# Patient Record
Sex: MALE | Race: WHITE | Employment: OTHER | URBAN - METROPOLITAN AREA
[De-identification: names, ages, dates, MRNs, and addresses within clinical notes are randomized per-mention and may not be internally consistent; named-entity substitution may affect disease eponyms.]

---

## 2018-03-19 ENCOUNTER — INITIAL CONSULT (OUTPATIENT)
Dept: SURGERY | Age: 23
End: 2018-03-19
Payer: COMMERCIAL

## 2018-03-19 VITALS — BODY MASS INDEX: 23.56 KG/M2 | WEIGHT: 120 LBS | HEIGHT: 60 IN

## 2018-03-19 DIAGNOSIS — D49.2 SOFT TISSUE NEOPLASM: Primary | ICD-10-CM

## 2018-03-19 PROCEDURE — 99204 OFFICE O/P NEW MOD 45 MIN: CPT | Performed by: SURGERY

## 2018-03-19 RX ORDER — LACTULOSE 10 G/15ML
SOLUTION ORAL; RECTAL
COMMUNITY
End: 2018-03-23 | Stop reason: ALTCHOICE

## 2018-03-19 NOTE — PROGRESS NOTES
mouth 2 times daily Take morning of surgery with a sip of water       No current facility-administered medications for this visit. No Known Allergies    The patient has a family history that is negative for severe cardiovascular or respiratory issues, negative for reaction to anesthesia. Social History     Social History    Marital status: Single     Spouse name: N/A    Number of children: N/A    Years of education: N/A     Occupational History    Not on file. Social History Main Topics    Smoking status: Never Smoker    Smokeless tobacco: Never Used    Alcohol use No    Drug use: No    Sexual activity: Not on file     Other Topics Concern    Not on file     Social History Narrative    No narrative on file           Review of Systems  Review of Systems -  General ROS: negative for - chills, fatigue or malaise  ENT ROS: negative for - hearing change, nasal congestion or nasal discharge  Allergy and Immunology ROS: negative for - hives, itchy/watery eyes or nasal congestion  Hematological and Lymphatic ROS: negative for - blood clots, blood transfusions, bruising or fatigue  Endocrine ROS: negative for - malaise/lethargy, mood swings, palpitations or polydipsia/polyuria  Breast ROS: negative for - new or changing breast lumps or nipple changes  Respiratory ROS: negative for - sputum changes, stridor, tachypnea or wheezing  Cardiovascular ROS: negative for - irregular heartbeat, loss of consciousness, murmur or orthopnea  Gastrointestinal ROS: negative for - constipation, diarrhea, gas/bloating, heartburn or hematemesis  Genito-Urinary ROS: negative for -  genital discharge, genital ulcers or hematuria  Musculoskeletal ROS: negative for - gait disturbance, muscle pain or muscular weakness      Physical exam:   There were no vitals taken for this visit.   General appearance:  NAD  Head: NCAT, PERRLA, EOMI, red conjunctiva  Neck: supple, no masses  Lungs: CTAB, equal chest rise bilateral  Heart: Reg rate  Abdomen: soft, nontender, nondistended  Skin; soft tissue neoplasm of right leg that is soft rubbery and mobile. Gu: no cva tenderness  Extremities: extremities normal, atraumatic, no cyanosis or edema  Pyschosocial: normal affect, no signs of depression      Assessment:  23 y.o. male with soft tissue neoplasm of right leg    Plan: To OR for excision Discussed the risk, benefits and alternatives of surgery including wound infections, bleeding, scar  and the risks of  anesthetic including MI, CVA, sudden death or reactions to anesthetic medications. The patient understands the risks and alternatives. All questions were answered to the patient's satisfaction and they freely signed the consent.     Clark Ansari MD  1:54 PM  3/19/2018

## 2018-03-21 ENCOUNTER — TELEPHONE (OUTPATIENT)
Dept: SURGERY | Age: 23
End: 2018-03-21

## 2018-03-23 ENCOUNTER — PREP FOR PROCEDURE (OUTPATIENT)
Dept: SURGERY | Age: 23
End: 2018-03-23

## 2018-03-23 RX ORDER — SODIUM CHLORIDE 0.9 % (FLUSH) 0.9 %
10 SYRINGE (ML) INJECTION PRN
Status: CANCELLED | OUTPATIENT
Start: 2018-03-23

## 2018-03-23 RX ORDER — SODIUM CHLORIDE 0.9 % (FLUSH) 0.9 %
10 SYRINGE (ML) INJECTION EVERY 12 HOURS SCHEDULED
Status: CANCELLED | OUTPATIENT
Start: 2018-03-23

## 2018-03-23 RX ORDER — SODIUM CHLORIDE, SODIUM LACTATE, POTASSIUM CHLORIDE, CALCIUM CHLORIDE 600; 310; 30; 20 MG/100ML; MG/100ML; MG/100ML; MG/100ML
INJECTION, SOLUTION INTRAVENOUS CONTINUOUS
Status: CANCELLED | OUTPATIENT
Start: 2018-03-23

## 2018-03-26 ENCOUNTER — ANESTHESIA EVENT (OUTPATIENT)
Dept: OPERATING ROOM | Age: 23
End: 2018-03-26
Payer: COMMERCIAL

## 2018-03-26 ENCOUNTER — ANESTHESIA (OUTPATIENT)
Dept: OPERATING ROOM | Age: 23
End: 2018-03-26
Payer: COMMERCIAL

## 2018-03-26 ENCOUNTER — HOSPITAL ENCOUNTER (OUTPATIENT)
Age: 23
Setting detail: OUTPATIENT SURGERY
Discharge: HOME | End: 2018-03-26
Attending: SURGERY | Admitting: SURGERY
Payer: COMMERCIAL

## 2018-03-26 VITALS
DIASTOLIC BLOOD PRESSURE: 44 MMHG | RESPIRATION RATE: 13 BRPM | OXYGEN SATURATION: 92 % | SYSTOLIC BLOOD PRESSURE: 86 MMHG

## 2018-03-26 VITALS
TEMPERATURE: 97.1 F | BODY MASS INDEX: 23.36 KG/M2 | HEART RATE: 92 BPM | RESPIRATION RATE: 16 BRPM | OXYGEN SATURATION: 96 % | WEIGHT: 119 LBS | SYSTOLIC BLOOD PRESSURE: 98 MMHG | DIASTOLIC BLOOD PRESSURE: 54 MMHG | HEIGHT: 60 IN

## 2018-03-26 PROCEDURE — 3700000001 HC ADD 15 MINUTES (ANESTHESIA): Performed by: SURGERY

## 2018-03-26 PROCEDURE — 27618 EXC LEG/ANKLE TUM < 3 CM: CPT | Performed by: SURGERY

## 2018-03-26 PROCEDURE — 2500000003 HC RX 250 WO HCPCS: Performed by: SURGERY

## 2018-03-26 PROCEDURE — 2500000003 HC RX 250 WO HCPCS

## 2018-03-26 PROCEDURE — 7100000011 HC PHASE II RECOVERY - ADDTL 15 MIN: Performed by: SURGERY

## 2018-03-26 PROCEDURE — 88305 TISSUE EXAM BY PATHOLOGIST: CPT

## 2018-03-26 PROCEDURE — 6360000002 HC RX W HCPCS

## 2018-03-26 PROCEDURE — 3600000012 HC SURGERY LEVEL 2 ADDTL 15MIN: Performed by: SURGERY

## 2018-03-26 PROCEDURE — 3600000002 HC SURGERY LEVEL 2 BASE: Performed by: SURGERY

## 2018-03-26 PROCEDURE — 2580000003 HC RX 258

## 2018-03-26 PROCEDURE — 3700000000 HC ANESTHESIA ATTENDED CARE: Performed by: SURGERY

## 2018-03-26 PROCEDURE — 7100000010 HC PHASE II RECOVERY - FIRST 15 MIN: Performed by: SURGERY

## 2018-03-26 RX ORDER — CEFAZOLIN SODIUM 1 G/3ML
INJECTION, POWDER, FOR SOLUTION INTRAMUSCULAR; INTRAVENOUS PRN
Status: DISCONTINUED | OUTPATIENT
Start: 2018-03-26 | End: 2018-03-26 | Stop reason: SDUPTHER

## 2018-03-26 RX ORDER — KETAMINE HYDROCHLORIDE 50 MG/ML
INJECTION, SOLUTION, CONCENTRATE INTRAMUSCULAR; INTRAVENOUS PRN
Status: DISCONTINUED | OUTPATIENT
Start: 2018-03-26 | End: 2018-03-26 | Stop reason: SDUPTHER

## 2018-03-26 RX ORDER — GLYCOPYRROLATE 0.2 MG/ML
INJECTION INTRAMUSCULAR; INTRAVENOUS PRN
Status: DISCONTINUED | OUTPATIENT
Start: 2018-03-26 | End: 2018-03-26 | Stop reason: SDUPTHER

## 2018-03-26 RX ORDER — MIDAZOLAM HYDROCHLORIDE 1 MG/ML
INJECTION INTRAMUSCULAR; INTRAVENOUS PRN
Status: DISCONTINUED | OUTPATIENT
Start: 2018-03-26 | End: 2018-03-26 | Stop reason: SDUPTHER

## 2018-03-26 RX ORDER — SODIUM CHLORIDE, SODIUM LACTATE, POTASSIUM CHLORIDE, CALCIUM CHLORIDE 600; 310; 30; 20 MG/100ML; MG/100ML; MG/100ML; MG/100ML
INJECTION, SOLUTION INTRAVENOUS CONTINUOUS
Status: DISCONTINUED | OUTPATIENT
Start: 2018-03-26 | End: 2018-03-26 | Stop reason: HOSPADM

## 2018-03-26 RX ORDER — BUPIVACAINE HYDROCHLORIDE AND EPINEPHRINE 2.5; 5 MG/ML; UG/ML
INJECTION, SOLUTION EPIDURAL; INFILTRATION; INTRACAUDAL; PERINEURAL PRN
Status: DISCONTINUED | OUTPATIENT
Start: 2018-03-26 | End: 2018-03-26 | Stop reason: HOSPADM

## 2018-03-26 RX ORDER — SODIUM CHLORIDE 9 MG/ML
INJECTION, SOLUTION INTRAVENOUS CONTINUOUS PRN
Status: DISCONTINUED | OUTPATIENT
Start: 2018-03-26 | End: 2018-03-26 | Stop reason: SDUPTHER

## 2018-03-26 RX ORDER — PROPOFOL 10 MG/ML
INJECTION, EMULSION INTRAVENOUS CONTINUOUS PRN
Status: DISCONTINUED | OUTPATIENT
Start: 2018-03-26 | End: 2018-03-26 | Stop reason: SDUPTHER

## 2018-03-26 RX ORDER — ONDANSETRON 2 MG/ML
INJECTION INTRAMUSCULAR; INTRAVENOUS PRN
Status: DISCONTINUED | OUTPATIENT
Start: 2018-03-26 | End: 2018-03-26 | Stop reason: SDUPTHER

## 2018-03-26 RX ORDER — SODIUM CHLORIDE 0.9 % (FLUSH) 0.9 %
10 SYRINGE (ML) INJECTION EVERY 12 HOURS SCHEDULED
Status: DISCONTINUED | OUTPATIENT
Start: 2018-03-26 | End: 2018-03-26 | Stop reason: HOSPADM

## 2018-03-26 RX ORDER — SODIUM CHLORIDE 0.9 % (FLUSH) 0.9 %
10 SYRINGE (ML) INJECTION PRN
Status: DISCONTINUED | OUTPATIENT
Start: 2018-03-26 | End: 2018-03-26 | Stop reason: HOSPADM

## 2018-03-26 RX ORDER — FENTANYL CITRATE 50 UG/ML
INJECTION, SOLUTION INTRAMUSCULAR; INTRAVENOUS PRN
Status: DISCONTINUED | OUTPATIENT
Start: 2018-03-26 | End: 2018-03-26 | Stop reason: SDUPTHER

## 2018-03-26 RX ADMIN — SODIUM CHLORIDE: 9 INJECTION, SOLUTION INTRAVENOUS at 10:08

## 2018-03-26 RX ADMIN — KETAMINE HYDROCHLORIDE 60 MG: 50 INJECTION, SOLUTION INTRAMUSCULAR; INTRAVENOUS at 10:06

## 2018-03-26 RX ADMIN — PROPOFOL 100 MCG/KG/MIN: 10 INJECTION, EMULSION INTRAVENOUS at 10:10

## 2018-03-26 RX ADMIN — CEFAZOLIN 1000 MG: 1 INJECTION, POWDER, FOR SOLUTION INTRAMUSCULAR; INTRAVENOUS; PARENTERAL at 10:18

## 2018-03-26 RX ADMIN — MIDAZOLAM HYDROCHLORIDE 2 MG: 1 INJECTION INTRAMUSCULAR; INTRAVENOUS at 10:06

## 2018-03-26 RX ADMIN — FENTANYL CITRATE 100 MCG: 50 INJECTION, SOLUTION INTRAMUSCULAR; INTRAVENOUS at 10:10

## 2018-03-26 RX ADMIN — ONDANSETRON 4 MG: 2 INJECTION, SOLUTION INTRAMUSCULAR; INTRAVENOUS at 10:17

## 2018-03-26 RX ADMIN — GLYCOPYRROLATE 0.2 MG: 0.2 INJECTION, SOLUTION INTRAMUSCULAR; INTRAVENOUS at 10:17

## 2018-03-26 ASSESSMENT — PULMONARY FUNCTION TESTS
PIF_VALUE: 1
PIF_VALUE: 0
PIF_VALUE: 1
PIF_VALUE: 0
PIF_VALUE: 1
PIF_VALUE: 0
PIF_VALUE: 1

## 2018-03-26 ASSESSMENT — PAIN - FUNCTIONAL ASSESSMENT: PAIN_FUNCTIONAL_ASSESSMENT: FACES

## 2018-03-26 NOTE — H&P
LACTULOSE PO Take  by mouth 3 times daily.        citalopram (CELEXA) 10 MG/5ML solution Take 10 mg by mouth daily.        RANITIDINE HCL PO Take 15 mg by mouth 2 times daily Take morning of surgery with a sip of water          No current facility-administered medications for this visit.             No Known Allergies     The patient has a family history that is negative for severe cardiovascular or respiratory issues, negative for reaction to anesthesia.     Social History   Social History            Social History    Marital status: Single       Spouse name: N/A    Number of children: N/A    Years of education: N/A          Occupational History    Not on file.           Social History Main Topics    Smoking status: Never Smoker    Smokeless tobacco: Never Used    Alcohol use No    Drug use: No    Sexual activity: Not on file           Other Topics Concern    Not on file          Social History Narrative    No narrative on file                  Review of Systems  Review of Systems -  General ROS: negative for - chills, fatigue or malaise  ENT ROS: negative for - hearing change, nasal congestion or nasal discharge  Allergy and Immunology ROS: negative for - hives, itchy/watery eyes or nasal congestion  Hematological and Lymphatic ROS: negative for - blood clots, blood transfusions, bruising or fatigue  Endocrine ROS: negative for - malaise/lethargy, mood swings, palpitations or polydipsia/polyuria  Breast ROS: negative for - new or changing breast lumps or nipple changes  Respiratory ROS: negative for - sputum changes, stridor, tachypnea or wheezing  Cardiovascular ROS: negative for - irregular heartbeat, loss of consciousness, murmur or orthopnea  Gastrointestinal ROS: negative for - constipation, diarrhea, gas/bloating, heartburn or hematemesis  Genito-Urinary ROS: negative for -  genital discharge, genital ulcers or hematuria  Musculoskeletal ROS: negative for - gait disturbance, muscle pain or muscular weakness        Physical exam:   There were no vitals taken for this visit. General appearance:  NAD  Head: NCAT, PERRLA, EOMI, red conjunctiva  Neck: supple, no masses  Lungs: CTAB, equal chest rise bilateral  Heart: Reg rate  Abdomen: soft, nontender, nondistended  Skin; soft tissue neoplasm of right leg that is soft rubbery and mobile. Gu: no cva tenderness  Extremities: extremities normal, atraumatic, no cyanosis or edema  Pyschosocial: normal affect, no signs of depression        Assessment:  23 y.o. male with soft tissue neoplasm of right leg     Plan: To OR for excision Discussed the risk, benefits and alternatives of surgery including wound infections, bleeding, scar  and the risks of  anesthetic including MI, CVA, sudden death or reactions to anesthetic medications. The patient understands the risks and alternatives.  All questions were answered to the patient's satisfaction and they freely signed the consent.  Malika Mayer MD

## 2018-03-28 ENCOUNTER — TELEPHONE (OUTPATIENT)
Dept: SURGERY | Age: 23
End: 2018-03-28

## 2018-03-28 NOTE — TELEPHONE ENCOUNTER
Patient's mom called saying Erik's thigh is swollen, red and warm to the touch. ..says incision looks fine--doesn't look infected, says it's just his thigh above the surgical site and she says it's more swollen today than yesterday. Denies fever or any other sx. Message sent to Dr Dolly Harrison. Per Dr Uriel Freed orders, pt can be seen here Thursday or can go to the ER. Called to inform mother and have to leave a voicemail msg to return my call.     Electronically signed by Narinder Sin MA on 3/28/18 at 8:52 AM

## 2018-03-29 ENCOUNTER — OFFICE VISIT (OUTPATIENT)
Dept: SURGERY | Age: 23
End: 2018-03-29

## 2018-03-29 VITALS — SYSTOLIC BLOOD PRESSURE: 102 MMHG | DIASTOLIC BLOOD PRESSURE: 68 MMHG | HEART RATE: 64 BPM

## 2018-03-29 DIAGNOSIS — D49.2 SOFT TISSUE NEOPLASM: Primary | ICD-10-CM

## 2018-03-29 PROCEDURE — 99024 POSTOP FOLLOW-UP VISIT: CPT | Performed by: SURGERY

## 2018-04-09 ENCOUNTER — OFFICE VISIT (OUTPATIENT)
Dept: SURGERY | Age: 23
End: 2018-04-09

## 2018-04-09 VITALS — SYSTOLIC BLOOD PRESSURE: 106 MMHG | HEART RATE: 70 BPM | DIASTOLIC BLOOD PRESSURE: 72 MMHG

## 2018-04-09 DIAGNOSIS — D49.2 SOFT TISSUE NEOPLASM: Primary | ICD-10-CM

## 2018-04-09 PROCEDURE — 99024 POSTOP FOLLOW-UP VISIT: CPT | Performed by: SURGERY

## 2018-04-09 RX ORDER — SULFAMETHOXAZOLE AND TRIMETHOPRIM 800; 160 MG/1; MG/1
1 TABLET ORAL 2 TIMES DAILY
Qty: 20 TABLET | Refills: 0 | Status: SHIPPED | OUTPATIENT
Start: 2018-04-09 | End: 2018-04-19

## 2018-05-01 ENCOUNTER — HOSPITAL ENCOUNTER (OUTPATIENT)
Dept: WOUND CARE | Age: 23
Discharge: HOME | End: 2018-05-01
Payer: COMMERCIAL

## 2018-05-01 VITALS
RESPIRATION RATE: 16 BRPM | HEART RATE: 84 BPM | SYSTOLIC BLOOD PRESSURE: 100 MMHG | WEIGHT: 119 LBS | HEIGHT: 60 IN | BODY MASS INDEX: 23.36 KG/M2 | DIASTOLIC BLOOD PRESSURE: 60 MMHG | TEMPERATURE: 97.3 F

## 2018-05-01 PROCEDURE — 99203 OFFICE O/P NEW LOW 30 MIN: CPT | Performed by: FAMILY MEDICINE

## 2018-05-01 PROCEDURE — 87070 CULTURE OTHR SPECIMN AEROBIC: CPT

## 2018-05-01 PROCEDURE — 11042 DBRDMT SUBQ TIS 1ST 20SQCM/<: CPT | Performed by: FAMILY MEDICINE

## 2018-05-01 PROCEDURE — 99213 OFFICE O/P EST LOW 20 MIN: CPT

## 2018-05-01 PROCEDURE — 87075 CULTR BACTERIA EXCEPT BLOOD: CPT

## 2018-05-01 PROCEDURE — 11042 DBRDMT SUBQ TIS 1ST 20SQCM/<: CPT

## 2018-05-03 LAB
ANAEROBIC CULTURE: NORMAL
ORGANISM: ABNORMAL
WOUND/ABSCESS: ABNORMAL
WOUND/ABSCESS: ABNORMAL

## 2018-05-15 ENCOUNTER — HOSPITAL ENCOUNTER (OUTPATIENT)
Dept: WOUND CARE | Age: 23
Discharge: HOME | End: 2018-05-15
Payer: COMMERCIAL

## 2018-05-15 VITALS
TEMPERATURE: 96.5 F | SYSTOLIC BLOOD PRESSURE: 110 MMHG | HEART RATE: 62 BPM | RESPIRATION RATE: 16 BRPM | HEIGHT: 60 IN | DIASTOLIC BLOOD PRESSURE: 62 MMHG | WEIGHT: 119 LBS | BODY MASS INDEX: 23.36 KG/M2

## 2018-05-15 PROCEDURE — 11042 DBRDMT SUBQ TIS 1ST 20SQCM/<: CPT | Performed by: FAMILY MEDICINE

## 2018-05-15 PROCEDURE — 11042 DBRDMT SUBQ TIS 1ST 20SQCM/<: CPT

## 2018-05-22 ENCOUNTER — HOSPITAL ENCOUNTER (OUTPATIENT)
Dept: WOUND CARE | Age: 23
Discharge: HOME | End: 2018-05-22
Payer: COMMERCIAL

## 2018-05-22 VITALS
SYSTOLIC BLOOD PRESSURE: 110 MMHG | RESPIRATION RATE: 16 BRPM | TEMPERATURE: 99.5 F | DIASTOLIC BLOOD PRESSURE: 62 MMHG | HEART RATE: 60 BPM

## 2018-05-22 PROCEDURE — 11042 DBRDMT SUBQ TIS 1ST 20SQCM/<: CPT

## 2018-05-22 PROCEDURE — 11042 DBRDMT SUBQ TIS 1ST 20SQCM/<: CPT | Performed by: FAMILY MEDICINE

## 2018-05-29 ENCOUNTER — HOSPITAL ENCOUNTER (OUTPATIENT)
Dept: WOUND CARE | Age: 23
Discharge: HOME | End: 2018-05-29
Payer: COMMERCIAL

## 2018-05-29 VITALS
HEART RATE: 96 BPM | WEIGHT: 119 LBS | BODY MASS INDEX: 23.36 KG/M2 | SYSTOLIC BLOOD PRESSURE: 106 MMHG | RESPIRATION RATE: 18 BRPM | HEIGHT: 60 IN | TEMPERATURE: 97.6 F | DIASTOLIC BLOOD PRESSURE: 80 MMHG

## 2018-05-29 PROCEDURE — 11042 DBRDMT SUBQ TIS 1ST 20SQCM/<: CPT

## 2018-05-29 PROCEDURE — 11042 DBRDMT SUBQ TIS 1ST 20SQCM/<: CPT | Performed by: FAMILY MEDICINE

## 2018-05-29 ASSESSMENT — PAIN SCALES - GENERAL: PAINLEVEL_OUTOF10: 0

## 2018-06-08 ENCOUNTER — HOSPITAL ENCOUNTER (OUTPATIENT)
Dept: WOUND CARE | Age: 23
Discharge: HOME | End: 2018-06-08
Payer: COMMERCIAL

## 2018-06-08 VITALS
DIASTOLIC BLOOD PRESSURE: 62 MMHG | SYSTOLIC BLOOD PRESSURE: 100 MMHG | RESPIRATION RATE: 16 BRPM | TEMPERATURE: 98 F | HEART RATE: 80 BPM

## 2018-06-08 PROCEDURE — 87077 CULTURE AEROBIC IDENTIFY: CPT

## 2018-06-08 PROCEDURE — 87186 SC STD MICRODIL/AGAR DIL: CPT

## 2018-06-08 PROCEDURE — 11042 DBRDMT SUBQ TIS 1ST 20SQCM/<: CPT

## 2018-06-08 PROCEDURE — 87070 CULTURE OTHR SPECIMN AEROBIC: CPT

## 2018-06-08 PROCEDURE — 11042 DBRDMT SUBQ TIS 1ST 20SQCM/<: CPT | Performed by: FAMILY MEDICINE

## 2018-06-08 PROCEDURE — 87075 CULTR BACTERIA EXCEPT BLOOD: CPT

## 2018-06-08 ASSESSMENT — PAIN SCALES - GENERAL: PAINLEVEL_OUTOF10: 0

## 2018-06-10 LAB — ANAEROBIC CULTURE: NORMAL

## 2018-06-12 ENCOUNTER — HOSPITAL ENCOUNTER (OUTPATIENT)
Dept: WOUND CARE | Age: 23
Discharge: HOME | End: 2018-06-12
Payer: COMMERCIAL

## 2018-06-12 VITALS
BODY MASS INDEX: 23.36 KG/M2 | TEMPERATURE: 97.5 F | WEIGHT: 119 LBS | HEART RATE: 98 BPM | DIASTOLIC BLOOD PRESSURE: 60 MMHG | RESPIRATION RATE: 18 BRPM | SYSTOLIC BLOOD PRESSURE: 102 MMHG | HEIGHT: 60 IN

## 2018-06-12 LAB
ORGANISM: ABNORMAL
WOUND/ABSCESS: ABNORMAL
WOUND/ABSCESS: ABNORMAL

## 2018-06-12 PROCEDURE — 97607 NEG PRS WND THR NDME<=50SQCM: CPT

## 2018-06-12 PROCEDURE — 11042 DBRDMT SUBQ TIS 1ST 20SQCM/<: CPT

## 2018-06-12 PROCEDURE — 11042 DBRDMT SUBQ TIS 1ST 20SQCM/<: CPT | Performed by: FAMILY MEDICINE

## 2018-06-22 ENCOUNTER — HOSPITAL ENCOUNTER (OUTPATIENT)
Dept: WOUND CARE | Age: 23
Discharge: HOME | End: 2018-06-22
Payer: COMMERCIAL

## 2018-06-22 VITALS
TEMPERATURE: 97.6 F | RESPIRATION RATE: 18 BRPM | SYSTOLIC BLOOD PRESSURE: 106 MMHG | DIASTOLIC BLOOD PRESSURE: 64 MMHG | HEART RATE: 96 BPM

## 2018-06-22 PROCEDURE — 97597 DBRDMT OPN WND 1ST 20 CM/<: CPT | Performed by: FAMILY MEDICINE

## 2018-06-22 PROCEDURE — 97597 DBRDMT OPN WND 1ST 20 CM/<: CPT

## 2018-06-22 PROCEDURE — 97607 NEG PRS WND THR NDME<=50SQCM: CPT

## 2018-06-29 ENCOUNTER — HOSPITAL ENCOUNTER (OUTPATIENT)
Dept: WOUND CARE | Age: 23
Discharge: HOME | End: 2018-06-29
Payer: COMMERCIAL

## 2018-06-29 VITALS
DIASTOLIC BLOOD PRESSURE: 68 MMHG | TEMPERATURE: 98.1 F | RESPIRATION RATE: 16 BRPM | SYSTOLIC BLOOD PRESSURE: 98 MMHG | HEART RATE: 90 BPM

## 2018-06-29 PROCEDURE — 87186 SC STD MICRODIL/AGAR DIL: CPT

## 2018-06-29 PROCEDURE — 87077 CULTURE AEROBIC IDENTIFY: CPT

## 2018-06-29 PROCEDURE — 11042 DBRDMT SUBQ TIS 1ST 20SQCM/<: CPT | Performed by: FAMILY MEDICINE

## 2018-06-29 PROCEDURE — 87070 CULTURE OTHR SPECIMN AEROBIC: CPT

## 2018-06-29 PROCEDURE — 87075 CULTR BACTERIA EXCEPT BLOOD: CPT

## 2018-06-29 PROCEDURE — 11042 DBRDMT SUBQ TIS 1ST 20SQCM/<: CPT

## 2018-06-29 ASSESSMENT — PAIN SCALES - GENERAL: PAINLEVEL_OUTOF10: 0

## 2018-07-01 LAB — ANAEROBIC CULTURE: NORMAL

## 2018-07-02 LAB
ORGANISM: ABNORMAL
WOUND/ABSCESS: ABNORMAL

## 2018-07-06 ENCOUNTER — HOSPITAL ENCOUNTER (OUTPATIENT)
Dept: WOUND CARE | Age: 23
Discharge: HOME | End: 2018-07-06
Payer: COMMERCIAL

## 2018-07-06 VITALS
SYSTOLIC BLOOD PRESSURE: 100 MMHG | HEART RATE: 100 BPM | DIASTOLIC BLOOD PRESSURE: 62 MMHG | TEMPERATURE: 98 F | RESPIRATION RATE: 18 BRPM

## 2018-07-06 DIAGNOSIS — L97.812 ULCER OF KNEE, RIGHT, WITH FAT LAYER EXPOSED (HCC): Primary | ICD-10-CM

## 2018-07-06 DIAGNOSIS — A49.02 INFECTION OF WOUND DUE TO METHICILLIN RESISTANT STAPHYLOCOCCUS AUREUS (MRSA): ICD-10-CM

## 2018-07-06 PROCEDURE — 11042 DBRDMT SUBQ TIS 1ST 20SQCM/<: CPT | Performed by: FAMILY MEDICINE

## 2018-07-06 PROCEDURE — 11042 DBRDMT SUBQ TIS 1ST 20SQCM/<: CPT

## 2018-07-06 RX ORDER — SULFAMETHOXAZOLE AND TRIMETHOPRIM 800; 160 MG/1; MG/1
1 TABLET ORAL 2 TIMES DAILY
Qty: 20 TABLET | Refills: 0 | Status: SHIPPED | OUTPATIENT
Start: 2018-07-06 | End: 2018-07-31

## 2018-07-06 ASSESSMENT — PAIN SCALES - GENERAL: PAINLEVEL_OUTOF10: 0

## 2018-07-06 NOTE — PROGRESS NOTES
Referring Physician : Estephania Morillo MD  [facility] RECORD NUMBER:  [id number]  AGE: 23 y.o. GENDER: male  : 1995  EPISODE DATE:  2018    Subjective:     Chief Complaint   Patient presents with    Wound Check      HISTORY of PRESENT ILLNESS HPI   Yared Quinn is a 23 y.o. male who presents today for wound/ulcer evaluation. History of Wound Context:  Right popliteal wound post op to a biopsy site. Wound/Ulcer Pain Timing/Severity: none  Quality of pain: N/A  Severity:  0 / 10   Modifying Factors: None  Associated Signs/Symptoms: irritation with attending tgo the wound    Ulcer Identification:  Ulcer Type: non-healing surgical  Contributing Factors: MRSA    Diabetic/Pressure/Non Pressure Ulcers only:  Ulcer: dehisced surgical wound    Wound: Nonhealing surgical due to infection        PAST MEDICAL HISTORY      Diagnosis Date    G tube feedings [facility])     age 3    History of blood transfusion     Mental handicap     mom states severe brain damage    Seizures ([address]      Past Surgical History:   Procedure Laterality Date    DENTAL SURGERY  2015    xrays, restorations and extractions x1    DENTAL SURGERY  2016    LEG TENDON SURGERY Bilateral     [place] BIOPSY, EACH ADDED LESION Right 3/26/2018    EXCISION OF SOFT TISSUE NEOPLASM RIGHT LEG performed by [facility] MD at [facility]     No family history on file.   Social History   Substance Use Topics    Smoking status: Never Smoker    Smokeless tobacco: Never Used    Alcohol use No     No Known Allergies  Current Outpatient Prescriptions on File Prior to Encounter   Medication Sig Dispense Refill    Elastic Bandages & Supports (KNEE BRACE ADJUSTABLE HINGED) MISC 1 Units by Does not apply route daily 1 each 0    Bisacodyl (DULCOLAX PO) Take by mouth daily as needed       Polyethylene Glycol 3350 (MIRALAX PO) Take by mouth      lacosamide (VIMPAT) 150 MG TABS tablet Take 150 mg by mouth 2 times daily Take morning of base.    Performed by: Heather Hassan DO    Consent obtained:  Yes    Time out taken:  Yes    Pain Control: Anesthetic  Anesthetic: 2% Lidocaine Gel Topical     Debridement:Excisional Debridement    Using curette the wound(s)/ulcer(s) was/were sharply debrided down through and including the removal of subcutaneous tissue. Devitalized Tissue Debrided:  fibrin, biofilm and slough to stimulate bleeding to promote healing, post debridement good bleeding base and wound edges noted    Pre Debridement Measurements:  Are located in the Wound/Ulcer Documentation Flow Sheet    Wound/Ulcer #: 1    Post Debridement Measurements:  Wound/Ulcer Descriptions are Pre Debridement except measurements:    Wound 05/01/18 Knee Posterior new #1 surgjical. right post knee. date of surgery april 9, 2018 (Active)   Wound Image   6/8/2018  1:21 PM   Dressing Status New drainage; Changed 5/15/2018  3:36 PM   Dressing Changed Changed/New 6/22/2018  1:59 PM   Dressing/Treatment Vacuum dressing 6/22/2018  1:59 PM   Wound Cleansed Rinsed/Irrigated with saline 6/22/2018  1:59 PM   Dressing Change Due 06/24/18 6/22/2018  1:59 PM   Wound Length (cm) 0.5 cm 7/6/2018  2:28 PM   Wound Width (cm) 0.5 cm 7/6/2018  2:28 PM   Wound Depth (cm)  .4 7/6/2018  2:28 PM   Calculated Wound Size (cm^2) (l*w) 0.25 cm^2 7/6/2018  2:28 PM   Change in Wound Size % (l*w) 91.26 7/6/2018  2:28 PM   Wound Assessment Pink 7/6/2018  1:32 PM   Drainage Amount Moderate 7/6/2018  1:32 PM   Drainage Description Serosanguinous 7/6/2018  1:32 PM   Odor None 7/6/2018  1:32 PM   Exposed structure Tendon 5/29/2018  3:35 PM   Cassie-wound Assessment Intact 7/6/2018  1:32 PM   Red%Wound Bed 40 5/1/2018  1:57 PM   Yellow%Wound Bed 60 5/1/2018  1:57 PM   Culture Taken Yes 6/8/2018  1:44 PM   Time out Yes 7/6/2018  2:28 PM   Procedural Pain 5 7/6/2018  2:28 PM   Post procedural Pain 2 7/6/2018  2:28 PM   Number of days: 66       Incision 03/26/15 Mouth (Active)   Number of days: 1198       Incision 12/08/16 Mouth (Active)   Number of days: 574       Incision 03/26/18 Leg Right (Active)   Number of days: 102     Percent of Wound/Ulcer Debrided: 100%    Total Surface Area Debrided:  0.25 cm sq. sq cm     Estimated Blood Loss:  None  Hemostasis Achieved:  by pressure    Procedural Pain:  0  / 10   Post Procedural Pain:  0 / 10     Response to treatment:  Well tolerated by patient. Plan:   Treatment Note please see attached Discharge Instructions  MRSA on last C&S We discussed with family need for IVAB TX due to resultant DERIAN's  Will stop VCA start Mupirocin daily as well as Bactrim and get ID consult. Written patient dismissal instructions given to patient and signed by patient or POA. Discharge Instructions         Visit Discharge/Physician Orders     Discharge condition: Stable     Assessment of pain at discharge: mild     Anesthetic used: 2% lidocaine     Discharge to: Home     Left via:Private automobile     Accompanied by: family     ECF/HHA: None     Dressing Orders: WOUND VAC HOLD. continue to cleanse wound with vashe in clinic, then rinse with normal saline solution. Pack wound lightly but thoroughly with MUPIRICIN ointment, then apply silver alginate and dry dressing. CHANGE EVERY 24 HRS.    FAX WOUND MEASUREMENTS TO I TWICE A MONTH #[phone]     Treatment Orders consult infectious disease. Begin to take liquid antibiotic. :[facility] followup visit ____________1 week_________________  (Please note your next appointment above and if you are unable to keep, kindly give a 24 hour notice.  Thank you.)     Physician signature:__________________________        If you experience any of the following, please call the [address] [place] [address] Road during business hours:     * Increase in Pain  * Temperature over 101  * Increase in drainage from your wound  * Drainage with a foul odor  * Bleeding  * Increase in swelling  * Need for compression bandage changes due to slippage,

## 2018-07-13 ENCOUNTER — HOSPITAL ENCOUNTER (OUTPATIENT)
Dept: WOUND CARE | Age: 23
Discharge: HOME | End: 2018-07-13
Payer: COMMERCIAL

## 2018-07-13 VITALS
RESPIRATION RATE: 18 BRPM | TEMPERATURE: 98 F | DIASTOLIC BLOOD PRESSURE: 62 MMHG | HEART RATE: 90 BPM | SYSTOLIC BLOOD PRESSURE: 98 MMHG

## 2018-07-13 DIAGNOSIS — L97.812 ULCER OF KNEE, RIGHT, WITH FAT LAYER EXPOSED (HCC): ICD-10-CM

## 2018-07-13 DIAGNOSIS — T81.31XS POSTOPERATIVE WOUND DEHISCENCE, SEQUELA: ICD-10-CM

## 2018-07-13 PROBLEM — T81.31XA DEHISCENCE OF SURGICAL WOUND: Status: ACTIVE | Noted: 2018-07-13

## 2018-07-13 PROCEDURE — 11042 DBRDMT SUBQ TIS 1ST 20SQCM/<: CPT

## 2018-07-13 PROCEDURE — 11042 DBRDMT SUBQ TIS 1ST 20SQCM/<: CPT | Performed by: FAMILY MEDICINE

## 2018-07-13 RX ORDER — GENTAMICIN SULFATE 1 MG/G
OINTMENT TOPICAL
Qty: 30 G | Refills: 1 | Status: SHIPPED | OUTPATIENT
Start: 2018-07-13 | End: 2018-07-20

## 2018-07-13 RX ORDER — SULFAMETHOXAZOLE AND TRIMETHOPRIM 800; 160 MG/1; MG/1
1 TABLET ORAL 2 TIMES DAILY
Qty: 20 TABLET | Refills: 0 | Status: SHIPPED | OUTPATIENT
Start: 2018-07-13 | End: 2018-07-31

## 2018-07-13 NOTE — PROGRESS NOTES
Referring Physician : Zackary Glass MD  [facility] RECORD NUMBER:  [id number]  AGE: 23 y.o. GENDER: male  : 1995  EPISODE DATE:  2018    Subjective:     Chief Complaint   Patient presents with    Wound Check      HISTORY of PRESENT ILLNESS HPI   Vero Heredia is a 23 y.o. male who presents today for wound/ulcer evaluation. History of Wound Context:  Post operative biopsy Right popliteal fossa wound opened. Chronic ulcer difficult to close in this location as his flexion contractures keep the knee hyperflexed. Wound/Ulcer Pain Timing/Severity: none  Quality of pain: N/A  Severity:  0 / 10   Modifying Factors: contractures from CP  Associated Signs/Symptoms: infection    Ulcer Identification:  Ulcer Type: non-healing surgical  Contributing Factors: position    Diabetic/Pressure/Non Pressure Ulcers only:  Ulcer: Surgial    Wound: Nonhealing surgical due to infection        PAST MEDICAL HISTORY      Diagnosis Date    G tube feedings [facility])     age 3    History of blood transfusion     Mental handicap     mom states severe brain damage    Seizures [address] [address]      Past Surgical History:   Procedure Laterality Date    DENTAL SURGERY  2015    xrays, restorations and extractions x1    DENTAL SURGERY  2016    LEG TENDON SURGERY Bilateral     [place] BIOPSY, EACH ADDED LESION Right 3/26/2018    EXCISION OF SOFT TISSUE NEOPLASM RIGHT LEG performed by Sonido Chen MD at [address]     No family history on file. Social History   Substance Use Topics    Smoking status: Never Smoker    Smokeless tobacco: Never Used    Alcohol use No     No Known Allergies  Current Outpatient Prescriptions on File Prior to Encounter   Medication Sig Dispense Refill    sulfamethoxazole-trimethoprim (BACTRIM DS) 800-160 MG per tablet Take 1 tablet by mouth 2 times daily 20 tablet 0    mupirocin (BACTROBAN) 2 % ointment Apply topically daily,to Right knee wound.  30 g 1    Elastic Bandages & Supports Incision 03/26/15 Mouth (Active)   Number of days: 1205       Incision 12/08/16 Mouth (Active)   Number of days: 581       Incision 03/26/18 Leg Right (Active)   Number of days: 109     Percent of Wound/Ulcer Debrided: 100%    Total Surface Area Debrided:  0.25 cm sq. sq cm     Estimated Blood Loss:  Minimal  Hemostasis Achieved:  by pressure    Procedural Pain:  0  / 10   Post Procedural Pain:  0 / 10     Response to treatment:  Well tolerated by patient. Plan:   Treatment Note please see attached Discharge Instructions    Written patient dismissal instructions given to patient and signed by patient or POA. Discharge Instructions       Visit Discharge/Physician Orders     Discharge condition: Stable     Assessment of pain at discharge: mild     Anesthetic used: 2% lidocaine     Discharge to: Home     Left via:Private automobile     Accompanied by: family     ECF/HHA: None     Dressing Orders: WOUND VAC HOLD. continue to cleanse wound with vashe in clinic, then rinse with normal saline solution. Pack wound lightly but thoroughly with MUPIRICIN ointment, then apply silver alginate and dry dressing. CHANGE EVERY 24 HRS.    FAX WOUND MEASUREMENTS TO [facility] TWICE A MONTH #[phone]     Treatment Orders consult infectious disease (done). Continue  to take antibiotic.   Consider consult to [facility] for right knee brace :[facility] followup visit ___________2 weekS, TUESDAY_________________        Electronically signed by Artur Short DO on 7/13/2018 at 2:02 PM

## 2018-07-24 ENCOUNTER — HOSPITAL ENCOUNTER (OUTPATIENT)
Dept: WOUND CARE | Age: 23
Discharge: HOME | End: 2018-07-24
Payer: COMMERCIAL

## 2018-07-24 VITALS
SYSTOLIC BLOOD PRESSURE: 98 MMHG | TEMPERATURE: 97.8 F | DIASTOLIC BLOOD PRESSURE: 62 MMHG | HEART RATE: 84 BPM | RESPIRATION RATE: 18 BRPM

## 2018-07-24 DIAGNOSIS — T81.31XS POSTOPERATIVE WOUND DEHISCENCE, SEQUELA: ICD-10-CM

## 2018-07-24 DIAGNOSIS — L97.812 ULCER OF KNEE, RIGHT, WITH FAT LAYER EXPOSED (HCC): Primary | ICD-10-CM

## 2018-07-24 PROCEDURE — 11042 DBRDMT SUBQ TIS 1ST 20SQCM/<: CPT | Performed by: FAMILY MEDICINE

## 2018-07-24 PROCEDURE — 11042 DBRDMT SUBQ TIS 1ST 20SQCM/<: CPT

## 2018-07-24 PROCEDURE — 87075 CULTR BACTERIA EXCEPT BLOOD: CPT

## 2018-07-24 PROCEDURE — 87070 CULTURE OTHR SPECIMN AEROBIC: CPT

## 2018-07-24 RX ORDER — SULFAMETHOXAZOLE AND TRIMETHOPRIM 800; 160 MG/1; MG/1
1 TABLET ORAL 2 TIMES DAILY
Qty: 20 TABLET | Refills: 0 | Status: SHIPPED | OUTPATIENT
Start: 2018-07-24 | End: 2018-08-01 | Stop reason: ALTCHOICE

## 2018-07-24 RX ORDER — GENTAMICIN SULFATE 1 MG/G
CREAM TOPICAL DAILY
COMMUNITY
End: 2018-09-28 | Stop reason: ALTCHOICE

## 2018-07-24 NOTE — PROGRESS NOTES
Follow-Up Wound Progress Note  Erik Daniels  AGE: 23 y.o. GENDER: male  DOD: 1995  TODAY'S DATE:  7/24/18  Subjective:    Michael Muse is a 23 y.o. male who presents today for wound check. Wound Etiology :  dehisced surgical wound  Wound Location :   on the right popliteal fossa Pain : {1/10     Abx : Present      Overall Wound Assessment  Wound is is unchanged. Size has unchanged    Objective:    BP 98/62   Pulse 84   Temp 97.8 °F (36.6 °C) (Axillary)   Resp 18   Wound 05/01/18 Knee Posterior new #1 surgical. right post knee. date of surgery april 9, 2018 (Active)   Wound Image   6/8/2018  1:21 PM   Dressing Status Clean;Dry; Intact 7/13/2018  2:15 PM   Dressing Changed Changed/New 7/24/2018  3:26 PM   Dressing/Treatment Silver dressing;Dry dressing 7/24/2018  3:26 PM   Wound Cleansed Rinsed/Irrigated with saline 7/24/2018  3:26 PM   Dressing Change Due 07/25/18 7/24/2018  3:26 PM   Wound Length (cm) 1 cm 7/24/2018  2:29 PM   Wound Width (cm) 1 cm 7/24/2018  2:29 PM   Wound Depth (cm)  1.3 7/24/2018  2:29 PM   Calculated Wound Size (cm^2) (l*w) 1 cm^2 7/24/2018  2:29 PM   Change in Wound Size % (l*w) 65.03 7/24/2018  2:29 PM   Wound Assessment Yellow;Slough 7/24/2018  1:50 PM   Drainage Amount Moderate 7/24/2018  1:50 PM   Drainage Description Serosanguinous 7/24/2018  1:50 PM   Odor None 7/24/2018  1:50 PM   Exposed structure Tendon 5/29/2018  3:35 PM   Cassie-wound Assessment Pink; Intact 7/24/2018  1:50 PM   Red%Wound Bed 40 5/1/2018  1:57 PM   Yellow%Wound Bed 60 5/1/2018  1:57 PM   Culture Taken Yes 7/24/2018  2:29 PM   Time out Yes 7/24/2018  2:29 PM   Procedural Pain 5 7/24/2018  2:29 PM   Post procedural Pain 2 7/24/2018  2:29 PM   Number of days: 84       Incision 03/26/15 Mouth (Active)   Number of days: 1216       Incision 12/08/16 Mouth (Active)   Number of days: 592       Incision 03/26/18 Leg Right (Active)   Number of days: 120     Wound   serous exudate noted  Errythema -

## 2018-07-25 ENCOUNTER — HOSPITAL ENCOUNTER (OUTPATIENT)
Dept: ULTRASOUND IMAGING | Age: 23
Discharge: HOME | End: 2018-07-25
Payer: COMMERCIAL

## 2018-07-25 ENCOUNTER — HOSPITAL ENCOUNTER (OUTPATIENT)
Dept: MAMMOGRAPHY | Age: 23
Discharge: HOME | End: 2018-07-27
Payer: COMMERCIAL

## 2018-07-25 DIAGNOSIS — R79.89 ABNORMAL LIVER FUNCTION TESTS: ICD-10-CM

## 2018-07-25 DIAGNOSIS — M24.50 CONTRACTURE OF JOINT OF MULTIPLE SITES: ICD-10-CM

## 2018-07-25 PROCEDURE — 77080 DXA BONE DENSITY AXIAL: CPT

## 2018-07-25 PROCEDURE — 76705 ECHO EXAM OF ABDOMEN: CPT

## 2018-07-26 LAB
ANAEROBIC CULTURE: NORMAL
ORGANISM: ABNORMAL
ORGANISM: ABNORMAL
WOUND/ABSCESS: ABNORMAL

## 2018-07-31 ENCOUNTER — HOSPITAL ENCOUNTER (OUTPATIENT)
Dept: WOUND CARE | Age: 23
Discharge: HOME | End: 2018-07-31
Payer: COMMERCIAL

## 2018-07-31 VITALS
SYSTOLIC BLOOD PRESSURE: 102 MMHG | BODY MASS INDEX: 23.36 KG/M2 | RESPIRATION RATE: 18 BRPM | DIASTOLIC BLOOD PRESSURE: 62 MMHG | HEIGHT: 60 IN | HEART RATE: 58 BPM | WEIGHT: 119 LBS | TEMPERATURE: 97.8 F

## 2018-07-31 PROCEDURE — 11042 DBRDMT SUBQ TIS 1ST 20SQCM/<: CPT | Performed by: FAMILY MEDICINE

## 2018-07-31 PROCEDURE — 11042 DBRDMT SUBQ TIS 1ST 20SQCM/<: CPT

## 2018-07-31 NOTE — PROGRESS NOTES
Follow-Up Wound Progress Note  Erik Daniels  AGE: 23 y.o. GENDER: male  DOD: 1995  TODAY'S DATE:  7/31/18  Subjective:    Darshana Carballo is a 23 y.o. male who presents today for wound check. Wound Etiology :  dehisced surgical wound  Wound Location :   on the right popliteal fossa. Granulation better. Pain : {0/10     Abx : Absent       Overall Wound Assessment  Wound is has improved. Size has unchanged    Objective:    /62   Pulse 58   Temp 97.8 °F (36.6 °C) (Oral)   Resp 18   Ht 5' (1.524 m)   Wt 119 lb (54 kg)   BMI 23.24 kg/m²   Wound 05/01/18 Knee Posterior new #1 surgical. right post knee. date of surgery april 9, 2018 (Active)   Wound Image   6/8/2018  1:21 PM   Dressing Status Clean;Dry; Intact 7/13/2018  2:15 PM   Dressing Changed Changed/New 7/24/2018  3:26 PM   Dressing/Treatment Silver dressing;Dry dressing 7/24/2018  3:26 PM   Wound Cleansed Rinsed/Irrigated with saline 7/24/2018  3:26 PM   Dressing Change Due 07/25/18 7/24/2018  3:26 PM   Wound Length (cm) 1 cm 7/31/2018  2:16 PM   Wound Width (cm) 1 cm 7/31/2018  2:16 PM   Wound Depth (cm)  1.3 7/31/2018  2:16 PM   Calculated Wound Size (cm^2) (l*w) 1 cm^2 7/31/2018  2:16 PM   Change in Wound Size % (l*w) 65.03 7/31/2018  2:16 PM   Wound Assessment Yellow;Slough;Pink 7/31/2018  1:46 PM   Drainage Amount Moderate 7/31/2018  1:46 PM   Drainage Description Serosanguinous; Yellow 7/31/2018  1:46 PM   Odor None 7/24/2018  1:50 PM   Exposed structure Tendon 5/29/2018  3:35 PM   Cassie-wound Assessment Pink; Intact 7/31/2018  1:46 PM   Red%Wound Bed 40 5/1/2018  1:57 PM   Yellow%Wound Bed 60 5/1/2018  1:57 PM   Culture Taken Yes 7/24/2018  2:29 PM   Time out Yes 7/24/2018  2:29 PM   Procedural Pain 6 7/31/2018  2:16 PM   Post procedural Pain 3 7/31/2018  2:16 PM   Number of days: 91       Incision 03/26/15 Mouth (Active)   Number of days: 1223       Incision 12/08/16 Mouth (Active)   Number of days: 599       Incision 03/26/18 Leg Right dimensions. Wound was irrigated with normal saline solution. Bleeding was with a small amount of bleeding, and controlled with pressure. Patient tolerated procedure well and was given proper instruction. Diagnosis: Dehisced surgical wound                    Patient Active Problem List   Diagnosis Code    Periodontal disease K05.6    Gingivitis K05.10    Tooth impaction K01.1    Ulcer of knee, right, with fat layer exposed ([facility] L97.812    Dehiscence of surgical wound T81. 31XA           Plan:      Treatment & Plan: 1.Excisional Debridement                              2. Alginate, ID consult. Scheduled                               3. Discussed appropriate home care of this wound. 4. Patient instructions were given. 5. Follow Up in 1 week(s).                                      Taylor Suggs DO                           7/31/18     2:30 PM

## 2018-08-02 RX ORDER — DOXYCYCLINE HYCLATE 100 MG
100 TABLET ORAL 2 TIMES DAILY
COMMUNITY
End: 2018-08-14 | Stop reason: SDUPTHER

## 2018-08-02 RX ORDER — AMOXICILLIN AND CLAVULANATE POTASSIUM 250; 62.5 MG/5ML; MG/5ML
POWDER, FOR SUSPENSION ORAL 2 TIMES DAILY
COMMUNITY
End: 2018-08-23 | Stop reason: ALTCHOICE

## 2018-08-06 ENCOUNTER — PREP FOR PROCEDURE (OUTPATIENT)
Dept: DENTISTRY | Age: 23
End: 2018-08-06

## 2018-08-07 ENCOUNTER — HOSPITAL ENCOUNTER (OUTPATIENT)
Dept: WOUND CARE | Age: 23
Discharge: HOME | End: 2018-08-07
Payer: COMMERCIAL

## 2018-08-07 VITALS
RESPIRATION RATE: 19 BRPM | HEART RATE: 60 BPM | BODY MASS INDEX: 23.36 KG/M2 | SYSTOLIC BLOOD PRESSURE: 118 MMHG | DIASTOLIC BLOOD PRESSURE: 80 MMHG | TEMPERATURE: 98 F | WEIGHT: 119 LBS | HEIGHT: 60 IN

## 2018-08-07 PROCEDURE — 11042 DBRDMT SUBQ TIS 1ST 20SQCM/<: CPT | Performed by: FAMILY MEDICINE

## 2018-08-07 PROCEDURE — 11042 DBRDMT SUBQ TIS 1ST 20SQCM/<: CPT

## 2018-08-07 NOTE — PROGRESS NOTES
Follow-Up Wound Progress Note  Erik Daniels  AGE: 23 y.o. GENDER: male  DOD: 1995  TODAY'S DATE:  8/7/18  Subjective:    Maco Bright is a 23 y.o. male who presents today for wound check. Wound Etiology :  dehisced surgical wound  Wound Location :   on the right popliteal fossa Pain : {1/10     Abx : Present      Overall Wound Assessment  Wound is has improved. Size has decreased    Objective:    /80   Pulse 60   Temp 98 °F (36.7 °C) (Axillary)   Resp 19   Ht 5' (1.524 m)   Wt 119 lb (54 kg)   BMI 23.24 kg/m²   Wound 05/01/18 Knee Posterior new #1 surgical. right post knee. date of surgery april 9, 2018 (Active)   Wound Image   6/8/2018  1:21 PM   Dressing Status Clean;Dry; Intact 7/13/2018  2:15 PM   Dressing Changed Changed/New 7/31/2018  2:43 PM   Dressing/Treatment Silver dressing;Dry dressing;Alginate 7/31/2018  2:43 PM   Wound Cleansed Rinsed/Irrigated with saline 7/31/2018  2:43 PM   Dressing Change Due 08/01/18 7/31/2018  2:43 PM   Wound Length (cm) 0.8 cm 8/7/2018  1:57 PM   Wound Width (cm) 0.9 cm 8/7/2018  1:57 PM   Wound Depth (cm)  1.9 8/7/2018  1:57 PM   Calculated Wound Size (cm^2) (l*w) 0.72 cm^2 8/7/2018  1:57 PM   Change in Wound Size % (l*w) 74.83 8/7/2018  1:57 PM   Wound Assessment Pale;Pink;Yellow 8/7/2018  1:13 PM   Drainage Amount Small 8/7/2018  1:13 PM   Drainage Description Zayas;Yellow 8/7/2018  1:13 PM   Odor None 8/7/2018  1:13 PM   Exposed structure Tendon 5/29/2018  3:35 PM   Cassie-wound Assessment Maceration;Pink 8/7/2018  1:13 PM   Red%Wound Bed 40 5/1/2018  1:57 PM   Yellow%Wound Bed 60 5/1/2018  1:57 PM   Culture Taken Yes 7/24/2018  2:29 PM   Time out Yes 8/7/2018  1:57 PM   Procedural Pain 10 8/7/2018  1:57 PM   Post procedural Pain 3 8/7/2018  1:57 PM   Number of days: 98       Incision 03/26/15 Mouth (Active)   Number of days: 1230       Incision 12/08/16 Mouth (Active)   Number of days: 606       Incision 03/26/18 Leg Right (Active)   Number of days: 134 Wound   serous exudate noted  Errythema - Present    (this wound was originally measured on:)  Wound 05/01/18 Knee Posterior new #1 surgical. right post knee. date of surgery april 9, 2018-Wound Length (cm): 0.8 cm  Wound 05/01/18 Knee Posterior new #1 surgical. right post knee. date of surgery april 9, 2018-Wound Width (cm): 0.9 cm  Wound 05/01/18 Knee Posterior new #1 surgical. right post knee. date of surgery april 9, 2018-Wound Depth (cm) : 1.9   Measurements shown are from today's visit. Wound 05/01/18 Knee Posterior new #1 surgical. right post knee. date of surgery april 9, 2018-Wound Assessment: Pale, Pink, Yellow     Wound 05/01/18 Knee Posterior new #1 surgical. right post knee. date of surgery april 9, 2018-Wound Assessment: Pale, Pink, Yellow  Wound 05/01/18 Knee Posterior new #1 surgical. right post knee. date of surgery april 9, 2018-Cassie-wound Assessment: Maceration, Pink  Wound 05/01/18 Knee Posterior new #1 surgical. right post knee. date of surgery april 9, 2018-Drainage Amount: Small  Wound 05/01/18 Knee Posterior new #1 surgical. right post knee. date of surgery april 9, 2018-Drainage Description: Shaista Bliss  Wound 05/01/18 Knee Posterior new #1 surgical. right post knee. date of surgery april 9, 2018-Odor: None     Assessment:     Please refer to nursing measurements and assessment regarding wound size pre and post debridement. Wound check - Care provided includes removal of existing dressing and visual inspection    Procedure: Debridement Note: Wound # 1. At 0.72 cm sq. The patient was placed in the supine position. Lidocaine  gauze was applied  at beginning of wound evaluation. An  Excisional Debridement was performed. Using a curette ,  the wound was debrided sharply of all fibrotic, necrotic, and hyperkeratotic tissue, including a layer of surrounding healthy tissue to stimulate epithelization.     The wound was excised through the level of the subcutaneous Wound Percentage debrided is

## 2018-08-08 ENCOUNTER — INITIAL CONSULT (OUTPATIENT)
Dept: SURGERY | Age: 23
End: 2018-08-08
Payer: COMMERCIAL

## 2018-08-08 ENCOUNTER — TELEPHONE (OUTPATIENT)
Dept: SURGERY | Age: 23
End: 2018-08-08

## 2018-08-08 VITALS
HEART RATE: 83 BPM | BODY MASS INDEX: 23.16 KG/M2 | SYSTOLIC BLOOD PRESSURE: 122 MMHG | WEIGHT: 118 LBS | HEIGHT: 60 IN | RESPIRATION RATE: 14 BRPM | TEMPERATURE: 98.3 F | DIASTOLIC BLOOD PRESSURE: 72 MMHG

## 2018-08-08 DIAGNOSIS — S81.801S LEG WOUND, RIGHT, SEQUELA: Primary | ICD-10-CM

## 2018-08-08 PROCEDURE — 99214 OFFICE O/P EST MOD 30 MIN: CPT | Performed by: SURGERY

## 2018-08-08 NOTE — TELEPHONE ENCOUNTER
Per the order of Dr. Trinity Goff, patient has been scheduled for Excisional debridement of posterior right knee on 8.10.18. Patient provided with verbal instructions during office visit and verbalized understanding. Patient informed that the hospital will be contacting them with the exact time to arrive day of surgery. Patient instructed to please contact our office with any questions. Surgery scheduling form faxed to [address] [place] [address] surgery scheduling and fax confirmation received. Dr. Trinity Goff to enter orders. Chart forwarded to MA to follow up on surgery scheduling, check for pre cert and to schedule post op appointment.   Electronically signed by Albin Kurtz on 8/8/18 at 12:15 PM

## 2018-08-09 NOTE — PROGRESS NOTES
General Surgery History and Physical  [facility]    Patient's Name/Date of Birth: Nory Borges / 1995    Date: August 9, 2018     Surgeon: Christelle Jacob M.D.    PCP: Velia Kanner, MD     Chief Complaint: right leg popliteal wound    HPI:   Nory Borges is a 23 y.o. male who presents for evaluation of right leg popliteal wound. Timing is constant, radiation to right popliteal fossa, alleviated by non and started several months ago and severity is 8/10. Followed in wound care. MR with full time parental care. Per family has problem with bouncing on legs uncontrollably to mental handicap. States up to 5000 times daily. Sleep contracted, Difficult to care for wound. Only recently obtained a brace for keeping leg extended. Had trial of wound vac, excision stitch abscess. Patient Active Problem List   Diagnosis    Periodontal disease    Gingivitis    Tooth impaction    Ulcer of knee, right, with fat layer exposed ([address]    Dehiscence of surgical wound       Past Medical History:   Diagnosis Date    G tube feedings ([address]     age 3    History of blood transfusion     Mental handicap     mom states severe brain damage    Seizures ([address]        Past Surgical History:   Procedure Laterality Date    DENTAL SURGERY  03/26/2015    xrays, restorations and extractions x1    DENTAL SURGERY  12/08/2016    LEG TENDON SURGERY Bilateral     [place] BIOPSY, EACH ADDED LESION Right 3/26/2018    EXCISION OF SOFT TISSUE NEOPLASM RIGHT LEG performed by Mike Moscoso MD at [address]       No Known Allergies    The patient has a family history that is negative for severe cardiovascular or respiratory issues, negative for reaction to anesthesia. Time spent reviewing past medical, surgical, social and family history, vitals, nursing assessment and images. No changes from above documented history.     Social History     Social History    Marital status: Single     Spouse name: N/A    Number of children: N/A    Years of education: N/A     Occupational History    Not on file.      Social History Main Topics    Smoking status: Never Smoker    Smokeless tobacco: Never Used    Alcohol use No    Drug use: No    Sexual activity: Not on file     Other Topics Concern    Not on file     Social History Narrative    No narrative on file           Review of Systems  Review of Systems -  General ROS: unable to perform due to the patients clinical state and sedation however, review of physical state revealed:  ENT ROS: negative for - nasal congestion or nasal discharge  Allergy and Immunology ROS: negative for - hives  Hematological and Lymphatic ROS: negative for - bruising or fatigue  Endocrine ROS: negative for - polydipsia/polyuria  Respiratory ROS: negative for -  stridor  Cardiovascular ROS: neg for frequent PVCs  Gastrointestinal ROS: pos for diarrhea  Genito-Urinary ROS: negative for -  genital discharge, genital ulcers or hematuria  Musculoskeletal ROS: bed bound      Physical exam:   /72 (Site: Left Arm, Position: Sitting, Cuff Size: Medium Adult)   Pulse 83   Temp 98.3 °F (36.8 °C) (Temporal)   Resp 14   Ht 5' (1.524 m)   Wt 118 lb (53.5 kg)   BMI 23.05 kg/m²   General appearance:  NAD  Head: NCAT, PERRLA, EOMI, red conjunctiva  Neck: supple, no masses  Lungs:Equal chest rise bilateral  Heart: Reg rate  Abdomen: soft, nontender, nondistended  Rectal: No bleeding  Skin; right leg wound  Gu: no cva tenderness  Extremities: contracted, right popliteal wound, tracking 4cm inferior, moist, no purulence, some exudate, no cellultisi  Psychosocial: No auditory or visual hallucinations          Assessment:  Yared Quinn is a 23 y.o. male with chronic right popliteal wound, developmental delay, extremity contractions  Patient Active Problem List   Diagnosis    Periodontal disease    Gingivitis    Tooth impaction    Ulcer of knee, right, with fat layer exposed ([address]    Dehiscence of surgical wound         Plan:  OR for excisional debridement right popliteal fossa wound  Discussed options, family very insistent on OR for debridement  Will need to keep brace on majority of day once OR complete to allow to heal and limit trauma  Follow up in wound care after surgery        Toño Desai MD  12:35 AM  8/9/2018

## 2018-08-10 ENCOUNTER — HOSPITAL ENCOUNTER (OUTPATIENT)
Age: 23
Discharge: HOME | End: 2018-08-10
Payer: COMMERCIAL

## 2018-08-10 DIAGNOSIS — T81.31XA POSTOPERATIVE WOUND DEHISCENCE, INITIAL ENCOUNTER: ICD-10-CM

## 2018-08-10 LAB
ALBUMIN SERPL-MCNC: 4.1 G/DL (ref 3.5–5.2)
ALP BLD-CCNC: 138 U/L (ref 40–129)
ALT SERPL-CCNC: 44 U/L (ref 0–40)
AMYLASE: 26 U/L (ref 20–100)
ANION GAP SERPL CALCULATED.3IONS-SCNC: 11 MMOL/L (ref 7–16)
AST SERPL-CCNC: 20 U/L (ref 0–39)
BASOPHILS ABSOLUTE: 0.05 E9/L (ref 0–0.2)
BASOPHILS RELATIVE PERCENT: 0.6 % (ref 0–2)
BILIRUB SERPL-MCNC: 0.7 MG/DL (ref 0–1.2)
BUN BLDV-MCNC: 8 MG/DL (ref 6–20)
CALCIUM SERPL-MCNC: 9.3 MG/DL (ref 8.6–10.2)
CHLORIDE BLD-SCNC: 105 MMOL/L (ref 98–107)
CO2: 28 MMOL/L (ref 22–29)
CREAT SERPL-MCNC: 0.6 MG/DL (ref 0.7–1.2)
EOSINOPHILS ABSOLUTE: 0.41 E9/L (ref 0.05–0.5)
EOSINOPHILS RELATIVE PERCENT: 5.3 % (ref 0–6)
GFR AFRICAN AMERICAN: >60
GFR NON-AFRICAN AMERICAN: >60 ML/MIN/1.73
GLUCOSE FASTING: 77 MG/DL (ref 74–109)
HCT VFR BLD CALC: 44.7 % (ref 37–54)
HEMOGLOBIN: 14.2 G/DL (ref 12.5–16.5)
IMMATURE GRANULOCYTES #: 0.02 E9/L
IMMATURE GRANULOCYTES %: 0.3 % (ref 0–5)
LIPASE: 15 U/L (ref 13–60)
LYMPHOCYTES ABSOLUTE: 2.53 E9/L (ref 1.5–4)
LYMPHOCYTES RELATIVE PERCENT: 32.8 % (ref 20–42)
MCH RBC QN AUTO: 32 PG (ref 26–35)
MCHC RBC AUTO-ENTMCNC: 31.8 % (ref 32–34.5)
MCV RBC AUTO: 100.7 FL (ref 80–99.9)
MONOCYTES ABSOLUTE: 0.55 E9/L (ref 0.1–0.95)
MONOCYTES RELATIVE PERCENT: 7.1 % (ref 2–12)
NEUTROPHILS ABSOLUTE: 4.16 E9/L (ref 1.8–7.3)
NEUTROPHILS RELATIVE PERCENT: 53.9 % (ref 43–80)
PDW BLD-RTO: 12.5 FL (ref 11.5–15)
PLATELET # BLD: 245 E9/L (ref 130–450)
PMV BLD AUTO: 11 FL (ref 7–12)
POTASSIUM SERPL-SCNC: 3.7 MMOL/L (ref 3.5–5)
RBC # BLD: 4.44 E12/L (ref 3.8–5.8)
SEDIMENTATION RATE, ERYTHROCYTE: 21 MM/HR (ref 0–15)
SODIUM BLD-SCNC: 144 MMOL/L (ref 132–146)
TOTAL PROTEIN: 7.3 G/DL (ref 6.4–8.3)
WBC # BLD: 7.7 E9/L (ref 4.5–11.5)

## 2018-08-10 PROCEDURE — 83690 ASSAY OF LIPASE: CPT

## 2018-08-10 PROCEDURE — 85025 COMPLETE CBC W/AUTO DIFF WBC: CPT

## 2018-08-10 PROCEDURE — 36415 COLL VENOUS BLD VENIPUNCTURE: CPT

## 2018-08-10 PROCEDURE — 82652 VIT D 1 25-DIHYDROXY: CPT

## 2018-08-10 PROCEDURE — 82150 ASSAY OF AMYLASE: CPT

## 2018-08-10 PROCEDURE — 80053 COMPREHEN METABOLIC PANEL: CPT

## 2018-08-10 PROCEDURE — 85651 RBC SED RATE NONAUTOMATED: CPT

## 2018-08-11 LAB — VITAMIN D 1,25-DIHYDROXY: 54.2 PG/ML (ref 19.9–79.3)

## 2018-08-14 ENCOUNTER — HOSPITAL ENCOUNTER (OUTPATIENT)
Dept: WOUND CARE | Age: 23
Discharge: HOME | End: 2018-08-14
Payer: COMMERCIAL

## 2018-08-14 ENCOUNTER — HOSPITAL ENCOUNTER (OUTPATIENT)
Dept: CT IMAGING | Age: 23
Discharge: HOME | End: 2018-08-14
Payer: COMMERCIAL

## 2018-08-14 ENCOUNTER — TELEPHONE (OUTPATIENT)
Dept: SURGERY | Age: 23
End: 2018-08-14

## 2018-08-14 VITALS
BODY MASS INDEX: 23.16 KG/M2 | RESPIRATION RATE: 16 BRPM | WEIGHT: 118 LBS | DIASTOLIC BLOOD PRESSURE: 72 MMHG | HEIGHT: 60 IN | HEART RATE: 84 BPM | SYSTOLIC BLOOD PRESSURE: 128 MMHG | TEMPERATURE: 98.8 F

## 2018-08-14 DIAGNOSIS — R19.15 ABNORMAL BOWEL SOUNDS: ICD-10-CM

## 2018-08-14 PROCEDURE — 74176 CT ABD & PELVIS W/O CONTRAST: CPT

## 2018-08-14 PROCEDURE — 11042 DBRDMT SUBQ TIS 1ST 20SQCM/<: CPT | Performed by: FAMILY MEDICINE

## 2018-08-14 PROCEDURE — 6360000004 HC RX CONTRAST MEDICATION: Performed by: RADIOLOGY

## 2018-08-14 PROCEDURE — 11042 DBRDMT SUBQ TIS 1ST 20SQCM/<: CPT

## 2018-08-14 RX ADMIN — IOHEXOL 50 ML: 240 INJECTION, SOLUTION INTRATHECAL; INTRAVASCULAR; INTRAVENOUS; ORAL at 10:32

## 2018-08-14 NOTE — PROGRESS NOTES
Dr Santo [place] office called and surgery for next week cancelled due to wound improvement.  Will call dr Gal Silvestre office if needed to reschedule
hyperkeratotic tissue, including a layer of surrounding healthy tissue to stimulate epithelization. The wound was excised through the level of the subcutaneous Wound Percentage debrided is 100%. Please refer to Nurses notes for pre and post debridement dimensions. Wound was irrigated with normal saline solution. Bleeding was with a small amount of bleeding, and controlled with pressure. Patient tolerated procedure well and was given proper instruction. Diagnosis: dehisced surgical wound                      Patient Active Problem List   Diagnosis Code    Periodontal disease K05.6    Gingivitis K05.10    Tooth impaction K01.1    Ulcer of knee, right, with fat layer exposed ([facility] L97.812    Dehiscence of surgical wound T81. 31XA           Plan:      Treatment & Plan: 1.Excisional Debridement                              2. Alginate. Continue brace. Recheck here before any surgery                              3. Discussed appropriate home care of this wound. 4. Patient instructions were given. 5. Follow Up in 1 week(s).                                      Evi Mendez DO                           8/14/18     2:37 PM

## 2018-08-14 NOTE — TELEPHONE ENCOUNTER
Called to speak with pts guardians. Pt is to be reschedule for surgery. Left message on mothers phone regarding rescheduling and offered 8/21/18 to pts mother on VM. Will follow up with.  Electronically signed by Gabo Paez on 8/14/2018 at 8:53 AM

## 2018-08-23 ENCOUNTER — HOSPITAL ENCOUNTER (OUTPATIENT)
Dept: WOUND CARE | Age: 23
Discharge: HOME | End: 2018-08-23
Payer: COMMERCIAL

## 2018-08-23 VITALS
RESPIRATION RATE: 16 BRPM | DIASTOLIC BLOOD PRESSURE: 64 MMHG | HEART RATE: 84 BPM | TEMPERATURE: 97.9 F | SYSTOLIC BLOOD PRESSURE: 116 MMHG

## 2018-08-23 PROCEDURE — 99213 OFFICE O/P EST LOW 20 MIN: CPT

## 2018-08-23 RX ORDER — NYSTATIN 100000 [USP'U]/G
POWDER TOPICAL
Qty: 1 BOTTLE | Refills: 1 | Status: SHIPPED
Start: 2018-08-23 | End: 2021-08-18 | Stop reason: SDUPTHER

## 2018-08-23 NOTE — PROGRESS NOTES
medications for this encounter. ALLERGIES     Patient has no known allergies. REVIEW OF SYSTEMS    (2-9 systems for level 4, 10 or more for level 5)   REVIEW OF SYSTEMS:    CONSTITUTIONAL:  No fever, chills, weight loss  ALLERGIES:   No urticaria, hay fever,    EYES:   No blurry vision, loss of vision, eye pain  ENT:     No hearing loss, sore throat  CARDIOVASCULAR:  No chest pain or palpitations  RESPIRATORY:  No cough, sob  ENDOCRINE:   No diabetes, thyroid disease  HEME-LYMPH:  No easy bruising or bleeding  GI:    No nausea, vomiting or diarrhea  :    No urinary complaints  NEURO:   No seizures, stroke, HA  MUSCULOSKELETAL: No muscle aches or pain, no joint pain  SKIN:    No rash or itch  PSYCH:   No depression or anxiety  PAST MEDICAL HISTORY     Past Medical History:   Diagnosis Date    G tube feedings ([address]     age 3    History of blood transfusion     Mental handicap     mom states severe brain damage    Seizures (HCC)     tremors  last seizure 8/8/2018     SURGICAL HISTORY       Past Surgical History:   Procedure Laterality Date    DENTAL SURGERY  03/26/2015    xrays, restorations and extractions x1    DENTAL SURGERY  12/08/2016    LEG TENDON SURGERY Bilateral     [place] BIOPSY, EACH ADDED LESION Right 3/26/2018    EXCISION OF SOFT TISSUE NEOPLASM RIGHT LEG performed by Sonido Chen MD at [facility]     History reviewed. No pertinent family history.   SOCIAL HISTORY       Social History     Social History    Marital status: Single     Spouse name: N/A    Number of children: N/A    Years of education: N/A     Social History Main Topics    Smoking status: Never Smoker    Smokeless tobacco: Never Used    Alcohol use No    Drug use: No    Sexual activity: Not Asked     Other Topics Concern    None     Social History Narrative    None     ·    PHYSICAL EXAM    (up to 7 for level 4, 8 or more for level 5)   Vitals:    Vitals:    08/23/18 1325   BP: 116/64   Pulse: 84 wound right post knee        FINAL IMPRESSION          Jose Antonio Quintero was seen today for wound infection. Postoperative wound dehiscence, subsequent encounter  -     SEDIMENTATION RATE 21  -     amoxicillin-clavulanate (AUGMENTIN) 250-62.5 MG/5ML suspension; Take 5 mLs by mouth 2 times daily for 21 days  -     doxycycline hyclate (VIBRA-TABS) 100 MG tablet;  Take 1 tablet by mouth 2 times daily for 21 days  Antifungal powder to area    Cont wound care    Side effects of abx discussed and informed    F/u in [address] 1 month  watch off atbx after today          Electronically signed by Erik Musa MD on 8/23/2018 at 1:50 PM

## 2018-08-24 RX ORDER — DOXYCYCLINE HYCLATE 100 MG/1
100 CAPSULE ORAL 2 TIMES DAILY
COMMUNITY
End: 2018-08-28 | Stop reason: ALTCHOICE

## 2018-08-24 RX ORDER — AMOXICILLIN AND CLAVULANATE POTASSIUM 250; 62.5 MG/5ML; MG/5ML
POWDER, FOR SUSPENSION ORAL 2 TIMES DAILY
COMMUNITY
End: 2018-09-28 | Stop reason: ALTCHOICE

## 2018-08-28 ENCOUNTER — HOSPITAL ENCOUNTER (OUTPATIENT)
Dept: WOUND CARE | Age: 23
Discharge: HOME | End: 2018-08-28
Payer: COMMERCIAL

## 2018-08-28 VITALS
HEART RATE: 64 BPM | TEMPERATURE: 97.2 F | DIASTOLIC BLOOD PRESSURE: 60 MMHG | RESPIRATION RATE: 16 BRPM | SYSTOLIC BLOOD PRESSURE: 120 MMHG

## 2018-08-28 PROCEDURE — 11042 DBRDMT SUBQ TIS 1ST 20SQCM/<: CPT

## 2018-08-28 PROCEDURE — 11042 DBRDMT SUBQ TIS 1ST 20SQCM/<: CPT | Performed by: FAMILY MEDICINE

## 2018-08-28 NOTE — PROGRESS NOTES
Follow-Up Wound Progress Note  Erik Daniels  AGE: 23 y.o. GENDER: male  DOD: 1995  TODAY'S DATE:  8/28/18  Subjective:    Teja Fierro is a 23 y.o. male who presents today for wound check. Wound Etiology :  dehisced surgical wound  Wound Location :  on right popliteal fossa Pain : {0/10     Abx : Present      Overall Wound Assessment  Wound is has slightly improved. Size has decreased    Objective:    /60   Pulse 64   Temp 97.2 °F (36.2 °C) (Axillary)   Resp 16   Wound 05/01/18 Knee Posterior new #1 surgical. right post knee. date of surgery april 9, 2018 (Active)   Wound Image   6/8/2018  1:21 PM   Dressing Status Clean;Dry; Intact 8/23/2018  4:27 PM   Dressing Changed Changed/New 8/23/2018  4:27 PM   Dressing/Treatment Other (Comment) 8/23/2018  4:27 PM   Wound Cleansed Rinsed/Irrigated with saline 8/23/2018  4:27 PM   Dressing Change Due 08/01/18 7/31/2018  2:43 PM   Wound Length (cm) 0.2 cm 8/28/2018  1:56 PM   Wound Width (cm) 0.5 cm 8/28/2018  1:56 PM   Wound Depth (cm)  1.6 8/28/2018  1:56 PM   Calculated Wound Size (cm^2) (l*w) 0.1 cm^2 8/28/2018  1:56 PM   Change in Wound Size % (l*w) 96.5 8/28/2018  1:56 PM   Distance Tunneling (cm) 2 cm 8/28/2018  1:28 PM   Tunneling Position ___ O'Clock 7 8/28/2018  1:28 PM   Wound Assessment Drainage; Red 8/28/2018  1:28 PM   Drainage Amount Moderate 8/28/2018  1:28 PM   Drainage Description Yellow;Serous 8/28/2018  1:28 PM   Odor None 8/28/2018  1:28 PM   Exposed structure Tendon 5/29/2018  3:35 PM   Cassie-wound Assessment Maceration; White 8/28/2018  1:28 PM   Red%Wound Bed 100 8/23/2018  1:30 PM   Yellow%Wound Bed 60 5/1/2018  1:57 PM   Culture Taken Yes 7/24/2018  2:29 PM   Debridement per physician None 8/23/2018  1:53 PM   Time out Yes 8/28/2018  1:56 PM   Procedural Pain 5 8/28/2018  1:56 PM   Post procedural Pain 3 8/28/2018  1:56 PM   Number of days: 119       Incision 03/26/15 Mouth (Active)   Number of days: 1251       Incision 12/08/16

## 2018-09-28 ENCOUNTER — HOSPITAL ENCOUNTER (OUTPATIENT)
Dept: WOUND CARE | Age: 23
Discharge: HOME | End: 2018-09-28
Payer: COMMERCIAL

## 2018-09-28 VITALS
DIASTOLIC BLOOD PRESSURE: 80 MMHG | RESPIRATION RATE: 16 BRPM | HEART RATE: 72 BPM | SYSTOLIC BLOOD PRESSURE: 122 MMHG | TEMPERATURE: 99.3 F

## 2018-09-28 PROCEDURE — 99212 OFFICE O/P EST SF 10 MIN: CPT

## 2018-09-28 PROCEDURE — 99213 OFFICE O/P EST LOW 20 MIN: CPT | Performed by: FAMILY MEDICINE

## 2018-12-11 ENCOUNTER — HOSPITAL ENCOUNTER (OUTPATIENT)
Age: 23
Discharge: HOME | End: 2018-12-11
Payer: COMMERCIAL

## 2018-12-11 LAB
ALBUMIN SERPL-MCNC: 3.8 G/DL (ref 3.5–5.2)
ALP BLD-CCNC: 94 U/L (ref 40–129)
ALT SERPL-CCNC: 40 U/L (ref 0–40)
ANION GAP SERPL CALCULATED.3IONS-SCNC: 11 MMOL/L (ref 7–16)
AST SERPL-CCNC: 21 U/L (ref 0–39)
BASOPHILS ABSOLUTE: 0.01 E9/L (ref 0–0.2)
BASOPHILS RELATIVE PERCENT: 0.2 % (ref 0–2)
BILIRUB SERPL-MCNC: 0.6 MG/DL (ref 0–1.2)
BUN BLDV-MCNC: 14 MG/DL (ref 6–20)
CALCIUM SERPL-MCNC: 9.1 MG/DL (ref 8.6–10.2)
CHLORIDE BLD-SCNC: 102 MMOL/L (ref 98–107)
CO2: 23 MMOL/L (ref 22–29)
CREAT SERPL-MCNC: 0.6 MG/DL (ref 0.7–1.2)
EOSINOPHILS ABSOLUTE: 0.05 E9/L (ref 0.05–0.5)
EOSINOPHILS RELATIVE PERCENT: 1 % (ref 0–6)
GFR AFRICAN AMERICAN: >60
GFR NON-AFRICAN AMERICAN: >60 ML/MIN/1.73
GLUCOSE BLD-MCNC: 136 MG/DL (ref 74–99)
HCT VFR BLD CALC: 40.8 % (ref 37–54)
HEMOGLOBIN: 13.9 G/DL (ref 12.5–16.5)
IMMATURE GRANULOCYTES #: 0.01 E9/L
IMMATURE GRANULOCYTES %: 0.2 % (ref 0–5)
LIPASE: 10 U/L (ref 13–60)
LYMPHOCYTES ABSOLUTE: 0.94 E9/L (ref 1.5–4)
LYMPHOCYTES RELATIVE PERCENT: 18.7 % (ref 20–42)
MCH RBC QN AUTO: 32.3 PG (ref 26–35)
MCHC RBC AUTO-ENTMCNC: 34.1 % (ref 32–34.5)
MCV RBC AUTO: 94.9 FL (ref 80–99.9)
MONOCYTES ABSOLUTE: 0.52 E9/L (ref 0.1–0.95)
MONOCYTES RELATIVE PERCENT: 10.3 % (ref 2–12)
NEUTROPHILS ABSOLUTE: 3.5 E9/L (ref 1.8–7.3)
NEUTROPHILS RELATIVE PERCENT: 69.6 % (ref 43–80)
PDW BLD-RTO: 12.8 FL (ref 11.5–15)
PLATELET # BLD: 203 E9/L (ref 130–450)
PMV BLD AUTO: 11.3 FL (ref 7–12)
POTASSIUM SERPL-SCNC: 3.5 MMOL/L (ref 3.5–5)
RBC # BLD: 4.3 E12/L (ref 3.8–5.8)
SEDIMENTATION RATE, ERYTHROCYTE: 29 MM/HR (ref 0–15)
SODIUM BLD-SCNC: 136 MMOL/L (ref 132–146)
TOTAL PROTEIN: 7.2 G/DL (ref 6.4–8.3)
WBC # BLD: 5 E9/L (ref 4.5–11.5)

## 2018-12-11 PROCEDURE — 83690 ASSAY OF LIPASE: CPT

## 2018-12-11 PROCEDURE — 36415 COLL VENOUS BLD VENIPUNCTURE: CPT

## 2018-12-11 PROCEDURE — 80053 COMPREHEN METABOLIC PANEL: CPT

## 2018-12-11 PROCEDURE — 85025 COMPLETE CBC W/AUTO DIFF WBC: CPT

## 2018-12-11 PROCEDURE — 85651 RBC SED RATE NONAUTOMATED: CPT

## 2019-08-05 ENCOUNTER — HOSPITAL ENCOUNTER (OUTPATIENT)
Age: 24
Discharge: HOME | End: 2019-08-05
Payer: COMMERCIAL

## 2019-08-05 LAB
ALBUMIN SERPL-MCNC: 4.6 G/DL (ref 3.5–5.2)
ALP BLD-CCNC: 147 U/L (ref 40–129)
ALT SERPL-CCNC: 71 U/L (ref 0–40)
AMYLASE: 22 U/L (ref 20–100)
ANION GAP SERPL CALCULATED.3IONS-SCNC: 12 MMOL/L (ref 7–16)
AST SERPL-CCNC: 35 U/L (ref 0–39)
BASOPHILS ABSOLUTE: 0.05 E9/L (ref 0–0.2)
BASOPHILS RELATIVE PERCENT: 0.9 % (ref 0–2)
BILIRUB SERPL-MCNC: 0.5 MG/DL (ref 0–1.2)
BUN BLDV-MCNC: 10 MG/DL (ref 6–20)
CALCIUM SERPL-MCNC: 10.1 MG/DL (ref 8.6–10.2)
CHLORIDE BLD-SCNC: 102 MMOL/L (ref 98–107)
CO2: 28 MMOL/L (ref 22–29)
CREAT SERPL-MCNC: 0.7 MG/DL (ref 0.7–1.2)
EOSINOPHILS ABSOLUTE: 0.16 E9/L (ref 0.05–0.5)
EOSINOPHILS RELATIVE PERCENT: 2.9 % (ref 0–6)
GFR AFRICAN AMERICAN: >60
GFR NON-AFRICAN AMERICAN: >60 ML/MIN/1.73
GLUCOSE FASTING: 88 MG/DL (ref 74–99)
HCT VFR BLD CALC: 46.8 % (ref 37–54)
HEMOGLOBIN: 15.5 G/DL (ref 12.5–16.5)
IMMATURE GRANULOCYTES #: 0.02 E9/L
IMMATURE GRANULOCYTES %: 0.4 % (ref 0–5)
LIPASE: 16 U/L (ref 13–60)
LYMPHOCYTES ABSOLUTE: 1.88 E9/L (ref 1.5–4)
LYMPHOCYTES RELATIVE PERCENT: 33.6 % (ref 20–42)
MCH RBC QN AUTO: 32.6 PG (ref 26–35)
MCHC RBC AUTO-ENTMCNC: 33.1 % (ref 32–34.5)
MCV RBC AUTO: 98.5 FL (ref 80–99.9)
MONOCYTES ABSOLUTE: 0.46 E9/L (ref 0.1–0.95)
MONOCYTES RELATIVE PERCENT: 8.2 % (ref 2–12)
NEUTROPHILS ABSOLUTE: 3.02 E9/L (ref 1.8–7.3)
NEUTROPHILS RELATIVE PERCENT: 54 % (ref 43–80)
PDW BLD-RTO: 12.1 FL (ref 11.5–15)
PLATELET # BLD: 212 E9/L (ref 130–450)
PMV BLD AUTO: 11.4 FL (ref 7–12)
POTASSIUM SERPL-SCNC: 4 MMOL/L (ref 3.5–5)
RBC # BLD: 4.75 E12/L (ref 3.8–5.8)
SEDIMENTATION RATE, ERYTHROCYTE: 5 MM/HR (ref 0–15)
SODIUM BLD-SCNC: 142 MMOL/L (ref 132–146)
TOTAL PROTEIN: 8.2 G/DL (ref 6.4–8.3)
WBC # BLD: 5.6 E9/L (ref 4.5–11.5)

## 2019-08-05 PROCEDURE — 83690 ASSAY OF LIPASE: CPT

## 2019-08-05 PROCEDURE — 85651 RBC SED RATE NONAUTOMATED: CPT

## 2019-08-05 PROCEDURE — 85025 COMPLETE CBC W/AUTO DIFF WBC: CPT

## 2019-08-05 PROCEDURE — 80053 COMPREHEN METABOLIC PANEL: CPT

## 2019-08-05 PROCEDURE — 36415 COLL VENOUS BLD VENIPUNCTURE: CPT

## 2019-08-05 PROCEDURE — 82150 ASSAY OF AMYLASE: CPT

## 2019-08-29 ENCOUNTER — HOSPITAL ENCOUNTER (EMERGENCY)
Age: 24
Discharge: HOME | End: 2019-08-29
Attending: EMERGENCY MEDICINE
Payer: COMMERCIAL

## 2019-08-29 VITALS
BODY MASS INDEX: 23.44 KG/M2 | TEMPERATURE: 97.5 F | DIASTOLIC BLOOD PRESSURE: 75 MMHG | HEART RATE: 82 BPM | WEIGHT: 120 LBS | RESPIRATION RATE: 18 BRPM | OXYGEN SATURATION: 96 % | SYSTOLIC BLOOD PRESSURE: 101 MMHG

## 2019-08-29 DIAGNOSIS — S00.81XA FOREHEAD ABRASION, INITIAL ENCOUNTER: Primary | ICD-10-CM
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
## 2019-08-29 PROCEDURE — 99283 EMERGENCY DEPT VISIT LOW MDM: CPT

## 2019-08-29 PROCEDURE — 6360000002 HC RX W HCPCS: Performed by: EMERGENCY MEDICINE

## 2019-08-29 PROCEDURE — 90715 TDAP VACCINE 7 YRS/> IM: CPT | Performed by: EMERGENCY MEDICINE

## 2019-08-29 PROCEDURE — 90471 IMMUNIZATION ADMIN: CPT | Performed by: EMERGENCY MEDICINE

## 2019-08-29 RX ADMIN — TETANUS TOXOID, REDUCED DIPHTHERIA TOXOID AND ACELLULAR PERTUSSIS VACCINE, ADSORBED 0.5 ML: 5; 2.5; 8; 8; 2.5 SUSPENSION INTRAMUSCULAR at 17:30

## 2019-12-18 ENCOUNTER — HOSPITAL ENCOUNTER (OUTPATIENT)
Age: 24
Discharge: HOME | End: 2019-12-18
Payer: COMMERCIAL

## 2019-12-18 LAB
ALBUMIN SERPL-MCNC: 4.7 G/DL (ref 3.5–5.2)
ALP BLD-CCNC: 163 U/L (ref 40–129)
ALT SERPL-CCNC: 61 U/L (ref 0–40)
ANION GAP SERPL CALCULATED.3IONS-SCNC: 14 MMOL/L (ref 7–16)
AST SERPL-CCNC: 37 U/L (ref 0–39)
BILIRUB SERPL-MCNC: 0.6 MG/DL (ref 0–1.2)
BUN BLDV-MCNC: 9 MG/DL (ref 6–20)
CALCIUM SERPL-MCNC: 10.1 MG/DL (ref 8.6–10.2)
CHLORIDE BLD-SCNC: 102 MMOL/L (ref 98–107)
CO2: 24 MMOL/L (ref 22–29)
CREAT SERPL-MCNC: 0.6 MG/DL (ref 0.7–1.2)
GFR AFRICAN AMERICAN: >60
GFR NON-AFRICAN AMERICAN: >60 ML/MIN/1.73
GLUCOSE FASTING: 93 MG/DL (ref 74–99)
POTASSIUM SERPL-SCNC: 4.4 MMOL/L (ref 3.5–5)
SODIUM BLD-SCNC: 140 MMOL/L (ref 132–146)
TOTAL PROTEIN: 8.5 G/DL (ref 6.4–8.3)

## 2019-12-18 PROCEDURE — 80053 COMPREHEN METABOLIC PANEL: CPT

## 2019-12-18 PROCEDURE — 36415 COLL VENOUS BLD VENIPUNCTURE: CPT

## 2020-08-07 ENCOUNTER — HOSPITAL ENCOUNTER (OUTPATIENT)
Age: 25
Discharge: HOME | End: 2020-08-09
Payer: COMMERCIAL

## 2020-08-07 LAB
ALBUMIN SERPL-MCNC: 4.4 G/DL (ref 3.5–5.2)
ALP BLD-CCNC: 135 U/L (ref 40–129)
ALT SERPL-CCNC: 42 U/L (ref 0–40)
ANION GAP SERPL CALCULATED.3IONS-SCNC: 13 MMOL/L (ref 7–16)
AST SERPL-CCNC: 25 U/L (ref 0–39)
BILIRUB SERPL-MCNC: 0.4 MG/DL (ref 0–1.2)
BUN BLDV-MCNC: 10 MG/DL (ref 6–20)
CALCIUM SERPL-MCNC: 9.7 MG/DL (ref 8.6–10.2)
CHLORIDE BLD-SCNC: 104 MMOL/L (ref 98–107)
CO2: 24 MMOL/L (ref 22–29)
CREAT SERPL-MCNC: 0.7 MG/DL (ref 0.7–1.2)
GFR AFRICAN AMERICAN: >60
GFR NON-AFRICAN AMERICAN: >60 ML/MIN/1.73
GLUCOSE BLD-MCNC: 89 MG/DL (ref 74–99)
POTASSIUM SERPL-SCNC: 3.5 MMOL/L (ref 3.5–5)
SODIUM BLD-SCNC: 141 MMOL/L (ref 132–146)
TOTAL PROTEIN: 7.6 G/DL (ref 6.4–8.3)

## 2020-08-07 PROCEDURE — 80053 COMPREHEN METABOLIC PANEL: CPT

## 2020-08-07 PROCEDURE — 36415 COLL VENOUS BLD VENIPUNCTURE: CPT

## 2020-08-10 ENCOUNTER — HOSPITAL ENCOUNTER (OUTPATIENT)
Dept: GENERAL RADIOLOGY | Age: 25
Discharge: HOME | End: 2020-08-12
Payer: COMMERCIAL

## 2020-08-10 ENCOUNTER — HOSPITAL ENCOUNTER (OUTPATIENT)
Age: 25
Discharge: HOME | End: 2020-08-12
Payer: COMMERCIAL

## 2020-08-10 PROCEDURE — 74018 RADEX ABDOMEN 1 VIEW: CPT

## 2020-08-28 ENCOUNTER — PREP FOR PROCEDURE (OUTPATIENT)
Dept: DENTISTRY | Age: 25
End: 2020-08-28

## 2020-08-31 ENCOUNTER — HOSPITAL ENCOUNTER (OUTPATIENT)
Age: 25
Setting detail: SPECIMEN
Discharge: HOME | End: 2020-08-31
Payer: COMMERCIAL

## 2020-08-31 PROCEDURE — U0002 COVID-19 LAB TEST NON-CDC: HCPCS

## 2020-08-31 NOTE — PROGRESS NOTES
Covid test scheduled for 08- [facility]. ....  quarantine until after surgery 09/04/20220/

## 2020-08-31 NOTE — PROGRESS NOTES
Patient having covid testing at [facility] on 8/31/20. Patient asked to bring ID and to self quarantine until day of procedure.

## 2020-09-01 RX ORDER — SODIUM CHLORIDE 0.9 % (FLUSH) 0.9 %
10 SYRINGE (ML) INJECTION PRN
Status: CANCELLED | OUTPATIENT
Start: 2020-09-01

## 2020-09-01 RX ORDER — SODIUM CHLORIDE 0.9 % (FLUSH) 0.9 %
10 SYRINGE (ML) INJECTION EVERY 12 HOURS SCHEDULED
Status: CANCELLED | OUTPATIENT
Start: 2020-09-01

## 2020-09-01 RX ORDER — SODIUM CHLORIDE, SODIUM LACTATE, POTASSIUM CHLORIDE, CALCIUM CHLORIDE 600; 310; 30; 20 MG/100ML; MG/100ML; MG/100ML; MG/100ML
INJECTION, SOLUTION INTRAVENOUS CONTINUOUS
Status: CANCELLED | OUTPATIENT
Start: 2020-09-01

## 2020-09-01 NOTE — H&P (VIEW-ONLY)
Dental History and Physical    [address] [facility] [address]    The patient is a 30 y.o. male     Chief Complaint: \"Lot of tartar\"    History of present illness: Pt had dental treatment under [facility] ~2 years ago, and has not had dental tx since. Due to pt's inability to cooperate and combative nature, dental tx in the clinical setting is contraindicated and dental tx must be completed in the OR under [facility]. Past Medical History:      Diagnosis Date    G tube feedings [facility])     age 3    History of blood transfusion     Mental handicap     mom states severe brain damage    Seizures (HCC)     tremors  last seizure 8/8/2018       Past Surgical History:        Procedure Laterality Date    DENTAL SURGERY  03/26/2015    xrays, restorations and extractions x1    DENTAL SURGERY  12/08/2016    LEG TENDON SURGERY Bilateral     [place] BIOPSY, EACH ADDED LESION Right 3/26/2018    EXCISION OF SOFT TISSUE NEOPLASM RIGHT LEG performed by Antony Marino MD at [address]       Medications Prior to Admission:    Prior to Admission medications    Medication Sig Start Date End Date Taking? Authorizing Provider   nystatin (MYCOSTATIN) 486228 UNIT/GM powder Apply 3 times daily.  8/23/18   Mckayla Ma MD   ranitidine (ZANTAC) 75 MG/5ML syrup TAKE 2 TEASPOONSFUL TWICE A DAY 7/17/18   Historical Provider, MD   Elastic Bandages & Supports (KNEE BRACE ADJUSTABLE HINGED) MISC 1 Units by Does not apply route daily 4/9/18   Antony Marino MD   Polyethylene Glycol 3350 (MIRALAX PO) Take by mouth as needed     Historical Provider, MD   lacosamide (VIMPAT) 150 MG TABS tablet Take 150 mg by mouth 2 times daily Take morning of surgery with a sip of water    Historical Provider, MD   clonazePAM (KLONOPIN) 0.5 MG tablet Take 0.5 mg by mouth 3 times daily as needed for Anxiety 11/2 tab three times daily  Take morning of surgery with a sip of water    Historical Provider, MD   lamoTRIgine (LAMICTAL) 200 MG tablet Take 200 mg by mouth 3 times daily. Historical Provider, MD   baclofen (LIORESAL) 20 MG tablet Take 20 mg by mouth 3 times daily     Historical Provider, MD   zonisamide (ZONEGRAN) 100 MG capsule Take 300 mg by mouth nightly     Historical Provider, MD   citalopram (CELEXA) 10 MG/5ML solution Take 10 mg by mouth daily. Historical Provider, MD       Allergies:  Patient has no known allergies. Social History:   TOBACCO:   reports that he has never smoked. He has never used smokeless tobacco.  ETOH:   reports no history of alcohol use. OCCUPATION:  unknown    Family History:   No family history on file.     REVIEW OF SYSTEMS:  PCP Dr. Toya Velazquez cleared on 8/26/2020, will be updated on day of procedure    Labs and Imaging Studies   Basic Labs    Imaging Studies:  Radiology:   None, radiographs will be made during procedure    Oral Findings:    Hygiene: dental hygiene poor    Dentition: poor    Teeth: caries: unable to evaluate    Retained roots: unable to evaluate    Impactions: unable to evaluate    Gingiva: inflamed    Mucous Membrane: dental hygiene poor    Tongue: coated dorsum    Floor of mouth: unable to evaluate    Alveolar Process: unable to evaluate    Salivary Glands: normal    Tentative Diagnosis: periodontal disease, dental caries    Resident Assessment and Plan:   Complete exam, radiographs, prophylaxis/SRP, fluoride treatment, restorations and extractions as indicated      Mahi Nicholson DDS  9/1/2020  5:39 PM    Attending physician: Dr. Cheyenne Ramos  Electronically signed by Pam Padilla DDS on 9/4/2020 at 7:02 AM

## 2020-09-01 NOTE — PROGRESS NOTES
[facility] PRE-ADMISSION TESTING GENERAL INSTRUCTIONS- [facility]-phone number:[phone]    GENERAL INSTRUCTIONS  [x] Antibacterial Soap shower Night before and/or AM of Surgery  [] Marcos wipe instruction sheet and wipes given. [x] Nothing by mouth after midnight, including gum, candy, mints, or water. [x] You may brush your teeth, gargle, but do NOT swallow water. []Hibiclens shower  the night before and the morning of surgery. Do not use             Hibiclens on your face or head. [x]No smoking, chewing tobacco, illegal drugs, or alcohol within 24 hours of your surgery. [x] Jewelry, valuables or body piercing's should not be brought to the hospital. All body and/or tongue piercing's must be removed prior to arriving to hospital.  ALL hair pins must be removed. [x] Do not wear makeup, lotions, powders, deodorant. Nail polish as directed by the nurse. [x] Arrange transportation with a responsible adult  to and from the hospital. If you do not have a responsible adult  to transport you, you will need to make arrangements with a medical transportation company (i.e. [email or web address]. A Uber/taxi/bus is not appropriate unless you are accompanied by a responsible adult ). Arrange for someone to be with you for the remainder of the day and for 24 hours after your procedure due to having had anesthesia. Who will be your  for transportation? parents   Who will be staying with you for 24 hrs after your procedure? parents  [x] Bring insurance card and photo ID.  [] Transfusion Bracelet: Please bring with you to hospital, day of surgery  [] Bring urine specimen day of surgery. Any small container is acceptable. [] Use inhalers the morning of surgery and bring with you to hospital.  [] Bring copy of living will or healthcare power of  papers to be placed in your electronic record.   [] CPAP/BI-PAP: Please bring your machine if you are to spend the night in the hospital.     PARKING INSTRUCTIONS:   [x] Arrival Time: 0530 MAIN ENTRANCE, [address], [place]  · [] Parking lot '\"I\"  is located on [facility] (the corner of [facility] and [facility]). To enter, press the button and the gate will lift. A free token will be provided to exit the lot. One car per patient is allowed to park in this lot. All other cars are to park on [address] [place] [address] Street either in the parking garage or the handicap lot. [x] To reach the [facility] lobby from [address] [place] [address], upon entering the hospital, take elevator B to the 3rd floor. EDUCATION INSTRUCTIONS:      [] Knee or hip replacement booklet & exercise pamphlets given. [] Jena Mann placed in chart. [] Pre-admission Testing educational folder given  [] Incentive Spirometry,coughing & deep breathing exercises reviewed. []Medication information sheet(s)   []Fluoroscopy-Xray used in surgery reviewed with patient. Educational pamphlet placed in chart. []Pain: Post-op pain is normal and to be expected. You will be asked to rate your pain from 0-10(a zero is not acceptable-education is needed). Your post-op pain goal is:  [] Ask your nurse for your pain medication. [] Joint camp offered. [] Joint replacement booklets given. [] Other:___________________________    MEDICATION INSTRUCTIONS:   [x]Bring a complete list of your medications, please write the last time you took the medicine, give this list to the nurse. [x] Take the following medications the morning of surgery with 1-2 ounces of water: SEE LIST  [] Stop herbal supplements and vitamins 5 days before your surgery. [] DO NOT take any diabetic medicine the morning of surgery. Follow instructions for insulin the day before surgery. [] If you are diabetic and your blood sugar is low or you feel symptomatic, you may drink 1-2 ounces of apple juice or take a glucose tablet.   The morning of your procedure, you may call the pre-op area

## 2020-09-03 ENCOUNTER — TELEPHONE (OUTPATIENT)
Dept: DENTISTRY | Age: 25
End: 2020-09-03

## 2020-09-03 LAB — SARS-COV-2, NAAT: NOT DETECTED

## 2020-09-03 NOTE — TELEPHONE ENCOUNTER
Spoke with patient's mother Rohit Jaramillo, discussed plan for surgery tomorrow and that we will update her on treatment needs after exam and radiographs are completed. All questions answered. Reminded no food/drink after midnight.

## 2020-09-04 ENCOUNTER — ANESTHESIA (OUTPATIENT)
Dept: OPERATING ROOM | Age: 25
End: 2020-09-04
Payer: COMMERCIAL

## 2020-09-04 ENCOUNTER — ANESTHESIA EVENT (OUTPATIENT)
Dept: OPERATING ROOM | Age: 25
End: 2020-09-04
Payer: COMMERCIAL

## 2020-09-04 ENCOUNTER — HOSPITAL ENCOUNTER (OUTPATIENT)
Age: 25
Setting detail: OUTPATIENT SURGERY
Discharge: HOME | End: 2020-09-04
Attending: DENTIST | Admitting: DENTIST
Payer: COMMERCIAL

## 2020-09-04 VITALS
DIASTOLIC BLOOD PRESSURE: 73 MMHG | SYSTOLIC BLOOD PRESSURE: 100 MMHG | TEMPERATURE: 98 F | WEIGHT: 135 LBS | HEIGHT: 60 IN | OXYGEN SATURATION: 98 % | HEART RATE: 84 BPM | BODY MASS INDEX: 26.5 KG/M2 | RESPIRATION RATE: 24 BRPM

## 2020-09-04 VITALS — TEMPERATURE: 93.4 F | SYSTOLIC BLOOD PRESSURE: 109 MMHG | DIASTOLIC BLOOD PRESSURE: 66 MMHG | OXYGEN SATURATION: 100 %

## 2020-09-04 PROCEDURE — 7100000011 HC PHASE II RECOVERY - ADDTL 15 MIN: Performed by: DENTIST

## 2020-09-04 PROCEDURE — 3600000002 HC SURGERY LEVEL 2 BASE: Performed by: DENTIST

## 2020-09-04 PROCEDURE — 2500000003 HC RX 250 WO HCPCS: Performed by: NURSE ANESTHETIST, CERTIFIED REGISTERED

## 2020-09-04 PROCEDURE — 3700000000 HC ANESTHESIA ATTENDED CARE: Performed by: DENTIST

## 2020-09-04 PROCEDURE — 3700000001 HC ADD 15 MINUTES (ANESTHESIA): Performed by: DENTIST

## 2020-09-04 PROCEDURE — 3600000012 HC SURGERY LEVEL 2 ADDTL 15MIN: Performed by: DENTIST

## 2020-09-04 PROCEDURE — 2580000003 HC RX 258: Performed by: STUDENT IN AN ORGANIZED HEALTH CARE EDUCATION/TRAINING PROGRAM

## 2020-09-04 PROCEDURE — 6360000002 HC RX W HCPCS: Performed by: NURSE ANESTHETIST, CERTIFIED REGISTERED

## 2020-09-04 PROCEDURE — 7100000001 HC PACU RECOVERY - ADDTL 15 MIN: Performed by: DENTIST

## 2020-09-04 PROCEDURE — 7100000010 HC PHASE II RECOVERY - FIRST 15 MIN: Performed by: DENTIST

## 2020-09-04 PROCEDURE — 7100000000 HC PACU RECOVERY - FIRST 15 MIN: Performed by: DENTIST

## 2020-09-04 RX ORDER — MEPERIDINE HYDROCHLORIDE 25 MG/ML
12.5 INJECTION INTRAMUSCULAR; INTRAVENOUS; SUBCUTANEOUS EVERY 5 MIN PRN
Status: DISCONTINUED | OUTPATIENT
Start: 2020-09-04 | End: 2020-09-04 | Stop reason: HOSPADM

## 2020-09-04 RX ORDER — PROMETHAZINE HYDROCHLORIDE 25 MG/ML
6.25 INJECTION, SOLUTION INTRAMUSCULAR; INTRAVENOUS EVERY 10 MIN PRN
Status: DISCONTINUED | OUTPATIENT
Start: 2020-09-04 | End: 2020-09-04 | Stop reason: HOSPADM

## 2020-09-04 RX ORDER — ROCURONIUM BROMIDE 10 MG/ML
INJECTION, SOLUTION INTRAVENOUS PRN
Status: DISCONTINUED | OUTPATIENT
Start: 2020-09-04 | End: 2020-09-04 | Stop reason: SDUPTHER

## 2020-09-04 RX ORDER — GLYCOPYRROLATE 1 MG/5 ML
SYRINGE (ML) INTRAVENOUS PRN
Status: DISCONTINUED | OUTPATIENT
Start: 2020-09-04 | End: 2020-09-04 | Stop reason: SDUPTHER

## 2020-09-04 RX ORDER — SODIUM CHLORIDE 0.9 % (FLUSH) 0.9 %
10 SYRINGE (ML) INJECTION PRN
Status: DISCONTINUED | OUTPATIENT
Start: 2020-09-04 | End: 2020-09-04 | Stop reason: HOSPADM

## 2020-09-04 RX ORDER — ONDANSETRON 2 MG/ML
INJECTION INTRAMUSCULAR; INTRAVENOUS PRN
Status: DISCONTINUED | OUTPATIENT
Start: 2020-09-04 | End: 2020-09-04 | Stop reason: SDUPTHER

## 2020-09-04 RX ORDER — HYDROCODONE BITARTRATE AND ACETAMINOPHEN 5; 325 MG/1; MG/1
2 TABLET ORAL PRN
Status: DISCONTINUED | OUTPATIENT
Start: 2020-09-04 | End: 2020-09-04 | Stop reason: HOSPADM

## 2020-09-04 RX ORDER — HYDROCODONE BITARTRATE AND ACETAMINOPHEN 5; 325 MG/1; MG/1
1 TABLET ORAL PRN
Status: DISCONTINUED | OUTPATIENT
Start: 2020-09-04 | End: 2020-09-04 | Stop reason: HOSPADM

## 2020-09-04 RX ORDER — FENTANYL CITRATE 50 UG/ML
INJECTION, SOLUTION INTRAMUSCULAR; INTRAVENOUS PRN
Status: DISCONTINUED | OUTPATIENT
Start: 2020-09-04 | End: 2020-09-04 | Stop reason: SDUPTHER

## 2020-09-04 RX ORDER — SODIUM CHLORIDE 0.9 % (FLUSH) 0.9 %
10 SYRINGE (ML) INJECTION EVERY 12 HOURS SCHEDULED
Status: DISCONTINUED | OUTPATIENT
Start: 2020-09-04 | End: 2020-09-04 | Stop reason: HOSPADM

## 2020-09-04 RX ORDER — ACETAMINOPHEN 500 MG
500 TABLET ORAL EVERY 6 HOURS PRN
Qty: 12 TABLET | Refills: 0 | Status: SHIPPED | OUTPATIENT
Start: 2020-09-04 | End: 2020-09-04 | Stop reason: HOSPADM

## 2020-09-04 RX ORDER — PROPOFOL 10 MG/ML
INJECTION, EMULSION INTRAVENOUS PRN
Status: DISCONTINUED | OUTPATIENT
Start: 2020-09-04 | End: 2020-09-04 | Stop reason: SDUPTHER

## 2020-09-04 RX ORDER — MIDAZOLAM HYDROCHLORIDE 1 MG/ML
INJECTION INTRAMUSCULAR; INTRAVENOUS PRN
Status: DISCONTINUED | OUTPATIENT
Start: 2020-09-04 | End: 2020-09-04 | Stop reason: SDUPTHER

## 2020-09-04 RX ORDER — DEXAMETHASONE SODIUM PHOSPHATE 10 MG/ML
INJECTION, SOLUTION INTRAMUSCULAR; INTRAVENOUS PRN
Status: DISCONTINUED | OUTPATIENT
Start: 2020-09-04 | End: 2020-09-04 | Stop reason: SDUPTHER

## 2020-09-04 RX ORDER — MORPHINE SULFATE 2 MG/ML
1 INJECTION, SOLUTION INTRAMUSCULAR; INTRAVENOUS EVERY 5 MIN PRN
Status: DISCONTINUED | OUTPATIENT
Start: 2020-09-04 | End: 2020-09-04 | Stop reason: HOSPADM

## 2020-09-04 RX ORDER — MORPHINE SULFATE 2 MG/ML
2 INJECTION, SOLUTION INTRAMUSCULAR; INTRAVENOUS EVERY 5 MIN PRN
Status: DISCONTINUED | OUTPATIENT
Start: 2020-09-04 | End: 2020-09-04 | Stop reason: HOSPADM

## 2020-09-04 RX ORDER — ACETAMINOPHEN 160 MG/5ML
10 SUSPENSION, ORAL (FINAL DOSE FORM) ORAL EVERY 6 HOURS PRN
Qty: 229.56 ML | Refills: 0 | Status: SHIPPED | OUTPATIENT
Start: 2020-09-04 | End: 2022-09-27

## 2020-09-04 RX ORDER — SODIUM CHLORIDE, SODIUM LACTATE, POTASSIUM CHLORIDE, CALCIUM CHLORIDE 600; 310; 30; 20 MG/100ML; MG/100ML; MG/100ML; MG/100ML
INJECTION, SOLUTION INTRAVENOUS CONTINUOUS
Status: DISCONTINUED | OUTPATIENT
Start: 2020-09-04 | End: 2020-09-04 | Stop reason: HOSPADM

## 2020-09-04 RX ORDER — NEOSTIGMINE METHYLSULFATE 1 MG/ML
INJECTION, SOLUTION INTRAVENOUS PRN
Status: DISCONTINUED | OUTPATIENT
Start: 2020-09-04 | End: 2020-09-04 | Stop reason: SDUPTHER

## 2020-09-04 RX ADMIN — PROPOFOL 50 MG: 10 INJECTION, EMULSION INTRAVENOUS at 08:00

## 2020-09-04 RX ADMIN — SODIUM CHLORIDE, POTASSIUM CHLORIDE, SODIUM LACTATE AND CALCIUM CHLORIDE: 600; 310; 30; 20 INJECTION, SOLUTION INTRAVENOUS at 06:41

## 2020-09-04 RX ADMIN — MIDAZOLAM 2 MG: 1 INJECTION INTRAMUSCULAR; INTRAVENOUS at 07:39

## 2020-09-04 RX ADMIN — ONDANSETRON HYDROCHLORIDE 4 MG: 2 INJECTION, SOLUTION INTRAMUSCULAR; INTRAVENOUS at 09:30

## 2020-09-04 RX ADMIN — FENTANYL CITRATE 50 MCG: 50 INJECTION, SOLUTION INTRAMUSCULAR; INTRAVENOUS at 08:00

## 2020-09-04 RX ADMIN — Medication 0.6 MG: at 09:34

## 2020-09-04 RX ADMIN — ROCURONIUM BROMIDE 40 MG: 10 INJECTION, SOLUTION INTRAVENOUS at 08:00

## 2020-09-04 RX ADMIN — DEXAMETHASONE SODIUM PHOSPHATE 10 MG: 10 INJECTION, SOLUTION INTRAMUSCULAR; INTRAVENOUS at 08:14

## 2020-09-04 RX ADMIN — Medication 3 MG: at 09:34

## 2020-09-04 RX ADMIN — PROPOFOL 150 MG: 10 INJECTION, EMULSION INTRAVENOUS at 07:52

## 2020-09-04 ASSESSMENT — PULMONARY FUNCTION TESTS
PIF_VALUE: 17
PIF_VALUE: 17
PIF_VALUE: 21
PIF_VALUE: 20
PIF_VALUE: 7
PIF_VALUE: 20
PIF_VALUE: 13
PIF_VALUE: 20
PIF_VALUE: 16
PIF_VALUE: 20
PIF_VALUE: 20
PIF_VALUE: 1
PIF_VALUE: 20
PIF_VALUE: 21
PIF_VALUE: 2
PIF_VALUE: 16
PIF_VALUE: 20
PIF_VALUE: 1
PIF_VALUE: 20
PIF_VALUE: 20
PIF_VALUE: 15
PIF_VALUE: 16
PIF_VALUE: 20
PIF_VALUE: 1
PIF_VALUE: 20
PIF_VALUE: 20
PIF_VALUE: 2
PIF_VALUE: 20
PIF_VALUE: 18
PIF_VALUE: 18
PIF_VALUE: 20
PIF_VALUE: 2
PIF_VALUE: 20
PIF_VALUE: 20
PIF_VALUE: 7
PIF_VALUE: 20
PIF_VALUE: 23
PIF_VALUE: 20
PIF_VALUE: 1
PIF_VALUE: 20
PIF_VALUE: 5
PIF_VALUE: 20
PIF_VALUE: 20
PIF_VALUE: 16
PIF_VALUE: 20
PIF_VALUE: 3
PIF_VALUE: 20
PIF_VALUE: 16
PIF_VALUE: 16
PIF_VALUE: 20
PIF_VALUE: 1
PIF_VALUE: 20
PIF_VALUE: 5
PIF_VALUE: 21
PIF_VALUE: 18
PIF_VALUE: 22
PIF_VALUE: 20
PIF_VALUE: 20
PIF_VALUE: 11
PIF_VALUE: 20
PIF_VALUE: 2
PIF_VALUE: 20
PIF_VALUE: 18
PIF_VALUE: 0
PIF_VALUE: 5
PIF_VALUE: 3
PIF_VALUE: 1
PIF_VALUE: 20
PIF_VALUE: 20
PIF_VALUE: 16
PIF_VALUE: 20
PIF_VALUE: 1
PIF_VALUE: 20
PIF_VALUE: 20
PIF_VALUE: 18
PIF_VALUE: 20
PIF_VALUE: 2
PIF_VALUE: 19
PIF_VALUE: 17
PIF_VALUE: 20
PIF_VALUE: 0
PIF_VALUE: 3
PIF_VALUE: 20
PIF_VALUE: 1
PIF_VALUE: 20
PIF_VALUE: 16
PIF_VALUE: 15
PIF_VALUE: 1
PIF_VALUE: 20
PIF_VALUE: 16
PIF_VALUE: 20
PIF_VALUE: 18
PIF_VALUE: 16
PIF_VALUE: 1
PIF_VALUE: 12
PIF_VALUE: 16
PIF_VALUE: 18
PIF_VALUE: 27
PIF_VALUE: 1
PIF_VALUE: 18
PIF_VALUE: 2
PIF_VALUE: 17
PIF_VALUE: 1
PIF_VALUE: 20
PIF_VALUE: 16
PIF_VALUE: 20
PIF_VALUE: 19
PIF_VALUE: 20

## 2020-09-04 ASSESSMENT — PAIN SCALES - WONG BAKER: WONGBAKER_NUMERICALRESPONSE: 0

## 2020-09-04 ASSESSMENT — PAIN SCALES - GENERAL
PAINLEVEL_OUTOF10: 0

## 2020-09-04 ASSESSMENT — PAIN DESCRIPTION - LOCATION: LOCATION: MOUTH

## 2020-09-04 ASSESSMENT — PAIN - FUNCTIONAL ASSESSMENT: PAIN_FUNCTIONAL_ASSESSMENT: FACES

## 2020-09-04 ASSESSMENT — PAIN DESCRIPTION - PAIN TYPE: TYPE: SURGICAL PAIN

## 2020-09-04 NOTE — ANESTHESIA PRE PROCEDURE
Department of Anesthesiology  Preprocedure Note       Name:  Una Nogueira   Age:  30 y.o.  :  1995                                          MRN:  [id number]         Date:  2020      Surgeon: Toya Martinez):  Suri Mtz DDS    Procedure: Procedure(s):  DENTAL RESTORATIONS    Vital Signs (Current)   Vitals:    20   BP: 108/74   Pulse: 78   Resp: 16   Temp: 36.3 °C (97.3 °F)   SpO2: 97%     Vital Signs Statistics (for past 48 hrs)     Temp  Av.3 °C (97.3 °F)  Min: 36.3 °C (97.3 °F)   Min taken time: 20  Max: 36.3 °C (97.3 °F)   Max taken time: 20  Pulse  Av  Min: 66   Min taken time: 20  Max: 66   Max taken time: 20  Resp  Av  Min: 12   Min taken time: 20  Max: 12   Max taken time: 20  BP  Min: 108/74   Min taken time: 20  Max: 108/74   Max taken time: 20  SpO2  Av %  Min: 97 %   Min taken time: 20  Max: 97 %   Max taken time: 20    BP Readings from Last 3 Encounters:   20 108/74   19 101/75   18 122/80     BMI  Body mass index is 26.37 kg/m². Estimated body mass index is 26.37 kg/m² as calculated from the following:    Height as of this encounter: 5' (1.524 m). Weight as of this encounter: 135 lb (61.2 kg).     CBC   Lab Results   Component Value Date    WBC 5.6 2019    RBC 4.75 2019    HGB 15.5 2019    HCT 46.8 2019    MCV 98.5 2019    RDW 12.1 2019     2019     CMP    Lab Results   Component Value Date     2020    K 3.5 2020     2020    CO2 24 2020    BUN 10 2020    CREATININE 0.7 2020    GFRAA >60 2020    LABGLOM >60 2020    GLUCOSE 89 2020    PROT 7.6 2020    CALCIUM 9.7 2020    BILITOT 0.4 2020    ALKPHOS 135 2020    AST 25 2020    ALT 42 2020     BMP    Lab Results   Component Value Date  08/07/2020    K 3.5 08/07/2020     08/07/2020    CO2 24 08/07/2020    BUN 10 08/07/2020    CREATININE 0.7 08/07/2020    CALCIUM 9.7 08/07/2020    GFRAA >60 08/07/2020    LABGLOM >60 08/07/2020    GLUCOSE 89 08/07/2020     POCGlucose  No results for input(s): GLUCOSE in the last 72 hours. Coags  No results found for: PROTIME, INR, APTT  HCG (If Applicable) No results found for: PREGTESTUR, PREGSERUM, HCG, HCGQUANT   ABGs No results found for: PHART, PO2ART, SCW4WYP, PZY6ISD, BEART, H3TSPXGO   Type & Screen (If Applicable)  No results found for: [facility]  Radiology (If Applicable)  Cardiac Testing (If Applicable)   EKG (If Applicable)   Medications prior to admission:   Prior to Admission medications    Medication Sig Start Date End Date Taking? Authorizing Provider   nystatin (MYCOSTATIN) 635905 UNIT/GM powder Apply 3 times daily. 8/23/18  Yes Jeronimo Wilkerson MD   Elastic Bandages & Supports (KNEE BRACE ADJUSTABLE HINGED) MISC 1 Units by Does not apply route daily 4/9/18  Yes Janella Soulier, MD   Polyethylene Glycol 3350 (MIRALAX PO) Take by mouth as needed    Yes Historical Provider, MD   lacosamide (VIMPAT) 150 MG TABS tablet Take 150 mg by mouth 2 times daily Take morning of surgery with a sip of water   Yes Historical Provider, MD   clonazePAM (KLONOPIN) 0.5 MG tablet Take 0.5 mg by mouth 3 times daily as needed for Anxiety 11/2 tab three times daily  Take morning of surgery with a sip of water   Yes Historical Provider, MD   lamoTRIgine (LAMICTAL) 200 MG tablet Take 200 mg by mouth 3 times daily. Yes Historical Provider, MD   baclofen (LIORESAL) 20 MG tablet Take 20 mg by mouth 3 times daily    Yes Historical Provider, MD   zonisamide (ZONEGRAN) 100 MG capsule Take 300 mg by mouth nightly    Yes Historical Provider, MD   citalopram (CELEXA) 10 MG/5ML solution Take 10 mg by mouth daily.    Yes Historical Provider, MD       Current medications:    Current Facility-Administered Medications comment: Gingivitis    periodontal disease    Tooth impaction Abdominal:           Vascular:                                        Anesthesia Plan      general     ASA 3       Induction: intravenous. Anesthetic plan and risks discussed with patient. Use of blood products discussed with patient whom. Plan discussed with attending.                   MINERVA Tellez - YI   9/4/2020

## 2020-09-04 NOTE — BRIEF OP NOTE
Brief Postoperative Note      Patient: Annette Murphy  YOB: 1995  MRN: [id number]    Date of Procedure: 9/4/2020    Pre-Op Diagnosis: DENTAL CARIES    Post-Op Diagnosis: Dental caries, gingival hyperplasia       Procedure(s):  EXAM, RADIOGRAPHS, PROPHYLAXIS, DENTAL RESTORATIONS    Surgeon(s):  HORTENCIA Velasquez DDS, DDS    Assistant:  Iona BAILEY    Anesthesia: General, EOTT    Estimated Blood Loss (mL): less than 68PV    Complications: None    Specimens:   * No specimens in log *    Implants:  * No implants in log *      Drains:   Gastrostomy/Enterostomy/Jejunostomy PEG-Jejunostomy  (Active)       Findings: generalized calculus, partial edentulism, gingival hyperplasia, gingivitis, dental caries, hairy tongue, cheek biting    Electronically signed by Tina Tran DDS on 9/4/2020 at 9:47 AM   Electronically signed by Randy Smith DDS on 9/4/2020 at 2:12 PM

## 2020-09-04 NOTE — OP NOTE
Date of Procedure: 9/4/2020     Surgeon: HORTENCIA Raphael Bench HORTENCIA  Sharon Lighter HORTENCIA    Surgical Wound Classification: Clean Contaminated II    Preoperative Diagnosis: Dental caries, gingivitis      Postoperative Diagnosis: dental caries, gingival hyperplasia, gingivitis, hairy tongue, buccal keratosis due to cheek biting    Operation: Exam, Prophy, Fluoride Treatment, Restorative: one Extractions: none    Anesthesia: General, EOTT    Estimated Blood Loss: less than 5cc    Complications:  none    Fluids: 600 mL    Specimen: N/A    Conditions:  good    Disposition: To PACU, stable    Procedure: This patient was initially seen in the preoperative holding area, where the history, physical and consents were updated and verified. The patient was then transferred via the anesthesia team and [facility] to operating room # 10 at [address] [place] [address] on 9/4/2020 , at which time the patient was transferred from the [facility] onto the operating room table and placed in the supine position. The patient's arms and legs were padded at the sides. The patient had the general anesthetic monitors applied. Please see anesthesia notes for complete details. Once the patient was placed into a general anesthetic state, the surgical area was prepped and draped in a standard oral and maxillofacial surgery fashion. A throat pack was placed. A comprehensive oral exam was performed. 18 radiographs were taken. Full mouth perio charting completed. A treatment plan was formulated based upon presenting clinical and radiographic findings. Intraoral soft tissue exam findings include: cheek biting keratosis especially apparent on L, hairy tongue. Pseudopocketing apparent on periodontal exam due to gingival hyperplasia. Caries were identified, followed by the preparation of the following teeth: #18 O. Dental restorations were placed as follows: #18 O composite. Hemodent used topically for hemostasis near #18.  Dental restorations were polished and refined to proper occlusion. Cavitron utilized to remove gross calculus. A prophylaxis with pumice was performed, applied Peridex 0.12% to gingiva, and fluoride applied. 1x1mm of gingival tissue interproximal #17,18 removed with rongeurs due to tissue tag encroaching on occlusal surface. No extractions indicated. Throat pack was removed. Patient was awake from anesthesia. Patient was released to recovery room in good condition. Patient tolerated procedure well. Postoperative instructions given to parent. The following prescriptions were given: acetaminophen 160mg/5mL suspension. No refills on prescription. 1-Week Post Op:  09/10/2020 at  01:00pm    Written by: Abraham Neves D.D.S. for Cordell Smith DDS    I was present with the above resident and patient for pre-operative, procedure and post-operative care. I agree with the above. The patient's mother was updated throughout and agreed with the treatment completed. Written and verbal post-op instructions given to patient's mother who verbalized her understanding.    Electronically signed by Cordell Smith DDS on 9/4/2020 at 2:16 PM

## 2020-09-04 NOTE — PROGRESS NOTES
Admitted to [facility]. Instructed mother. Covid test done:8/31/2020    Results:  neg    Self quarantine guidelines followed since tested? yes  Any unusual S/S or concerns expressed/observed?   No         JUAN Chaidez

## 2020-09-04 NOTE — INTERVAL H&P NOTE
Update History & Physical    The patient's History and Physical of August 26, 2020 was reviewed with the patient and I examined the patient. There was no change. The surgical site was confirmed by the mother and me. Plan: The risks, benefits, expected outcome, and alternative to the recommended procedure have been discussed with the mother. Discussed that we will have a definitive plan following radiographs and exam, and we will speak to her prior to treatment if we have more needed than cleaning and restorations. Mother understands and wants to proceed with the procedure.      Electronically signed by Gabrielle Umanzor DDS on 9/4/2020 at 7:18 AM    Electronically signed by Julio C Min DDS on 9/4/2020 at 7:22 AM

## 2020-09-04 NOTE — ANESTHESIA PRE PROCEDURE
Department of Anesthesiology  Preprocedure Note       Name:  Jade Phillips   Age:  30 y.o.  :  1995                                          MRN:  [id number]         Date:  2020      Surgeon: Araseli Obrien):  Kimo Zhu DDS    Procedure: Procedure(s):  Dental    Medications prior to admission:   Prior to Admission medications    Medication Sig Start Date End Date Taking? Authorizing Provider   nystatin (MYCOSTATIN) 962673 UNIT/GM powder Apply 3 times daily. 18  Yes Marlene Workman MD   Elastic Bandages & Supports (KNEE BRACE ADJUSTABLE HINGED) MISC 1 Units by Does not apply route daily 18  Yes Soniya Brannon MD   Polyethylene Glycol 3350 (MIRALAX PO) Take by mouth as needed    Yes Historical Provider, MD   lacosamide (VIMPAT) 150 MG TABS tablet Take 150 mg by mouth 2 times daily Take morning of surgery with a sip of water   Yes Historical Provider, MD   clonazePAM (KLONOPIN) 0.5 MG tablet Take 0.5 mg by mouth 3 times daily as needed for Anxiety  tab three times daily  Take morning of surgery with a sip of water   Yes Historical Provider, MD   lamoTRIgine (LAMICTAL) 200 MG tablet Take 200 mg by mouth 3 times daily. Yes Historical Provider, MD   baclofen (LIORESAL) 20 MG tablet Take 20 mg by mouth 3 times daily    Yes Historical Provider, MD   zonisamide (ZONEGRAN) 100 MG capsule Take 300 mg by mouth nightly    Yes Historical Provider, MD   citalopram (CELEXA) 10 MG/5ML solution Take 10 mg by mouth daily.    Yes Historical Provider, MD       Current medications:    Current Facility-Administered Medications   Medication Dose Route Frequency Provider Last Rate Last Dose    lactated ringers infusion   Intravenous Continuous Donovan Hiss,  mL/hr at 20 0641      sodium chloride flush 0.9 % injection 10 mL  10 mL Intravenous 2 times per day Donovan Hiss, DDS        sodium chloride flush 0.9 % injection 10 mL  10 mL Intravenous PRN Donovan Hiss, DDS           Allergies:  No Known Allergies    Problem List:    Patient Active Problem List   Diagnosis Code    Periodontal disease K05.6    Gingivitis K05.10    Tooth impaction K01.1    Ulcer of knee, right, with fat layer exposed ([facility] [address] L97.812    Dehiscence of surgical wound T81. 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 performed by Melissa Gooden MD at [address] History:    Social History     Tobacco Use    Smoking status: Never Smoker    Smokeless tobacco: Never Used   Substance Use Topics    Alcohol use: No                                Counseling given: Not Answered      Vital Signs (Current):   Vitals:    09/01/20 1553 09/04/20 0641   BP:  108/74   Pulse:  78   Resp:  16   Temp:  97.3 °F (36.3 °C)   TempSrc:  Temporal   SpO2:  97%   Weight: 135 lb (61.2 kg) 135 lb (61.2 kg)   Height: 5' (1.524 m) 5' (1.524 m)                                              BP Readings from Last 3 Encounters:   09/04/20 108/74   08/29/19 101/75   09/28/18 122/80       NPO Status: Time of last liquid consumption: 0430                        Time of last solid consumption: 1900                        Date of last liquid consumption: 09/04/20                        Date of last solid food consumption: 09/03/20    BMI:   Wt Readings from Last 3 Encounters:   09/04/20 135 lb (61.2 kg)   08/29/19 120 lb (54.4 kg)   08/14/18 118 lb (53.5 kg)     Body mass index is 26.37 kg/m².     CBC:   Lab Results   Component Value Date    WBC 5.6 08/05/2019    RBC 4.75 08/05/2019 HGB 15.5 08/05/2019    HCT 46.8 08/05/2019    MCV 98.5 08/05/2019    RDW 12.1 08/05/2019     08/05/2019       CMP:   Lab Results   Component Value Date     08/07/2020    K 3.5 08/07/2020     08/07/2020    CO2 24 08/07/2020    BUN 10 08/07/2020    CREATININE 0.7 08/07/2020    GFRAA >60 08/07/2020    LABGLOM >60 08/07/2020    GLUCOSE 89 08/07/2020    PROT 7.6 08/07/2020    CALCIUM 9.7 08/07/2020    BILITOT 0.4 08/07/2020    ALKPHOS 135 08/07/2020    AST 25 08/07/2020    ALT 42 08/07/2020       POC Tests: No results for input(s): POCGLU, POCNA, POCK, POCCL, POCBUN, POCHEMO, POCHCT in the last 72 hours. Coags: No results found for: PROTIME, INR, APTT    HCG (If Applicable): No results found for: PREGTESTUR, PREGSERUM, HCG, HCGQUANT     ABGs: No results found for: PHART, PO2ART, NQT9BFV, YNO2DND, BEART, V0SUFGCK     Type & Screen (If Applicable):  No results found for: [facility]    Anesthesia Evaluation  Patient summary reviewed no history of anesthetic complications:   Airway:        Comment: Unable to access  Patient will not open his mouth   Dental:      Comment: Family denies loose teeth  States has regular dental visits    Pulmonary:Negative Pulmonary ROS                              Cardiovascular:Negative CV ROS            Rhythm: regular                      Neuro/Psych:   (+) seizures:, psychiatric history (mental handicap):             ROS comment: Severe brain damage GI/Hepatic/Renal:            ROS comment: g-tube. Endo/Other: Negative Endo/Other ROS                    Abdominal:           Vascular: negative vascular ROS. Anesthesia Plan      general     ASA 3     (General back up prn)  Induction: intravenous. MIPS: Postoperative opioids intended, Prophylactic antiemetics administered and Postoperative trial extubation. Anesthetic plan and risks discussed with mother and father. Plan discussed with YI.             Marco Reynoso Danitza Verma MD   9/4/2020

## 2020-09-05 NOTE — ANESTHESIA PRE PROCEDURE
Department of Anesthesiology  Preprocedure Note       Name:  Mehnaz Urrutia   Age:  30 y.o.  :  1995                                          MRN:  [id number]         Date:  2020      Surgeon: Adrienne Rivera):  Jan Colunga DDS    Procedure: Procedure(s):  EXAM, RADIOGRAPHS, PROPHYLAXIS, DENTAL RESTORATIONS    Medications prior to admission:   Prior to Admission medications    Medication Sig Start Date End Date Taking? Authorizing Provider   acetaminophen (TYLENOL) 160 MG/5ML suspension 19.13 mLs by Per G Tube route every 6 hours as needed for Fever 20 Yes Eric Melo DDS   nystatin (MYCOSTATIN) 798431 UNIT/GM powder Apply 3 times daily. 18  Yes Robert Thompson MD   Elastic Bandages & Supports (KNEE BRACE ADJUSTABLE HINGED) MISC 1 Units by Does not apply route daily 18  Yes George Marc MD   Polyethylene Glycol 3350 (MIRALAX PO) Take by mouth as needed    Yes Historical Provider, MD   lacosamide (VIMPAT) 150 MG TABS tablet Take 150 mg by mouth 2 times daily Take morning of surgery with a sip of water   Yes Historical Provider, MD   clonazePAM (KLONOPIN) 0.5 MG tablet Take 0.5 mg by mouth 3 times daily as needed for Anxiety  tab three times daily  Take morning of surgery with a sip of water   Yes Historical Provider, MD   lamoTRIgine (LAMICTAL) 200 MG tablet Take 200 mg by mouth 3 times daily. Yes Historical Provider, MD   baclofen (LIORESAL) 20 MG tablet Take 20 mg by mouth 3 times daily    Yes Historical Provider, MD   zonisamide (ZONEGRAN) 100 MG capsule Take 300 mg by mouth nightly    Yes Historical Provider, MD   citalopram (CELEXA) 10 MG/5ML solution Take 10 mg by mouth daily. Yes Historical Provider, MD       Current medications:    No current facility-administered medications for this encounter.       Current Outpatient Medications   Medication Sig Dispense Refill    acetaminophen (TYLENOL) 160 MG/5ML suspension 19.13 mLs by Per G Tube route every 6 hours as needed Applicable):  No results found for: HIV, HEPCAB    COVID-19 Screening (If Applicable):   Lab Results   Component Value Date    COVID19 Not Detected 08/31/2020         Anesthesia Evaluation    Airway: Mallampati: II        Dental:          Pulmonary:                              Cardiovascular:                      Neuro/Psych:               GI/Hepatic/Renal:             Endo/Other:                     Abdominal:           Vascular:                                        Anesthesia Plan        Steven Gamez MD   9/5/2020

## 2021-04-26 ENCOUNTER — TELEPHONE (OUTPATIENT)
Dept: PRIMARY CARE CLINIC | Age: 26
End: 2021-04-26

## 2021-04-26 NOTE — TELEPHONE ENCOUNTER
It is not on that medication according to his medication list the only thing I can see is hyoscyamine

## 2021-05-17 VITALS
TEMPERATURE: 97.2 F | OXYGEN SATURATION: 93 % | SYSTOLIC BLOOD PRESSURE: 94 MMHG | HEART RATE: 74 BPM | RESPIRATION RATE: 14 BRPM | DIASTOLIC BLOOD PRESSURE: 68 MMHG

## 2021-08-12 ENCOUNTER — TELEPHONE (OUTPATIENT)
Dept: PRIMARY CARE CLINIC | Age: 26
End: 2021-08-12

## 2021-08-12 DIAGNOSIS — R56.9 SEIZURES (HCC): ICD-10-CM

## 2021-08-12 DIAGNOSIS — G80.1 SPASTIC DIPLEGIC CEREBRAL PALSY (HCC): ICD-10-CM

## 2021-08-12 DIAGNOSIS — R79.89 ABNORMAL LIVER FUNCTION TESTS: ICD-10-CM

## 2021-08-16 ENCOUNTER — HOSPITAL ENCOUNTER (OUTPATIENT)
Age: 26
Discharge: HOME | End: 2021-08-16
Payer: COMMERCIAL

## 2021-08-16 DIAGNOSIS — R79.89 ABNORMAL LIVER FUNCTION TESTS: ICD-10-CM

## 2021-08-16 DIAGNOSIS — R56.9 SEIZURES (HCC): ICD-10-CM

## 2021-08-16 LAB
ALBUMIN SERPL-MCNC: 4.6 G/DL (ref 3.5–5.2)
ALP BLD-CCNC: 145 U/L (ref 40–129)
ALT SERPL-CCNC: 44 U/L (ref 0–40)
ANION GAP SERPL CALCULATED.3IONS-SCNC: 10 MMOL/L (ref 7–16)
AST SERPL-CCNC: 24 U/L (ref 0–39)
BASOPHILS ABSOLUTE: 0.04 E9/L (ref 0–0.2)
BASOPHILS RELATIVE PERCENT: 0.9 % (ref 0–2)
BILIRUB SERPL-MCNC: 0.6 MG/DL (ref 0–1.2)
BUN BLDV-MCNC: 10 MG/DL (ref 6–20)
CALCIUM SERPL-MCNC: 9.8 MG/DL (ref 8.6–10.2)
CHLORIDE BLD-SCNC: 104 MMOL/L (ref 98–107)
CO2: 28 MMOL/L (ref 22–29)
CREAT SERPL-MCNC: 0.7 MG/DL (ref 0.7–1.2)
EOSINOPHILS ABSOLUTE: 0.28 E9/L (ref 0.05–0.5)
EOSINOPHILS RELATIVE PERCENT: 6.1 % (ref 0–6)
GFR AFRICAN AMERICAN: >60
GFR NON-AFRICAN AMERICAN: >60 ML/MIN/1.73
GLUCOSE BLD-MCNC: 88 MG/DL (ref 74–99)
HCT VFR BLD CALC: 50.1 % (ref 37–54)
HEMOGLOBIN: 16.4 G/DL (ref 12.5–16.5)
IMMATURE GRANULOCYTES #: 0.01 E9/L
IMMATURE GRANULOCYTES %: 0.2 % (ref 0–5)
LYMPHOCYTES ABSOLUTE: 1.95 E9/L (ref 1.5–4)
LYMPHOCYTES RELATIVE PERCENT: 42.7 % (ref 20–42)
MCH RBC QN AUTO: 32.7 PG (ref 26–35)
MCHC RBC AUTO-ENTMCNC: 32.7 % (ref 32–34.5)
MCV RBC AUTO: 99.8 FL (ref 80–99.9)
MONOCYTES ABSOLUTE: 0.45 E9/L (ref 0.1–0.95)
MONOCYTES RELATIVE PERCENT: 9.8 % (ref 2–12)
NEUTROPHILS ABSOLUTE: 1.84 E9/L (ref 1.8–7.3)
NEUTROPHILS RELATIVE PERCENT: 40.3 % (ref 43–80)
PDW BLD-RTO: 12.5 FL (ref 11.5–15)
PLATELET # BLD: 158 E9/L (ref 130–450)
PMV BLD AUTO: 12.8 FL (ref 7–12)
POTASSIUM SERPL-SCNC: 4 MMOL/L (ref 3.5–5)
RBC # BLD: 5.02 E12/L (ref 3.8–5.8)
SODIUM BLD-SCNC: 142 MMOL/L (ref 132–146)
TOTAL PROTEIN: 8 G/DL (ref 6.4–8.3)
WBC # BLD: 4.6 E9/L (ref 4.5–11.5)

## 2021-08-16 PROCEDURE — 80053 COMPREHEN METABOLIC PANEL: CPT

## 2021-08-16 PROCEDURE — 36415 COLL VENOUS BLD VENIPUNCTURE: CPT

## 2021-08-16 PROCEDURE — 85025 COMPLETE CBC W/AUTO DIFF WBC: CPT

## 2021-08-18 ENCOUNTER — OFFICE VISIT (OUTPATIENT)
Dept: PRIMARY CARE CLINIC | Age: 26
End: 2021-08-18
Payer: COMMERCIAL

## 2021-08-18 VITALS
OXYGEN SATURATION: 97 % | DIASTOLIC BLOOD PRESSURE: 70 MMHG | SYSTOLIC BLOOD PRESSURE: 100 MMHG | HEART RATE: 95 BPM | TEMPERATURE: 97.2 F

## 2021-08-18 DIAGNOSIS — K21.9 GASTROESOPHAGEAL REFLUX DISEASE WITHOUT ESOPHAGITIS: ICD-10-CM

## 2021-08-18 DIAGNOSIS — G80.1 SPASTIC DIPLEGIC CEREBRAL PALSY (HCC): Primary | ICD-10-CM

## 2021-08-18 DIAGNOSIS — R56.9 SEIZURES (HCC): ICD-10-CM

## 2021-08-18 DIAGNOSIS — F41.9 ANXIETY: ICD-10-CM

## 2021-08-18 DIAGNOSIS — R79.89 ABNORMAL LIVER FUNCTION TESTS: ICD-10-CM

## 2021-08-18 PROBLEM — L97.812 ULCER OF KNEE, RIGHT, WITH FAT LAYER EXPOSED (HCC): Status: RESOLVED | Noted: 2018-07-13 | Resolved: 2021-08-18

## 2021-08-18 PROBLEM — R25.1 TREMOR: Status: ACTIVE | Noted: 2021-08-18

## 2021-08-18 PROBLEM — G40.909 NON-REFRACTORY EPILEPSY (HCC): Status: ACTIVE | Noted: 2021-08-18

## 2021-08-18 PROBLEM — F79 INTELLECTUAL DISABILITY: Status: ACTIVE | Noted: 2021-08-18

## 2021-08-18 PROCEDURE — 99213 OFFICE O/P EST LOW 20 MIN: CPT | Performed by: INTERNAL MEDICINE

## 2021-08-18 RX ORDER — NYSTATIN 100000 [USP'U]/G
POWDER TOPICAL
Qty: 1 BOTTLE | Refills: 1 | Status: SHIPPED
Start: 2021-08-18 | End: 2022-09-27 | Stop reason: SDUPTHER

## 2021-08-18 RX ORDER — FLUTICASONE PROPIONATE 50 MCG
SPRAY, SUSPENSION (ML) NASAL
COMMUNITY
Start: 2015-11-13 | End: 2021-10-14

## 2021-08-18 RX ORDER — LACTULOSE 10 G/15ML
SOLUTION ORAL
COMMUNITY
Start: 2013-04-15 | End: 2021-08-18 | Stop reason: SDUPTHER

## 2021-08-18 RX ORDER — BACLOFEN 20 MG/1
20 TABLET ORAL 3 TIMES DAILY
Qty: 270 TABLET | Refills: 0 | Status: SHIPPED
Start: 2021-08-18 | End: 2021-10-14 | Stop reason: SDUPTHER

## 2021-08-18 RX ORDER — CITALOPRAM HYDROBROMIDE 20 MG/10ML
10 SOLUTION, ORAL ORAL DAILY
Qty: 300 ML | Refills: 1 | Status: SHIPPED
Start: 2021-08-18 | End: 2021-10-14 | Stop reason: SDUPTHER

## 2021-08-18 RX ORDER — LACTULOSE 10 G/15ML
10 SOLUTION ORAL DAILY
Qty: 450 ML | Refills: 2 | Status: SHIPPED
Start: 2021-08-18 | End: 2021-10-14 | Stop reason: SDUPTHER

## 2021-08-18 RX ORDER — GLYCOPYRROLATE 1 MG/1
TABLET ORAL
COMMUNITY
Start: 2021-07-28 | End: 2021-11-08 | Stop reason: SDUPTHER

## 2021-08-18 RX ORDER — GENTAMICIN SULFATE 3 MG/G
0.5 OINTMENT OPHTHALMIC DAILY PRN
COMMUNITY
Start: 2020-08-07

## 2021-08-18 SDOH — ECONOMIC STABILITY: FOOD INSECURITY: WITHIN THE PAST 12 MONTHS, THE FOOD YOU BOUGHT JUST DIDN'T LAST AND YOU DIDN'T HAVE MONEY TO GET MORE.: NEVER TRUE

## 2021-08-18 SDOH — ECONOMIC STABILITY: FOOD INSECURITY: WITHIN THE PAST 12 MONTHS, YOU WORRIED THAT YOUR FOOD WOULD RUN OUT BEFORE YOU GOT MONEY TO BUY MORE.: NEVER TRUE

## 2021-08-18 ASSESSMENT — SOCIAL DETERMINANTS OF HEALTH (SDOH): HOW HARD IS IT FOR YOU TO PAY FOR THE VERY BASICS LIKE FOOD, HOUSING, MEDICAL CARE, AND HEATING?: NOT HARD AT ALL

## 2021-08-18 NOTE — PROGRESS NOTES
Chief Complaint   Patient presents with    Other     f/u visit. with labs    Other     family has decided to get the vaccine for covid       HPI:  Patient is here for follow-up of cerebral palsy and abnormal LFTs. Patient appears content today wheelchair-bound. He is more cooperative today. His mother is denying any problems lately or any complaints. He has been following with neurology and they have increased his medication to control his seizure activity. Lab work was discussed with family everything appears within reasonable range with stable LFTs when compared to last blood work. Past Medical History, Surgical History, and Family History has been reviewed and updated.     Review of Systems:  Constitutional:  No fever, no fatigue, no chills, no headaches, no weight change  Dermatology:  No rash, no mole, no dry or sensitive skin  ENT:  No cough, no sore throat, no sinus pain, no runny nose, no ear pain  Cardiology:  No chest pain, no palpitations, no leg edema, no shortness of breath, no PND  Gastroenterology:  No dysphagia, no abdominal pain, no nausea, no vomiting, no constipation, no diarrhea, no heartburn  Musculoskeletal:  No joint pain, no leg cramps, no back pain, no muscle aches  Respiratory:  No shortness of breath, no orthopnea, no wheezing, no GARCIA, no hemoptysis  Urology:  No blood in the urine, no urinary frequency, no urinary incontinence, no urinary urgency, no nocturia, no dysuria    Vitals:    08/18/21 1104   BP: 100/70   Site: Left Upper Arm   Position: Sitting   Cuff Size: Large Adult   Pulse: 95   Temp: 97.2 °F (36.2 °C)   SpO2: 97%       General:  Patient is in a vegetative state, wheelchair-bound severe mental retardation and contracted body  HEENT:  Atraumatic, normocephalic, PERRLA, EOMI, clear conjunctiva, TMs clear, nose-clear, throat - no erythema  Neck:  Supple, no goiter, no carotid bruits, no LAD  Lungs:  CTA   Heart:  RRR, no murmurs, gallops or rubs  Abdomen:  Soft/nt/nd, + bowel sounds  Extremities:  No clubbing, cyanosis or edema  Skin: unremarkable    Sodium   Date Value Ref Range Status   08/16/2021 142 132 - 146 mmol/L Final     Potassium   Date Value Ref Range Status   08/16/2021 4.0 3.5 - 5.0 mmol/L Final     Chloride   Date Value Ref Range Status   08/16/2021 104 98 - 107 mmol/L Final     CO2   Date Value Ref Range Status   08/16/2021 28 22 - 29 mmol/L Final     BUN   Date Value Ref Range Status   08/16/2021 10 6 - 20 mg/dL Final     CREATININE   Date Value Ref Range Status   08/16/2021 0.7 0.7 - 1.2 mg/dL Final     Glucose   Date Value Ref Range Status   08/16/2021 88 74 - 99 mg/dL Final     Calcium   Date Value Ref Range Status   08/16/2021 9.8 8.6 - 10.2 mg/dL Final     Total Protein   Date Value Ref Range Status   08/16/2021 8.0 6.4 - 8.3 g/dL Final     Albumin   Date Value Ref Range Status   08/16/2021 4.6 3.5 - 5.2 g/dL Final     Total Bilirubin   Date Value Ref Range Status   08/16/2021 0.6 0.0 - 1.2 mg/dL Final     Alkaline Phosphatase   Date Value Ref Range Status   08/16/2021 145 (H) 40 - 129 U/L Final     AST   Date Value Ref Range Status   08/16/2021 24 0 - 39 U/L Final     ALT   Date Value Ref Range Status   08/16/2021 44 (H) 0 - 40 U/L Final     GFR Non-   Date Value Ref Range Status   08/16/2021 >60 >=60 mL/min/1.73 Final     Comment:     Chronic Kidney Disease: less than 60 ml/min/1.73 sq.m. Kidney Failure: less than 15 ml/min/1.73 sq.m. Results valid for patients 18 years and older. GFR    Date Value Ref Range Status   08/16/2021 >60  Final        No results found.      Assessment/Plan:      Outpatient Encounter Medications as of 8/18/2021   Medication Sig Dispense Refill    fluticasone (FLONASE) 50 MCG/ACT nasal spray by Nasal route      gentamicin (GENTAK) 0.3 % ophthalmic ointment Apply to eye      glycopyrrolate (ROBINUL) 1 MG tablet TAKE 2 TABLETS BY MOUTH THREE TIMES A DAY      nystatin (MYCOSTATIN) 399634 UNIT/GM powder Apply 3 times daily. 1 Bottle 1    baclofen (LIORESAL) 20 MG tablet Take 1 tablet by mouth 3 times daily 270 tablet 0    citalopram (CELEXA) 10 MG/5ML solution Take 5 mLs by mouth daily 300 mL 1    lactulose (CHRONULAC) 10 GM/15ML solution Take 15 mLs by mouth daily 450 mL 2    lansoprazole (PREVACID SOLUTAB) 15 MG disintegrating tablet Take 1 tablet by mouth daily 90 tablet 0    acetaminophen (TYLENOL) 160 MG/5ML suspension 19.13 mLs by Per G Tube route every 6 hours as needed for Fever 229.56 mL 0    Polyethylene Glycol 3350 (MIRALAX PO) Take by mouth as needed       lacosamide (VIMPAT) 150 MG TABS tablet Take 150 mg by mouth 2 times daily. Takes two tablets bid      clonazePAM (KLONOPIN) 0.5 MG tablet Take 0.5 mg by mouth 3 times daily as needed for Anxiety 11/2 tab three times daily  Take morning of surgery with a sip of water      lamoTRIgine (LAMICTAL) 200 MG tablet Take 200 mg by mouth 3 times daily.  zonisamide (ZONEGRAN) 100 MG capsule Take 300 mg by mouth nightly       [DISCONTINUED] lactulose (CHRONULAC) 10 GM/15ML solution Take by mouth      [DISCONTINUED] lansoprazole (PREVACID SOLUTAB) 15 MG disintegrating tablet Take by mouth      [DISCONTINUED] nystatin (MYCOSTATIN) 499434 UNIT/GM powder Apply 3 times daily. 1 Bottle 1    [DISCONTINUED] Elastic Bandages & Supports (KNEE BRACE ADJUSTABLE HINGED) MISC 1 Units by Does not apply route daily (Patient not taking: Reported on 8/18/2021) 1 each 0    [DISCONTINUED] baclofen (LIORESAL) 20 MG tablet Take 20 mg by mouth 3 times daily       [DISCONTINUED] citalopram (CELEXA) 10 MG/5ML solution Take 10 mg by mouth daily. No facility-administered encounter medications on file as of 8/18/2021. Cleveland Grief was seen today for other and other. Diagnoses and all orders for this visit:    Spastic diplegic cerebral palsy ([facility] [address]    Abnormal liver function tests  -     Comprehensive Metabolic Panel;  Future    Seizures ([facility]  -     baclofen (LIORESAL) 20 MG tablet; Take 1 tablet by mouth 3 times daily    Gastroesophageal reflux disease without esophagitis  -     lansoprazole (PREVACID SOLUTAB) 15 MG disintegrating tablet; Take 1 tablet by mouth daily    Anxiety  -     citalopram (CELEXA) 10 MG/5ML solution; Take 5 mLs by mouth daily    Other orders  -     nystatin (MYCOSTATIN) 875173 UNIT/GM powder; Apply 3 times daily. -     lactulose (CHRONULAC) 10 GM/15ML solution; Take 15 mLs by mouth daily         There are no Patient Instructions on file for this visit. On this date 8/18/2021 I have spent 25 minutes reviewing previous notes, test results and face to face with the patient discussing the diagnosis and importance of compliance with the treatment plan as well as documenting on the day of the visit.       Nelly Santacruz MD   8/18/21

## 2021-08-28 ENCOUNTER — HOSPITAL ENCOUNTER (INPATIENT)
Age: 26
LOS: 22 days | Discharge: ANOTHER HOSPITAL | DRG: 870 | End: 2021-09-19
Attending: EMERGENCY MEDICINE | Admitting: STUDENT IN AN ORGANIZED HEALTH CARE EDUCATION/TRAINING PROGRAM
Payer: COMMERCIAL

## 2021-08-28 ENCOUNTER — APPOINTMENT (OUTPATIENT)
Dept: GENERAL RADIOLOGY | Age: 26
DRG: 870 | End: 2021-08-28
Payer: COMMERCIAL

## 2021-08-28 ENCOUNTER — APPOINTMENT (OUTPATIENT)
Dept: ULTRASOUND IMAGING | Age: 26
DRG: 870 | End: 2021-08-28
Payer: COMMERCIAL

## 2021-08-28 DIAGNOSIS — J96.01 ACUTE RESPIRATORY FAILURE WITH HYPOXIA (HCC): Primary | ICD-10-CM

## 2021-08-28 DIAGNOSIS — R65.21 SEPSIS WITH ACUTE HYPOXIC RESPIRATORY FAILURE AND SEPTIC SHOCK, DUE TO UNSPECIFIED ORGANISM (HCC): ICD-10-CM

## 2021-08-28 DIAGNOSIS — J12.82 PNEUMONIA DUE TO COVID-19 VIRUS: ICD-10-CM

## 2021-08-28 DIAGNOSIS — E87.6 HYPOKALEMIA: ICD-10-CM

## 2021-08-28 DIAGNOSIS — J96.01 SEPSIS WITH ACUTE HYPOXIC RESPIRATORY FAILURE AND SEPTIC SHOCK, DUE TO UNSPECIFIED ORGANISM (HCC): ICD-10-CM

## 2021-08-28 DIAGNOSIS — A41.9 SEPSIS WITH ACUTE HYPOXIC RESPIRATORY FAILURE AND SEPTIC SHOCK, DUE TO UNSPECIFIED ORGANISM (HCC): ICD-10-CM

## 2021-08-28 DIAGNOSIS — J18.9 PNEUMONIA OF RIGHT LOWER LOBE DUE TO INFECTIOUS ORGANISM: ICD-10-CM

## 2021-08-28 DIAGNOSIS — U07.1 PNEUMONIA DUE TO COVID-19 VIRUS: ICD-10-CM

## 2021-08-28 LAB
ALBUMIN SERPL-MCNC: 3.3 G/DL (ref 3.5–5.2)
ALP BLD-CCNC: 96 U/L (ref 40–129)
ALT SERPL-CCNC: 42 U/L (ref 0–40)
ANION GAP SERPL CALCULATED.3IONS-SCNC: 14 MMOL/L (ref 7–16)
AST SERPL-CCNC: 49 U/L (ref 0–39)
B.E.: -5.3 MMOL/L (ref -3–3)
BACTERIA: ABNORMAL /HPF
BASOPHILS ABSOLUTE: 0 E9/L (ref 0–0.2)
BASOPHILS RELATIVE PERCENT: 0 % (ref 0–2)
BILIRUB SERPL-MCNC: 0.5 MG/DL (ref 0–1.2)
BILIRUBIN URINE: NEGATIVE
BLOOD, URINE: ABNORMAL
BUN BLDV-MCNC: 10 MG/DL (ref 6–20)
CALCIUM SERPL-MCNC: 8.8 MG/DL (ref 8.6–10.2)
CHLORIDE BLD-SCNC: 99 MMOL/L (ref 98–107)
CLARITY: CLEAR
CO2: 21 MMOL/L (ref 22–29)
COLOR: ABNORMAL
CREAT SERPL-MCNC: 0.9 MG/DL (ref 0.7–1.2)
D DIMER: 292 NG/ML DDU
DELIVERY SYSTEMS: ABNORMAL
DEVICE: ABNORMAL
EOSINOPHILS ABSOLUTE: 0 E9/L (ref 0.05–0.5)
EOSINOPHILS RELATIVE PERCENT: 0 % (ref 0–6)
EPITHELIAL CELLS, UA: ABNORMAL /HPF
FIO2 ARTERIAL: 100
GFR AFRICAN AMERICAN: >60
GFR NON-AFRICAN AMERICAN: >60 ML/MIN/1.73
GLUCOSE BLD-MCNC: 147 MG/DL (ref 74–99)
GLUCOSE URINE: NEGATIVE MG/DL
HCO3 ARTERIAL: 19.8 MMOL/L (ref 22–26)
HCT VFR BLD CALC: 41.4 % (ref 37–54)
HEMOGLOBIN: 13.8 G/DL (ref 12.5–16.5)
KETONES, URINE: NEGATIVE MG/DL
LACTIC ACID, SEPSIS: 4.1 MMOL/L (ref 0.5–1.9)
LACTIC ACID: 5 MMOL/L (ref 0.5–2.2)
LEUKOCYTE ESTERASE, URINE: NEGATIVE
LIPASE: 42 U/L (ref 13–60)
LYMPHOCYTES ABSOLUTE: 0.73 E9/L (ref 1.5–4)
LYMPHOCYTES RELATIVE PERCENT: 22 % (ref 20–42)
MAGNESIUM: 1.7 MG/DL (ref 1.6–2.6)
MCH RBC QN AUTO: 32.8 PG (ref 26–35)
MCHC RBC AUTO-ENTMCNC: 33.3 % (ref 32–34.5)
MCV RBC AUTO: 98.3 FL (ref 80–99.9)
METAMYELOCYTES RELATIVE PERCENT: 4 % (ref 0–1)
MONOCYTES ABSOLUTE: 0.16 E9/L (ref 0.1–0.95)
MONOCYTES RELATIVE PERCENT: 5 % (ref 2–12)
MYELOCYTE PERCENT: 1 % (ref 0–0)
NEUTROPHILS ABSOLUTE: 2.41 E9/L (ref 1.8–7.3)
NEUTROPHILS RELATIVE PERCENT: 68 % (ref 43–80)
NITRITE, URINE: NEGATIVE
O2 SATURATION: 84.7 % (ref 92–98.5)
OPERATOR ID: 30
PCO2 ARTERIAL: 36.6 MMHG (ref 35–45)
PDW BLD-RTO: 12.3 FL (ref 11.5–15)
PH BLOOD GAS: 7.34 (ref 7.35–7.45)
PH UA: 5.5 (ref 5–9)
PLATELET # BLD: 128 E9/L (ref 130–450)
PMV BLD AUTO: 11.6 FL (ref 7–12)
PO2 ARTERIAL: 52.2 MMHG (ref 80–100)
POTASSIUM SERPL-SCNC: 3 MMOL/L (ref 3.5–5)
PRO-BNP: 390 PG/ML (ref 0–125)
PROTEIN UA: 30 MG/DL
RBC # BLD: 4.21 E12/L (ref 3.8–5.8)
RBC UA: ABNORMAL /HPF (ref 0–2)
SARS-COV-2, NAAT: DETECTED
SODIUM BLD-SCNC: 134 MMOL/L (ref 132–146)
SOURCE, BLOOD GAS: ABNORMAL
SPECIFIC GRAVITY UA: 1.02 (ref 1–1.03)
TOTAL PROTEIN: 6.5 G/DL (ref 6.4–8.3)
TROPONIN, HIGH SENSITIVITY: 6 NG/L (ref 0–11)
UROBILINOGEN, URINE: 1 E.U./DL
WBC # BLD: 3.3 E9/L (ref 4.5–11.5)
WBC UA: ABNORMAL /HPF (ref 0–5)

## 2021-08-28 PROCEDURE — 96365 THER/PROPH/DIAG IV INF INIT: CPT

## 2021-08-28 PROCEDURE — 86140 C-REACTIVE PROTEIN: CPT

## 2021-08-28 PROCEDURE — 5A1955Z RESPIRATORY VENTILATION, GREATER THAN 96 CONSECUTIVE HOURS: ICD-10-PCS | Performed by: EMERGENCY MEDICINE

## 2021-08-28 PROCEDURE — 85378 FIBRIN DEGRADE SEMIQUANT: CPT

## 2021-08-28 PROCEDURE — 80053 COMPREHEN METABOLIC PANEL: CPT

## 2021-08-28 PROCEDURE — 81001 URINALYSIS AUTO W/SCOPE: CPT

## 2021-08-28 PROCEDURE — 87040 BLOOD CULTURE FOR BACTERIA: CPT

## 2021-08-28 PROCEDURE — 6360000002 HC RX W HCPCS: Performed by: EMERGENCY MEDICINE

## 2021-08-28 PROCEDURE — 71045 X-RAY EXAM CHEST 1 VIEW: CPT

## 2021-08-28 PROCEDURE — 99291 CRITICAL CARE FIRST HOUR: CPT

## 2021-08-28 PROCEDURE — 2580000003 HC RX 258: Performed by: EMERGENCY MEDICINE

## 2021-08-28 PROCEDURE — 83605 ASSAY OF LACTIC ACID: CPT

## 2021-08-28 PROCEDURE — 2500000003 HC RX 250 WO HCPCS: Performed by: EMERGENCY MEDICINE

## 2021-08-28 PROCEDURE — 84484 ASSAY OF TROPONIN QUANT: CPT

## 2021-08-28 PROCEDURE — XW033E5 INTRODUCTION OF REMDESIVIR ANTI-INFECTIVE INTO PERIPHERAL VEIN, PERCUTANEOUS APPROACH, NEW TECHNOLOGY GROUP 5: ICD-10-PCS | Performed by: EMERGENCY MEDICINE

## 2021-08-28 PROCEDURE — 87088 URINE BACTERIA CULTURE: CPT

## 2021-08-28 PROCEDURE — 2000000000 HC ICU R&B

## 2021-08-28 PROCEDURE — 96367 TX/PROPH/DG ADDL SEQ IV INF: CPT

## 2021-08-28 PROCEDURE — 87635 SARS-COV-2 COVID-19 AMP PRB: CPT

## 2021-08-28 PROCEDURE — 2580000003 HC RX 258

## 2021-08-28 PROCEDURE — 82803 BLOOD GASES ANY COMBINATION: CPT

## 2021-08-28 PROCEDURE — 96375 TX/PRO/DX INJ NEW DRUG ADDON: CPT

## 2021-08-28 PROCEDURE — 02HV33Z INSERTION OF INFUSION DEVICE INTO SUPERIOR VENA CAVA, PERCUTANEOUS APPROACH: ICD-10-PCS | Performed by: EMERGENCY MEDICINE

## 2021-08-28 PROCEDURE — 6370000000 HC RX 637 (ALT 250 FOR IP): Performed by: EMERGENCY MEDICINE

## 2021-08-28 PROCEDURE — 31500 INSERT EMERGENCY AIRWAY: CPT

## 2021-08-28 PROCEDURE — 2700000000 HC OXYGEN THERAPY PER DAY

## 2021-08-28 PROCEDURE — 83690 ASSAY OF LIPASE: CPT

## 2021-08-28 PROCEDURE — 0BH18EZ INSERTION OF ENDOTRACHEAL AIRWAY INTO TRACHEA, VIA NATURAL OR ARTIFICIAL OPENING ENDOSCOPIC: ICD-10-PCS | Performed by: EMERGENCY MEDICINE

## 2021-08-28 PROCEDURE — 36556 INSERT NON-TUNNEL CV CATH: CPT

## 2021-08-28 PROCEDURE — 93005 ELECTROCARDIOGRAM TRACING: CPT | Performed by: EMERGENCY MEDICINE

## 2021-08-28 PROCEDURE — 83735 ASSAY OF MAGNESIUM: CPT

## 2021-08-28 PROCEDURE — 93970 EXTREMITY STUDY: CPT

## 2021-08-28 PROCEDURE — 85025 COMPLETE CBC W/AUTO DIFF WBC: CPT

## 2021-08-28 PROCEDURE — 83880 ASSAY OF NATRIURETIC PEPTIDE: CPT

## 2021-08-28 RX ORDER — SODIUM CHLORIDE, SODIUM LACTATE, POTASSIUM CHLORIDE, CALCIUM CHLORIDE 600; 310; 30; 20 MG/100ML; MG/100ML; MG/100ML; MG/100ML
INJECTION, SOLUTION INTRAVENOUS
Status: COMPLETED
Start: 2021-08-28 | End: 2021-08-28

## 2021-08-28 RX ORDER — SODIUM CHLORIDE, SODIUM LACTATE, POTASSIUM CHLORIDE, CALCIUM CHLORIDE 600; 310; 30; 20 MG/100ML; MG/100ML; MG/100ML; MG/100ML
INJECTION, SOLUTION INTRAVENOUS CONTINUOUS
Status: DISCONTINUED | OUTPATIENT
Start: 2021-08-28 | End: 2021-08-30

## 2021-08-28 RX ORDER — 0.9 % SODIUM CHLORIDE 0.9 %
30 INTRAVENOUS SOLUTION INTRAVENOUS PRN
Status: DISCONTINUED | OUTPATIENT
Start: 2021-08-28 | End: 2021-09-19 | Stop reason: HOSPADM

## 2021-08-28 RX ORDER — SODIUM CHLORIDE 9 MG/ML
INJECTION, SOLUTION INTRAVENOUS
Status: COMPLETED
Start: 2021-08-28 | End: 2021-08-28

## 2021-08-28 RX ORDER — 0.9 % SODIUM CHLORIDE 0.9 %
30 INTRAVENOUS SOLUTION INTRAVENOUS ONCE
Status: COMPLETED | OUTPATIENT
Start: 2021-08-28 | End: 2021-08-28

## 2021-08-28 RX ORDER — DEXAMETHASONE SODIUM PHOSPHATE 10 MG/ML
6 INJECTION, SOLUTION INTRAMUSCULAR; INTRAVENOUS ONCE
Status: COMPLETED | OUTPATIENT
Start: 2021-08-28 | End: 2021-08-28

## 2021-08-28 RX ORDER — 0.9 % SODIUM CHLORIDE 0.9 %
500 INTRAVENOUS SOLUTION INTRAVENOUS ONCE
Status: COMPLETED | OUTPATIENT
Start: 2021-08-28 | End: 2021-08-28

## 2021-08-28 RX ORDER — SODIUM CHLORIDE, SODIUM LACTATE, POTASSIUM CHLORIDE, AND CALCIUM CHLORIDE .6; .31; .03; .02 G/100ML; G/100ML; G/100ML; G/100ML
500 INJECTION, SOLUTION INTRAVENOUS ONCE
Status: COMPLETED | OUTPATIENT
Start: 2021-08-28 | End: 2021-08-28

## 2021-08-28 RX ORDER — ACETAMINOPHEN 650 MG/1
650 SUPPOSITORY RECTAL ONCE
Status: COMPLETED | OUTPATIENT
Start: 2021-08-28 | End: 2021-08-28

## 2021-08-28 RX ORDER — ONDANSETRON 2 MG/ML
4 INJECTION INTRAMUSCULAR; INTRAVENOUS ONCE
Status: COMPLETED | OUTPATIENT
Start: 2021-08-28 | End: 2021-08-28

## 2021-08-28 RX ORDER — POTASSIUM CHLORIDE 7.45 MG/ML
10 INJECTION INTRAVENOUS
Status: DISPENSED | OUTPATIENT
Start: 2021-08-28 | End: 2021-08-28

## 2021-08-28 RX ADMIN — POTASSIUM CHLORIDE 10 MEQ: 10 INJECTION, SOLUTION INTRAVENOUS at 19:03

## 2021-08-28 RX ADMIN — SODIUM CHLORIDE, POTASSIUM CHLORIDE, SODIUM LACTATE AND CALCIUM CHLORIDE: 600; 310; 30; 20 INJECTION, SOLUTION INTRAVENOUS at 23:01

## 2021-08-28 RX ADMIN — Medication 500 ML: at 20:17

## 2021-08-28 RX ADMIN — SODIUM CHLORIDE, SODIUM LACTATE, POTASSIUM CHLORIDE, AND CALCIUM CHLORIDE 500 ML: .6; .31; .03; .02 INJECTION, SOLUTION INTRAVENOUS at 22:53

## 2021-08-28 RX ADMIN — SODIUM CHLORIDE, POTASSIUM CHLORIDE, SODIUM LACTATE AND CALCIUM CHLORIDE 500 ML: 600; 310; 30; 20 INJECTION, SOLUTION INTRAVENOUS at 22:53

## 2021-08-28 RX ADMIN — SODIUM CHLORIDE 1770 ML: 9 INJECTION, SOLUTION INTRAVENOUS at 16:46

## 2021-08-28 RX ADMIN — ONDANSETRON 4 MG: 2 INJECTION INTRAMUSCULAR; INTRAVENOUS at 23:01

## 2021-08-28 RX ADMIN — POTASSIUM CHLORIDE 10 MEQ: 10 INJECTION, SOLUTION INTRAVENOUS at 17:45

## 2021-08-28 RX ADMIN — DEXAMETHASONE SODIUM PHOSPHATE 6 MG: 10 INJECTION, SOLUTION INTRAMUSCULAR; INTRAVENOUS at 16:26

## 2021-08-28 RX ADMIN — ACETAMINOPHEN 650 MG: 650 SUPPOSITORY RECTAL at 16:27

## 2021-08-28 RX ADMIN — NOREPINEPHRINE BITARTRATE 10 MCG/MIN: 1 INJECTION, SOLUTION, CONCENTRATE INTRAVENOUS at 20:18

## 2021-08-28 RX ADMIN — PIPERACILLIN AND TAZOBACTAM 4500 MG: 4; .5 INJECTION, POWDER, FOR SOLUTION INTRAVENOUS at 16:35

## 2021-08-28 RX ADMIN — REMDESIVIR 200 MG: 100 INJECTION, POWDER, LYOPHILIZED, FOR SOLUTION INTRAVENOUS at 20:23

## 2021-08-28 RX ADMIN — ENOXAPARIN SODIUM 60 MG: 60 INJECTION, SOLUTION INTRAVENOUS; SUBCUTANEOUS at 20:25

## 2021-08-28 RX ADMIN — SODIUM CHLORIDE 500 ML: 9 INJECTION, SOLUTION INTRAVENOUS at 20:17

## 2021-08-28 ASSESSMENT — PAIN SCALES - GENERAL: PAINLEVEL_OUTOF10: 0

## 2021-08-28 NOTE — LETTER
[place] Department Medicaid  CERTIFICATION OF NECESSITY  FOR NON-EMERGENCY TRANSPORTATION   BY GROUND AMBULANCE      Individual Information   1. Name: Alberto Akins 2. [place] Medicaid Billing Number: [id number]   3. Address: [place] [address]      Transportation Provider Information   4. Provider Name:    5. [place] Medicaid Provider Number:  National Provider Identifier (NPI):      Certification  7. Criteria:  During transport, this individual requires:  [x] Medical treatment or continuous     supervision by an EMT. [x] The administration or regulation of oxygen by another person. [x] Supervised protective restraint. Discharge Date:   5. Length  [x] Not more than 1 day(s)  [] One Year     Additional Information Relevant to Certification   10. Comments or Explanations, If Necessary or Appropriate   Higher level of care- [facility]- Neurology- [facility]- Seizures, pt with cerebral palsy- AMS, Acute respiratory failure- Covid-19. Certifying Practitioner Information   11. Name of Practitioner: Dr. Artur Lopez MD   Medicaid number:  [address] Provider Identifier (NPI):   [id number]      Signature Information   14. Date of Signature: 9/14/21 15. Name of Person Signing: Janki Franco CM   16. Signature and Professional Designation: Electronically signed by Janki Franco RN-BC on 9/14/2021 at 11:20 AM       [facility] [phone]  Rev. 7/2015      [address] [place] [address] Encounter Date/Time: 8/28/2021 [address] Account: [de-identified]    MRN: [id number]    Patient: Alberto Akins    Contact Serial #: [id number]      ENCOUNTER          Patient Class: I Private Enc?   No Unit  BD: [address] [facility] ICU IC03/IC03-01   Hospital Service: ICU/CCU   Encounter DX: Hypokalemia [E87.6]   ADM Provider:     Procedure:     ATT Provider: Artur Lopez MD   REF Provider:        Admission DX: Hypokalemia, Acute respiratory failure with hypoxia ([facility] [address] Pneumonia of right lower lobe due to infectious organism, Sepsis with acute hypoxic respiratory failure without septic shock, due to unspecified organism ([facility] [address] and DX codes: E87.6, J96.01, J18.9, A41.9, R65.20, J96.01      PATIENT                 Name: Calli Abbasi : 1995 (25 yrs)   Address: [facility] Sex: Male   City: [address] [place] [address]         Marital Status: Single   Employer: DISABLED         Buddhism: [place] Faith   Primary Care Provider: [place], [place]*         Primary Phone: [phone]   EMERGENCY CONTACT   Contact Name Legal Guardian? Relationship to Patient Home Phone Work Phone   1. Eloy Daniels Jr  2. Crystal Daniels      Parent  Parent [phone]              GUARANTOR            Guarantor: Rodger Daniels     : 10/[birth year]1966   Address: [address] [place] [address] Sex: Male     [place] [address]     Relation to Patient: Father       Home Phone: [phone]   Guarantor ID: [id number]       Work Phone:     Guarantor Employer: SELF-EMPLOYED         Status: SELF EMPL*      COVERAGE        PRIMARY INSURANCE   Payor: [employer] Plan: [employer] [place] - EXC*   Payor Address: [place] [address]       Group Number: [id number] Insurance Type: INDEMNITY   Subscriber Name: Josue Velazquez : 10/30/1968   Subscriber ID: [id number] Pat.  Rel. to Sub: Child

## 2021-08-29 ENCOUNTER — APPOINTMENT (OUTPATIENT)
Dept: GENERAL RADIOLOGY | Age: 26
DRG: 870 | End: 2021-08-29
Payer: COMMERCIAL

## 2021-08-29 PROBLEM — U07.1 PNEUMONIA DUE TO COVID-19 VIRUS: Status: ACTIVE | Noted: 2021-08-29

## 2021-08-29 PROBLEM — J12.82 PNEUMONIA DUE TO COVID-19 VIRUS: Status: ACTIVE | Noted: 2021-08-29

## 2021-08-29 LAB
ALBUMIN SERPL-MCNC: 2.9 G/DL (ref 3.5–5.2)
ALP BLD-CCNC: 67 U/L (ref 40–129)
ALT SERPL-CCNC: 37 U/L (ref 0–40)
AMORPHOUS: ABNORMAL
ANION GAP SERPL CALCULATED.3IONS-SCNC: 12 MMOL/L (ref 7–16)
AST SERPL-CCNC: 57 U/L (ref 0–39)
B.E.: -11.7 MMOL/L (ref -3–3)
B.E.: -5.3 MMOL/L (ref -3–3)
B.E.: -8.6 MMOL/L (ref -3–3)
B.E.: -8.8 MMOL/L (ref -3–3)
B.E.: -9 MMOL/L (ref -3–3)
B.E.: -9.8 MMOL/L (ref -3–3)
BACTERIA: ABNORMAL /HPF
BASOPHILS ABSOLUTE: 0 E9/L (ref 0–0.2)
BASOPHILS ABSOLUTE: 0 E9/L (ref 0–0.2)
BASOPHILS RELATIVE PERCENT: 0 % (ref 0–2)
BASOPHILS RELATIVE PERCENT: 0 % (ref 0–2)
BILIRUB SERPL-MCNC: 0.6 MG/DL (ref 0–1.2)
BILIRUBIN URINE: NEGATIVE
BLOOD, URINE: ABNORMAL
BUN BLDV-MCNC: 9 MG/DL (ref 6–20)
BURR CELLS: ABNORMAL
C-REACTIVE PROTEIN: 0.3 MG/DL (ref 0–0.4)
C-REACTIVE PROTEIN: 8.4 MG/DL (ref 0–0.4)
CALCIUM SERPL-MCNC: 7.5 MG/DL (ref 8.6–10.2)
CHLORIDE BLD-SCNC: 104 MMOL/L (ref 98–107)
CLARITY: ABNORMAL
CO2: 20 MMOL/L (ref 22–29)
COHB: 0.3 % (ref 0–1.5)
COLOR: YELLOW
COMMENT: ABNORMAL
CREAT SERPL-MCNC: 0.9 MG/DL (ref 0.7–1.2)
CRITICAL NOTIFICATION: YES
CRITICAL: ABNORMAL
D DIMER: 420 NG/ML DDU
DATE ANALYZED: ABNORMAL
DATE OF COLLECTION: ABNORMAL
DELIVERY SYSTEMS: ABNORMAL
DEVICE: ABNORMAL
EKG ATRIAL RATE: 115 BPM
EKG P AXIS: 53 DEGREES
EKG P-R INTERVAL: 136 MS
EKG Q-T INTERVAL: 292 MS
EKG QRS DURATION: 96 MS
EKG QTC CALCULATION (BAZETT): 403 MS
EKG R AXIS: 123 DEGREES
EKG T AXIS: 22 DEGREES
EKG VENTRICULAR RATE: 115 BPM
EOSINOPHILS ABSOLUTE: 0 E9/L (ref 0.05–0.5)
EOSINOPHILS ABSOLUTE: 0 E9/L (ref 0.05–0.5)
EOSINOPHILS RELATIVE PERCENT: 0 % (ref 0–6)
EOSINOPHILS RELATIVE PERCENT: 0 % (ref 0–6)
EPITHELIAL CELLS, UA: ABNORMAL /HPF
FIO2 ARTERIAL: 15
FIO2: 100 %
GFR AFRICAN AMERICAN: >60
GFR NON-AFRICAN AMERICAN: >60 ML/MIN/1.73
GLUCOSE BLD-MCNC: 165 MG/DL (ref 74–99)
GLUCOSE URINE: NEGATIVE MG/DL
HCO3 ARTERIAL: 16.4 MMOL/L (ref 22–26)
HCO3: 17.2 MMOL/L (ref 22–26)
HCO3: 17.4 MMOL/L (ref 22–26)
HCO3: 18.7 MMOL/L (ref 22–26)
HCT VFR BLD CALC: 36.4 % (ref 37–54)
HCT VFR BLD CALC: 36.9 % (ref 37–54)
HEMOGLOBIN: 12.3 G/DL (ref 12.5–16.5)
HEMOGLOBIN: 12.4 G/DL (ref 12.5–16.5)
HHB: 1 % (ref 0–5)
HHB: 11.9 % (ref 0–5)
HHB: 12.8 % (ref 0–5)
HHB: 23.5 % (ref 0–5)
HHB: 9.2 % (ref 0–5)
KETONES, URINE: NEGATIVE MG/DL
LAB: ABNORMAL
LACTIC ACID: 5 MMOL/L (ref 0.5–2.2)
LACTIC ACID: 5.8 MMOL/L (ref 0.5–2.2)
LACTIC ACID: 6 MMOL/L (ref 0.5–2.2)
LACTIC ACID: 6.7 MMOL/L (ref 0.5–2.2)
LEUKOCYTE ESTERASE, URINE: NEGATIVE
LYMPHOCYTES ABSOLUTE: 0.29 E9/L (ref 1.5–4)
LYMPHOCYTES ABSOLUTE: 0.48 E9/L (ref 1.5–4)
LYMPHOCYTES RELATIVE PERCENT: 16 % (ref 20–42)
LYMPHOCYTES RELATIVE PERCENT: 8 % (ref 20–42)
Lab: ABNORMAL
MAGNESIUM: 1.5 MG/DL (ref 1.6–2.6)
MAGNESIUM: 2.5 MG/DL (ref 1.6–2.6)
MCH RBC QN AUTO: 32.7 PG (ref 26–35)
MCH RBC QN AUTO: 32.8 PG (ref 26–35)
MCHC RBC AUTO-ENTMCNC: 33.6 % (ref 32–34.5)
MCHC RBC AUTO-ENTMCNC: 33.8 % (ref 32–34.5)
MCV RBC AUTO: 97.1 FL (ref 80–99.9)
MCV RBC AUTO: 97.4 FL (ref 80–99.9)
METAMYELOCYTES RELATIVE PERCENT: 24 % (ref 0–1)
METAMYELOCYTES RELATIVE PERCENT: 5 % (ref 0–1)
METHB: 0.4 % (ref 0–1.5)
METHB: 0.5 % (ref 0–1.5)
MODE: AC
MONOCYTES ABSOLUTE: 0.03 E9/L (ref 0.1–0.95)
MONOCYTES ABSOLUTE: 0.04 E9/L (ref 0.1–0.95)
MONOCYTES RELATIVE PERCENT: 1 % (ref 2–12)
MONOCYTES RELATIVE PERCENT: 1 % (ref 2–12)
MYELOCYTE PERCENT: 1 % (ref 0–0)
MYELOCYTE PERCENT: 6 % (ref 0–0)
NEUTROPHILS ABSOLUTE: 2.49 E9/L (ref 1.8–7.3)
NEUTROPHILS ABSOLUTE: 3.28 E9/L (ref 1.8–7.3)
NEUTROPHILS RELATIVE PERCENT: 53 % (ref 43–80)
NEUTROPHILS RELATIVE PERCENT: 85 % (ref 43–80)
NITRITE, URINE: NEGATIVE
O2 CONTENT: 14.4 ML/DL
O2 CONTENT: 16.3 ML/DL
O2 CONTENT: 16.4 ML/DL
O2 CONTENT: 17.1 ML/DL
O2 CONTENT: 20.4 ML/DL
O2 SATURATION: 73.9 % (ref 92–98.5)
O2 SATURATION: 76.3 % (ref 92–98.5)
O2 SATURATION: 87.1 % (ref 92–98.5)
O2 SATURATION: 88 % (ref 92–98.5)
O2 SATURATION: 90.7 % (ref 92–98.5)
O2 SATURATION: 99 % (ref 92–98.5)
O2HB: 75.8 % (ref 94–97)
O2HB: 86.5 % (ref 94–97)
O2HB: 87.4 % (ref 94–97)
O2HB: 90.1 % (ref 94–97)
O2HB: 98.2 % (ref 94–97)
OPERATOR ID: 2124
OPERATOR ID: 797
OPERATOR ID: 797
OPERATOR ID: ABNORMAL
PATIENT TEMP: 37 C
PCO2 ARTERIAL: 44.9 MMHG (ref 35–45)
PCO2: 32.2 MMHG (ref 35–45)
PCO2: 36.5 MMHG (ref 35–45)
PCO2: 38.7 MMHG (ref 35–45)
PCO2: 39.3 MMHG (ref 35–45)
PCO2: 42.5 MMHG (ref 35–45)
PDW BLD-RTO: 12.6 FL (ref 11.5–15)
PDW BLD-RTO: 12.8 FL (ref 11.5–15)
PEEP/CPAP: 14 CMH2O
PFO2: 0.41 MMHG/%
PFO2: 0.53 MMHG/%
PFO2: 0.54 MMHG/%
PFO2: 0.58 MMHG/%
PFO2: 2.44 MMHG/%
PH BLOOD GAS: 7.17 (ref 7.35–7.45)
PH BLOOD GAS: 7.23 (ref 7.35–7.45)
PH BLOOD GAS: 7.26 (ref 7.35–7.45)
PH BLOOD GAS: 7.27 (ref 7.35–7.45)
PH BLOOD GAS: 7.29 (ref 7.35–7.45)
PH BLOOD GAS: 7.38 (ref 7.35–7.45)
PH UA: 5.5 (ref 5–9)
PHOSPHORUS: 2.4 MG/DL (ref 2.5–4.5)
PHOSPHORUS: 3.5 MG/DL (ref 2.5–4.5)
PLATELET # BLD: 115 E9/L (ref 130–450)
PLATELET # BLD: 131 E9/L (ref 130–450)
PMV BLD AUTO: 11.2 FL (ref 7–12)
PMV BLD AUTO: 11.6 FL (ref 7–12)
PO2 ARTERIAL: 49.2 MMHG (ref 80–100)
PO2: 244.4 MMHG (ref 75–100)
PO2: 41.3 MMHG (ref 75–100)
PO2: 53 MMHG (ref 75–100)
PO2: 54.3 MMHG (ref 75–100)
PO2: 57.9 MMHG (ref 75–100)
POTASSIUM SERPL-SCNC: 3.6 MMOL/L (ref 3.5–5)
PROTEIN UA: 30 MG/DL
RBC # BLD: 3.75 E12/L (ref 3.8–5.8)
RBC # BLD: 3.79 E12/L (ref 3.8–5.8)
RBC # BLD: NORMAL 10*6/UL
RBC UA: ABNORMAL /HPF (ref 0–2)
RI(T): 1.68
RI(T): 10.25
RI(T): 10.95
RI(T): 11.25
RI(T): 14.63
RR MECHANICAL: 24 B/MIN
SODIUM BLD-SCNC: 136 MMOL/L (ref 132–146)
SOURCE, BLOOD GAS: ABNORMAL
SPECIFIC GRAVITY UA: 1.02 (ref 1–1.03)
THB: 13.3 G/DL (ref 11.5–16.5)
THB: 13.5 G/DL (ref 11.5–16.5)
THB: 14.4 G/DL (ref 11.5–16.5)
TIME ANALYZED: 1151
TIME ANALYZED: 1752
TIME ANALYZED: 259
TIME ANALYZED: 406
TIME ANALYZED: 752
TOTAL PROTEIN: 5.4 G/DL (ref 6.4–8.3)
UROBILINOGEN, URINE: 1 E.U./DL
VT MECHANICAL: 400 ML
WBC # BLD: 3 E9/L (ref 4.5–11.5)
WBC # BLD: 3.6 E9/L (ref 4.5–11.5)
WBC UA: ABNORMAL /HPF (ref 0–5)

## 2021-08-29 PROCEDURE — 87077 CULTURE AEROBIC IDENTIFY: CPT

## 2021-08-29 PROCEDURE — 94002 VENT MGMT INPAT INIT DAY: CPT

## 2021-08-29 PROCEDURE — 87186 SC STD MICRODIL/AGAR DIL: CPT

## 2021-08-29 PROCEDURE — 87206 SMEAR FLUORESCENT/ACID STAI: CPT

## 2021-08-29 PROCEDURE — 37799 UNLISTED PX VASCULAR SURGERY: CPT

## 2021-08-29 PROCEDURE — 99291 CRITICAL CARE FIRST HOUR: CPT | Performed by: INTERNAL MEDICINE

## 2021-08-29 PROCEDURE — 6370000000 HC RX 637 (ALT 250 FOR IP)

## 2021-08-29 PROCEDURE — 83735 ASSAY OF MAGNESIUM: CPT

## 2021-08-29 PROCEDURE — 81001 URINALYSIS AUTO W/SCOPE: CPT

## 2021-08-29 PROCEDURE — 6360000002 HC RX W HCPCS: Performed by: INTERNAL MEDICINE

## 2021-08-29 PROCEDURE — 85378 FIBRIN DEGRADE SEMIQUANT: CPT

## 2021-08-29 PROCEDURE — 2500000003 HC RX 250 WO HCPCS: Performed by: INTERNAL MEDICINE

## 2021-08-29 PROCEDURE — 6360000002 HC RX W HCPCS

## 2021-08-29 PROCEDURE — 87449 NOS EACH ORGANISM AG IA: CPT

## 2021-08-29 PROCEDURE — 83605 ASSAY OF LACTIC ACID: CPT

## 2021-08-29 PROCEDURE — 71045 X-RAY EXAM CHEST 1 VIEW: CPT

## 2021-08-29 PROCEDURE — 31500 INSERT EMERGENCY AIRWAY: CPT

## 2021-08-29 PROCEDURE — 2580000003 HC RX 258: Performed by: EMERGENCY MEDICINE

## 2021-08-29 PROCEDURE — 36415 COLL VENOUS BLD VENIPUNCTURE: CPT

## 2021-08-29 PROCEDURE — 2000000000 HC ICU R&B

## 2021-08-29 PROCEDURE — 2580000003 HC RX 258: Performed by: INTERNAL MEDICINE

## 2021-08-29 PROCEDURE — 2500000003 HC RX 250 WO HCPCS: Performed by: EMERGENCY MEDICINE

## 2021-08-29 PROCEDURE — 87070 CULTURE OTHR SPECIMN AEROBIC: CPT

## 2021-08-29 PROCEDURE — 36620 INSERTION CATHETER ARTERY: CPT

## 2021-08-29 PROCEDURE — 84100 ASSAY OF PHOSPHORUS: CPT

## 2021-08-29 PROCEDURE — 99253 IP/OBS CNSLTJ NEW/EST LOW 45: CPT | Performed by: STUDENT IN AN ORGANIZED HEALTH CARE EDUCATION/TRAINING PROGRAM

## 2021-08-29 PROCEDURE — 6370000000 HC RX 637 (ALT 250 FOR IP): Performed by: INTERNAL MEDICINE

## 2021-08-29 PROCEDURE — 82803 BLOOD GASES ANY COMBINATION: CPT

## 2021-08-29 PROCEDURE — 87081 CULTURE SCREEN ONLY: CPT

## 2021-08-29 PROCEDURE — 85025 COMPLETE CBC W/AUTO DIFF WBC: CPT

## 2021-08-29 PROCEDURE — 2580000003 HC RX 258

## 2021-08-29 PROCEDURE — 94640 AIRWAY INHALATION TREATMENT: CPT

## 2021-08-29 PROCEDURE — C9113 INJ PANTOPRAZOLE SODIUM, VIA: HCPCS

## 2021-08-29 PROCEDURE — 93010 ELECTROCARDIOGRAM REPORT: CPT | Performed by: INTERNAL MEDICINE

## 2021-08-29 PROCEDURE — 80053 COMPREHEN METABOLIC PANEL: CPT

## 2021-08-29 PROCEDURE — 86140 C-REACTIVE PROTEIN: CPT

## 2021-08-29 PROCEDURE — 82805 BLOOD GASES W/O2 SATURATION: CPT

## 2021-08-29 RX ORDER — ONDANSETRON 2 MG/ML
4 INJECTION INTRAMUSCULAR; INTRAVENOUS EVERY 6 HOURS PRN
Status: DISCONTINUED | OUTPATIENT
Start: 2021-08-29 | End: 2021-08-29 | Stop reason: SDUPTHER

## 2021-08-29 RX ORDER — FENTANYL CITRATE 50 UG/ML
25 INJECTION, SOLUTION INTRAMUSCULAR; INTRAVENOUS
Status: DISCONTINUED | OUTPATIENT
Start: 2021-08-29 | End: 2021-09-19 | Stop reason: HOSPADM

## 2021-08-29 RX ORDER — SODIUM CHLORIDE 0.9 % (FLUSH) 0.9 %
10 SYRINGE (ML) INJECTION PRN
Status: DISCONTINUED | OUTPATIENT
Start: 2021-08-29 | End: 2021-09-19 | Stop reason: HOSPADM

## 2021-08-29 RX ORDER — DEXAMETHASONE SODIUM PHOSPHATE 10 MG/ML
6 INJECTION, SOLUTION INTRAMUSCULAR; INTRAVENOUS 2 TIMES DAILY
Status: DISCONTINUED | OUTPATIENT
Start: 2021-08-29 | End: 2021-08-29

## 2021-08-29 RX ORDER — MAGNESIUM SULFATE IN WATER 40 MG/ML
2000 INJECTION, SOLUTION INTRAVENOUS ONCE
Status: COMPLETED | OUTPATIENT
Start: 2021-08-29 | End: 2021-08-29

## 2021-08-29 RX ORDER — CITALOPRAM 10 MG/1
10 TABLET ORAL DAILY
Status: DISCONTINUED | OUTPATIENT
Start: 2021-08-29 | End: 2021-08-29

## 2021-08-29 RX ORDER — POLYETHYLENE GLYCOL 3350 17 G/17G
17 POWDER, FOR SOLUTION ORAL DAILY PRN
Status: DISCONTINUED | OUTPATIENT
Start: 2021-08-29 | End: 2021-09-19 | Stop reason: HOSPADM

## 2021-08-29 RX ORDER — ZONISAMIDE 100 MG/1
300 CAPSULE ORAL NIGHTLY
Status: DISCONTINUED | OUTPATIENT
Start: 2021-08-29 | End: 2021-09-19 | Stop reason: HOSPADM

## 2021-08-29 RX ORDER — ONDANSETRON 4 MG/1
4 TABLET, ORALLY DISINTEGRATING ORAL EVERY 8 HOURS PRN
Status: DISCONTINUED | OUTPATIENT
Start: 2021-08-29 | End: 2021-09-19 | Stop reason: HOSPADM

## 2021-08-29 RX ORDER — ONDANSETRON 2 MG/ML
4 INJECTION INTRAMUSCULAR; INTRAVENOUS EVERY 6 HOURS PRN
Status: DISCONTINUED | OUTPATIENT
Start: 2021-08-29 | End: 2021-09-19 | Stop reason: HOSPADM

## 2021-08-29 RX ORDER — PROPOFOL 10 MG/ML
INJECTION, EMULSION INTRAVENOUS
Status: DISPENSED
Start: 2021-08-29 | End: 2021-08-29

## 2021-08-29 RX ORDER — SODIUM CHLORIDE, SODIUM LACTATE, POTASSIUM CHLORIDE, AND CALCIUM CHLORIDE .6; .31; .03; .02 G/100ML; G/100ML; G/100ML; G/100ML
500 INJECTION, SOLUTION INTRAVENOUS ONCE
Status: COMPLETED | OUTPATIENT
Start: 2021-08-29 | End: 2021-08-29

## 2021-08-29 RX ORDER — POLYVINYL ALCOHOL 14 MG/ML
1 SOLUTION/ DROPS OPHTHALMIC EVERY 6 HOURS
Status: DISCONTINUED | OUTPATIENT
Start: 2021-08-29 | End: 2021-09-13

## 2021-08-29 RX ORDER — BACLOFEN 10 MG/1
10 TABLET ORAL 3 TIMES DAILY
Status: DISCONTINUED | OUTPATIENT
Start: 2021-08-29 | End: 2021-09-11

## 2021-08-29 RX ORDER — ACETAMINOPHEN 325 MG/1
650 TABLET ORAL EVERY 6 HOURS PRN
Status: DISCONTINUED | OUTPATIENT
Start: 2021-08-29 | End: 2021-09-19 | Stop reason: HOSPADM

## 2021-08-29 RX ORDER — LAMOTRIGINE 100 MG/1
200 TABLET ORAL 3 TIMES DAILY
Status: DISCONTINUED | OUTPATIENT
Start: 2021-08-29 | End: 2021-09-19 | Stop reason: HOSPADM

## 2021-08-29 RX ORDER — SODIUM CHLORIDE 9 MG/ML
10 INJECTION INTRAVENOUS DAILY
Status: DISCONTINUED | OUTPATIENT
Start: 2021-08-29 | End: 2021-09-19 | Stop reason: HOSPADM

## 2021-08-29 RX ORDER — IPRATROPIUM BROMIDE AND ALBUTEROL SULFATE 2.5; .5 MG/3ML; MG/3ML
1 SOLUTION RESPIRATORY (INHALATION) 4 TIMES DAILY
Status: DISCONTINUED | OUTPATIENT
Start: 2021-08-29 | End: 2021-09-17 | Stop reason: ALTCHOICE

## 2021-08-29 RX ORDER — ONDANSETRON 4 MG/1
4 TABLET, ORALLY DISINTEGRATING ORAL EVERY 8 HOURS PRN
Status: DISCONTINUED | OUTPATIENT
Start: 2021-08-29 | End: 2021-08-29 | Stop reason: SDUPTHER

## 2021-08-29 RX ORDER — ACETAMINOPHEN 650 MG/1
650 SUPPOSITORY RECTAL EVERY 6 HOURS PRN
Status: DISCONTINUED | OUTPATIENT
Start: 2021-08-29 | End: 2021-09-19 | Stop reason: HOSPADM

## 2021-08-29 RX ORDER — PROPOFOL 10 MG/ML
5-50 INJECTION, EMULSION INTRAVENOUS
Status: DISCONTINUED | OUTPATIENT
Start: 2021-08-29 | End: 2021-09-08 | Stop reason: CLARIF

## 2021-08-29 RX ORDER — PANTOPRAZOLE SODIUM 40 MG/10ML
40 INJECTION, POWDER, LYOPHILIZED, FOR SOLUTION INTRAVENOUS DAILY
Status: DISCONTINUED | OUTPATIENT
Start: 2021-08-29 | End: 2021-09-19 | Stop reason: HOSPADM

## 2021-08-29 RX ORDER — SODIUM CHLORIDE 9 MG/ML
INJECTION, SOLUTION INTRAVENOUS EVERY 8 HOURS
Status: DISCONTINUED | OUTPATIENT
Start: 2021-08-29 | End: 2021-08-29

## 2021-08-29 RX ORDER — SODIUM CHLORIDE 9 MG/ML
25 INJECTION, SOLUTION INTRAVENOUS PRN
Status: DISCONTINUED | OUTPATIENT
Start: 2021-08-29 | End: 2021-09-19 | Stop reason: HOSPADM

## 2021-08-29 RX ORDER — BACLOFEN 10 MG/1
20 TABLET ORAL 3 TIMES DAILY
Status: DISCONTINUED | OUTPATIENT
Start: 2021-08-29 | End: 2021-08-29

## 2021-08-29 RX ORDER — DEXAMETHASONE SODIUM PHOSPHATE 10 MG/ML
6 INJECTION, SOLUTION INTRAMUSCULAR; INTRAVENOUS 2 TIMES DAILY
Status: DISCONTINUED | OUTPATIENT
Start: 2021-08-29 | End: 2021-08-30

## 2021-08-29 RX ORDER — SODIUM CHLORIDE 0.9 % (FLUSH) 0.9 %
5-40 SYRINGE (ML) INJECTION EVERY 12 HOURS SCHEDULED
Status: DISCONTINUED | OUTPATIENT
Start: 2021-08-29 | End: 2021-09-19 | Stop reason: HOSPADM

## 2021-08-29 RX ADMIN — ZONISAMIDE 300 MG: 100 CAPSULE ORAL at 20:30

## 2021-08-29 RX ADMIN — PROPOFOL 10 MCG/KG/MIN: 10 INJECTION, EMULSION INTRAVENOUS at 06:34

## 2021-08-29 RX ADMIN — REMDESIVIR 100 MG: 100 INJECTION, POWDER, LYOPHILIZED, FOR SOLUTION INTRAVENOUS at 20:23

## 2021-08-29 RX ADMIN — SODIUM CHLORIDE, POTASSIUM CHLORIDE, SODIUM LACTATE AND CALCIUM CHLORIDE 500 ML: 600; 310; 30; 20 INJECTION, SOLUTION INTRAVENOUS at 09:13

## 2021-08-29 RX ADMIN — MAGNESIUM SULFATE IN WATER 2000 MG: 40 INJECTION, SOLUTION INTRAVENOUS at 05:31

## 2021-08-29 RX ADMIN — LAMOTRIGINE 200 MG: 100 TABLET ORAL at 20:36

## 2021-08-29 RX ADMIN — SODIUM CHLORIDE: 9 INJECTION, SOLUTION INTRAVENOUS at 09:14

## 2021-08-29 RX ADMIN — DEXAMETHASONE SODIUM PHOSPHATE 6 MG: 10 INJECTION, SOLUTION INTRAMUSCULAR; INTRAVENOUS at 07:08

## 2021-08-29 RX ADMIN — IPRATROPIUM BROMIDE AND ALBUTEROL SULFATE 1 AMPULE: .5; 3 SOLUTION RESPIRATORY (INHALATION) at 05:19

## 2021-08-29 RX ADMIN — Medication 50 MCG/HR: at 01:09

## 2021-08-29 RX ADMIN — Medication 10 ML: at 08:07

## 2021-08-29 RX ADMIN — IPRATROPIUM BROMIDE AND ALBUTEROL SULFATE 1 AMPULE: .5; 3 SOLUTION RESPIRATORY (INHALATION) at 19:16

## 2021-08-29 RX ADMIN — AZITHROMYCIN DIHYDRATE 500 MG: 500 INJECTION, POWDER, LYOPHILIZED, FOR SOLUTION INTRAVENOUS at 04:11

## 2021-08-29 RX ADMIN — BACLOFEN 20 MG: 10 TABLET ORAL at 07:42

## 2021-08-29 RX ADMIN — PIPERACILLIN AND TAZOBACTAM 3375 MG: 3; .375 INJECTION, POWDER, LYOPHILIZED, FOR SOLUTION INTRAVENOUS at 11:51

## 2021-08-29 RX ADMIN — WATER 2000 MG: 1 INJECTION INTRAMUSCULAR; INTRAVENOUS; SUBCUTANEOUS at 15:42

## 2021-08-29 RX ADMIN — BACLOFEN 10 MG: 10 TABLET ORAL at 15:51

## 2021-08-29 RX ADMIN — IPRATROPIUM BROMIDE AND ALBUTEROL SULFATE 1 AMPULE: .5; 3 SOLUTION RESPIRATORY (INHALATION) at 09:12

## 2021-08-29 RX ADMIN — SODIUM CHLORIDE, PRESERVATIVE FREE 10 ML: 5 INJECTION INTRAVENOUS at 22:24

## 2021-08-29 RX ADMIN — PANTOPRAZOLE SODIUM 40 MG: 40 INJECTION, POWDER, FOR SOLUTION INTRAVENOUS at 07:43

## 2021-08-29 RX ADMIN — SODIUM CHLORIDE 30 ML: 9 INJECTION, SOLUTION INTRAVENOUS at 20:53

## 2021-08-29 RX ADMIN — SODIUM CHLORIDE, PRESERVATIVE FREE 10 ML: 5 INJECTION INTRAVENOUS at 15:45

## 2021-08-29 RX ADMIN — SODIUM CHLORIDE, PRESERVATIVE FREE 10 ML: 5 INJECTION INTRAVENOUS at 07:43

## 2021-08-29 RX ADMIN — VANCOMYCIN HYDROCHLORIDE 1000 MG: 1 INJECTION, POWDER, LYOPHILIZED, FOR SOLUTION INTRAVENOUS at 04:11

## 2021-08-29 RX ADMIN — SODIUM CHLORIDE, POTASSIUM CHLORIDE, SODIUM LACTATE AND CALCIUM CHLORIDE 500 ML: 600; 310; 30; 20 INJECTION, SOLUTION INTRAVENOUS at 04:12

## 2021-08-29 RX ADMIN — CITALOPRAM HYDROBROMIDE 10 MG: 10 TABLET ORAL at 07:42

## 2021-08-29 RX ADMIN — ENOXAPARIN SODIUM 60 MG: 60 INJECTION SUBCUTANEOUS at 07:41

## 2021-08-29 RX ADMIN — PIPERACILLIN AND TAZOBACTAM 3375 MG: 3; .375 INJECTION, POWDER, LYOPHILIZED, FOR SOLUTION INTRAVENOUS at 05:16

## 2021-08-29 RX ADMIN — CISATRACURIUM BESYLATE 2 MCG/KG/MIN: 10 INJECTION, SOLUTION INTRAVENOUS at 09:32

## 2021-08-29 RX ADMIN — LACOSAMIDE 150 MG: 100 TABLET, FILM COATED ORAL at 20:30

## 2021-08-29 RX ADMIN — Medication 10 ML: at 20:37

## 2021-08-29 RX ADMIN — SODIUM CHLORIDE, PRESERVATIVE FREE 10 ML: 5 INJECTION INTRAVENOUS at 20:38

## 2021-08-29 RX ADMIN — IPRATROPIUM BROMIDE AND ALBUTEROL SULFATE 1 AMPULE: .5; 3 SOLUTION RESPIRATORY (INHALATION) at 13:10

## 2021-08-29 RX ADMIN — LACOSAMIDE 150 MG: 100 TABLET, FILM COATED ORAL at 07:43

## 2021-08-29 RX ADMIN — LAMOTRIGINE 200 MG: 100 TABLET ORAL at 07:43

## 2021-08-29 RX ADMIN — DEXAMETHASONE SODIUM PHOSPHATE 6 MG: 10 INJECTION, SOLUTION INTRAMUSCULAR; INTRAVENOUS at 20:37

## 2021-08-29 RX ADMIN — BACLOFEN 10 MG: 10 TABLET ORAL at 20:30

## 2021-08-29 RX ADMIN — LAMOTRIGINE 200 MG: 100 TABLET ORAL at 15:51

## 2021-08-29 RX ADMIN — POTASSIUM PHOSPHATE, MONOBASIC AND POTASSIUM PHOSPHATE, DIBASIC 30 MMOL: 224; 236 INJECTION, SOLUTION, CONCENTRATE INTRAVENOUS at 09:30

## 2021-08-29 RX ADMIN — SODIUM CHLORIDE, PRESERVATIVE FREE 10 ML: 5 INJECTION INTRAVENOUS at 15:47

## 2021-08-29 RX ADMIN — PROPOFOL 20.06 MCG/KG/MIN: 10 INJECTION, EMULSION INTRAVENOUS at 15:41

## 2021-08-29 RX ADMIN — SODIUM CHLORIDE, POTASSIUM CHLORIDE, SODIUM LACTATE AND CALCIUM CHLORIDE: 600; 310; 30; 20 INJECTION, SOLUTION INTRAVENOUS at 15:40

## 2021-08-29 ASSESSMENT — PULMONARY FUNCTION TESTS
PIF_VALUE: 31
PIF_VALUE: 36
PIF_VALUE: 33
PIF_VALUE: 31
PIF_VALUE: 22
PIF_VALUE: 35
PIF_VALUE: 33
PIF_VALUE: 28
PIF_VALUE: 35
PIF_VALUE: 33
PIF_VALUE: 34
PIF_VALUE: 30
PIF_VALUE: 30
PIF_VALUE: 31
PIF_VALUE: 30
PIF_VALUE: 37
PIF_VALUE: 35
PIF_VALUE: 38
PIF_VALUE: 31
PIF_VALUE: 35
PIF_VALUE: 33
PIF_VALUE: 35
PIF_VALUE: 31
PIF_VALUE: 36
PIF_VALUE: 31
PIF_VALUE: 39
PIF_VALUE: 33
PIF_VALUE: 34
PIF_VALUE: 36
PIF_VALUE: 37
PIF_VALUE: 31
PIF_VALUE: 35
PIF_VALUE: 37

## 2021-08-29 ASSESSMENT — PAIN SCALES - GENERAL
PAINLEVEL_OUTOF10: 0

## 2021-08-29 NOTE — H&P
(ROBINUL) 1 MG tablet, TAKE 2 TABLETS BY MOUTH THREE TIMES A DAY  nystatin (MYCOSTATIN) 772252 UNIT/GM powder, Apply 3 times daily. baclofen (LIORESAL) 20 MG tablet, Take 1 tablet by mouth 3 times daily  citalopram (CELEXA) 10 MG/5ML solution, Take 5 mLs by mouth daily  lactulose (CHRONULAC) 10 GM/15ML solution, Take 15 mLs by mouth daily  lansoprazole (PREVACID SOLUTAB) 15 MG disintegrating tablet, Take 1 tablet by mouth daily  Polyethylene Glycol 3350 (MIRALAX PO), Take by mouth as needed   lacosamide (VIMPAT) 150 MG TABS tablet, Take 150 mg by mouth 2 times daily. Takes two tablets bid  clonazePAM (KLONOPIN) 0.5 MG tablet, Take 0.5 mg by mouth 3 times daily as needed for Anxiety 11/2 tab three times daily Take morning of surgery with a sip of water  lamoTRIgine (LAMICTAL) 200 MG tablet, Take 200 mg by mouth 3 times daily. zonisamide (ZONEGRAN) 100 MG capsule, Take 300 mg by mouth nightly   acetaminophen (TYLENOL) 160 MG/5ML suspension, 19.13 mLs by Per G Tube route every 6 hours as needed for Fever    Allergies:    Patient has no known allergies. Social History:    reports that he has never smoked. He has never used smokeless tobacco. He reports that he does not drink alcohol and does not use drugs. Family History:   family history is not on file. REVIEW OF SYSTEMS:  As above in the HPI, otherwise negative. PHYSICAL EXAM:    Vitals:  BP (!) 80/52   Pulse 109   Temp 99.9 °F (37.7 °C) (Axillary)   Resp 24   Ht 5' (1.524 m)   Wt 130 lb (59 kg)   SpO2 94%   BMI 25.39 kg/m²     Due to the current efforts to prevent transmission of COVID-19 and also the need to preserve PPE for other caregivers, a face-to-face encounter with the patient was not performed. That being said, all relevant records and diagnostic tests were reviewed, including laboratory results and imaging. Please reference any relevant documentation elsewhere.  Care will be coordinated with other specialties as appropriate.       LABS:  CBC with Differential:    Lab Results   Component Value Date    WBC 3.0 08/29/2021    RBC 3.75 08/29/2021    HGB 12.3 08/29/2021    HCT 36.4 08/29/2021     08/29/2021    MCV 97.1 08/29/2021    MCH 32.8 08/29/2021    MCHC 33.8 08/29/2021    RDW 12.8 08/29/2021    METASPCT 24.0 08/29/2021    LYMPHOPCT 16.0 08/29/2021    MONOPCT 1.0 08/29/2021    MYELOPCT 6.0 08/29/2021    BASOPCT 0.0 08/29/2021    MONOSABS 0.03 08/29/2021    LYMPHSABS 0.48 08/29/2021    EOSABS 0.00 08/29/2021    BASOSABS 0.00 08/29/2021     CMP:    Lab Results   Component Value Date     08/29/2021    K 3.6 08/29/2021     08/29/2021    CO2 20 08/29/2021    BUN 9 08/29/2021    CREATININE 0.9 08/29/2021    GFRAA >60 08/29/2021    LABGLOM >60 08/29/2021    GLUCOSE 165 08/29/2021    PROT 5.4 08/29/2021    LABALBU 2.9 08/29/2021    CALCIUM 7.5 08/29/2021    BILITOT 0.6 08/29/2021    ALKPHOS 67 08/29/2021    AST 57 08/29/2021    ALT 37 08/29/2021     Magnesium:    Lab Results   Component Value Date    MG 2.5 08/29/2021     Phosphorus:    Lab Results   Component Value Date    PHOS 2.4 08/29/2021     PT/INR:  No results found for: PROTIME, INR  U/A:    Lab Results   Component Value Date    COLORU Yellow 08/29/2021    PROTEINU 30 08/29/2021    PHUR 5.5 08/29/2021    WBCUA NONE 08/29/2021    RBCUA 2-5 08/29/2021    BACTERIA RARE 08/29/2021    CLARITYU SLCLOUDY 08/29/2021    SPECGRAV 1.025 08/29/2021    LEUKOCYTESUR Negative 08/29/2021    UROBILINOGEN 1.0 08/29/2021    BILIRUBINUR Negative 08/29/2021    BLOODU MODERATE 08/29/2021    GLUCOSEU Negative 08/29/2021    AMORPHOUS FEW 08/29/2021     ABG:    Lab Results   Component Value Date    PH 7.291 08/29/2021    PCO2 36.5 08/29/2021    PO2 57.9 08/29/2021    HCO3 17.2 08/29/2021    BE -8.6 08/29/2021    O2SAT 90.7 08/29/2021         ASSESSMENT:      Principal Problem:    Acute respiratory failure with hypoxia (HCC)  Active Problems:    Cerebral palsy (HCC)    Seizures [facility])    Mentally disabled    Convulsions ([facility] [address]    Pneumonia due to COVID-19 virus  Resolved Problems:    * No resolved hospital problems. *      PLAN:    #1 continue with respiratory care therapy and proning positions. 2.  Continue with IV vancomycin, Zithromax and remdesivir  3. Continue with IV steroids  4. Consult ID, intensive care specialist.  5.  Prognosis is poor. Palliative care was consulted but family would rather wait and see about the prognosis in the next few days.               Please note that over 50 minutes was spent in evaluating the patient, review of records and results, discussion with staff/family, etc.      Electronically signed by Braden Mas MD on 8/29/2021 at 12:50 PM

## 2021-08-29 NOTE — PROCEDURES
Vasile Betts is a 30 y.o. male patient. 1. Acute respiratory failure with hypoxia ([facility] [address]    2. Pneumonia of right lower lobe due to infectious organism    3. Sepsis with acute hypoxic respiratory failure without septic shock, due to unspecified organism ([facility] [address]    4. Hypokalemia      Past Medical History:   Diagnosis Date    Cerebral palsy ([facility] [address]     G tube feedings ([facility] [address]     age 3    History of blood transfusion     Mental handicap     mom states severe brain damage    Seizures (HCC)     tremors  last seizure 8/8/2018     Blood pressure (!) 79/49, pulse 105, temperature 98.7 °F (37.1 °C), temperature source Axillary, resp. rate 24, height 5' (1.524 m), weight 130 lb (59 kg), SpO2 100 %. Insert Arterial Line    Date/Time: 8/29/2021 5:00 AM  Performed by: MINERVA Cohn CNP  Authorized by: MINERVA Cohn CNP   Consent: Verbal consent obtained. Written consent obtained. Risks and benefits: risks, benefits and alternatives were discussed  Consent given by: parent  Patient understanding: patient states understanding of the procedure being performed  Patient consent: the patient's understanding of the procedure matches consent given  Procedure consent: procedure consent matches procedure scheduled  Relevant documents: relevant documents present and verified  Test results: test results available and properly labeled  Site marked: the operative site was marked  Imaging studies: imaging studies available  Required items: required blood products, implants, devices, and special equipment available  Patient identity confirmed: verbally with patient and arm band  Time out: Immediately prior to procedure a \"time out\" was called to verify the correct patient, procedure, equipment, support staff and site/side marked as required. Preparation: Patient was prepped and draped in the usual sterile fashion.   Indications: multiple ABGs, respiratory failure and hemodynamic monitoring  Location: right radial  Anesthesia: local infiltration    Anesthesia:  Local Anesthetic: lidocaine 1% without epinephrine    Sedation:  Patient sedated: yes  Analgesia: see MAR for details    Nikhil's test normal: yes  Needle gauge: 20  Seldinger technique: Seldinger technique used  Number of attempts: 1  Post-procedure: line sutured and dressing applied  Post-procedure CMS: normal and unchanged  Patient tolerance: patient tolerated the procedure well with no immediate complications          MINERVA Schulz - YUKI  8/29/2021

## 2021-08-29 NOTE — CONSULTS
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  History of seizures  Hypokalemia, hypomagnesia    Plan of Care  Start Decadron and remdesivir  Start Unasyn, Zithromax, vancomycin for possible aspiration pneumonia versus pneumonitis  Full dose Lovenox for possible PE, CTA too risky at this time secondary to hypoxia  Echo stat to evaluate for right heart strain, bilateral Doppler lower extremities negative for DVT  Continue heated high flow nasal cannula, can add nonrebreather if needed for hypoxia  Continue Levophed  IV fluids  Trend lactic  Check CRP for possible Actemra administration  Consult ID  DuoNebs every 4 hours  Already pancultured  Check UA  Replete electrolytes as needed  CMP, CBC, mag and Phos daily  Keep patient n.p.o. as he is high risk for aspiration, will evaluate nutrition at a later date  Continue patient's home seizure medication, will have to contact family to verify medication list  VTE prophylaxis with full dose enoxaparin  GI prophylaxis with Protonix      History of Present Illness: Patient is a 30 y.o. 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 Father is at bedside and is able to give me a detailed description of the events leading up to emergency room arrival.  Symptoms started 4 days ago and the patient was becoming more more lethargic. While giving the patient his normal tube feeds, he began to gag like he was going to vomit. Father denies that he had any emesis at that time.   Later in the evening, the patient was put in bed and father states that he was moaning and his lips turned blue. Father did call EMS and patient presented to the emergency department. Father endorses the patient has had fever as high as 100 and chills. The patient is nonverbal at baseline, the father states that he has been tired and moving less. Everyone in the household has mild symptoms. When he presented to the emergency room he was 41% on room air  Labs and imaging in the ED pertinent for potassium 3.0, CO2 21, magnesium 1.7, lactic acid 4.1, glucose 147, proBNP 390, troponin 6, ALT 42, AST 49, WBC 3.3, Covid swab positive, ABG with pH 7.34, PCO2 36.6, PO2 52.2, bicarb 19.8, bilateral venous Doppler ultrasounds negative for DVT, EKG was sinus tachycardia and a QTC of 4 3, chest x-ray with right lower lobe pneumonia and left lower lobe opacities atelectasis versus pneumonia. CTA was not done as there was concern that patient may become extremely hypoxic when laying flat. Past Medical History:  Past Medical History:   Diagnosis Date    Cerebral palsy ([address]     G tube feedings ([address]     age 3    History of blood transfusion     Mental handicap     mom states severe brain damage    Seizures (HCC)     tremors  last seizure 8/8/2018        Family History:   History reviewed. No pertinent family history. Allergies:         Patient has no known allergies.     Social history:  Social History     Socioeconomic History    Marital status: Single     Spouse name: Not on file    Number of children: Not on file    Years of education: Not on file    Highest education level: Not on file   Occupational History    Not on file   Tobacco Use    Smoking status: Never Smoker    Smokeless tobacco: Never Used   Vaping Use    Vaping Use: Never used   Substance and Sexual Activity    Alcohol use: No    Drug use: No    Sexual activity: Not on file   Other Topics Concern    Not on file   Social History Narrative    Not on file     Social Determinants of Health     Financial Resource Strain: Low Risk     Difficulty of Paying Living Expenses: Not hard at all   Food Insecurity: No Food Insecurity    Worried About Running Out of Food in the Last Year: Never true    Denis of Food in the Last Year: Never true   Transportation Needs:     Lack of Transportation (Medical):      Lack of Transportation (Non-Medical):    Physical Activity:     Days of Exercise per Week:     Minutes of Exercise per Session:    Stress:     Feeling of Stress :    Social Connections:     Frequency of Communication with Friends and Family:     Frequency of Social Gatherings with Friends and Family:     Attends Taoist Services:     Active Member of Clubs or Organizations:     Attends Club or Organization Meetings:     Marital Status:    Intimate Partner Violence:     Fear of Current or Ex-Partner:     Emotionally Abused:     Physically Abused:     Sexually Abused:        Current Medications:     Current Facility-Administered Medications:     [COMPLETED] remdesivir 200 mg in sodium chloride 0.9 % 250 mL IVPB, 200 mg, IntraVENous, Once, Last Rate: 500 mL/hr at 08/28/21 2023, 200 mg at 08/28/21 2023 **FOLLOWED BY** [START ON 8/29/2021] remdesivir 100 mg in sodium chloride 0.9 % 250 mL IVPB, 100 mg, IntraVENous, Q24H, Eve Baeza DO    0.9 % sodium chloride bolus, 30 mL, IntraVENous, PRN, Eve Baeza DO    norepinephrine (LEVOPHED) 16 mg in dextrose 5 % 250 mL infusion, 2-100 mcg/min, IntraVENous, Continuous, Jazmin Dennis MD, Last Rate: 9.4 mL/hr at 08/28/21 2018, 10 mcg/min at 08/28/21 2018    lactated ringers infusion, , IntraVENous, Continuous, MINERVA Solis CNP    lactated ringers bolus, 500 mL, IntraVENous, Once, MINERVA Solis CNP    Current Outpatient Medications:     fluticasone (FLONASE) 50 MCG/ACT nasal spray, by Nasal route, Disp: , Rfl:     gentamicin (GENTAK) 0.3 % ophthalmic ointment, Apply to eye, Disp: , Rfl:     glycopyrrolate (ROBINUL) 1 MG tablet, TAKE 2 TABLETS BY MOUTH THREE TIMES A DAY, Disp: , Rfl:     nystatin (MYCOSTATIN) 274275 UNIT/GM powder, Apply 3 times daily. , Disp: 1 Bottle, Rfl: 1    baclofen (LIORESAL) 20 MG tablet, Take 1 tablet by mouth 3 times daily, Disp: 270 tablet, Rfl: 0    citalopram (CELEXA) 10 MG/5ML solution, Take 5 mLs by mouth daily, Disp: 300 mL, Rfl: 1    lactulose (CHRONULAC) 10 GM/15ML solution, Take 15 mLs by mouth daily, Disp: 450 mL, Rfl: 2    lansoprazole (PREVACID SOLUTAB) 15 MG disintegrating tablet, Take 1 tablet by mouth daily, Disp: 90 tablet, Rfl: 0    acetaminophen (TYLENOL) 160 MG/5ML suspension, 19.13 mLs by Per G Tube route every 6 hours as needed for Fever, Disp: 229.56 mL, Rfl: 0    Polyethylene Glycol 3350 (MIRALAX PO), Take by mouth as needed , Disp: , Rfl:     lacosamide (VIMPAT) 150 MG TABS tablet, Take 150 mg by mouth 2 times daily. Takes two tablets bid, Disp: , Rfl:     clonazePAM (KLONOPIN) 0.5 MG tablet, Take 0.5 mg by mouth 3 times daily as needed for Anxiety 11/2 tab three times daily Take morning of surgery with a sip of water, Disp: , Rfl:     lamoTRIgine (LAMICTAL) 200 MG tablet, Take 200 mg by mouth 3 times daily. , Disp: , Rfl:     zonisamide (ZONEGRAN) 100 MG capsule, Take 300 mg by mouth nightly , Disp: , Rfl:     Review of Systems:   Unable to perform review of systems secondary to patient being nonverbal    Physical Exam:   Vital Signs:  BP (!) 81/44   Pulse 102   Temp 99.3 °F (37.4 °C)   Resp 28   Wt 130 lb (59 kg)   SpO2 90%   BMI 25.39 kg/m²     Input/Output:  No intake/output data recorded. Oxygen requirements: 60 L 90% heated high flow nasal cannula    Ventilator Information:  Vent Information  FiO2 : 100 %  SpO2: 90 %  SpO2/FiO2 ratio: 91    General appearance: Thin, in respiratory distress.      HEENT: Not normocephalic secondary to previous surgery, TAMIKO, moist mucosa, saliva and pharynx  Neck: Supple, no jugular venous distension, lymphadenopathy,

## 2021-08-29 NOTE — ED PROVIDER NOTES
Central Line Placement Procedure Note    Indication: hypovolemia and long term access    Consent: The patient's father was counseled regarding the procedure, its indications, risks, potential complications and alternatives, and any questions were answered. Consent was obtained to proceed. Procedure: The patient was positioned appropriately and the skin over the right femoral vein was prepped with betadine and draped in a sterile fashion. Local anesthesia was obtained by infiltration using 1% Lidocaine without epinephrine. A large bore needle was used to identify the vein. A guide wire was then inserted into the vein through the needle. A triple lumen catheter was then inserted into the vessel over the guide wire using the Seldinger technique. All ports showed good, free flowing blood return and were flushed with saline solution. The catheter was then securely fastened to the skin. Two sutures were placed into the kit included tube clamp, proximal eyelets and a suture end from each of the securing sutures was extended around the catheter and tied to the proximal eyelets as an added measure to prevent dislodgement. An antibiotic disk was placed and the site was then covered with a sterile dressing. A post procedure X-ray was not indicated. The patient tolerated the procedure well.     Complications: None         Renee Aviles MD  Resident  08/28/21 2714

## 2021-08-29 NOTE — PROGRESS NOTES
Acute Problems  Acute hypoxic respiratory failure secondary to COVID-19 pneumonia  Severe ARDS. Sepsis with shock  Aspiration pneumonitis versus pneumonia  Possible pulmonary emboli  Metabolic acidosis most likely secondary to lactic acidosis  Severe CP with mental handicap, nonverbal secondary to brain bleed  History of seizures  Hypokalemia, hypomagnesia    Plan of Care  C/W Decadron and remdesivir  ID is to decide on Actemra and to manage antibiotics. C/W zosyn, Zithromax, vancomycin for possible aspiration pneumonia versus pneumonitis  Index of suspicion for PE is extremely low. Lower extremities ultrasound is negative. Patient will be switched to DVT prophylaxis dose. Echo stat to evaluate for right heart strain, bilateral Doppler lower extremities negative for DVT  Patient will be started on paralytics and will be prone. Blood gases will be assessed serially. Continue Levophed  IV fluids  Trend lactic  Check CRP for possible Actemra administration  Consult ID  DuoNebs every 4 hours  Already pancultured  UA is suggestive of mild UTI. Replete electrolytes as needed  CMP, CBC, mag and Phos daily  Patient is intubated. He will be started on trickle tube feeding as per dietitian recommendation. Continue patient's home seizure medication, will have to contact family to verify medication list  VTE prophylaxis with  enoxaparin 40 twice daily  GI prophylaxis with Protonix  Palliative care consult to define goals of care. Patient is currently full code. History of Present Illness: Patient is a 30 y.o. 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   Father is at bedside and is able to give me a detailed description of the events leading up to emergency room arrival.  Symptoms started 4 days ago and the patient was becoming more more lethargic. While giving the patient his normal tube feeds, he began to gag like he was going to vomit. Father denies that he had any emesis at that time. Later in the evening, the patient was put in bed and father states that he was moaning and his lips turned blue. Father did call EMS and patient presented to the emergency department. Father endorses the patient has had fever as high as 100 and chills. The patient is nonverbal at baseline, the father states that he has been tired and moving less. Everyone in the household has mild symptoms. When he presented to the emergency room he was 41% on room air  Labs and imaging in the ED pertinent for potassium 3.0, CO2 21, magnesium 1.7, lactic acid 4.1, glucose 147, proBNP 390, troponin 6, ALT 42, AST 49, WBC 3.3, Covid swab positive, ABG with pH 7.34, PCO2 36.6, PO2 52.2, bicarb 19.8, bilateral venous Doppler ultrasounds negative for DVT, EKG was sinus tachycardia and a QTC of 4 3, chest x-ray with right lower lobe pneumonia and left lower lobe opacities atelectasis versus pneumonia. CTA was not done as there was concern that patient may become extremely hypoxic when laying flat. Daily progress:  08/29/2021: Patient was intubated in the emergency room yesterday for worsening respiratory failure. He is hypoxemic 100% oxygen. He was started on paralytics and will be prone today. He continues to require Levophed at a low dose. He has low-grade fever but no chills or rigors. Past Medical History:  Past Medical History:   Diagnosis Date    Cerebral palsy ([address]     G tube feedings ([address]     age 3    History of blood transfusion     Mental handicap     mom states severe brain damage    Seizures (HCC)     tremors  last seizure 8/8/2018        Family History:   History reviewed. No pertinent family history. Allergies:         Patient has no known allergies.     Social history:  Social History     Socioeconomic History    Marital status: Single     Spouse name: Not on file    Number of children: Not on file    Years of education: Not on file    Highest education level: Not on file   Occupational History    Not on file   Tobacco Use    Smoking status: Never Smoker    Smokeless tobacco: Never Used   Vaping Use    Vaping Use: Never used   Substance and Sexual Activity    Alcohol use: No    Drug use: No    Sexual activity: Not on file   Other Topics Concern    Not on file   Social History Narrative    Not on file     Social Determinants of Health     Financial Resource Strain: Low Risk     Difficulty of Paying Living Expenses: Not hard at all   Food Insecurity: No Food Insecurity    Worried About [address] in the Last Year: Never true    [facility] in the Last Year: Never true   Transportation Needs:     Lack of Transportation (Medical):      Lack of Transportation (Non-Medical):    Physical Activity:     Days of Exercise per Week:     Minutes of Exercise per Session:    Stress:     Feeling of Stress :    Social Connections:     Frequency of Communication with Friends and Family:     Frequency of Social Gatherings with Friends and Family:     Attends Evangelical Services:     Active Member of Clubs or Organizations:     Attends Club or Organization Meetings:     Marital Status:    Intimate Partner Violence:     Fear of Current or Ex-Partner:     Emotionally Abused:     Physically Abused:     Sexually Abused:        Current Medications:     Current Facility-Administered Medications:     sodium chloride flush 0.9 % injection 5-40 mL, 5-40 mL, IntraVENous, 2 times per day, Georgann Flow, APRN - CNP, 10 mL at 08/29/21 0807    sodium chloride flush 0.9 % injection 10 mL, 10 mL, IntraVENous, PRN, Georgann Flow, APRN - CNP    0.9 % sodium chloride infusion, 25 mL, IntraVENous, PRN, Georgann Flow, APRN - CNP    ondansetron (ZOFRAN-ODT) disintegrating tablet 4 mg, 4 mg, Oral, Q8H PRN **OR** ondansetron (ZOFRAN) injection 4 mg, 4 mg, IntraVENous, Q6H PRN, MINERVA Merino CNP    polyethylene glycol (GLYCOLAX) packet 17 g, 17 g, Oral, Daily PRN, MINERVA Merino CNP    acetaminophen (TYLENOL) tablet 650 mg, 650 mg, Oral, Q6H PRN **OR** acetaminophen (TYLENOL) suppository 650 mg, 650 mg, Rectal, Q6H PRN, MINERVA Merino CNP    ipratropium-albuterol (DUONEB) nebulizer solution 1 ampule, 1 ampule, Inhalation, 4x daily, MINERVA Merino CNP, 1 ampule at 08/29/21 0912    azithromycin (ZITHROMAX) 500 mg in D5W 250ml Vial Mate, 500 mg, IntraVENous, Q24H, MINERVA Merino CNP, Stopped at 08/29/21 0516    pantoprazole (PROTONIX) injection 40 mg, 40 mg, IntraVENous, Daily, 40 mg at 08/29/21 0743 **AND** sodium chloride (PF) 0.9 % injection 10 mL, 10 mL, IntraVENous, Daily, MINERVA Merino CNP, 10 mL at 08/29/21 0743    perflutren lipid microspheres (DEFINITY) injection 1.65 mg, 1.5 mL, IntraVENous, ONCE PRN, MINERVA Merino CNP    fentaNYL 5 mcg/ml in 0.9%  ml infusion, 12.5-200 mcg/hr, IntraVENous, Continuous, Kian Nicholson MD, Last Rate: 10 mL/hr at 08/29/21 0753, 50 mcg/hr at 08/29/21 0753    fentaNYL (SUBLIMAZE) injection 25 mcg, 25 mcg, IntraVENous, Q1H PRN, Kian Nicholson MD    lacosamide (VIMPAT) tablet 150 mg, 150 mg, Oral, BID, MINERVA Merino CNP, 150 mg at 08/29/21 0743    lamoTRIgine (LAMICTAL) tablet 200 mg, 200 mg, Oral, TID, MINERVA Merino - CNP, 200 mg at 08/29/21 0743    zonisamide (ZONEGRAN) capsule 300 mg, 300 mg, Oral, Nightly, MINERVA Merino CNP    dexamethasone (PF) (DECADRON) injection 6 mg, 6 mg, IntraVENous, BID, MINERVA Merino CNP, 6 mg at 08/29/21 0708    piperacillin-tazobactam (ZOSYN) 3,375 mg in dextrose 5 % 50 mL IVPB extended infusion (mini-bag), 3,375 mg, IntraVENous, Q8H, MINERVA Merino CNP, Stopped at 08/29/21 1025    0.9 % sodium chloride infusion, , IntraVENous, Q8H, MINERVA Merino CNP, Stopped at 08/29/21 1101    vancomycin (VANCOCIN) 1,000 mg in dextrose 5 % 250 mL IVPB, 1,000 mg, IntraVENous, Q12H, [place], APRN - CNP    propofol 1000 MG/100ML injection, , , ,     propofol injection, 5-50 mcg/kg/min, IntraVENous, Titrated, [place], APRN - CNP, Last Rate: 7.1 mL/hr at 08/29/21 0753, 20 mcg/kg/min at 08/29/21 0753    cisatracurium besylate (NIMBEX) 200 mg in sodium chloride 0.9 % 100 mL infusion, 0.5-10 mcg/kg/min, IntraVENous, Continuous, Jay Durand MD, Last Rate: 3.5 mL/hr at 08/29/21 0932, 2 mcg/kg/min at 08/29/21 0932    potassium phosphate 30 mmol in dextrose 5 % 500 mL IVPB, 30 mmol, IntraVENous, Once, Jay Durand MD    baclofen (LIORESAL) tablet 10 mg, 10 mg, Oral, TID, Jay Durand MD  Aetshanel Ortiz ON 8/30/2021] enoxaparin (LOVENOX) injection 40 mg, 40 mg, Subcutaneous, BID, Jay Durand MD    [COMPLETED] remdesivir 200 mg in sodium chloride 0.9 % 250 mL IVPB, 200 mg, IntraVENous, Once, Stopped at 08/28/21 9895 **FOLLOWED BY** remdesivir 100 mg in sodium chloride 0.9 % 250 mL IVPB, 100 mg, IntraVENous, Q24H, Eve Baeza DO    0.9 % sodium chloride bolus, 30 mL, IntraVENous, PRN, Eve Baeza DO    norepinephrine (LEVOPHED) 16 mg in dextrose 5 % 250 mL infusion, 2-100 mcg/min, IntraVENous, Continuous, Galindo Cooper MD, Last Rate: 5.6 mL/hr at 08/29/21 0931, 6 mcg/min at 08/29/21 0931    lactated ringers infusion, , IntraVENous, Continuous, [place], APRN - CNP, Last Rate: 100 mL/hr at 08/29/21 0753, Rate Verify at 08/29/21 0753    Review of Systems:   Unable to perform review of systems secondary to patient being nonverbal    Physical Exam:   Vital Signs:  BP (!) 80/52   Pulse 109   Temp 99.9 °F (37.7 °C) (Axillary)   Resp 24   Ht 5' (1.524 m)   Wt 130 lb (59 kg)   SpO2 94%   BMI 25.39 kg/m²     Input/Output:  In: 2065.1 [I.V.:956.1]  Out: 2000     Oxygen requirements: MV    Ventilator Information:  Vent Information  $Ventilation: $Initial Day  Skin Assessment: Clean, dry, & intact  Vent Type: 980  Vent Mode: AC/VC  Vt Ordered: 400 mL  Rate Set: 24 bmp  Peak Flow: 80 L/min  Pressure Support: 0 cmH20  FiO2 : 100 %  SpO2: 94 %  SpO2/FiO2 ratio: 94  Sensitivity: 3  PEEP/CPAP: 14  I Time/ I Time %: 0 s  Humidification Source: HME  Mask Type: Full face mask  Mask Size: Small    General appearance: Thin, in respiratory distress. HEENT: Intubated   neck: Supple, no jugular venous distension, lymphadenopathy, thyromegaly or carotid bruits  Chest: Equal breath sounds, bilateral loud rhonchi noted throughout lung fields, no wheezing, and no tenderness over ribs   Cardiovascular: Normal S1 , S2, increased rate and regular rhythm, no murmur, rub or gallop  Abdomen: Normal sounds present, soft, lax with no tenderness, no hepatosplenomegaly, and no masses, Petey-key button noted in left upper quadrant. Extremities: No edema. Pulses are equally present. No clubbing or cyanosis, no mottling  Skin: intact, no rashes   Neurologic: Sedated and mechanically ventilated. Investigations:  Labs, radiological imaging and cardiac work up were reviewed        ICU STAFF PHYSICIAN NOTE OF PERSONAL INVOLVEMENT IN CARE  As the attending physician, I certify that I personally reviewed the patient's history and personally examined the patient to confirm the physical findings described above, and that I reviewed the relevant imaging studies and available reports. I also discussed the differential diagnosis and all of the proposed management plans with the patient and individuals accompanying the patient to this visit. They had the opportunity to ask questions about the proposed management plans and to have those questions answered. This patient has a high probability of sudden, clinically significant deterioration, which requires the highest level of physician preparedness to intervene urgently.   I managed/supervised life or organ supporting interventions that required frequent physician assessment. I devoted my full attention to the direct care of this patient for the amount of time indicated below. Time I spent with family or surrogate(s) is included only if the patient was incapable of providing the necessary information or participating in medical decision-making. Time devoted to teaching and to any procedures I billed separately is not included.   Critical Care Time: 40 minutes      Electronically signed by Fozia Nunez MD on 8/29/2021 at 11:29 AM

## 2021-08-29 NOTE — PROGRESS NOTES
Pharmacy Consultation Note  (Antibiotic Dosing and Monitoring)    Initial consult date: 2021  Consulting physician: MINERVA Merino CNP   Drug: Vancomycin  Indication: CAP    Age/  Gender Height Weight IBW Dosing weight  Allergy Information   25 y.o./male 5' (152.4 cm) 130 lb (59 kg)     Ideal body weight: 50 kg (110 lb 3.7 oz)  Adjusted ideal body weight: 53.6 kg (118 lb 2.2 oz)  57  Patient has no known allergies. Temp (24hrs), Av.9 °F (37.2 °C), Min:98.7 °F (37.1 °C), Max:99.3 °F (37.4 °C)        Recent vancomycin administrations                   vancomycin (VANCOCIN) 1,000 mg in dextrose 5 % 250 mL IVPB (mg) 1,000 mg New Bag 21 0411                No intake or output data in the 24 hours ending 21 0522    Historical Cultures:  Organism   Date Value Ref Range Status   2018 Coagulase Negative Staphylococci (A)  Final   2018 Corynebacterium species (A)  Final     No results for input(s): BC in the last 72 hours. Cultures:  available culture and sensitivity results were reviewed in [facility]    Assessment:  · 30 y.o. male with a pmh sx for cerebral palsy and found to have [id number] has been initiated Vancomycin empirically 2/2 septic shock.   · Goal trough level = 15-20 mcg/mL  · sCr stable    Plan:  · Vancomycin 1000 mg IV q12h  · Monitor renal function   · Clinical pharmacy to follow    Obdulio Caruso PharmD, BCPS 2021 7:37 AM

## 2021-08-29 NOTE — ED PROVIDER NOTES
HPI   Patient is a 30 y.o. male with a past medical history of cerebral palsy, mentally handicapped, seizures, presenting to the Emergency Department for altered mental status and respiratory distress. History obtained by parents. Symptoms are severe in severity and constant since onset. They are improved by nothing and worsened by time. Patient presents for altered mental status and hypoxia. 41% on room air on arrival.  History limited secondary to patient baseline mental status. They state patient is been sick for the last few days. Congested, coughing. Also states that patient has fever as high as 100. He is not vaccinated. Everyone in the house with similar symptoms. He is nonverbal at baseline and moves minimally at baseline. They state he has been more tired and moving less. Review of Systems   Unable to perform ROS: Patient nonverbal        Physical Exam  Vitals and nursing note reviewed. Constitutional:       General: He is in acute distress. Appearance: He is ill-appearing and toxic-appearing. HENT:      Head: Normocephalic and atraumatic. Mouth/Throat:      Mouth: Mucous membranes are moist.   Eyes:      Pupils: Pupils are equal, round, and reactive to light. Cardiovascular:      Rate and Rhythm: Regular rhythm. Tachycardia present. Heart sounds: Normal heart sounds. No murmur heard. Pulmonary:      Effort: Pulmonary effort is normal. No respiratory distress. Breath sounds: Normal breath sounds. No wheezing or rales. Abdominal:      Palpations: Abdomen is soft. Tenderness: There is no abdominal tenderness. There is no guarding or rebound. Musculoskeletal:      Cervical back: Neck supple. Skin:     General: Skin is warm and dry. Neurological:      Mental Status: He is lethargic.       Coordination: Coordination normal.      Comments: Gag reflex but neuro exam limited to baseline mental; status          Procedures   Intubation Procedure Note    Indication: Respiratory failure and hypoxia    Consent: The patient's father was counseled regarding the procedure, its indications, risks, potential complications and alternatives, and any questions were answered. Consent was obtained to proceed. Medications Used: ketamine intravenously and rocuronium intravenously    Procedure: The patient was placed in the appropriate position. Cricoid pressure was utilized. Intubation was performed by direct laryngoscopy using a laryngoscope and a 7.5 cuffed endotracheal tube. The cuff was then inflated and the tube was secured appropriately at a distance of 23 cm to the dental ridge. Initial confirmation of placement included bilateral breath sounds, an end tidal CO2 detector, absence of sounds over the stomach and adequate chest rise. A chest x-ray to verify correct placement of the tube showed appropriate tube position. The patient tolerated the procedure well. Complications: None      SEE OTHER ED NOTE FOR CENTRAL LINE PROCEDURE NOTE    MDM   Patient presented to the Emergency Department for hypoxia and lethargy . Patient hypoxic at 42% on arrival, toxic appearing. Extensive work-up initiated. Patient improving on nasal cannula and nonrebreather. Oxygen saturation further declined again no was placed on heated high flow. Remained stable on this for many hours. Work-up concerning for COVID-19 as well as aspiration pneumonia. Septic work-up also initiated and antibiotics started. IV fluids given. Patient's blood pressure started to further decompensate and central line was placed and pressors added. Also hypokalemic which was replaced. Patient further was deteriorating and pulse ox in mid 80s despite being on 100% FiO2 on BiPAP. Extensive course patient had with critical care and shared decision-making had.   Concern with intubation that patient was very hard to wean from the vent as well as if we DO NOT INTUBATE that he will further deteriorate and make intubation at a later time for difficult. Decision made to intubate patient for hypoxia and respiratory status. Informed family multiple times and updated them multiple times about patient's stay here in the department. Symptoms is likely secondary to COVID-19 as well as aspiration pneumonia. He was started on remdesivir as well as Decadron. Patient was accepted to the ICU. ED Course as of Aug 29 0124   Sat Aug 28, 2021   1601 Initially on nasal cannula as well as nonrebreather. O2 sat 93. Switch to arvo device. Patient satting 87 to 88%. Reinserted the nasal airway. Patient on 60 L at 100%, satting at 93%. [KP]   6412 Patient reevaluated, 97% on the heated high flow nasal cannula. Pressure 89/46    [KP]   7262 Spoke with Dr. Daniel Nunez, will admit. [KP]   1918 Patient reevaluated, oxygen is now 90% despite it being 100% for the last 1 to 2 hours. Mildly hypotensive with a pressure of 81/44. Respiratory coming to suction patient    [KP]   2120 Central line was placed. Bedside ultrasound performed. [KP]   6811 Reevaluated patient, was informed that his pulse ox was down into the low 80s. Arriving to the room patient 79 to 80% on heated high flow at 60 L. Patient previously had a nasal trumpet in his nose, this has since been removed. Unsure of where the nasal trumpet want. Replaced nasal trumpet, oxygen saturation slowly improving. Respiratory attempted suctioning patient. Still satting at 83%. Will attempt BiPAP at this time his tube feeds have been stopped all day, less concern for aspiration at this time. [KP]   4192 Patient placed on BiPAP. Satting 85%. Longer nasal trumpet inserted. [KP]   Sun Aug 29, 2021   0007 Spoke back-and-forth with nurse practitioner on in the ICU who then also spoke with the intensivist.  Discussed pros and cons of intubating patient.   At this time patient satting around 85% on 100% FiO2 on BiPAP, will proceed with intubation []      ED Course User Index  [] Eve Altamiranopaalva-DO David            ----------------------------------------------- PAST HISTORY --------------------------------------------  Past Medical History:  has a past medical history of Cerebral palsy ([facility] G tube feedings ([facility] [address] History of blood transfusion, Mental handicap, and Seizures ([facility] Past Surgical History:  has a past surgical history that includes Dental surgery (03/26/2015); Leg Tendon Surgery (Bilateral); Dental surgery (12/08/2016); pr biopsy, each added lesion (Right, 3/26/2018); and Dental surgery (N/A, 9/4/2020). Social History:  reports that he has never smoked. He has never used smokeless tobacco. He reports that he does not drink alcohol and does not use drugs. Family History: family history is not on file. The patients home medications have been reviewed. Allergies: Patient has no known allergies.     ------------------------------------------------ RESULTS ---------------------------------------------------    LABS:  Results for orders placed or performed during the hospital encounter of 08/28/21   COVID-19, Rapid    Specimen: Nasopharyngeal Swab   Result Value Ref Range    SARS-CoV-2, NAAT DETECTED (A) Not Detected   CBC Auto Differential   Result Value Ref Range    WBC 3.3 (L) 4.5 - 11.5 E9/L    RBC 4.21 3.80 - 5.80 E12/L    Hemoglobin 13.8 12.5 - 16.5 g/dL    Hematocrit 41.4 37.0 - 54.0 %    MCV 98.3 80.0 - 99.9 fL    MCH 32.8 26.0 - 35.0 pg    MCHC 33.3 32.0 - 34.5 %    RDW 12.3 11.5 - 15.0 fL    Platelets 316 (L) 515 - 450 E9/L    MPV 11.6 7.0 - 12.0 fL    Neutrophils % 68.0 43.0 - 80.0 %    Lymphocytes % 22.0 20.0 - 42.0 %    Monocytes % 5.0 2.0 - 12.0 %    Eosinophils % 0.0 0.0 - 6.0 %    Basophils % 0.0 0.0 - 2.0 %    Neutrophils Absolute 2.41 1.80 - 7.30 E9/L    Lymphocytes Absolute 0.73 (L) 1.50 - 4.00 E9/L    Monocytes Absolute 0.16 0.10 - 0.95 E9/L    Eosinophils Absolute 0.00 (L) 0.05 - 0.50 E9/L    Basophils Absolute 0.00 0.00 - 0.20 E9/L    Metamyelocytes Relative 4.0 (H) 0.0 - 1.0 %    Myelocyte Percent 1.0 0 - 0 %   Lipase   Result Value Ref Range    Lipase 42 13 - 60 U/L   Lactate, Sepsis   Result Value Ref Range    Lactic Acid, Sepsis 4.1 (HH) 0.5 - 1.9 mmol/L   Troponin   Result Value Ref Range    Troponin, High Sensitivity 6 0 - 11 ng/L   Brain Natriuretic Peptide   Result Value Ref Range    Pro- (H) 0 - 125 pg/mL   Urinalysis, reflex to microscopic   Result Value Ref Range    Color, UA DKYELLOW Straw/Yellow    Clarity, UA Clear Clear    Glucose, Ur Negative Negative mg/dL    Bilirubin Urine Negative Negative    Ketones, Urine Negative Negative mg/dL    Specific Gravity, UA 1.020 1.005 - 1.030    Blood, Urine TRACE (A) Negative    pH, UA 5.5 5.0 - 9.0    Protein, UA 30 (A) Negative mg/dL    Urobilinogen, Urine 1.0 <2.0 E.U./dL    Nitrite, Urine Negative Negative    Leukocyte Esterase, Urine Negative Negative   Comprehensive Metabolic Panel   Result Value Ref Range    Sodium 134 132 - 146 mmol/L    Potassium 3.0 (L) 3.5 - 5.0 mmol/L    Chloride 99 98 - 107 mmol/L    CO2 21 (L) 22 - 29 mmol/L    Anion Gap 14 7 - 16 mmol/L    Glucose 147 (H) 74 - 99 mg/dL    BUN 10 6 - 20 mg/dL    CREATININE 0.9 0.7 - 1.2 mg/dL    GFR Non-African American >60 >=60 mL/min/1.73    GFR African American >60     Calcium 8.8 8.6 - 10.2 mg/dL    Total Protein 6.5 6.4 - 8.3 g/dL    Albumin 3.3 (L) 3.5 - 5.2 g/dL    Total Bilirubin 0.5 0.0 - 1.2 mg/dL    Alkaline Phosphatase 96 40 - 129 U/L    ALT 42 (H) 0 - 40 U/L    AST 49 (H) 0 - 39 U/L   Magnesium   Result Value Ref Range    Magnesium 1.7 1.6 - 2.6 mg/dL   Arterial Blood Gas, Respiratory Only   Result Value Ref Range    Source: Arterial     FIO2 Arterial 100.0     pH, Blood Gas 7.342 (L) 7.350 - 7.450    pCO2, Arterial 36.6 35.0 - 45.0 mmHg    pO2, Arterial 52.2 (L) 80.0 - 100.0 mmHg    HCO3, Arterial 19.8 (L) 22.0 - 26.0 mmol/L    B.E. -5.3 (L) -3.0 - 3.0 mmol/L    O2 Sat 84.7 (L) 92.0 - 98.5 %     ID 30     DEVICE 15,065,521,400,933     Delivery Systems HFNC    Lactic Acid, Plasma   Result Value Ref Range    Lactic Acid 5.0 (HH) 0.5 - 2.2 mmol/L   D-dimer, quantitative   Result Value Ref Range    D-Dimer, Quant 292 ng/mL DDU   Microscopic Urinalysis   Result Value Ref Range    WBC, UA 1-3 0 - 5 /HPF    RBC, UA 1-3 0 - 2 /HPF    Epithelial Cells, UA RARE /HPF    Bacteria, UA RARE (A) None Seen /HPF   Arterial Blood Gas, Respiratory Only   Result Value Ref Range    Source: Arterial     FIO2 Arterial 15.0     pH, Blood Gas 7.172 (LL) 7.350 - 7.450    pCO2, Arterial 44.9 35.0 - 45.0 mmHg    pO2, Arterial 49.2 (L) 80.0 - 100.0 mmHg    HCO3, Arterial 16.4 (L) 22.0 - 26.0 mmol/L    B.E. -11.7 (L) -3.0 - 3.0 mmol/L    O2 Sat 73.9 (L) 92.0 - 98.5 %     ID 2,124     DEVICE 15,065,521,400,933     Critical Notification Yes     Delivery Systems Bagging    EKG 12 Lead   Result Value Ref Range    Ventricular Rate 115 BPM    Atrial Rate 115 BPM    P-R Interval 136 ms    QRS Duration 96 ms    Q-T Interval 292 ms    QTc Calculation (Bazett) 403 ms    P Axis 53 degrees    R Axis 123 degrees    T Axis 22 degrees       RADIOLOGY:    All Radiology results interpreted by Radiologist unless otherwise noted. XR CHEST PORTABLE   Final Result   Increasing right lung opacities consistent with pneumonia. US DUP LOWER EXTREMITIES BILATERAL VENOUS   Final Result   No evidence of DVT in either lower extremity. XR CHEST PORTABLE   Final Result   1. Right lower lobe pneumonia. 2. Left lower lobe atelectasis versus pneumonia. XR CHEST PORTABLE    (Results Pending)       BRT:[id number]  This EKG is signed and interpreted by ED Physician. Time:  1531  Rate: 114  Rhythm: Sinus. Interpretation: sinus tachycardia. No STEMI. QTc 427. QRS 94. Nonspecific ST changes in V2, V3.   Comparison: no previous EKG.        ---------------------------- NURSING NOTES AND VITALS REVIEWED -------------------------   The nursing notes within the ED encounter and vital signs as below have been reviewed. /68   Pulse 121   Temp 99 °F (37.2 °C)   Resp 26   Wt 130 lb (59 kg)   SpO2 (!) 84%   BMI 25.39 kg/m²   Oxygen Saturation Interpretation: Improved after treatment      ------------------------------------------PROGRESS NOTES -------------------------------------------    ED COURSE MEDICATIONS:                Medications   potassium chloride 10 mEq/100 mL IVPB (Peripheral Line) (0 mEq IntraVENous Stopped 8/28/21 2245)   remdesivir 200 mg in sodium chloride 0.9 % 250 mL IVPB (0 mg IntraVENous Stopped 8/28/21 2245)     Followed by   remdesivir 100 mg in sodium chloride 0.9 % 250 mL IVPB (has no administration in time range)   0.9 % sodium chloride bolus (has no administration in time range)   norepinephrine (LEVOPHED) 16 mg in dextrose 5 % 250 mL infusion (6 mcg/min IntraVENous Rate/Dose Verify 8/29/21 0055)   lactated ringers infusion ( IntraVENous New Bag 8/28/21 2301)   fentaNYL 5 mcg/ml in 0.9%  ml infusion (50 mcg/hr IntraVENous New Bag 8/29/21 0109)   fentaNYL (SUBLIMAZE) injection 25 mcg (has no administration in time range)   0.9 % sodium chloride IV bolus 1,770 mL (0 mLs IntraVENous Stopped 8/28/21 1858)   acetaminophen (TYLENOL) suppository 650 mg (650 mg Rectal Given 8/28/21 1627)   dexamethasone (PF) (DECADRON) injection 6 mg (6 mg IntraVENous Given 8/28/21 1626)   piperacillin-tazobactam (ZOSYN) 4,500 mg in dextrose 5 % 100 mL IVPB (mini-bag) (0 mg IntraVENous Stopped 8/28/21 1714)   0.9 % sodium chloride bolus (0 mLs IntraVENous Stopped 8/28/21 2245)   enoxaparin (LOVENOX) injection 60 mg (60 mg Subcutaneous Given 8/28/21 2025)   lactated ringers bolus (500 mLs IntraVENous New Bag 8/28/21 2253)   ondansetron (ZOFRAN) injection 4 mg (4 mg IntraVENous Given 8/28/21 2301)       CONSULTATIONS:            Consultation:  I Spoke with Dr. Shelbie Patricia (Critical care).   Discussed case.  They will provide consultation. Consultation:  I Spoke with Dr. Tad Rey (Medicine). Discussed case. They will admit this patient. Pharmacy- will dose remdesivir    PROCEDURES:            Central line and intubation. COUNSELING:   I have spoken with the patient and discussed todays results, in addition to providing specific details for the plan of care and counseling regarding the diagnosis and prognosis.     --------------------------------------- IMPRESSION & DISPOSITION --------------------------------     IMPRESSION(s):  1. Acute respiratory failure with hypoxia ([address]    2. Pneumonia of right lower lobe due to infectious organism    3. Sepsis with acute hypoxic respiratory failure without septic shock, due to unspecified organism ([address]    4. Hypokalemia        This patient's ED course included: a personal history and physicial examination, re-evaluation prior to disposition, multiple bedside re-evaluations, IV medications, cardiac monitoring, continuous pulse oximetry and complex medical decision making and emergency management    This patient has been closely monitored during their ED course. DISPOSITION:  Disposition: Admit to CCU/ICU. Patient condition is critical.    END OF PROVIDER NOTE.            Sascha Tiwari DO  Resident  08/29/21 7297

## 2021-08-29 NOTE — PLAN OF CARE
Problem: Isolation Precautions - Risk of Spread of Infection  Goal: Prevent transmission of infection  Outcome: Met This Shift

## 2021-08-29 NOTE — CONSULTS
Infectious Disease Consult Note     Admit Date: 8/28/2021  3:31 PM    Chief complaint: unable to obtain due to mentation     Reason for Consult:  [address]     Requesting Physician:  Jasmina Norris MD    HISTORY OF PRESENT ILLNESS:    This is a 30year old male with history of cerebral palsy, mental retardation, nonverbal at baseline. History by parents. He presented to the emergency room with parents with complaints of altered mental status and hypoxia 41% on room air. He was placed on a nonrebreather and admitted to the ICU where he was later intubated. Everyone in the house has similar symptoms. He was started on Decadron, remdesivir, Unasyn, Zithromax, vancomycin for possible aspiration pneumonia versus pneumonitis as per intensivist.     Due to the current efforts to prevent transmission of COVID-19 and also the need to preserve PPE for other caregivers, a face-to-face encounter with the patient was not performed. That being said, all relevant records and diagnostic tests were reviewed, including laboratory results and imaging. Please reference any relevant documentation elsewhere. Care will be coordinated with the primary service and other specialties as appropriate.      REVIEW OF SYSTEMS:    Negative except as above     MEDICAL HISTORY  Past Medical History:   Diagnosis Date    Cerebral palsy ([address]     G tube feedings [facility])     age 3    History of blood transfusion     Mental handicap     mom states severe brain damage    Seizures ([facility])     tremors  last seizure 8/8/2018      Immunization History   Administered Date(s) Administered    DT (pediatric) 08/24/1999    DTP 01/26/1994, 03/30/1994, 06/22/1994    DTaP (Infanrix) 11/28/1995    Hepatitis A Ped/Adol (Havrix, Vaqta) 06/18/2007, 06/20/2008    Hepatitis B Ped/Adol (Engerix-B, Recombivax HB) 1993, 01/05/1994, 06/22/1994    Hib vaccine 01/26/1994, 03/30/1994, 06/22/1994, 12/14/1994    Influenza A ([id number]) Vaccine PF IM 11/12/2009    Influenza Virus Vaccine 11/01/2005, 09/15/2011, 11/20/2013    MMR 12/14/1994, 08/24/1999    Meningococcal MCV4P (Menactra) 06/09/2005, 09/15/2011    Polio IPV (IPOL) 08/24/1999    Polio OPV 01/26/1994, 03/30/1994, 06/22/1994    Tdap (Boostrix, Adacel) 06/09/2005, 08/29/2019    Varicella (Varivax) 04/29/1997, 06/18/2007     Past Surgical History:   Procedure Laterality Date    DENTAL SURGERY  03/26/2015    xrays, restorations and extractions x1    DENTAL SURGERY  12/08/2016    DENTAL SURGERY N/A 9/4/2020    EXAM, RADIOGRAPHS, PROPHYLAXIS, DENTAL RESTORATIONS performed by Koby Salinas DDS at [address] Bilateral     ME BIOPSY, EACH ADDED LESION Right 3/26/2018    EXCISION OF SOFT TISSUE NEOPLASM RIGHT LEG performed by Kirill Blue MD at [address]  family history is not on file.   SOCIAL HISTORY  Social History     Socioeconomic History    Marital status: Single     Spouse name: Not on file    Number of children: Not on file    Years of education: Not on file    Highest education level: Not on file   Occupational History    Not on file   Tobacco Use    Smoking status: Never Smoker    Smokeless tobacco: Never Used   Vaping Use    Vaping Use: Never used   Substance and Sexual Activity    Alcohol use: No    Drug use: No    Sexual activity: Not on file   Other Topics Concern    Not on file   Social History Narrative    Not on file     Social Determinants of Health     Financial Resource Strain: Low Risk     Difficulty of Paying Living Expenses: Not hard at all   Food Insecurity: No Food Insecurity    Worried About Running Out of Food in the Last Year: Never true    [address] in the Last Year: Never true   Transportation Needs:     Lack of Transportation (Medical):     Lack of Transportation (Non-Medical):    Physical Activity:     Days of Exercise per Week:     Minutes of Exercise per Session:    Stress:     Feeling of Stress :    Social Connections:     Frequency of Communication with Friends and Family:     Frequency of Social Gatherings with Friends and Family:     Attends Confucianist Services:      Active Member of Clubs or Organizations:     Attends Club or Organization Meetings:     Marital Status:    Intimate Partner Violence:     Fear of Current or Ex-Partner:     Emotionally Abused:     Physically Abused:     Sexually Abused:          Current Medications:     Scheduled Meds:   sodium chloride flush  5-40 mL IntraVENous 2 times per day    ipratropium-albuterol  1 ampule Inhalation 4x daily    azithromycin  500 mg IntraVENous Q24H    pantoprazole  40 mg IntraVENous Daily    And    sodium chloride (PF)  10 mL IntraVENous Daily    lacosamide  150 mg Oral BID    lamoTRIgine  200 mg Oral TID    zonisamide  300 mg Oral Nightly    dexamethasone  6 mg IntraVENous BID    propofol        potassium phosphate IVPB  30 mmol IntraVENous Once    baclofen  10 mg Oral TID    [START ON 8/30/2021] enoxaparin  40 mg Subcutaneous BID    cefTRIAXone (ROCEPHIN) IV  2,000 mg IntraVENous Q24H    remdesivir IVPB  100 mg IntraVENous Q24H     Continuous Infusions:   sodium chloride      fentaNYL 5 mcg/ml in 0.9%  ml infusion 50 mcg/hr (08/29/21 0753)    sodium chloride Stopped (08/29/21 1101)    propofol 20 mcg/kg/min (08/29/21 0753)    cisatracurium (NIMBEX) infusion 2 mcg/kg/min (08/29/21 0932)    norepinephrine 6 mcg/min (08/29/21 0931)    lactated ringers 100 mL/hr at 08/29/21 0753     PRN Meds:sodium chloride flush, sodium chloride, ondansetron **OR** ondansetron, polyethylene glycol, acetaminophen **OR** acetaminophen, perflutren lipid microspheres, fentanNYL, sodium chloride      PHYSICAL EXAM:  Vitals:    08/29/21 1200 08/29/21 1230 08/29/21 1300 08/29/21 1330   BP:       Pulse: 106  115 101   Resp: 21 24 24 24   Temp:       TempSrc:       SpO2: 94%  100% 100%   Weight:       Height:         Intubated, sedated, on vent  proning   Art line   ET   Titus  Contractures           LABS AND DATA REVIEW: Recent Labs     08/28/21  1602 08/29/21  0220 08/29/21  0735   WBC 3.3* 3.6* 3.0*   HGB 13.8 12.4* 12.3*   HCT 41.4 36.9* 36.4*   MCV 98.3 97.4 97.1   * 115* 131     Recent Labs     08/28/21  1602 08/28/21  1602 08/29/21  0220     --  136   K 3.0*   < > 3.6   CL 99   < > 104   CO2 21*   < > 20*   BUN 10  --  9   CREATININE 0.9  --  0.9   GFRAA >60  --  >60   LABGLOM >60  --  >60   GLUCOSE 147*  --  165*   PROT 6.5  --  5.4*   LABALBU 3.3*  --  2.9*   CALCIUM 8.8  --  7.5*   BILITOT 0.5  --  0.6   ALKPHOS 96  --  67   AST 49*  --  57*   ALT 42*  --  37    < > = values in this interval not displayed. BLOOD CX #1  No results for input(s): BC in the last 72 hours. BLOOD CX #2  No results for input(s): Aundria Mario in the last 72 hours. WOUND culture  No results for input(s): WNDABS in the last 72 hours. URINE CULTURE  No results for input(s): LABURIN in the last 72 hours. ASSESSMENT & PLAN  COVID19 positive severe unvaccinated   Cerebral palsy, mentally disabled   Poor prognosis - family will wait for further palliative care discussions over net few days     Continue Vancomycin, Azithromycin, Remdesivir, Steroids   Continue vitamins   No need for Zosyn at this time  Follow labs and cultures     Thank you for consult. MINERVA Abbott NP  8/29/2021  2:02 PM    I have discussed the case, including pertinent history and physical  exam findings . I have seen and examined the patient and the key elements of the encounter have been performed by me. I agree with the assessment, plan and orders as documented.       Treatment plan as per my recommendation     Padmini Torrez MD, FACP  8/29/2021  8:54 PM

## 2021-08-29 NOTE — PROGRESS NOTES
Pt was intubated with first attempt by team.  Pt was preoxygenated with 100% on BiPAP before intubation. Direct visualization with video scope and tubed with first attempt. Positive color change as well as bilateral and equal breath sounds. Pt tube 7.5 at 23 at the lip. Pt placed on vent at this time.

## 2021-08-29 NOTE — ED NOTES
Physician aware of respiratory status, respiratory at bedside, Bi pap applied, Sp02 climbing, currently 88%     Macho Sanchez RN  08/28/21 7601

## 2021-08-29 NOTE — CONSULTS
Palliative Care Department  [phone]  Palliative Care Initial Consult  Provider Gray Iglesias MD      PATIENT: Michelle Cole  : 1995  MRN: [id number]  ADMISSION DATE: 2021  3:31 PM  Referring Provider: Thad Lizarraga MD    Palliative Medicine was consulted on hospital day 1 for assistance with Goals of care    HPI:     Mulugeta Griggs is a 30 y.o. 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 ASSESSMENT/PLAN:     Pertinent Hospital Diagnoses      COVID-19 infection   Sepsis   Pneumonia    Palliative Care Encounter / Counseling Regarding Goals of Care  Please see detailed goals of care discussion as below   At this time, Michelle Cole, Does Not have capacity for medical decision-making. Capacity is time limited and situation/question specific   Outcome of goals of care meeting: Spoke with the parent over the phone, explained to him the situation that [place] currently is in. He does not want to change his CODE STATUS at this time. He would like for him to return back home.  Code status Full Code   Advanced Directives: no POA or living will in epic   Surrogate/Legal NOK:    fareed Ortega, Father, [phone]  Marine Rabago, Mother, [phone]    Spiritual assessment: no spiritual distress identified  Bereavement and grief: to be determined  Referrals to: none today    Thank you for the opportunity to participate in the care of Michelle Cole. Gray Iglesias MD  Palliative Medicine     SUBJECTIVE:     Details of Conversation: Spoke to the parents of [place], introduced palliative medicine. Spoke about his condition, not knowing which way [place] will go as far as being extubated or not. At this time they do not want any segovia in CODE STATUS.   They would like for Erik to return back home. Since this is his first day on the ventilator. They would like to watch and wait how he improves. Conferred and support were provided over the phone. Prognosis: Guarded    OBJECTIVE:     BP (!) 80/52   Pulse 109   Temp 99.9 °F (37.7 °C) (Axillary)   Resp 24   Ht 5' (1.524 m)   Wt 130 lb (59 kg)   SpO2 91%   BMI 25.39 kg/m²     Physical Examination:  Due to the current efforts to prevent transmission of COVID-19 and also the need to preserve PPE for other caregivers, a face-to-face encounter with the patient was not performed. That being said, all relevant records and diagnostic tests were reviewed, including laboratory results and imaging. Please reference any relevant documentation elsewhere. Care will be coordinated with the primary service and other specialties as appropriate. Objective data reviewed: labs, images, records, medication use, vitals and chart    Time/Communication  Greater than 50% of time spent, total 45 minutes in counseling and coordination of care at the bedside regarding goals of care. Thank you for allowing Palliative Medicine to participate in the care of Jackeline Valentin. Note: This report was completed using [facility] voiced recognition software. Every effort has been made to ensure accuracy; however, inadvertent computerized transcription errors may be present.

## 2021-08-29 NOTE — ED NOTES
Procedure start with physician and respiratory at bedside.  Preprocedure meds given @ 0031 by Dr Johnathan Guevara, Rocuronium 70mg, Ketamine 70mg given IV     Avinash Hardwick RN  08/29/21 5526

## 2021-08-29 NOTE — ED NOTES
NG tube placed 58\" right nares, xray in room for ET and NG tub placement     Kayley Chandler RN  08/29/21 325 ANGY Keyes RN  08/29/21 6685

## 2021-08-29 NOTE — ED NOTES
Patient respiratory status declining, physician choosing to intubate. Physician, residents, respiratory at bedside.      Kishan Daley RN  08/29/21 0025       Kishan Daley RN  08/29/21 0028

## 2021-08-29 NOTE — ED NOTES
7.5 inch ET tube in place, 23\" at the [facility]     Corrinne Sloop, RN  08/29/21 [address] [facility] [address], RN  08/29/21 2987

## 2021-08-30 ENCOUNTER — APPOINTMENT (OUTPATIENT)
Dept: GENERAL RADIOLOGY | Age: 26
DRG: 870 | End: 2021-08-30
Payer: COMMERCIAL

## 2021-08-30 LAB
ALBUMIN SERPL-MCNC: 2.5 G/DL (ref 3.5–5.2)
ALP BLD-CCNC: 64 U/L (ref 40–129)
ALT SERPL-CCNC: 40 U/L (ref 0–40)
ANION GAP SERPL CALCULATED.3IONS-SCNC: 16 MMOL/L (ref 7–16)
ANISOCYTOSIS: ABNORMAL
AST SERPL-CCNC: 95 U/L (ref 0–39)
B.E.: -10.3 MMOL/L (ref -3–3)
B.E.: -5.1 MMOL/L (ref -3–3)
B.E.: -7 MMOL/L (ref -3–3)
BASOPHILS ABSOLUTE: 0 E9/L (ref 0–0.2)
BASOPHILS RELATIVE PERCENT: 0 % (ref 0–2)
BILIRUB SERPL-MCNC: 0.9 MG/DL (ref 0–1.2)
BUN BLDV-MCNC: 6 MG/DL (ref 6–20)
BURR CELLS: ABNORMAL
CALCIUM SERPL-MCNC: 7.8 MG/DL (ref 8.6–10.2)
CHLORIDE BLD-SCNC: 99 MMOL/L (ref 98–107)
CO2: 19 MMOL/L (ref 22–29)
COHB: 0.3 % (ref 0–1.5)
CREAT SERPL-MCNC: 0.4 MG/DL (ref 0.7–1.2)
CRITICAL: ABNORMAL
DATE ANALYZED: ABNORMAL
DATE OF COLLECTION: ABNORMAL
EOSINOPHILS ABSOLUTE: 0 E9/L (ref 0.05–0.5)
EOSINOPHILS RELATIVE PERCENT: 0 % (ref 0–6)
FIO2: 40 %
FIO2: 40 %
FIO2: 50 %
GFR AFRICAN AMERICAN: >60
GFR NON-AFRICAN AMERICAN: >60 ML/MIN/1.73
GLUCOSE BLD-MCNC: 192 MG/DL (ref 74–99)
HCO3: 15.6 MMOL/L (ref 22–26)
HCO3: 17.1 MMOL/L (ref 22–26)
HCO3: 18.9 MMOL/L (ref 22–26)
HCT VFR BLD CALC: 36.9 % (ref 37–54)
HEMOGLOBIN: 12.8 G/DL (ref 12.5–16.5)
HHB: 3.6 % (ref 0–5)
HHB: 5.2 % (ref 0–5)
HHB: 6 % (ref 0–5)
L. PNEUMOPHILA SEROGP 1 UR AG: NORMAL
LAB: ABNORMAL
LACTATE DEHYDROGENASE: 776 U/L (ref 135–225)
LACTIC ACID: 11.8 MMOL/L (ref 0.5–2.2)
LACTIC ACID: 6.9 MMOL/L (ref 0.5–2.2)
LACTIC ACID: 8.3 MMOL/L (ref 0.5–2.2)
LACTIC ACID: 8.7 MMOL/L (ref 0.5–2.2)
LV EF: 58 %
LVEF MODALITY: NORMAL
LYMPHOCYTES ABSOLUTE: 1.54 E9/L (ref 1.5–4)
LYMPHOCYTES RELATIVE PERCENT: 11 % (ref 20–42)
Lab: ABNORMAL
MAGNESIUM: 2.2 MG/DL (ref 1.6–2.6)
MCH RBC QN AUTO: 32.5 PG (ref 26–35)
MCHC RBC AUTO-ENTMCNC: 34.7 % (ref 32–34.5)
MCV RBC AUTO: 93.7 FL (ref 80–99.9)
METHB: 0.3 % (ref 0–1.5)
METHB: 0.4 % (ref 0–1.5)
METHB: 0.4 % (ref 0–1.5)
MODE: AC
MONOCYTES ABSOLUTE: 0.98 E9/L (ref 0.1–0.95)
MONOCYTES RELATIVE PERCENT: 7 % (ref 2–12)
MRSA CULTURE ONLY: NORMAL
MYELOCYTE PERCENT: 0.9 % (ref 0–0)
NEUTROPHILS ABSOLUTE: 11.62 E9/L (ref 1.8–7.3)
NEUTROPHILS RELATIVE PERCENT: 82 % (ref 43–80)
O2 CONTENT: 16.7 ML/DL
O2 CONTENT: 18.6 ML/DL
O2 CONTENT: 19 ML/DL
O2 SATURATION: 94 % (ref 92–98.5)
O2 SATURATION: 94.8 % (ref 92–98.5)
O2 SATURATION: 96.4 % (ref 92–98.5)
O2HB: 93.3 % (ref 94–97)
O2HB: 94.2 % (ref 94–97)
O2HB: 95.7 % (ref 94–97)
OPERATOR ID: 3086
OPERATOR ID: 3086
OPERATOR ID: ABNORMAL
OVALOCYTES: ABNORMAL
PATIENT TEMP: 37 C
PCO2: 30.5 MMHG (ref 35–45)
PCO2: 32.6 MMHG (ref 35–45)
PCO2: 34.5 MMHG (ref 35–45)
PDW BLD-RTO: 12.4 FL (ref 11.5–15)
PEEP/CPAP: 10 CMH2O
PEEP/CPAP: 12 CMH2O
PEEP/CPAP: 12 CMH2O
PFO2: 1.62 MMHG/%
PFO2: 1.75 MMHG/%
PFO2: 1.79 MMHG/%
PH BLOOD GAS: 7.27 (ref 7.35–7.45)
PH BLOOD GAS: 7.37 (ref 7.35–7.45)
PH BLOOD GAS: 7.38 (ref 7.35–7.45)
PHOSPHORUS: 1.8 MG/DL (ref 2.5–4.5)
PLATELET # BLD: 140 E9/L (ref 130–450)
PMV BLD AUTO: 10.8 FL (ref 7–12)
PO2: 70 MMHG (ref 75–100)
PO2: 71.6 MMHG (ref 75–100)
PO2: 81.1 MMHG (ref 75–100)
POIKILOCYTES: ABNORMAL
POTASSIUM SERPL-SCNC: 4.1 MMOL/L (ref 3.5–5)
RBC # BLD: 3.94 E12/L (ref 3.8–5.8)
RI(T): 2.29
RI(T): 2.43
RI(T): 2.79
RR MECHANICAL: 24 B/MIN
SODIUM BLD-SCNC: 134 MMOL/L (ref 132–146)
SOURCE, BLOOD GAS: ABNORMAL
STREP PNEUMONIAE ANTIGEN, URINE: NORMAL
TEAR DROP CELLS: ABNORMAL
THB: 12.7 G/DL (ref 11.5–16.5)
THB: 14 G/DL (ref 11.5–16.5)
THB: 14.1 G/DL (ref 11.5–16.5)
TIME ANALYZED: 1314
TIME ANALYZED: 4
TIME ANALYZED: 543
TOTAL PROTEIN: 5 G/DL (ref 6.4–8.3)
VT MECHANICAL: 380 ML
WBC # BLD: 14 E9/L (ref 4.5–11.5)

## 2021-08-30 PROCEDURE — 6360000002 HC RX W HCPCS

## 2021-08-30 PROCEDURE — 2580000003 HC RX 258: Performed by: EMERGENCY MEDICINE

## 2021-08-30 PROCEDURE — 2500000003 HC RX 250 WO HCPCS

## 2021-08-30 PROCEDURE — 99231 SBSQ HOSP IP/OBS SF/LOW 25: CPT | Performed by: FAMILY MEDICINE

## 2021-08-30 PROCEDURE — 93308 TTE F-UP OR LMTD: CPT

## 2021-08-30 PROCEDURE — 6360000002 HC RX W HCPCS: Performed by: INTERNAL MEDICINE

## 2021-08-30 PROCEDURE — 94640 AIRWAY INHALATION TREATMENT: CPT

## 2021-08-30 PROCEDURE — 2580000003 HC RX 258

## 2021-08-30 PROCEDURE — 83615 LACTATE (LD) (LDH) ENZYME: CPT

## 2021-08-30 PROCEDURE — 2000000000 HC ICU R&B

## 2021-08-30 PROCEDURE — 2500000003 HC RX 250 WO HCPCS: Performed by: INTERNAL MEDICINE

## 2021-08-30 PROCEDURE — 2580000003 HC RX 258: Performed by: INTERNAL MEDICINE

## 2021-08-30 PROCEDURE — 83735 ASSAY OF MAGNESIUM: CPT

## 2021-08-30 PROCEDURE — 82784 ASSAY IGA/IGD/IGG/IGM EACH: CPT

## 2021-08-30 PROCEDURE — 80053 COMPREHEN METABOLIC PANEL: CPT

## 2021-08-30 PROCEDURE — 37799 UNLISTED PX VASCULAR SURGERY: CPT

## 2021-08-30 PROCEDURE — 84100 ASSAY OF PHOSPHORUS: CPT

## 2021-08-30 PROCEDURE — 6370000000 HC RX 637 (ALT 250 FOR IP): Performed by: INTERNAL MEDICINE

## 2021-08-30 PROCEDURE — 6360000002 HC RX W HCPCS: Performed by: EMERGENCY MEDICINE

## 2021-08-30 PROCEDURE — 6370000000 HC RX 637 (ALT 250 FOR IP)

## 2021-08-30 PROCEDURE — C9113 INJ PANTOPRAZOLE SODIUM, VIA: HCPCS

## 2021-08-30 PROCEDURE — 85025 COMPLETE CBC W/AUTO DIFF WBC: CPT

## 2021-08-30 PROCEDURE — 94003 VENT MGMT INPAT SUBQ DAY: CPT

## 2021-08-30 PROCEDURE — 2500000003 HC RX 250 WO HCPCS: Performed by: EMERGENCY MEDICINE

## 2021-08-30 PROCEDURE — 36415 COLL VENOUS BLD VENIPUNCTURE: CPT

## 2021-08-30 PROCEDURE — 82805 BLOOD GASES W/O2 SATURATION: CPT

## 2021-08-30 PROCEDURE — 83605 ASSAY OF LACTIC ACID: CPT

## 2021-08-30 RX ORDER — SODIUM CHLORIDE, SODIUM LACTATE, POTASSIUM CHLORIDE, AND CALCIUM CHLORIDE .6; .31; .03; .02 G/100ML; G/100ML; G/100ML; G/100ML
1000 INJECTION, SOLUTION INTRAVENOUS ONCE
Status: COMPLETED | OUTPATIENT
Start: 2021-08-30 | End: 2021-08-30

## 2021-08-30 RX ORDER — SODIUM CHLORIDE 9 MG/ML
25 INJECTION, SOLUTION INTRAVENOUS EVERY 12 HOURS
Status: DISCONTINUED | OUTPATIENT
Start: 2021-08-30 | End: 2021-09-11 | Stop reason: ALTCHOICE

## 2021-08-30 RX ADMIN — LAMOTRIGINE 200 MG: 100 TABLET ORAL at 21:15

## 2021-08-30 RX ADMIN — IPRATROPIUM BROMIDE AND ALBUTEROL SULFATE 1 AMPULE: .5; 3 SOLUTION RESPIRATORY (INHALATION) at 18:21

## 2021-08-30 RX ADMIN — SODIUM CHLORIDE, POTASSIUM CHLORIDE, SODIUM LACTATE AND CALCIUM CHLORIDE 1000 ML: 600; 310; 30; 20 INJECTION, SOLUTION INTRAVENOUS at 06:03

## 2021-08-30 RX ADMIN — VANCOMYCIN HYDROCHLORIDE 1000 MG: 1 INJECTION, POWDER, LYOPHILIZED, FOR SOLUTION INTRAVENOUS at 14:10

## 2021-08-30 RX ADMIN — ZONISAMIDE 300 MG: 100 CAPSULE ORAL at 21:17

## 2021-08-30 RX ADMIN — CEFEPIME HYDROCHLORIDE 2000 MG: 2 INJECTION, POWDER, FOR SOLUTION INTRAVENOUS at 14:09

## 2021-08-30 RX ADMIN — DEXAMETHASONE SODIUM PHOSPHATE 6 MG: 10 INJECTION, SOLUTION INTRAMUSCULAR; INTRAVENOUS at 08:16

## 2021-08-30 RX ADMIN — BACLOFEN 10 MG: 10 TABLET ORAL at 14:06

## 2021-08-30 RX ADMIN — SODIUM CHLORIDE, PRESERVATIVE FREE 10 ML: 5 INJECTION INTRAVENOUS at 08:17

## 2021-08-30 RX ADMIN — BACLOFEN 10 MG: 10 TABLET ORAL at 21:15

## 2021-08-30 RX ADMIN — Medication 10 ML: at 21:17

## 2021-08-30 RX ADMIN — IPRATROPIUM BROMIDE AND ALBUTEROL SULFATE 1 AMPULE: .5; 3 SOLUTION RESPIRATORY (INHALATION) at 06:24

## 2021-08-30 RX ADMIN — POLYVINYL ALCOHOL 1 DROP: 14 SOLUTION/ DROPS OPHTHALMIC at 11:00

## 2021-08-30 RX ADMIN — Medication 10 ML: at 08:17

## 2021-08-30 RX ADMIN — SODIUM PHOSPHATE, MONOBASIC, MONOHYDRATE AND SODIUM PHOSPHATE, DIBASIC, ANHYDROUS 30 MMOL: 276; 142 INJECTION, SOLUTION INTRAVENOUS at 08:17

## 2021-08-30 RX ADMIN — HYDROCORTISONE SODIUM SUCCINATE 100 MG: 100 INJECTION, POWDER, FOR SOLUTION INTRAMUSCULAR; INTRAVENOUS at 21:14

## 2021-08-30 RX ADMIN — ENOXAPARIN SODIUM 40 MG: 40 INJECTION SUBCUTANEOUS at 08:13

## 2021-08-30 RX ADMIN — PROPOFOL 20 MCG/KG/MIN: 10 INJECTION, EMULSION INTRAVENOUS at 14:06

## 2021-08-30 RX ADMIN — AZITHROMYCIN DIHYDRATE 500 MG: 500 INJECTION, POWDER, LYOPHILIZED, FOR SOLUTION INTRAVENOUS at 03:45

## 2021-08-30 RX ADMIN — CISATRACURIUM BESYLATE 1.5 MCG/KG/MIN: 10 INJECTION, SOLUTION INTRAVENOUS at 08:20

## 2021-08-30 RX ADMIN — SODIUM BICARBONATE 100 ML/HR: 84 INJECTION, SOLUTION INTRAVENOUS at 14:08

## 2021-08-30 RX ADMIN — METRONIDAZOLE 500 MG: 500 INJECTION, SOLUTION INTRAVENOUS at 21:16

## 2021-08-30 RX ADMIN — LACOSAMIDE 150 MG: 100 TABLET, FILM COATED ORAL at 21:15

## 2021-08-30 RX ADMIN — BACLOFEN 10 MG: 10 TABLET ORAL at 08:15

## 2021-08-30 RX ADMIN — PANTOPRAZOLE SODIUM 40 MG: 40 INJECTION, POWDER, FOR SOLUTION INTRAVENOUS at 08:13

## 2021-08-30 RX ADMIN — IPRATROPIUM BROMIDE AND ALBUTEROL SULFATE 1 AMPULE: .5; 3 SOLUTION RESPIRATORY (INHALATION) at 09:41

## 2021-08-30 RX ADMIN — POLYVINYL ALCOHOL 1 DROP: 14 SOLUTION/ DROPS OPHTHALMIC at 17:21

## 2021-08-30 RX ADMIN — Medication 50 MCG/HR: at 03:59

## 2021-08-30 RX ADMIN — ENOXAPARIN SODIUM 40 MG: 40 INJECTION SUBCUTANEOUS at 21:16

## 2021-08-30 RX ADMIN — IPRATROPIUM BROMIDE AND ALBUTEROL SULFATE 1 AMPULE: .5; 3 SOLUTION RESPIRATORY (INHALATION) at 13:21

## 2021-08-30 RX ADMIN — LACOSAMIDE 150 MG: 100 TABLET, FILM COATED ORAL at 08:14

## 2021-08-30 RX ADMIN — REMDESIVIR 100 MG: 100 INJECTION, POWDER, LYOPHILIZED, FOR SOLUTION INTRAVENOUS at 21:16

## 2021-08-30 RX ADMIN — HYDROCORTISONE SODIUM SUCCINATE 100 MG: 100 INJECTION, POWDER, FOR SOLUTION INTRAMUSCULAR; INTRAVENOUS at 14:08

## 2021-08-30 RX ADMIN — LAMOTRIGINE 200 MG: 100 TABLET ORAL at 14:06

## 2021-08-30 RX ADMIN — METRONIDAZOLE 500 MG: 500 INJECTION, SOLUTION INTRAVENOUS at 14:09

## 2021-08-30 RX ADMIN — SODIUM CHLORIDE, POTASSIUM CHLORIDE, SODIUM LACTATE AND CALCIUM CHLORIDE: 600; 310; 30; 20 INJECTION, SOLUTION INTRAVENOUS at 02:30

## 2021-08-30 RX ADMIN — LAMOTRIGINE 200 MG: 100 TABLET ORAL at 08:16

## 2021-08-30 RX ADMIN — POLYVINYL ALCOHOL 1 DROP: 14 SOLUTION/ DROPS OPHTHALMIC at 21:15

## 2021-08-30 ASSESSMENT — PAIN SCALES - GENERAL
PAINLEVEL_OUTOF10: 0

## 2021-08-30 ASSESSMENT — PULMONARY FUNCTION TESTS
PIF_VALUE: 31
PIF_VALUE: 28
PIF_VALUE: 31
PIF_VALUE: 30
PIF_VALUE: 31
PIF_VALUE: 29
PIF_VALUE: 30
PIF_VALUE: 31
PIF_VALUE: 30
PIF_VALUE: 28
PIF_VALUE: 31
PIF_VALUE: 28
PIF_VALUE: 30
PIF_VALUE: 29
PIF_VALUE: 28
PIF_VALUE: 31
PIF_VALUE: 31
PIF_VALUE: 30
PIF_VALUE: 31
PIF_VALUE: 28
PIF_VALUE: 28
PIF_VALUE: 31
PIF_VALUE: 29
PIF_VALUE: 30
PIF_VALUE: 30
PIF_VALUE: 31
PIF_VALUE: 31
PIF_VALUE: 30
PIF_VALUE: 30
PIF_VALUE: 31

## 2021-08-30 NOTE — PLAN OF CARE
Problem: Airway Clearance - Ineffective  Goal: Achieve or maintain patent airway  Outcome: Met This Shift     Problem: Gas Exchange - Impaired  Goal: Absence of hypoxia  Outcome: Met This Shift  Goal: Promote optimal lung function  Outcome: Met This Shift     Problem: Breathing Pattern - Ineffective  Goal: Ability to achieve and maintain a regular respiratory rate  Outcome: Met This Shift     Problem:  Body Temperature -  Risk of, Imbalanced  Goal: Ability to maintain a body temperature within defined limits  Outcome: Completed  Goal: Will regain or maintain usual level of consciousness  Outcome: Completed  Goal: Complications related to the disease process, condition or treatment will be avoided or minimized  Outcome: Completed     Problem: Isolation Precautions - Risk of Spread of Infection  Goal: Prevent transmission of infection  Outcome: Met This Shift     Problem: Nutrition Deficits  Goal: Optimize nutritional status  Outcome: Not Met This Shift     Problem: Risk for Fluid Volume Deficit  Goal: Maintain normal heart rhythm  Outcome: Completed  Goal: Maintain absence of muscle cramping  Outcome: Completed  Goal: Maintain normal serum potassium, sodium, calcium, phosphorus, and pH  Outcome: Completed     Problem: Loneliness or Risk for Loneliness  Goal: Demonstrate positive use of time alone when socialization is not possible  Outcome: Completed     Problem: Non-Violent Restraints  Goal: Removal from restraints as soon as assessed to be safe  Outcome: Completed  Goal: No harm/injury to patient while restraints in use  Outcome: Completed  Goal: Patient's dignity will be maintained  Outcome: Completed     Problem: Skin Integrity:  Goal: Will show no infection signs and symptoms  Description: Will show no infection signs and symptoms  Outcome: Met This Shift  Goal: Absence of new skin breakdown  Description: Absence of new skin breakdown  Outcome: Met This Shift

## 2021-08-30 NOTE — CONSULTS
Comprehensive Nutrition Assessment    Type and Reason for Visit:  Initial, Consult (vent)    Nutrition Recommendations/Plan: Recommend intiation of Osmolite 1.2 (Standard w/o Fiber) @15 ml/hr advancing 10 ml/hr q 8 hr to goal rate of 55 ml/hr x 24 hr to provide: 1320 mlTV, 1584 kcal, 73 gm protein, 1082 ml FW. Recommend 110 ml FW q 4 hr w/o IV fluids or per critical care. - Patient should be considered at increased risk for metabolic complications R/T refeeding, characterized by drops in serum K, Mg, and Phos levels. These parameters should be closely monitored and be WNL prior to initiation or progression of feeding. Nutrition Assessment:  Pt admtited w/ hypokalemia and acute respiratory failure r/t COVID-19. PMH of spastic CP, pt non-verbal at baseline. Pt w/ noted PEG/PEG-J tube, JASON which pt has. Pt intubated at this time. Will provide EN recommendations and monitor while admitted. Malnutrition Assessment:  Malnutrition Status: At risk for malnutrition (Comment)    Context:  Acute Illness     Findings of the 6 clinical characteristics of malnutrition:  Energy Intake:  Mild decrease in energy intake (Comment)  Weight Loss:  Unable to assess (No actual wt this admission and no actual EMR wt hx)     Body Fat Loss:  Unable to assess (Pt is COVID-19 positive)     Muscle Mass Loss:  Unable to assess    Fluid Accumulation:  No significant fluid accumulation     Strength:  Not Performed    Estimated Daily Nutrient Needs:  Energy (kcal):  6110-7196 ([id number]= 1750); Weight Used for Energy Requirements:  Current     Protein (g):  70-85 (1.4-1.7 gm/kg IBW);  Weight Used for Protein Requirements:  Ideal        Fluid (ml/day):  3250-4478 or per critical care; Method Used for Fluid Requirements:  1 ml/kcal      Nutrition Related Findings:  Pt intubated, Propofol @ 7.1 ml/hr (187 kcal/day), abd flat soft, hypoactive BS, PEG tube clamped, NGT clamped, worsening lactic acidosis, hyperglycemia, pt on steroid, hypophosphatemia, BUE contractures and flaccid BLE      Wounds:  None       Current Nutrition Therapies:    Diet NPO    Anthropometric Measures:  · Height: 5' (152.4 cm)  · Current Body Weight: 130 lb (59 kg) (8/28 no method, UTO current wt d/t pt COVID-19 positive)   · Admission Body Weight: 130 lb (59 kg) (8/28 no method)    · Usual Body Weight: 135 lb (61.2 kg) (9/4/2020 stated, no actual EMR wt hx)     · Ideal Body Weight: 106 lbs; % Ideal Body Weight 122.6 %   · BMI: 25.4  · Adjusted Body Weight: No Adjustment   · BMI Categories: Overweight (BMI 25.0-29. 9)       Nutrition Diagnosis:   · Inadequate protein-energy intake related to impaired respiratory function as evidenced by NPO or clear liquid status due to medical condition, intubation    Nutrition Interventions:   Food and/or Nutrient Delivery:  Continue NPO, Start Tube Feeding (Recommend intiation of Osmolite 1.2 (Standard w/o Fiber) @15 ml/hr advancing 10 ml/hr q 8 hr to goal rate of 55 ml/hr x 24 hr to provide: 1320 mlTV, 1584 kcal, 73 gm protein, 1082 ml FW. Recommend 110 ml FW q 4 hr w/o IV fluids or per critical care.)  Nutrition Education/Counseling:  No recommendation at this time   Coordination of Nutrition Care:  Continue to monitor while inpatient    Goals:  Nutrition Progression       Nutrition Monitoring and Evaluation:   Behavioral-Environmental Outcomes:  None Identified   Food/Nutrient Intake Outcomes:  Diet Advancement/Tolerance  Physical Signs/Symptoms Outcomes:  Biochemical Data, GI Status, Fluid Status or Edema, Hemodynamic Status, Nutrition Focused Physical Findings, Skin, Weight     Discharge Planning:     Too soon to determine     Electronically signed by Pinky Alcazar RD, LD on 8/30/21 at 2:08 PM EDT    Contact: [phone]

## 2021-08-30 NOTE — ACP (ADVANCE CARE PLANNING)
Advance Care Planning     Advance Care Planning Activator (Inpatient)  Conversation Note      Date of ACP Conversation: 8/30/2021     Conversation Conducted with: Father: Jason Thompson and he spoke to Palliative care team yesterday as well noted. ACP Activator: Dora Balderrama RN    Health Care Decision Maker:     Current Designated Health Care Decision Maker:     Primary Decision Maker: [address] - Parent - [phone]    Secondary Decision Maker: Ute Dasilva - Parent - [phone]       Care Preferences    Ventilation: \"If you were in your present state of health and suddenly became very ill and were unable to breathe on your own, what would your preference be about the use of a ventilator (breathing machine) if it were available to you? \"      Would the patient desire the use of ventilator (breathing machine)?: Yes    \"If your health worsens and it becomes clear that your chance of recovery is unlikely, what would your preference be about the use of a ventilator (breathing machine) if it were available to you? \"     Would the patient desire the use of ventilator (breathing machine)?:YES      Resuscitation  \"CPR works best to restart the heart when there is a sudden event, like a heart attack, in someone who is otherwise healthy. Unfortunately, CPR does not typically restart the heart for people who have serious health conditions or who are very sick. \"    \"In the event your heart stopped as a result of an underlying serious health condition, would you want attempts to be made to restart your heart (answer \"yes\" for attempt to resuscitate) or would you prefer a natural death (answer \"no\" for do not attempt to resuscitate)? \" YES     [] Yes   [] No   Educated Patient / Feliciyashira Patton regarding differences between Advance Directives and portable DNR orders.     Length of ACP Conversation in minutes:      Conversation Outcomes:  [x] ACP discussion completed  [] Existing advance directive reviewed with patient; no changes to patient's previously recorded wishes  [] New Advance Directive completed  [] Portable Do Not Rescitate prepared for Provider review and signature  [] POLST/POST/MOLST/MOST prepared for Provider review and signature      Follow-up plan:    [] Schedule follow-up conversation to continue planning  [] Referred individual to Provider for additional questions/concerns   [] Advised patient/agent/surrogate to review completed ACP document and update if needed with changes in condition, patient preferences or care setting    [x] This note routed to one or more involved healthcare providers    Electronically signed by ROSANGELA Momin on 8/30/2021 at 9:20 AM

## 2021-08-30 NOTE — PLAN OF CARE
Problem: Isolation Precautions - Risk of Spread of Infection  Goal: Prevent transmission of infection  8/30/2021 0708 by Christi Mohr RN  Outcome: Met This Shift

## 2021-08-30 NOTE — PROGRESS NOTES
Pharmacy Consultation Note  (Antibiotic Dosing and Monitoring)    Initial consult date: 08/29/2021  Consulting physician: Yvonne Gonzalez, APRN - CNP   Drug: Vancomycin  Indication: CAP    Vancomycin discontinued, pharmacy will sign off. Please re-consult if needed.     Thank you,    Mirela Dan, PharmD, BCPS 8/30/2021 8:02 AM   [phone]

## 2021-08-30 NOTE — PROGRESS NOTES
Pulmonary/Critical Care Progress Note    We are following patient for COVID-19 illness possible pneumonia, suspect additional gram-negative pneumonia, cavitating right upper lobe infiltrate or least consolidated infiltrate, possible aspiration pneumonitis, cerebral palsy, septic shock with increasing lactic acid levels, possible cardiac dysfunction, electrolyte disorders    SUBJECTIVE:  Patient's lactate level continues to rise and the degree of metabolic acidosis is seen on recent blood gases is worse. This suggests septic shock in addition to Covid pneumonitis and bacterial pneumonitis with the possibility of underlying cardiogenic shock as well. Therefore, in addition to vancomycin, we have switched ceftriaxone to cefepime added metronidazole for aspiration pneumonitis and azithromycin for atypical pneumonia    Additionally, we will change dexamethasone for now to hydrocortisone as part of the septic shock protocol. IV fluids were also being administered. We will need to place the patient in supine position in order to obtain an accurate echocardiogram.  If he tolerates being supine, we may want to initiate tube feedings.     MEDICATIONS:   sodium phosphate IVPB  30 mmol IntraVENous Once    cefepime  2,000 mg IntraVENous Q12H    metroNIDAZOLE  500 mg IntraVENous Q8H    hydrocortisone sodium succinate PF  100 mg IntraVENous Q8H    vancomycin  15 mg/kg IntraVENous Q12H    sodium chloride flush  5-40 mL IntraVENous 2 times per day    ipratropium-albuterol  1 ampule Inhalation 4x daily    azithromycin  500 mg IntraVENous Q24H    pantoprazole  40 mg IntraVENous Daily    And    sodium chloride (PF)  10 mL IntraVENous Daily    lacosamide  150 mg Oral BID    lamoTRIgine  200 mg Oral TID    zonisamide  300 mg Oral Nightly    baclofen  10 mg Oral TID    enoxaparin  40 mg SubCUTAneous BID    polyvinyl alcohol  1 drop Both Eyes Q6H    remdesivir IVPB  100 mg IntraVENous Q24H      sodium chloride      IV infusion builder      sodium chloride      fentaNYL 5 mcg/ml in 0.9%  ml infusion 40 mcg/hr (08/30/21 1200)    propofol 20. 056 mcg/kg/min (08/30/21 1200)    cisatracurium (NIMBEX) infusion 1.5 mcg/kg/min (08/30/21 1200)    norepinephrine Stopped (08/30/21 0800)     sodium chloride flush, sodium chloride, ondansetron **OR** ondansetron, polyethylene glycol, acetaminophen **OR** acetaminophen, perflutren lipid microspheres, fentanNYL, sodium chloride      REVIEW OF SYSTEMS:  Patient's baseline is nonverbal and he is also sedated, paralyzed, and intubated    OBJECTIVE:  Vitals:    08/30/21 1200   BP: (!) 99/55   Pulse: 108   Resp: 24   Temp: 96.5 °F (35.8 °C)   SpO2: 96%     FiO2 : 40 %  O2 Flow Rate (L/min): 60 L/min  O2 Device: Ventilator    PHYSICAL EXAM:  Constitutional: No fever, chills, diaphoresis  Skin: Skin rash, no skin breakdown  HEENT: Endotracheal tube secured at lips  Neck: JVD, lymphadenopathy, thyromegaly  Cardiovascular: S1, S2 regular.   No S3 murmurs or rubs present  Respiratory: Inspiratory crackles over both posterior lung fields, no wheezes present  Gastrointestinal: Soft, nondistended, bowel sounds faintly present  Genitourinary: No bloody urine  Extremities: No clubbing, cyanosis, or edema  Neurological: Sedated and paralyzed  Psychological: Cannot examine    LABS:  WBC   Date Value Ref Range Status   08/30/2021 14.0 (H) 4.5 - 11.5 E9/L Final   08/29/2021 3.0 (L) 4.5 - 11.5 E9/L Final   08/29/2021 3.6 (L) 4.5 - 11.5 E9/L Final     Hemoglobin   Date Value Ref Range Status   08/30/2021 12.8 12.5 - 16.5 g/dL Final   08/29/2021 12.3 (L) 12.5 - 16.5 g/dL Final   08/29/2021 12.4 (L) 12.5 - 16.5 g/dL Final     Hematocrit   Date Value Ref Range Status   08/30/2021 36.9 (L) 37.0 - 54.0 % Final   08/29/2021 36.4 (L) 37.0 - 54.0 % Final   08/29/2021 36.9 (L) 37.0 - 54.0 % Final     MCV   Date Value Ref Range Status   08/30/2021 93.7 80.0 - 99.9 fL Final   08/29/2021 97.1 80.0 - 99.9 fL Final 08/29/2021 97.4 80.0 - 99.9 fL Final     Platelets   Date Value Ref Range Status   08/30/2021 140 130 - 450 E9/L Final   08/29/2021 131 130 - 450 E9/L Final   08/29/2021 115 (L) 130 - 450 E9/L Final     Sodium   Date Value Ref Range Status   08/30/2021 134 132 - 146 mmol/L Final   08/29/2021 136 132 - 146 mmol/L Final   08/28/2021 134 132 - 146 mmol/L Final     Potassium   Date Value Ref Range Status   08/30/2021 4.1 3.5 - 5.0 mmol/L Final   08/29/2021 3.6 3.5 - 5.0 mmol/L Final   08/28/2021 3.0 (L) 3.5 - 5.0 mmol/L Final     Chloride   Date Value Ref Range Status   08/30/2021 99 98 - 107 mmol/L Final   08/29/2021 104 98 - 107 mmol/L Final   08/28/2021 99 98 - 107 mmol/L Final     CO2   Date Value Ref Range Status   08/30/2021 19 (L) 22 - 29 mmol/L Final   08/29/2021 20 (L) 22 - 29 mmol/L Final   08/28/2021 21 (L) 22 - 29 mmol/L Final     BUN   Date Value Ref Range Status   08/30/2021 6 6 - 20 mg/dL Final   08/29/2021 9 6 - 20 mg/dL Final   08/28/2021 10 6 - 20 mg/dL Final     CREATININE   Date Value Ref Range Status   08/30/2021 0.4 (L) 0.7 - 1.2 mg/dL Final   08/29/2021 0.9 0.7 - 1.2 mg/dL Final   08/28/2021 0.9 0.7 - 1.2 mg/dL Final     Glucose   Date Value Ref Range Status   08/30/2021 192 (H) 74 - 99 mg/dL Final   08/29/2021 165 (H) 74 - 99 mg/dL Final   08/28/2021 147 (H) 74 - 99 mg/dL Final     Calcium   Date Value Ref Range Status   08/30/2021 7.8 (L) 8.6 - 10.2 mg/dL Final   08/29/2021 7.5 (L) 8.6 - 10.2 mg/dL Final   08/28/2021 8.8 8.6 - 10.2 mg/dL Final     Total Protein   Date Value Ref Range Status   08/30/2021 5.0 (L) 6.4 - 8.3 g/dL Final   08/29/2021 5.4 (L) 6.4 - 8.3 g/dL Final   08/28/2021 6.5 6.4 - 8.3 g/dL Final     Albumin   Date Value Ref Range Status   08/30/2021 2.5 (L) 3.5 - 5.2 g/dL Final   08/29/2021 2.9 (L) 3.5 - 5.2 g/dL Final   08/28/2021 3.3 (L) 3.5 - 5.2 g/dL Final     Total Bilirubin   Date Value Ref Range Status   08/30/2021 0.9 0.0 - 1.2 mg/dL Final   08/29/2021 0.6 0.0 - 1.2 mg/dL Final   08/28/2021 0.5 0.0 - 1.2 mg/dL Final     Alkaline Phosphatase   Date Value Ref Range Status   08/30/2021 64 40 - 129 U/L Final   08/29/2021 67 40 - 129 U/L Final   08/28/2021 96 40 - 129 U/L Final     AST   Date Value Ref Range Status   08/30/2021 95 (H) 0 - 39 U/L Final   08/29/2021 57 (H) 0 - 39 U/L Final   08/28/2021 49 (H) 0 - 39 U/L Final     ALT   Date Value Ref Range Status   08/30/2021 40 0 - 40 U/L Final   08/29/2021 37 0 - 40 U/L Final   08/28/2021 42 (H) 0 - 40 U/L Final     GFR Non-   Date Value Ref Range Status   08/30/2021 >60 >=60 mL/min/1.73 Final     Comment:     Chronic Kidney Disease: less than 60 ml/min/1.73 sq.m. Kidney Failure: less than 15 ml/min/1.73 sq.m. Results valid for patients 18 years and older. 08/29/2021 >60 >=60 mL/min/1.73 Final     Comment:     Chronic Kidney Disease: less than 60 ml/min/1.73 sq.m. Kidney Failure: less than 15 ml/min/1.73 sq.m. Results valid for patients 18 years and older. 08/28/2021 >60 >=60 mL/min/1.73 Final     Comment:     Chronic Kidney Disease: less than 60 ml/min/1.73 sq.m. Kidney Failure: less than 15 ml/min/1.73 sq.m. Results valid for patients 18 years and older. GFR    Date Value Ref Range Status   08/30/2021 >60  Final   08/29/2021 >60  Final   08/28/2021 >60  Final     Magnesium   Date Value Ref Range Status   08/30/2021 2.2 1.6 - 2.6 mg/dL Final   08/29/2021 2.5 1.6 - 2.6 mg/dL Final   08/29/2021 1.5 (L) 1.6 - 2.6 mg/dL Final     Phosphorus   Date Value Ref Range Status   08/30/2021 1.8 (L) 2.5 - 4.5 mg/dL Final   08/29/2021 2.4 (L) 2.5 - 4.5 mg/dL Final   08/29/2021 3.5 2.5 - 4.5 mg/dL Final     Recent Labs     08/30/21  1314   PH 7.273*   PO2 71.6*   PCO2 34.5*   HCO3 15.6*   BE -10.3*   O2SAT 94.0       RADIOLOGY:  XR CHEST PORTABLE   Final Result   Satisfactory position of the ETT and NGT.       Otherwise, stable chest.         XR CHEST PORTABLE   Final Result   Increasing right lung opacities consistent with pneumonia. US DUP LOWER EXTREMITIES BILATERAL VENOUS   Final Result   No evidence of DVT in either lower extremity. XR CHEST PORTABLE   Final Result   1. Right lower lobe pneumonia. 2. Left lower lobe atelectasis versus pneumonia. XR CHEST PORTABLE    (Results Pending)           PROBLEM LIST:  Principal Problem:    Acute respiratory failure with hypoxia (HCC)  Active Problems:    Cerebral palsy (HCC)    Seizures ([address]    Mentally disabled    Convulsions ([address]    Pneumonia due to COVID-19 virus  Resolved Problems:    * No resolved hospital problems. *      IMPRESSION:  1. COVID-19 illness with possible pneumonitis  2. Suspect underlying bacterial pneumonia with asymmetric chest x-ray showing very dense possibly early cavitating right upper lobe infiltrate  3. Possible aspiration pneumonitis  4. Worsening lactic acidosis  5. Must rule out component of cardiogenic shock  6. Hypophosphatemia  7. 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 Stat echocardiogram that was not done yesterday  2. Change antibiotics to include vancomycin, cefepime, and Flagyl  3. Change dexamethasone to hydrocortisone for purposes of septic shock  4. IV fluids  5. Pressors if needed (Levophed has been newly weaned off as of this morning)  6. Phosphorus replacement with sodium phosphate  7. Tube feedings if possible  8. Try to wean neuromuscular paralysis tomorrow if patient is more stable    ATTESTATION:  ICU Staff Physician note of personal involvement in Care  As the attending physician, I certify that I personally reviewed the patients history and personally examined the patient to confirm the physical findings described above,  And that I reviewed the relevant imaging studies and available reports.   I also discussed the differential diagnosis and all of the proposed management plans with the patient and individuals accompanying the patient to this visit. They had the opportunity to ask questions about the proposed management plans and to have those questions answered. This patient has a high probability of sudden, clinically significant deterioration, which requires the highest level of physician preparedness to intervene urgently. I managed/supervised life or organ supporting interventions that required frequent physician assessment. I devoted my full attention to the direct care of this patient for the amount of time indicated below. Time I spent with the family or surrogate(s) is included only if the patient was incapable of providing the necessary information or participating in medical decisions  Time devoted to teaching and to any procedures I billed separately is not included.     CRITICAL CARE TIME:  39 minutes    Electronically signed by Garland León MD on 8/30/2021 at 1:23 PM

## 2021-08-30 NOTE — PLAN OF CARE
Problem: Airway Clearance - Ineffective  Goal: Achieve or maintain patent airway  Outcome: Met This Shift     Problem: Gas Exchange - Impaired  Goal: Absence of hypoxia  Outcome: Met This Shift     Problem: Isolation Precautions - Risk of Spread of Infection  Goal: Prevent transmission of infection  8/30/2021 1300 by Juan Blue RN  Outcome: Met This Shift  8/30/2021 0708 by Lavon Sutton RN  Outcome: Met This Shift     Problem: Skin Integrity:  Goal: Will show no infection signs and symptoms  Description: Will show no infection signs and symptoms  Outcome: Met This Shift  Goal: Absence of new skin breakdown  Description: Absence of new skin breakdown  Outcome: Met This Shift     Problem: Falls - Risk of:  Goal: Will remain free from falls  Description: Will remain free from falls  Outcome: Met This Shift     Problem: Nutrition Deficits  Goal: Optimize nutritional status  Outcome: Not Met This Shift

## 2021-08-30 NOTE — PROGRESS NOTES
96.5 °F (35.8 °C) (Axillary)   Resp 24   Ht 5' (1.524 m)   Wt 130 lb (59 kg)   SpO2 92%   BMI 25.39 kg/m²   Temp  Av.3 °F (36.8 °C)  Min: 96.5 °F (35.8 °C)  Max: 99.7 °F (37.6 °C)  Constitutional:  The patient is sedated   Skin:    Warm and dry   HEENT:    .  AT/NC     Chest:   Dec bs ant vent No use of accessory muscles to breathe. Symmetrical expansion. Cardiovascular:  S1 and S2 are rhythmic and regular. No murmurs appreciated. Abdomen:   Positive bowel sounds to auscultation. Benign to palpation. Extremities:   No clubbing, no cyanosis, no edema.   CNS    sedated  Lines: rij  j tube      DIAGNOSTIC RESULTS   Radiology:    Recent Labs     21  0220 21  0735 21  0511   WBC 3.6* 3.0* 14.0*   RBC 3.79* 3.75* 3.94   HGB 12.4* 12.3* 12.8   HCT 36.9* 36.4* 36.9*   MCV 97.4 97.1 93.7   MCH 32.7 32.8 32.5   MCHC 33.6 33.8 34.7*   RDW 12.6 12.8 12.4   * 131 140   MPV 11.6 11.2 10.8     Recent Labs     21  1602 21  0220 21  0511    136 134   K 3.0* 3.6 4.1   CL 99 104 99   CO2 21* 20* 19*   BUN 10 9 6   CREATININE 0.9 0.9 0.4*   GLUCOSE 147* 165* 192*   PROT 6.5 5.4* 5.0*   LABALBU 3.3* 2.9* 2.5*   CALCIUM 8.8 7.5* 7.8*   BILITOT 0.5 0.6 0.9   ALKPHOS 96 67 64   AST 49* 57* 95*   ALT 42* 37 40     Lab Results   Component Value Date    CRP 0.3 2021    CRP 8.4 (H) 2021     Lab Results   Component Value Date    SEDRATE 5 2019    SEDRATE 29 (H) 2018    SEDRATE 21 (H) 08/10/2018     Recent Labs     21  1602 21  2151 21  0220 21  0735 21  0511   CRP  --  8.4*  --  0.3  --    LDH  --   --   --   --  776*   DDIMER  --  292 420  --   --    AST 49*  --  57*  --  95*   ALT 42*  --  37  --  40     No results found for: CHOL, TRIG, HDL, LDLCALC, LABVLDL  No results found for: VITD25    Microbiology:   Recent Labs     21  1750   COVID19 DETECTED*     Lab Results   Component Value Date    BC 24 Hours no growth 08/28/2021    BLOODCULT2 24 Hours no growth 08/28/2021    ORG Gram negative marlon 08/29/2021    ORG Coagulase Negative Staphylococci 07/24/2018    ORG Corynebacterium species 07/24/2018     WOUND/ABSCESS   Date Value Ref Range Status   07/24/2018 Light growth  Final   07/24/2018 Light growth  Mixed morphologies    Final   06/29/2018   Corrected    Light growth  Too fastidious for susceptibility testing.     06/29/2018 Light growth  Corrected   06/29/2018 Moderate growth  Corrected     Smear, Respiratory   Date Value Ref Range Status   08/29/2021   Final    Group 6: <25 PMN's/LPF and <25 Epithelial cells/LPF  Rare Polymorphonuclear leukocytes  Rare Epithelial cells  Rare Gram variable rods       No results found for: MPNEUMO, CLAMYDCU, LABLEGI, AFBCX, FUNGSM, LABFUNG    MRSA Culture Only   Date Value Ref Range Status   08/29/2021 Methicillin resistant Staph aureus not isolated  Final     CULTURE, RESPIRATORY   Date Value Ref Range Status   08/29/2021 Oral Pharyngeal Adina reduced (A)  Preliminary   08/29/2021   Preliminary    Moderate growth  Identification and sensitivity to follow       Recent Labs     08/29/21  0220   ORG Gram negative marlon*     Recent Labs     08/29/21  0220   ORG Gram negative marlon*     Recent Labs     08/28/21  2252 08/29/21  0220   LABURIN Growth not present, incubation continues  --    ORG  --  Gram negative marlon*            FINAL IMPRESSION    Pt had   Chief Complaint   Patient presents with    Altered Mental Status    Respiratory Distress     41% SpO2    Admitted for Hypokalemia [E87.6]  Acute respiratory failure with hypoxia (HCC) [J96.01]  Pneumonia of right lower lobe due to infectious organism [J18.9]  Sepsis with acute hypoxic respiratory failure without septic shock, due to unspecified organism ([facility] [A41.9, R65.20, J96.01]  On treatment for above  With leukopenia/leukocytosis  Gn pneumonia   Mrsa neg     cefepime (MAXIPIME) 2000 mg IVPB minibag, Q12H  metronidazole (FLAGYL) 500 mg in NaCl 100 mL IVPB premix, Q8H  hydrocortisone sodium succinate PF (SOLU-CORTEF) injection 100 mg, Q8H  vancomycin (VANCOCIN) 1,000 mg in dextrose 5 % 250 mL IVPB, Q12H  azithromycin (ZITHROMAX) 500 mg in D5W 250ml Vial Mate, Q24H  enoxaparin (LOVENOX) injection 40 mg, BID  remdesivir 100 mg in sodium chloride 0.9 % 250 mL IVPB, Q24H  0.9 % sodium chloride bolus, PRN  norepinephrine (LEVOPHED) 16 mg in dextrose 5 % 250 mL infusion, Continuous      Check cx    · Monitor labs    Imaging and labs were reviewed per medical records. The patient was educated about the diagnosis, prognosis, indications, risks and benefits of treatment. An opportunity to ask questions was given to the patient/FAMILY. Thank you for involving me in the care of Nydia Alan will continue to follow. Please do not hesitate to call for any questions or concerns.     Electronically signed by Claudette Crazier, MD on 8/30/2021 at 5:14 PM

## 2021-08-30 NOTE — PROGRESS NOTES
Palliative Care Department  [phone]  Palliative Care Initial Consult  Provider Hola Lyle DO      PATIENT: Vinnie Villa  : 1995  MRN: [id number]  ADMISSION DATE: 2021  3:31 PM  Referring Provider: Mirna Mar MD    Palliative Medicine was consulted on hospital day 2 for assistance with Goals of care    HPI:     Raissa Lima is a 30 y.o. 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 ASSESSMENT/PLAN:     Pertinent Hospital Diagnoses      COVID-19 pneumonia   Acute hypoxic respiratory failure requiring intubation   Sepsis   Cerebral palsy    Palliative Care Encounter / Counseling Regarding Goals of Care  Please see detailed goals of care discussion as below   At this time, Vinnie Villa, Does Not have capacity for medical decision-making. Capacity is time limited and situation/question specific   Outcome of goals of care meeting: Spoke with the father. He does not want to change his CODE STATUS at this time. He would like for him to return back home.  Code status Full Code   Advanced Directives: no POA or living will in epic   Surrogate/Legal NOK:    fareed Penny, Father, [phone]  Luisa Jarquin, Mother, [phone]    Spiritual assessment: no spiritual distress identified  Bereavement and grief: to be determined  Referrals to: none today     SUBJECTIVE:     Details of Conversation: Chart reviewed. Patient seen through glass door. Spoke with patient's father Franny Felt. Father very emotional, wants patient to remain full code, is hopeful that he will recover and be able to go back home. Denies any needs at present. Gave father palliative phone number and encouraged him to call with questions/concerns.   Emotional support provided and appreciated. Prognosis: Guarded    OBJECTIVE:     BP (!) 99/55   Pulse 105   Temp 96.5 °F (35.8 °C) (Axillary)   Resp 24   Ht 5' (1.524 m)   Wt 130 lb (59 kg)   SpO2 96%   BMI 25.39 kg/m²     Physical Examination:  Due to the current efforts to prevent transmission of COVID-19 and also the need to preserve PPE for other caregivers, a face-to-face encounter with the patient was not performed. That being said, all relevant records and diagnostic tests were reviewed, including laboratory results and imaging. Please reference any relevant documentation elsewhere. Care will be coordinated with the primary service and other specialties as appropriate. Objective data reviewed: labs, images, records, medication use, vitals and chart    Time/Communication  Greater than 50% of time spent, total 15 minutes in counseling and coordination of care at the bedside regarding goals of care. Thank you for allowing Palliative Medicine to participate in the care of Swapnil Mixon.

## 2021-08-31 ENCOUNTER — APPOINTMENT (OUTPATIENT)
Dept: GENERAL RADIOLOGY | Age: 26
DRG: 870 | End: 2021-08-31
Payer: COMMERCIAL

## 2021-08-31 LAB
ALBUMIN SERPL-MCNC: 2.1 G/DL (ref 3.5–5.2)
ALP BLD-CCNC: 110 U/L (ref 40–129)
ALT SERPL-CCNC: 34 U/L (ref 0–40)
ANION GAP SERPL CALCULATED.3IONS-SCNC: 9 MMOL/L (ref 7–16)
AST SERPL-CCNC: 84 U/L (ref 0–39)
B.E.: 1.3 MMOL/L (ref -3–3)
BASOPHILS ABSOLUTE: 0 E9/L (ref 0–0.2)
BASOPHILS RELATIVE PERCENT: 0.3 % (ref 0–2)
BILIRUB SERPL-MCNC: 1.6 MG/DL (ref 0–1.2)
BUN BLDV-MCNC: 8 MG/DL (ref 6–20)
BURR CELLS: ABNORMAL
CALCIUM SERPL-MCNC: 7.7 MG/DL (ref 8.6–10.2)
CHLORIDE BLD-SCNC: 101 MMOL/L (ref 98–107)
CO2: 25 MMOL/L (ref 22–29)
COHB: 0.3 % (ref 0–1.5)
CREAT SERPL-MCNC: 0.4 MG/DL (ref 0.7–1.2)
CRITICAL: ABNORMAL
CULTURE, RESPIRATORY: ABNORMAL
CULTURE, RESPIRATORY: ABNORMAL
DATE ANALYZED: ABNORMAL
DATE OF COLLECTION: ABNORMAL
EKG ATRIAL RATE: 114 BPM
EKG P AXIS: 40 DEGREES
EKG P-R INTERVAL: 124 MS
EKG Q-T INTERVAL: 310 MS
EKG QRS DURATION: 94 MS
EKG QTC CALCULATION (BAZETT): 427 MS
EKG R AXIS: 104 DEGREES
EKG T AXIS: 21 DEGREES
EKG VENTRICULAR RATE: 114 BPM
EOSINOPHILS ABSOLUTE: 0 E9/L (ref 0.05–0.5)
EOSINOPHILS RELATIVE PERCENT: 0 % (ref 0–6)
FIO2: 40 %
GFR AFRICAN AMERICAN: >60
GFR NON-AFRICAN AMERICAN: >60 ML/MIN/1.73
GLUCOSE BLD-MCNC: 379 MG/DL (ref 74–99)
HCO3: 23.6 MMOL/L (ref 22–26)
HCT VFR BLD CALC: 31.2 % (ref 37–54)
HEMOGLOBIN: 10.9 G/DL (ref 12.5–16.5)
HHB: 7.1 % (ref 0–5)
IGA: 95 MG/DL (ref 70–400)
IGG: 717 MG/DL (ref 700–1600)
IGM: 56 MG/DL (ref 40–230)
LAB: ABNORMAL
LACTIC ACID: 3.9 MMOL/L (ref 0.5–2.2)
LACTIC ACID: 4.3 MMOL/L (ref 0.5–2.2)
LACTIC ACID: 5.2 MMOL/L (ref 0.5–2.2)
LACTIC ACID: 6 MMOL/L (ref 0.5–2.2)
LYMPHOCYTES ABSOLUTE: 0.32 E9/L (ref 1.5–4)
LYMPHOCYTES RELATIVE PERCENT: 1.7 % (ref 20–42)
Lab: ABNORMAL
MAGNESIUM: 2.2 MG/DL (ref 1.6–2.6)
MCH RBC QN AUTO: 32.4 PG (ref 26–35)
MCHC RBC AUTO-ENTMCNC: 34.9 % (ref 32–34.5)
MCV RBC AUTO: 92.9 FL (ref 80–99.9)
METER GLUCOSE: 188 MG/DL (ref 74–99)
METER GLUCOSE: 215 MG/DL (ref 74–99)
METER GLUCOSE: 294 MG/DL (ref 74–99)
METHB: 0.4 % (ref 0–1.5)
MODE: AC
MONOCYTES ABSOLUTE: 0.32 E9/L (ref 0.1–0.95)
MONOCYTES RELATIVE PERCENT: 1.7 % (ref 2–12)
NEUTROPHILS ABSOLUTE: 15.52 E9/L (ref 1.8–7.3)
NEUTROPHILS RELATIVE PERCENT: 96.5 % (ref 43–80)
O2 CONTENT: 15.2 ML/DL
O2 SATURATION: 92.8 % (ref 92–98.5)
O2HB: 92.2 % (ref 94–97)
OPERATOR ID: 2323
ORGANISM: ABNORMAL
PATIENT TEMP: 37 C
PCO2: 30.2 MMHG (ref 35–45)
PDW BLD-RTO: 12.2 FL (ref 11.5–15)
PEEP/CPAP: 10 CMH2O
PFO2: 1.42 MMHG/%
PH BLOOD GAS: 7.51 (ref 7.35–7.45)
PHOSPHORUS: 0.7 MG/DL (ref 2.5–4.5)
PLATELET # BLD: 143 E9/L (ref 130–450)
PMV BLD AUTO: 11.4 FL (ref 7–12)
PO2: 57 MMHG (ref 75–100)
POIKILOCYTES: ABNORMAL
POLYCHROMASIA: ABNORMAL
POTASSIUM SERPL-SCNC: 2.4 MMOL/L (ref 3.5–5)
RBC # BLD: 3.36 E12/L (ref 3.8–5.8)
RI(T): 3.22
RR MECHANICAL: 24 B/MIN
SCHISTOCYTES: ABNORMAL
SMEAR, RESPIRATORY: ABNORMAL
SODIUM BLD-SCNC: 135 MMOL/L (ref 132–146)
SOURCE, BLOOD GAS: ABNORMAL
SPHEROCYTES: ABNORMAL
TEAR DROP CELLS: ABNORMAL
THB: 11.7 G/DL (ref 11.5–16.5)
TIME ANALYZED: 507
TOTAL PROTEIN: 4.2 G/DL (ref 6.4–8.3)
URINE CULTURE, ROUTINE: NORMAL
VT MECHANICAL: 380 ML
WBC # BLD: 16 E9/L (ref 4.5–11.5)

## 2021-08-31 PROCEDURE — 36592 COLLECT BLOOD FROM PICC: CPT

## 2021-08-31 PROCEDURE — 94640 AIRWAY INHALATION TREATMENT: CPT

## 2021-08-31 PROCEDURE — 6360000002 HC RX W HCPCS

## 2021-08-31 PROCEDURE — 2580000003 HC RX 258: Performed by: INTERNAL MEDICINE

## 2021-08-31 PROCEDURE — 83735 ASSAY OF MAGNESIUM: CPT

## 2021-08-31 PROCEDURE — C9113 INJ PANTOPRAZOLE SODIUM, VIA: HCPCS

## 2021-08-31 PROCEDURE — 2500000003 HC RX 250 WO HCPCS: Performed by: INTERNAL MEDICINE

## 2021-08-31 PROCEDURE — 6370000000 HC RX 637 (ALT 250 FOR IP)

## 2021-08-31 PROCEDURE — 6360000002 HC RX W HCPCS: Performed by: EMERGENCY MEDICINE

## 2021-08-31 PROCEDURE — 2500000003 HC RX 250 WO HCPCS: Performed by: NURSE PRACTITIONER

## 2021-08-31 PROCEDURE — 2580000003 HC RX 258

## 2021-08-31 PROCEDURE — 99232 SBSQ HOSP IP/OBS MODERATE 35: CPT | Performed by: STUDENT IN AN ORGANIZED HEALTH CARE EDUCATION/TRAINING PROGRAM

## 2021-08-31 PROCEDURE — 94003 VENT MGMT INPAT SUBQ DAY: CPT

## 2021-08-31 PROCEDURE — 6360000002 HC RX W HCPCS: Performed by: INTERNAL MEDICINE

## 2021-08-31 PROCEDURE — 80053 COMPREHEN METABOLIC PANEL: CPT

## 2021-08-31 PROCEDURE — 2500000003 HC RX 250 WO HCPCS: Performed by: EMERGENCY MEDICINE

## 2021-08-31 PROCEDURE — 2580000003 HC RX 258: Performed by: EMERGENCY MEDICINE

## 2021-08-31 PROCEDURE — 6370000000 HC RX 637 (ALT 250 FOR IP): Performed by: INTERNAL MEDICINE

## 2021-08-31 PROCEDURE — 82962 GLUCOSE BLOOD TEST: CPT

## 2021-08-31 PROCEDURE — 84100 ASSAY OF PHOSPHORUS: CPT

## 2021-08-31 PROCEDURE — 6360000002 HC RX W HCPCS: Performed by: NURSE PRACTITIONER

## 2021-08-31 PROCEDURE — 37799 UNLISTED PX VASCULAR SURGERY: CPT

## 2021-08-31 PROCEDURE — 2000000000 HC ICU R&B

## 2021-08-31 PROCEDURE — 83605 ASSAY OF LACTIC ACID: CPT

## 2021-08-31 PROCEDURE — 82805 BLOOD GASES W/O2 SATURATION: CPT

## 2021-08-31 PROCEDURE — 2580000003 HC RX 258: Performed by: NURSE PRACTITIONER

## 2021-08-31 PROCEDURE — 71045 X-RAY EXAM CHEST 1 VIEW: CPT

## 2021-08-31 PROCEDURE — 85025 COMPLETE CBC W/AUTO DIFF WBC: CPT

## 2021-08-31 RX ORDER — SODIUM CHLORIDE, SODIUM LACTATE, POTASSIUM CHLORIDE, CALCIUM CHLORIDE 600; 310; 30; 20 MG/100ML; MG/100ML; MG/100ML; MG/100ML
INJECTION, SOLUTION INTRAVENOUS CONTINUOUS
Status: DISCONTINUED | OUTPATIENT
Start: 2021-08-31 | End: 2021-09-04

## 2021-08-31 RX ORDER — POTASSIUM CHLORIDE 29.8 MG/ML
20 INJECTION INTRAVENOUS
Status: COMPLETED | OUTPATIENT
Start: 2021-08-31 | End: 2021-08-31

## 2021-08-31 RX ADMIN — POLYVINYL ALCOHOL 1 DROP: 14 SOLUTION/ DROPS OPHTHALMIC at 09:55

## 2021-08-31 RX ADMIN — IPRATROPIUM BROMIDE AND ALBUTEROL SULFATE 1 AMPULE: .5; 3 SOLUTION RESPIRATORY (INHALATION) at 09:53

## 2021-08-31 RX ADMIN — Medication 10 ML: at 09:00

## 2021-08-31 RX ADMIN — Medication 10 ML: at 20:17

## 2021-08-31 RX ADMIN — IPRATROPIUM BROMIDE AND ALBUTEROL SULFATE 1 AMPULE: .5; 3 SOLUTION RESPIRATORY (INHALATION) at 04:56

## 2021-08-31 RX ADMIN — ENOXAPARIN SODIUM 40 MG: 40 INJECTION SUBCUTANEOUS at 20:16

## 2021-08-31 RX ADMIN — PROPOFOL 20 MCG/KG/MIN: 10 INJECTION, EMULSION INTRAVENOUS at 09:51

## 2021-08-31 RX ADMIN — POLYVINYL ALCOHOL 1 DROP: 14 SOLUTION/ DROPS OPHTHALMIC at 16:00

## 2021-08-31 RX ADMIN — POTASSIUM PHOSPHATE, MONOBASIC POTASSIUM PHOSPHATE, DIBASIC 30 MMOL: 224; 236 INJECTION, SOLUTION, CONCENTRATE INTRAVENOUS at 06:30

## 2021-08-31 RX ADMIN — HYDROCORTISONE SODIUM SUCCINATE 100 MG: 100 INJECTION, POWDER, FOR SOLUTION INTRAMUSCULAR; INTRAVENOUS at 05:06

## 2021-08-31 RX ADMIN — LAMOTRIGINE 200 MG: 100 TABLET ORAL at 08:58

## 2021-08-31 RX ADMIN — ZONISAMIDE 300 MG: 100 CAPSULE ORAL at 20:33

## 2021-08-31 RX ADMIN — SODIUM CHLORIDE, PRESERVATIVE FREE 10 ML: 5 INJECTION INTRAVENOUS at 08:59

## 2021-08-31 RX ADMIN — CISATRACURIUM BESYLATE 1.5 MCG/KG/MIN: 10 INJECTION, SOLUTION INTRAVENOUS at 13:00

## 2021-08-31 RX ADMIN — VANCOMYCIN HYDROCHLORIDE 1000 MG: 1 INJECTION, POWDER, LYOPHILIZED, FOR SOLUTION INTRAVENOUS at 02:35

## 2021-08-31 RX ADMIN — PANTOPRAZOLE SODIUM 40 MG: 40 INJECTION, POWDER, FOR SOLUTION INTRAVENOUS at 08:59

## 2021-08-31 RX ADMIN — POTASSIUM CHLORIDE 20 MEQ: 29.8 INJECTION, SOLUTION INTRAVENOUS at 08:58

## 2021-08-31 RX ADMIN — CEFEPIME HYDROCHLORIDE 2000 MG: 2 INJECTION, POWDER, FOR SOLUTION INTRAVENOUS at 13:17

## 2021-08-31 RX ADMIN — SODIUM BICARBONATE 100 ML/HR: 84 INJECTION, SOLUTION INTRAVENOUS at 00:09

## 2021-08-31 RX ADMIN — IPRATROPIUM BROMIDE AND ALBUTEROL SULFATE 1 AMPULE: .5; 3 SOLUTION RESPIRATORY (INHALATION) at 18:53

## 2021-08-31 RX ADMIN — IPRATROPIUM BROMIDE AND ALBUTEROL SULFATE 1 AMPULE: .5; 3 SOLUTION RESPIRATORY (INHALATION) at 13:33

## 2021-08-31 RX ADMIN — POTASSIUM CHLORIDE 20 MEQ: 29.8 INJECTION, SOLUTION INTRAVENOUS at 13:09

## 2021-08-31 RX ADMIN — LACOSAMIDE 150 MG: 100 TABLET, FILM COATED ORAL at 08:58

## 2021-08-31 RX ADMIN — POLYVINYL ALCOHOL 1 DROP: 14 SOLUTION/ DROPS OPHTHALMIC at 05:08

## 2021-08-31 RX ADMIN — HYDROCORTISONE SODIUM SUCCINATE 50 MG: 100 INJECTION, POWDER, FOR SOLUTION INTRAMUSCULAR; INTRAVENOUS at 14:30

## 2021-08-31 RX ADMIN — BACLOFEN 10 MG: 10 TABLET ORAL at 08:58

## 2021-08-31 RX ADMIN — POTASSIUM CHLORIDE 20 MEQ: 29.8 INJECTION, SOLUTION INTRAVENOUS at 07:46

## 2021-08-31 RX ADMIN — AZITHROMYCIN DIHYDRATE 500 MG: 500 INJECTION, POWDER, LYOPHILIZED, FOR SOLUTION INTRAVENOUS at 02:35

## 2021-08-31 RX ADMIN — SODIUM CHLORIDE 25 ML: 9 INJECTION, SOLUTION INTRAVENOUS at 17:30

## 2021-08-31 RX ADMIN — METRONIDAZOLE 500 MG: 500 INJECTION, SOLUTION INTRAVENOUS at 13:09

## 2021-08-31 RX ADMIN — CEFEPIME HYDROCHLORIDE 2000 MG: 2 INJECTION, POWDER, FOR SOLUTION INTRAVENOUS at 00:15

## 2021-08-31 RX ADMIN — INSULIN LISPRO 9 UNITS: 100 INJECTION, SOLUTION INTRAVENOUS; SUBCUTANEOUS at 18:35

## 2021-08-31 RX ADMIN — Medication 65 MCG/HR: at 10:20

## 2021-08-31 RX ADMIN — LAMOTRIGINE 200 MG: 100 TABLET ORAL at 14:30

## 2021-08-31 RX ADMIN — BACLOFEN 10 MG: 10 TABLET ORAL at 20:33

## 2021-08-31 RX ADMIN — LACOSAMIDE 150 MG: 100 TABLET, FILM COATED ORAL at 20:33

## 2021-08-31 RX ADMIN — SODIUM CHLORIDE, PRESERVATIVE FREE 10 ML: 5 INJECTION INTRAVENOUS at 02:35

## 2021-08-31 RX ADMIN — POTASSIUM CHLORIDE 20 MEQ: 29.8 INJECTION, SOLUTION INTRAVENOUS at 11:32

## 2021-08-31 RX ADMIN — POLYVINYL ALCOHOL 1 DROP: 14 SOLUTION/ DROPS OPHTHALMIC at 23:02

## 2021-08-31 RX ADMIN — SODIUM CHLORIDE, POTASSIUM CHLORIDE, SODIUM LACTATE AND CALCIUM CHLORIDE: 600; 310; 30; 20 INJECTION, SOLUTION INTRAVENOUS at 06:59

## 2021-08-31 RX ADMIN — ENOXAPARIN SODIUM 40 MG: 40 INJECTION SUBCUTANEOUS at 08:59

## 2021-08-31 RX ADMIN — SODIUM CHLORIDE 25 ML: 9 INJECTION, SOLUTION INTRAVENOUS at 04:15

## 2021-08-31 RX ADMIN — PROPOFOL 20 MCG/KG/MIN: 10 INJECTION, EMULSION INTRAVENOUS at 00:09

## 2021-08-31 RX ADMIN — INSULIN LISPRO 6 UNITS: 100 INJECTION, SOLUTION INTRAVENOUS; SUBCUTANEOUS at 11:44

## 2021-08-31 RX ADMIN — POTASSIUM CHLORIDE 20 MEQ: 29.8 INJECTION, SOLUTION INTRAVENOUS at 14:15

## 2021-08-31 RX ADMIN — BACLOFEN 10 MG: 10 TABLET ORAL at 14:30

## 2021-08-31 RX ADMIN — HYDROCORTISONE SODIUM SUCCINATE 50 MG: 100 INJECTION, POWDER, FOR SOLUTION INTRAMUSCULAR; INTRAVENOUS at 22:45

## 2021-08-31 RX ADMIN — REMDESIVIR 100 MG: 100 INJECTION, POWDER, LYOPHILIZED, FOR SOLUTION INTRAVENOUS at 20:16

## 2021-08-31 RX ADMIN — INSULIN LISPRO 3 UNITS: 100 INJECTION, SOLUTION INTRAVENOUS; SUBCUTANEOUS at 23:04

## 2021-08-31 RX ADMIN — METRONIDAZOLE 500 MG: 500 INJECTION, SOLUTION INTRAVENOUS at 20:33

## 2021-08-31 RX ADMIN — SODIUM CHLORIDE, POTASSIUM CHLORIDE, SODIUM LACTATE AND CALCIUM CHLORIDE: 600; 310; 30; 20 INJECTION, SOLUTION INTRAVENOUS at 18:54

## 2021-08-31 RX ADMIN — LAMOTRIGINE 200 MG: 100 TABLET ORAL at 20:33

## 2021-08-31 RX ADMIN — METRONIDAZOLE 500 MG: 500 INJECTION, SOLUTION INTRAVENOUS at 05:06

## 2021-08-31 ASSESSMENT — PULMONARY FUNCTION TESTS
PIF_VALUE: 29
PIF_VALUE: 30
PIF_VALUE: 30
PIF_VALUE: 29
PIF_VALUE: 28
PIF_VALUE: 30
PIF_VALUE: 29
PIF_VALUE: 30
PIF_VALUE: 29
PIF_VALUE: 30
PIF_VALUE: 31
PIF_VALUE: 30
PIF_VALUE: 30
PIF_VALUE: 29
PIF_VALUE: 29
PIF_VALUE: 30
PIF_VALUE: 26
PIF_VALUE: 30
PIF_VALUE: 29
PIF_VALUE: 29
PIF_VALUE: 30

## 2021-08-31 ASSESSMENT — PAIN SCALES - GENERAL: PAINLEVEL_OUTOF10: 0

## 2021-08-31 NOTE — PLAN OF CARE
Problem: Airway Clearance - Ineffective  Goal: Achieve or maintain patent airway  Outcome: Met This Shift     Problem: Breathing Pattern - Ineffective  Goal: Ability to achieve and maintain a regular respiratory rate  Outcome: Met This Shift     Problem: Falls - Risk of:  Goal: Will remain free from falls  Description: Will remain free from falls  Outcome: Met This Shift  Goal: Absence of physical injury  Description: Absence of physical injury  Outcome: Met This Shift     Problem: Gas Exchange - Impaired  Goal: Absence of hypoxia  Outcome: Not Met This Shift

## 2021-08-31 NOTE — CARE COORDINATION
8/31/21 Positive covid 98/28/21) unvaccinated. Cm transition of care: icu/vent (8/28/21)/sedation/paralytic/ art line. fio2 at 50%, peep 10. Palliative care on consult. Pt (has Cerebral palsy) lives at home with both parents in two story home, has rale system, is wc bound and has all supplies he needs at this time. Pt has private duty nursing care. Father will provide a ride with a special van at discharge. Watch for oxygen needs and HHC needs at discharge.  Electronically signed by Bruce Richmond RN-BC on 8/31/2021 at 12:56 PM

## 2021-08-31 NOTE — PROGRESS NOTES
Chart reviewed, patient remains paralyzed/sedated. Weaned off pressor. FiO2 up to 50% this morning. Spoke with patient's father yesterday who confirmed that family wants patient to remain full code and everything possible done to extend his life. No acute PM need today.     [address]

## 2021-08-31 NOTE — PROGRESS NOTES
Pulmonary/Critical Care Progress Note    We are following patient for COVID-19 illness plus likely right upper lobe pneumonia, acute respiratory failure, question aspiration, cerebral palsy, septic shock with now improving lactic acid levels, question cardiac disorder, electrolyte disorders such as hypokalemia    SUBJECTIVE:  Patient's acidosis has resolved and he now has a respiratory alkalosis with normal bicarbonate level. Therefore, we will discontinue the bicarbonate drip and place him back on lactated Ringer's solution. The patient has no history of diabetes but his blood sugars are quite high now on hydrocortisone given for septic shock. Therefore, he will need to be treated with insulin coverage and we will reduce the IV steroids as well. Hemoglobin A1c will be obtained. He is tolerating tube feedings without difficulty. The chest x-ray shows clearing in the left side but there is still considerable lung infiltrate on the right. Nevertheless, we will continue to decrease the patient's fraction of inspired oxygen and continue antibiotics including cefepime, azithromycin, and metronidazole. Klebsiella is now growing in the patient's sputum which is sensitive to cefepime. Because it will be important to still cover for anaerobic organisms, we will continue metronidazole but we can discontinue azithromycin.     MEDICATIONS:   hydrocortisone sodium succinate PF  50 mg IntraVENous Q8H    insulin lispro  0-18 Units SubCUTAneous Q6H    potassium chloride  20 mEq IntraVENous Q1H    cefepime  2,000 mg IntraVENous Q12H    metroNIDAZOLE  500 mg IntraVENous Q8H    sodium chloride flush  5-40 mL IntraVENous 2 times per day    ipratropium-albuterol  1 ampule Inhalation 4x daily    azithromycin  500 mg IntraVENous Q24H    pantoprazole  40 mg IntraVENous Daily    And    sodium chloride (PF)  10 mL IntraVENous Daily    lacosamide  150 mg Oral BID    lamoTRIgine  200 mg Oral TID    zonisamide  300 mg Oral Nightly    baclofen  10 mg Oral TID    enoxaparin  40 mg SubCUTAneous BID    polyvinyl alcohol  1 drop Both Eyes Q6H    remdesivir IVPB  100 mg IntraVENous Q24H      lactated ringers 100 mL/hr at 08/31/21 0659    sodium chloride Stopped (08/31/21 0615)    sodium chloride      fentaNYL 5 mcg/ml in 0.9%  ml infusion 65 mcg/hr (08/31/21 1020)    propofol 15 mcg/kg/min (08/31/21 0953)    cisatracurium (NIMBEX) infusion 1.5 mcg/kg/min (08/31/21 0900)    norepinephrine Stopped (08/30/21 0800)     sodium chloride flush, sodium chloride, ondansetron **OR** ondansetron, polyethylene glycol, acetaminophen **OR** acetaminophen, perflutren lipid microspheres, fentanNYL, sodium chloride      REVIEW OF SYSTEMS:  Constitutional: Denies fever, weight loss, night sweats, and fatigue  Skin: Denies pigmentation, dark lesions, and rashes   HEENT: Denies hearing loss, tinnitus, ear drainage, epistaxis, sore throat, and hoarseness. Cardiovascular: Denies palpitations, chest pain, and chest pressure. Respiratory: Denies cough, dyspnea at rest, hemoptysis, apnea, and choking. Gastrointestinal: Denies nausea, vomiting, poor appetite, diarrhea, heartburn or reflux  Genitourinary: Denies dysuria, frequency, urgency or hematuria  Musculoskeletal: Denies myalgias, muscle weakness, and bone pain  Neurological: Denies dizziness, vertigo, headache, and focal weakness  Psychological: Denies anxiety and depression  Endocrine: Denies heat intolerance and cold intolerance  Hematopoietic/Lymphatic: Denies bleeding problems and blood transfusions    OBJECTIVE:  Vitals:    08/31/21 1100   BP:    Pulse: 103   Resp: 20   Temp:    SpO2: 92%     FiO2 : 50 %  O2 Flow Rate (L/min): 60 L/min  O2 Device: Ventilator    PHYSICAL EXAM:  Constitutional: No fever, chills, diaphoresis  Skin: No rash, no skin breakdown  HEENT: Endotracheal tube secured at lips  Neck: No JVD, lymphadenopathy, thyromegaly  Cardiovascular: S1, S2 regular.   No S3 murmurs rubs present  Respiratory: Coarse breath sounds bilaterally.   More on the right than the left  Gastrointestinal: Soft, flat, nontender  Genitourinary: No CVA tenderness obvious no grossly bloody urine  Extremities: No clubbing, cyanosis, or edema  Neurological: Sedated  Psychological: Sedated    LABS:  WBC   Date Value Ref Range Status   08/31/2021 16.0 (H) 4.5 - 11.5 E9/L Final   08/30/2021 14.0 (H) 4.5 - 11.5 E9/L Final   08/29/2021 3.0 (L) 4.5 - 11.5 E9/L Final     Hemoglobin   Date Value Ref Range Status   08/31/2021 10.9 (L) 12.5 - 16.5 g/dL Final   08/30/2021 12.8 12.5 - 16.5 g/dL Final   08/29/2021 12.3 (L) 12.5 - 16.5 g/dL Final     Hematocrit   Date Value Ref Range Status   08/31/2021 31.2 (L) 37.0 - 54.0 % Final   08/30/2021 36.9 (L) 37.0 - 54.0 % Final   08/29/2021 36.4 (L) 37.0 - 54.0 % Final     MCV   Date Value Ref Range Status   08/31/2021 92.9 80.0 - 99.9 fL Final   08/30/2021 93.7 80.0 - 99.9 fL Final   08/29/2021 97.1 80.0 - 99.9 fL Final     Platelets   Date Value Ref Range Status   08/31/2021 143 130 - 450 E9/L Final   08/30/2021 140 130 - 450 E9/L Final   08/29/2021 131 130 - 450 E9/L Final     Sodium   Date Value Ref Range Status   08/31/2021 135 132 - 146 mmol/L Final   08/30/2021 134 132 - 146 mmol/L Final   08/29/2021 136 132 - 146 mmol/L Final     Potassium   Date Value Ref Range Status   08/31/2021 2.4 (LL) 3.5 - 5.0 mmol/L Final   08/30/2021 4.1 3.5 - 5.0 mmol/L Final   08/29/2021 3.6 3.5 - 5.0 mmol/L Final     Chloride   Date Value Ref Range Status   08/31/2021 101 98 - 107 mmol/L Final   08/30/2021 99 98 - 107 mmol/L Final   08/29/2021 104 98 - 107 mmol/L Final     CO2   Date Value Ref Range Status   08/31/2021 25 22 - 29 mmol/L Final   08/30/2021 19 (L) 22 - 29 mmol/L Final   08/29/2021 20 (L) 22 - 29 mmol/L Final     BUN   Date Value Ref Range Status   08/31/2021 8 6 - 20 mg/dL Final   08/30/2021 6 6 - 20 mg/dL Final   08/29/2021 9 6 - 20 mg/dL Final     CREATININE   Date Value Ref Range Status   08/31/2021 0.4 (L) 0.7 - 1.2 mg/dL Final   08/30/2021 0.4 (L) 0.7 - 1.2 mg/dL Final   08/29/2021 0.9 0.7 - 1.2 mg/dL Final     Glucose   Date Value Ref Range Status   08/31/2021 379 (H) 74 - 99 mg/dL Final   08/30/2021 192 (H) 74 - 99 mg/dL Final   08/29/2021 165 (H) 74 - 99 mg/dL Final     Calcium   Date Value Ref Range Status   08/31/2021 7.7 (L) 8.6 - 10.2 mg/dL Final   08/30/2021 7.8 (L) 8.6 - 10.2 mg/dL Final   08/29/2021 7.5 (L) 8.6 - 10.2 mg/dL Final     Total Protein   Date Value Ref Range Status   08/31/2021 4.2 (L) 6.4 - 8.3 g/dL Final   08/30/2021 5.0 (L) 6.4 - 8.3 g/dL Final   08/29/2021 5.4 (L) 6.4 - 8.3 g/dL Final     Albumin   Date Value Ref Range Status   08/31/2021 2.1 (L) 3.5 - 5.2 g/dL Final   08/30/2021 2.5 (L) 3.5 - 5.2 g/dL Final   08/29/2021 2.9 (L) 3.5 - 5.2 g/dL Final     Total Bilirubin   Date Value Ref Range Status   08/31/2021 1.6 (H) 0.0 - 1.2 mg/dL Final   08/30/2021 0.9 0.0 - 1.2 mg/dL Final   08/29/2021 0.6 0.0 - 1.2 mg/dL Final     Alkaline Phosphatase   Date Value Ref Range Status   08/31/2021 110 40 - 129 U/L Final   08/30/2021 64 40 - 129 U/L Final   08/29/2021 67 40 - 129 U/L Final     AST   Date Value Ref Range Status   08/31/2021 84 (H) 0 - 39 U/L Final   08/30/2021 95 (H) 0 - 39 U/L Final   08/29/2021 57 (H) 0 - 39 U/L Final     ALT   Date Value Ref Range Status   08/31/2021 34 0 - 40 U/L Final   08/30/2021 40 0 - 40 U/L Final   08/29/2021 37 0 - 40 U/L Final     GFR Non-   Date Value Ref Range Status   08/31/2021 >60 >=60 mL/min/1.73 Final     Comment:     Chronic Kidney Disease: less than 60 ml/min/1.73 sq.m. Kidney Failure: less than 15 ml/min/1.73 sq.m. Results valid for patients 18 years and older. 08/30/2021 >60 >=60 mL/min/1.73 Final     Comment:     Chronic Kidney Disease: less than 60 ml/min/1.73 sq.m. Kidney Failure: less than 15 ml/min/1.73 sq.m. Results valid for patients 18 years and older. 08/29/2021 >60 >=60 mL/min/1.73 Final     Comment:     Chronic Kidney Disease: less than 60 ml/min/1.73 sq.m. Kidney Failure: less than 15 ml/min/1.73 sq.m. Results valid for patients 18 years and older. GFR    Date Value Ref Range Status   08/31/2021 >60  Final   08/30/2021 >60  Final   08/29/2021 >60  Final     Magnesium   Date Value Ref Range Status   08/31/2021 2.2 1.6 - 2.6 mg/dL Final   08/30/2021 2.2 1.6 - 2.6 mg/dL Final   08/29/2021 2.5 1.6 - 2.6 mg/dL Final     Phosphorus   Date Value Ref Range Status   08/31/2021 0.7 (LL) 2.5 - 4.5 mg/dL Final   08/30/2021 1.8 (L) 2.5 - 4.5 mg/dL Final   08/29/2021 2.4 (L) 2.5 - 4.5 mg/dL Final     Recent Labs     08/31/21  0507   PH 7.511*   PO2 57.0*   PCO2 30.2*   HCO3 23.6   BE 1.3   O2SAT 92.8       RADIOLOGY:  XR CHEST PORTABLE   Final Result   Significant bilateral infiltrates with partial interval clearing of the left   lower lobe infiltrate since the prior study         XR CHEST PORTABLE   Final Result   Satisfactory position of the ETT and NGT. Otherwise, stable chest.         XR CHEST PORTABLE   Final Result   Increasing right lung opacities consistent with pneumonia. US DUP LOWER EXTREMITIES BILATERAL VENOUS   Final Result   No evidence of DVT in either lower extremity. XR CHEST PORTABLE   Final Result   1. Right lower lobe pneumonia. 2. Left lower lobe atelectasis versus pneumonia. XR CHEST PORTABLE    (Results Pending)           PROBLEM LIST:  Principal Problem:    Acute respiratory failure with hypoxia (HCC)  Active Problems:    Cerebral palsy (HCC)    Seizures ([address]    Mentally disabled    Convulsions ([address]    Pneumonia due to COVID-19 virus  Resolved Problems:    * No resolved hospital problems. *      IMPRESSION:  1. Acute respiratory failure  2. Possible aspiration or other bacterial pneumonia  3. COVID-19 illness with possible COVID-19 pneumonitis  4. Improved lactic acidosis  5.  Normal cardiac function  6. Hypokalemia, likely related to improvement of acidosis  7. Hypophosphatemia, corrected  8. History of seizure disorder secondary to cerebral palsy  9. Diabetes mellitus, new diagnosis likely type II    PLAN:  1. Discontinue bicarbonate drip and use lactated Ringer's  2. Will need insulin coverage  3. Decrease FiO2 as able  4. Check hemoglobin A1c  5. Discontinue neuromuscular paralysis  6. Continue aerosolized bronchodilators  7. Continue IV antibiotics (metronidazole, azithromycin, cefepime)      ATTESTATION:  ICU Staff Physician note of personal involvement in Care  As the attending physician, I certify that I personally reviewed the patients history and personally examined the patient to confirm the physical findings described above,  And that I reviewed the relevant imaging studies and available reports. I also discussed the differential diagnosis and all of the proposed management plans with the patient and individuals accompanying the patient to this visit. They had the opportunity to ask questions about the proposed management plans and to have those questions answered. This patient has a high probability of sudden, clinically significant deterioration, which requires the highest level of physician preparedness to intervene urgently. I managed/supervised life or organ supporting interventions that required frequent physician assessment. I devoted my full attention to the direct care of this patient for the amount of time indicated below. Time I spent with the family or surrogate(s) is included only if the patient was incapable of providing the necessary information or participating in medical decisions  Time devoted to teaching and to any procedures I billed separately is not included.     CRITICAL CARE TIME:  33 minutes    Electronically signed by Miles Alexandre MD on 8/31/2021 at 1:30 PM

## 2021-08-31 NOTE — PROGRESS NOTES
[facility]  [facility]  Progress Note    NAME: Nallely Roque  MR:  [id number]  :   1995  DATE OF SERVICE:21    This is a face to face encounter with Nallely Roque 30 y.o. male on 21    CHIEF COMPLAINT     ID following for   Chief Complaint   Patient presents with    Altered Mental Status    Respiratory Distress     41% SpO2     HISTORY OF PRESENT ILLNESS   Pt seen   21   No physical  examination today due to the efforts to preserve PPE and limit transmission/exposure to COVID-19. All relevant records and diagnostic tests were reviewed, including laboratory results and imaging. Please reference any relevant documentation elsewhere. Care will be coordinated with patient's care team.    On vent supine  Spoke with nurse  paralyzed    Patient is tolerating medications. No reported adverse drug reactions. REVIEW OF SYSTEMS     As stated above in the chief complaint, otherwise negative.   CURRENT MEDICATIONS      potassium chloride  20 mEq IntraVENous Q2H    potassium phosphate IVPB  30 mmol IntraVENous Once    cefepime  2,000 mg IntraVENous Q12H    metroNIDAZOLE  500 mg IntraVENous Q8H    hydrocortisone sodium succinate PF  100 mg IntraVENous Q8H    vancomycin  15 mg/kg IntraVENous Q12H    sodium chloride flush  5-40 mL IntraVENous 2 times per day    ipratropium-albuterol  1 ampule Inhalation 4x daily    azithromycin  500 mg IntraVENous Q24H    pantoprazole  40 mg IntraVENous Daily    And    sodium chloride (PF)  10 mL IntraVENous Daily    lacosamide  150 mg Oral BID    lamoTRIgine  200 mg Oral TID    zonisamide  300 mg Oral Nightly    baclofen  10 mg Oral TID    enoxaparin  40 mg SubCUTAneous BID    polyvinyl alcohol  1 drop Both Eyes Q6H    remdesivir IVPB  100 mg IntraVENous Q24H     Continuous Infusions:   lactated ringers 100 mL/hr at 21 0659    sodium chloride Stopped (21 0615)    sodium chloride      fentaNYL 5 mcg/ml in 0.9%  ml infusion 40 mcg/hr (21 1600)    propofol 20 mcg/kg/min (21 0009)    cisatracurium (NIMBEX) infusion 2 mcg/kg/min (21 08)    norepinephrine Stopped (21 08)     PRN Meds:sodium chloride flush, sodium chloride, ondansetron **OR** ondansetron, polyethylene glycol, acetaminophen **OR** acetaminophen, perflutren lipid microspheres, fentanNYL, sodium chloride    PHYSICAL EXAM     BP (!) 103/53   Pulse 94   Temp 97 °F (36.1 °C) (Esophageal)   Resp 20   Ht 5' (1.524 m)   Wt 130 lb (59 kg)   SpO2 91%   BMI 25.39 kg/m²   Temp  Av.4 °F (36.3 °C)  Min: 96.5 °F (35.8 °C)  Max: 97.9 °F (36.6 °C)  Constitutional:  The patient is sedated  vent     Lines: [facility]  j tube      DIAGNOSTIC RESULTS   Radiology:    Recent Labs     21  0735 21  0511 21  0503   WBC 3.0* 14.0* 16.0*   RBC 3.75* 3.94 3.36*   HGB 12.3* 12.8 10.9*   HCT 36.4* 36.9* 31.2*   MCV 97.1 93.7 92.9   MCH 32.8 32.5 32.4   MCHC 33.8 34.7* 34.9*   RDW 12.8 12.4 12.2    140 143   MPV 11.2 10.8 11.4     Recent Labs     21  0220 21  0511 21  0503    134 135   K 3.6 4.1 2.4*    99 101   CO2 20* 19* 25   BUN 9 6 8   CREATININE 0.9 0.4* 0.4*   GLUCOSE 165* 192* 379*   PROT 5.4* 5.0* 4.2*   LABALBU 2.9* 2.5* 2.1*   CALCIUM 7.5* 7.8* 7.7*   BILITOT 0.6 0.9 1.6*   ALKPHOS 67 64 110   AST 57* 95* 84*   ALT 37 40 34     Lab Results   Component Value Date    CRP 0.3 2021    CRP 8.4 (H) 2021     Lab Results   Component Value Date    SEDRATE 5 2019    SEDRATE 29 (H) 2018    SEDRATE 21 (H) 08/10/2018     Recent Labs     21  1602 21  2151 21  0220 21  0735 21  0511 21  0503   CRP  --  8.4*  --  0.3  --   --    LDH  --   --   --   --  776*  --    DDIMER  --  292 420  --   --   --    AST   < >  --  57*  --  95* 84*   ALT   < >  --  37  --  40 34    < > = values in this interval not displayed.         Microbiology: Recent Labs     08/28/21  1750   COVID19 DETECTED*     Lab Results   Component Value Date    BC 24 Hours no growth 08/28/2021    BLOODCULT2 24 Hours no growth 08/28/2021    ORG Gram negative marlon 08/29/2021    ORG Coagulase Negative Staphylococci 07/24/2018    ORG Corynebacterium species 07/24/2018     WOUND/ABSCESS   Date Value Ref Range Status   07/24/2018 Light growth  Final   07/24/2018 Light growth  Mixed morphologies    Final   06/29/2018   Corrected    Light growth  Too fastidious for susceptibility testing.     06/29/2018 Light growth  Corrected   06/29/2018 Moderate growth  Corrected     Smear, Respiratory   Date Value Ref Range Status   08/29/2021   Final    Group 6: <25 PMN's/LPF and <25 Epithelial cells/LPF  Rare Polymorphonuclear leukocytes  Rare Epithelial cells  Rare Gram variable rods       No results found for: MPNEUMO, CLAMYDCU, LABLEGI, AFBCX, FUNGSM, LABFUNG    MRSA Culture Only   Date Value Ref Range Status   08/29/2021 Methicillin resistant Staph aureus not isolated  Final     CULTURE, RESPIRATORY   Date Value Ref Range Status   08/29/2021 Oral Pharyngeal Adina reduced (A)  Preliminary   08/29/2021   Preliminary    Moderate growth  Identification and sensitivity to follow       Recent Labs     08/29/21  0220   ORG Gram negative marlon*     Recent Labs     08/29/21  0220   ORG Gram negative marlon*         FINAL IMPRESSION    Pt had   Chief Complaint   Patient presents with    Altered Mental Status    Respiratory Distress     41% SpO2    Admitted for Hypokalemia [E87.6]  Acute respiratory failure with hypoxia (HCC) [J96.01]  Pneumonia of right lower lobe due to infectious organism [J18.9]  Sepsis with acute hypoxic respiratory failure without septic shock, due to unspecified organism ([facility] [address] [A41.9, R65.20, J96.01]  On treatment for above  With leukopenia/leukocytosis  Gn pneumonia check final cx  Mrsa neg     cefepime (MAXIPIME) 2000 mg IVPB minibag, Q12H  metronidazole (FLAGYL) 500 mg in NaCl 100

## 2021-08-31 NOTE — PROGRESS NOTES
[address] [place], [place] [address]ist   Progress Note    Admitting Date and Time: 8/28/2021  3:31 PM  Admit Dx: Hypokalemia [E87.6]  Acute respiratory failure with hypoxia ([facility] [address] [J96.01]  Pneumonia of right lower lobe due to infectious organism [J18.9]  Sepsis with acute hypoxic respiratory failure without septic shock, due to unspecified organism ([facility] [address] [A41.9, R65.20, J96.01]    Subjective:    Patient is currently intubated and sedated so unable to conduct ROS. Per RN: Patient starting to improve today, planning to stop paralytics.        hydrocortisone sodium succinate PF  50 mg IntraVENous Q8H    insulin lispro  0-18 Units SubCUTAneous Q6H    potassium chloride  20 mEq IntraVENous Q1H    cefepime  2,000 mg IntraVENous Q12H    metroNIDAZOLE  500 mg IntraVENous Q8H    sodium chloride flush  5-40 mL IntraVENous 2 times per day    ipratropium-albuterol  1 ampule Inhalation 4x daily    pantoprazole  40 mg IntraVENous Daily    And    sodium chloride (PF)  10 mL IntraVENous Daily    lacosamide  150 mg Oral BID    lamoTRIgine  200 mg Oral TID    zonisamide  300 mg Oral Nightly    baclofen  10 mg Oral TID    enoxaparin  40 mg SubCUTAneous BID    polyvinyl alcohol  1 drop Both Eyes Q6H    remdesivir IVPB  100 mg IntraVENous Q24H     sodium chloride flush, 10 mL, PRN  sodium chloride, 25 mL, PRN  ondansetron, 4 mg, Q8H PRN   Or  ondansetron, 4 mg, Q6H PRN  polyethylene glycol, 17 g, Daily PRN  acetaminophen, 650 mg, Q6H PRN   Or  acetaminophen, 650 mg, Q6H PRN  perflutren lipid microspheres, 1.5 mL, ONCE PRN  fentanNYL, 25 mcg, Q1H PRN  sodium chloride, 30 mL, PRN         Objective:    BP (!) 103/53   Pulse 103   Temp 95.7 °F (35.4 °C) (Axillary)   Resp 20   Ht 5' (1.524 m)   Wt 130 lb (59 kg)   SpO2 92%   BMI 25.39 kg/m²     Due to the current efforts to prevent transmission of COVID-19 and also the need to preserve PPE for other caregivers, a face-to-face encounter with the patient was not performed. That being said, all relevant records and diagnostic tests were reviewed, including laboratory results and imaging. Please reference any relevant documentation elsewhere. Care will be coordinated with other specialties as appropriate.       Recent Labs     08/29/21 0220 08/30/21  0511 08/31/21  0503    134 135   K 3.6 4.1 2.4*    99 101   CO2 20* 19* 25   BUN 9 6 8   CREATININE 0.9 0.4* 0.4*   GLUCOSE 165* 192* 379*   CALCIUM 7.5* 7.8* 7.7*       Recent Labs     08/29/21 0220 08/30/21  0511 08/31/21  0503   ALKPHOS 67 64 110   PROT 5.4* 5.0* 4.2*   LABALBU 2.9* 2.5* 2.1*   BILITOT 0.6 0.9 1.6*   AST 57* 95* 84*   ALT 37 40 34       Recent Labs     08/29/21  0735 08/30/21  0511 08/31/21  0503   WBC 3.0* 14.0* 16.0*   RBC 3.75* 3.94 3.36*   HGB 12.3* 12.8 10.9*   HCT 36.4* 36.9* 31.2*   MCV 97.1 93.7 92.9   MCH 32.8 32.5 32.4   MCHC 33.8 34.7* 34.9*   RDW 12.8 12.4 12.2    140 143   MPV 11.2 10.8 11.4       Lactic acid, plasma [[id number]] (Abnormal)    Collected: 08/31/21 0503    Updated: 08/31/21 0555    Specimen Source: Blood     Lactic Acid 5.2High Panic   mmol/L         Culture, Respiratory, Sputum [[id number]] (Abnormal)     Collected: 08/29/21 0220    Updated: 08/31/21 1340    Specimen Source: Sputum Expectorated     CULTURE, RESPIRATORY Oral Pharyngeal Adina reducedAbnormal     Smear, Respiratory --    Group 6: <25 PMN's/LPF and <25 Epithelial cells/LPF   Rare Polymorphonuclear leukocytes   Rare Epithelial cells   Rare Gram variable rods     Organism Klebsiella pneumoniae ssp pneumoniaeAbnormal     CULTURE, RESPIRATORY Moderate growth   Narrative:     Source: SPUEX       Site:         Lactic acid, plasma [[id number]] (Abnormal)    Collected: 08/31/21 1124    Updated: 08/31/21 1243    Specimen Source: Blood     Lactic Acid 3.9High Panic   mmol/L         Radiology:   XR CHEST PORTABLE   Final Result   Significant bilateral infiltrates with partial interval clearing of the left lower lobe infiltrate since the prior study         XR CHEST PORTABLE   Final Result   Satisfactory position of the ETT and NGT. Otherwise, stable chest.         XR CHEST PORTABLE   Final Result   Increasing right lung opacities consistent with pneumonia. US DUP LOWER EXTREMITIES BILATERAL VENOUS   Final Result   No evidence of DVT in either lower extremity. XR CHEST PORTABLE   Final Result   1. Right lower lobe pneumonia. 2. Left lower lobe atelectasis versus pneumonia. XR CHEST PORTABLE    (Results Pending)       Assessment:  Principal Problem:    Acute respiratory failure with hypoxia (HCC)  Active Problems:    Cerebral palsy (HCC)    Seizures ([facility] [address]    Mentally disabled    Convulsions ([facility] [address]    Pneumonia due to COVID-19 virus  Resolved Problems:    * No resolved hospital problems. *      Plan:  1. Acute respiratory failure with hypoxemia  -Patient currently intubated, sedated (fentanyl and propofol) and on paralytics (nimbex)  -FiO2 currently at 50%, 10 PEEP, 20 RR and   -Most recent AB.511/30.2/57/23.6. O2 saturation 92%   -Planning to stop paralytics today and wean FiO2 as tolerated    2. COVID PNA  -Patient unvaccinated, was coughing, congested and had fevers prior to presentation  -Patient currently intubated, sedated and on paralytics  -ID is on board  -Patient was switched from dexamethasone after 3 days to solucortef 50mg Q8H (day 2 now)  -Patient also on remdesivir 100mg QD, day 4 today and duonebs  -CXR today shows partial improvement of LLL infiltrate  -WBC 16.0 (from 14 yesterday), has been afebrile since admission    3. PNA, possibly aspiration  -Sputum Cx growing Klebsiella, currently on flagyl 500mg Q8H and cefepime 2gm Q12H  -Did receive 3 days of azithromycin as well, however was DCd today  -Strep and legionella negative.  BCx negative so far  -CXR shows dense RUL infiltrate  -Patient has been afebrile since admission, WBC 16 today from 14 yesterday  -Will follow up final sputum Cx and monitor labs and VS    4. Lactic acidosis  -LA peaked at 11.8 on 8/30, has improved to 3.9 this afternoon  -Patient had AG of 16 this AM, later today improved to 9  -Was getting bicarb infusion for acidosis, now switched to LR at 75ml/hr  -Will monitor BMP and lactic acid levels    5. Electrolyte abnormalities  -Potassium 2.4 this afternoon and phosphorus 0.7  -Both were repleted, will continue to monitor    6. Seizure disorder 2/2 cerebral palsy  -Patient has known CP, is nonverbal at baseline with severe developmental delay  -Has seizures, takes baclofen, vimpat, lamictal and zonisamide at home which are continued here    7. Elevated blood glucose  -Patient has had elevated glucose levels up to 379  -No known diagnosis of DM and patient is currently on steroids which will increase glucose  -A1C should be checked to determine if patient is diabetic or just elevated BS 2/2 steroids and current illness  -Currently on NPO ISS with hypoglycemic protocol    8. Septic shock (improving)  -Patient hypotensive initially, required levophed but was able to be weaned off yesterday  -Still on LR at 75ml/hr and solucortef 50mg Q8H  -BP improved to 94/47 today (was 75/39 at the lowest on 8/29)  -WBC increased to 16 from 14, lactic acid is improving  -Patient also tachycardic, 103 most recent HR  -Continue IVF and monitor VS and labs      NOTE: This report was transcribed using voice recognition software. Every effort was made to ensure accuracy; however, inadvertent computerized transcription errors may be present.      Electronically signed by Marlen Villafuerte MD on 8/31/2021 at 2:13 PM

## 2021-09-01 ENCOUNTER — APPOINTMENT (OUTPATIENT)
Dept: GENERAL RADIOLOGY | Age: 26
DRG: 870 | End: 2021-09-01
Payer: COMMERCIAL

## 2021-09-01 LAB
ALBUMIN SERPL-MCNC: 2.3 G/DL (ref 3.5–5.2)
ALP BLD-CCNC: 208 U/L (ref 40–129)
ALT SERPL-CCNC: 33 U/L (ref 0–40)
ANION GAP SERPL CALCULATED.3IONS-SCNC: 6 MMOL/L (ref 7–16)
ANISOCYTOSIS: ABNORMAL
AST SERPL-CCNC: 81 U/L (ref 0–39)
B.E.: -0.7 MMOL/L (ref -3–3)
BASOPHILS ABSOLUTE: 0 E9/L (ref 0–0.2)
BASOPHILS RELATIVE PERCENT: 0.1 % (ref 0–2)
BILIRUB SERPL-MCNC: 1.8 MG/DL (ref 0–1.2)
BUN BLDV-MCNC: 12 MG/DL (ref 6–20)
BURR CELLS: ABNORMAL
CALCIUM SERPL-MCNC: 7.8 MG/DL (ref 8.6–10.2)
CHLORIDE BLD-SCNC: 109 MMOL/L (ref 98–107)
CO2: 28 MMOL/L (ref 22–29)
COHB: 0.3 % (ref 0–1.5)
CREAT SERPL-MCNC: 0.4 MG/DL (ref 0.7–1.2)
CRITICAL: ABNORMAL
DATE ANALYZED: ABNORMAL
DATE OF COLLECTION: ABNORMAL
EOSINOPHILS ABSOLUTE: 0 E9/L (ref 0.05–0.5)
EOSINOPHILS RELATIVE PERCENT: 0 % (ref 0–6)
FIO2: 60 %
GFR AFRICAN AMERICAN: >60
GFR NON-AFRICAN AMERICAN: >60 ML/MIN/1.73
GLUCOSE BLD-MCNC: 99 MG/DL (ref 74–99)
HCO3: 26 MMOL/L (ref 22–26)
HCT VFR BLD CALC: 31.5 % (ref 37–54)
HEMOGLOBIN: 10.8 G/DL (ref 12.5–16.5)
HHB: 17 % (ref 0–5)
LAB: ABNORMAL
LACTIC ACID: 1.8 MMOL/L (ref 0.5–2.2)
LACTIC ACID: 2.7 MMOL/L (ref 0.5–2.2)
LYMPHOCYTES ABSOLUTE: 0.15 E9/L (ref 1.5–4)
LYMPHOCYTES RELATIVE PERCENT: 0.9 % (ref 20–42)
Lab: ABNORMAL
MAGNESIUM: 2.5 MG/DL (ref 1.6–2.6)
MCH RBC QN AUTO: 32.6 PG (ref 26–35)
MCHC RBC AUTO-ENTMCNC: 34.3 % (ref 32–34.5)
MCV RBC AUTO: 95.2 FL (ref 80–99.9)
METER GLUCOSE: 119 MG/DL (ref 74–99)
METER GLUCOSE: 90 MG/DL (ref 74–99)
METHB: 0.4 % (ref 0–1.5)
MODE: AC
MONOCYTES ABSOLUTE: 0.89 E9/L (ref 0.1–0.95)
MONOCYTES RELATIVE PERCENT: 6.1 % (ref 2–12)
NEUTROPHILS ABSOLUTE: 13.76 E9/L (ref 1.8–7.3)
NEUTROPHILS RELATIVE PERCENT: 93 % (ref 43–80)
O2 CONTENT: 14.6 ML/DL
O2 SATURATION: 82.9 % (ref 92–98.5)
O2HB: 82.3 % (ref 94–97)
OPERATOR ID: 2323
PATIENT TEMP: 37 C
PCO2: 51.7 MMHG (ref 35–45)
PDW BLD-RTO: 12.9 FL (ref 11.5–15)
PEEP/CPAP: 10 CMH2O
PFO2: 0.78 MMHG/%
PH BLOOD GAS: 7.32 (ref 7.35–7.45)
PHOSPHORUS: 2.1 MG/DL (ref 2.5–4.5)
PLATELET # BLD: 146 E9/L (ref 130–450)
PMV BLD AUTO: 11.3 FL (ref 7–12)
PO2: 47 MMHG (ref 75–100)
POIKILOCYTES: ABNORMAL
POLYCHROMASIA: ABNORMAL
POTASSIUM SERPL-SCNC: 3.9 MMOL/L (ref 3.5–5)
RBC # BLD: 3.31 E12/L (ref 3.8–5.8)
RI(T): 6.57
RR MECHANICAL: 20 B/MIN
SODIUM BLD-SCNC: 143 MMOL/L (ref 132–146)
SOURCE, BLOOD GAS: ABNORMAL
THB: 12.6 G/DL (ref 11.5–16.5)
TIME ANALYZED: 443
TOTAL PROTEIN: 4.9 G/DL (ref 6.4–8.3)
VT MECHANICAL: 325 ML
WBC # BLD: 14.8 E9/L (ref 4.5–11.5)

## 2021-09-01 PROCEDURE — 82962 GLUCOSE BLOOD TEST: CPT

## 2021-09-01 PROCEDURE — 84100 ASSAY OF PHOSPHORUS: CPT

## 2021-09-01 PROCEDURE — 6360000002 HC RX W HCPCS: Performed by: EMERGENCY MEDICINE

## 2021-09-01 PROCEDURE — 2500000003 HC RX 250 WO HCPCS: Performed by: EMERGENCY MEDICINE

## 2021-09-01 PROCEDURE — 94003 VENT MGMT INPAT SUBQ DAY: CPT

## 2021-09-01 PROCEDURE — 2580000003 HC RX 258: Performed by: EMERGENCY MEDICINE

## 2021-09-01 PROCEDURE — 6370000000 HC RX 637 (ALT 250 FOR IP)

## 2021-09-01 PROCEDURE — 2000000000 HC ICU R&B

## 2021-09-01 PROCEDURE — 83605 ASSAY OF LACTIC ACID: CPT

## 2021-09-01 PROCEDURE — 6360000002 HC RX W HCPCS

## 2021-09-01 PROCEDURE — 6360000002 HC RX W HCPCS: Performed by: INTERNAL MEDICINE

## 2021-09-01 PROCEDURE — 82805 BLOOD GASES W/O2 SATURATION: CPT

## 2021-09-01 PROCEDURE — 2500000003 HC RX 250 WO HCPCS: Performed by: NURSE PRACTITIONER

## 2021-09-01 PROCEDURE — 36415 COLL VENOUS BLD VENIPUNCTURE: CPT

## 2021-09-01 PROCEDURE — 6370000000 HC RX 637 (ALT 250 FOR IP): Performed by: INTERNAL MEDICINE

## 2021-09-01 PROCEDURE — 94640 AIRWAY INHALATION TREATMENT: CPT

## 2021-09-01 PROCEDURE — 2500000003 HC RX 250 WO HCPCS: Performed by: INTERNAL MEDICINE

## 2021-09-01 PROCEDURE — 99232 SBSQ HOSP IP/OBS MODERATE 35: CPT | Performed by: STUDENT IN AN ORGANIZED HEALTH CARE EDUCATION/TRAINING PROGRAM

## 2021-09-01 PROCEDURE — 83735 ASSAY OF MAGNESIUM: CPT

## 2021-09-01 PROCEDURE — C9113 INJ PANTOPRAZOLE SODIUM, VIA: HCPCS

## 2021-09-01 PROCEDURE — 37799 UNLISTED PX VASCULAR SURGERY: CPT

## 2021-09-01 PROCEDURE — 2580000003 HC RX 258: Performed by: INTERNAL MEDICINE

## 2021-09-01 PROCEDURE — 85025 COMPLETE CBC W/AUTO DIFF WBC: CPT

## 2021-09-01 PROCEDURE — 2580000003 HC RX 258: Performed by: NURSE PRACTITIONER

## 2021-09-01 PROCEDURE — 36592 COLLECT BLOOD FROM PICC: CPT

## 2021-09-01 PROCEDURE — 80053 COMPREHEN METABOLIC PANEL: CPT

## 2021-09-01 PROCEDURE — 2580000003 HC RX 258

## 2021-09-01 RX ORDER — NICOTINE POLACRILEX 4 MG
15 LOZENGE BUCCAL PRN
Status: DISCONTINUED | OUTPATIENT
Start: 2021-09-01 | End: 2021-09-19 | Stop reason: HOSPADM

## 2021-09-01 RX ORDER — DEXTROSE MONOHYDRATE 25 G/50ML
12.5 INJECTION, SOLUTION INTRAVENOUS PRN
Status: DISCONTINUED | OUTPATIENT
Start: 2021-09-01 | End: 2021-09-19 | Stop reason: HOSPADM

## 2021-09-01 RX ORDER — DEXTROSE MONOHYDRATE 50 MG/ML
100 INJECTION, SOLUTION INTRAVENOUS PRN
Status: DISCONTINUED | OUTPATIENT
Start: 2021-09-01 | End: 2021-09-19 | Stop reason: HOSPADM

## 2021-09-01 RX ADMIN — PROPOFOL 30 MCG/KG/MIN: 10 INJECTION, EMULSION INTRAVENOUS at 08:30

## 2021-09-01 RX ADMIN — HYDROCORTISONE SODIUM SUCCINATE 50 MG: 100 INJECTION, POWDER, FOR SOLUTION INTRAMUSCULAR; INTRAVENOUS at 05:22

## 2021-09-01 RX ADMIN — HYDROCORTISONE SODIUM SUCCINATE 50 MG: 100 INJECTION, POWDER, FOR SOLUTION INTRAMUSCULAR; INTRAVENOUS at 14:00

## 2021-09-01 RX ADMIN — LAMOTRIGINE 200 MG: 100 TABLET ORAL at 20:37

## 2021-09-01 RX ADMIN — IPRATROPIUM BROMIDE AND ALBUTEROL SULFATE 1 AMPULE: .5; 3 SOLUTION RESPIRATORY (INHALATION) at 18:09

## 2021-09-01 RX ADMIN — ENOXAPARIN SODIUM 40 MG: 40 INJECTION SUBCUTANEOUS at 08:30

## 2021-09-01 RX ADMIN — Medication 125 MCG/HR: at 01:00

## 2021-09-01 RX ADMIN — Medication 150 MCG/HR: at 21:00

## 2021-09-01 RX ADMIN — Medication 150 MCG/HR: at 11:24

## 2021-09-01 RX ADMIN — LAMOTRIGINE 200 MG: 100 TABLET ORAL at 14:02

## 2021-09-01 RX ADMIN — REMDESIVIR 100 MG: 100 INJECTION, POWDER, LYOPHILIZED, FOR SOLUTION INTRAVENOUS at 20:43

## 2021-09-01 RX ADMIN — METRONIDAZOLE 500 MG: 500 INJECTION, SOLUTION INTRAVENOUS at 21:30

## 2021-09-01 RX ADMIN — LACOSAMIDE 150 MG: 100 TABLET, FILM COATED ORAL at 08:31

## 2021-09-01 RX ADMIN — POLYVINYL ALCOHOL 1 DROP: 14 SOLUTION/ DROPS OPHTHALMIC at 22:30

## 2021-09-01 RX ADMIN — PROPOFOL 25 MCG/KG/MIN: 10 INJECTION, EMULSION INTRAVENOUS at 00:26

## 2021-09-01 RX ADMIN — PANTOPRAZOLE SODIUM 40 MG: 40 INJECTION, POWDER, FOR SOLUTION INTRAVENOUS at 08:30

## 2021-09-01 RX ADMIN — ZONISAMIDE 300 MG: 100 CAPSULE ORAL at 20:38

## 2021-09-01 RX ADMIN — IPRATROPIUM BROMIDE AND ALBUTEROL SULFATE 1 AMPULE: .5; 3 SOLUTION RESPIRATORY (INHALATION) at 08:54

## 2021-09-01 RX ADMIN — SODIUM CHLORIDE, POTASSIUM CHLORIDE, SODIUM LACTATE AND CALCIUM CHLORIDE: 600; 310; 30; 20 INJECTION, SOLUTION INTRAVENOUS at 08:32

## 2021-09-01 RX ADMIN — IPRATROPIUM BROMIDE AND ALBUTEROL SULFATE 1 AMPULE: .5; 3 SOLUTION RESPIRATORY (INHALATION) at 13:00

## 2021-09-01 RX ADMIN — BARICITINIB 4 MG: 2 TABLET, FILM COATED ORAL at 14:01

## 2021-09-01 RX ADMIN — POTASSIUM PHOSPHATE, MONOBASIC AND POTASSIUM PHOSPHATE, DIBASIC 20 MMOL: 224; 236 INJECTION, SOLUTION, CONCENTRATE INTRAVENOUS at 06:36

## 2021-09-01 RX ADMIN — BACLOFEN 10 MG: 10 TABLET ORAL at 20:35

## 2021-09-01 RX ADMIN — SODIUM CHLORIDE, PRESERVATIVE FREE 10 ML: 5 INJECTION INTRAVENOUS at 08:48

## 2021-09-01 RX ADMIN — BACLOFEN 10 MG: 10 TABLET ORAL at 08:31

## 2021-09-01 RX ADMIN — HYDROCORTISONE SODIUM SUCCINATE 50 MG: 100 INJECTION, POWDER, FOR SOLUTION INTRAMUSCULAR; INTRAVENOUS at 22:00

## 2021-09-01 RX ADMIN — METRONIDAZOLE 500 MG: 500 INJECTION, SOLUTION INTRAVENOUS at 14:00

## 2021-09-01 RX ADMIN — LAMOTRIGINE 200 MG: 100 TABLET ORAL at 08:31

## 2021-09-01 RX ADMIN — PROPOFOL 30 MCG/KG/MIN: 10 INJECTION, EMULSION INTRAVENOUS at 17:18

## 2021-09-01 RX ADMIN — LACOSAMIDE 150 MG: 100 TABLET, FILM COATED ORAL at 20:36

## 2021-09-01 RX ADMIN — IPRATROPIUM BROMIDE AND ALBUTEROL SULFATE 1 AMPULE: .5; 3 SOLUTION RESPIRATORY (INHALATION) at 06:11

## 2021-09-01 RX ADMIN — SODIUM CHLORIDE 25 ML: 9 INJECTION, SOLUTION INTRAVENOUS at 04:37

## 2021-09-01 RX ADMIN — CEFEPIME HYDROCHLORIDE 2000 MG: 2 INJECTION, POWDER, FOR SOLUTION INTRAVENOUS at 14:03

## 2021-09-01 RX ADMIN — SODIUM CHLORIDE 25 ML: 9 INJECTION, SOLUTION INTRAVENOUS at 18:00

## 2021-09-01 RX ADMIN — POLYVINYL ALCOHOL 1 DROP: 14 SOLUTION/ DROPS OPHTHALMIC at 16:30

## 2021-09-01 RX ADMIN — CEFEPIME HYDROCHLORIDE 2000 MG: 2 INJECTION, POWDER, FOR SOLUTION INTRAVENOUS at 01:01

## 2021-09-01 RX ADMIN — ENOXAPARIN SODIUM 40 MG: 40 INJECTION SUBCUTANEOUS at 20:35

## 2021-09-01 RX ADMIN — POLYVINYL ALCOHOL 1 DROP: 14 SOLUTION/ DROPS OPHTHALMIC at 12:00

## 2021-09-01 RX ADMIN — SODIUM CHLORIDE, PRESERVATIVE FREE 10 ML: 5 INJECTION INTRAVENOUS at 05:26

## 2021-09-01 RX ADMIN — BACLOFEN 10 MG: 10 TABLET ORAL at 14:02

## 2021-09-01 RX ADMIN — POLYVINYL ALCOHOL 1 DROP: 14 SOLUTION/ DROPS OPHTHALMIC at 04:37

## 2021-09-01 RX ADMIN — METRONIDAZOLE 500 MG: 500 INJECTION, SOLUTION INTRAVENOUS at 05:22

## 2021-09-01 RX ADMIN — Medication 10 ML: at 20:38

## 2021-09-01 RX ADMIN — Medication 10 ML: at 08:48

## 2021-09-01 ASSESSMENT — PULMONARY FUNCTION TESTS
PIF_VALUE: 29
PIF_VALUE: 32
PIF_VALUE: 27
PIF_VALUE: 33
PIF_VALUE: 34
PIF_VALUE: 29
PIF_VALUE: 31
PIF_VALUE: 21
PIF_VALUE: 31
PIF_VALUE: 31
PIF_VALUE: 17
PIF_VALUE: 32
PIF_VALUE: 31
PIF_VALUE: 30
PIF_VALUE: 29
PIF_VALUE: 32
PIF_VALUE: 32
PIF_VALUE: 28
PIF_VALUE: 29

## 2021-09-01 NOTE — PROGRESS NOTES
[address] [place], [place] [address]   Progress Note    Admitting Date and Time: 8/28/2021  3:31 PM  Admit Dx: Hypokalemia [E87.6]  Acute respiratory failure with hypoxia ([facility] [address] [J96.01]  Pneumonia of right lower lobe due to infectious organism [J18.9]  Sepsis with acute hypoxic respiratory failure without septic shock, due to unspecified organism ([facility] [address] [A41.9, R65.20, J96.01]    Subjective:    Patient is currently intubated and sedated so unable to conduct ROS. Per RN: Patient required to go back up to 100% FiO2 on the vent, however will be attempting to wean down FiO2 today.        baricitinib  4 mg Oral Daily    hydrocortisone sodium succinate PF  50 mg IntraVENous Q8H    insulin lispro  0-18 Units SubCUTAneous Q6H    cefepime  2,000 mg IntraVENous Q12H    metroNIDAZOLE  500 mg IntraVENous Q8H    sodium chloride flush  5-40 mL IntraVENous 2 times per day    ipratropium-albuterol  1 ampule Inhalation 4x daily    pantoprazole  40 mg IntraVENous Daily    And    sodium chloride (PF)  10 mL IntraVENous Daily    lacosamide  150 mg Oral BID    lamoTRIgine  200 mg Oral TID    zonisamide  300 mg Oral Nightly    baclofen  10 mg Oral TID    enoxaparin  40 mg SubCUTAneous BID    polyvinyl alcohol  1 drop Both Eyes Q6H    remdesivir IVPB  100 mg IntraVENous Q24H     sodium chloride flush, 10 mL, PRN  sodium chloride, 25 mL, PRN  ondansetron, 4 mg, Q8H PRN   Or  ondansetron, 4 mg, Q6H PRN  polyethylene glycol, 17 g, Daily PRN  acetaminophen, 650 mg, Q6H PRN   Or  acetaminophen, 650 mg, Q6H PRN  perflutren lipid microspheres, 1.5 mL, ONCE PRN  fentanNYL, 25 mcg, Q1H PRN  sodium chloride, 30 mL, PRN         Objective:    BP (!) 98/51   Pulse 98   Temp 97.7 °F (36.5 °C) (Bladder)   Resp 22   Ht 5' (1.524 m)   Wt 153 lb (69.4 kg)   SpO2 94%   BMI 29.88 kg/m²     Due to the current efforts to prevent transmission of COVID-19 and also the need to preserve PPE for other caregivers, a face-to-face encounter with the patient was not performed. That being said, all relevant records and diagnostic tests were reviewed, including laboratory results and imaging. Please reference any relevant documentation elsewhere. Care will be coordinated with other specialties as appropriate.       Recent Labs     08/30/21 0511 08/31/21 0503 09/01/21 0429    135 143   K 4.1 2.4* 3.9   CL 99 101 109*   CO2 19* 25 28   BUN 6 8 12   CREATININE 0.4* 0.4* 0.4*   GLUCOSE 192* 379* 99   CALCIUM 7.8* 7.7* 7.8*       Recent Labs     08/30/21  0511 08/31/21 0503 09/01/21 0429   ALKPHOS 64 110 208*   PROT 5.0* 4.2* 4.9*   LABALBU 2.5* 2.1* 2.3*   BILITOT 0.9 1.6* 1.8*   AST 95* 84* 81*   ALT 40 34 33       Recent Labs     08/30/21 0511 08/31/21 0503 09/01/21 0429   WBC 14.0* 16.0* 14.8*   RBC 3.94 3.36* 3.31*   HGB 12.8 10.9* 10.8*   HCT 36.9* 31.2* 31.5*   MCV 93.7 92.9 95.2   MCH 32.5 32.4 32.6   MCHC 34.7* 34.9* 34.3   RDW 12.4 12.2 12.9    143 146   MPV 10.8 11.4 11.3       Lactic acid, plasma [[id number]] (Abnormal)    Collected: 08/31/21 0503    Updated: 08/31/21 0555    Specimen Source: Blood     Lactic Acid 5.2High Panic   mmol/L     Lactic acid, plasma [[id number]] (Abnormal)    Collected: 09/01/21 0329    Updated: 09/01/21 0329    Specimen Source: Blood     Lactic Acid 2.7High           Culture, Respiratory, Sputum [[id number]] (Abnormal)     Collected: 08/29/21 0220    Updated: 08/31/21 1340    Specimen Source: Sputum Expectorated     CULTURE, RESPIRATORY Oral Pharyngeal Adina reducedAbnormal     Smear, Respiratory --    Group 6: <25 PMN's/LPF and <25 Epithelial cells/LPF   Rare Polymorphonuclear leukocytes   Rare Epithelial cells   Rare Gram variable rods     Organism Klebsiella pneumoniae ssp pneumoniaeAbnormal     CULTURE, RESPIRATORY Moderate growth   Narrative:     Source: SPUEX       Site:         Lactic acid, plasma [[id number]] (Abnormal)    Collected: 08/31/21 1124    Updated: 08/31/21 1243 Specimen Source: Blood     Lactic Acid 3.9High Panic   mmol/L         Radiology:   XR CHEST PORTABLE   Final Result   Significant bilateral infiltrates with partial interval clearing of the left   lower lobe infiltrate since the prior study         XR CHEST PORTABLE   Final Result   Satisfactory position of the ETT and NGT. Otherwise, stable chest.         XR CHEST PORTABLE   Final Result   Increasing right lung opacities consistent with pneumonia. US DUP LOWER EXTREMITIES BILATERAL VENOUS   Final Result   No evidence of DVT in either lower extremity. XR CHEST PORTABLE   Final Result   1. Right lower lobe pneumonia. 2. Left lower lobe atelectasis versus pneumonia. XR CHEST PORTABLE    (Results Pending)       Assessment:  Principal Problem:    Acute respiratory failure with hypoxia (HCC)  Active Problems:    Cerebral palsy (HCC)    Seizures ([facility] [address]    Mentally disabled    Convulsions ([facility] [address]    Pneumonia due to COVID-19 virus  Resolved Problems:    * No resolved hospital problems. *      Plan:  1. Acute respiratory failure with hypoxemia  -Patient currently intubated and sedated (fentanyl and propofol)   -FiO2 currently at 100%, 12 PEEP, 22 RR and   -Most recent AB.320/51.7/47/26. O2 saturation 94%   -Planning to wean FiO2 as tolerated     2. COVID PNA  -Patient unvaccinated, was coughing, congested and had fevers prior to presentation  -Patient currently intubated and sedated   -ID is on board  -Patient was switched from dexamethasone after 3 days to solucortef 50mg Q8H (day 3 now). -Patient also on remdesivir 100mg QD, day 5 today and duonebs. Was also started on baricitinib today  -CXR yesterday shows partial improvement of LLL infiltrate  -WBC 14.8 (from 16 yesterday), has been afebrile since admission    3.  PNA, possibly aspiration  -Sputum Cx growing Klebsiella sensitive to cefepime, currently on flagyl 500mg Q8H and cefepime 2gm Q12H  -Did receive 3 days of azithromycin as well, however was DCd today  -Strep and legionella negative. BCx negative so far  -CXR shows dense RUL infiltrate  -Patient has been afebrile since admission, WBC 14.8 today from 16 yesterday  -Will follow up final sputum Cx and monitor labs and VS    4. Lactic acidosis  -LA peaked at 11.8 on 8/30, has improved to 2.7 this morning  -Patient had AG of 6 this AM  -Was getting bicarb infusion for acidosis, now switched to LR at 75ml/hr  -Will monitor BMP and lactic acid levels     5. Electrolyte abnormalities  -Potassium improved to 3.9 today and phosphorus 2.1  -Both were repleted, will continue to monitor    6. Seizure disorder 2/2 cerebral palsy  -Patient has known CP, is nonverbal at baseline with severe developmental delay  -Has seizures, takes baclofen, vimpat, lamictal and zonisamide at home which are continued here    7. Elevated blood glucose  -Patient has had elevated glucose levels up to 379, this AM 99  -No known diagnosis of DM and patient is currently on steroids which will increase glucose  -A1C should be checked to determine if patient is diabetic or just elevated BS 2/2 steroids and current illness  -Currently on NPO ISS with hypoglycemic protocol    8. Septic shock (improving)  -Patient hypotensive initially, required levophed but was able to be weaned off yesterday  -Still on LR at 75ml/hr and solucortef 50mg Q8H  -BP improved to 100/56 today (was 75/39 at the lowest on 8/29)  -WBC 14.8 from 16 yesterday, lactic acid is improving  -Patient also tachycardic,  this AM  -Continue IVF and monitor VS and labs      NOTE: This report was transcribed using voice recognition software. Every effort was made to ensure accuracy; however, inadvertent computerized transcription errors may be present.      Electronically signed by Stan Long MD on 9/1/2021 at 3:28 PM

## 2021-09-01 NOTE — PROGRESS NOTES
Patient's brothers here to visit. Educated on [place] precautions. All questions answered and updates provided. Emotional support given.

## 2021-09-01 NOTE — PLAN OF CARE
Problem: Airway Clearance - Ineffective  Goal: Achieve or maintain patent airway  Outcome: Met This Shift     Problem: Gas Exchange - Impaired  Goal: Promote optimal lung function  Outcome: Met This Shift     Problem: Isolation Precautions - Risk of Spread of Infection  Goal: Prevent transmission of infection  Outcome: Met This Shift

## 2021-09-01 NOTE — CARE COORDINATION
9/1/21 Positive covid (8/28/21)- unvaccinated. Cm transition of care: icu/vent fio2 @ 80%, peep 12/ art line/sedation. Pt with private duty nursing at home, lives with family. Pt with cerebral palsy, he is wc bound. Palliative care on consult. Father to provide a ride at discharge using a special van. Watch for any discharge needs. CM/SS to follow.  Electronically signed by Danna Negron RN-BC on 9/1/2021 at 3:52 PM

## 2021-09-01 NOTE — PROGRESS NOTES
[address] [facility]  [facility]  Progress Note    NAME: Michelina Shone  MR:  [id number]  :   1995  DATE OF SERVICE:21    This is a face to face encounter with Michelina Shone 30 y.o. male on 21    CHIEF COMPLAINT     ID following for   Chief Complaint   Patient presents with    Altered Mental Status    Respiratory Distress     41% SpO2     HISTORY OF PRESENT ILLNESS   Pt seen   21   No physical  examination today due to the efforts to preserve PPE and limit transmission/exposure to COVID-19. All relevant records and diagnostic tests were reviewed, including laboratory results and imaging. Please reference any relevant documentation elsewhere. Care will be coordinated with patient's care team.    On vent supine  Spoke with nurse  paralyzed    Patient is tolerating medications. No reported adverse drug reactions. REVIEW OF SYSTEMS     As stated above in the chief complaint, otherwise negative.   CURRENT MEDICATIONS      potassium phosphate IVPB  20 mmol IntraVENous Once    hydrocortisone sodium succinate PF  50 mg IntraVENous Q8H    insulin lispro  0-18 Units SubCUTAneous Q6H    cefepime  2,000 mg IntraVENous Q12H    metroNIDAZOLE  500 mg IntraVENous Q8H    sodium chloride flush  5-40 mL IntraVENous 2 times per day    ipratropium-albuterol  1 ampule Inhalation 4x daily    pantoprazole  40 mg IntraVENous Daily    And    sodium chloride (PF)  10 mL IntraVENous Daily    lacosamide  150 mg Oral BID    lamoTRIgine  200 mg Oral TID    zonisamide  300 mg Oral Nightly    baclofen  10 mg Oral TID    enoxaparin  40 mg SubCUTAneous BID    polyvinyl alcohol  1 drop Both Eyes Q6H    remdesivir IVPB  100 mg IntraVENous Q24H     Continuous Infusions:   lactated ringers 75 mL/hr at 21 0832    sodium chloride Stopped (21 0636)    sodium chloride      fentaNYL 5 mcg/ml in 0.9%  ml infusion 150 mcg/hr (21 0700)    propofol 30 mcg/kg/min (21 0830)    norepinephrine Stopped (21 0800)     PRN Meds:sodium chloride flush, sodium chloride, ondansetron **OR** ondansetron, polyethylene glycol, acetaminophen **OR** acetaminophen, perflutren lipid microspheres, fentanNYL, sodium chloride    PHYSICAL EXAM     BP (!) 90/56   Pulse 117   Temp 99.1 °F (37.3 °C) (Bladder)   Resp 23   Ht 5' (1.524 m)   Wt 153 lb (69.4 kg)   SpO2 (!) 87%   BMI 29.88 kg/m²   Temp  Av.6 °F (36.4 °C)  Min: 96.1 °F (35.6 °C)  Max: 99.1 °F (37.3 °C)  Constitutional:  The patient is sedated  vent     Lines: [facility]  j tube      DIAGNOSTIC RESULTS   Radiology:    Recent Labs     21  0511 21  0503 21  0429   WBC 14.0* 16.0* 14.8*   RBC 3.94 3.36* 3.31*   HGB 12.8 10.9* 10.8*   HCT 36.9* 31.2* 31.5*   MCV 93.7 92.9 95.2   MCH 32.5 32.4 32.6   MCHC 34.7* 34.9* 34.3   RDW 12.4 12.2 12.9    143 146   MPV 10.8 11.4 11.3     Recent Labs     21  0511 21  0503 21  0429    135 143   K 4.1 2.4* 3.9   CL 99 101 109*   CO2 19* 25 28   BUN 6 8 12   CREATININE 0.4* 0.4* 0.4*   GLUCOSE 192* 379* 99   PROT 5.0* 4.2* 4.9*   LABALBU 2.5* 2.1* 2.3*   CALCIUM 7.8* 7.7* 7.8*   BILITOT 0.9 1.6* 1.8*   ALKPHOS 64 110 208*   AST 95* 84* 81*   ALT 40 34 33     Lab Results   Component Value Date    CRP 0.3 2021    CRP 8.4 (H) 2021     Lab Results   Component Value Date    SEDRATE 5 2019    SEDRATE 29 (H) 2018    SEDRATE 21 (H) 08/10/2018     Recent Labs     21  0511 21  0503 21  0429   *  --   --    AST 95* 84* 81*   ALT 40 34 33        Microbiology:   No results for input(s): COVID19 in the last 72 hours.   Lab Results   Component Value Date    BC 24 Hours no growth 2021    BLOODCULT2 24 Hours no growth 2021    ORG Klebsiella pneumoniae ssp pneumoniae 2021    ORG Coagulase Negative Staphylococci 2018    ORG Corynebacterium species 2018     WOUND/ABSCESS   Date Value Ref Range Status   07/24/2018 Light growth  Final   07/24/2018 Light growth  Mixed morphologies    Final   06/29/2018   Corrected    Light growth  Too fastidious for susceptibility testing.     06/29/2018 Light growth  Corrected   06/29/2018 Moderate growth  Corrected     Smear, Respiratory   Date Value Ref Range Status   08/29/2021   Final    Group 6: <25 PMN's/LPF and <25 Epithelial cells/LPF  Rare Polymorphonuclear leukocytes  Rare Epithelial cells  Rare Gram variable rods       No results found for: MPNEUMO, CLAMYDCU, LABLEGI, AFBCX, FUNGSM, LABFUNG    MRSA Culture Only   Date Value Ref Range Status   08/29/2021 Methicillin resistant Staph aureus not isolated  Final     CULTURE, RESPIRATORY   Date Value Ref Range Status   08/29/2021 Oral Pharyngeal Adina reduced (A)  Final   08/29/2021 Moderate growth  Final     No results for input(s): CXSURG, ORG in the last 72 hours. No results for input(s): WNDABS, ORG in the last 72 hours.      Susceptibility    Klebsiella pneumoniae ssp pneumoniae (1)    Antibiotic Interpretation DERIAN Status    ampicillin Resistant >=^32 mcg/mL     ceFAZolin Sensitive <=^4 mcg/mL     cefepime Sensitive <=^0.12 mcg/mL     cefTRIAXone Sensitive <=^0.25 mcg/mL     Confirmatory Extended Spectrum Beta-Lactamase Negative Neg mcg/mL     ertapenem Sensitive <=^0.12 mcg/mL     gentamicin Sensitive <=^1 mcg/mL     levofloxacin Sensitive <=^0.12 mcg/mL     piperacillin-tazobactam Sensitive <=^4 mcg/mL     trimethoprim-sulfamethoxazole Sensitive <=^20 mcg/            FINAL IMPRESSION    Pt had   Chief Complaint   Patient presents with    Altered Mental Status    Respiratory Distress     41% SpO2    Admitted for Hypokalemia [E87.6]  Acute respiratory failure with hypoxia (HCC) [J96.01]  Pneumonia of right lower lobe due to infectious organism [J18.9]  Sepsis with acute hypoxic respiratory failure without septic shock, due to unspecified organism ([facility] [A41.9, R65.20, J96.01]  On treatment for above  With leukopenia/leukocytosis  Gn pneumonia check final cx  Mrsa neg     Sputum culture positive for Klebsiella patient at present on cefepime tolerating it well  WBC count coming down nicely    cefepime (MAXIPIME) 2000 mg IVPB minibag, Q12H  metronidazole (FLAGYL) 500 mg in NaCl 100 mL IVPB premix, Q8H  hydrocortisone sodium succinate PF (SOLU-CORTEF) injection 100 mg, Q8H  vancomycin (VANCOCIN) 1,000 mg in dextrose 5 % 250 mL IVPB, Q12H  azithromycin (ZITHROMAX) 500 mg in D5W 250ml Vial Mate, Q24H  enoxaparin (LOVENOX) injection 40 mg, BID  remdesivir 100 mg in sodium chloride 0.9 % 250 mL IVPB, Q24H day 4    Consider consolidating atbx         · Monitor labs    Imaging and labs were reviewed per medical records.      Willia Brunner, MD, FACP  9/1/2021  9:25 AM

## 2021-09-01 NOTE — PROGRESS NOTES
Pulmonary/Critical Care Progress Note    We are following patient for COVID-19 pneumonitis, Klebsiella pneumonia right upper lobe, acute respiratory failure, cerebral palsy, septic shock, metabolic acidosis, respiratory acidosis    SUBJECTIVE:  The patient had some desaturation problems earlier this morning but is currently stabilized on an FiO2 of 80% and PEEP of 12. However, his saturation is at 100% currently we will begin to reduce the FiO2. He is currently receiving baricitinib which has been shown to shorten the length of illness in patients with severe COVID-19 pneumonitis. We will also continue cefepime in order to treat his Klebsiella but we have left metronidazole in place because of the possibility of anaerobic infection. Patient is tolerating tube feedings satisfactorily.     MEDICATIONS:   baricitinib  4 mg Oral Daily    hydrocortisone sodium succinate PF  50 mg IntraVENous Q8H    insulin lispro  0-18 Units SubCUTAneous Q6H    cefepime  2,000 mg IntraVENous Q12H    metroNIDAZOLE  500 mg IntraVENous Q8H    sodium chloride flush  5-40 mL IntraVENous 2 times per day    ipratropium-albuterol  1 ampule Inhalation 4x daily    pantoprazole  40 mg IntraVENous Daily    And    sodium chloride (PF)  10 mL IntraVENous Daily    lacosamide  150 mg Oral BID    lamoTRIgine  200 mg Oral TID    zonisamide  300 mg Oral Nightly    baclofen  10 mg Oral TID    enoxaparin  40 mg SubCUTAneous BID    polyvinyl alcohol  1 drop Both Eyes Q6H    remdesivir IVPB  100 mg IntraVENous Q24H      lactated ringers 75 mL/hr at 09/01/21 0832    sodium chloride Stopped (09/01/21 0636)    sodium chloride      fentaNYL 5 mcg/ml in 0.9%  ml infusion 150 mcg/hr (09/01/21 1124)    propofol 30 mcg/kg/min (09/01/21 0830)    norepinephrine Stopped (08/30/21 0800)     sodium chloride flush, sodium chloride, ondansetron **OR** ondansetron, polyethylene glycol, acetaminophen **OR** acetaminophen, perflutren lipid microspheres, fentanNYL, sodium chloride      REVIEW OF SYSTEMS:  Patient is intubated sedated and nonverbal at baseline and is therefore unable to answer any significant questions regarding review of systems  OBJECTIVE:  Vitals:    09/01/21 1400   BP:    Pulse: 98   Resp: 22   Temp:    SpO2: 94%     FiO2 : 80 %  O2 Flow Rate (L/min): 60 L/min  O2 Device: Ventilator    PHYSICAL EXAM:  Constitutional: No fever, chills, diaphoresis  Skin: Skin rash, no skin breakdown  HEENT: Endotracheal tube secured at lips  Neck: JVD, lymphadenopathy, thyromegaly  Cardiovascular: 1, S2 regular.   No S3 murmurs rubs present  Respiratory: Inspiratory crackles over both posterior lung fields with consolidated breath sounds in the right upper anterior lung field  Gastrointestinal: Soft, flat, nondistended  Genitourinary: Grossly bloody urine  Extremities: Clubbing, cyanosis, or edema absent  Neurological: Sedated  Psychological: Sedated    LABS:  WBC   Date Value Ref Range Status   09/01/2021 14.8 (H) 4.5 - 11.5 E9/L Final   08/31/2021 16.0 (H) 4.5 - 11.5 E9/L Final   08/30/2021 14.0 (H) 4.5 - 11.5 E9/L Final     Hemoglobin   Date Value Ref Range Status   09/01/2021 10.8 (L) 12.5 - 16.5 g/dL Final   08/31/2021 10.9 (L) 12.5 - 16.5 g/dL Final   08/30/2021 12.8 12.5 - 16.5 g/dL Final     Hematocrit   Date Value Ref Range Status   09/01/2021 31.5 (L) 37.0 - 54.0 % Final   08/31/2021 31.2 (L) 37.0 - 54.0 % Final   08/30/2021 36.9 (L) 37.0 - 54.0 % Final     MCV   Date Value Ref Range Status   09/01/2021 95.2 80.0 - 99.9 fL Final   08/31/2021 92.9 80.0 - 99.9 fL Final   08/30/2021 93.7 80.0 - 99.9 fL Final     Platelets   Date Value Ref Range Status   09/01/2021 146 130 - 450 E9/L Final   08/31/2021 143 130 - 450 E9/L Final   08/30/2021 140 130 - 450 E9/L Final     Sodium   Date Value Ref Range Status   09/01/2021 143 132 - 146 mmol/L Final   08/31/2021 135 132 - 146 mmol/L Final   08/30/2021 134 132 - 146 mmol/L Final     Potassium   Date Value Ref Range Status   09/01/2021 3.9 3.5 - 5.0 mmol/L Final   08/31/2021 2.4 (LL) 3.5 - 5.0 mmol/L Final   08/30/2021 4.1 3.5 - 5.0 mmol/L Final     Chloride   Date Value Ref Range Status   09/01/2021 109 (H) 98 - 107 mmol/L Final   08/31/2021 101 98 - 107 mmol/L Final   08/30/2021 99 98 - 107 mmol/L Final     CO2   Date Value Ref Range Status   09/01/2021 28 22 - 29 mmol/L Final   08/31/2021 25 22 - 29 mmol/L Final   08/30/2021 19 (L) 22 - 29 mmol/L Final     BUN   Date Value Ref Range Status   09/01/2021 12 6 - 20 mg/dL Final   08/31/2021 8 6 - 20 mg/dL Final   08/30/2021 6 6 - 20 mg/dL Final     CREATININE   Date Value Ref Range Status   09/01/2021 0.4 (L) 0.7 - 1.2 mg/dL Final   08/31/2021 0.4 (L) 0.7 - 1.2 mg/dL Final   08/30/2021 0.4 (L) 0.7 - 1.2 mg/dL Final     Glucose   Date Value Ref Range Status   09/01/2021 99 74 - 99 mg/dL Final   08/31/2021 379 (H) 74 - 99 mg/dL Final   08/30/2021 192 (H) 74 - 99 mg/dL Final     Calcium   Date Value Ref Range Status   09/01/2021 7.8 (L) 8.6 - 10.2 mg/dL Final   08/31/2021 7.7 (L) 8.6 - 10.2 mg/dL Final   08/30/2021 7.8 (L) 8.6 - 10.2 mg/dL Final     Total Protein   Date Value Ref Range Status   09/01/2021 4.9 (L) 6.4 - 8.3 g/dL Final   08/31/2021 4.2 (L) 6.4 - 8.3 g/dL Final   08/30/2021 5.0 (L) 6.4 - 8.3 g/dL Final     Albumin   Date Value Ref Range Status   09/01/2021 2.3 (L) 3.5 - 5.2 g/dL Final   08/31/2021 2.1 (L) 3.5 - 5.2 g/dL Final   08/30/2021 2.5 (L) 3.5 - 5.2 g/dL Final     Total Bilirubin   Date Value Ref Range Status   09/01/2021 1.8 (H) 0.0 - 1.2 mg/dL Final   08/31/2021 1.6 (H) 0.0 - 1.2 mg/dL Final   08/30/2021 0.9 0.0 - 1.2 mg/dL Final     Alkaline Phosphatase   Date Value Ref Range Status   09/01/2021 208 (H) 40 - 129 U/L Final   08/31/2021 110 40 - 129 U/L Final   08/30/2021 64 40 - 129 U/L Final     AST   Date Value Ref Range Status   09/01/2021 81 (H) 0 - 39 U/L Final   08/31/2021 84 (H) 0 - 39 U/L Final   08/30/2021 95 (H) 0 - 39 U/L Final ALT   Date Value Ref Range Status   09/01/2021 33 0 - 40 U/L Final   08/31/2021 34 0 - 40 U/L Final   08/30/2021 40 0 - 40 U/L Final     GFR Non-   Date Value Ref Range Status   09/01/2021 >60 >=60 mL/min/1.73 Final     Comment:     Chronic Kidney Disease: less than 60 ml/min/1.73 sq.m. Kidney Failure: less than 15 ml/min/1.73 sq.m. Results valid for patients 18 years and older. 08/31/2021 >60 >=60 mL/min/1.73 Final     Comment:     Chronic Kidney Disease: less than 60 ml/min/1.73 sq.m. Kidney Failure: less than 15 ml/min/1.73 sq.m. Results valid for patients 18 years and older. 08/30/2021 >60 >=60 mL/min/1.73 Final     Comment:     Chronic Kidney Disease: less than 60 ml/min/1.73 sq.m. Kidney Failure: less than 15 ml/min/1.73 sq.m. Results valid for patients 18 years and older. GFR    Date Value Ref Range Status   09/01/2021 >60  Final   08/31/2021 >60  Final   08/30/2021 >60  Final     Magnesium   Date Value Ref Range Status   09/01/2021 2.5 1.6 - 2.6 mg/dL Final   08/31/2021 2.2 1.6 - 2.6 mg/dL Final   08/30/2021 2.2 1.6 - 2.6 mg/dL Final     Phosphorus   Date Value Ref Range Status   09/01/2021 2.1 (L) 2.5 - 4.5 mg/dL Final   08/31/2021 0.7 (LL) 2.5 - 4.5 mg/dL Final   08/30/2021 1.8 (L) 2.5 - 4.5 mg/dL Final     Recent Labs     09/01/21  0443   PH 7.320*   PO2 47.0*   PCO2 51.7*   HCO3 26.0   BE -0.7   O2SAT 82.9*       RADIOLOGY:  XR CHEST PORTABLE   Final Result   Significant bilateral infiltrates with partial interval clearing of the left   lower lobe infiltrate since the prior study         XR CHEST PORTABLE   Final Result   Satisfactory position of the ETT and NGT. Otherwise, stable chest.         XR CHEST PORTABLE   Final Result   Increasing right lung opacities consistent with pneumonia. US DUP LOWER EXTREMITIES BILATERAL VENOUS   Final Result   No evidence of DVT in either lower extremity.          XR CHEST PORTABLE   Final Result   1. Right lower lobe pneumonia. 2. Left lower lobe atelectasis versus pneumonia. XR CHEST PORTABLE    (Results Pending)   XR CHEST PORTABLE    (Results Pending)           PROBLEM LIST:  Principal Problem:    Acute respiratory failure with hypoxia (HCC)  Active Problems:    Cerebral palsy (HCC)    Seizures ([address]    Mentally disabled    Convulsions ([facility]    Pneumonia due to COVID-19 virus  Resolved Problems:    * No resolved hospital problems. *      IMPRESSION:  1. Acute respiratory failure  2. Klebsiella pneumonia  3. Possible aspiration pneumonitis  4. COVID-19 pneumonitis  5. History of seizure disorder  6. Diabetes mellitus type 2    PLAN:  1. Wean FiO2 as able, now down to 70% and 12 of PEEP  2. Insulin coverage  3. Continue aerosolized bronchodilators  4. Modest amounts of IV fluids  5. Continue nutritional support with tube feedings  6. Change to supine position in a.m. if patient does well tonight still proned. ATTESTATION:  ICU Staff Physician note of personal involvement in Care  As the attending physician, I certify that I personally reviewed the patients history and personally examined the patient to confirm the physical findings described above,  And that I reviewed the relevant imaging studies and available reports. I also discussed the differential diagnosis and all of the proposed management plans with the patient and individuals accompanying the patient to this visit. They had the opportunity to ask questions about the proposed management plans and to have those questions answered. This patient has a high probability of sudden, clinically significant deterioration, which requires the highest level of physician preparedness to intervene urgently. I managed/supervised life or organ supporting interventions that required frequent physician assessment. I devoted my full attention to the direct care of this patient for the amount of time indicated below. Time I spent with the family or surrogate(s) is included only if the patient was incapable of providing the necessary information or participating in medical decisions  Time devoted to teaching and to any procedures I billed separately is not included.     CRITICAL CARE TIME:  35 minutes    Electronically signed by Inez Hanley MD on 9/1/2021 at 5:13 PM

## 2021-09-01 NOTE — PROGRESS NOTES
Physician Progress Note      PATIENT:               Bree Lopez  CSN #:                  [id number]  :                       1995  ADMIT DATE:       2021 3:31 PM  DISCH DATE:  RESPONDING  PROVIDER #:        Ofelia Heller MD          QUERY TEXT:    Pt admitted with COVID-19 pneumonia and noted to have Sepsis with shock   documented by Pulmonology/Intensive care consultant. If possible, please   document in progress notes and discharge summary if you are evaluating and/or   treating: The medical record reflects the following:  Risk Factors: COVID-19 pneumonia with Acute resp failure/ARDS in unvaccinated   pt., cerebral palsy  Clinical Indicators: On admission: CXR pneumonia, Resp culture GNR, COVID-19   Positive, Ph 7.34, LA 4.1, Wbc 3.3, Crp 8.4,  VS 89/-34-99.3.    - Wbc 14, LA 8.3, VS 98/-21-99.0 . Treatment: NS 2L in ER, Levophed gtt., Remdesivir, IV Decadron, Vanc, Zmax,   Zosyn, vitamins, Vent, Pulm/ID consults. Thank you, Tali Gore RN [phone]  Options provided:  -- Sepsis present on admission due to COVID-19 pneumonia  -- Sepsis not present on admission due to COVID-19 pneumonia  -- Other - I will add my own diagnosis  -- Disagree - Not applicable / Not valid  -- Disagree - Clinically unable to determine / Unknown  -- Refer to Clinical Documentation Reviewer    PROVIDER RESPONSE TEXT:    This patient has sepsis which was present on admission due to COVID-19   pneumonia.     Query created by: Arvind Baker on 2021 12:36 PM      Electronically signed by:  Ofelia Heller MD 2021 12:46 PM

## 2021-09-02 ENCOUNTER — APPOINTMENT (OUTPATIENT)
Dept: GENERAL RADIOLOGY | Age: 26
DRG: 870 | End: 2021-09-02
Payer: COMMERCIAL

## 2021-09-02 LAB
ALBUMIN SERPL-MCNC: 2.1 G/DL (ref 3.5–5.2)
ALP BLD-CCNC: 238 U/L (ref 40–129)
ALT SERPL-CCNC: 36 U/L (ref 0–40)
ANION GAP SERPL CALCULATED.3IONS-SCNC: 7 MMOL/L (ref 7–16)
AST SERPL-CCNC: 81 U/L (ref 0–39)
B.E.: -2.1 MMOL/L (ref -3–3)
BASOPHILS ABSOLUTE: 0 E9/L (ref 0–0.2)
BASOPHILS RELATIVE PERCENT: 0 % (ref 0–2)
BILIRUB SERPL-MCNC: 1.1 MG/DL (ref 0–1.2)
BLOOD CULTURE, ROUTINE: NORMAL
BUN BLDV-MCNC: 13 MG/DL (ref 6–20)
CALCIUM SERPL-MCNC: 7.6 MG/DL (ref 8.6–10.2)
CHLORIDE BLD-SCNC: 110 MMOL/L (ref 98–107)
CO2: 27 MMOL/L (ref 22–29)
COHB: 0.3 % (ref 0–1.5)
CREAT SERPL-MCNC: 0.4 MG/DL (ref 0.7–1.2)
CRITICAL: ABNORMAL
DATE ANALYZED: ABNORMAL
DATE OF COLLECTION: ABNORMAL
EOSINOPHILS ABSOLUTE: 0 E9/L (ref 0.05–0.5)
EOSINOPHILS RELATIVE PERCENT: 0 % (ref 0–6)
FIO2: 90 %
GFR AFRICAN AMERICAN: >60
GFR NON-AFRICAN AMERICAN: >60 ML/MIN/1.73
GLUCOSE BLD-MCNC: 131 MG/DL (ref 74–99)
HCO3: 24.3 MMOL/L (ref 22–26)
HCT VFR BLD CALC: 30.2 % (ref 37–54)
HEMOGLOBIN: 9.9 G/DL (ref 12.5–16.5)
HHB: 3.6 % (ref 0–5)
LAB: ABNORMAL
LACTIC ACID: 2.2 MMOL/L (ref 0.5–2.2)
LACTIC ACID: 2.3 MMOL/L (ref 0.5–2.2)
LACTIC ACID: 2.5 MMOL/L (ref 0.5–2.2)
LACTIC ACID: 2.7 MMOL/L (ref 0.5–2.2)
LYMPHOCYTES ABSOLUTE: 0.73 E9/L (ref 1.5–4)
LYMPHOCYTES RELATIVE PERCENT: 12 % (ref 20–42)
Lab: ABNORMAL
MAGNESIUM: 2.7 MG/DL (ref 1.6–2.6)
MCH RBC QN AUTO: 32.8 PG (ref 26–35)
MCHC RBC AUTO-ENTMCNC: 32.8 % (ref 32–34.5)
MCV RBC AUTO: 100 FL (ref 80–99.9)
METER GLUCOSE: 102 MG/DL (ref 74–99)
METER GLUCOSE: 128 MG/DL (ref 74–99)
METER GLUCOSE: 129 MG/DL (ref 74–99)
METER GLUCOSE: 139 MG/DL (ref 74–99)
METER GLUCOSE: 155 MG/DL (ref 74–99)
METHB: 0.5 % (ref 0–1.5)
MODE: AC
MONOCYTES ABSOLUTE: 0.55 E9/L (ref 0.1–0.95)
MONOCYTES RELATIVE PERCENT: 9 % (ref 2–12)
NEUTROPHILS ABSOLUTE: 4.82 E9/L (ref 1.8–7.3)
NEUTROPHILS RELATIVE PERCENT: 79 % (ref 43–80)
O2 CONTENT: 13.3 ML/DL
O2 SATURATION: 96.4 % (ref 92–98.5)
O2HB: 95.6 % (ref 94–97)
OPERATOR ID: 9689
OVALOCYTES: ABNORMAL
PATIENT TEMP: 37 C
PCO2: 48.9 MMHG (ref 35–45)
PDW BLD-RTO: 13.7 FL (ref 11.5–15)
PEEP/CPAP: 12 CMH2O
PFO2: 1.04 MMHG/%
PH BLOOD GAS: 7.31 (ref 7.35–7.45)
PHOSPHORUS: 2.4 MG/DL (ref 2.5–4.5)
PLATELET # BLD: 100 E9/L (ref 130–450)
PMV BLD AUTO: 11.6 FL (ref 7–12)
PO2: 93.7 MMHG (ref 75–100)
POIKILOCYTES: ABNORMAL
POTASSIUM SERPL-SCNC: 4.4 MMOL/L (ref 3.5–5)
RBC # BLD: 3.02 E12/L (ref 3.8–5.8)
RI(T): 5.07
RR MECHANICAL: 22 B/MIN
SODIUM BLD-SCNC: 144 MMOL/L (ref 132–146)
SOURCE, BLOOD GAS: ABNORMAL
THB: 9.8 G/DL (ref 11.5–16.5)
TIME ANALYZED: 517
TOTAL PROTEIN: 4.2 G/DL (ref 6.4–8.3)
VT MECHANICAL: 325 ML
WBC # BLD: 6.1 E9/L (ref 4.5–11.5)

## 2021-09-02 PROCEDURE — 99231 SBSQ HOSP IP/OBS SF/LOW 25: CPT | Performed by: NURSE PRACTITIONER

## 2021-09-02 PROCEDURE — 2580000003 HC RX 258: Performed by: INTERNAL MEDICINE

## 2021-09-02 PROCEDURE — 6360000002 HC RX W HCPCS: Performed by: INTERNAL MEDICINE

## 2021-09-02 PROCEDURE — 99232 SBSQ HOSP IP/OBS MODERATE 35: CPT | Performed by: STUDENT IN AN ORGANIZED HEALTH CARE EDUCATION/TRAINING PROGRAM

## 2021-09-02 PROCEDURE — 84100 ASSAY OF PHOSPHORUS: CPT

## 2021-09-02 PROCEDURE — 6360000002 HC RX W HCPCS

## 2021-09-02 PROCEDURE — 2580000003 HC RX 258

## 2021-09-02 PROCEDURE — 2580000003 HC RX 258: Performed by: EMERGENCY MEDICINE

## 2021-09-02 PROCEDURE — 94640 AIRWAY INHALATION TREATMENT: CPT

## 2021-09-02 PROCEDURE — 6370000000 HC RX 637 (ALT 250 FOR IP)

## 2021-09-02 PROCEDURE — 2500000003 HC RX 250 WO HCPCS: Performed by: EMERGENCY MEDICINE

## 2021-09-02 PROCEDURE — 36415 COLL VENOUS BLD VENIPUNCTURE: CPT

## 2021-09-02 PROCEDURE — 94003 VENT MGMT INPAT SUBQ DAY: CPT

## 2021-09-02 PROCEDURE — 6360000002 HC RX W HCPCS: Performed by: EMERGENCY MEDICINE

## 2021-09-02 PROCEDURE — 82805 BLOOD GASES W/O2 SATURATION: CPT

## 2021-09-02 PROCEDURE — 83735 ASSAY OF MAGNESIUM: CPT

## 2021-09-02 PROCEDURE — 2000000000 HC ICU R&B

## 2021-09-02 PROCEDURE — C9113 INJ PANTOPRAZOLE SODIUM, VIA: HCPCS

## 2021-09-02 PROCEDURE — 6370000000 HC RX 637 (ALT 250 FOR IP): Performed by: INTERNAL MEDICINE

## 2021-09-02 PROCEDURE — 2500000003 HC RX 250 WO HCPCS: Performed by: INTERNAL MEDICINE

## 2021-09-02 PROCEDURE — 82962 GLUCOSE BLOOD TEST: CPT

## 2021-09-02 PROCEDURE — 83605 ASSAY OF LACTIC ACID: CPT

## 2021-09-02 PROCEDURE — 80053 COMPREHEN METABOLIC PANEL: CPT

## 2021-09-02 PROCEDURE — 85025 COMPLETE CBC W/AUTO DIFF WBC: CPT

## 2021-09-02 RX ORDER — POLYETHYLENE GLYCOL 3350 17 G/17G
17 POWDER, FOR SOLUTION ORAL DAILY
Status: DISCONTINUED | OUTPATIENT
Start: 2021-09-02 | End: 2021-09-19 | Stop reason: HOSPADM

## 2021-09-02 RX ORDER — BISACODYL 10 MG
10 SUPPOSITORY, RECTAL RECTAL DAILY PRN
Status: DISCONTINUED | OUTPATIENT
Start: 2021-09-02 | End: 2021-09-05 | Stop reason: SDUPTHER

## 2021-09-02 RX ADMIN — PANTOPRAZOLE SODIUM 40 MG: 40 INJECTION, POWDER, FOR SOLUTION INTRAVENOUS at 08:05

## 2021-09-02 RX ADMIN — SODIUM CHLORIDE 25 ML: 9 INJECTION, SOLUTION INTRAVENOUS at 17:03

## 2021-09-02 RX ADMIN — Medication 10 ML: at 08:07

## 2021-09-02 RX ADMIN — KETAMINE HYDROCHLORIDE 0.2 MG/KG/HR: 100 INJECTION, SOLUTION INTRAMUSCULAR; INTRAVENOUS at 16:50

## 2021-09-02 RX ADMIN — Medication 10 ML: at 22:03

## 2021-09-02 RX ADMIN — CEFEPIME HYDROCHLORIDE 2000 MG: 2 INJECTION, POWDER, FOR SOLUTION INTRAVENOUS at 02:00

## 2021-09-02 RX ADMIN — LACOSAMIDE 150 MG: 100 TABLET, FILM COATED ORAL at 22:01

## 2021-09-02 RX ADMIN — IPRATROPIUM BROMIDE AND ALBUTEROL SULFATE 1 AMPULE: .5; 3 SOLUTION RESPIRATORY (INHALATION) at 14:47

## 2021-09-02 RX ADMIN — BACLOFEN 10 MG: 10 TABLET ORAL at 08:06

## 2021-09-02 RX ADMIN — INSULIN LISPRO 3 UNITS: 100 INJECTION, SOLUTION INTRAVENOUS; SUBCUTANEOUS at 17:09

## 2021-09-02 RX ADMIN — IPRATROPIUM BROMIDE AND ALBUTEROL SULFATE 1 AMPULE: .5; 3 SOLUTION RESPIRATORY (INHALATION) at 09:40

## 2021-09-02 RX ADMIN — HYDROCORTISONE SODIUM SUCCINATE 50 MG: 100 INJECTION, POWDER, FOR SOLUTION INTRAMUSCULAR; INTRAVENOUS at 12:57

## 2021-09-02 RX ADMIN — BISACODYL 10 MG: 10 SUPPOSITORY RECTAL at 11:36

## 2021-09-02 RX ADMIN — BACLOFEN 10 MG: 10 TABLET ORAL at 22:01

## 2021-09-02 RX ADMIN — SODIUM CHLORIDE, PRESERVATIVE FREE 10 ML: 5 INJECTION INTRAVENOUS at 08:05

## 2021-09-02 RX ADMIN — HYDROCORTISONE SODIUM SUCCINATE 50 MG: 100 INJECTION, POWDER, FOR SOLUTION INTRAMUSCULAR; INTRAVENOUS at 06:11

## 2021-09-02 RX ADMIN — LAMOTRIGINE 200 MG: 100 TABLET ORAL at 22:01

## 2021-09-02 RX ADMIN — ZONISAMIDE 300 MG: 100 CAPSULE ORAL at 22:02

## 2021-09-02 RX ADMIN — POLYVINYL ALCOHOL 1 DROP: 14 SOLUTION/ DROPS OPHTHALMIC at 11:36

## 2021-09-02 RX ADMIN — SODIUM CHLORIDE 25 ML: 9 INJECTION, SOLUTION INTRAVENOUS at 05:20

## 2021-09-02 RX ADMIN — ENOXAPARIN SODIUM 40 MG: 40 INJECTION SUBCUTANEOUS at 22:02

## 2021-09-02 RX ADMIN — POLYETHYLENE GLYCOL 3350 17 G: 17 POWDER, FOR SOLUTION ORAL at 11:36

## 2021-09-02 RX ADMIN — BARICITINIB 4 MG: 2 TABLET, FILM COATED ORAL at 08:06

## 2021-09-02 RX ADMIN — ENOXAPARIN SODIUM 40 MG: 40 INJECTION SUBCUTANEOUS at 08:05

## 2021-09-02 RX ADMIN — PROPOFOL 45 MCG/KG/MIN: 10 INJECTION, EMULSION INTRAVENOUS at 07:41

## 2021-09-02 RX ADMIN — IPRATROPIUM BROMIDE AND ALBUTEROL SULFATE 1 AMPULE: .5; 3 SOLUTION RESPIRATORY (INHALATION) at 06:33

## 2021-09-02 RX ADMIN — Medication 175 MCG/HR: at 13:11

## 2021-09-02 RX ADMIN — BACLOFEN 10 MG: 10 TABLET ORAL at 13:07

## 2021-09-02 RX ADMIN — POLYVINYL ALCOHOL 1 DROP: 14 SOLUTION/ DROPS OPHTHALMIC at 04:30

## 2021-09-02 RX ADMIN — LACOSAMIDE 150 MG: 100 TABLET, FILM COATED ORAL at 08:06

## 2021-09-02 RX ADMIN — Medication 200 MCG/HR: at 19:46

## 2021-09-02 RX ADMIN — Medication 175 MCG/HR: at 05:30

## 2021-09-02 RX ADMIN — IPRATROPIUM BROMIDE AND ALBUTEROL SULFATE 1 AMPULE: .5; 3 SOLUTION RESPIRATORY (INHALATION) at 18:01

## 2021-09-02 RX ADMIN — HYDROCORTISONE SODIUM SUCCINATE 50 MG: 100 INJECTION, POWDER, FOR SOLUTION INTRAMUSCULAR; INTRAVENOUS at 22:02

## 2021-09-02 RX ADMIN — PROPOFOL 50 MCG/KG/MIN: 10 INJECTION, EMULSION INTRAVENOUS at 18:54

## 2021-09-02 RX ADMIN — CEFEPIME HYDROCHLORIDE 2000 MG: 2 INJECTION, POWDER, FOR SOLUTION INTRAVENOUS at 12:52

## 2021-09-02 RX ADMIN — LAMOTRIGINE 200 MG: 100 TABLET ORAL at 13:07

## 2021-09-02 RX ADMIN — PROPOFOL 45 MCG/KG/MIN: 10 INJECTION, EMULSION INTRAVENOUS at 02:39

## 2021-09-02 RX ADMIN — POLYVINYL ALCOHOL 1 DROP: 14 SOLUTION/ DROPS OPHTHALMIC at 17:04

## 2021-09-02 RX ADMIN — POLYVINYL ALCOHOL 1 DROP: 14 SOLUTION/ DROPS OPHTHALMIC at 22:03

## 2021-09-02 RX ADMIN — METRONIDAZOLE 500 MG: 500 INJECTION, SOLUTION INTRAVENOUS at 06:00

## 2021-09-02 RX ADMIN — PROPOFOL 45 MCG/KG/MIN: 10 INJECTION, EMULSION INTRAVENOUS at 14:08

## 2021-09-02 RX ADMIN — LAMOTRIGINE 200 MG: 100 TABLET ORAL at 08:06

## 2021-09-02 ASSESSMENT — PULMONARY FUNCTION TESTS
PIF_VALUE: 31
PIF_VALUE: 29
PIF_VALUE: 32
PIF_VALUE: 29
PIF_VALUE: 30
PIF_VALUE: 28
PIF_VALUE: 28
PIF_VALUE: 29
PIF_VALUE: 28
PIF_VALUE: 28
PIF_VALUE: 29
PIF_VALUE: 29
PIF_VALUE: 32
PIF_VALUE: 28
PIF_VALUE: 28
PIF_VALUE: 29
PIF_VALUE: 31

## 2021-09-02 NOTE — PROGRESS NOTES
Comprehensive Nutrition Assessment    Type and Reason for Visit:  Reassess    Nutrition Recommendations/Plan: Due to increased Propofol recommend Osmolite 1.2 (Standard w/o  Fiber) @ 35 ml/hr x 24 hr + 1 ProMod daily to provide: 840 mlTV, 1112 kcal, 73 gm protein, 689 ml FW. Additional FW per critical care. Nutrition Assessment:  Pt admitted w/ hypokalemia and acute respiratory failure r/t COVID-19 and possible aspiration PNA. PMH of spastic CP. Pt remains intubated and sedated in prone position on trickle feeds. Will provide updated EN recommendations and monitor while admitted. Malnutrition Assessment:  Malnutrition Status: At risk for malnutrition (Comment)    Context:  Acute Illness     Findings of the 6 clinical characteristics of malnutrition:  Energy Intake:  Mild decrease in energy intake (Comment)  Weight Loss:  Unable to assess (No actual wt this admission and no actual EMR wt hx)     Body Fat Loss:  Unable to assess (Pt is COVID-19 positive)     Muscle Mass Loss:  Unable to assess    Fluid Accumulation:  No significant fluid accumulation     Strength:  Not Performed    Estimated Daily Nutrient Needs:  Energy (kcal):  0030-8253; Weight Used for Energy Requirements:  Current     Protein (g):  70-85 (1.4-1.7 gm/kg IBW);  Weight Used for Protein Requirements:  Ideal        Fluid (ml/day):  per critical care; Method Used for Fluid Requirements:  Other (Comment)      Nutrition Related Findings:  Pt intubated, Propofol @ 15.9 ml/hr (420 kcal/day), abd flat soft, PEG tube clamped, OGT w/ EN running at trickle, hypoactive BS, +4.1L I/O, +2 BUE edema, GI labs elevated, BUE contractures and flaccid BLE      Wounds:  Pressure Injury (noted on L heel, not staged)       Current Nutrition Therapies:    Diet NPO  ADULT TUBE FEEDING; Orogastric; Standard without Fiber; Continuous; 10; Yes; 10; Q 4 hours; 55; 100; Q 6 hours    Anthropometric Measures:  · Height: 5' (152.4 cm)  · Current Body Weight: 130 lb (59 kg) (Using admit wt d/t fluid status, Current wt: 153 lb (9/1) no method)   · Admission Body Weight: 130 lb (59 kg) (8/28 no method)    · Usual Body Weight: 135 lb (61.2 kg) (9/4/2020 stated, no actual EMR wt hx)     · Ideal Body Weight: 106 lbs; % Ideal Body Weight 122.6 %   · BMI: 25.4  · Adjusted Body Weight: No Adjustment   · BMI Categories: Overweight (BMI 25.0-29. 9)       Nutrition Diagnosis:   · Inadequate protein-energy intake related to impaired respiratory function as evidenced by intubation, nutrition support - enteral nutrition    Nutrition Interventions:   Food and/or Nutrient Delivery:  Modify Tube Feeding (Due to increased Propofol recommend Osmolite 1.2 (Standard w/o  Fiber) @ 35 ml/hr x 24 hr + 1 ProMod daily to provide: 840 mlTV, 1112 kcal, 73 gm protein, 689 ml FW. Additional FW per critical care.)  Nutrition Education/Counseling:  No recommendation at this time   Coordination of Nutrition Care:  Continue to monitor while inpatient    Goals:  EN tolerance       Nutrition Monitoring and Evaluation:   Behavioral-Environmental Outcomes:  None Identified   Food/Nutrient Intake Outcomes:  Enteral Nutrition Intake/Tolerance  Physical Signs/Symptoms Outcomes:  Biochemical Data, GI Status, Fluid Status or Edema, Nutrition Focused Physical Findings, Skin, Weight, Hemodynamic Status     Discharge Planning:     Too soon to determine     Electronically signed by Julissa Ghosh RD, LD on 9/2/21 at 1:47 PM EDT    Contact: [phone]

## 2021-09-02 NOTE — PROGRESS NOTES
[address] [place], [place] [address]ist   Progress Note    Admitting Date and Time: 8/28/2021  3:31 PM  Admit Dx: Hypokalemia [E87.6]  Acute respiratory failure with hypoxia ([facility] [J96.01]  Pneumonia of right lower lobe due to infectious organism [J18.9]  Sepsis with acute hypoxic respiratory failure without septic shock, due to unspecified organism ([facility] [A41.9, R65.20, J96.01]    Subjective:    Patient is currently intubated and sedated so unable to conduct ROS. Per RN: Patient was doing better yesterday late afternoon, however declined again overnight and was unable to come out of proning. FiO2 at 100%, planning on trying to wean. Patient was also hypothermic so was placed on warming blanket.        polyethylene glycol  17 g Oral Daily    baricitinib  4 mg Oral Daily    hydrocortisone sodium succinate PF  50 mg IntraVENous Q8H    insulin lispro  0-18 Units SubCUTAneous Q6H    cefepime  2,000 mg IntraVENous Q12H    sodium chloride flush  5-40 mL IntraVENous 2 times per day    ipratropium-albuterol  1 ampule Inhalation 4x daily    pantoprazole  40 mg IntraVENous Daily    And    sodium chloride (PF)  10 mL IntraVENous Daily    lacosamide  150 mg Oral BID    lamoTRIgine  200 mg Oral TID    zonisamide  300 mg Oral Nightly    baclofen  10 mg Oral TID    enoxaparin  40 mg SubCUTAneous BID    polyvinyl alcohol  1 drop Both Eyes Q6H     bisacodyl, 10 mg, Daily PRN  glucose, 15 g, PRN  dextrose, 12.5 g, PRN  glucagon (rDNA), 1 mg, PRN  dextrose, 100 mL/hr, PRN  sodium chloride flush, 10 mL, PRN  sodium chloride, 25 mL, PRN  ondansetron, 4 mg, Q8H PRN   Or  ondansetron, 4 mg, Q6H PRN  polyethylene glycol, 17 g, Daily PRN  acetaminophen, 650 mg, Q6H PRN   Or  acetaminophen, 650 mg, Q6H PRN  perflutren lipid microspheres, 1.5 mL, ONCE PRN  fentanNYL, 25 mcg, Q1H PRN  sodium chloride, 30 mL, PRN         Objective:    BP (!) 108/54   Pulse 108   Temp 94.3 °F (34.6 °C) (Core)   Resp 22   Ht 5' (1.524 m) Wt 153 lb (69.4 kg)   SpO2 94%   BMI 29.88 kg/m²     Due to the current efforts to prevent transmission of COVID-19 and also the need to preserve PPE for other caregivers, a face-to-face encounter with the patient was not performed. That being said, all relevant records and diagnostic tests were reviewed, including laboratory results and imaging. Please reference any relevant documentation elsewhere. Care will be coordinated with other specialties as appropriate.       Recent Labs     08/31/21 0503 09/01/21 0429 09/02/21  0444    143 144   K 2.4* 3.9 4.4    109* 110*   CO2 25 28 27   BUN 8 12 13   CREATININE 0.4* 0.4* 0.4*   GLUCOSE 379* 99 131*   CALCIUM 7.7* 7.8* 7.6*       Recent Labs     08/31/21 0503 09/01/21 0429 09/02/21  0444   ALKPHOS 110 208* 238*   PROT 4.2* 4.9* 4.2*   LABALBU 2.1* 2.3* 2.1*   BILITOT 1.6* 1.8* 1.1   AST 84* 81* 81*   ALT 34 33 36       Recent Labs     08/31/21  0503 09/01/21  0429 09/02/21  0534   WBC 16.0* 14.8* 6.1   RBC 3.36* 3.31* 3.02*   HGB 10.9* 10.8* 9.9*   HCT 31.2* 31.5* 30.2*   MCV 92.9 95.2 100.0*   MCH 32.4 32.6 32.8   MCHC 34.9* 34.3 32.8   RDW 12.2 12.9 13.7    146 100*   MPV 11.4 11.3 11.6       Lactic acid, plasma [[id number]] (Abnormal)    Collected: 08/31/21 0503    Updated: 08/31/21 0555    Specimen Source: Blood     Lactic Acid 5.2High Panic   mmol/L     Lactic acid, plasma [[id number]] (Abnormal)    Collected: 09/01/21 0329    Updated: 09/01/21 0329    Specimen Source: Blood     Lactic Acid 2.7High           Culture, Respiratory, Sputum [[id number]] (Abnormal)     Collected: 08/29/21 0220    Updated: 08/31/21 1340    Specimen Source: Sputum Expectorated     CULTURE, RESPIRATORY Oral Pharyngeal Adina reducedAbnormal     Smear, Respiratory --    Group 6: <25 PMN's/LPF and <25 Epithelial cells/LPF   Rare Polymorphonuclear leukocytes   Rare Epithelial cells   Rare Gram variable rods     Organism Klebsiella pneumoniae ssp pneumoniaeAbnormal CULTURE, RESPIRATORY Moderate growth   Narrative:     Source: SPUEX       Site:         Lactic acid, plasma [[id number]] (Abnormal)    Collected: 21 1124    Updated: 21 1243    Specimen Source: Blood     Lactic Acid 3.9High Panic   mmol/L         Radiology:   XR CHEST PORTABLE   Final Result   Significant bilateral infiltrates with partial interval clearing of the left   lower lobe infiltrate since the prior study         XR CHEST PORTABLE   Final Result   Satisfactory position of the ETT and NGT. Otherwise, stable chest.         XR CHEST PORTABLE   Final Result   Increasing right lung opacities consistent with pneumonia. US DUP LOWER EXTREMITIES BILATERAL VENOUS   Final Result   No evidence of DVT in either lower extremity. XR CHEST PORTABLE   Final Result   1. Right lower lobe pneumonia. 2. Left lower lobe atelectasis versus pneumonia. XR CHEST PORTABLE    (Results Pending)       Assessment:  Principal Problem:    Acute respiratory failure with hypoxia (HCC)  Active Problems:    Cerebral palsy (HCC)    Seizures ([facility]    Mentally disabled    Convulsions ([facility] [facility]    Pneumonia due to COVID-19 virus  Resolved Problems:    * No resolved hospital problems. *      Plan:  1. Acute respiratory failure with hypoxemia  -Patient currently intubated and sedated (fentanyl and propofol)   -FiO2 currently at 100%, 12 PEEP, 24 RR and   -Most recent AB.314/48.9/93.7/24.3. O2 saturation 94%   -Planning to wean FiO2 as tolerated and continue proning     2. COVID PNA  -Patient unvaccinated, was coughing, congested and had fevers prior to presentation  -Patient currently intubated and sedated   -ID is on board  -Patient was switched from dexamethasone after 3 days to solucortef 50mg Q8H (day 4 now). -Patient completed 5 days of remdesivir and is on duonebs.  Was also started on baricitinib today  -CXR yesterday shows partial improvement of LLL infiltrate  -WBC 6.1 (from 14 yesterday), was hypothermic today -placed on warming blanket    3. PNA, possibly aspiration  -Sputum Cx growing Klebsiella sensitive to cefepime, currently on cefepime 2gm Q12H  -Did receive 3 days of azithromycin as well, however was DCd  -Strep and legionella negative. BCx negative so far  -CXR shows dense RUL infiltrate  -Patient has been afebrile since admission, WBC 6.1 today from 14 yesterday  -Will follow up final sputum Cx and monitor labs and VS    4. Lactic acidosis  -LA peaked at 11.8 on 8/30, has improved to 2.5 this morning  -Patient had AG of 7 this AM  -Was getting bicarb infusion for acidosis, now switched to LR at 75ml/hr  -Will monitor BMP and lactic acid levels     5. Electrolyte abnormalities  -Potassium improved to 4.4 today and phosphorus 2.4  -Magnesium 2.7 today  -all were repleted, will continue to monitor    6. Seizure disorder 2/2 cerebral palsy  -Patient has known CP, is nonverbal at baseline with severe developmental delay  -Has seizures, takes baclofen, vimpat, lamictal and zonisamide at home which are continued here    7. Elevated blood glucose  -Patient has had elevated glucose levels up to 379, this   -No known diagnosis of DM and patient is currently on steroids which will increase glucose  -A1C should be checked to determine if patient is diabetic or just elevated BS 2/2 steroids and current illness  -Currently on NPO ISS with hypoglycemic protocol    8. Septic shock (improving)  -Patient hypotensive initially, required levophed but was able to be weaned off yesterday  -Still on LR at 75ml/hr and solucortef 50mg Q8H  -BP 96/88 today (was 75/39 at the lowest on 8/29)  -WBC 6.1 from 14 yesterday, lactic acid is improving  -Patient also tachycardic, HR 91 this AM  -Continue IVF and monitor VS and labs      NOTE: This report was transcribed using voice recognition software. Every effort was made to ensure accuracy; however, inadvertent computerized transcription errors may be present. Electronically signed by Geary Homans, MD on 9/2/2021 at 3:07 PM

## 2021-09-02 NOTE — PROGRESS NOTES
Palliative Care Department  [phone]  Palliative Care Progress Note  Provider MINERVA Pastor CNP      PATIENT: Swapnil Mixon  : 1995  MRN: [id number]  ADMISSION DATE: 2021  3:31 PM  Referring Provider: Delma Pavon MD    Palliative Medicine was consulted on hospital day 5 for assistance with Goals of care    HPI:     Ash Fulton is a 30 y.o. 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 ASSESSMENT/PLAN:     Pertinent Hospital Diagnoses      COVID-19 infection   Sepsis   Pneumonia    Palliative Care Encounter / Counseling Regarding Goals of Care  Please see detailed goals of care discussion as below   At this time, Swapnil Mixon, Does Not have capacity for medical decision-making. Capacity is time limited and situation/question specific   Outcome of goals of care meeting: Spoke with the patient's father and provided support. Plan is to continue treatment at this time. Code status remains a Full Code.  Code status Full Code   Advanced Directives: no POA or living will in epic   Surrogate/Legal NOK:    fareed Almanzar, Father, [phone]  Ej Mueller, Mother, [phone]    Spiritual assessment: no spiritual distress identified  Bereavement and grief: to be determined  Referrals to: none today    Thank you for the opportunity to participate in the care of Swapnil Mixon. MINERVA Pastor CNP  Palliative Medicine     SUBJECTIVE:     Details of Conversation: Spoke with the patient's father Greg Rowell on the phone. He explained he had just gotten an update. Erik's oxygen requirements have been fluctuating and he is requiring 100% FiO2 currently. Plan is to continue current treatment and he would like for code status to remain a Full Code.  Comfort and support provided, encouraged to call palliative medicine if he has any question or concerns. Prognosis: Guarded    OBJECTIVE:     BP (!) 96/58   Pulse 91   Temp 94.3 °F (34.6 °C) (Core)   Resp 24   Ht 5' (1.524 m)   Wt 153 lb (69.4 kg)   SpO2 91%   BMI 29.88 kg/m²     Physical Examination:  Due to the current efforts to prevent transmission of COVID-19 and also the need to preserve PPE for other caregivers, a face-to-face encounter with the patient was not performed. That being said, all relevant records and diagnostic tests were reviewed, including laboratory results and imaging. Please reference any relevant documentation elsewhere. Care will be coordinated with the primary service and other specialties as appropriate. Objective data reviewed: labs, images, records, medication use, vitals and chart    Time/Communication  Greater than 50% of time spent, total 15 minutes in counseling and coordination of care at the bedside regarding goals of care. Thank you for allowing Palliative Medicine to participate in the care of Jackeline Valentin. Note: This report was completed using [employer] voiced recognition software. Every effort has been made to ensure accuracy; however, inadvertent computerized transcription errors may be present.

## 2021-09-02 NOTE — PROGRESS NOTES
Pulmonary/Critical Care Progress Note    We are following patient for COVID-19 pneumonitis, Klebsiella pneumonia right upper lobe, acute respiratory failure, cerebral palsy, septic shock, metabolic acidosis, respiratory acidosis    SUBJECTIVE:  The patient has required an FiO2 of 1.00 for some of the day when he became unstable secondary to inadequate sedation. We have added ketamine which seems to have helped. However, this is not effective, we may need to reinitiate neuromuscular blockade. Currently, patient's blood pressure is 99/50 with an FiO2 of 1.00 and 13 of PEEP. Baricitinib has been added over the last several days. Patient is managing to keep urine output up and his BUN and creatinine are stable. Nevertheless, his prognosis remains quite poor. He was seen today by the palliative care team who interacted with the patient's father, who wishes that he be kept a full code.     MEDICATIONS:   polyethylene glycol  17 g Oral Daily    baricitinib  4 mg Oral Daily    hydrocortisone sodium succinate PF  50 mg IntraVENous Q8H    insulin lispro  0-18 Units SubCUTAneous Q6H    cefepime  2,000 mg IntraVENous Q12H    sodium chloride flush  5-40 mL IntraVENous 2 times per day    ipratropium-albuterol  1 ampule Inhalation 4x daily    pantoprazole  40 mg IntraVENous Daily    And    sodium chloride (PF)  10 mL IntraVENous Daily    lacosamide  150 mg Oral BID    lamoTRIgine  200 mg Oral TID    zonisamide  300 mg Oral Nightly    baclofen  10 mg Oral TID    enoxaparin  40 mg SubCUTAneous BID    polyvinyl alcohol  1 drop Both Eyes Q6H      ketamine (KETALAR) infusion for analgosedation      dextrose      lactated ringers 75 mL/hr at 09/01/21 0832    sodium chloride Stopped (09/02/21 0658)    sodium chloride      fentaNYL 5 mcg/ml in 0.9%  ml infusion 200 mcg/hr (09/02/21 1457)    propofol 50 mcg/kg/min (09/02/21 1459)    norepinephrine Stopped (08/30/21 0800)     bisacodyl, glucose, dextrose, glucagon (rDNA), dextrose, sodium chloride flush, sodium chloride, ondansetron **OR** ondansetron, polyethylene glycol, acetaminophen **OR** acetaminophen, perflutren lipid microspheres, fentanNYL, sodium chloride      REVIEW OF SYSTEMS:  Patient is unable to answer questions because he is sedated and intubated and has cerebral palsy    OBJECTIVE:  Vitals:    09/02/21 1400   BP: (!) 108/54   Pulse: 108   Resp: 22   Temp:    SpO2: 94%     FiO2 : 100 % (Vent physically checked; connections and ETT ok.)  O2 Flow Rate (L/min): 60 L/min  O2 Device: Ventilator    PHYSICAL EXAM:  Constitutional: No fever, chills, diaphoresis  Skin: Skin rash, no skin breakdown  HEENT: Endotracheal tube secured at lips  Neck: No JVD, lymphadenopathy, thyromegaly  Cardiovascular: S1, S2 regular. No S3 murmurs rubs present  Respiratory: Inspiratory crackles over both posterior lung fields.   No wheezes are heard  Gastrointestinal: Soft, nondistended, no hepatosplenomegaly  Genitourinary: No grossly bloody urine  Extremities: No clubbing, cyanosis, or edema  Neurological: Sedated  Psychological: Sedated    LABS:  WBC   Date Value Ref Range Status   09/02/2021 6.1 4.5 - 11.5 E9/L Final   09/01/2021 14.8 (H) 4.5 - 11.5 E9/L Final   08/31/2021 16.0 (H) 4.5 - 11.5 E9/L Final     Hemoglobin   Date Value Ref Range Status   09/02/2021 9.9 (L) 12.5 - 16.5 g/dL Final   09/01/2021 10.8 (L) 12.5 - 16.5 g/dL Final   08/31/2021 10.9 (L) 12.5 - 16.5 g/dL Final     Hematocrit   Date Value Ref Range Status   09/02/2021 30.2 (L) 37.0 - 54.0 % Final   09/01/2021 31.5 (L) 37.0 - 54.0 % Final   08/31/2021 31.2 (L) 37.0 - 54.0 % Final     MCV   Date Value Ref Range Status   09/02/2021 100.0 (H) 80.0 - 99.9 fL Final   09/01/2021 95.2 80.0 - 99.9 fL Final   08/31/2021 92.9 80.0 - 99.9 fL Final     Platelets   Date Value Ref Range Status   09/02/2021 100 (L) 130 - 450 E9/L Final   09/01/2021 146 130 - 450 E9/L Final   08/31/2021 143 130 - 450 E9/L Final Sodium   Date Value Ref Range Status   09/02/2021 144 132 - 146 mmol/L Final   09/01/2021 143 132 - 146 mmol/L Final   08/31/2021 135 132 - 146 mmol/L Final     Potassium   Date Value Ref Range Status   09/02/2021 4.4 3.5 - 5.0 mmol/L Final   09/01/2021 3.9 3.5 - 5.0 mmol/L Final   08/31/2021 2.4 (LL) 3.5 - 5.0 mmol/L Final     Chloride   Date Value Ref Range Status   09/02/2021 110 (H) 98 - 107 mmol/L Final   09/01/2021 109 (H) 98 - 107 mmol/L Final   08/31/2021 101 98 - 107 mmol/L Final     CO2   Date Value Ref Range Status   09/02/2021 27 22 - 29 mmol/L Final   09/01/2021 28 22 - 29 mmol/L Final   08/31/2021 25 22 - 29 mmol/L Final     BUN   Date Value Ref Range Status   09/02/2021 13 6 - 20 mg/dL Final   09/01/2021 12 6 - 20 mg/dL Final   08/31/2021 8 6 - 20 mg/dL Final     CREATININE   Date Value Ref Range Status   09/02/2021 0.4 (L) 0.7 - 1.2 mg/dL Final   09/01/2021 0.4 (L) 0.7 - 1.2 mg/dL Final   08/31/2021 0.4 (L) 0.7 - 1.2 mg/dL Final     Glucose   Date Value Ref Range Status   09/02/2021 131 (H) 74 - 99 mg/dL Final   09/01/2021 99 74 - 99 mg/dL Final   08/31/2021 379 (H) 74 - 99 mg/dL Final     Calcium   Date Value Ref Range Status   09/02/2021 7.6 (L) 8.6 - 10.2 mg/dL Final   09/01/2021 7.8 (L) 8.6 - 10.2 mg/dL Final   08/31/2021 7.7 (L) 8.6 - 10.2 mg/dL Final     Total Protein   Date Value Ref Range Status   09/02/2021 4.2 (L) 6.4 - 8.3 g/dL Final   09/01/2021 4.9 (L) 6.4 - 8.3 g/dL Final   08/31/2021 4.2 (L) 6.4 - 8.3 g/dL Final     Albumin   Date Value Ref Range Status   09/02/2021 2.1 (L) 3.5 - 5.2 g/dL Final   09/01/2021 2.3 (L) 3.5 - 5.2 g/dL Final   08/31/2021 2.1 (L) 3.5 - 5.2 g/dL Final     Total Bilirubin   Date Value Ref Range Status   09/02/2021 1.1 0.0 - 1.2 mg/dL Final   09/01/2021 1.8 (H) 0.0 - 1.2 mg/dL Final   08/31/2021 1.6 (H) 0.0 - 1.2 mg/dL Final     Alkaline Phosphatase   Date Value Ref Range Status   09/02/2021 238 (H) 40 - 129 U/L Final   09/01/2021 208 (H) 40 - 129 U/L Final 08/31/2021 110 40 - 129 U/L Final     AST   Date Value Ref Range Status   09/02/2021 81 (H) 0 - 39 U/L Final   09/01/2021 81 (H) 0 - 39 U/L Final   08/31/2021 84 (H) 0 - 39 U/L Final     ALT   Date Value Ref Range Status   09/02/2021 36 0 - 40 U/L Final   09/01/2021 33 0 - 40 U/L Final   08/31/2021 34 0 - 40 U/L Final     GFR Non-   Date Value Ref Range Status   09/02/2021 >60 >=60 mL/min/1.73 Final     Comment:     Chronic Kidney Disease: less than 60 ml/min/1.73 sq.m. Kidney Failure: less than 15 ml/min/1.73 sq.m. Results valid for patients 18 years and older. 09/01/2021 >60 >=60 mL/min/1.73 Final     Comment:     Chronic Kidney Disease: less than 60 ml/min/1.73 sq.m. Kidney Failure: less than 15 ml/min/1.73 sq.m. Results valid for patients 18 years and older. 08/31/2021 >60 >=60 mL/min/1.73 Final     Comment:     Chronic Kidney Disease: less than 60 ml/min/1.73 sq.m. Kidney Failure: less than 15 ml/min/1.73 sq.m. Results valid for patients 18 years and older. GFR    Date Value Ref Range Status   09/02/2021 >60  Final   09/01/2021 >60  Final   08/31/2021 >60  Final     Magnesium   Date Value Ref Range Status   09/02/2021 2.7 (H) 1.6 - 2.6 mg/dL Final   09/01/2021 2.5 1.6 - 2.6 mg/dL Final   08/31/2021 2.2 1.6 - 2.6 mg/dL Final     Phosphorus   Date Value Ref Range Status   09/02/2021 2.4 (L) 2.5 - 4.5 mg/dL Final   09/01/2021 2.1 (L) 2.5 - 4.5 mg/dL Final   08/31/2021 0.7 (LL) 2.5 - 4.5 mg/dL Final     Recent Labs     09/02/21  0517   PH 7.314*   PO2 93.7   PCO2 48.9*   HCO3 24.3   BE -2.1   O2SAT 96.4       RADIOLOGY:  XR CHEST PORTABLE   Final Result   Significant bilateral infiltrates with partial interval clearing of the left   lower lobe infiltrate since the prior study         XR CHEST PORTABLE   Final Result   Satisfactory position of the ETT and NGT.       Otherwise, stable chest.         XR CHEST PORTABLE   Final Result Increasing right lung opacities consistent with pneumonia. US DUP LOWER EXTREMITIES BILATERAL VENOUS   Final Result   No evidence of DVT in either lower extremity. XR CHEST PORTABLE   Final Result   1. Right lower lobe pneumonia. 2. Left lower lobe atelectasis versus pneumonia. XR CHEST PORTABLE    (Results Pending)           PROBLEM LIST:  Principal Problem:    Acute respiratory failure with hypoxia (HCC)  Active Problems:    Cerebral palsy (HCC)    Seizures ([address]    Mentally disabled    Convulsions ([address]    Pneumonia due to COVID-19 virus  Resolved Problems:    * No resolved hospital problems. *      IMPRESSION:   1. Acute respiratory failure  2. COVID-19 pneumonitis/ARDS  3. Klebsiella pneumonia  4. Question aspiration pneumonitis  5. History of seizure disorder  6. Cerebral palsy  7. Diabetes mellitus type 2    PLAN:  1. Continue to try to reduce FiO2 while patient is in prone position  2. Add ketamine for more sedation  3. Continue tube feedings at trickle rate while he is prone  4. Insulin coverage  5. Air/bronchodilators  6. If able to obtain chest x-ray and ABGs in a.m. after supine positioning, we will do so  7. Continue baricitinib    ATTESTATION:  ICU Staff Physician note of personal involvement in Care  As the attending physician, I certify that I personally reviewed the patients history and personally examined the patient to confirm the physical findings described above,  And that I reviewed the relevant imaging studies and available reports. I also discussed the differential diagnosis and all of the proposed management plans with the patient and individuals accompanying the patient to this visit. They had the opportunity to ask questions about the proposed management plans and to have those questions answered.      This patient has a high probability of sudden, clinically significant deterioration, which requires the highest level of physician preparedness to intervene urgently. I managed/supervised life or organ supporting interventions that required frequent physician assessment. I devoted my full attention to the direct care of this patient for the amount of time indicated below. Time I spent with the family or surrogate(s) is included only if the patient was incapable of providing the necessary information or participating in medical decisions  Time devoted to teaching and to any procedures I billed separately is not included.     CRITICAL CARE TIME:  38 minutes    Electronically signed by Ceola Goldmann, MD on 9/2/2021 at 3:49 PM

## 2021-09-02 NOTE — CARE COORDINATION
9/2/21 Positive covid (8/28/21)- unvaccinated. Cm transition of care: icu/vent/sedation/art line/isolation/prone. fio2 at 75%, peep 12. Pt (has Cerebral palsy) lives at home with both parents in two story home, has rale system, wc bound and all supplies he needs at this time. Pt has private duty nursing care. Father will provide a ride with a special van at discharge. Watch for oxygen needs. Palliative care on consult. CM/SS to follow.   Electronically signed by Nick Richards RN-BC on 9/2/2021 at 9:41 AM

## 2021-09-02 NOTE — PROGRESS NOTES
chloride    PHYSICAL EXAM     BP (!) 96/58   Pulse 91   Temp 94.3 °F (34.6 °C) (Core)   Resp 24   Ht 5' (1.524 m)   Wt 153 lb (69.4 kg)   SpO2 91%   BMI 29.88 kg/m²   Temp  Av.5 °F (35.8 °C)  Min: 94.3 °F (34.6 °C)  Max: 97.5 °F (36.4 °C)  Constitutional:  The patient is sedated  Vent PRONING   Lines: [facility]  j tube  Suprapubic       DIAGNOSTIC RESULTS   Radiology:    Recent Labs     21  0503 21  0534   WBC 16.0* 14.8* 6.1   RBC 3.36* 3.31* 3.02*   HGB 10.9* 10.8* 9.9*   HCT 31.2* 31.5* 30.2*   MCV 92.9 95.2 100.0*   MCH 32.4 32.6 32.8   MCHC 34.9* 34.3 32.8   RDW 12.2 12.9 13.7    146 100*   MPV 11.4 11.3 11.6     Recent Labs     21  0503 21  0444    143 144   K 2.4* 3.9 4.4    109* 110*   CO2 25 28 27   BUN 8 12 13   CREATININE 0.4* 0.4* 0.4*   GLUCOSE 379* 99 131*   PROT 4.2* 4.9* 4.2*   LABALBU 2.1* 2.3* 2.1*   CALCIUM 7.7* 7.8* 7.6*   BILITOT 1.6* 1.8* 1.1   ALKPHOS 110 208* 238*   AST 84* 81* 81*   ALT 34 33 36     Lab Results   Component Value Date    CRP 0.3 2021    CRP 8.4 (H) 2021     Lab Results   Component Value Date    SEDRATE 5 2019    SEDRATE 29 (H) 2018    SEDRATE 21 (H) 08/10/2018     Recent Labs     21  0503 21  0444   AST 84* 81* 81*   ALT 34 33 36        Microbiology:   No results for input(s): COVID19 in the last 72 hours.   Lab Results   Component Value Date    BC 24 Hours no growth 2021    BLOODCULT2 24 Hours no growth 2021    ORG Klebsiella pneumoniae ssp pneumoniae 2021    ORG Coagulase Negative Staphylococci 2018    ORG Corynebacterium species 2018     WOUND/ABSCESS   Date Value Ref Range Status   2018 Light growth  Final   2018 Light growth  Mixed morphologies    Final   2018   Corrected    Light growth  Too fastidious for susceptibility testing.     2018 Light growth  Corrected   2018 Moderate growth  Corrected     Smear, Respiratory   Date Value Ref Range Status   08/29/2021   Final    Group 6: <25 PMN's/LPF and <25 Epithelial cells/LPF  Rare Polymorphonuclear leukocytes  Rare Epithelial cells  Rare Gram variable rods       No results found for: MPNEUMO, CLAMYDCU, LABLEGI, AFBCX, FUNGSM, LABFUNG    MRSA Culture Only   Date Value Ref Range Status   08/29/2021 Methicillin resistant Staph aureus not isolated  Final     CULTURE, RESPIRATORY   Date Value Ref Range Status   08/29/2021 Oral Pharyngeal Adina reduced (A)  Final   08/29/2021 Moderate growth  Final     No results for input(s): CXSURG, ORG in the last 72 hours. No results for input(s): WNDABS, ORG in the last 72 hours. Susceptibility    Klebsiella pneumoniae ssp pneumoniae (1)    Antibiotic Interpretation DERIAN Status    ampicillin Resistant >=^32 mcg/mL     ceFAZolin Sensitive <=^4 mcg/mL     cefepime Sensitive <=^0.12 mcg/mL     cefTRIAXone Sensitive <=^0.25 mcg/mL     Confirmatory Extended Spectrum Beta-Lactamase Negative Neg mcg/mL     ertapenem Sensitive <=^0.12 mcg/mL     gentamicin Sensitive <=^1 mcg/mL     levofloxacin Sensitive <=^0.12 mcg/mL     piperacillin-tazobactam Sensitive <=^4 mcg/mL     trimethoprim-sulfamethoxazole Sensitive <=^20 mcg/            ASSESSMENT      With leukopenia/ leukocytosis - resolved   Klebsiella pneumonia  Mrsa neg   COVID19 pneumonia   Cerebral palsy admitted with COVID pneumonia and AMS     PLAN  Continue Cefepime and Azithromycin   DC Flagyl  S/p Remdesivir x 5 days   Continue Baricitinib   Continue vitamins   Monitor labs, follow cultures   Wean oxygen as tolerated       MINERVA Cho - NP,  9/2/2021  12:50 PM    I have discussed the case, including pertinent history  and the key elements of the encounter have been performed by me. I agree with the assessment, plan and orders as documented.       Treatment plan as per my recommendation     Bailey Leija MD, FACP  9/2/2021  11:02 PM

## 2021-09-03 ENCOUNTER — APPOINTMENT (OUTPATIENT)
Dept: GENERAL RADIOLOGY | Age: 26
DRG: 870 | End: 2021-09-03
Payer: COMMERCIAL

## 2021-09-03 LAB
ALBUMIN SERPL-MCNC: 2.1 G/DL (ref 3.5–5.2)
ALP BLD-CCNC: 290 U/L (ref 40–129)
ALT SERPL-CCNC: 47 U/L (ref 0–40)
ANION GAP SERPL CALCULATED.3IONS-SCNC: 2 MMOL/L (ref 7–16)
AST SERPL-CCNC: 104 U/L (ref 0–39)
BASOPHILS ABSOLUTE: 0.01 E9/L (ref 0–0.2)
BASOPHILS RELATIVE PERCENT: 0.2 % (ref 0–2)
BILIRUB SERPL-MCNC: 0.8 MG/DL (ref 0–1.2)
BUN BLDV-MCNC: 12 MG/DL (ref 6–20)
CALCIUM SERPL-MCNC: 7.7 MG/DL (ref 8.6–10.2)
CHLORIDE BLD-SCNC: 109 MMOL/L (ref 98–107)
CO2: 34 MMOL/L (ref 22–29)
CREAT SERPL-MCNC: 0.5 MG/DL (ref 0.7–1.2)
CULTURE, BLOOD 2: NORMAL
EOSINOPHILS ABSOLUTE: 0 E9/L (ref 0.05–0.5)
EOSINOPHILS RELATIVE PERCENT: 0 % (ref 0–6)
GFR AFRICAN AMERICAN: >60
GFR NON-AFRICAN AMERICAN: >60 ML/MIN/1.73
GLUCOSE BLD-MCNC: 93 MG/DL (ref 74–99)
HCT VFR BLD CALC: 29.6 % (ref 37–54)
HEMOGLOBIN: 9.5 G/DL (ref 12.5–16.5)
IMMATURE GRANULOCYTES #: 0.25 E9/L
IMMATURE GRANULOCYTES %: 4.1 % (ref 0–5)
LACTIC ACID: 1 MMOL/L (ref 0.5–2.2)
LACTIC ACID: 1.2 MMOL/L (ref 0.5–2.2)
LYMPHOCYTES ABSOLUTE: 0.93 E9/L (ref 1.5–4)
LYMPHOCYTES RELATIVE PERCENT: 15.1 % (ref 20–42)
MAGNESIUM: 2.4 MG/DL (ref 1.6–2.6)
MCH RBC QN AUTO: 32.5 PG (ref 26–35)
MCHC RBC AUTO-ENTMCNC: 32.1 % (ref 32–34.5)
MCV RBC AUTO: 101.4 FL (ref 80–99.9)
METER GLUCOSE: 101 MG/DL (ref 74–99)
METER GLUCOSE: 122 MG/DL (ref 74–99)
METER GLUCOSE: 130 MG/DL (ref 74–99)
MONOCYTES ABSOLUTE: 0.43 E9/L (ref 0.1–0.95)
MONOCYTES RELATIVE PERCENT: 7 % (ref 2–12)
NEUTROPHILS ABSOLUTE: 4.53 E9/L (ref 1.8–7.3)
NEUTROPHILS RELATIVE PERCENT: 73.6 % (ref 43–80)
PDW BLD-RTO: 13.9 FL (ref 11.5–15)
PHOSPHORUS: 2.4 MG/DL (ref 2.5–4.5)
PLATELET # BLD: 172 E9/L (ref 130–450)
PMV BLD AUTO: 11.1 FL (ref 7–12)
POTASSIUM SERPL-SCNC: 4.5 MMOL/L (ref 3.5–5)
RBC # BLD: 2.92 E12/L (ref 3.8–5.8)
SODIUM BLD-SCNC: 145 MMOL/L (ref 132–146)
TOTAL PROTEIN: 4.4 G/DL (ref 6.4–8.3)
WBC # BLD: 6.2 E9/L (ref 4.5–11.5)

## 2021-09-03 PROCEDURE — 2580000003 HC RX 258: Performed by: INTERNAL MEDICINE

## 2021-09-03 PROCEDURE — 2500000003 HC RX 250 WO HCPCS: Performed by: NURSE PRACTITIONER

## 2021-09-03 PROCEDURE — 6360000002 HC RX W HCPCS: Performed by: INTERNAL MEDICINE

## 2021-09-03 PROCEDURE — 85025 COMPLETE CBC W/AUTO DIFF WBC: CPT

## 2021-09-03 PROCEDURE — 71045 X-RAY EXAM CHEST 1 VIEW: CPT

## 2021-09-03 PROCEDURE — 83735 ASSAY OF MAGNESIUM: CPT

## 2021-09-03 PROCEDURE — 99232 SBSQ HOSP IP/OBS MODERATE 35: CPT | Performed by: STUDENT IN AN ORGANIZED HEALTH CARE EDUCATION/TRAINING PROGRAM

## 2021-09-03 PROCEDURE — 6370000000 HC RX 637 (ALT 250 FOR IP): Performed by: INTERNAL MEDICINE

## 2021-09-03 PROCEDURE — 6360000002 HC RX W HCPCS

## 2021-09-03 PROCEDURE — C9113 INJ PANTOPRAZOLE SODIUM, VIA: HCPCS

## 2021-09-03 PROCEDURE — 31500 INSERT EMERGENCY AIRWAY: CPT

## 2021-09-03 PROCEDURE — 99253 IP/OBS CNSLTJ NEW/EST LOW 45: CPT | Performed by: SURGERY

## 2021-09-03 PROCEDURE — 80053 COMPREHEN METABOLIC PANEL: CPT

## 2021-09-03 PROCEDURE — 6370000000 HC RX 637 (ALT 250 FOR IP)

## 2021-09-03 PROCEDURE — 6360000002 HC RX W HCPCS: Performed by: EMERGENCY MEDICINE

## 2021-09-03 PROCEDURE — 74018 RADEX ABDOMEN 1 VIEW: CPT

## 2021-09-03 PROCEDURE — 83605 ASSAY OF LACTIC ACID: CPT

## 2021-09-03 PROCEDURE — 2580000003 HC RX 258

## 2021-09-03 PROCEDURE — 6360000004 HC RX CONTRAST MEDICATION: Performed by: STUDENT IN AN ORGANIZED HEALTH CARE EDUCATION/TRAINING PROGRAM

## 2021-09-03 PROCEDURE — 84100 ASSAY OF PHOSPHORUS: CPT

## 2021-09-03 PROCEDURE — 94640 AIRWAY INHALATION TREATMENT: CPT

## 2021-09-03 PROCEDURE — 36592 COLLECT BLOOD FROM PICC: CPT

## 2021-09-03 PROCEDURE — 82962 GLUCOSE BLOOD TEST: CPT

## 2021-09-03 PROCEDURE — 2500000003 HC RX 250 WO HCPCS

## 2021-09-03 PROCEDURE — 2580000003 HC RX 258: Performed by: EMERGENCY MEDICINE

## 2021-09-03 PROCEDURE — 2580000003 HC RX 258: Performed by: NURSE PRACTITIONER

## 2021-09-03 PROCEDURE — 94003 VENT MGMT INPAT SUBQ DAY: CPT

## 2021-09-03 PROCEDURE — 36415 COLL VENOUS BLD VENIPUNCTURE: CPT

## 2021-09-03 PROCEDURE — 2000000000 HC ICU R&B

## 2021-09-03 PROCEDURE — 2500000003 HC RX 250 WO HCPCS: Performed by: INTERNAL MEDICINE

## 2021-09-03 RX ORDER — DEXAMETHASONE SODIUM PHOSPHATE 10 MG/ML
6 INJECTION, SOLUTION INTRAMUSCULAR; INTRAVENOUS EVERY 12 HOURS
Status: DISCONTINUED | OUTPATIENT
Start: 2021-09-03 | End: 2021-09-11

## 2021-09-03 RX ORDER — ETOMIDATE 2 MG/ML
20 INJECTION INTRAVENOUS ONCE
Status: COMPLETED | OUTPATIENT
Start: 2021-09-03 | End: 2021-09-03

## 2021-09-03 RX ORDER — ETOMIDATE 2 MG/ML
INJECTION INTRAVENOUS
Status: DISPENSED
Start: 2021-09-03 | End: 2021-09-04

## 2021-09-03 RX ADMIN — DEXAMETHASONE SODIUM PHOSPHATE 6 MG: 10 INJECTION, SOLUTION INTRAMUSCULAR; INTRAVENOUS at 10:45

## 2021-09-03 RX ADMIN — IPRATROPIUM BROMIDE AND ALBUTEROL SULFATE 1 AMPULE: .5; 3 SOLUTION RESPIRATORY (INHALATION) at 16:01

## 2021-09-03 RX ADMIN — BARICITINIB 4 MG: 2 TABLET, FILM COATED ORAL at 08:31

## 2021-09-03 RX ADMIN — Medication 200 MCG/HR: at 09:41

## 2021-09-03 RX ADMIN — LAMOTRIGINE 200 MG: 100 TABLET ORAL at 21:39

## 2021-09-03 RX ADMIN — PROPOFOL 50 MCG/KG/MIN: 10 INJECTION, EMULSION INTRAVENOUS at 00:37

## 2021-09-03 RX ADMIN — POTASSIUM PHOSPHATE, MONOBASIC AND POTASSIUM PHOSPHATE, DIBASIC 10 MMOL: 224; 236 INJECTION, SOLUTION, CONCENTRATE INTRAVENOUS at 08:29

## 2021-09-03 RX ADMIN — BACLOFEN 10 MG: 10 TABLET ORAL at 08:31

## 2021-09-03 RX ADMIN — KETAMINE HYDROCHLORIDE 0.2 MG/KG/HR: 100 INJECTION, SOLUTION INTRAMUSCULAR; INTRAVENOUS at 22:22

## 2021-09-03 RX ADMIN — POLYVINYL ALCOHOL 1 DROP: 14 SOLUTION/ DROPS OPHTHALMIC at 21:51

## 2021-09-03 RX ADMIN — Medication 10 ML: at 08:36

## 2021-09-03 RX ADMIN — BACLOFEN 10 MG: 10 TABLET ORAL at 21:39

## 2021-09-03 RX ADMIN — SODIUM CHLORIDE, PRESERVATIVE FREE 10 ML: 5 INJECTION INTRAVENOUS at 08:30

## 2021-09-03 RX ADMIN — HYDROCORTISONE SODIUM SUCCINATE 50 MG: 100 INJECTION, POWDER, FOR SOLUTION INTRAMUSCULAR; INTRAVENOUS at 05:16

## 2021-09-03 RX ADMIN — LAMOTRIGINE 200 MG: 100 TABLET ORAL at 08:31

## 2021-09-03 RX ADMIN — CEFEPIME HYDROCHLORIDE 2000 MG: 2 INJECTION, POWDER, FOR SOLUTION INTRAVENOUS at 01:55

## 2021-09-03 RX ADMIN — ENOXAPARIN SODIUM 40 MG: 40 INJECTION SUBCUTANEOUS at 20:38

## 2021-09-03 RX ADMIN — SODIUM CHLORIDE 25 ML: 9 INJECTION, SOLUTION INTRAVENOUS at 05:06

## 2021-09-03 RX ADMIN — IPRATROPIUM BROMIDE AND ALBUTEROL SULFATE 1 AMPULE: .5; 3 SOLUTION RESPIRATORY (INHALATION) at 12:26

## 2021-09-03 RX ADMIN — ETOMIDATE 20 MG: 2 INJECTION, SOLUTION INTRAVENOUS at 20:25

## 2021-09-03 RX ADMIN — POLYVINYL ALCOHOL 1 DROP: 14 SOLUTION/ DROPS OPHTHALMIC at 10:45

## 2021-09-03 RX ADMIN — Medication 200 MCG/HR: at 16:53

## 2021-09-03 RX ADMIN — ZONISAMIDE 300 MG: 100 CAPSULE ORAL at 21:38

## 2021-09-03 RX ADMIN — SODIUM CHLORIDE, PRESERVATIVE FREE 10 ML: 5 INJECTION INTRAVENOUS at 20:41

## 2021-09-03 RX ADMIN — LACOSAMIDE 150 MG: 100 TABLET, FILM COATED ORAL at 21:39

## 2021-09-03 RX ADMIN — DEXAMETHASONE SODIUM PHOSPHATE 6 MG: 10 INJECTION, SOLUTION INTRAMUSCULAR; INTRAVENOUS at 21:40

## 2021-09-03 RX ADMIN — Medication 200 MCG/HR: at 02:12

## 2021-09-03 RX ADMIN — PROPOFOL 50 MCG/KG/MIN: 10 INJECTION, EMULSION INTRAVENOUS at 20:22

## 2021-09-03 RX ADMIN — ENOXAPARIN SODIUM 40 MG: 40 INJECTION SUBCUTANEOUS at 08:30

## 2021-09-03 RX ADMIN — PANTOPRAZOLE SODIUM 40 MG: 40 INJECTION, POWDER, FOR SOLUTION INTRAVENOUS at 08:30

## 2021-09-03 RX ADMIN — POLYVINYL ALCOHOL 1 DROP: 14 SOLUTION/ DROPS OPHTHALMIC at 16:56

## 2021-09-03 RX ADMIN — IPRATROPIUM BROMIDE AND ALBUTEROL SULFATE 1 AMPULE: .5; 3 SOLUTION RESPIRATORY (INHALATION) at 05:43

## 2021-09-03 RX ADMIN — PROPOFOL 50 MCG/KG/MIN: 10 INJECTION, EMULSION INTRAVENOUS at 16:52

## 2021-09-03 RX ADMIN — SODIUM CHLORIDE, POTASSIUM CHLORIDE, SODIUM LACTATE AND CALCIUM CHLORIDE: 600; 310; 30; 20 INJECTION, SOLUTION INTRAVENOUS at 02:08

## 2021-09-03 RX ADMIN — DIATRIZOATE MEGLUMINE AND DIATRIZOATE SODIUM 50 ML: 600; 100 SOLUTION ORAL; RECTAL at 16:18

## 2021-09-03 RX ADMIN — IPRATROPIUM BROMIDE AND ALBUTEROL SULFATE 1 AMPULE: .5; 3 SOLUTION RESPIRATORY (INHALATION) at 09:10

## 2021-09-03 RX ADMIN — BACLOFEN 10 MG: 10 TABLET ORAL at 15:43

## 2021-09-03 RX ADMIN — PROPOFOL 50 MCG/KG/MIN: 10 INJECTION, EMULSION INTRAVENOUS at 05:16

## 2021-09-03 RX ADMIN — LACOSAMIDE 150 MG: 100 TABLET, FILM COATED ORAL at 08:30

## 2021-09-03 RX ADMIN — SODIUM CHLORIDE 25 ML: 9 INJECTION, SOLUTION INTRAVENOUS at 19:42

## 2021-09-03 RX ADMIN — POLYETHYLENE GLYCOL 3350 17 G: 17 POWDER, FOR SOLUTION ORAL at 08:30

## 2021-09-03 RX ADMIN — LAMOTRIGINE 200 MG: 100 TABLET ORAL at 15:43

## 2021-09-03 RX ADMIN — PROPOFOL 50 MCG/KG/MIN: 10 INJECTION, EMULSION INTRAVENOUS at 11:56

## 2021-09-03 RX ADMIN — CEFEPIME HYDROCHLORIDE 2000 MG: 2 INJECTION, POWDER, FOR SOLUTION INTRAVENOUS at 15:47

## 2021-09-03 RX ADMIN — SODIUM CHLORIDE, PRESERVATIVE FREE 10 ML: 5 INJECTION INTRAVENOUS at 21:40

## 2021-09-03 RX ADMIN — POLYVINYL ALCOHOL 1 DROP: 14 SOLUTION/ DROPS OPHTHALMIC at 05:07

## 2021-09-03 ASSESSMENT — PULMONARY FUNCTION TESTS
PIF_VALUE: 42
PIF_VALUE: 32
PIF_VALUE: 30
PIF_VALUE: 41
PIF_VALUE: 33
PIF_VALUE: 34
PIF_VALUE: 28
PIF_VALUE: 32
PIF_VALUE: 34
PIF_VALUE: 28
PIF_VALUE: 31
PIF_VALUE: 30
PIF_VALUE: 29
PIF_VALUE: 41
PIF_VALUE: 29
PIF_VALUE: 45
PIF_VALUE: 32
PIF_VALUE: 30
PIF_VALUE: 30
PIF_VALUE: 31
PIF_VALUE: 31
PIF_VALUE: 34
PIF_VALUE: 31

## 2021-09-03 NOTE — PROGRESS NOTES
[address] [facility]  [facility]  Progress Note    Chief Complaint   Patient presents with    Altered Mental Status    Respiratory Distress     41% SpO2     SUBJECTIVE    Pt seen   09/03/21     No physical  examination today due to the efforts to preserve PPE and limit transmission/exposure to COVID-19. All relevant records and diagnostic tests were reviewed, including laboratory results and imaging. Please reference any relevant documentation elsewhere. Care will be coordinated with patient's care team.    On vent, prone, paralyzed   Patient is tolerating medications. No reported adverse drug reactions. REVIEW OF SYSTEMS     As stated above in the chief complaint, otherwise negative.   CURRENT MEDICATIONS      potassium phosphate IVPB  10 mmol IntraVENous Once    dexamethasone  6 mg IntraVENous Q12H    polyethylene glycol  17 g Oral Daily    baricitinib  4 mg Oral Daily    insulin lispro  0-18 Units SubCUTAneous Q6H    cefepime  2,000 mg IntraVENous Q12H    sodium chloride flush  5-40 mL IntraVENous 2 times per day    ipratropium-albuterol  1 ampule Inhalation 4x daily    pantoprazole  40 mg IntraVENous Daily    And    sodium chloride (PF)  10 mL IntraVENous Daily    lacosamide  150 mg Oral BID    lamoTRIgine  200 mg Oral TID    zonisamide  300 mg Oral Nightly    baclofen  10 mg Oral TID    enoxaparin  40 mg SubCUTAneous BID    polyvinyl alcohol  1 drop Both Eyes Q6H     Continuous Infusions:   ketamine (KETALAR) infusion for analgosedation 0.2 mg/kg/hr (09/02/21 1650)    dextrose      lactated ringers 75 mL/hr at 09/03/21 0208    sodium chloride Stopped (09/03/21 0706)    sodium chloride      fentaNYL 5 mcg/ml in 0.9%  ml infusion 200 mcg/hr (09/03/21 0941)    propofol 50 mcg/kg/min (09/03/21 0516)    norepinephrine Stopped (08/30/21 0800)     PRN Meds:bisacodyl, glucose, dextrose, glucagon (rDNA), dextrose, sodium chloride flush, sodium chloride, ondansetron **OR** ondansetron, polyethylene glycol, acetaminophen **OR** acetaminophen, perflutren lipid microspheres, fentanNYL, sodium chloride    PHYSICAL EXAM     BP (!) 112/55   Pulse 111   Temp 97.7 °F (36.5 °C) (Core)   Resp 22   Ht 5' (1.524 m)   Wt 153 lb (69.4 kg)   SpO2 90%   BMI 29.88 kg/m²   Temp  Av.7 °F (37.1 °C)  Min: 97.7 °F (36.5 °C)  Max: 99.3 °F (37.4 °C)  Constitutional:  The patient is sedated  Vent PRONING   Lines: [facility]  j tube  Suprapubic       DIAGNOSTIC RESULTS   Radiology:    Recent Labs     2134 21  0458   WBC 14.8* 6.1 6.2   RBC 3.31* 3.02* 2.92*   HGB 10.8* 9.9* 9.5*   HCT 31.5* 30.2* 29.6*   MCV 95.2 100.0* 101.4*   MCH 32.6 32.8 32.5   MCHC 34.3 32.8 32.1   RDW 12.9 13.7 13.9    100* 172   MPV 11.3 11.6 11.1     Recent Labs     21  0458    144 145   K 3.9 4.4 4.5   * 110* 109*   CO2 28 27 34*   BUN 12 13 12   CREATININE 0.4* 0.4* 0.5*   GLUCOSE 99 131* 93   PROT 4.9* 4.2* 4.4*   LABALBU 2.3* 2.1* 2.1*   CALCIUM 7.8* 7.6* 7.7*   BILITOT 1.8* 1.1 0.8   ALKPHOS 208* 238* 290*   AST 81* 81* 104*   ALT 33 36 47*     Lab Results   Component Value Date    CRP 0.3 2021    CRP 8.4 (H) 2021     Lab Results   Component Value Date    SEDRATE 5 2019    SEDRATE 29 (H) 2018    SEDRATE 21 (H) 08/10/2018     Recent Labs     21  0429 21  0444 21  0458   AST 81* 81* 104*   ALT 33 36 47*        Microbiology:   No results for input(s): COVID19 in the last 72 hours.   Lab Results   Component Value Date    BC 5 Days no growth 2021    BLOODCULT2 24 Hours no growth 2021    ORG Klebsiella pneumoniae ssp pneumoniae 2021    ORG Coagulase Negative Staphylococci 2018    ORG Corynebacterium species 2018     WOUND/ABSCESS   Date Value Ref Range Status   2018 Light growth  Final   2018 Light growth  Mixed morphologies    Final   2018 Corrected    Light growth  Too fastidious for susceptibility testing.     06/29/2018 Light growth  Corrected   06/29/2018 Moderate growth  Corrected     Smear, Respiratory   Date Value Ref Range Status   08/29/2021   Final    Group 6: <25 PMN's/LPF and <25 Epithelial cells/LPF  Rare Polymorphonuclear leukocytes  Rare Epithelial cells  Rare Gram variable rods       No results found for: MPNEUMO, CLAMYDCU, LABLEGI, AFBCX, FUNGSM, LABFUNG    MRSA Culture Only   Date Value Ref Range Status   08/29/2021 Methicillin resistant Staph aureus not isolated  Final     CULTURE, RESPIRATORY   Date Value Ref Range Status   08/29/2021 Oral Pharyngeal Adina reduced (A)  Final   08/29/2021 Moderate growth  Final     No results for input(s): CXSURG, ORG in the last 72 hours. No results for input(s): WNDABS, ORG in the last 72 hours. Susceptibility    Klebsiella pneumoniae ssp pneumoniae (1)    Antibiotic Interpretation DERIAN Status    ampicillin Resistant >=^32 mcg/mL     ceFAZolin Sensitive <=^4 mcg/mL     cefepime Sensitive <=^0.12 mcg/mL     cefTRIAXone Sensitive <=^0.25 mcg/mL     Confirmatory Extended Spectrum Beta-Lactamase Negative Neg mcg/mL     ertapenem Sensitive <=^0.12 mcg/mL     gentamicin Sensitive <=^1 mcg/mL     levofloxacin Sensitive <=^0.12 mcg/mL     piperacillin-tazobactam Sensitive <=^4 mcg/mL     trimethoprim-sulfamethoxazole Sensitive <=^20 mcg/            ASSESSMENT      With leukopenia/ leukocytosis - resolved   Klebsiella pneumonia  Mrsa neg   COVID19 pneumonia   Cerebral palsy admitted with COVID pneumonia and AMS     S/p Remdesivir x 5 days     PLAN  Continue Cefepime  Continue Baricitinib   Continue Solu-cortef   Monitor labs, follow cultures     Family wants to continue full code status       Darcie Maza, APRN - NP,  9/3/2021  10:24 AM     I have discussed the case, including pertinent history and  the key elements of the encounter have been performed by me.  I agree with the assessment, plan and orders as documented.       Treatment plan as per my recommendation     Willia Brunner, MD, FACP  9/3/2021  2:23 PM

## 2021-09-03 NOTE — PROGRESS NOTES
Chart reviewed. PM spoke with father yesterday, he continues to want patient to remain full code and gets updates from ICU team.  FiO2 requirements went up to 100% yesterday, currently 95%, has been proned and requiring additional sedation to synchronize with ventilator. Father has PM number to call if he has questions. No acute PM needs today.     Marcial Frederick

## 2021-09-03 NOTE — CARE COORDINATION
9/3/21 Positive covid 8/28/21- unvaccinated. Cm transition of care: icu/vent (8/28/21)/ sedation/isolation/ art line/prone. fio2 at 95%, peep 12. Surgical consult for peg tube placement. Pt lives at home with family- wc bound. Has special rale system to transport him into bath etc. Pt has private duty nursing. No oxygen at home will need DME choice if discharge oxygen needs. Palliative medicine on consult- family with contact information. Father will provide a ride at discharge. CM/SS to follow.   Electronically signed by Ashwin Gonzalez RN-BC on 9/3/2021 at 1:02 PM

## 2021-09-03 NOTE — PROGRESS NOTES
Pulmonary/Critical Care Progress Note    We are following patient for COVID-19 pneumonitis, additional Klebsiella pneumonia right upper lobe, acute respiratory failure, cerebral palsy, septic shock, metabolic acidosis, respiratory acidosis, improved    SUBJECTIVE:  Patient's FiO2 has been tapered to 0.95 on 12 cm of PEEP. Vital signs have remained stable. He required the addition of ketamine yesterday in order to achieve adequate levels of sedation so that he would not \"call\" the ventilator and desaturate. He appears well saturated and well sedated today. He is tolerating small amounts of tube feedings without difficulty. Phosphorus level was low and this is being repleted. Today, we will attempt to place him in a supine position and obtain arterial blood gases and a chest x-ray. He continues his course of baricitinib per the ID service. We will now change his hydrocortisone back to dexamethasone since there is no longer any suspicion or indication of septic shock.       MEDICATIONS:   potassium phosphate IVPB  10 mmol IntraVENous Once    polyethylene glycol  17 g Oral Daily    baricitinib  4 mg Oral Daily    hydrocortisone sodium succinate PF  50 mg IntraVENous Q8H    insulin lispro  0-18 Units SubCUTAneous Q6H    cefepime  2,000 mg IntraVENous Q12H    sodium chloride flush  5-40 mL IntraVENous 2 times per day    ipratropium-albuterol  1 ampule Inhalation 4x daily    pantoprazole  40 mg IntraVENous Daily    And    sodium chloride (PF)  10 mL IntraVENous Daily    lacosamide  150 mg Oral BID    lamoTRIgine  200 mg Oral TID    zonisamide  300 mg Oral Nightly    baclofen  10 mg Oral TID    enoxaparin  40 mg SubCUTAneous BID    polyvinyl alcohol  1 drop Both Eyes Q6H      ketamine (KETALAR) infusion for analgosedation 0.2 mg/kg/hr (09/02/21 1650)    dextrose      lactated ringers 75 mL/hr at 09/03/21 0208    sodium chloride Stopped (09/03/21 0706)    sodium chloride      fentaNYL 5 mcg/ml in 0.9%  ml infusion 200 mcg/hr (09/03/21 0212)    propofol 50 mcg/kg/min (09/03/21 0516)    norepinephrine Stopped (08/30/21 0800)     bisacodyl, glucose, dextrose, glucagon (rDNA), dextrose, sodium chloride flush, sodium chloride, ondansetron **OR** ondansetron, polyethylene glycol, acetaminophen **OR** acetaminophen, perflutren lipid microspheres, fentanNYL, sodium chloride      REVIEW OF SYSTEMS:  Patient is sedated and cannot answer questions regarding review of systems    OBJECTIVE:  Vitals:    09/03/21 0800   BP: 110/64   Pulse: 107   Resp: 23   Temp: 97.7 °F (36.5 °C)   SpO2:      FiO2 : 95 %  O2 Flow Rate (L/min): 60 L/min  O2 Device: Ventilator    PHYSICAL EXAM:  Constitutional: No fever, chills, diaphoresis  Skin: Skin rash, no skin breakdown  HEENT: Endotracheal tube secured at lips  Neck: No JVD, lymphadenopathy, thyromegaly  Cardiovascular: S1, S2 normal.  No S3 murmurs are present  Respiratory: Bilateral inspiratory crackles; questionable pleural rubs present  Gastrointestinal: Soft, flat, nondistended  Genitourinary: No grossly bloody urine  Extremities: Clubbing, cyanosis, or edema  Neurological: Sedated  Psychological: Sedated    LABS:  WBC   Date Value Ref Range Status   09/03/2021 6.2 4.5 - 11.5 E9/L Final   09/02/2021 6.1 4.5 - 11.5 E9/L Final   09/01/2021 14.8 (H) 4.5 - 11.5 E9/L Final     Hemoglobin   Date Value Ref Range Status   09/03/2021 9.5 (L) 12.5 - 16.5 g/dL Final   09/02/2021 9.9 (L) 12.5 - 16.5 g/dL Final   09/01/2021 10.8 (L) 12.5 - 16.5 g/dL Final     Hematocrit   Date Value Ref Range Status   09/03/2021 29.6 (L) 37.0 - 54.0 % Final   09/02/2021 30.2 (L) 37.0 - 54.0 % Final   09/01/2021 31.5 (L) 37.0 - 54.0 % Final     MCV   Date Value Ref Range Status   09/03/2021 101.4 (H) 80.0 - 99.9 fL Final   09/02/2021 100.0 (H) 80.0 - 99.9 fL Final   09/01/2021 95.2 80.0 - 99.9 fL Final     Platelets   Date Value Ref Range Status   09/03/2021 172 130 - 450 E9/L Final 09/02/2021 100 (L) 130 - 450 E9/L Final   09/01/2021 146 130 - 450 E9/L Final     Sodium   Date Value Ref Range Status   09/03/2021 145 132 - 146 mmol/L Final   09/02/2021 144 132 - 146 mmol/L Final   09/01/2021 143 132 - 146 mmol/L Final     Potassium   Date Value Ref Range Status   09/03/2021 4.5 3.5 - 5.0 mmol/L Final   09/02/2021 4.4 3.5 - 5.0 mmol/L Final   09/01/2021 3.9 3.5 - 5.0 mmol/L Final     Chloride   Date Value Ref Range Status   09/03/2021 109 (H) 98 - 107 mmol/L Final   09/02/2021 110 (H) 98 - 107 mmol/L Final   09/01/2021 109 (H) 98 - 107 mmol/L Final     CO2   Date Value Ref Range Status   09/03/2021 34 (H) 22 - 29 mmol/L Final   09/02/2021 27 22 - 29 mmol/L Final   09/01/2021 28 22 - 29 mmol/L Final     BUN   Date Value Ref Range Status   09/03/2021 12 6 - 20 mg/dL Final   09/02/2021 13 6 - 20 mg/dL Final   09/01/2021 12 6 - 20 mg/dL Final     CREATININE   Date Value Ref Range Status   09/03/2021 0.5 (L) 0.7 - 1.2 mg/dL Final   09/02/2021 0.4 (L) 0.7 - 1.2 mg/dL Final   09/01/2021 0.4 (L) 0.7 - 1.2 mg/dL Final     Glucose   Date Value Ref Range Status   09/03/2021 93 74 - 99 mg/dL Final   09/02/2021 131 (H) 74 - 99 mg/dL Final   09/01/2021 99 74 - 99 mg/dL Final     Calcium   Date Value Ref Range Status   09/03/2021 7.7 (L) 8.6 - 10.2 mg/dL Final   09/02/2021 7.6 (L) 8.6 - 10.2 mg/dL Final   09/01/2021 7.8 (L) 8.6 - 10.2 mg/dL Final     Total Protein   Date Value Ref Range Status   09/03/2021 4.4 (L) 6.4 - 8.3 g/dL Final   09/02/2021 4.2 (L) 6.4 - 8.3 g/dL Final   09/01/2021 4.9 (L) 6.4 - 8.3 g/dL Final     Albumin   Date Value Ref Range Status   09/03/2021 2.1 (L) 3.5 - 5.2 g/dL Final   09/02/2021 2.1 (L) 3.5 - 5.2 g/dL Final   09/01/2021 2.3 (L) 3.5 - 5.2 g/dL Final     Total Bilirubin   Date Value Ref Range Status   09/03/2021 0.8 0.0 - 1.2 mg/dL Final   09/02/2021 1.1 0.0 - 1.2 mg/dL Final   09/01/2021 1.8 (H) 0.0 - 1.2 mg/dL Final     Alkaline Phosphatase   Date Value Ref Range Status 09/03/2021 290 (H) 40 - 129 U/L Final   09/02/2021 238 (H) 40 - 129 U/L Final   09/01/2021 208 (H) 40 - 129 U/L Final     AST   Date Value Ref Range Status   09/03/2021 104 (H) 0 - 39 U/L Final   09/02/2021 81 (H) 0 - 39 U/L Final   09/01/2021 81 (H) 0 - 39 U/L Final     ALT   Date Value Ref Range Status   09/03/2021 47 (H) 0 - 40 U/L Final   09/02/2021 36 0 - 40 U/L Final   09/01/2021 33 0 - 40 U/L Final     GFR Non-   Date Value Ref Range Status   09/03/2021 >60 >=60 mL/min/1.73 Final     Comment:     Chronic Kidney Disease: less than 60 ml/min/1.73 sq.m. Kidney Failure: less than 15 ml/min/1.73 sq.m. Results valid for patients 18 years and older. 09/02/2021 >60 >=60 mL/min/1.73 Final     Comment:     Chronic Kidney Disease: less than 60 ml/min/1.73 sq.m. Kidney Failure: less than 15 ml/min/1.73 sq.m. Results valid for patients 18 years and older. 09/01/2021 >60 >=60 mL/min/1.73 Final     Comment:     Chronic Kidney Disease: less than 60 ml/min/1.73 sq.m. Kidney Failure: less than 15 ml/min/1.73 sq.m. Results valid for patients 18 years and older.        GFR    Date Value Ref Range Status   09/03/2021 >60  Final   09/02/2021 >60  Final   09/01/2021 >60  Final     Magnesium   Date Value Ref Range Status   09/03/2021 2.4 1.6 - 2.6 mg/dL Final   09/02/2021 2.7 (H) 1.6 - 2.6 mg/dL Final   09/01/2021 2.5 1.6 - 2.6 mg/dL Final     Phosphorus   Date Value Ref Range Status   09/03/2021 2.4 (L) 2.5 - 4.5 mg/dL Final   09/02/2021 2.4 (L) 2.5 - 4.5 mg/dL Final   09/01/2021 2.1 (L) 2.5 - 4.5 mg/dL Final     Recent Labs     09/02/21  0517   PH 7.314*   PO2 93.7   PCO2 48.9*   HCO3 24.3   BE -2.1   O2SAT 96.4       RADIOLOGY:  XR CHEST PORTABLE   Final Result   Significant bilateral infiltrates with partial interval clearing of the left   lower lobe infiltrate since the prior study         XR CHEST PORTABLE   Final Result   Satisfactory position of the ETT and NGT. Otherwise, stable chest.         XR CHEST PORTABLE   Final Result   Increasing right lung opacities consistent with pneumonia. US DUP LOWER EXTREMITIES BILATERAL VENOUS   Final Result   No evidence of DVT in either lower extremity. XR CHEST PORTABLE   Final Result   1. Right lower lobe pneumonia. 2. Left lower lobe atelectasis versus pneumonia. XR CHEST PORTABLE    (Results Pending)           PROBLEM LIST:  Principal Problem:    Acute respiratory failure with hypoxia (HCC)  Active Problems:    Cerebral palsy (HCC)    Seizures ([address]    Mentally disabled    Convulsions ([address]    Pneumonia due to COVID-19 virus  Resolved Problems:    * No resolved hospital problems. *      IMPRESSION:  1. COVID-19 pneumonitis  2. Additional Klebsiella pneumoniae pneumonia sensitive to cefepime  3. Cerebral palsy  4. Question aspiration pneumonitis  5. History of seizure disorder  6. Diabetes mellitus type 2  7. Acute hypoxemic respiratory failure    PLAN:  1. Placed in supine position today  2. Increase tube feedings when supine  3. Check ABGs and chest x-ray today  4. Wean FiO2 as possible  5. Insulin coverage as needed  6. Continue inhaled bronchodilators  7. Continue baricitinib  8. Discontinue hydrocortisone and restart dexamethasone 6 mg IV every 12 hours    ATTESTATION:  ICU Staff Physician note of personal involvement in Care  As the attending physician, I certify that I personally reviewed the patients history and personally examined the patient to confirm the physical findings described above,  And that I reviewed the relevant imaging studies and available reports. I also discussed the differential diagnosis and all of the proposed management plans with the patient and individuals accompanying the patient to this visit. They had the opportunity to ask questions about the proposed management plans and to have those questions answered.      This patient has a high probability of sudden, clinically significant deterioration, which requires the highest level of physician preparedness to intervene urgently. I managed/supervised life or organ supporting interventions that required frequent physician assessment. I devoted my full attention to the direct care of this patient for the amount of time indicated below. Time I spent with the family or surrogate(s) is included only if the patient was incapable of providing the necessary information or participating in medical decisions  Time devoted to teaching and to any procedures I billed separately is not included.     CRITICAL CARE TIME:  34 minutes    Electronically signed by Joni Dhaliwal MD on 9/3/2021 at 9:32 AM

## 2021-09-03 NOTE — PROGRESS NOTES
[address] [place], [place] [address]ist   Progress Note    Admitting Date and Time: 8/28/2021  3:31 PM  Admit Dx: Hypokalemia [E87.6]  Acute respiratory failure with hypoxia ([facility] [J96.01]  Pneumonia of right lower lobe due to infectious organism [J18.9]  Sepsis with acute hypoxic respiratory failure without septic shock, due to unspecified organism ([facility] [address] [A41.9, R65.20, J96.01]    Subjective:    Patient is currently intubated and sedated so unable to conduct ROS. Per RN: Attempting to wean FiO2 and turn to supine as tolerated.  Ketamine was started yesterday       dexamethasone  6 mg IntraVENous Q12H    polyethylene glycol  17 g Oral Daily    baricitinib  4 mg Oral Daily    insulin lispro  0-18 Units SubCUTAneous Q6H    cefepime  2,000 mg IntraVENous Q12H    sodium chloride flush  5-40 mL IntraVENous 2 times per day    ipratropium-albuterol  1 ampule Inhalation 4x daily    pantoprazole  40 mg IntraVENous Daily    And    sodium chloride (PF)  10 mL IntraVENous Daily    lacosamide  150 mg Oral BID    lamoTRIgine  200 mg Oral TID    zonisamide  300 mg Oral Nightly    baclofen  10 mg Oral TID    enoxaparin  40 mg SubCUTAneous BID    polyvinyl alcohol  1 drop Both Eyes Q6H     bisacodyl, 10 mg, Daily PRN  glucose, 15 g, PRN  dextrose, 12.5 g, PRN  glucagon (rDNA), 1 mg, PRN  dextrose, 100 mL/hr, PRN  sodium chloride flush, 10 mL, PRN  sodium chloride, 25 mL, PRN  ondansetron, 4 mg, Q8H PRN   Or  ondansetron, 4 mg, Q6H PRN  polyethylene glycol, 17 g, Daily PRN  acetaminophen, 650 mg, Q6H PRN   Or  acetaminophen, 650 mg, Q6H PRN  perflutren lipid microspheres, 1.5 mL, ONCE PRN  fentanNYL, 25 mcg, Q1H PRN  sodium chloride, 30 mL, PRN         Objective:    /60   Pulse 104   Temp 97.4 °F (36.3 °C) (Core)   Resp 23   Ht 5' (1.524 m)   Wt 153 lb (69.4 kg)   SpO2 94%   BMI 29.88 kg/m²     Due to the current efforts to prevent transmission of COVID-19 and also the need to preserve PPE for other caregivers, a face-to-face encounter with the patient was not performed. That being said, all relevant records and diagnostic tests were reviewed, including laboratory results and imaging. Please reference any relevant documentation elsewhere. Care will be coordinated with other specialties as appropriate. Recent Labs     09/01/21 0429 09/02/21 0444 09/03/21 0458    144 145   K 3.9 4.4 4.5   * 110* 109*   CO2 28 27 34*   BUN 12 13 12   CREATININE 0.4* 0.4* 0.5*   GLUCOSE 99 131* 93   CALCIUM 7.8* 7.6* 7.7*       Recent Labs     09/01/21 0429 09/02/21 0444 09/03/21 0458   ALKPHOS 208* 238* 290*   PROT 4.9* 4.2* 4.4*   LABALBU 2.3* 2.1* 2.1*   BILITOT 1.8* 1.1 0.8   AST 81* 81* 104*   ALT 33 36 47*       Recent Labs     09/01/21 0429 09/02/21  0534 09/03/21 0458   WBC 14.8* 6.1 6.2   RBC 3.31* 3.02* 2.92*   HGB 10.8* 9.9* 9.5*   HCT 31.5* 30.2* 29.6*   MCV 95.2 100.0* 101.4*   MCH 32.6 32.8 32.5   MCHC 34.3 32.8 32.1   RDW 12.9 13.7 13.9    100* 172   MPV 11.3 11.6 11.1           Culture, Respiratory, Sputum [[id number]] (Abnormal)     Collected: 08/29/21 0220    Updated: 08/31/21 1340    Specimen Source: Sputum Expectorated     CULTURE, RESPIRATORY Oral Pharyngeal Adina reducedAbnormal     Smear, Respiratory --    Group 6: <25 PMN's/LPF and <25 Epithelial cells/LPF   Rare Polymorphonuclear leukocytes   Rare Epithelial cells   Rare Gram variable rods     Organism Klebsiella pneumoniae ssp pneumoniaeAbnormal     CULTURE, RESPIRATORY Moderate growth   Narrative:     Source: SPUEX       Site:                                     Radiology:   XR CHEST PORTABLE   Final Result   Significant bilateral infiltrates with partial interval clearing of the left   lower lobe infiltrate since the prior study         XR CHEST PORTABLE   Final Result   Satisfactory position of the ETT and NGT.       Otherwise, stable chest.         XR CHEST PORTABLE   Final Result   Increasing right lung opacities consistent with pneumonia. US DUP LOWER EXTREMITIES BILATERAL VENOUS   Final Result   No evidence of DVT in either lower extremity. XR CHEST PORTABLE   Final Result   1. Right lower lobe pneumonia. 2. Left lower lobe atelectasis versus pneumonia. XR CHEST PORTABLE    (Results Pending)       Assessment:  Principal Problem:    Acute respiratory failure with hypoxia (HCC)  Active Problems:    Cerebral palsy (HCC)    Seizures ([address]    Mentally disabled    Convulsions ([facility] [address]    Pneumonia due to COVID-19 virus  Resolved Problems:    * No resolved hospital problems. *      Plan:  1. Acute respiratory failure with hypoxemia  -Patient currently intubated and sedated (fentanyl and propofol and ketamine)   -FiO2 currently at 90%, 12 PEEP, 24 RR and   -Most recent AB.314/48.9/93.7/24.3. O2 saturation 94%   -Planning to wean FiO2 as tolerated and attempt to turn supine today    2. COVID PNA  -Patient unvaccinated, was coughing, congested and had fevers prior to presentation  -Patient currently intubated and sedated   -ID is on board  -Patient switched back to dexamethasone today  -Patient completed 5 days of remdesivir and is on duonebs. Was also started on baricitinib   -CXR yesterday shows partial improvement of LLL infiltrate  -WBC 6.2 (stable), was hypothermic today -placed on warming blanket    3. PNA, possibly aspiration  -Sputum Cx growing Klebsiella sensitive to cefepime, currently on cefepime 2gm Q12H  -Did receive 3 days of azithromycin as well, however was DCd  -Strep and legionella negative. BCx negative so far  -CXR shows dense RUL infiltrate  -Patient has been afebrile since admission, WBC stable at 6.2 today   -Will follow up final sputum Cx and monitor labs and VS    4.  Lactic acidosis (resolved)  -LA peaked at 11.8 on , has improved to 1.0 this morning  -Was getting bicarb infusion for acidosis, now switched to LR at 75ml/hr  -Will monitor BMP and lactic acid levels 5. Electrolyte abnormalities  -Potassium improved to 4.5 today   -Phosphorus 1.4 today  -all were repleted, will continue to monitor    6. Seizure disorder 2/2 cerebral palsy  -Patient has known CP, is nonverbal at baseline with severe developmental delay  -Has seizures, takes baclofen, vimpat, lamictal and zonisamide at home which are continued here    7. Elevated blood glucose  -Patient has had elevated glucose levels up to 379, this AM 93  -No known diagnosis of DM and patient is currently on steroids which will increase glucose  -A1C should be checked to determine if patient is diabetic or just elevated BS 2/2 steroids and current illness  -Currently on NPO ISS with hypoglycemic protocol    8. Septic shock (resolved)  -Patient hypotensive initially, required levophed but was able to be weaned off  -Still on LR at 75ml/hr, solucortef switched to dexamethasone today  -/55 today (was 75/39 at the lowest on 8/29)  -WBC stable at 6.2, lactic acid normalized  -Patient also tachycardic,  this AM  -Continue IVF and monitor VS and labs      NOTE: This report was transcribed using voice recognition software. Every effort was made to ensure accuracy; however, inadvertent computerized transcription errors may be present.      Electronically signed by Ronald Salgado MD on 9/3/2021 at 2:30 PM

## 2021-09-03 NOTE — CONSULTS
GENERAL SURGERY  CONSULT NOTE    Patient's Name/Date of Birth: Alessandra Carl /1995 (62 y.o.)    Date: September 3, 2021     CC: PEG dislodged    HPI:  Alessandra Carl is a 30 y.o. male who presents with COVID pneumonia. Patient is intubated sedated. No history able to be obtained. Hx of CP and pinky button. Apparently fell out after being prone. Tract appears well formed, unclear date.      Past Medical History:   Diagnosis Date    Cerebral palsy ([address]     COVID-19     G tube feedings [facility])     age 3    History of blood transfusion     Mental handicap     mom states severe brain damage    Seizures ([facility] [address]     tremors  last seizure 8/8/2018       Past Surgical History:   Procedure Laterality Date    DENTAL SURGERY  03/26/2015    xrays, restorations and extractions x1    DENTAL SURGERY  12/08/2016    DENTAL SURGERY N/A 09/04/2020    EXAM, RADIOGRAPHS, PROPHYLAXIS, DENTAL RESTORATIONS performed by Minh Macias DDS at [address] [place] [address]  09/04/2020    LEG TENDON SURGERY Bilateral     SC BIOPSY, EACH ADDED LESION Right 03/26/2018    EXCISION OF SOFT TISSUE NEOPLASM RIGHT LEG performed by Ruben Maxwell MD at [address]       Current Facility-Administered Medications   Medication Dose Route Frequency Provider Last Rate Last Admin    dexamethasone (PF) (DECADRON) injection 6 mg  6 mg IntraVENous Q12H Lisy Aguilera MD   6 mg at 09/03/21 1045    bisacodyl (DULCOLAX) suppository 10 mg  10 mg Rectal Daily PRN Lisy Aguilera MD   10 mg at 09/02/21 1136    polyethylene glycol (GLYCOLAX) packet 17 g  17 g Oral Daily Lisy Aguilera MD   17 g at 09/03/21 0830    ketamine (KETALAR) 500 mg in sodium chloride 0.9 % 250 mL infusion  0.2 mg/kg/hr (Ideal) IntraVENous Continuous Lisy Aguilera MD 5 mL/hr at 09/02/21 1650 0.2 mg/kg/hr at 09/02/21 1650    baricitinib (OLUMIANT) tablet 4 mg  4 mg Oral Daily Lisy Aguilera MD   4 mg at 09/03/21 0831    glucose (GLUTOSE) 40 % oral gel 15 g  15 g Oral PRN Lisy Aguilera MD dextrose 50 % IV solution  12.5 g IntraVENous PRN Faustino Zheng MD        glucagon (rDNA) injection 1 mg  1 mg IntraMUSCular PRN Faustino Zheng MD        dextrose 5 % solution  100 mL/hr IntraVENous PRN Faustino Zheng MD        lactated ringers infusion   IntraVENous Continuous Faustino Zheng MD 75 mL/hr at 09/03/21 0208 New Bag at 09/03/21 0208    insulin lispro (HUMALOG) injection vial 0-18 Units  0-18 Units SubCUTAneous Q6H Faustino Zheng MD   3 Units at 09/02/21 1709    cefepime (MAXIPIME) 2000 mg IVPB minibag  2,000 mg IntraVENous Q12H Faustino Zheng MD   Stopped at 09/03/21 0603    0.9 % sodium chloride infusion  25 mL IntraVENous Q12H Faustino Zheng MD   Stopped at 09/03/21 0706    sodium chloride flush 0.9 % injection 5-40 mL  5-40 mL IntraVENous 2 times per day MINERVA Cohn CNP   10 mL at 09/03/21 0836    sodium chloride flush 0.9 % injection 10 mL  10 mL IntraVENous PRN MINERVA Cohn CNP   10 mL at 09/01/21 0526    0.9 % sodium chloride infusion  25 mL IntraVENous PRN MINERVA Cohn CNP        ondansetron (ZOFRAN-ODT) disintegrating tablet 4 mg  4 mg Oral Q8H PRN MINERVA Cohn CNP        Or    ondansetron (ZOFRAN) injection 4 mg  4 mg IntraVENous Q6H PRN MINERVA Cohn CNP        polyethylene glycol (GLYCOLAX) packet 17 g  17 g Oral Daily PRN MINERVA Cohn CNP        acetaminophen (TYLENOL) tablet 650 mg  650 mg Oral Q6H PRN MINERVA Cohn CNP        Or    acetaminophen (TYLENOL) suppository 650 mg  650 mg Rectal Q6H PRN MINERVA Cohn CNP        ipratropium-albuterol (DUONEB) nebulizer solution 1 ampule  1 ampule Inhalation 4x daily MINERVA Cohn CNP   1 ampule at 09/03/21 1226    pantoprazole (PROTONIX) injection 40 mg  40 mg IntraVENous Daily MINERVA Cohn CNP   40 mg at 09/03/21 0830    And    sodium chloride (PF) 0.9 % injection 10 mL  10 mL IntraVENous Daily Johnson Ness APRN - CNP   10 mL at 09/03/21 0830 perflutren lipid microspheres (DEFINITY) injection 1.65 mg  1.5 mL IntraVENous ONCE PRN [facility], [facility] - [facility]        fentaNYL 5 mcg/ml in 0.9%  ml infusion  12.5-200 mcg/hr IntraVENous Continuous Damien White MD 40 mL/hr at 09/03/21 0941 200 mcg/hr at 09/03/21 0941    fentaNYL (SUBLIMAZE) injection 25 mcg  25 mcg IntraVENous Q1H PRN Damien White MD        lacosamide (VIMPAT) tablet 150 mg  150 mg Oral BID [facility], APRN - CNP   150 mg at 09/03/21 0830    lamoTRIgine (LAMICTAL) tablet 200 mg  200 mg Oral TID [facility], APRN - CNP   200 mg at 09/03/21 0831    zonisamide (ZONEGRAN) capsule 300 mg  300 mg Oral Nightly [facility], APRN - CNP   300 mg at 09/02/21 2202    propofol injection  5-50 mcg/kg/min IntraVENous Titrated [facility], APRN - CNP 17.7 mL/hr at 09/03/21 1156 50 mcg/kg/min at 09/03/21 1156    baclofen (LIORESAL) tablet 10 mg  10 mg Oral TID Lisandro Butler MD   10 mg at 09/03/21 0831    enoxaparin (LOVENOX) injection 40 mg  40 mg SubCUTAneous BID Lisandro Butler MD   40 mg at 09/03/21 0830    polyvinyl alcohol (LIQUIFILM TEARS) 1.4 % ophthalmic solution 1 drop  1 drop Both Eyes Q6H Lisandro Butler MD   1 drop at 09/03/21 1045    0.9 % sodium chloride bolus  30 mL IntraVENous PRN Eve Baeza DO   Stopped at 08/29/21 2103    norepinephrine (LEVOPHED) 16 mg in dextrose 5 % 250 mL infusion  2-100 mcg/min IntraVENous Continuous Damien White MD   Stopped at 08/30/21 0800       No Known Allergies    History reviewed. No pertinent family history.     Social History     Socioeconomic History    Marital status: Single     Spouse name: Not on file    Number of children: Not on file    Years of education: Not on file    Highest education level: Not on file   Occupational History    Not on file   Tobacco Use    Smoking status: Never Smoker    Smokeless tobacco: Never Used   Vaping Use    Vaping Use: Never used   Substance and Sexual Activity Alcohol use: No    Drug use: No    Sexual activity: Not on file   Other Topics Concern    Not on file   Social History Narrative    Not on file     Social Determinants of Health     Financial Resource Strain: Low Risk     Difficulty of Paying Living Expenses: Not hard at all   Food Insecurity: No Food Insecurity    Worried About Running Out of Food in the Last Year: Never true    Ran Out of Food in the Last Year: Never true   Transportation Needs:     Lack of Transportation (Medical):     Lack of Transportation (Non-Medical):    Physical Activity:     Days of Exercise per Week:     Minutes of Exercise per Session:    Stress:     Feeling of Stress :    Social Connections:     Frequency of Communication with Friends and Family:     Frequency of Social Gatherings with Friends and Family:     Attends [employer] Services: Active Member of Clubs or Organizations:     Attends Club or Organization Meetings:     Marital Status:    Intimate Partner Violence:     Fear of Current or Ex-Partner:     Emotionally Abused:     Physically Abused:     Sexually Abused:        ROS:   Review of Systems   Unable to perform ROS: Intubated       Physical Exam:  Vitals:    09/03/21 1300   BP: 114/60   Pulse: 104   Resp: 23   Temp:    SpO2: 94%       GEN: laying in bed, narcotized  EYES: Sclera white, pupils equal round and reactive to light  ENMT: trachea midline, ears externally intact  LYMPH: no lympadenopathy in neck. No lympadenopathy in groins  RESP: Intubated AC 24/325/12/100, course breath sounds  CV: Heart soundswere normal with a regular rate and rhythm. 1+ pitting edema  GI/ Abdomen: The abdomen was soft and non distended. Gastrostomy tube site well healed trach no drainage or surrounding erythema. There was no tenderness, guarding, rebound, or rigidity. There was no, hepatosplenomegaly.     LABS:  CBC  Recent Labs     09/03/21  0458   WBC 6.2   HGB 9.5*   HCT 29.6*        BMP  Recent Labs 09/03/21 0458      K 4.5   *   CO2 34*   BUN 12   CREATININE 0.5*   CALCIUM 7.7*     Liver Function  Recent Labs     09/03/21 0458   BILITOT 0.8   *   ALT 47*   ALKPHOS 290*   PROT 4.4*   LABALBU 2.1*     No results for input(s): LACTATE in the last 72 hours. No results for input(s): INR, PTT in the last 72 hours. Invalid input(s): PT    RADIOLOGY  XR CHEST PORTABLE    Result Date: 8/29/2021  EXAMINATION: ONE XRAY VIEW OF THE CHEST 8/29/2021 12:37 am COMPARISON: 08/28/2021 at 1934 hours, portable chest.  Also 1546 hours, same date. HISTORY: ORDERING SYSTEM PROVIDED HISTORY: post-intubation TECHNOLOGIST PROVIDED HISTORY: Reason for exam:->post-intubation FINDINGS: Interval intubation with the endotracheal tube tip 3.2 cm from the shantell. NGT extends satisfactorily into the mid stomach. Overlying EKG leads. No pneumothorax or visualized pleural effusion. Unchanged multifocal right greater than left lung airspace disease, compatible with pneumonia. No acute osseous abnormality. Satisfactory position of the ETT and NGT. Otherwise, stable chest.     XR CHEST PORTABLE    Result Date: 8/28/2021  EXAMINATION: ONE XRAY VIEW OF THE CHEST 8/28/2021 7:34 pm COMPARISON: 08/28/2021 HISTORY: ORDERING SYSTEM PROVIDED HISTORY: hypoxia TECHNOLOGIST PROVIDED HISTORY: Reason for exam:->hypoxia FINDINGS: Increasing right lung opacities. There is no effusion or pneumothorax. The cardiomediastinal silhouette is without acute process. The osseous structures are without acute process. Increasing right lung opacities consistent with pneumonia. XR CHEST PORTABLE    Result Date: 8/28/2021  EXAMINATION: ONE XRAY VIEW OF THE CHEST 8/28/2021 3:10 pm COMPARISON: None. HISTORY: ORDERING SYSTEM PROVIDED HISTORY: shortness of breath TECHNOLOGIST PROVIDED HISTORY: Reason for exam:->shortness of breath FINDINGS: The heart and mediastinum are normal for AP technique.  There is focal airspace opacity / consolidation in the right lower lobe, consistent with pneumonia. Mild atelectasis lies in the medial left base. 1. Right lower lobe pneumonia. 2. Left lower lobe atelectasis versus pneumonia. US DUP LOWER EXTREMITIES BILATERAL VENOUS    Result Date: 8/28/2021  EXAMINATION: DUPLEX VENOUS ULTRASOUND OF THE BILATERAL LOWER EXTREMITIES, 8/28/2021 6:05 pm TECHNIQUE: Duplex ultrasound using B-mode/gray scaled imaging and Doppler spectral analysis and color flow was obtained of the bilateral lower extremities. COMPARISON: None. HISTORY: ORDERING SYSTEM PROVIDED HISTORY: r/o dvt TECHNOLOGIST PROVIDED HISTORY: Reason for exam:->r/o dvt What reading provider will be dictating this exam?->CRC FINDINGS: The visualized veins of the bilateral lower extremities are patent and free of echogenic thrombus. The veins demonstrate good compressibility with normal color flow study and spectral analysis. No evidence of DVT in either lower extremity.           Assessment/Plan:  30 y.o. male with PEG dislodged and COVID pneumonia       2.3 cm 16F pinky button at bedside and was replaced with 12 F replacement gastrostomy tube  Xray pending  Not sure if pinky buttons available while in patient but will discuss with family when able  If patient needs a trach please feel free to call surgery  D/w Dr. Pearl Stephens M.D., Ph.D., PGY-4  9/3/2021  3:13 PM    Surgery Progress Note            Chief complaint:   Chief Complaint   Patient presents with    Altered Mental Status    Respiratory Distress     41% SpO2      Patient Active Problem List   Diagnosis    Periodontal disease    Gingivitis    Dehiscence of surgical wound    Cerebral palsy ([address]    Seizures ([address]    Abnormal liver function tests    Anxiety    Constipation    Esophageal reflux    Mentally disabled    Non-refractory epilepsy ([address]    Scoliosis associated with other condition    Tremor    Convulsions ([address]    Acute respiratory failure with hypoxia ([address]    Pneumonia

## 2021-09-04 LAB
ALBUMIN SERPL-MCNC: 2.3 G/DL (ref 3.5–5.2)
ALP BLD-CCNC: 282 U/L (ref 40–129)
ALT SERPL-CCNC: 45 U/L (ref 0–40)
ANION GAP SERPL CALCULATED.3IONS-SCNC: 4 MMOL/L (ref 7–16)
AST SERPL-CCNC: 87 U/L (ref 0–39)
BASOPHILS ABSOLUTE: 0 E9/L (ref 0–0.2)
BASOPHILS RELATIVE PERCENT: 0 % (ref 0–2)
BILIRUB SERPL-MCNC: 0.8 MG/DL (ref 0–1.2)
BUN BLDV-MCNC: 9 MG/DL (ref 6–20)
CALCIUM SERPL-MCNC: 8.2 MG/DL (ref 8.6–10.2)
CHLORIDE BLD-SCNC: 105 MMOL/L (ref 98–107)
CO2: 34 MMOL/L (ref 22–29)
CREAT SERPL-MCNC: 0.4 MG/DL (ref 0.7–1.2)
EOSINOPHILS ABSOLUTE: 0 E9/L (ref 0.05–0.5)
EOSINOPHILS RELATIVE PERCENT: 0 % (ref 0–6)
GFR AFRICAN AMERICAN: >60
GFR NON-AFRICAN AMERICAN: >60 ML/MIN/1.73
GLUCOSE BLD-MCNC: 105 MG/DL (ref 74–99)
HCT VFR BLD CALC: 33.1 % (ref 37–54)
HEMOGLOBIN: 10.8 G/DL (ref 12.5–16.5)
LYMPHOCYTES ABSOLUTE: 1.49 E9/L (ref 1.5–4)
LYMPHOCYTES RELATIVE PERCENT: 18 % (ref 20–42)
MAGNESIUM: 2.2 MG/DL (ref 1.6–2.6)
MCH RBC QN AUTO: 32.7 PG (ref 26–35)
MCHC RBC AUTO-ENTMCNC: 32.6 % (ref 32–34.5)
MCV RBC AUTO: 100.3 FL (ref 80–99.9)
METAMYELOCYTES RELATIVE PERCENT: 1 % (ref 0–1)
METER GLUCOSE: 112 MG/DL (ref 74–99)
METER GLUCOSE: 123 MG/DL (ref 74–99)
METER GLUCOSE: 96 MG/DL (ref 74–99)
METER GLUCOSE: 99 MG/DL (ref 74–99)
MONOCYTES ABSOLUTE: 0.66 E9/L (ref 0.1–0.95)
MONOCYTES RELATIVE PERCENT: 8 % (ref 2–12)
MYELOCYTE PERCENT: 2 % (ref 0–0)
NEUTROPHILS ABSOLUTE: 6.14 E9/L (ref 1.8–7.3)
NEUTROPHILS RELATIVE PERCENT: 71 % (ref 43–80)
PDW BLD-RTO: 13.6 FL (ref 11.5–15)
PHOSPHORUS: 2.3 MG/DL (ref 2.5–4.5)
PLATELET # BLD: 211 E9/L (ref 130–450)
PMV BLD AUTO: 10.6 FL (ref 7–12)
POTASSIUM SERPL-SCNC: 3.9 MMOL/L (ref 3.5–5)
PROCALCITONIN: 3.1 NG/ML (ref 0–0.08)
RBC # BLD: 3.3 E12/L (ref 3.8–5.8)
SODIUM BLD-SCNC: 143 MMOL/L (ref 132–146)
TOTAL PROTEIN: 5 G/DL (ref 6.4–8.3)
TRIGL SERPL-MCNC: 360 MG/DL (ref 0–149)
WBC # BLD: 8.3 E9/L (ref 4.5–11.5)

## 2021-09-04 PROCEDURE — 6370000000 HC RX 637 (ALT 250 FOR IP): Performed by: INTERNAL MEDICINE

## 2021-09-04 PROCEDURE — 6360000002 HC RX W HCPCS: Performed by: INTERNAL MEDICINE

## 2021-09-04 PROCEDURE — 2580000003 HC RX 258: Performed by: EMERGENCY MEDICINE

## 2021-09-04 PROCEDURE — 6370000000 HC RX 637 (ALT 250 FOR IP)

## 2021-09-04 PROCEDURE — 2580000003 HC RX 258: Performed by: INTERNAL MEDICINE

## 2021-09-04 PROCEDURE — 85025 COMPLETE CBC W/AUTO DIFF WBC: CPT

## 2021-09-04 PROCEDURE — 94003 VENT MGMT INPAT SUBQ DAY: CPT

## 2021-09-04 PROCEDURE — 2500000003 HC RX 250 WO HCPCS: Performed by: INTERNAL MEDICINE

## 2021-09-04 PROCEDURE — 36415 COLL VENOUS BLD VENIPUNCTURE: CPT

## 2021-09-04 PROCEDURE — 94640 AIRWAY INHALATION TREATMENT: CPT

## 2021-09-04 PROCEDURE — 84100 ASSAY OF PHOSPHORUS: CPT

## 2021-09-04 PROCEDURE — 2500000003 HC RX 250 WO HCPCS: Performed by: EMERGENCY MEDICINE

## 2021-09-04 PROCEDURE — 84478 ASSAY OF TRIGLYCERIDES: CPT

## 2021-09-04 PROCEDURE — 87040 BLOOD CULTURE FOR BACTERIA: CPT

## 2021-09-04 PROCEDURE — 99233 SBSQ HOSP IP/OBS HIGH 50: CPT | Performed by: INTERNAL MEDICINE

## 2021-09-04 PROCEDURE — 87206 SMEAR FLUORESCENT/ACID STAI: CPT

## 2021-09-04 PROCEDURE — 83735 ASSAY OF MAGNESIUM: CPT

## 2021-09-04 PROCEDURE — 80053 COMPREHEN METABOLIC PANEL: CPT

## 2021-09-04 PROCEDURE — 84145 PROCALCITONIN (PCT): CPT

## 2021-09-04 PROCEDURE — 6360000002 HC RX W HCPCS

## 2021-09-04 PROCEDURE — 2000000000 HC ICU R&B

## 2021-09-04 PROCEDURE — 6360000002 HC RX W HCPCS: Performed by: EMERGENCY MEDICINE

## 2021-09-04 PROCEDURE — 87070 CULTURE OTHR SPECIMN AEROBIC: CPT

## 2021-09-04 PROCEDURE — 2580000003 HC RX 258

## 2021-09-04 PROCEDURE — C9113 INJ PANTOPRAZOLE SODIUM, VIA: HCPCS

## 2021-09-04 PROCEDURE — 36592 COLLECT BLOOD FROM PICC: CPT

## 2021-09-04 PROCEDURE — 82962 GLUCOSE BLOOD TEST: CPT

## 2021-09-04 RX ORDER — FUROSEMIDE 10 MG/ML
10 INJECTION INTRAMUSCULAR; INTRAVENOUS ONCE
Status: COMPLETED | OUTPATIENT
Start: 2021-09-04 | End: 2021-09-04

## 2021-09-04 RX ORDER — ACETYLCYSTEINE 200 MG/ML
600 SOLUTION ORAL; RESPIRATORY (INHALATION) 2 TIMES DAILY
Status: DISCONTINUED | OUTPATIENT
Start: 2021-09-04 | End: 2021-09-06

## 2021-09-04 RX ADMIN — LAMOTRIGINE 200 MG: 100 TABLET ORAL at 09:45

## 2021-09-04 RX ADMIN — PROPOFOL 50 MCG/KG/MIN: 10 INJECTION, EMULSION INTRAVENOUS at 02:37

## 2021-09-04 RX ADMIN — PROPOFOL 50 MCG/KG/MIN: 10 INJECTION, EMULSION INTRAVENOUS at 16:38

## 2021-09-04 RX ADMIN — POLYETHYLENE GLYCOL 3350 17 G: 17 POWDER, FOR SOLUTION ORAL at 09:44

## 2021-09-04 RX ADMIN — SODIUM CHLORIDE, PRESERVATIVE FREE 10 ML: 5 INJECTION INTRAVENOUS at 09:47

## 2021-09-04 RX ADMIN — ACETYLCYSTEINE 600 MG: 200 SOLUTION ORAL; RESPIRATORY (INHALATION) at 20:51

## 2021-09-04 RX ADMIN — Medication 10 ML: at 10:36

## 2021-09-04 RX ADMIN — IPRATROPIUM BROMIDE AND ALBUTEROL SULFATE 1 AMPULE: .5; 3 SOLUTION RESPIRATORY (INHALATION) at 13:15

## 2021-09-04 RX ADMIN — DEXAMETHASONE SODIUM PHOSPHATE 6 MG: 10 INJECTION, SOLUTION INTRAMUSCULAR; INTRAVENOUS at 09:44

## 2021-09-04 RX ADMIN — Medication 200 MCG/HR: at 12:45

## 2021-09-04 RX ADMIN — LACOSAMIDE 150 MG: 100 TABLET, FILM COATED ORAL at 09:46

## 2021-09-04 RX ADMIN — BACLOFEN 10 MG: 10 TABLET ORAL at 09:46

## 2021-09-04 RX ADMIN — POLYVINYL ALCOHOL 1 DROP: 14 SOLUTION/ DROPS OPHTHALMIC at 09:47

## 2021-09-04 RX ADMIN — IPRATROPIUM BROMIDE AND ALBUTEROL SULFATE 1 AMPULE: .5; 3 SOLUTION RESPIRATORY (INHALATION) at 18:04

## 2021-09-04 RX ADMIN — POLYVINYL ALCOHOL 1 DROP: 14 SOLUTION/ DROPS OPHTHALMIC at 15:54

## 2021-09-04 RX ADMIN — FUROSEMIDE 10 MG: 20 INJECTION, SOLUTION INTRAMUSCULAR; INTRAVENOUS at 14:57

## 2021-09-04 RX ADMIN — ENOXAPARIN SODIUM 40 MG: 40 INJECTION SUBCUTANEOUS at 20:51

## 2021-09-04 RX ADMIN — IPRATROPIUM BROMIDE AND ALBUTEROL SULFATE 1 AMPULE: .5; 3 SOLUTION RESPIRATORY (INHALATION) at 06:10

## 2021-09-04 RX ADMIN — SODIUM CHLORIDE, POTASSIUM CHLORIDE, SODIUM LACTATE AND CALCIUM CHLORIDE: 600; 310; 30; 20 INJECTION, SOLUTION INTRAVENOUS at 04:11

## 2021-09-04 RX ADMIN — BACLOFEN 10 MG: 10 TABLET ORAL at 13:17

## 2021-09-04 RX ADMIN — BARICITINIB 4 MG: 2 TABLET, FILM COATED ORAL at 09:45

## 2021-09-04 RX ADMIN — Medication 200 MCG/HR: at 20:58

## 2021-09-04 RX ADMIN — LACOSAMIDE 150 MG: 100 TABLET, FILM COATED ORAL at 20:52

## 2021-09-04 RX ADMIN — PANTOPRAZOLE SODIUM 40 MG: 40 INJECTION, POWDER, FOR SOLUTION INTRAVENOUS at 09:44

## 2021-09-04 RX ADMIN — POLYVINYL ALCOHOL 1 DROP: 14 SOLUTION/ DROPS OPHTHALMIC at 04:11

## 2021-09-04 RX ADMIN — Medication 200 MCG/HR: at 00:13

## 2021-09-04 RX ADMIN — IPRATROPIUM BROMIDE AND ALBUTEROL SULFATE 1 AMPULE: .5; 3 SOLUTION RESPIRATORY (INHALATION) at 09:09

## 2021-09-04 RX ADMIN — ACETAMINOPHEN 650 MG: 325 TABLET ORAL at 12:47

## 2021-09-04 RX ADMIN — CEFEPIME HYDROCHLORIDE 2000 MG: 2 INJECTION, POWDER, FOR SOLUTION INTRAVENOUS at 14:53

## 2021-09-04 RX ADMIN — SODIUM CHLORIDE 25 ML: 9 INJECTION, SOLUTION INTRAVENOUS at 19:00

## 2021-09-04 RX ADMIN — POLYVINYL ALCOHOL 1 DROP: 14 SOLUTION/ DROPS OPHTHALMIC at 22:37

## 2021-09-04 RX ADMIN — PROPOFOL 50 MCG/KG/MIN: 10 INJECTION, EMULSION INTRAVENOUS at 22:40

## 2021-09-04 RX ADMIN — DEXAMETHASONE SODIUM PHOSPHATE 6 MG: 10 INJECTION, SOLUTION INTRAMUSCULAR; INTRAVENOUS at 22:37

## 2021-09-04 RX ADMIN — PROPOFOL 50 MCG/KG/MIN: 10 INJECTION, EMULSION INTRAVENOUS at 06:36

## 2021-09-04 RX ADMIN — Medication 10 ML: at 20:53

## 2021-09-04 RX ADMIN — CEFEPIME HYDROCHLORIDE 2000 MG: 2 INJECTION, POWDER, FOR SOLUTION INTRAVENOUS at 02:37

## 2021-09-04 RX ADMIN — PROPOFOL 50 MCG/KG/MIN: 10 INJECTION, EMULSION INTRAVENOUS at 12:35

## 2021-09-04 RX ADMIN — BACLOFEN 10 MG: 10 TABLET ORAL at 20:52

## 2021-09-04 RX ADMIN — ENOXAPARIN SODIUM 40 MG: 40 INJECTION SUBCUTANEOUS at 09:44

## 2021-09-04 RX ADMIN — ZONISAMIDE 300 MG: 100 CAPSULE ORAL at 20:51

## 2021-09-04 RX ADMIN — SODIUM CHLORIDE, PRESERVATIVE FREE 10 ML: 5 INJECTION INTRAVENOUS at 15:01

## 2021-09-04 RX ADMIN — LAMOTRIGINE 200 MG: 100 TABLET ORAL at 20:52

## 2021-09-04 RX ADMIN — LAMOTRIGINE 200 MG: 100 TABLET ORAL at 13:20

## 2021-09-04 RX ADMIN — Medication 200 MCG/HR: at 07:03

## 2021-09-04 RX ADMIN — Medication 2 TABLET: at 12:35

## 2021-09-04 ASSESSMENT — PULMONARY FUNCTION TESTS
PIF_VALUE: 45
PIF_VALUE: 29
PIF_VALUE: 27
PIF_VALUE: 27
PIF_VALUE: 38
PIF_VALUE: 40
PIF_VALUE: 29
PIF_VALUE: 28
PIF_VALUE: 26
PIF_VALUE: 29
PIF_VALUE: 29
PIF_VALUE: 21
PIF_VALUE: 36
PIF_VALUE: 35
PIF_VALUE: 38
PIF_VALUE: 50
PIF_VALUE: 38
PIF_VALUE: 30
PIF_VALUE: 20
PIF_VALUE: 39
PIF_VALUE: 33
PIF_VALUE: 55
PIF_VALUE: 25
PIF_VALUE: 40
PIF_VALUE: 39
PIF_VALUE: 27
PIF_VALUE: 29
PIF_VALUE: 21

## 2021-09-04 ASSESSMENT — PAIN SCALES - GENERAL
PAINLEVEL_OUTOF10: 0

## 2021-09-04 NOTE — PROGRESS NOTES
CRITICAL CARE PROGRESS NOTE    The patient's case was discussed in multidisciplinary rounds including critical care specialist, nursing, RT and pharmacy. Her evaluation is as follows:      30year old man with PMH as described below admitted to ICU for management of COVID-19 pneumonia, additional Klebsiella pneumonia right upper lobe, acute respiratory failure, ARDS, cerebral palsy, septic shock, metabolic acidosis, respiratory acidosis. --Remains ventilated in prone position   --Ventilator: AC Vt 325/ f24/ PEEP 12/85% FiO2  Crs 19  Pplat 28  --Fluid balance is positive for 5 L    /74   Pulse 119   Temp 99.3 °F (37.4 °C) (Core)   Resp 25   Ht 5' (1.524 m)   Wt 153 lb (69.4 kg)   SpO2 97%   BMI 29.88 kg/m²   General: Comatose, flaccid limbs, prone position, ventilated  HEENT: Face with edema, puffy eyelids, unable to open eyelids  Respiratory: Lungs with decreased breath sounds bilaterally, no adventitious sounds auscultated  CV: Regular rate, no murmurs, trace leg edema  Abdomen: Soft, non tender, + bowel sounds  Skin: Hydrated, adequate turgor, no rash, capillary refill <2 seconds  Extremities: Muscular strength:contracted limbs, pulls me when  I try to move his arms, with contractures, distal pulses present  Neurology: Comatose, contracted limbs, neck is supple, no meningitic signs present. A/P:  Acute hypoxemic respiratory failure secondary to severe COVID-19 pneumonia and ARDS + Klebsiella pneumonia  --Mechanical ventilation with lung protective strategy.      --Analgesia with fentanyl   Sedation with Propofol and Ketamine  --Prone position recommended  --Antimicrobial regimen: Cefepime  --Antiviral regimen: Remdesivir X5 days, baricitinib daily and dexamethasone 6 mg BID X 10 days  --Cultures ordered as having intermittent fever  --Inflammatory markers daily  --Anticoagulation: Enoxaparin BID  --Diuresis with furosemide 10 mg    Metabolic encephalopathy due to 1  --Currently sedated for management of ARDS    Cerebral palsy  --Continue baclofen     Epilepsy  --Continue Lacosamide, Lamotrigine and zonisamide    Tube feedings at 10 cc/h    Last 3 CMP:  Recent Labs     09/02/21  0444 09/03/21  0458 09/04/21  0512    145 143   K 4.4 4.5 3.9   * 109* 105   CO2 27 34* 34*   BUN 13 12 9   CREATININE 0.4* 0.5* 0.4*   GLUCOSE 131* 93 105*   CALCIUM 7.6* 7.7* 8.2*   PROT 4.2* 4.4* 5.0*   LABALBU 2.1* 2.1* 2.3*   BILITOT 1.1 0.8 0.8   ALKPHOS 238* 290* 282*   AST 81* 104* 87*   ALT 36 47* 45*     Recent Labs     09/04/21  0512   WBC 8.3   RBC 3.30*   HGB 10.8*   HCT 33.1*   .3*   MCH 32.7   MCHC 32.6   RDW 13.6      MPV 10.6       No results for input(s): BC in the last 72 hours. No results for input(s): Gevena Messi in the last 72 hours.     24 HR INTAKE/OUTPUT:      Intake/Output Summary (Last 24 hours) at 9/4/2021 1215  Last data filed at 9/4/2021 1028  Gross per 24 hour   Intake 3790.1 ml   Output 3475 ml   Net 315.1 ml     MEDICATIONS:   acetylcysteine  600 mg Per NG tube BID    dexamethasone  6 mg IntraVENous Q12H    polyethylene glycol  17 g Oral Daily    baricitinib  4 mg Oral Daily    insulin lispro  0-18 Units SubCUTAneous Q6H    cefepime  2,000 mg IntraVENous Q12H    sodium chloride flush  5-40 mL IntraVENous 2 times per day    ipratropium-albuterol  1 ampule Inhalation 4x daily    pantoprazole  40 mg IntraVENous Daily    And    sodium chloride (PF)  10 mL IntraVENous Daily    lacosamide  150 mg Oral BID    lamoTRIgine  200 mg Oral TID    zonisamide  300 mg Oral Nightly    baclofen  10 mg Oral TID    enoxaparin  40 mg SubCUTAneous BID    polyvinyl alcohol  1 drop Both Eyes Q6H      ketamine (KETALAR) infusion for analgosedation 0.3 mg/kg/hr (09/04/21 0941)    dextrose      lactated ringers 75 mL/hr at 09/04/21 0941    sodium chloride Stopped (09/03/21 2142)    sodium chloride      fentaNYL 5 mcg/ml in 0.9%  ml infusion 200 mcg/hr (09/04/21 0941)  propofol 50 mcg/kg/min (09/04/21 1235)    norepinephrine Stopped (08/30/21 0800)     diatrizoate meglumine-sodium, bisacodyl, glucose, dextrose, glucagon (rDNA), dextrose, sodium chloride flush, sodium chloride, ondansetron **OR** ondansetron, polyethylene glycol, acetaminophen **OR** acetaminophen, perflutren lipid microspheres, fentanNYL, sodium chloride    OBJECTIVE:  Vitals:    09/04/21 1200   BP: 124/64   Pulse: 116   Resp: 28   Temp: 99.9 °F (37.7 °C)   SpO2: 96%     FiO2 : 100 %  O2 Flow Rate (L/min): 60 L/min  O2 Device: Ventilator        LABS:  WBC   Date Value Ref Range Status   09/04/2021 8.3 4.5 - 11.5 E9/L Final   09/03/2021 6.2 4.5 - 11.5 E9/L Final   09/02/2021 6.1 4.5 - 11.5 E9/L Final     Hemoglobin   Date Value Ref Range Status   09/04/2021 10.8 (L) 12.5 - 16.5 g/dL Final   09/03/2021 9.5 (L) 12.5 - 16.5 g/dL Final   09/02/2021 9.9 (L) 12.5 - 16.5 g/dL Final     Hematocrit   Date Value Ref Range Status   09/04/2021 33.1 (L) 37.0 - 54.0 % Final   09/03/2021 29.6 (L) 37.0 - 54.0 % Final   09/02/2021 30.2 (L) 37.0 - 54.0 % Final     MCV   Date Value Ref Range Status   09/04/2021 100.3 (H) 80.0 - 99.9 fL Final   09/03/2021 101.4 (H) 80.0 - 99.9 fL Final   09/02/2021 100.0 (H) 80.0 - 99.9 fL Final     Platelets   Date Value Ref Range Status   09/04/2021 211 130 - 450 E9/L Corrected     Comment:     Platelet clumps are identified. This may decrease the automated platelet  count artifactually.      09/03/2021 172 130 - 450 E9/L Final   09/02/2021 100 (L) 130 - 450 E9/L Final     Sodium   Date Value Ref Range Status   09/04/2021 143 132 - 146 mmol/L Final   09/03/2021 145 132 - 146 mmol/L Final   09/02/2021 144 132 - 146 mmol/L Final     Potassium   Date Value Ref Range Status   09/04/2021 3.9 3.5 - 5.0 mmol/L Final   09/03/2021 4.5 3.5 - 5.0 mmol/L Final   09/02/2021 4.4 3.5 - 5.0 mmol/L Final     Chloride   Date Value Ref Range Status   09/04/2021 105 98 - 107 mmol/L Final   09/03/2021 109 (H) 98 - 107 mmol/L Final   09/02/2021 110 (H) 98 - 107 mmol/L Final     CO2   Date Value Ref Range Status   09/04/2021 34 (H) 22 - 29 mmol/L Final   09/03/2021 34 (H) 22 - 29 mmol/L Final   09/02/2021 27 22 - 29 mmol/L Final     BUN   Date Value Ref Range Status   09/04/2021 9 6 - 20 mg/dL Final   09/03/2021 12 6 - 20 mg/dL Final   09/02/2021 13 6 - 20 mg/dL Final     CREATININE   Date Value Ref Range Status   09/04/2021 0.4 (L) 0.7 - 1.2 mg/dL Final   09/03/2021 0.5 (L) 0.7 - 1.2 mg/dL Final   09/02/2021 0.4 (L) 0.7 - 1.2 mg/dL Final     Glucose   Date Value Ref Range Status   09/04/2021 105 (H) 74 - 99 mg/dL Final   09/03/2021 93 74 - 99 mg/dL Final   09/02/2021 131 (H) 74 - 99 mg/dL Final     Calcium   Date Value Ref Range Status   09/04/2021 8.2 (L) 8.6 - 10.2 mg/dL Final   09/03/2021 7.7 (L) 8.6 - 10.2 mg/dL Final   09/02/2021 7.6 (L) 8.6 - 10.2 mg/dL Final     Total Protein   Date Value Ref Range Status   09/04/2021 5.0 (L) 6.4 - 8.3 g/dL Final   09/03/2021 4.4 (L) 6.4 - 8.3 g/dL Final   09/02/2021 4.2 (L) 6.4 - 8.3 g/dL Final     Albumin   Date Value Ref Range Status   09/04/2021 2.3 (L) 3.5 - 5.2 g/dL Final   09/03/2021 2.1 (L) 3.5 - 5.2 g/dL Final   09/02/2021 2.1 (L) 3.5 - 5.2 g/dL Final     Total Bilirubin   Date Value Ref Range Status   09/04/2021 0.8 0.0 - 1.2 mg/dL Final   09/03/2021 0.8 0.0 - 1.2 mg/dL Final   09/02/2021 1.1 0.0 - 1.2 mg/dL Final     Alkaline Phosphatase   Date Value Ref Range Status   09/04/2021 282 (H) 40 - 129 U/L Final   09/03/2021 290 (H) 40 - 129 U/L Final   09/02/2021 238 (H) 40 - 129 U/L Final     AST   Date Value Ref Range Status   09/04/2021 87 (H) 0 - 39 U/L Final   09/03/2021 104 (H) 0 - 39 U/L Final   09/02/2021 81 (H) 0 - 39 U/L Final     ALT   Date Value Ref Range Status   09/04/2021 45 (H) 0 - 40 U/L Final   09/03/2021 47 (H) 0 - 40 U/L Final   09/02/2021 36 0 - 40 U/L Final     GFR Non-   Date Value Ref Range Status   09/04/2021 >60 >=60 mL/min/1.73 Final Comment:     Chronic Kidney Disease: less than 60 ml/min/1.73 sq.m. Kidney Failure: less than 15 ml/min/1.73 sq.m. Results valid for patients 18 years and older. 09/03/2021 >60 >=60 mL/min/1.73 Final     Comment:     Chronic Kidney Disease: less than 60 ml/min/1.73 sq.m. Kidney Failure: less than 15 ml/min/1.73 sq.m. Results valid for patients 18 years and older. 09/02/2021 >60 >=60 mL/min/1.73 Final     Comment:     Chronic Kidney Disease: less than 60 ml/min/1.73 sq.m. Kidney Failure: less than 15 ml/min/1.73 sq.m. Results valid for patients 18 years and older. GFR    Date Value Ref Range Status   09/04/2021 >60  Final   09/03/2021 >60  Final   09/02/2021 >60  Final     Magnesium   Date Value Ref Range Status   09/04/2021 2.2 1.6 - 2.6 mg/dL Final   09/03/2021 2.4 1.6 - 2.6 mg/dL Final   09/02/2021 2.7 (H) 1.6 - 2.6 mg/dL Final     Phosphorus   Date Value Ref Range Status   09/04/2021 2.3 (L) 2.5 - 4.5 mg/dL Final   09/03/2021 2.4 (L) 2.5 - 4.5 mg/dL Final   09/02/2021 2.4 (L) 2.5 - 4.5 mg/dL Final     Recent Labs     09/02/21  0517   PH 7.314*   PO2 93.7   PCO2 48.9*   HCO3 24.3   BE -2.1   O2SAT 96.4       RADIOLOGY:  XR CHEST PORTABLE   Final Result   1. Slight advancement of the endotracheal tube which now resides in the   midthoracic trachea. 2.  Stable appearance of the chest.  Stable position of the enteric tube. XR CHEST PORTABLE   Final Result   1. Interval slight advancement of the endotracheal tube which now resides in   the proximal thoracic trachea. 2.  Persistent ill-defined opacities in the right greater than left lungs. No change from prior exam.  No obvious pneumothorax. 3.  Enteric tube. Contrast material identified within the stomach lumen. XR CHEST PORTABLE   Final Result   1. Persistent ill-defined opacities in the bilateral lungs slightly worse in   the left mid to upper lung.   Probable trace pleural effusions. 2.  Slight retraction of the endotracheal tube which projects over the   cervical region. Suggest repositioning. 3.  There appears to be a satisfactory position of the enteric tube. The findings were sent to the Radiology Results [address] at 7:12   pm on 9/3/2021to be communicated to a licensed caregiver. XR ABDOMEN FOR NG/OG/NE TUBE PLACEMENT   Final Result   1. Satisfactory position of gastric feeding tube as evidenced by stomach   contour outlined following injection of oral contrast.      2.  Satisfactory position of enteric tube. 3.  Significant reflux of oral contrast material into the esophagus. XR CHEST PORTABLE   Final Result   Significant bilateral infiltrates with partial interval clearing of the left   lower lobe infiltrate since the prior study         XR CHEST PORTABLE   Final Result   Satisfactory position of the ETT and NGT. Otherwise, stable chest.         XR CHEST PORTABLE   Final Result   Increasing right lung opacities consistent with pneumonia. US DUP LOWER EXTREMITIES BILATERAL VENOUS   Final Result   No evidence of DVT in either lower extremity. XR CHEST PORTABLE   Final Result   1. Right lower lobe pneumonia. 2. Left lower lobe atelectasis versus pneumonia. PROBLEM LIST:  Principal Problem:    Acute respiratory failure with hypoxia (HCC)  Active Problems:    Cerebral palsy (HCC)    Seizures ([address]    Mentally disabled    Convulsions ([address]    Pneumonia due to COVID-19 virus  Resolved Problems:    * No resolved hospital problems. *      ATTESTATION:  ICU Staff Physician note of personal involvement in Care  As the attending physician, I certify that I personally reviewed the patients history and personnally examined the patient to confirm the physical findings described above,  And that I reviewed the relevant imaging studies and available reports.   I also discussed the differential diagnosis and all of the proposed management plans with the patient and individuals accompanying the patient to this visit. They had the opportunity to ask questions about the proposed management plans and to have those questions answered. This patient has a high probability of sudden, clinically significant deterioration, which requires the highest level of physician preparedness to intervene urgently. I managed/supervised life or organ supporting interventions that required frequent physician assessment. I devoted my full attention to the direct care of this patient for the amount of time indicated below. Time I spent with the family or surrogate(s) is included only if the patient was incapable of providing the necessary information or participating in medical decisions  Time devoted to teaching and to any procedures I billed separately is not included.      CRITICAL CARE TIME:  30 minutes    Jacob Gardiner MD  Pulmonary and Critical Care Medicine

## 2021-09-04 NOTE — PROGRESS NOTES
[facility] Progress Note    Admitting Date and Time: 8/28/2021  3:31 PM  Admit Dx: Hypokalemia [E87.6]  Acute respiratory failure with hypoxia ([address] [J96.01]  Pneumonia of right lower lobe due to infectious organism [J18.9]  Sepsis with acute hypoxic respiratory failure without septic shock, due to unspecified organism ([address] [A41.9, R65.20, J96.01]    Subjective:  Patient is being followed for Hypokalemia [E87.6]  Acute respiratory failure with hypoxia ([address] [J96.01]  Pneumonia of right lower lobe due to infectious organism [J18.9]  Sepsis with acute hypoxic respiratory failure without septic shock, due to unspecified organism ([address] [A41.9, R65.20, J96.01]     Patient remains intubated, sedated and prone. Remains on ketamine    Unable to perform ROS. Currently on FiO2 85%.      acetylcysteine  600 mg Per NG tube BID    dexamethasone  6 mg IntraVENous Q12H    polyethylene glycol  17 g Oral Daily    baricitinib  4 mg Oral Daily    insulin lispro  0-18 Units SubCUTAneous Q6H    cefepime  2,000 mg IntraVENous Q12H    sodium chloride flush  5-40 mL IntraVENous 2 times per day    ipratropium-albuterol  1 ampule Inhalation 4x daily    pantoprazole  40 mg IntraVENous Daily    And    sodium chloride (PF)  10 mL IntraVENous Daily    lacosamide  150 mg Oral BID    lamoTRIgine  200 mg Oral TID    zonisamide  300 mg Oral Nightly    baclofen  10 mg Oral TID    enoxaparin  40 mg SubCUTAneous BID    polyvinyl alcohol  1 drop Both Eyes Q6H     diatrizoate meglumine-sodium, 50 mL, ONCE PRN  bisacodyl, 10 mg, Daily PRN  glucose, 15 g, PRN  dextrose, 12.5 g, PRN  glucagon (rDNA), 1 mg, PRN  dextrose, 100 mL/hr, PRN  sodium chloride flush, 10 mL, PRN  sodium chloride, 25 mL, PRN  ondansetron, 4 mg, Q8H PRN   Or  ondansetron, 4 mg, Q6H PRN  polyethylene glycol, 17 g, Daily PRN  acetaminophen, 650 mg, Q6H PRN   Or  acetaminophen, 650 mg, Q6H PRN  perflutren lipid microspheres, 1.5 mL, ONCE PRN  fentanNYL, 25 mcg, Q1H PRN  sodium chloride, 30 mL, PRN         Objective:    /64   Pulse 116   Temp 99.9 °F (37.7 °C) (Core)   Resp 28   Ht 5' (1.524 m)   Wt 153 lb (69.4 kg)   SpO2 96%   BMI 29.88 kg/m²    Due to the current efforts to prevent transmission of COVID-19 and also the need to preserve PPE for other caregivers, a face-to-face encounter with the patient was not performed. That being said, all relevant records and diagnostic tests were reviewed, including laboratory results and imaging. Please reference any relevant documentation elsewhere. Care will be coordinated with other specialties as appropriate.       Recent Labs     09/02/21  0444 09/03/21  0458 09/04/21  0512    145 143   K 4.4 4.5 3.9   * 109* 105   CO2 27 34* 34*   BUN 13 12 9   CREATININE 0.4* 0.5* 0.4*   GLUCOSE 131* 93 105*   CALCIUM 7.6* 7.7* 8.2*       Recent Labs     09/02/21  0534 09/03/21  0458 09/04/21  0512   WBC 6.1 6.2 8.3   RBC 3.02* 2.92* 3.30*   HGB 9.9* 9.5* 10.8*   HCT 30.2* 29.6* 33.1*   .0* 101.4* 100.3*   MCH 32.8 32.5 32.7   MCHC 32.8 32.1 32.6   RDW 13.7 13.9 13.6   * 172 211   MPV 11.6 11.1 10.6     Culture, Respiratory, Sputum [[id number]] (Abnormal)      Collected: 08/29/21 0220     Updated: 08/31/21 1340     Specimen Source: Sputum Expectorated       CULTURE, RESPIRATORY Oral Pharyngeal Adina reducedAbnormal      Smear, Respiratory --     Group 6: <25 PMN's/LPF and <25 Epithelial cells/LPF   Rare Polymorphonuclear leukocytes   Rare Epithelial cells   Rare Gram variable rods      Organism Klebsiella pneumoniae ssp pneumoniaeAbnormal      CULTURE, RESPIRATORY Moderate growth   Narrative:     Source: SPUEX       Site:             Radiology:   XR CHEST PORTABLE   Final Result   Significant bilateral infiltrates with partial interval clearing of the left   lower lobe infiltrate since the prior study           XR CHEST PORTABLE   Final Result   Satisfactory position of the ETT and NGT.       Otherwise, stable chest.           XR CHEST PORTABLE   Final Result   Increasing right lung opacities consistent with pneumonia.           US DUP LOWER EXTREMITIES BILATERAL VENOUS   Final Result   No evidence of DVT in either lower extremity.           XR CHEST PORTABLE   Final Result   1. Right lower lobe pneumonia. 2. Left lower lobe atelectasis versus pneumonia. Assessment:  Principal Problem:    Acute respiratory failure with hypoxia (HCC)  Active Problems:    Cerebral palsy (HCC)    Seizures ([address]    Mentally disabled    Convulsions ([address]    Pneumonia due to COVID-19 virus  Resolved Problems:    * No resolved hospital problems. *    Assessment:  -Acute hypoxic respiratory failure, 2/2 COVID pneumonia with secondary bacterial pneumonia  -COVID-19 pneumonia, Klebsiella pneumonia  -Hx cerebral palsy with seizure disorder  -Septic shockresolved  -Lactic acidosisresolved    Plan:  -Remains intubated, sedated and proned. Currently on ketamine  -ID following, input appreciated. -S/p 5 days of remdesivir, on baricitinib  -Continuing on breathing treatment and Decadron  -Follow inflammatory markers and D-dimer. On Lovenox twice daily  -Continue home seizure medications      NOTE: This report was transcribed using voice recognition software. Every effort was made to ensure accuracy; however, inadvertent computerized transcription errors may be present.   Electronically signed by Aida Enriquez MD on 9/4/2021 at 12:44 PM

## 2021-09-04 NOTE — PROCEDURES
Darcie Sam is a 30 y.o. male patient. 1. Acute respiratory failure with hypoxia ([address]    2. Pneumonia of right lower lobe due to infectious organism    3. Hypokalemia    4. Pneumonia due to COVID-19 virus    5. Sepsis with acute hypoxic respiratory failure and septic shock, due to unspecified organism [facility])      Past Medical History:   Diagnosis Date    C[facility]     COVID-19     G tube feedings ([facility]     age 3    History of blood transfusion     Mental handicap     mom states severe brain damage    Seizures (HCC)     tremors  last seizure 8/8/2018     Blood pressure 118/87, pulse 85, temperature 97.3 °F (36.3 °C), temperature source Core, resp. rate 24, height 5' (1.524 m), weight 153 lb (69.4 kg), SpO2 95 %. Intubation    Date/Time: 9/3/2021 11:26 PM  Performed by: MINERVA Day CNP  Authorized by: MINERVA Day CNP   Consent: The procedure was performed in an emergent situation. Imaging studies: imaging studies available  Patient identity confirmed: arm band  Time out: Immediately prior to procedure a \"time out\" was called to verify the correct patient, procedure, equipment, support staff and site/side marked as required.   Indications: respiratory failure  Intubation method: video-assisted  Preoxygenation: BVM  Pretreatment medications: fentanyl  Sedatives: propofol  Laryngoscope size: Mac 4  Tube size: 7.5 mm  Tube type: cuffed  Number of attempts: 1  Cricoid pressure: no  Cords visualized: yes  Post-procedure assessment: chest rise,  CO2 detector and ETCO2 monitor  Breath sounds: equal  Cuff inflated: yes  ETT to lip: 25 cm  Tube secured with: ETT jackson  Chest x-ray interpreted by me and radiologist.  Chest x-ray findings: endotracheal tube in appropriate position  Patient tolerance: patient tolerated the procedure well with no immediate complications          MINERVA Serna CNP  9/3/2021

## 2021-09-04 NOTE — PROGRESS NOTES
[facility]  [facility]  Progress Note    Chief Complaint   Patient presents with    Altered Mental Status    Respiratory Distress     41% SpO2     SUBJECTIVE    Pt seen   09/04/21     No physical  examination today due to the efforts to preserve PPE and limit transmission/exposure to COVID-19. All relevant records and diagnostic tests were reviewed, including laboratory results and imaging. Please reference any relevant documentation elsewhere. Care will be coordinated with patient's care team.    On vent, prone, paralyzed   Patient is tolerating medications. No reported adverse drug reactions. REVIEW OF SYSTEMS     As stated above in the chief complaint, otherwise negative.   CURRENT MEDICATIONS      dexamethasone  6 mg IntraVENous Q12H    polyethylene glycol  17 g Oral Daily    baricitinib  4 mg Oral Daily    insulin lispro  0-18 Units SubCUTAneous Q6H    cefepime  2,000 mg IntraVENous Q12H    sodium chloride flush  5-40 mL IntraVENous 2 times per day    ipratropium-albuterol  1 ampule Inhalation 4x daily    pantoprazole  40 mg IntraVENous Daily    And    sodium chloride (PF)  10 mL IntraVENous Daily    lacosamide  150 mg Oral BID    lamoTRIgine  200 mg Oral TID    zonisamide  300 mg Oral Nightly    baclofen  10 mg Oral TID    enoxaparin  40 mg SubCUTAneous BID    polyvinyl alcohol  1 drop Both Eyes Q6H     Continuous Infusions:   ketamine (KETALAR) infusion for analgosedation 0.3 mg/kg/hr (09/04/21 0941)    dextrose      lactated ringers 75 mL/hr at 09/04/21 0941    sodium chloride Stopped (09/03/21 2142)    sodium chloride      fentaNYL 5 mcg/ml in 0.9%  ml infusion 200 mcg/hr (09/04/21 0941)    propofol 50 mcg/kg/min (09/04/21 0941)    norepinephrine Stopped (08/30/21 0800)     PRN Meds:diatrizoate meglumine-sodium, bisacodyl, glucose, dextrose, glucagon (rDNA), dextrose, sodium chloride flush, sodium chloride, ondansetron **OR** ondansetron, polyethylene glycol, acetaminophen **OR** acetaminophen, perflutren lipid microspheres, fentanNYL, sodium chloride    PHYSICAL EXAM     /83   Pulse 113   Temp 99.3 °F (37.4 °C) (Core)   Resp 23   Ht 5' (1.524 m)   Wt 153 lb (69.4 kg)   SpO2 97%   BMI 29.88 kg/m²   Temp  Av.8 °F (36.6 °C)  Min: 95.7 °F (35.4 °C)  Max: 99.3 °F (37.4 °C)  Constitutional:  The patient is sedated  Vent PRONING   Lines: [facility]  j tube  Suprapubic       DIAGNOSTIC RESULTS   Radiology:    Recent Labs     2134 21  05   WBC 6.1 6.2 8.3   RBC 3.02* 2.92* 3.30*   HGB 9.9* 9.5* 10.8*   HCT 30.2* 29.6* 33.1*   .0* 101.4* 100.3*   MCH 32.8 32.5 32.7   MCHC 32.8 32.1 32.6   RDW 13.7 13.9 13.6   * 172 211   MPV 11.6 11.1 10.6     Recent Labs     214 21  05    145 143   K 4.4 4.5 3.9   * 109* 105   CO2 27 34* 34*   BUN 13 12 9   CREATININE 0.4* 0.5* 0.4*   GLUCOSE 131* 93 105*   PROT 4.2* 4.4* 5.0*   LABALBU 2.1* 2.1* 2.3*   CALCIUM 7.6* 7.7* 8.2*   BILITOT 1.1 0.8 0.8   ALKPHOS 238* 290* 282*   AST 81* 104* 87*   ALT 36 47* 45*     Lab Results   Component Value Date    CRP 0.3 2021    CRP 8.4 (H) 2021     Lab Results   Component Value Date    SEDRATE 5 2019    SEDRATE 29 (H) 2018    SEDRATE 21 (H) 08/10/2018     Recent Labs     218 21  0512   AST 81* 104* 87*   ALT 36 47* 45*        Microbiology:   No results for input(s): COVID19 in the last 72 hours.   Lab Results   Component Value Date    BC 5 Days no growth 2021    BLOODCULT2 5 Days no growth 2021    ORG Klebsiella pneumoniae ssp pneumoniae 2021    ORG Coagulase Negative Staphylococci 2018    ORG Corynebacterium species 2018     WOUND/ABSCESS   Date Value Ref Range Status   2018 Light growth  Final   2018 Light growth  Mixed morphologies    Final   2018   Corrected    Light

## 2021-09-04 NOTE — PLAN OF CARE
Problem: Airway Clearance - Ineffective  Goal: Achieve or maintain patent airway  Outcome: Met This Shift     Problem: Gas Exchange - Impaired  Goal: Absence of hypoxia  Outcome: Met This Shift  Goal: Promote optimal lung function  Outcome: Met This Shift     Problem: Breathing Pattern - Ineffective  Goal: Ability to achieve and maintain a regular respiratory rate  Outcome: Met This Shift     Problem: Isolation Precautions - Risk of Spread of Infection  Goal: Prevent transmission of infection  Outcome: Met This Shift     Problem: Nutrition Deficits  Goal: Optimize nutritional status  Outcome: Not Met This Shift     Problem: Skin Integrity:  Goal: Will show no infection signs and symptoms  Description: Will show no infection signs and symptoms  Outcome: Met This Shift  Goal: Absence of new skin breakdown  Description: Absence of new skin breakdown  Outcome: Met This Shift     Problem: Falls - Risk of:  Goal: Will remain free from falls  Description: Will remain free from falls  Outcome: Met This Shift  Goal: Absence of physical injury  Description: Absence of physical injury  Outcome: Met This Shift

## 2021-09-04 NOTE — PROGRESS NOTES
The patient's chest x-ray obtained at 0630 this a.m. noted interval retraction of the endotracheal tube which overlies the cervical region. Radiology suggested advancing the tube approximately 5 cm. However after respiratory therapy attempted several times to advance the ET tube into proper position a repeat chest x-ray still showed the tube approximately 7.5 cm above the shantell even after advancing the tube all the way to 28 cm. The patient will require removal of the current ET tube and reintubation. See procedure notes.

## 2021-09-05 LAB
ALBUMIN SERPL-MCNC: 2.2 G/DL (ref 3.5–5.2)
ALP BLD-CCNC: 234 U/L (ref 40–129)
ALT SERPL-CCNC: 30 U/L (ref 0–40)
ANION GAP SERPL CALCULATED.3IONS-SCNC: 3 MMOL/L (ref 7–16)
AST SERPL-CCNC: 56 U/L (ref 0–39)
B.E.: 6.5 MMOL/L (ref -3–3)
BASOPHILS ABSOLUTE: 0 E9/L (ref 0–0.2)
BASOPHILS RELATIVE PERCENT: 0.3 % (ref 0–2)
BILIRUB SERPL-MCNC: 0.7 MG/DL (ref 0–1.2)
BUN BLDV-MCNC: 8 MG/DL (ref 6–20)
CALCIUM SERPL-MCNC: 7.9 MG/DL (ref 8.6–10.2)
CHLORIDE BLD-SCNC: 101 MMOL/L (ref 98–107)
CO2: 36 MMOL/L (ref 22–29)
COHB: 1 % (ref 0–1.5)
CREAT SERPL-MCNC: 0.4 MG/DL (ref 0.7–1.2)
CRITICAL: ABNORMAL
DATE ANALYZED: ABNORMAL
DATE OF COLLECTION: ABNORMAL
EOSINOPHILS ABSOLUTE: 0 E9/L (ref 0.05–0.5)
EOSINOPHILS RELATIVE PERCENT: 0.1 % (ref 0–6)
FIO2: 65 %
GFR AFRICAN AMERICAN: >60
GFR NON-AFRICAN AMERICAN: >60 ML/MIN/1.73
GLUCOSE BLD-MCNC: 111 MG/DL (ref 74–99)
HCO3: 31.9 MMOL/L (ref 22–26)
HCT VFR BLD CALC: 32.2 % (ref 37–54)
HEMOGLOBIN: 10.4 G/DL (ref 12.5–16.5)
HHB: 8.6 % (ref 0–5)
LAB: ABNORMAL
LACTIC ACID: 1.1 MMOL/L (ref 0.5–2.2)
LYMPHOCYTES ABSOLUTE: 0.45 E9/L (ref 1.5–4)
LYMPHOCYTES RELATIVE PERCENT: 5.3 % (ref 20–42)
Lab: ABNORMAL
MAGNESIUM: 2 MG/DL (ref 1.6–2.6)
MCH RBC QN AUTO: 32.8 PG (ref 26–35)
MCHC RBC AUTO-ENTMCNC: 32.3 % (ref 32–34.5)
MCV RBC AUTO: 101.6 FL (ref 80–99.9)
METAMYELOCYTES RELATIVE PERCENT: 1.8 % (ref 0–1)
METER GLUCOSE: 114 MG/DL (ref 74–99)
METER GLUCOSE: 56 MG/DL (ref 74–99)
METER GLUCOSE: 63 MG/DL (ref 74–99)
METER GLUCOSE: 83 MG/DL (ref 74–99)
METER GLUCOSE: 84 MG/DL (ref 74–99)
METER GLUCOSE: 90 MG/DL (ref 74–99)
METER GLUCOSE: 97 MG/DL (ref 74–99)
METER GLUCOSE: 98 MG/DL (ref 74–99)
METHB: 0.4 % (ref 0–1.5)
MODE: AC
MONOCYTES ABSOLUTE: 0.09 E9/L (ref 0.1–0.95)
MONOCYTES RELATIVE PERCENT: 0.9 % (ref 2–12)
MYELOCYTE PERCENT: 2.6 % (ref 0–0)
NEUTROPHILS ABSOLUTE: 8.46 E9/L (ref 1.8–7.3)
NEUTROPHILS RELATIVE PERCENT: 89.5 % (ref 43–80)
O2 CONTENT: 16.6 ML/DL
O2 SATURATION: 91.3 % (ref 92–98.5)
O2HB: 90 % (ref 94–97)
OPERATOR ID: ABNORMAL
OVALOCYTES: ABNORMAL
PATIENT TEMP: 27 C
PCO2: 49.1 MMHG (ref 35–45)
PDW BLD-RTO: 13.2 FL (ref 11.5–15)
PEEP/CPAP: 12 CMH2O
PFO2: 0.91 MMHG/%
PH BLOOD GAS: 7.43 (ref 7.35–7.45)
PHOSPHORUS: 3.5 MG/DL (ref 2.5–4.5)
PLATELET # BLD: 278 E9/L (ref 130–450)
PMV BLD AUTO: 10.6 FL (ref 7–12)
PO2: 58.9 MMHG (ref 75–100)
POIKILOCYTES: ABNORMAL
POTASSIUM SERPL-SCNC: 4 MMOL/L (ref 3.5–5)
RBC # BLD: 3.17 E12/L (ref 3.8–5.8)
RI(T): 13.46
RR MECHANICAL: 24 B/MIN
SODIUM BLD-SCNC: 140 MMOL/L (ref 132–146)
SOURCE, BLOOD GAS: ABNORMAL
THB: 13.1 G/DL (ref 11.5–16.5)
TIME ANALYZED: 1239
TOTAL PROTEIN: 5 G/DL (ref 6.4–8.3)
VT MECHANICAL: 325 ML
WBC # BLD: 9 E9/L (ref 4.5–11.5)

## 2021-09-05 PROCEDURE — 2580000003 HC RX 258: Performed by: EMERGENCY MEDICINE

## 2021-09-05 PROCEDURE — 2000000000 HC ICU R&B

## 2021-09-05 PROCEDURE — 2580000003 HC RX 258: Performed by: INTERNAL MEDICINE

## 2021-09-05 PROCEDURE — 6370000000 HC RX 637 (ALT 250 FOR IP)

## 2021-09-05 PROCEDURE — 85025 COMPLETE CBC W/AUTO DIFF WBC: CPT

## 2021-09-05 PROCEDURE — C9113 INJ PANTOPRAZOLE SODIUM, VIA: HCPCS

## 2021-09-05 PROCEDURE — 6360000002 HC RX W HCPCS: Performed by: INTERNAL MEDICINE

## 2021-09-05 PROCEDURE — 83735 ASSAY OF MAGNESIUM: CPT

## 2021-09-05 PROCEDURE — 36415 COLL VENOUS BLD VENIPUNCTURE: CPT

## 2021-09-05 PROCEDURE — 6370000000 HC RX 637 (ALT 250 FOR IP): Performed by: INTERNAL MEDICINE

## 2021-09-05 PROCEDURE — 80053 COMPREHEN METABOLIC PANEL: CPT

## 2021-09-05 PROCEDURE — 6360000002 HC RX W HCPCS: Performed by: EMERGENCY MEDICINE

## 2021-09-05 PROCEDURE — 83605 ASSAY OF LACTIC ACID: CPT

## 2021-09-05 PROCEDURE — 36600 WITHDRAWAL OF ARTERIAL BLOOD: CPT

## 2021-09-05 PROCEDURE — 2580000003 HC RX 258: Performed by: NURSE PRACTITIONER

## 2021-09-05 PROCEDURE — 94640 AIRWAY INHALATION TREATMENT: CPT

## 2021-09-05 PROCEDURE — 2500000003 HC RX 250 WO HCPCS: Performed by: NURSE PRACTITIONER

## 2021-09-05 PROCEDURE — 84100 ASSAY OF PHOSPHORUS: CPT

## 2021-09-05 PROCEDURE — 94003 VENT MGMT INPAT SUBQ DAY: CPT

## 2021-09-05 PROCEDURE — 2500000003 HC RX 250 WO HCPCS: Performed by: EMERGENCY MEDICINE

## 2021-09-05 PROCEDURE — 2580000003 HC RX 258

## 2021-09-05 PROCEDURE — 2500000003 HC RX 250 WO HCPCS: Performed by: INTERNAL MEDICINE

## 2021-09-05 PROCEDURE — 82962 GLUCOSE BLOOD TEST: CPT

## 2021-09-05 PROCEDURE — 6360000002 HC RX W HCPCS

## 2021-09-05 PROCEDURE — 82805 BLOOD GASES W/O2 SATURATION: CPT

## 2021-09-05 PROCEDURE — 99233 SBSQ HOSP IP/OBS HIGH 50: CPT | Performed by: INTERNAL MEDICINE

## 2021-09-05 RX ORDER — MIDAZOLAM HYDROCHLORIDE 1 MG/ML
1 INJECTION INTRAMUSCULAR; INTRAVENOUS
Status: DISCONTINUED | OUTPATIENT
Start: 2021-09-05 | End: 2021-09-05

## 2021-09-05 RX ORDER — BISACODYL 10 MG
10 SUPPOSITORY, RECTAL RECTAL DAILY PRN
Status: DISCONTINUED | OUTPATIENT
Start: 2021-09-05 | End: 2021-09-19 | Stop reason: HOSPADM

## 2021-09-05 RX ADMIN — Medication 150 MCG/HR: at 18:48

## 2021-09-05 RX ADMIN — LAMOTRIGINE 200 MG: 100 TABLET ORAL at 13:52

## 2021-09-05 RX ADMIN — MIDAZOLAM HYDROCHLORIDE 2 MG/HR: 5 INJECTION, SOLUTION INTRAMUSCULAR; INTRAVENOUS at 12:10

## 2021-09-05 RX ADMIN — DEXAMETHASONE SODIUM PHOSPHATE 6 MG: 10 INJECTION, SOLUTION INTRAMUSCULAR; INTRAVENOUS at 09:27

## 2021-09-05 RX ADMIN — LAMOTRIGINE 200 MG: 100 TABLET ORAL at 09:26

## 2021-09-05 RX ADMIN — BACLOFEN 10 MG: 10 TABLET ORAL at 20:13

## 2021-09-05 RX ADMIN — BISACODYL 10 MG: 10 SUPPOSITORY RECTAL at 16:07

## 2021-09-05 RX ADMIN — Medication 10 ML: at 20:14

## 2021-09-05 RX ADMIN — POLYVINYL ALCOHOL 1 DROP: 14 SOLUTION/ DROPS OPHTHALMIC at 16:12

## 2021-09-05 RX ADMIN — BACLOFEN 10 MG: 10 TABLET ORAL at 09:26

## 2021-09-05 RX ADMIN — Medication 200 MCG/HR: at 04:02

## 2021-09-05 RX ADMIN — ACETYLCYSTEINE 600 MG: 200 SOLUTION ORAL; RESPIRATORY (INHALATION) at 20:12

## 2021-09-05 RX ADMIN — DEXTROSE MONOHYDRATE 12.5 G: 25 INJECTION, SOLUTION INTRAVENOUS at 00:05

## 2021-09-05 RX ADMIN — IPRATROPIUM BROMIDE AND ALBUTEROL SULFATE 1 AMPULE: .5; 3 SOLUTION RESPIRATORY (INHALATION) at 13:23

## 2021-09-05 RX ADMIN — IPRATROPIUM BROMIDE AND ALBUTEROL SULFATE 1 AMPULE: .5; 3 SOLUTION RESPIRATORY (INHALATION) at 18:26

## 2021-09-05 RX ADMIN — POLYVINYL ALCOHOL 1 DROP: 14 SOLUTION/ DROPS OPHTHALMIC at 12:00

## 2021-09-05 RX ADMIN — DEXAMETHASONE SODIUM PHOSPHATE 6 MG: 10 INJECTION, SOLUTION INTRAMUSCULAR; INTRAVENOUS at 21:47

## 2021-09-05 RX ADMIN — BACLOFEN 10 MG: 10 TABLET ORAL at 13:52

## 2021-09-05 RX ADMIN — POLYVINYL ALCOHOL 1 DROP: 14 SOLUTION/ DROPS OPHTHALMIC at 23:22

## 2021-09-05 RX ADMIN — BARICITINIB 4 MG: 2 TABLET, FILM COATED ORAL at 09:26

## 2021-09-05 RX ADMIN — CEFEPIME HYDROCHLORIDE 2000 MG: 2 INJECTION, POWDER, FOR SOLUTION INTRAVENOUS at 14:22

## 2021-09-05 RX ADMIN — POLYETHYLENE GLYCOL 3350 17 G: 17 POWDER, FOR SOLUTION ORAL at 09:26

## 2021-09-05 RX ADMIN — POLYVINYL ALCOHOL 1 DROP: 14 SOLUTION/ DROPS OPHTHALMIC at 03:47

## 2021-09-05 RX ADMIN — Medication 200 MCG/HR: at 10:39

## 2021-09-05 RX ADMIN — PROPOFOL 25 MCG/KG/MIN: 10 INJECTION, EMULSION INTRAVENOUS at 16:19

## 2021-09-05 RX ADMIN — IPRATROPIUM BROMIDE AND ALBUTEROL SULFATE 1 AMPULE: .5; 3 SOLUTION RESPIRATORY (INHALATION) at 10:13

## 2021-09-05 RX ADMIN — ZONISAMIDE 300 MG: 100 CAPSULE ORAL at 21:47

## 2021-09-05 RX ADMIN — PROPOFOL 50 MCG/KG/MIN: 10 INJECTION, EMULSION INTRAVENOUS at 05:40

## 2021-09-05 RX ADMIN — LACOSAMIDE 150 MG: 100 TABLET, FILM COATED ORAL at 09:26

## 2021-09-05 RX ADMIN — LACOSAMIDE 150 MG: 100 TABLET, FILM COATED ORAL at 21:48

## 2021-09-05 RX ADMIN — KETAMINE HYDROCHLORIDE 0.8 MG/KG/HR: 100 INJECTION, SOLUTION INTRAMUSCULAR; INTRAVENOUS at 08:34

## 2021-09-05 RX ADMIN — PROPOFOL 40 MCG/KG/MIN: 10 INJECTION, EMULSION INTRAVENOUS at 10:11

## 2021-09-05 RX ADMIN — ENOXAPARIN SODIUM 40 MG: 40 INJECTION SUBCUTANEOUS at 21:48

## 2021-09-05 RX ADMIN — ACETYLCYSTEINE 600 MG: 200 SOLUTION ORAL; RESPIRATORY (INHALATION) at 08:35

## 2021-09-05 RX ADMIN — SODIUM CHLORIDE, PRESERVATIVE FREE 10 ML: 5 INJECTION INTRAVENOUS at 09:27

## 2021-09-05 RX ADMIN — PANTOPRAZOLE SODIUM 40 MG: 40 INJECTION, POWDER, FOR SOLUTION INTRAVENOUS at 09:27

## 2021-09-05 RX ADMIN — LAMOTRIGINE 200 MG: 100 TABLET ORAL at 21:48

## 2021-09-05 RX ADMIN — CEFEPIME HYDROCHLORIDE 2000 MG: 2 INJECTION, POWDER, FOR SOLUTION INTRAVENOUS at 02:30

## 2021-09-05 RX ADMIN — ENOXAPARIN SODIUM 40 MG: 40 INJECTION SUBCUTANEOUS at 09:27

## 2021-09-05 RX ADMIN — SODIUM CHLORIDE 25 ML: 9 INJECTION, SOLUTION INTRAVENOUS at 17:06

## 2021-09-05 RX ADMIN — Medication 10 ML: at 09:25

## 2021-09-05 RX ADMIN — IPRATROPIUM BROMIDE AND ALBUTEROL SULFATE 1 AMPULE: .5; 3 SOLUTION RESPIRATORY (INHALATION) at 06:37

## 2021-09-05 ASSESSMENT — PAIN SCALES - GENERAL
PAINLEVEL_OUTOF10: 0

## 2021-09-05 ASSESSMENT — PULMONARY FUNCTION TESTS
PIF_VALUE: 24
PIF_VALUE: 28
PIF_VALUE: 42
PIF_VALUE: 42
PIF_VALUE: 29
PIF_VALUE: 28
PIF_VALUE: 28
PIF_VALUE: 30
PIF_VALUE: 27
PIF_VALUE: 29
PIF_VALUE: 30
PIF_VALUE: 47
PIF_VALUE: 28
PIF_VALUE: 27
PIF_VALUE: 29
PIF_VALUE: 28
PIF_VALUE: 21
PIF_VALUE: 53
PIF_VALUE: 26
PIF_VALUE: 29
PIF_VALUE: 21
PIF_VALUE: 29
PIF_VALUE: 31
PIF_VALUE: 27
PIF_VALUE: 30
PIF_VALUE: 31
PIF_VALUE: 28

## 2021-09-05 NOTE — PROGRESS NOTES
[facility]  [facility]  Progress Note    Chief Complaint   Patient presents with    Altered Mental Status    Respiratory Distress     41% SpO2     SUBJECTIVE    Pt seen   09/05/21     No physical  examination today due to the efforts to preserve PPE and limit transmission/exposure to COVID-19. All relevant records and diagnostic tests were reviewed, including laboratory results and imaging. Please reference any relevant documentation elsewhere. Care will be coordinated with patient's care team.    On vent, prone, paralyzed   Patient is tolerating medications. No reported adverse drug reactions. REVIEW OF SYSTEMS     As stated above in the chief complaint, otherwise negative.   CURRENT MEDICATIONS      acetylcysteine  600 mg Per NG tube BID    dexamethasone  6 mg IntraVENous Q12H    polyethylene glycol  17 g Oral Daily    baricitinib  4 mg Oral Daily    insulin lispro  0-18 Units SubCUTAneous Q6H    cefepime  2,000 mg IntraVENous Q12H    sodium chloride flush  5-40 mL IntraVENous 2 times per day    ipratropium-albuterol  1 ampule Inhalation 4x daily    pantoprazole  40 mg IntraVENous Daily    And    sodium chloride (PF)  10 mL IntraVENous Daily    lacosamide  150 mg Oral BID    lamoTRIgine  200 mg Oral TID    zonisamide  300 mg Oral Nightly    baclofen  10 mg Oral TID    enoxaparin  40 mg SubCUTAneous BID    polyvinyl alcohol  1 drop Both Eyes Q6H     Continuous Infusions:   midazolam 2 mg/hr (09/05/21 1210)    dextrose      sodium chloride Stopped (09/04/21 2105)    sodium chloride      fentaNYL 5 mcg/ml in 0.9%  ml infusion 200 mcg/hr (09/05/21 1039)    propofol 40 mcg/kg/min (09/05/21 1011)     PRN Meds:bisacodyl, diatrizoate meglumine-sodium, glucose, dextrose, glucagon (rDNA), dextrose, sodium chloride flush, sodium chloride, ondansetron **OR** ondansetron, polyethylene glycol, acetaminophen **OR** acetaminophen, perflutren lipid microspheres, fentanNYL, sodium chloride    PHYSICAL EXAM     /62   Pulse 127   Temp 100.2 °F (37.9 °C)   Resp 23   Ht 5' (1.524 m)   Wt 153 lb (69.4 kg)   SpO2 97%   BMI 29.88 kg/m²   Temp  Av.2 °F (37.3 °C)  Min: 98.5 °F (36.9 °C)  Max: 100.2 °F (37.9 °C)  Constitutional:  The patient is sedated  Vent PRONING   Lines: [facility]  j tube  Suprapubic       DIAGNOSTIC RESULTS   Radiology:    Recent Labs     2133   WBC 6.2 8.3 9.0   RBC 2.92* 3.30* 3.17*   HGB 9.5* 10.8* 10.4*   HCT 29.6* 33.1* 32.2*   .4* 100.3* 101.6*   MCH 32.5 32.7 32.8   MCHC 32.1 32.6 32.3   RDW 13.9 13.6 13.2    211 278   MPV 11.1 10.6 10.6     Recent Labs     2133    143 140   K 4.5 3.9 4.0   * 105 101   CO2 34* 34* 36*   BUN 12 9 8   CREATININE 0.5* 0.4* 0.4*   GLUCOSE 93 105* 111*   PROT 4.4* 5.0* 5.0*   LABALBU 2.1* 2.3* 2.2*   CALCIUM 7.7* 8.2* 7.9*   BILITOT 0.8 0.8 0.7   ALKPHOS 290* 282* 234*   * 87* 56*   ALT 47* 45* 30     Lab Results   Component Value Date    CRP 0.3 2021    CRP 8.4 (H) 2021     Lab Results   Component Value Date    SEDRATE 5 2019    SEDRATE 29 (H) 2018    SEDRATE 21 (H) 08/10/2018     Recent Labs     21  0533   PROCAL  --  3.10*  --    * 87* 56*   ALT 47* 45* 30   TRIG  --  360*  --         Microbiology:   No results for input(s): COVID19 in the last 72 hours. Lab Results   Component Value Date    BC 5 Days no growth 2021    BLOODCULT2 5 Days no growth 2021    ORG Klebsiella pneumoniae ssp pneumoniae 2021    ORG Coagulase Negative Staphylococci 2018    ORG Corynebacterium species 2018     WOUND/ABSCESS   Date Value Ref Range Status   2018 Light growth  Final   2018 Light growth  Mixed morphologies    Final   2018   Corrected    Light growth  Too fastidious for susceptibility testing. 06/29/2018 Light growth  Corrected   06/29/2018 Moderate growth  Corrected     Smear, Respiratory   Date Value Ref Range Status   09/04/2021   Final    Moderate Polymorphonuclear leukocytes  Epithelial cells not seen  No organisms seen       No results found for: MPNEUMO, CLAMYDCU, LABLEGI, AFBCX, FUNGSM, LABFUNG    MRSA Culture Only   Date Value Ref Range Status   08/29/2021 Methicillin resistant Staph aureus not isolated  Final     CULTURE, RESPIRATORY   Date Value Ref Range Status   08/29/2021 Oral Pharyngeal Adina reduced (A)  Final   08/29/2021 Moderate growth  Final     No results for input(s): CXSURG, ORG in the last 72 hours. No results for input(s): WNDABS, ORG in the last 72 hours.      Susceptibility    Klebsiella pneumoniae ssp pneumoniae (1)    Antibiotic Interpretation DERIAN Status    ampicillin Resistant >=^32 mcg/mL     ceFAZolin Sensitive <=^4 mcg/mL     cefepime Sensitive <=^0.12 mcg/mL     cefTRIAXone Sensitive <=^0.25 mcg/mL     Confirmatory Extended Spectrum Beta-Lactamase Negative Neg mcg/mL     ertapenem Sensitive <=^0.12 mcg/mL     gentamicin Sensitive <=^1 mcg/mL     levofloxacin Sensitive <=^0.12 mcg/mL     piperacillin-tazobactam Sensitive <=^4 mcg/mL     trimethoprim-sulfamethoxazole Sensitive <=^20 mcg/            ASSESSMENT      With leukopenia/ leukocytosis - resolved   Klebsiella pneumonia  Mrsa neg   COVID19 pneumonia   Cerebral palsy admitted with COVID pneumonia and AMS     S/p Remdesivir x 5 days     PLAN  Continue Cefepime  Continue Baricitinib   Continue Solu-cortef   Monitor labs, follow cultures     Family wants to continue full code status     Quynh Cochran MD, [address]  9/5/2021  12:55 PM

## 2021-09-05 NOTE — PROGRESS NOTES
CRITICAL CARE PROGRESS NOTE    The patient's case was discussed in multidisciplinary rounds including critical care specialist, nursing, RT and pharmacy. Her evaluation is as follows:      30year old man with PMH as described below admitted to ICU for management of COVID-19 pneumonia, additional Klebsiella pneumonia right upper lobe, acute respiratory failure, ARDS, cerebral palsy, septic shock, metabolic acidosis, respiratory acidosis. --Ventilated and in prone position  --Hypoglycemic overnight, received D 50W  --Ventilator: AC Vt 325/ f24/ PEEP 12/85% FiO2  Crs 19  Pplat 28  --Fluid balance is positive 3L, had good diuresis with 10 mg of furosemide  --Seems uncomfortable when receiving flushes through NG, will check lactate and ABG    **His father came to the unit, I updated him on his critical condition. Also, I recommended his father to get immunized against COVID because if the patient were to survive, he could get COVID again from unimmunized family members. The patient's dad was somewhat beligerant with me after this recommendation. /63   Pulse 117   Temp 99 °F (37.2 °C) (Core)   Resp 25   Ht 5' (1.524 m)   Wt 153 lb (69.4 kg)   SpO2 94%   BMI 29.88 kg/m²   General: Lethargic, moves his shoulders and blinks, prone position, intubated and sedated  HEENT: face edema, mouth with ETT, no nasal lesions, no cervical adenopathy palpated  Respiratory: Lungs with diminished breath sounds bilaterally, no adventitious sounds auscultated, no accessory muscle use  CV: Regular rate, no murmurs, no leg edema  Abdomen: Soft, aparently non tender, + bowel sounds  Skin: Hydrated, adequate turgor, no rash, capillary refill <2 seconds  Extremities: Contracted limbs, moves 4 limbs spontaneously, distal pulses present  Neurology: Lethargic, moves shoulders spontaneously,  neck is supple, no meningitic signs present.       A/P:  Acute hypoxemic respiratory failure secondary to severe COVID-19 pneumonia and ARDS + Klebsiella pneumonia  --Mechanical ventilation with lung protective strategy. --Analgesia with fentanyl   Sedation with Propofol and midazolam  --Prone position recommended  --Antimicrobial regimen: Cefepime  --Antiviral regimen: Remdesivir X5 days, baricitinib daily and dexamethasone 6 mg BID X 10 days  --Cultures ordered as having intermittent fever  --Inflammatory markers daily  --Anticoagulation: Enoxaparin BID  --Diuresis on hold    Metabolic encephalopathy due to 1  --Currently sedated for management of ARDS    Cerebral palsy  --Continue baclofen     Epilepsy  --Continue Lacosamide, Lamotrigine and zonisamide    Tube feedings at 20 cc/h    Last 3 CMP:    Recent Labs     09/03/21  0458 09/04/21  0512 09/05/21  0533    143 140   K 4.5 3.9 4.0   * 105 101   CO2 34* 34* 36*   BUN 12 9 8   CREATININE 0.5* 0.4* 0.4*   GLUCOSE 93 105* 111*   CALCIUM 7.7* 8.2* 7.9*   PROT 4.4* 5.0* 5.0*   LABALBU 2.1* 2.3* 2.2*   BILITOT 0.8 0.8 0.7   ALKPHOS 290* 282* 234*   * 87* 56*   ALT 47* 45* 30     Recent Labs     09/05/21  0533   WBC 9.0   RBC 3.17*   HGB 10.4*   HCT 32.2*   .6*   MCH 32.8   MCHC 32.3   RDW 13.2      MPV 10.6       No results for input(s): BC in the last 72 hours. No results for input(s): Tamara Holes in the last 72 hours.     24 HR INTAKE/OUTPUT:      Intake/Output Summary (Last 24 hours) at 9/5/2021 1059  Last data filed at 9/5/2021 0800  Gross per 24 hour   Intake 3218.05 ml   Output 5500 ml   Net -2281.95 ml     MEDICATIONS:   acetylcysteine  600 mg Per NG tube BID    dexamethasone  6 mg IntraVENous Q12H    polyethylene glycol  17 g Oral Daily    baricitinib  4 mg Oral Daily    insulin lispro  0-18 Units SubCUTAneous Q6H    cefepime  2,000 mg IntraVENous Q12H    sodium chloride flush  5-40 mL IntraVENous 2 times per day    ipratropium-albuterol  1 ampule Inhalation 4x daily    pantoprazole  40 mg IntraVENous Daily    And    sodium chloride (PF)  10 mL IntraVENous Daily    lacosamide  150 mg Oral BID    lamoTRIgine  200 mg Oral TID    zonisamide  300 mg Oral Nightly    baclofen  10 mg Oral TID    enoxaparin  40 mg SubCUTAneous BID    polyvinyl alcohol  1 drop Both Eyes Q6H      ketamine (KETALAR) infusion for analgosedation 0.8 mg/kg/hr (09/05/21 0834)    dextrose      sodium chloride Stopped (09/04/21 2105)    sodium chloride      fentaNYL 5 mcg/ml in 0.9%  ml infusion 200 mcg/hr (09/05/21 1039)    propofol 40 mcg/kg/min (09/05/21 1011)     diatrizoate meglumine-sodium, bisacodyl, glucose, dextrose, glucagon (rDNA), dextrose, sodium chloride flush, sodium chloride, ondansetron **OR** ondansetron, polyethylene glycol, acetaminophen **OR** acetaminophen, perflutren lipid microspheres, fentanNYL, sodium chloride    OBJECTIVE:  Vitals:    09/05/21 1004   BP:    Pulse: 117   Resp: 25   Temp:    SpO2: 94%     FiO2 : 65 %  O2 Flow Rate (L/min): 60 L/min  O2 Device: Ventilator        LABS:  WBC   Date Value Ref Range Status   09/05/2021 9.0 4.5 - 11.5 E9/L Final   09/04/2021 8.3 4.5 - 11.5 E9/L Final   09/03/2021 6.2 4.5 - 11.5 E9/L Final     Hemoglobin   Date Value Ref Range Status   09/05/2021 10.4 (L) 12.5 - 16.5 g/dL Final   09/04/2021 10.8 (L) 12.5 - 16.5 g/dL Final   09/03/2021 9.5 (L) 12.5 - 16.5 g/dL Final     Hematocrit   Date Value Ref Range Status   09/05/2021 32.2 (L) 37.0 - 54.0 % Final   09/04/2021 33.1 (L) 37.0 - 54.0 % Final   09/03/2021 29.6 (L) 37.0 - 54.0 % Final     MCV   Date Value Ref Range Status   09/05/2021 101.6 (H) 80.0 - 99.9 fL Final   09/04/2021 100.3 (H) 80.0 - 99.9 fL Final   09/03/2021 101.4 (H) 80.0 - 99.9 fL Final     Platelets   Date Value Ref Range Status   09/05/2021 278 130 - 450 E9/L Final   09/04/2021 211 130 - 450 E9/L Corrected     Comment:     Platelet clumps are identified. This may decrease the automated platelet  count artifactually.      09/03/2021 172 130 - 450 E9/L Final     Sodium   Date Value Ref Range Status   09/05/2021 140 132 - 146 mmol/L Final   09/04/2021 143 132 - 146 mmol/L Final   09/03/2021 145 132 - 146 mmol/L Final     Potassium   Date Value Ref Range Status   09/05/2021 4.0 3.5 - 5.0 mmol/L Final   09/04/2021 3.9 3.5 - 5.0 mmol/L Final   09/03/2021 4.5 3.5 - 5.0 mmol/L Final     Chloride   Date Value Ref Range Status   09/05/2021 101 98 - 107 mmol/L Final   09/04/2021 105 98 - 107 mmol/L Final   09/03/2021 109 (H) 98 - 107 mmol/L Final     CO2   Date Value Ref Range Status   09/05/2021 36 (H) 22 - 29 mmol/L Final   09/04/2021 34 (H) 22 - 29 mmol/L Final   09/03/2021 34 (H) 22 - 29 mmol/L Final     BUN   Date Value Ref Range Status   09/05/2021 8 6 - 20 mg/dL Final   09/04/2021 9 6 - 20 mg/dL Final   09/03/2021 12 6 - 20 mg/dL Final     CREATININE   Date Value Ref Range Status   09/05/2021 0.4 (L) 0.7 - 1.2 mg/dL Final   09/04/2021 0.4 (L) 0.7 - 1.2 mg/dL Final   09/03/2021 0.5 (L) 0.7 - 1.2 mg/dL Final     Glucose   Date Value Ref Range Status   09/05/2021 111 (H) 74 - 99 mg/dL Final   09/04/2021 105 (H) 74 - 99 mg/dL Final   09/03/2021 93 74 - 99 mg/dL Final     Calcium   Date Value Ref Range Status   09/05/2021 7.9 (L) 8.6 - 10.2 mg/dL Final   09/04/2021 8.2 (L) 8.6 - 10.2 mg/dL Final   09/03/2021 7.7 (L) 8.6 - 10.2 mg/dL Final     Total Protein   Date Value Ref Range Status   09/05/2021 5.0 (L) 6.4 - 8.3 g/dL Final   09/04/2021 5.0 (L) 6.4 - 8.3 g/dL Final   09/03/2021 4.4 (L) 6.4 - 8.3 g/dL Final     Albumin   Date Value Ref Range Status   09/05/2021 2.2 (L) 3.5 - 5.2 g/dL Final   09/04/2021 2.3 (L) 3.5 - 5.2 g/dL Final   09/03/2021 2.1 (L) 3.5 - 5.2 g/dL Final     Total Bilirubin   Date Value Ref Range Status   09/05/2021 0.7 0.0 - 1.2 mg/dL Final   09/04/2021 0.8 0.0 - 1.2 mg/dL Final   09/03/2021 0.8 0.0 - 1.2 mg/dL Final     Alkaline Phosphatase   Date Value Ref Range Status   09/05/2021 234 (H) 40 - 129 U/L Final   09/04/2021 282 (H) 40 - 129 U/L Final   09/03/2021 290 (H) 40 - 129 U/L Final AST   Date Value Ref Range Status   09/05/2021 56 (H) 0 - 39 U/L Final   09/04/2021 87 (H) 0 - 39 U/L Final   09/03/2021 104 (H) 0 - 39 U/L Final     ALT   Date Value Ref Range Status   09/05/2021 30 0 - 40 U/L Final   09/04/2021 45 (H) 0 - 40 U/L Final   09/03/2021 47 (H) 0 - 40 U/L Final     GFR Non-   Date Value Ref Range Status   09/05/2021 >60 >=60 mL/min/1.73 Final     Comment:     Chronic Kidney Disease: less than 60 ml/min/1.73 sq.m. Kidney Failure: less than 15 ml/min/1.73 sq.m. Results valid for patients 18 years and older. 09/04/2021 >60 >=60 mL/min/1.73 Final     Comment:     Chronic Kidney Disease: less than 60 ml/min/1.73 sq.m. Kidney Failure: less than 15 ml/min/1.73 sq.m. Results valid for patients 18 years and older. 09/03/2021 >60 >=60 mL/min/1.73 Final     Comment:     Chronic Kidney Disease: less than 60 ml/min/1.73 sq.m. Kidney Failure: less than 15 ml/min/1.73 sq.m. Results valid for patients 18 years and older. GFR    Date Value Ref Range Status   09/05/2021 >60  Final   09/04/2021 >60  Final   09/03/2021 >60  Final     Magnesium   Date Value Ref Range Status   09/05/2021 2.0 1.6 - 2.6 mg/dL Final   09/04/2021 2.2 1.6 - 2.6 mg/dL Final   09/03/2021 2.4 1.6 - 2.6 mg/dL Final     Phosphorus   Date Value Ref Range Status   09/05/2021 3.5 2.5 - 4.5 mg/dL Final   09/04/2021 2.3 (L) 2.5 - 4.5 mg/dL Final   09/03/2021 2.4 (L) 2.5 - 4.5 mg/dL Final     No results for input(s): PH, PO2, PCO2, HCO3, BE, O2SAT in the last 72 hours. RADIOLOGY:  XR CHEST PORTABLE   Final Result   1. Slight advancement of the endotracheal tube which now resides in the   midthoracic trachea. 2.  Stable appearance of the chest.  Stable position of the enteric tube. XR CHEST PORTABLE   Final Result   1. Interval slight advancement of the endotracheal tube which now resides in   the proximal thoracic trachea.       2.  Persistent ill-defined opacities in the right greater than left lungs. No change from prior exam.  No obvious pneumothorax. 3.  Enteric tube. Contrast material identified within the stomach lumen. XR CHEST PORTABLE   Final Result   1. Persistent ill-defined opacities in the bilateral lungs slightly worse in   the left mid to upper lung. Probable trace pleural effusions. 2.  Slight retraction of the endotracheal tube which projects over the   cervical region. Suggest repositioning. 3.  There appears to be a satisfactory position of the enteric tube. The findings were sent to the Radiology Results [address] at 7:12   pm on 9/3/2021to be communicated to a licensed caregiver. XR ABDOMEN FOR NG/OG/NE TUBE PLACEMENT   Final Result   1. Satisfactory position of gastric feeding tube as evidenced by stomach   contour outlined following injection of oral contrast.      2.  Satisfactory position of enteric tube. 3.  Significant reflux of oral contrast material into the esophagus. XR CHEST PORTABLE   Final Result   Significant bilateral infiltrates with partial interval clearing of the left   lower lobe infiltrate since the prior study         XR CHEST PORTABLE   Final Result   Satisfactory position of the ETT and NGT. Otherwise, stable chest.         XR CHEST PORTABLE   Final Result   Increasing right lung opacities consistent with pneumonia. US DUP LOWER EXTREMITIES BILATERAL VENOUS   Final Result   No evidence of DVT in either lower extremity. XR CHEST PORTABLE   Final Result   1. Right lower lobe pneumonia. 2. Left lower lobe atelectasis versus pneumonia. PROBLEM LIST:  Principal Problem:    Acute respiratory failure with hypoxia (HCC)  Active Problems:    Cerebral palsy (HCC)    Seizures ([address]    Mentally disabled    Convulsions ([address]    Pneumonia due to COVID-19 virus  Resolved Problems:    * No resolved hospital problems. *      ATTESTATION:  ICU Staff Physician note of personal involvement in Care  As the attending physician, I certify that I personally reviewed the patients history and personnally examined the patient to confirm the physical findings described above,  And that I reviewed the relevant imaging studies and available reports. I also discussed the differential diagnosis and all of the proposed management plans with the patient and individuals accompanying the patient to this visit. They had the opportunity to ask questions about the proposed management plans and to have those questions answered. This patient has a high probability of sudden, clinically significant deterioration, which requires the highest level of physician preparedness to intervene urgently. I managed/supervised life or organ supporting interventions that required frequent physician assessment. I devoted my full attention to the direct care of this patient for the amount of time indicated below. Time I spent with the family or surrogate(s) is included only if the patient was incapable of providing the necessary information or participating in medical decisions  Time devoted to teaching and to any procedures I billed separately is not included.      CRITICAL CARE TIME:  30 minutes    Mane Ortiz MD  Pulmonary and Critical Care Medicine

## 2021-09-05 NOTE — PROGRESS NOTES
[facility]) Hospitalist     ICU Progress Note    Admitting Date and Time: 8/28/2021  3:31 PM  Admit Dx: Hypokalemia [E87.6]  Acute respiratory failure with hypoxia (HCC) [J96.01]  Pneumonia of right lower lobe due to infectious organism [J18.9]  Sepsis with acute hypoxic respiratory failure without septic shock, due to unspecified organism ([address] [A41.9, R65.20, J96.01]    Subjective:  Patient is being followed for Hypokalemia [E87.6]  Acute respiratory failure with hypoxia ([address] [J96.01]  Pneumonia of right lower lobe due to infectious organism [J18.9]  Sepsis with acute hypoxic respiratory failure without septic shock, due to unspecified organism ([address] [A41.9, R65.20, J96.01]     Patient remains intubated, sedated and prone. Per RN: Ketamine off and now getting Versed, propofol and Fentanly    Unable to perform ROS.      acetylcysteine  600 mg Per NG tube BID    dexamethasone  6 mg IntraVENous Q12H    polyethylene glycol  17 g Oral Daily    baricitinib  4 mg Oral Daily    insulin lispro  0-18 Units SubCUTAneous Q6H    cefepime  2,000 mg IntraVENous Q12H    sodium chloride flush  5-40 mL IntraVENous 2 times per day    ipratropium-albuterol  1 ampule Inhalation 4x daily    pantoprazole  40 mg IntraVENous Daily    And    sodium chloride (PF)  10 mL IntraVENous Daily    lacosamide  150 mg Oral BID    lamoTRIgine  200 mg Oral TID    zonisamide  300 mg Oral Nightly    baclofen  10 mg Oral TID    enoxaparin  40 mg SubCUTAneous BID    polyvinyl alcohol  1 drop Both Eyes Q6H     bisacodyl, 10 mg, Daily PRN  diatrizoate meglumine-sodium, 50 mL, ONCE PRN  glucose, 15 g, PRN  dextrose, 12.5 g, PRN  glucagon (rDNA), 1 mg, PRN  dextrose, 100 mL/hr, PRN  sodium chloride flush, 10 mL, PRN  sodium chloride, 25 mL, PRN  ondansetron, 4 mg, Q8H PRN   Or  ondansetron, 4 mg, Q6H PRN  polyethylene glycol, 17 g, Daily PRN  acetaminophen, 650 mg, Q6H PRN   Or  acetaminophen, 650 mg, Q6H PRN  perflutren lipid microspheres, 1.5 mL, ONCE PRN  fentanNYL, 25 mcg, Q1H PRN  sodium chloride, 30 mL, PRN         Objective:    BP (!) 101/46   Pulse 114   Temp 100.2 °F (37.9 °C) (Core)   Resp 25   Ht 5' (1.524 m)   Wt 153 lb (69.4 kg)   SpO2 97%   BMI 29.88 kg/m²    Due to the current efforts to prevent transmission of COVID-19 and also the need to preserve PPE for other caregivers, a face-to-face encounter with the patient was not performed. That being said, all relevant records and diagnostic tests were reviewed, including laboratory results and imaging. Please reference any relevant documentation elsewhere. Care will be coordinated with other specialties as appropriate.       Recent Labs     09/03/21 0458 09/04/21 0512 09/05/21  0533    143 140   K 4.5 3.9 4.0   * 105 101   CO2 34* 34* 36*   BUN 12 9 8   CREATININE 0.5* 0.4* 0.4*   GLUCOSE 93 105* 111*   CALCIUM 7.7* 8.2* 7.9*       Recent Labs     09/03/21 0458 09/04/21 0512 09/05/21  0533   WBC 6.2 8.3 9.0   RBC 2.92* 3.30* 3.17*   HGB 9.5* 10.8* 10.4*   HCT 29.6* 33.1* 32.2*   .4* 100.3* 101.6*   MCH 32.5 32.7 32.8   MCHC 32.1 32.6 32.3   RDW 13.9 13.6 13.2    211 278   MPV 11.1 10.6 10.6     Culture, Respiratory, Sputum [[id number]] (Abnormal)      Collected: 08/29/21 0220     Updated: 08/31/21 1340     Specimen Source: Sputum Expectorated       CULTURE, RESPIRATORY Oral Pharyngeal Adina reducedAbnormal      Smear, Respiratory --     Group 6: <25 PMN's/LPF and <25 Epithelial cells/LPF   Rare Polymorphonuclear leukocytes   Rare Epithelial cells   Rare Gram variable rods      Organism Klebsiella pneumoniae ssp pneumoniaeAbnormal      CULTURE, RESPIRATORY Moderate growth   Narrative:     Source: SPUEX       Site:             Radiology:   XR CHEST PORTABLE   Final Result   Significant bilateral infiltrates with partial interval clearing of the left   lower lobe infiltrate since the prior study           XR CHEST PORTABLE   Final Result Satisfactory position of the ETT and NGT.       Otherwise, stable chest.           XR CHEST PORTABLE   Final Result   Increasing right lung opacities consistent with pneumonia.           US DUP LOWER EXTREMITIES BILATERAL VENOUS   Final Result   No evidence of DVT in either lower extremity.           XR CHEST PORTABLE   Final Result   1. Right lower lobe pneumonia. 2. Left lower lobe atelectasis versus pneumonia. Assessment:  Principal Problem:    Acute respiratory failure with hypoxia (HCC)  Active Problems:    Cerebral palsy (HCC)    Seizures ([address]    Mentally disabled    Convulsions ([address]    Pneumonia due to COVID-19 virus  Resolved Problems:    * No resolved hospital problems. *    Assessment:  -Acute hypoxic respiratory failure 2/2 COVID pneumonia with secondary Klebsiella bacterial pneumonia  -Hx cerebral palsy with seizure disorder  -Septic shock (resolved)  -Lactic acidosis (resolved)    Plan:  -Remains intubated, sedated and proned. -ID following, input appreciated. -Complete 5 days of remdesivir on Sept 1st  -Baricitinib 4 mg daily day #5 of treament  -Continuing on breathing treatment and Decadron which was resumed on Friday  -Follow COVID labs. -Bid Lovenox for COVID  -Continue home seizure medications    Case discussed with ICU nurse on the floor. NOTE: This report was transcribed using voice recognition software. Every effort was made to ensure accuracy; however, inadvertent computerized transcription errors may be present.   Electronically signed by Tien Garcia MD on 9/5/2021 at 5:21 PM

## 2021-09-06 LAB
ALBUMIN SERPL-MCNC: 2.1 G/DL (ref 3.5–5.2)
ALP BLD-CCNC: 204 U/L (ref 40–129)
ALT SERPL-CCNC: 23 U/L (ref 0–40)
ANION GAP SERPL CALCULATED.3IONS-SCNC: 5 MMOL/L (ref 7–16)
AST SERPL-CCNC: 36 U/L (ref 0–39)
B.E.: 6.1 MMOL/L (ref -3–3)
BASOPHILS ABSOLUTE: 0 E9/L (ref 0–0.2)
BASOPHILS RELATIVE PERCENT: 0 % (ref 0–2)
BILIRUB SERPL-MCNC: 0.5 MG/DL (ref 0–1.2)
BUN BLDV-MCNC: 8 MG/DL (ref 6–20)
CALCIUM SERPL-MCNC: 8.3 MG/DL (ref 8.6–10.2)
CHLORIDE BLD-SCNC: 105 MMOL/L (ref 98–107)
CO2: 32 MMOL/L (ref 22–29)
COHB: 1.1 % (ref 0–1.5)
CREAT SERPL-MCNC: 0.4 MG/DL (ref 0.7–1.2)
CRITICAL: ABNORMAL
CULTURE, RESPIRATORY: ABNORMAL
CULTURE, RESPIRATORY: ABNORMAL
DATE ANALYZED: ABNORMAL
DATE OF COLLECTION: ABNORMAL
EOSINOPHILS ABSOLUTE: 0 E9/L (ref 0.05–0.5)
EOSINOPHILS RELATIVE PERCENT: 0 % (ref 0–6)
FIO2: 50 %
GFR AFRICAN AMERICAN: >60
GFR NON-AFRICAN AMERICAN: >60 ML/MIN/1.73
GLUCOSE BLD-MCNC: 159 MG/DL (ref 74–99)
HCO3: 30.5 MMOL/L (ref 22–26)
HCT VFR BLD CALC: 33.1 % (ref 37–54)
HEMOGLOBIN: 10.8 G/DL (ref 12.5–16.5)
HHB: 12.2 % (ref 0–5)
LAB: ABNORMAL
LYMPHOCYTES ABSOLUTE: 0.86 E9/L (ref 1.5–4)
LYMPHOCYTES RELATIVE PERCENT: 8 % (ref 20–42)
Lab: ABNORMAL
MAGNESIUM: 2.2 MG/DL (ref 1.6–2.6)
MCH RBC QN AUTO: 32.5 PG (ref 26–35)
MCHC RBC AUTO-ENTMCNC: 32.6 % (ref 32–34.5)
MCV RBC AUTO: 99.7 FL (ref 80–99.9)
METAMYELOCYTES RELATIVE PERCENT: 1 % (ref 0–1)
METER GLUCOSE: 105 MG/DL (ref 74–99)
METER GLUCOSE: 108 MG/DL (ref 74–99)
METER GLUCOSE: 132 MG/DL (ref 74–99)
METHB: 0.3 % (ref 0–1.5)
MODE: AC
MONOCYTES ABSOLUTE: 0.11 E9/L (ref 0.1–0.95)
MONOCYTES RELATIVE PERCENT: 1 % (ref 2–12)
MYELOCYTE PERCENT: 5 % (ref 0–0)
NEUTROPHILS ABSOLUTE: 9.74 E9/L (ref 1.8–7.3)
NEUTROPHILS RELATIVE PERCENT: 85 % (ref 43–80)
O2 CONTENT: 15.8 ML/DL
O2 SATURATION: 87.6 % (ref 92–98.5)
O2HB: 86.4 % (ref 94–97)
OPERATOR ID: 3
ORGANISM: ABNORMAL
PATIENT TEMP: 37 C
PCO2: 43.2 MMHG (ref 35–45)
PDW BLD-RTO: 13 FL (ref 11.5–15)
PEEP/CPAP: 10 CMH2O
PFO2: 1.04 MMHG/%
PH BLOOD GAS: 7.47 (ref 7.35–7.45)
PHOSPHORUS: 3.2 MG/DL (ref 2.5–4.5)
PLATELET # BLD: 336 E9/L (ref 130–450)
PMV BLD AUTO: 10.5 FL (ref 7–12)
PO2: 52.2 MMHG (ref 75–100)
POLYCHROMASIA: ABNORMAL
POTASSIUM SERPL-SCNC: 4.2 MMOL/L (ref 3.5–5)
RBC # BLD: 3.32 E12/L (ref 3.8–5.8)
RI(T): 4.66
RR MECHANICAL: 24 B/MIN
SMEAR, RESPIRATORY: ABNORMAL
SODIUM BLD-SCNC: 142 MMOL/L (ref 132–146)
SOURCE, BLOOD GAS: ABNORMAL
THB: 13 G/DL (ref 11.5–16.5)
TIME ANALYZED: 1037
TOTAL PROTEIN: 5.3 G/DL (ref 6.4–8.3)
TRIGL SERPL-MCNC: 316 MG/DL (ref 0–149)
VT MECHANICAL: 325 ML
WBC # BLD: 10.7 E9/L (ref 4.5–11.5)

## 2021-09-06 PROCEDURE — 6370000000 HC RX 637 (ALT 250 FOR IP): Performed by: INTERNAL MEDICINE

## 2021-09-06 PROCEDURE — 80053 COMPREHEN METABOLIC PANEL: CPT

## 2021-09-06 PROCEDURE — 6370000000 HC RX 637 (ALT 250 FOR IP)

## 2021-09-06 PROCEDURE — 6360000002 HC RX W HCPCS: Performed by: EMERGENCY MEDICINE

## 2021-09-06 PROCEDURE — 85025 COMPLETE CBC W/AUTO DIFF WBC: CPT

## 2021-09-06 PROCEDURE — 6360000002 HC RX W HCPCS: Performed by: INTERNAL MEDICINE

## 2021-09-06 PROCEDURE — 36592 COLLECT BLOOD FROM PICC: CPT

## 2021-09-06 PROCEDURE — 2580000003 HC RX 258: Performed by: INTERNAL MEDICINE

## 2021-09-06 PROCEDURE — 94003 VENT MGMT INPAT SUBQ DAY: CPT

## 2021-09-06 PROCEDURE — C9113 INJ PANTOPRAZOLE SODIUM, VIA: HCPCS

## 2021-09-06 PROCEDURE — 2580000003 HC RX 258

## 2021-09-06 PROCEDURE — 82805 BLOOD GASES W/O2 SATURATION: CPT

## 2021-09-06 PROCEDURE — 99232 SBSQ HOSP IP/OBS MODERATE 35: CPT | Performed by: STUDENT IN AN ORGANIZED HEALTH CARE EDUCATION/TRAINING PROGRAM

## 2021-09-06 PROCEDURE — 6360000002 HC RX W HCPCS

## 2021-09-06 PROCEDURE — 94640 AIRWAY INHALATION TREATMENT: CPT

## 2021-09-06 PROCEDURE — 82962 GLUCOSE BLOOD TEST: CPT

## 2021-09-06 PROCEDURE — 83735 ASSAY OF MAGNESIUM: CPT

## 2021-09-06 PROCEDURE — 2580000003 HC RX 258: Performed by: EMERGENCY MEDICINE

## 2021-09-06 PROCEDURE — 36415 COLL VENOUS BLD VENIPUNCTURE: CPT

## 2021-09-06 PROCEDURE — 2500000003 HC RX 250 WO HCPCS: Performed by: INTERNAL MEDICINE

## 2021-09-06 PROCEDURE — 2000000000 HC ICU R&B

## 2021-09-06 PROCEDURE — 84478 ASSAY OF TRIGLYCERIDES: CPT

## 2021-09-06 PROCEDURE — 84100 ASSAY OF PHOSPHORUS: CPT

## 2021-09-06 RX ADMIN — CEFEPIME HYDROCHLORIDE 2000 MG: 2 INJECTION, POWDER, FOR SOLUTION INTRAVENOUS at 16:58

## 2021-09-06 RX ADMIN — PROPOFOL 30 MCG/KG/MIN: 10 INJECTION, EMULSION INTRAVENOUS at 13:01

## 2021-09-06 RX ADMIN — ZONISAMIDE 300 MG: 100 CAPSULE ORAL at 20:05

## 2021-09-06 RX ADMIN — BACLOFEN 10 MG: 10 TABLET ORAL at 16:58

## 2021-09-06 RX ADMIN — IPRATROPIUM BROMIDE AND ALBUTEROL SULFATE 1 AMPULE: .5; 3 SOLUTION RESPIRATORY (INHALATION) at 17:43

## 2021-09-06 RX ADMIN — BACLOFEN 10 MG: 10 TABLET ORAL at 20:04

## 2021-09-06 RX ADMIN — Medication 150 MCG/HR: at 03:38

## 2021-09-06 RX ADMIN — Medication 150 MCG/HR: at 13:10

## 2021-09-06 RX ADMIN — POLYVINYL ALCOHOL 1 DROP: 14 SOLUTION/ DROPS OPHTHALMIC at 21:34

## 2021-09-06 RX ADMIN — POLYVINYL ALCOHOL 1 DROP: 14 SOLUTION/ DROPS OPHTHALMIC at 16:58

## 2021-09-06 RX ADMIN — POLYETHYLENE GLYCOL 3350 17 G: 17 POWDER, FOR SOLUTION ORAL at 08:36

## 2021-09-06 RX ADMIN — Medication 175 MCG/HR: at 21:32

## 2021-09-06 RX ADMIN — PROPOFOL 30 MCG/KG/MIN: 10 INJECTION, EMULSION INTRAVENOUS at 19:41

## 2021-09-06 RX ADMIN — ENOXAPARIN SODIUM 40 MG: 40 INJECTION SUBCUTANEOUS at 20:05

## 2021-09-06 RX ADMIN — DEXAMETHASONE SODIUM PHOSPHATE 6 MG: 10 INJECTION, SOLUTION INTRAMUSCULAR; INTRAVENOUS at 08:35

## 2021-09-06 RX ADMIN — LACOSAMIDE 150 MG: 100 TABLET, FILM COATED ORAL at 20:04

## 2021-09-06 RX ADMIN — BARICITINIB 4 MG: 2 TABLET, FILM COATED ORAL at 08:35

## 2021-09-06 RX ADMIN — IPRATROPIUM BROMIDE AND ALBUTEROL SULFATE 1 AMPULE: .5; 3 SOLUTION RESPIRATORY (INHALATION) at 09:43

## 2021-09-06 RX ADMIN — BACLOFEN 10 MG: 10 TABLET ORAL at 08:35

## 2021-09-06 RX ADMIN — SODIUM CHLORIDE, PRESERVATIVE FREE 10 ML: 5 INJECTION INTRAVENOUS at 21:34

## 2021-09-06 RX ADMIN — POLYVINYL ALCOHOL 1 DROP: 14 SOLUTION/ DROPS OPHTHALMIC at 08:37

## 2021-09-06 RX ADMIN — LAMOTRIGINE 200 MG: 100 TABLET ORAL at 16:58

## 2021-09-06 RX ADMIN — IPRATROPIUM BROMIDE AND ALBUTEROL SULFATE 1 AMPULE: .5; 3 SOLUTION RESPIRATORY (INHALATION) at 13:18

## 2021-09-06 RX ADMIN — SODIUM CHLORIDE, PRESERVATIVE FREE 10 ML: 5 INJECTION INTRAVENOUS at 08:36

## 2021-09-06 RX ADMIN — POLYVINYL ALCOHOL 1 DROP: 14 SOLUTION/ DROPS OPHTHALMIC at 03:50

## 2021-09-06 RX ADMIN — DEXAMETHASONE SODIUM PHOSPHATE 6 MG: 10 INJECTION, SOLUTION INTRAMUSCULAR; INTRAVENOUS at 21:34

## 2021-09-06 RX ADMIN — PANTOPRAZOLE SODIUM 40 MG: 40 INJECTION, POWDER, FOR SOLUTION INTRAVENOUS at 08:36

## 2021-09-06 RX ADMIN — IPRATROPIUM BROMIDE AND ALBUTEROL SULFATE 1 AMPULE: .5; 3 SOLUTION RESPIRATORY (INHALATION) at 06:18

## 2021-09-06 RX ADMIN — Medication 10 ML: at 08:36

## 2021-09-06 RX ADMIN — MIDAZOLAM HYDROCHLORIDE 3 MG/HR: 5 INJECTION, SOLUTION INTRAMUSCULAR; INTRAVENOUS at 21:33

## 2021-09-06 RX ADMIN — CEFEPIME HYDROCHLORIDE 2000 MG: 2 INJECTION, POWDER, FOR SOLUTION INTRAVENOUS at 02:45

## 2021-09-06 RX ADMIN — LAMOTRIGINE 200 MG: 100 TABLET ORAL at 08:36

## 2021-09-06 RX ADMIN — LACOSAMIDE 150 MG: 100 TABLET, FILM COATED ORAL at 08:36

## 2021-09-06 RX ADMIN — SODIUM CHLORIDE 25 ML: 9 INJECTION, SOLUTION INTRAVENOUS at 18:57

## 2021-09-06 RX ADMIN — SODIUM CHLORIDE 25 ML: 9 INJECTION, SOLUTION INTRAVENOUS at 06:36

## 2021-09-06 RX ADMIN — LAMOTRIGINE 200 MG: 100 TABLET ORAL at 20:04

## 2021-09-06 RX ADMIN — ACETYLCYSTEINE 600 MG: 200 SOLUTION ORAL; RESPIRATORY (INHALATION) at 08:35

## 2021-09-06 RX ADMIN — PROPOFOL 25 MCG/KG/MIN: 10 INJECTION, EMULSION INTRAVENOUS at 03:42

## 2021-09-06 RX ADMIN — Medication 10 ML: at 20:05

## 2021-09-06 RX ADMIN — ENOXAPARIN SODIUM 40 MG: 40 INJECTION SUBCUTANEOUS at 08:35

## 2021-09-06 ASSESSMENT — PULMONARY FUNCTION TESTS
PIF_VALUE: 32
PIF_VALUE: 32
PIF_VALUE: 39
PIF_VALUE: 31
PIF_VALUE: 29
PIF_VALUE: 25
PIF_VALUE: 39
PIF_VALUE: 27
PIF_VALUE: 32
PIF_VALUE: 42
PIF_VALUE: 26
PIF_VALUE: 28
PIF_VALUE: 41
PIF_VALUE: 29
PIF_VALUE: 32
PIF_VALUE: 24
PIF_VALUE: 40
PIF_VALUE: 27
PIF_VALUE: 40
PIF_VALUE: 27
PIF_VALUE: 39
PIF_VALUE: 40
PIF_VALUE: 41
PIF_VALUE: 28
PIF_VALUE: 40
PIF_VALUE: 27
PIF_VALUE: 30

## 2021-09-06 ASSESSMENT — PAIN SCALES - GENERAL
PAINLEVEL_OUTOF10: 0

## 2021-09-06 NOTE — PROGRESS NOTES
[facility]  [facility]  Progress Note    NAME: Rico Ha  MR:  [id number]  :   1995  DATE OF SERVICE:21    This is a face to face encounter with Rcio Ha 30 y.o. male on 21    CHIEF COMPLAINT     ID following for   Chief Complaint   Patient presents with    Altered Mental Status    Respiratory Distress     41% SpO2     HISTORY OF PRESENT ILLNESS   Pt seen   21      On vent proned   afebrile tachy     Patient is tolerating medications. No reported adverse drug reactions. REVIEW OF SYSTEMS     As stated above in the chief complaint, otherwise negative.   CURRENT MEDICATIONS      dexamethasone  6 mg IntraVENous Q12H    polyethylene glycol  17 g Oral Daily    baricitinib  4 mg Oral Daily    insulin lispro  0-18 Units SubCUTAneous Q6H    cefepime  2,000 mg IntraVENous Q12H    sodium chloride flush  5-40 mL IntraVENous 2 times per day    ipratropium-albuterol  1 ampule Inhalation 4x daily    pantoprazole  40 mg IntraVENous Daily    And    sodium chloride (PF)  10 mL IntraVENous Daily    lacosamide  150 mg Oral BID    lamoTRIgine  200 mg Oral TID    zonisamide  300 mg Oral Nightly    baclofen  10 mg Oral TID    enoxaparin  40 mg SubCUTAneous BID    polyvinyl alcohol  1 drop Both Eyes Q6H     Continuous Infusions:   midazolam 2 mg/hr (21)    dextrose      sodium chloride Stopped (21 08)    sodium chloride      fentaNYL 5 mcg/ml in 0.9%  ml infusion 150 mcg/hr (21 0338)    propofol 25 mcg/kg/min (21 0342)     PRN Meds:bisacodyl, diatrizoate meglumine-sodium, glucose, dextrose, glucagon (rDNA), dextrose, sodium chloride flush, sodium chloride, ondansetron **OR** ondansetron, polyethylene glycol, acetaminophen **OR** acetaminophen, perflutren lipid microspheres, fentanNYL, sodium chloride    PHYSICAL EXAM     /74   Pulse 96   Temp 98.2 °F (36.8 °C) (Core)   Resp (!) 32   Ht 5' (1.524 m) Wt 153 lb (69.4 kg)   SpO2 96%   BMI 29.88 kg/m²   Temp  Av.1 °F (37.3 °C)  Min: 98.2 °F (36.8 °C)  Max: 100.2 °F (37.9 °C)  Constitutional:  The patient is sedated  Vent  proned  heent edema  rs dec bs post vent  abd j tube ngt  Heart tachy sr  Ext edema tight   Skin no rash  Titus orange      Lines: rfem tlc         DIAGNOSTIC RESULTS   Radiology:    Recent Labs     21   WBC 8.3 9.0 10.7   RBC 3.30* 3.17* 3.32*   HGB 10.8* 10.4* 10.8*   HCT 33.1* 32.2* 33.1*   .3* 101.6* 99.7   MCH 32.7 32.8 32.5   MCHC 32.6 32.3 32.6   RDW 13.6 13.2 13.0    278 336   MPV 10.6 10.6 10.5     Recent Labs     21    140 142   K 3.9 4.0 4.2    101 105   CO2 34* 36* 32*   BUN 9 8 8   CREATININE 0.4* 0.4* 0.4*   GLUCOSE 105* 111* 159*   PROT 5.0* 5.0* 5.3*   LABALBU 2.3* 2.2* 2.1*   CALCIUM 8.2* 7.9* 8.3*   BILITOT 0.8 0.7 0.5   ALKPHOS 282* 234* 204*   AST 87* 56* 36   ALT 45* 30 23     Lab Results   Component Value Date    CRP 0.3 2021    CRP 8.4 (H) 2021     Lab Results   Component Value Date    SEDRATE 5 2019    SEDRATE 29 (H) 2018    SEDRATE 21 (H) 08/10/2018     Recent Labs     21  0433   PROCAL 3.10*  --   --    AST 87* 56* 36   ALT 45* 30 23   TRIG 360*  --  316*        Microbiology:   No results for input(s): COVID19 in the last 72 hours.   Lab Results   Component Value Date    BC 24 Hours no growth 2021    BC 5 Days no growth 2021    BLOODCULT2 5 Days no growth 2021    ORG Yeast, not C. albicans 2021    ORG Klebsiella pneumoniae ssp pneumoniae 2021    ORG Coagulase Negative Staphylococci 2018    ORG Corynebacterium species 2018     WOUND/ABSCESS   Date Value Ref Range Status   2018 Light growth  Final   2018 Light growth  Mixed morphologies    Final   2018   Corrected    Light growth  Too fastidious for susceptibility testing.     06/29/2018 Light growth  Corrected   06/29/2018 Moderate growth  Corrected     Smear, Respiratory   Date Value Ref Range Status   09/04/2021   Final    Moderate Polymorphonuclear leukocytes  Epithelial cells not seen  No organisms seen       No results found for: MPNEUMO, CLAMYDCU, LABLEGI, AFBCX, FUNGSM, LABFUNG    MRSA Culture Only   Date Value Ref Range Status   08/29/2021 Methicillin resistant Staph aureus not isolated  Final     CULTURE, RESPIRATORY   Date Value Ref Range Status   09/04/2021 Oral Pharyngeal Adina reduced (A)  Preliminary   09/04/2021 Light growth  Preliminary   08/29/2021 Oral Pharyngeal Adina reduced (A)  Final   08/29/2021 Moderate growth  Final     Recent Labs     09/04/21  1244   ORG Yeast, not C. albicans*        FINAL IMPRESSION    Pt had   Chief Complaint   Patient presents with    Altered Mental Status    Respiratory Distress     41% SpO2    Admitted for Hypokalemia [E87.6]  Acute respiratory failure with hypoxia ([address] [J96.01]  Pneumonia of right lower lobe due to infectious organism [J18.9]  Sepsis with acute hypoxic respiratory failure without septic shock, due to unspecified organism ([address] [A41.9, R65.20, J96.01]  On treatment for above  And covid + 8/28  S/p  leukopenia/leukocytosis  Klebisella pneumonia check final cx  Mrsa neg   Yeast, not C. Albicans colonized resp cx  Cerebral palsy     dexamethasone (PF) (DECADRON) injection 6 mg, Q12H     baricitinib (OLUMIANT) tablet 4 mg, Daily     cefepime (MAXIPIME) 2000 mg IVPB minibag, Q12H     enoxaparin (LOVENOX) injection 40 mg, BID     Check fungitell  · Monitor labs    Imaging and labs were reviewed per medical records. .  Thank you for involving me in the care of Rebeca Sun will continue to follow. Please do not hesitate to call for any questions or concerns.     Electronically signed by Louis Barlow MD on 9/6/2021 at 10:12 AM

## 2021-09-06 NOTE — PROGRESS NOTES
[address] [place], [place] [address]ist   Progress Note    Admitting Date and Time: 8/28/2021  3:31 PM  Admit Dx: Hypokalemia [E87.6]  Acute respiratory failure with hypoxia ([facility] [J96.01]  Pneumonia of right lower lobe due to infectious organism [J18.9]  Sepsis with acute hypoxic respiratory failure without septic shock, due to unspecified organism ([facility] [A41.9, R65.20, J96.01]    Subjective:    Patient is currently intubated and sedated so unable to conduct ROS. Per RN: Attempting to wean FiO2 and turn to supine as tolerated. On fentanyl, propofol and versed currently.      dexamethasone  6 mg IntraVENous Q12H    polyethylene glycol  17 g Oral Daily    baricitinib  4 mg Oral Daily    insulin lispro  0-18 Units SubCUTAneous Q6H    cefepime  2,000 mg IntraVENous Q12H    sodium chloride flush  5-40 mL IntraVENous 2 times per day    ipratropium-albuterol  1 ampule Inhalation 4x daily    pantoprazole  40 mg IntraVENous Daily    And    sodium chloride (PF)  10 mL IntraVENous Daily    lacosamide  150 mg Oral BID    lamoTRIgine  200 mg Oral TID    zonisamide  300 mg Oral Nightly    baclofen  10 mg Oral TID    enoxaparin  40 mg SubCUTAneous BID    polyvinyl alcohol  1 drop Both Eyes Q6H     bisacodyl, 10 mg, Daily PRN  diatrizoate meglumine-sodium, 50 mL, ONCE PRN  glucose, 15 g, PRN  dextrose, 12.5 g, PRN  glucagon (rDNA), 1 mg, PRN  dextrose, 100 mL/hr, PRN  sodium chloride flush, 10 mL, PRN  sodium chloride, 25 mL, PRN  ondansetron, 4 mg, Q8H PRN   Or  ondansetron, 4 mg, Q6H PRN  polyethylene glycol, 17 g, Daily PRN  acetaminophen, 650 mg, Q6H PRN   Or  acetaminophen, 650 mg, Q6H PRN  perflutren lipid microspheres, 1.5 mL, ONCE PRN  fentanNYL, 25 mcg, Q1H PRN  sodium chloride, 30 mL, PRN         Objective:    /73   Pulse 96   Temp 98.6 °F (37 °C) (Core)   Resp 24   Ht 5' (1.524 m)   Wt 153 lb (69.4 kg)   SpO2 95%   BMI 29.88 kg/m²     Due to the current efforts to prevent transmission of COVID-19 and also the need to preserve PPE for other caregivers, a face-to-face encounter with the patient was not performed. That being said, all relevant records and diagnostic tests were reviewed, including laboratory results and imaging. Please reference any relevant documentation elsewhere. Care will be coordinated with other specialties as appropriate. Recent Labs     09/04/21 0512 09/05/21 0533 09/06/21 0433    140 142   K 3.9 4.0 4.2    101 105   CO2 34* 36* 32*   BUN 9 8 8   CREATININE 0.4* 0.4* 0.4*   GLUCOSE 105* 111* 159*   CALCIUM 8.2* 7.9* 8.3*       Recent Labs     09/04/21 0512 09/05/21 0533 09/06/21 0433   ALKPHOS 282* 234* 204*   PROT 5.0* 5.0* 5.3*   LABALBU 2.3* 2.2* 2.1*   BILITOT 0.8 0.7 0.5   AST 87* 56* 36   ALT 45* 30 23       Recent Labs     09/04/21 0512 09/05/21 0533 09/06/21 0433   WBC 8.3 9.0 10.7   RBC 3.30* 3.17* 3.32*   HGB 10.8* 10.4* 10.8*   HCT 33.1* 32.2* 33.1*   .3* 101.6* 99.7   MCH 32.7 32.8 32.5   MCHC 32.6 32.3 32.6   RDW 13.6 13.2 13.0    278 336   MPV 10.6 10.6 10.5                 Radiology:   XR CHEST PORTABLE   Final Result   1. Slight advancement of the endotracheal tube which now resides in the   midthoracic trachea. 2.  Stable appearance of the chest.  Stable position of the enteric tube. XR CHEST PORTABLE   Final Result   1. Interval slight advancement of the endotracheal tube which now resides in   the proximal thoracic trachea. 2.  Persistent ill-defined opacities in the right greater than left lungs. No change from prior exam.  No obvious pneumothorax. 3.  Enteric tube. Contrast material identified within the stomach lumen. XR CHEST PORTABLE   Final Result   1. Persistent ill-defined opacities in the bilateral lungs slightly worse in   the left mid to upper lung. Probable trace pleural effusions.       2.  Slight retraction of the endotracheal tube which projects over the   cervical region. Suggest repositioning. 3.  There appears to be a satisfactory position of the enteric tube. The findings were sent to the Radiology Results [address] at 7:12   pm on 9/3/2021to be communicated to a licensed caregiver. XR ABDOMEN FOR NG/OG/NE TUBE PLACEMENT   Final Result   1. Satisfactory position of gastric feeding tube as evidenced by stomach   contour outlined following injection of oral contrast.      2.  Satisfactory position of enteric tube. 3.  Significant reflux of oral contrast material into the esophagus. XR CHEST PORTABLE   Final Result   Significant bilateral infiltrates with partial interval clearing of the left   lower lobe infiltrate since the prior study         XR CHEST PORTABLE   Final Result   Satisfactory position of the ETT and NGT. Otherwise, stable chest.         XR CHEST PORTABLE   Final Result   Increasing right lung opacities consistent with pneumonia. US DUP LOWER EXTREMITIES BILATERAL VENOUS   Final Result   No evidence of DVT in either lower extremity. XR CHEST PORTABLE   Final Result   1. Right lower lobe pneumonia. 2. Left lower lobe atelectasis versus pneumonia. Assessment:  Principal Problem:    Acute respiratory failure with hypoxia (HCC)  Active Problems:    Cerebral palsy (HCC)    Seizures ([address]    Mentally disabled    Convulsions ([address]    Pneumonia due to COVID-19 virus  Resolved Problems:    * No resolved hospital problems. *      Plan:  1. Acute respiratory failure with hypoxemia  -Patient currently intubated and sedated (fentanyl and propofol and ketamine)   -FiO2 currently at 50%, 10 PEEP, 24 RR and   -Most recent AB.467/43.2/52.2/30.5. O2 saturation 99%   -Planning to wean FiO2 as tolerated and attempt to turn supine today    2.  COVID PNA  -Patient unvaccinated, was coughing, congested and had fevers prior to presentation  -Patient currently intubated and sedated   -ID is on present.      Electronically signed by Marlen Villafuerte MD on 9/6/2021 at 1:25 PM

## 2021-09-06 NOTE — PROGRESS NOTES
Acute Problems  Acute hypoxic respiratory failure secondary to COVID-19 pneumonia  Severe ARDS. 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 He completed remdesivir. ID is to manage baricitinib and to manage antibiotics. Index of suspicion for PE is extremely low. Lower extremities ultrasound is negative. C/W to DVT prophylaxis dose. Proning for at least 12 to 16 hours a day  Wean FiO2 and PEEP as tolerated  DuoNebs every 4 hours  Already pancultured  UA is suggestive of mild UTI. Replete electrolytes as needed  CMP, CBC, mag and Phos daily  Patient is intubated. He will be started on trickle tube feeding as per dietitian recommendation. Continue patient's home seizure medication, will have to contact family to verify medication list  VTE prophylaxis with  enoxaparin 40 twice daily  GI prophylaxis with Protonix  Palliative care consult to define goals of care. Patient is currently full code. History of Present Illness: Patient is a 30 y.o. 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 Father is at bedside and is able to give me a detailed description of the events leading up to emergency room arrival.  Symptoms started 4 days ago and the patient was becoming more more lethargic. While giving the patient his normal tube feeds, he began to gag like he was going to vomit. Father denies that he had any emesis at that time. Later in the evening, the patient was put in bed and father states that he was moaning and his lips turned blue.   Father did call EMS and patient presented to the emergency department. Father endorses the patient has had fever as high as 100 and chills. The patient is nonverbal at baseline, the father states that he has been tired and moving less. Everyone in the household has mild symptoms. When he presented to the emergency room he was 41% on room air  Labs and imaging in the ED pertinent for potassium 3.0, CO2 21, magnesium 1.7, lactic acid 4.1, glucose 147, proBNP 390, troponin 6, ALT 42, AST 49, WBC 3.3, Covid swab positive, ABG with pH 7.34, PCO2 36.6, PO2 52.2, bicarb 19.8, bilateral venous Doppler ultrasounds negative for DVT, EKG was sinus tachycardia and a QTC of 4 3, chest x-ray with right lower lobe pneumonia and left lower lobe opacities atelectasis versus pneumonia. CTA was not done as there was concern that patient may become extremely hypoxic when laying flat. Daily progress:  08/29/2021: Patient was intubated in the emergency room yesterday for worsening respiratory failure. He is hypoxemic 100% oxygen. He was started on paralytics and will be prone today. He continues to require Levophed at a low dose. He has low-grade fever but no chills or rigors. 09/06/2021: Patient remained sedated intubated and mechanically ventilated. He is currently proned. He has no fever, chills, rigors. He remains critically ill. We will continue with low tidal volume ventilation and lung protective strategy. Past Medical History:  Past Medical History:   Diagnosis Date    Cerebral palsy ([address]     COVID-19     G tube feedings ([address]     age 3    History of blood transfusion     Mental handicap     mom states severe brain damage    Seizures (HCC)     tremors  last seizure 8/8/2018        Family History:   History reviewed. No pertinent family history. Allergies:         Patient has no known allergies.     Social history:  Social History     Socioeconomic History    Marital status: Single     Spouse name: Not on file    Number of solution 50 mL, 50 mL, PEG Tube, ONCE PRN, Angeles Fontana MD, 50 mL at 09/03/21 1618    polyethylene glycol (GLYCOLAX) packet 17 g, 17 g, Oral, Daily, Lisy Aguilera MD, 17 g at 09/06/21 0836    baricitinib (OLUMIANT) tablet 4 mg, 4 mg, Oral, Daily, Lisy Aguilera MD, 4 mg at 09/06/21 0835    glucose (GLUTOSE) 40 % oral gel 15 g, 15 g, Oral, PRN, Lisy Aguilera MD    dextrose 50 % IV solution, 12.5 g, IntraVENous, PRN, Lisy Aguilera MD, 12.5 g at 09/05/21 0005    glucagon (rDNA) injection 1 mg, 1 mg, IntraMUSCular, PRN, Lisy Aguilera MD    dextrose 5 % solution, 100 mL/hr, IntraVENous, PRN, Lisy Aguilera MD    insulin lispro (HUMALOG) injection vial 0-18 Units, 0-18 Units, SubCUTAneous, Q6H, Lisy Aguilera MD, 3 Units at 09/02/21 1709    cefepime (MAXIPIME) 2000 mg IVPB minibag, 2,000 mg, IntraVENous, Q12H, Lisy Aguilera MD, Stopped at 09/06/21 0636    0.9 % sodium chloride infusion, 25 mL, IntraVENous, Q12H, Lisy Aguilera MD, Stopped at 09/06/21 2525    sodium chloride flush 0.9 % injection 5-40 mL, 5-40 mL, IntraVENous, 2 times per day, Cala July, APRN - CNP, 10 mL at 09/06/21 0836    sodium chloride flush 0.9 % injection 10 mL, 10 mL, IntraVENous, PRN, Cala July, APRN - CNP, 10 mL at 09/04/21 1501    0.9 % sodium chloride infusion, 25 mL, IntraVENous, PRN, Cala July, APRN - CNP    ondansetron (ZOFRAN-ODT) disintegrating tablet 4 mg, 4 mg, Oral, Q8H PRN **OR** ondansetron (ZOFRAN) injection 4 mg, 4 mg, IntraVENous, Q6H PRN, Cala July, APRN - CNP    polyethylene glycol (GLYCOLAX) packet 17 g, 17 g, Oral, Daily PRN, Cala July, APRN - CNP    acetaminophen (TYLENOL) tablet 650 mg, 650 mg, Oral, Q6H PRN, 650 mg at 09/04/21 1247 **OR** acetaminophen (TYLENOL) suppository 650 mg, 650 mg, Rectal, Q6H PRN, Cala July, APRN - CNP    ipratropium-albuterol (DUONEB) nebulizer solution 1 ampule, 1 ampule, Inhalation, 4x daily, Esme July, APRN - CNP, 1 ampule at 09/06/21 NG/[id number]]  Out: 2100     Oxygen requirements: MV    Ventilator Information:  Vent Information  $Ventilation: $Subsequent Day  Skin Assessment: Clean, dry, & intact  Vent Type: 980  Vent Mode: AC/VC  Vt Ordered: 325 mL  Rate Set: 24 bmp  Peak Flow: 55 L/min  Pressure Support: 0 cmH20  FiO2 : 50 %  SpO2: 96 %  SpO2/FiO2 ratio: 192  Sensitivity: 3  PEEP/CPAP: 10  I Time/ I Time %: 0 s  Humidification Source: Heated wire  Humidification Temp: 37  Humidification Temp Measured: 36.8  Circuit Condensation: Drained  Mask Type: Full face mask  Mask Size: Small    General appearance: Thin, in respiratory distress. HEENT: Intubated   neck: Supple, no jugular venous distension, lymphadenopathy, thyromegaly or carotid bruits  Chest: Equal breath sounds, bilateral loud rhonchi noted throughout lung fields, no wheezing, and no tenderness over ribs   Cardiovascular: Normal S1 , S2, increased rate and regular rhythm, no murmur, rub or gallop  Abdomen: Normal sounds present, soft, lax with no tenderness, no hepatosplenomegaly, and no masses, Petey-key button noted in left upper quadrant. Extremities: No edema. Pulses are equally present. No clubbing or cyanosis, no mottling  Skin: intact, no rashes   Neurologic: Sedated and mechanically ventilated. Investigations:  Labs, radiological imaging and cardiac work up were reviewed        ICU STAFF PHYSICIAN NOTE OF PERSONAL INVOLVEMENT IN CARE  As the attending physician, I certify that I personally reviewed the patient's history and personally examined the patient to confirm the physical findings described above, and that I reviewed the relevant imaging studies and available reports. I also discussed the differential diagnosis and all of the proposed management plans with the patient and individuals accompanying the patient to this visit. They had the opportunity to ask questions about the proposed management plans and to have those questions answered.     This patient has a high probability of sudden, clinically significant deterioration, which requires the highest level of physician preparedness to intervene urgently. I managed/supervised life or organ supporting interventions that required frequent physician assessment. I devoted my full attention to the direct care of this patient for the amount of time indicated below. Time I spent with family or surrogate(s) is included only if the patient was incapable of providing the necessary information or participating in medical decision-making. Time devoted to teaching and to any procedures I billed separately is not included.   Critical Care Time: 40 minutes      Electronically signed by Nita Del Rio MD on 9/6/2021 at 12:48 PM

## 2021-09-07 ENCOUNTER — APPOINTMENT (OUTPATIENT)
Dept: GENERAL RADIOLOGY | Age: 26
DRG: 870 | End: 2021-09-07
Payer: COMMERCIAL

## 2021-09-07 LAB
ALBUMIN SERPL-MCNC: 2.4 G/DL (ref 3.5–5.2)
ALP BLD-CCNC: 194 U/L (ref 40–129)
ALT SERPL-CCNC: 20 U/L (ref 0–40)
ANION GAP SERPL CALCULATED.3IONS-SCNC: 5 MMOL/L (ref 7–16)
AST SERPL-CCNC: 32 U/L (ref 0–39)
ATYPICAL LYMPHOCYTE RELATIVE PERCENT: 0.9 % (ref 0–4)
B.E.: 4.6 MMOL/L (ref -3–3)
BASOPHILS ABSOLUTE: 0 E9/L (ref 0–0.2)
BASOPHILS RELATIVE PERCENT: 0.2 % (ref 0–2)
BILIRUB SERPL-MCNC: 0.5 MG/DL (ref 0–1.2)
BUN BLDV-MCNC: 8 MG/DL (ref 6–20)
CALCIUM SERPL-MCNC: 8.1 MG/DL (ref 8.6–10.2)
CHLORIDE BLD-SCNC: 103 MMOL/L (ref 98–107)
CO2: 32 MMOL/L (ref 22–29)
COHB: 0.1 % (ref 0–1.5)
CREAT SERPL-MCNC: 0.4 MG/DL (ref 0.7–1.2)
CRITICAL: ABNORMAL
DATE ANALYZED: ABNORMAL
DATE OF COLLECTION: ABNORMAL
EOSINOPHILS ABSOLUTE: 0 E9/L (ref 0.05–0.5)
EOSINOPHILS RELATIVE PERCENT: 0.1 % (ref 0–6)
FIO2: 50 %
GFR AFRICAN AMERICAN: >60
GFR NON-AFRICAN AMERICAN: >60 ML/MIN/1.73
GLUCOSE BLD-MCNC: 132 MG/DL (ref 74–99)
HCO3: 29.1 MMOL/L (ref 22–26)
HCT VFR BLD CALC: 31.8 % (ref 37–54)
HEMOGLOBIN: 10.3 G/DL (ref 12.5–16.5)
HHB: 13.3 % (ref 0–5)
LAB: ABNORMAL
LYMPHOCYTES ABSOLUTE: 0.41 E9/L (ref 1.5–4)
LYMPHOCYTES RELATIVE PERCENT: 3.5 % (ref 20–42)
Lab: ABNORMAL
MAGNESIUM: 2.1 MG/DL (ref 1.6–2.6)
MCH RBC QN AUTO: 32.6 PG (ref 26–35)
MCHC RBC AUTO-ENTMCNC: 32.4 % (ref 32–34.5)
MCV RBC AUTO: 100.6 FL (ref 80–99.9)
METAMYELOCYTES RELATIVE PERCENT: 2.6 % (ref 0–1)
METER GLUCOSE: 103 MG/DL (ref 74–99)
METER GLUCOSE: 116 MG/DL (ref 74–99)
METER GLUCOSE: 134 MG/DL (ref 74–99)
METER GLUCOSE: 92 MG/DL (ref 74–99)
METHB: 0.4 % (ref 0–1.5)
MODE: AC
MONOCYTES ABSOLUTE: 0.21 E9/L (ref 0.1–0.95)
MONOCYTES RELATIVE PERCENT: 1.8 % (ref 2–12)
NEUTROPHILS ABSOLUTE: 9.68 E9/L (ref 1.8–7.3)
NEUTROPHILS RELATIVE PERCENT: 91.2 % (ref 43–80)
O2 CONTENT: 14.5 ML/DL
O2 SATURATION: 86.6 % (ref 92–98.5)
O2HB: 86.2 % (ref 94–97)
OPERATOR ID: ABNORMAL
OVALOCYTES: ABNORMAL
PATIENT TEMP: 37 C
PCO2: 43.1 MMHG (ref 35–45)
PDW BLD-RTO: 12.6 FL (ref 11.5–15)
PEEP/CPAP: 12 CMH2O
PFO2: 1.01 MMHG/%
PH BLOOD GAS: 7.45 (ref 7.35–7.45)
PHOSPHORUS: 2.8 MG/DL (ref 2.5–4.5)
PLATELET # BLD: 324 E9/L (ref 130–450)
PMV BLD AUTO: 10.1 FL (ref 7–12)
PO2: 50.6 MMHG (ref 75–100)
POIKILOCYTES: ABNORMAL
POLYCHROMASIA: ABNORMAL
POTASSIUM SERPL-SCNC: 3.9 MMOL/L (ref 3.5–5)
RBC # BLD: 3.16 E12/L (ref 3.8–5.8)
RI(T): 4.84
RR MECHANICAL: 20 B/MIN
SODIUM BLD-SCNC: 140 MMOL/L (ref 132–146)
SOURCE, BLOOD GAS: ABNORMAL
THB: 12 G/DL (ref 11.5–16.5)
TIME ANALYZED: 512
TOTAL PROTEIN: 5.4 G/DL (ref 6.4–8.3)
VT MECHANICAL: 325 ML
WBC # BLD: 10.3 E9/L (ref 4.5–11.5)

## 2021-09-07 PROCEDURE — 36592 COLLECT BLOOD FROM PICC: CPT

## 2021-09-07 PROCEDURE — 99232 SBSQ HOSP IP/OBS MODERATE 35: CPT | Performed by: STUDENT IN AN ORGANIZED HEALTH CARE EDUCATION/TRAINING PROGRAM

## 2021-09-07 PROCEDURE — 99233 SBSQ HOSP IP/OBS HIGH 50: CPT | Performed by: FAMILY MEDICINE

## 2021-09-07 PROCEDURE — 84100 ASSAY OF PHOSPHORUS: CPT

## 2021-09-07 PROCEDURE — 83735 ASSAY OF MAGNESIUM: CPT

## 2021-09-07 PROCEDURE — 6360000002 HC RX W HCPCS: Performed by: SPECIALIST

## 2021-09-07 PROCEDURE — 2580000003 HC RX 258: Performed by: INTERNAL MEDICINE

## 2021-09-07 PROCEDURE — 87449 NOS EACH ORGANISM AG IA: CPT

## 2021-09-07 PROCEDURE — 94640 AIRWAY INHALATION TREATMENT: CPT

## 2021-09-07 PROCEDURE — C9113 INJ PANTOPRAZOLE SODIUM, VIA: HCPCS

## 2021-09-07 PROCEDURE — 80053 COMPREHEN METABOLIC PANEL: CPT

## 2021-09-07 PROCEDURE — 6360000002 HC RX W HCPCS: Performed by: EMERGENCY MEDICINE

## 2021-09-07 PROCEDURE — 71045 X-RAY EXAM CHEST 1 VIEW: CPT

## 2021-09-07 PROCEDURE — 2580000003 HC RX 258: Performed by: EMERGENCY MEDICINE

## 2021-09-07 PROCEDURE — 6370000000 HC RX 637 (ALT 250 FOR IP): Performed by: INTERNAL MEDICINE

## 2021-09-07 PROCEDURE — 6370000000 HC RX 637 (ALT 250 FOR IP)

## 2021-09-07 PROCEDURE — 82805 BLOOD GASES W/O2 SATURATION: CPT

## 2021-09-07 PROCEDURE — 85025 COMPLETE CBC W/AUTO DIFF WBC: CPT

## 2021-09-07 PROCEDURE — 94003 VENT MGMT INPAT SUBQ DAY: CPT

## 2021-09-07 PROCEDURE — 6360000002 HC RX W HCPCS: Performed by: INTERNAL MEDICINE

## 2021-09-07 PROCEDURE — 36415 COLL VENOUS BLD VENIPUNCTURE: CPT

## 2021-09-07 PROCEDURE — 87040 BLOOD CULTURE FOR BACTERIA: CPT

## 2021-09-07 PROCEDURE — 2000000000 HC ICU R&B

## 2021-09-07 PROCEDURE — 2500000003 HC RX 250 WO HCPCS: Performed by: INTERNAL MEDICINE

## 2021-09-07 PROCEDURE — 36600 WITHDRAWAL OF ARTERIAL BLOOD: CPT

## 2021-09-07 PROCEDURE — 2580000003 HC RX 258

## 2021-09-07 PROCEDURE — 82962 GLUCOSE BLOOD TEST: CPT

## 2021-09-07 PROCEDURE — 6360000002 HC RX W HCPCS

## 2021-09-07 PROCEDURE — 2580000003 HC RX 258: Performed by: SPECIALIST

## 2021-09-07 PROCEDURE — 2700000000 HC OXYGEN THERAPY PER DAY

## 2021-09-07 RX ORDER — SODIUM CHLORIDE 0.9 % (FLUSH) 0.9 %
5-40 SYRINGE (ML) INJECTION EVERY 12 HOURS SCHEDULED
Status: DISCONTINUED | OUTPATIENT
Start: 2021-09-07 | End: 2021-09-19 | Stop reason: HOSPADM

## 2021-09-07 RX ORDER — HEPARIN SODIUM (PORCINE) LOCK FLUSH IV SOLN 100 UNIT/ML 100 UNIT/ML
3 SOLUTION INTRAVENOUS EVERY 12 HOURS SCHEDULED
Status: DISCONTINUED | OUTPATIENT
Start: 2021-09-07 | End: 2021-09-19 | Stop reason: HOSPADM

## 2021-09-07 RX ORDER — HEPARIN SODIUM (PORCINE) LOCK FLUSH IV SOLN 100 UNIT/ML 100 UNIT/ML
3 SOLUTION INTRAVENOUS PRN
Status: DISCONTINUED | OUTPATIENT
Start: 2021-09-07 | End: 2021-09-19 | Stop reason: HOSPADM

## 2021-09-07 RX ORDER — SODIUM CHLORIDE 0.9 % (FLUSH) 0.9 %
5-40 SYRINGE (ML) INJECTION PRN
Status: DISCONTINUED | OUTPATIENT
Start: 2021-09-07 | End: 2021-09-19 | Stop reason: HOSPADM

## 2021-09-07 RX ORDER — SODIUM CHLORIDE 9 MG/ML
25 INJECTION, SOLUTION INTRAVENOUS PRN
Status: DISCONTINUED | OUTPATIENT
Start: 2021-09-07 | End: 2021-09-19 | Stop reason: HOSPADM

## 2021-09-07 RX ORDER — LIDOCAINE HYDROCHLORIDE 10 MG/ML
5 INJECTION, SOLUTION EPIDURAL; INFILTRATION; INTRACAUDAL; PERINEURAL ONCE
Status: DISCONTINUED | OUTPATIENT
Start: 2021-09-07 | End: 2021-09-19

## 2021-09-07 RX ADMIN — ENOXAPARIN SODIUM 40 MG: 40 INJECTION SUBCUTANEOUS at 08:30

## 2021-09-07 RX ADMIN — BARICITINIB 4 MG: 2 TABLET, FILM COATED ORAL at 10:56

## 2021-09-07 RX ADMIN — LAMOTRIGINE 200 MG: 100 TABLET ORAL at 08:32

## 2021-09-07 RX ADMIN — PROPOFOL 40 MCG/KG/MIN: 10 INJECTION, EMULSION INTRAVENOUS at 22:25

## 2021-09-07 RX ADMIN — LAMOTRIGINE 200 MG: 100 TABLET ORAL at 13:21

## 2021-09-07 RX ADMIN — Medication 10 ML: at 21:23

## 2021-09-07 RX ADMIN — DEXAMETHASONE SODIUM PHOSPHATE 6 MG: 10 INJECTION, SOLUTION INTRAMUSCULAR; INTRAVENOUS at 21:23

## 2021-09-07 RX ADMIN — POLYVINYL ALCOHOL 1 DROP: 14 SOLUTION/ DROPS OPHTHALMIC at 11:17

## 2021-09-07 RX ADMIN — IPRATROPIUM BROMIDE AND ALBUTEROL SULFATE 1 AMPULE: .5; 3 SOLUTION RESPIRATORY (INHALATION) at 16:20

## 2021-09-07 RX ADMIN — BACLOFEN 10 MG: 10 TABLET ORAL at 13:21

## 2021-09-07 RX ADMIN — SODIUM CHLORIDE, PRESERVATIVE FREE 10 ML: 5 INJECTION INTRAVENOUS at 03:19

## 2021-09-07 RX ADMIN — IPRATROPIUM BROMIDE AND ALBUTEROL SULFATE 1 AMPULE: .5; 3 SOLUTION RESPIRATORY (INHALATION) at 09:46

## 2021-09-07 RX ADMIN — LAMOTRIGINE 200 MG: 100 TABLET ORAL at 21:05

## 2021-09-07 RX ADMIN — BACLOFEN 10 MG: 10 TABLET ORAL at 08:25

## 2021-09-07 RX ADMIN — Medication 10 ML: at 08:33

## 2021-09-07 RX ADMIN — SODIUM CHLORIDE 25 ML: 9 INJECTION, SOLUTION INTRAVENOUS at 18:52

## 2021-09-07 RX ADMIN — SODIUM CHLORIDE 25 ML: 9 INJECTION, SOLUTION INTRAVENOUS at 08:19

## 2021-09-07 RX ADMIN — Medication 175 MCG/HR: at 13:26

## 2021-09-07 RX ADMIN — POLYETHYLENE GLYCOL 3350 17 G: 17 POWDER, FOR SOLUTION ORAL at 10:57

## 2021-09-07 RX ADMIN — ENOXAPARIN SODIUM 40 MG: 40 INJECTION SUBCUTANEOUS at 21:06

## 2021-09-07 RX ADMIN — ZONISAMIDE 300 MG: 100 CAPSULE ORAL at 21:05

## 2021-09-07 RX ADMIN — CEFEPIME HYDROCHLORIDE 2000 MG: 2 INJECTION, POWDER, FOR SOLUTION INTRAVENOUS at 03:19

## 2021-09-07 RX ADMIN — PROPOFOL 35 MCG/KG/MIN: 10 INJECTION, EMULSION INTRAVENOUS at 11:06

## 2021-09-07 RX ADMIN — PROPOFOL 30 MCG/KG/MIN: 10 INJECTION, EMULSION INTRAVENOUS at 04:55

## 2021-09-07 RX ADMIN — Medication 175 MCG/HR: at 05:55

## 2021-09-07 RX ADMIN — Medication 175 MCG/HR: at 22:25

## 2021-09-07 RX ADMIN — PANTOPRAZOLE SODIUM 40 MG: 40 INJECTION, POWDER, FOR SOLUTION INTRAVENOUS at 08:24

## 2021-09-07 RX ADMIN — Medication 10 ML: at 21:05

## 2021-09-07 RX ADMIN — POLYVINYL ALCOHOL 1 DROP: 14 SOLUTION/ DROPS OPHTHALMIC at 04:46

## 2021-09-07 RX ADMIN — PROPOFOL 40 MCG/KG/MIN: 10 INJECTION, EMULSION INTRAVENOUS at 17:39

## 2021-09-07 RX ADMIN — SODIUM CHLORIDE, PRESERVATIVE FREE 10 ML: 5 INJECTION INTRAVENOUS at 08:32

## 2021-09-07 RX ADMIN — LACOSAMIDE 150 MG: 100 TABLET, FILM COATED ORAL at 21:04

## 2021-09-07 RX ADMIN — IPRATROPIUM BROMIDE AND ALBUTEROL SULFATE 1 AMPULE: .5; 3 SOLUTION RESPIRATORY (INHALATION) at 22:39

## 2021-09-07 RX ADMIN — CEFEPIME HYDROCHLORIDE 2000 MG: 2 INJECTION, POWDER, FOR SOLUTION INTRAVENOUS at 14:51

## 2021-09-07 RX ADMIN — LACOSAMIDE 150 MG: 100 TABLET, FILM COATED ORAL at 08:30

## 2021-09-07 RX ADMIN — MIDAZOLAM HYDROCHLORIDE 5 MG/HR: 5 INJECTION, SOLUTION INTRAMUSCULAR; INTRAVENOUS at 18:24

## 2021-09-07 RX ADMIN — DEXAMETHASONE SODIUM PHOSPHATE 6 MG: 10 INJECTION, SOLUTION INTRAMUSCULAR; INTRAVENOUS at 10:57

## 2021-09-07 RX ADMIN — IPRATROPIUM BROMIDE AND ALBUTEROL SULFATE 1 AMPULE: .5; 3 SOLUTION RESPIRATORY (INHALATION) at 06:33

## 2021-09-07 RX ADMIN — BACLOFEN 10 MG: 10 TABLET ORAL at 21:05

## 2021-09-07 RX ADMIN — Medication 10 ML: at 11:54

## 2021-09-07 RX ADMIN — DEXTROSE MONOHYDRATE 200 MG: 50 INJECTION, SOLUTION INTRAVENOUS at 11:54

## 2021-09-07 RX ADMIN — POLYVINYL ALCOHOL 1 DROP: 14 SOLUTION/ DROPS OPHTHALMIC at 16:14

## 2021-09-07 ASSESSMENT — PULMONARY FUNCTION TESTS
PIF_VALUE: 32
PIF_VALUE: 34
PIF_VALUE: 33
PIF_VALUE: 35
PIF_VALUE: 33
PIF_VALUE: 34
PIF_VALUE: 35
PIF_VALUE: 32
PIF_VALUE: 35
PIF_VALUE: 31
PIF_VALUE: 31
PIF_VALUE: 32
PIF_VALUE: 32
PIF_VALUE: 35
PIF_VALUE: 40
PIF_VALUE: 33
PIF_VALUE: 29
PIF_VALUE: 34
PIF_VALUE: 25
PIF_VALUE: 33
PIF_VALUE: 27
PIF_VALUE: 33
PIF_VALUE: 33
PIF_VALUE: 39
PIF_VALUE: 33
PIF_VALUE: 35
PIF_VALUE: 35
PIF_VALUE: 32
PIF_VALUE: 32

## 2021-09-07 ASSESSMENT — PAIN SCALES - GENERAL
PAINLEVEL_OUTOF10: 0
PAINLEVEL_OUTOF10: 0

## 2021-09-07 NOTE — PLAN OF CARE
Problem: Airway Clearance - Ineffective  Goal: Achieve or maintain patent airway  Outcome: Met This Shift     Problem: Gas Exchange - Impaired  Goal: Absence of hypoxia  Outcome: Met This Shift     Problem: Nutrition Deficits  Goal: Optimize nutritional status  Outcome: Met This Shift     Problem: Skin Integrity:  Goal: Will show no infection signs and symptoms  Description: Will show no infection signs and symptoms  Outcome: Met This Shift     Problem: Skin Integrity:  Goal: Absence of new skin breakdown  Description: Absence of new skin breakdown  Outcome: Met This Shift     Problem: Falls - Risk of:  Goal: Will remain free from falls  Description: Will remain free from falls  Outcome: Met This Shift     Problem: Falls - Risk of:  Goal: Absence of physical injury  Description: Absence of physical injury  Outcome: Met This Shift
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
## 2021-09-07 NOTE — PROGRESS NOTES
Acute Problems  Acute hypoxic respiratory failure secondary to COVID-19 pneumonia  Severe ARDS. 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 Patient is on baricitinib. He completed remdesivir. ID is to manage baricitinib and to manage antibiotics. Index of suspicion for PE is extremely low. Lower extremities ultrasound is negative. C/W to DVT prophylaxis dose. Proning for at least 12 to 16 hours a day  Wean FiO2 and PEEP as tolerated  DuoNebs every 4 hours  Already pancultured  UA is suggestive of mild UTI. Replete electrolytes as needed  CMP, CBC, mag and Phos daily  Patient is intubated. He will be started on trickle tube feeding as per dietitian recommendation. Continue patient's home seizure medication, will have to contact family to verify medication list  VTE prophylaxis with  enoxaparin 40 twice daily  GI prophylaxis with Protonix  Palliative care consult to define goals of care. Patient is currently full code. History of Present Illness: Patient is a 30 y.o. 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 Father is at bedside and is able to give me a detailed description of the events leading up to emergency room arrival.  Symptoms started 4 days ago and the patient was becoming more more lethargic. While giving the patient his normal tube feeds, he began to gag like he was going to vomit. Father denies that he had any emesis at that time. Later in the evening, the patient was put in bed and father states that he was moaning and his lips turned blue.   Father fernie [facility])     age 3    History of blood transfusion     Mental handicap     mom states severe brain damage    Seizures (HCC)     tremors  last seizure 8/8/2018        Family History:   History reviewed. No pertinent family history. Allergies:         Patient has no known allergies. Social history:  Social History     Socioeconomic History    Marital status: Single     Spouse name: Not on file    Number of children: Not on file    Years of education: Not on file    Highest education level: Not on file   Occupational History    Not on file   Tobacco Use    Smoking status: Never Smoker    Smokeless tobacco: Never Used   Vaping Use    Vaping Use: Never used   Substance and Sexual Activity    Alcohol use: No    Drug use: No    Sexual activity: Not on file   Other Topics Concern    Not on file   Social History Narrative    Not on file     Social Determinants of Health     Financial Resource Strain: Low Risk     Difficulty of Paying Living Expenses: Not hard at all   Food Insecurity: No Food Insecurity    Worried About [address] [employer] in the Last Year: Never true    [address] [employer] in the Last Year: Never true   Transportation Needs:     Lack of Transportation (Medical):      Lack of Transportation (Non-Medical):    Physical Activity:     Days of Exercise per Week:     Minutes of Exercise per Session:    Stress:     Feeling of Stress :    Social Connections:     Frequency of Communication with Friends and Family:     Frequency of Social Gatherings with Friends and Family:     Attends Congregational Services:     Active Member of Clubs or Organizations:     Attends Club or Organization Meetings:     Marital Status:    Intimate Partner Violence:     Fear of Current or Ex-Partner:     Emotionally Abused:     Physically Abused:     Sexually Abused:        Current Medications:     Current Facility-Administered Medications:     anidulafungin (ERAXIS) 200 mg in dextrose 5 % 260 mL IVPB, 200 mg, IntraVENous, Once, Last Rate: 86.7 mL/hr at 09/07/21 1154, 200 mg at 09/07/21 1154 **AND** [START ON 9/8/2021] anidulafungin (ERAXIS) 100 mg in dextrose 5 % 130 mL IVPB, 100 mg, IntraVENous, Q24H, César Yan MD    lidocaine PF 1 % injection 5 mL, 5 mL, IntraDERmal, Once, Sandeep Neff MD    sodium chloride flush 0.9 % injection 5-40 mL, 5-40 mL, IntraVENous, 2 times per day, Sandeep Neff MD, 10 mL at 09/07/21 1154    sodium chloride flush 0.9 % injection 5-40 mL, 5-40 mL, IntraVENous, PRN, Sandeep Neff MD    0.9 % sodium chloride infusion, 25 mL, IntraVENous, PRN, Sandeep Neff MD    heparin flush 100 UNIT/ML injection 300 Units, 3 mL, IntraVENous, 2 times per day, Sandeep Neff MD    heparin flush 100 UNIT/ML injection 300 Units, 3 mL, IntraCATHeter, PRN, Sandeep Neff MD    bisacodyl (DULCOLAX) suppository 10 mg, 10 mg, Rectal, Daily PRN, Nichole Lucio MD, 10 mg at 09/05/21 1607    midazolam (VERSED) 100 mg in dextrose 5 % 100 mL infusion, 1-10 mg/hr, IntraVENous, Continuous, Nichole Lucio MD, Last Rate: 5 mL/hr at 09/06/21 2230, 5 mg/hr at 09/06/21 2230    dexamethasone (PF) (DECADRON) injection 6 mg, 6 mg, IntraVENous, Q12H, Kellie Knox MD, 6 mg at 09/07/21 1057    diatrizoate meglumine-sodium (GASTROGRAFIN) 66-10 % solution 50 mL, 50 mL, PEG Tube, ONCE PRN, Vivian Hagen MD, 50 mL at 09/03/21 1618    polyethylene glycol (GLYCOLAX) packet 17 g, 17 g, Oral, Daily, Kellie Knox MD, 17 g at 09/07/21 1057    baricitinib (OLUMIANT) tablet 4 mg, 4 mg, Oral, Daily, Klelie Knox MD, 4 mg at 09/07/21 1056    glucose (GLUTOSE) 40 % oral gel 15 g, 15 g, Oral, PRN, Kellie Knox MD    dextrose 50 % IV solution, 12.5 g, IntraVENous, PRN, Kellie Knox MD, 12.5 g at 09/05/21 0005    glucagon (rDNA) injection 1 mg, 1 mg, IntraMUSCular, PRN, Kellie Knox MD    dextrose 5 % solution, 100 mL/hr, IntraVENous, PRN, Loretta Matute MD    insulin lispro (HUMALOG) injection vial 0-18 Units, 0-18 Units, SubCUTAneous, Q6H, Loretta Matute MD, 3 Units at 09/02/21 1709    cefepime (MAXIPIME) 2000 mg IVPB minibag, 2,000 mg, IntraVENous, Q12H, Loretta Matute MD, Stopped at 09/07/21 0820    0.9 % sodium chloride infusion, 25 mL, IntraVENous, Q12H, Loretta Matute MD, Stopped at 09/07/21 1055    sodium chloride flush 0.9 % injection 5-40 mL, 5-40 mL, IntraVENous, 2 times per day, MINERVA Gupta CNP, 10 mL at 09/07/21 0833    sodium chloride flush 0.9 % injection 10 mL, 10 mL, IntraVENous, PRN, MINERVA Gupta CNP, 10 mL at 09/07/21 0319    0.9 % sodium chloride infusion, 25 mL, IntraVENous, PRN, MINERVA Gupta CNP    ondansetron (ZOFRAN-ODT) disintegrating tablet 4 mg, 4 mg, Oral, Q8H PRN **OR** ondansetron (ZOFRAN) injection 4 mg, 4 mg, IntraVENous, Q6H PRN, MINERVA Gupta CNP    polyethylene glycol (GLYCOLAX) packet 17 g, 17 g, Oral, Daily PRN, MINERVA Gupta CNP    acetaminophen (TYLENOL) tablet 650 mg, 650 mg, Oral, Q6H PRN, 650 mg at 09/04/21 1247 **OR** acetaminophen (TYLENOL) suppository 650 mg, 650 mg, Rectal, Q6H PRN, MINERVA Gupta CNP    ipratropium-albuterol (DUONEB) nebulizer solution 1 ampule, 1 ampule, Inhalation, 4x daily, MINERVA Gupta CNP, 1 ampule at 09/07/21 0946    pantoprazole (PROTONIX) injection 40 mg, 40 mg, IntraVENous, Daily, 40 mg at 09/07/21 0824 **AND** sodium chloride (PF) 0.9 % injection 10 mL, 10 mL, IntraVENous, Daily, MINERVA Gupta - CNP, 10 mL at 09/07/21 8364    perflutren lipid microspheres (DEFINITY) injection 1.65 mg, 1.5 mL, IntraVENous, ONCE PRN, MINERVA Gupta CNP    fentaNYL 5 mcg/ml in 0.9%  ml infusion, 12.5-200 mcg/hr, IntraVENous, Continuous, Kartik Torres MD, Last Rate: 35 mL/hr at 09/07/21 0555, 175 mcg/hr at 09/07/21 0555    fentaNYL (SUBLIMAZE) injection 25 mcg, 25 respiratory distress. HEENT: Intubated   neck: Supple, no jugular venous distension, lymphadenopathy, thyromegaly or carotid bruits  Chest: Equal breath sounds, bilateral loud rhonchi noted throughout lung fields, no wheezing, and no tenderness over ribs   Cardiovascular: Normal S1 , S2, increased rate and regular rhythm, no murmur, rub or gallop  Abdomen: Normal sounds present, soft, lax with no tenderness, no hepatosplenomegaly, and no masses, Petey-key button noted in left upper quadrant. Extremities: No edema. Pulses are equally present. No clubbing or cyanosis, no mottling  Skin: intact, no rashes   Neurologic: Sedated and mechanically ventilated. Investigations:  Labs, radiological imaging and cardiac work up were reviewed        ICU STAFF PHYSICIAN NOTE OF PERSONAL INVOLVEMENT IN CARE  As the attending physician, I certify that I personally reviewed the patient's history and personally examined the patient to confirm the physical findings described above, and that I reviewed the relevant imaging studies and available reports. I also discussed the differential diagnosis and all of the proposed management plans with the patient and individuals accompanying the patient to this visit. They had the opportunity to ask questions about the proposed management plans and to have those questions answered. This patient has a high probability of sudden, clinically significant deterioration, which requires the highest level of physician preparedness to intervene urgently. I managed/supervised life or organ supporting interventions that required frequent physician assessment. I devoted my full attention to the direct care of this patient for the amount of time indicated below. Time I spent with family or surrogate(s) is included only if the patient was incapable of providing the necessary information or participating in medical decision-making.   Time devoted to teaching and to any procedures I billed separately is not included.   Critical Care Time: 40 minutes      Electronically signed by Fidel Valenzuela MD on 9/7/2021 at 1:19 PM

## 2021-09-07 NOTE — CARE COORDINATION
9/7/21 Positive covid 8/28/21- unvaccinated. CM transition of care: pt with history of cp with kate button(g tube) which fell out and was replaced during this stay. Tube feeds, icu/vent (8/28/21)/ fio2 at 50%, peep 12. Sedation: fentanyl, diprivan and versed. Severe ARDS, Palliative care on consult. Father has received updates from nursing staff. Picc, proninig attempting again today. Watch for trache needs in future. CM requesting LTACH to follow for potential discharge needs. CM/SS to follow.  Electronically signed by ROSANGELA Mejia on 9/7/2021 at 11:23 AM

## 2021-09-07 NOTE — PROGRESS NOTES
lipid microspheres, 1.5 mL, ONCE PRN  fentanNYL, 25 mcg, Q1H PRN  sodium chloride, 30 mL, PRN         Objective:    /67   Pulse 97   Temp 99 °F (37.2 °C) (Axillary)   Resp (!) 0   Ht 5' (1.524 m)   Wt 153 lb (69.4 kg)   SpO2 100%   BMI 29.88 kg/m²     Due to the current efforts to prevent transmission of COVID-19 and also the need to preserve PPE for other caregivers, a face-to-face encounter with the patient was not performed. That being said, all relevant records and diagnostic tests were reviewed, including laboratory results and imaging. Please reference any relevant documentation elsewhere. Care will be coordinated with other specialties as appropriate. Recent Labs     09/05/21 0533 09/06/21 0433 09/07/21 0441    142 140   K 4.0 4.2 3.9    105 103   CO2 36* 32* 32*   BUN 8 8 8   CREATININE 0.4* 0.4* 0.4*   GLUCOSE 111* 159* 132*   CALCIUM 7.9* 8.3* 8.1*       Recent Labs     09/05/21 0533 09/06/21 0433 09/07/21 0441   ALKPHOS 234* 204* 194*   PROT 5.0* 5.3* 5.4*   LABALBU 2.2* 2.1* 2.4*   BILITOT 0.7 0.5 0.5   AST 56* 36 32   ALT 30 23 20       Recent Labs     09/05/21 0533 09/06/21 0433 09/07/21  0441   WBC 9.0 10.7 10.3   RBC 3.17* 3.32* 3.16*   HGB 10.4* 10.8* 10.3*   HCT 32.2* 33.1* 31.8*   .6* 99.7 100.6*   MCH 32.8 32.5 32.6   MCHC 32.3 32.6 32.4   RDW 13.2 13.0 12.6    336 324   MPV 10.6 10.5 10.1                 Radiology:   XR CHEST PORTABLE   Final Result   1. Lines okay. 2. Mild-moderate improved left lung aeration, overall decreased lung volumes. XR CHEST PORTABLE   Final Result   1. Slight advancement of the endotracheal tube which now resides in the   midthoracic trachea. 2.  Stable appearance of the chest.  Stable position of the enteric tube. XR CHEST PORTABLE   Final Result   1. Interval slight advancement of the endotracheal tube which now resides in   the proximal thoracic trachea.       2.  Persistent ill-defined opacities in the right greater than left lungs. No change from prior exam.  No obvious pneumothorax. 3.  Enteric tube. Contrast material identified within the stomach lumen. XR CHEST PORTABLE   Final Result   1. Persistent ill-defined opacities in the bilateral lungs slightly worse in   the left mid to upper lung. Probable trace pleural effusions. 2.  Slight retraction of the endotracheal tube which projects over the   cervical region. Suggest repositioning. 3.  There appears to be a satisfactory position of the enteric tube. The findings were sent to the Radiology Results [address] at 7:12   pm on 9/3/2021to be communicated to a licensed caregiver. XR ABDOMEN FOR NG/OG/NE TUBE PLACEMENT   Final Result   1. Satisfactory position of gastric feeding tube as evidenced by stomach   contour outlined following injection of oral contrast.      2.  Satisfactory position of enteric tube. 3.  Significant reflux of oral contrast material into the esophagus. XR CHEST PORTABLE   Final Result   Significant bilateral infiltrates with partial interval clearing of the left   lower lobe infiltrate since the prior study         XR CHEST PORTABLE   Final Result   Satisfactory position of the ETT and NGT. Otherwise, stable chest.         XR CHEST PORTABLE   Final Result   Increasing right lung opacities consistent with pneumonia. US DUP LOWER EXTREMITIES BILATERAL VENOUS   Final Result   No evidence of DVT in either lower extremity. XR CHEST PORTABLE   Final Result   1. Right lower lobe pneumonia. 2. Left lower lobe atelectasis versus pneumonia. Assessment:  Principal Problem:    Acute respiratory failure with hypoxia (HCC)  Active Problems:    Cerebral palsy (HCC)    Seizures ([address]    Mentally disabled    Convulsions ([address]    Pneumonia due to COVID-19 virus  Resolved Problems:    * No resolved hospital problems. *      Plan:  1.  Acute respiratory failure with hypoxemia  -Patient currently intubated and sedated (fentanyl and propofol and ketamine)   -FiO2 currently at 50%, 10 PEEP, 20 RR and   -Most recent AB.467/43.2/52.2/30.5. O2 saturation 100%   -Planning to wean FiO2 as tolerated and attempt to turn supine today    2. COVID PNA  -Patient unvaccinated, was coughing, congested and had fevers prior to presentation  -Patient currently intubated and sedated   -ID is on board  -Patient switched back to dexamethasone   -Patient completed 5 days of remdesivir and is on duonebs. Was also started on baricitinib   -CXR today showed slight improvement in LLL infiltrate  -WBC 10.3, stable. Tmax past 24hrs 100.0     3. PNA, possibly aspiration  -Sputum Cx growing Klebsiella sensitive to cefepime, currently on cefepime 2gm Q12H  -Repeat sputum Cx growing yeast, not candida - was started on eraxis  -Did receive 3 days of azithromycin as well, however was DCd  -Strep and legionella negative. BCx negative so far  -CXR slight improvement in LLL infiltrate  -WBC 10.3 today, Tmax 100.0  -Will follow up final sputum Cx and monitor labs and VS    4. Lactic acidosis (resolved)  -LA peaked at 11.8 on , has improved to 1.1 most recently  -Was getting bicarb infusion for acidosis, now off all IVF  -Will monitor BMP and lactic acid levels     5. Electrolyte abnormalities  -Potassium stable 3.9 today   -Mag and phos also WNL today  -Will continue to monitor    6. Seizure disorder 2/2 cerebral palsy  -Patient has known CP, is nonverbal at baseline with severe developmental delay  -Has seizures, takes baclofen, vimpat, lamictal and zonisamide at home which are continued here    7.  Elevated blood glucose  -Patient has had elevated glucose levels up to 379, this   -No known diagnosis of DM and patient is currently on steroids which will increase glucose  -A1C should be checked to determine if patient is diabetic or just elevated BS 2/2 steroids and

## 2021-09-07 NOTE — PROGRESS NOTES
[address] [facility]  [facility]  Progress Note    NAME: Vinnie Villa  MR:  [id number]  :   1995  DATE OF SERVICE:21    This is a face to face encounter with Vinnie Villa 30 y.o. male on 21    CHIEF COMPLAINT     ID following for   Chief Complaint   Patient presents with    Altered Mental Status    Respiratory Distress     41% SpO2     HISTORY OF PRESENT ILLNESS   Pt seen   21     No physical  examination today due to the efforts to preserve PPE and limit transmission/exposure to COVID-19. All relevant records and diagnostic tests were reviewed, including laboratory results and imaging. Please reference any relevant documentation elsewhere. Care will be coordinated with patient's care team.    On vent ac50%  [id number]        Patient is tolerating medications. No reported adverse drug reactions. REVIEW OF SYSTEMS     As stated above in the chief complaint, otherwise negative.   CURRENT MEDICATIONS      dexamethasone  6 mg IntraVENous Q12H    polyethylene glycol  17 g Oral Daily    baricitinib  4 mg Oral Daily    insulin lispro  0-18 Units SubCUTAneous Q6H    cefepime  2,000 mg IntraVENous Q12H    sodium chloride flush  5-40 mL IntraVENous 2 times per day    ipratropium-albuterol  1 ampule Inhalation 4x daily    pantoprazole  40 mg IntraVENous Daily    And    sodium chloride (PF)  10 mL IntraVENous Daily    lacosamide  150 mg Oral BID    lamoTRIgine  200 mg Oral TID    zonisamide  300 mg Oral Nightly    baclofen  10 mg Oral TID    enoxaparin  40 mg SubCUTAneous BID    polyvinyl alcohol  1 drop Both Eyes Q6H     Continuous Infusions:   midazolam 5 mg/hr (21 2230)    dextrose      sodium chloride 25 mL (21 0819)    sodium chloride      fentaNYL 5 mcg/ml in 0.9%  ml infusion 175 mcg/hr (21 0555)    propofol 30 mcg/kg/min (21 9255)     PRN Meds:bisacodyl, diatrizoate meglumine-sodium, glucose, dextrose, glucagon (rDNA), dextrose, sodium chloride flush, sodium chloride, ondansetron **OR** ondansetron, polyethylene glycol, acetaminophen **OR** acetaminophen, perflutren lipid microspheres, fentanNYL, sodium chloride    PHYSICAL EXAM     /75   Pulse 97   Temp 99.1 °F (37.3 °C) (Rectal)   Resp 20   Ht 5' (1.524 m)   Wt 153 lb (69.4 kg)   SpO2 95%   BMI 29.88 kg/m²   Temp  Av.9 °F (37.2 °C)  Min: 98.2 °F (36.8 °C)  Max: 100 °F (37.8 °C)  Constitutional:  The patient is sedated  Vent  proned     Titus      Lines: rfem tlc         DIAGNOSTIC RESULTS   Radiology:    Recent Labs     21   WBC 9.0 10.7 10.3   RBC 3.17* 3.32* 3.16*   HGB 10.4* 10.8* 10.3*   HCT 32.2* 33.1* 31.8*   .6* 99.7 100.6*   MCH 32.8 32.5 32.6   MCHC 32.3 32.6 32.4   RDW 13.2 13.0 12.6    336 324   MPV 10.6 10.5 10.1     Recent Labs     21    142 140   K 4.0 4.2 3.9    105 103   CO2 36* 32* 32*   BUN 8 8 8   CREATININE 0.4* 0.4* 0.4*   GLUCOSE 111* 159* 132*   PROT 5.0* 5.3* 5.4*   LABALBU 2.2* 2.1* 2.4*   CALCIUM 7.9* 8.3* 8.1*   BILITOT 0.7 0.5 0.5   ALKPHOS 234* 204* 194*   AST 56* 36 32   ALT 30 23 20     Lab Results   Component Value Date    CRP 0.3 2021    CRP 8.4 (H) 2021     Lab Results   Component Value Date    SEDRATE 5 2019    SEDRATE 29 (H) 2018    SEDRATE 21 (H) 08/10/2018     Recent Labs     21  0533 21  0433 21  0441   AST 56* 36 32   ALT 30 23 20   TRIG  --  316*  --         Microbiology:   No results for input(s): COVID19 in the last 72 hours.   Lab Results   Component Value Date    BC 24 Hours no growth 2021    BC 5 Days no growth 2021    BLOODCULT2 5 Days no growth 2021    ORG Yeast, not C. albicans 2021    ORG Klebsiella pneumoniae ssp pneumoniae 2021    ORG Coagulase Negative Staphylococci 2018    ORG Corynebacterium species 2018 WOUND/ABSCESS   Date Value Ref Range Status   07/24/2018 Light growth  Final   07/24/2018 Light growth  Mixed morphologies    Final   06/29/2018   Corrected    Light growth  Too fastidious for susceptibility testing.     06/29/2018 Light growth  Corrected   06/29/2018 Moderate growth  Corrected     Smear, Respiratory   Date Value Ref Range Status   09/04/2021   Final    Moderate Polymorphonuclear leukocytes  Epithelial cells not seen  No organisms seen       No results found for: MPNEUMO, CLAMYDCU, LABLEGI, AFBCX, FUNGSM, LABFUNG    MRSA Culture Only   Date Value Ref Range Status   08/29/2021 Methicillin resistant Staph aureus not isolated  Final     CULTURE, RESPIRATORY   Date Value Ref Range Status   09/04/2021 Oral Pharyngeal Adina reduced (A)  Final   09/04/2021 Light growth  Final   08/29/2021 Oral Pharyngeal Adina reduced (A)  Final   08/29/2021 Moderate growth  Final     Recent Labs     09/04/21  1244   ORG Yeast, not C. albicans*        FINAL IMPRESSION    Pt had   Chief Complaint   Patient presents with    Altered Mental Status    Respiratory Distress     41% SpO2    Admitted for Hypokalemia [E87.6]  Acute respiratory failure with hypoxia (HCC) [J96.01]  Pneumonia of right lower lobe due to infectious organism [J18.9]  Sepsis with acute hypoxic respiratory failure without septic shock, due to unspecified organism ([facility] [address] [A41.9, R65.20, J96.01]  On treatment for above  And covid + 8/28  S/p  leukopenia/leukocytosis  Klebisella pneumonia check final cx  Mrsa neg   Yeast, not C. Albicans colonized resp cx  Cerebral palsy     dexamethasone (PF) (DECADRON) injection 6 mg, Q12H     baricitinib (OLUMIANT) tablet 4 mg, Daily     cefepime (MAXIPIME) 2000 mg IVPB minibag, Q12H     enoxaparin (LOVENOX) injection 40 mg, BID     Check fungitell  Add antifungal due to fevers   · Monitor labs    Imaging and labs were reviewed per medical records.     .  Thank you for involving me in the care of Michelina Shone I will continue to follow. Please do not hesitate to call for any questions or concerns.     Electronically signed by Louis Barlow MD on 9/7/2021 at 9:13 AM

## 2021-09-07 NOTE — PLAN OF CARE
Problem: Airway Clearance - Ineffective  Goal: Achieve or maintain patent airway  Outcome: Met This Shift     Problem: Gas Exchange - Impaired  Goal: Absence of hypoxia  Outcome: Met This Shift  Goal: Promote optimal lung function  Outcome: Met This Shift     Problem: Breathing Pattern - Ineffective  Goal: Ability to achieve and maintain a regular respiratory rate  Outcome: Met This Shift     Problem: Falls - Risk of:  Goal: Will remain free from falls  Description: Will remain free from falls  Outcome: Met This Shift  Goal: Absence of physical injury  Description: Absence of physical injury  Outcome: Met This Shift

## 2021-09-08 LAB
ALBUMIN SERPL-MCNC: 2.8 G/DL (ref 3.5–5.2)
ALP BLD-CCNC: 199 U/L (ref 40–129)
ALT SERPL-CCNC: 22 U/L (ref 0–40)
ANION GAP SERPL CALCULATED.3IONS-SCNC: 6 MMOL/L (ref 7–16)
AST SERPL-CCNC: 35 U/L (ref 0–39)
B.E.: 4.3 MMOL/L (ref -3–3)
BASOPHILS ABSOLUTE: 0 E9/L (ref 0–0.2)
BASOPHILS RELATIVE PERCENT: 0.3 % (ref 0–2)
BILIRUB SERPL-MCNC: 0.5 MG/DL (ref 0–1.2)
BUN BLDV-MCNC: 7 MG/DL (ref 6–20)
CALCIUM SERPL-MCNC: 8.5 MG/DL (ref 8.6–10.2)
CHLORIDE BLD-SCNC: 105 MMOL/L (ref 98–107)
CO2: 30 MMOL/L (ref 22–29)
COHB: 0.3 % (ref 0–1.5)
CREAT SERPL-MCNC: 0.4 MG/DL (ref 0.7–1.2)
CRITICAL: ABNORMAL
DATE ANALYZED: ABNORMAL
DATE OF COLLECTION: ABNORMAL
EOSINOPHILS ABSOLUTE: 0 E9/L (ref 0.05–0.5)
EOSINOPHILS RELATIVE PERCENT: 0.1 % (ref 0–6)
FIO2: 40 %
GFR AFRICAN AMERICAN: >60
GFR NON-AFRICAN AMERICAN: >60 ML/MIN/1.73
GLUCOSE BLD-MCNC: 106 MG/DL (ref 74–99)
HCO3: 28.7 MMOL/L (ref 22–26)
HCT VFR BLD CALC: 32.4 % (ref 37–54)
HEMOGLOBIN: 10.5 G/DL (ref 12.5–16.5)
HHB: 7.1 % (ref 0–5)
LAB: ABNORMAL
LYMPHOCYTES ABSOLUTE: 0.65 E9/L (ref 1.5–4)
LYMPHOCYTES RELATIVE PERCENT: 6.1 % (ref 20–42)
Lab: ABNORMAL
MAGNESIUM: 2.1 MG/DL (ref 1.6–2.6)
MCH RBC QN AUTO: 32.6 PG (ref 26–35)
MCHC RBC AUTO-ENTMCNC: 32.4 % (ref 32–34.5)
MCV RBC AUTO: 100.6 FL (ref 80–99.9)
METAMYELOCYTES RELATIVE PERCENT: 0.9 % (ref 0–1)
METER GLUCOSE: 108 MG/DL (ref 74–99)
METER GLUCOSE: 110 MG/DL (ref 74–99)
METER GLUCOSE: 64 MG/DL (ref 74–99)
METER GLUCOSE: 81 MG/DL (ref 74–99)
METER GLUCOSE: 88 MG/DL (ref 74–99)
METER GLUCOSE: 94 MG/DL (ref 74–99)
METHB: 0.3 % (ref 0–1.5)
MODE: AC
MONOCYTES ABSOLUTE: 0.43 E9/L (ref 0.1–0.95)
MONOCYTES RELATIVE PERCENT: 4.3 % (ref 2–12)
NEUTROPHILS ABSOLUTE: 9.72 E9/L (ref 1.8–7.3)
NEUTROPHILS RELATIVE PERCENT: 88.7 % (ref 43–80)
O2 CONTENT: 14.8 ML/DL
O2 SATURATION: 92.9 % (ref 92–98.5)
O2HB: 92.3 % (ref 94–97)
OPERATOR ID: 7278
PATIENT TEMP: 37 C
PCO2: 41.9 MMHG (ref 35–45)
PDW BLD-RTO: 12.4 FL (ref 11.5–15)
PEEP/CPAP: 10 CMH2O
PFO2: 1.57 MMHG/%
PH BLOOD GAS: 7.45 (ref 7.35–7.45)
PHOSPHORUS: 3.5 MG/DL (ref 2.5–4.5)
PLATELET # BLD: 381 E9/L (ref 130–450)
PMV BLD AUTO: 10.4 FL (ref 7–12)
PO2: 62.9 MMHG (ref 75–100)
POTASSIUM SERPL-SCNC: 3.7 MMOL/L (ref 3.5–5)
RBC # BLD: 3.22 E12/L (ref 3.8–5.8)
RI(T): 2.61
RR MECHANICAL: 20 B/MIN
SODIUM BLD-SCNC: 141 MMOL/L (ref 132–146)
SOURCE, BLOOD GAS: ABNORMAL
THB: 11.4 G/DL (ref 11.5–16.5)
TIME ANALYZED: 1036
TOTAL PROTEIN: 5.8 G/DL (ref 6.4–8.3)
TRIGL SERPL-MCNC: 306 MG/DL (ref 0–149)
VT MECHANICAL: 350 ML
WBC # BLD: 10.8 E9/L (ref 4.5–11.5)

## 2021-09-08 PROCEDURE — 6370000000 HC RX 637 (ALT 250 FOR IP): Performed by: INTERNAL MEDICINE

## 2021-09-08 PROCEDURE — 6360000002 HC RX W HCPCS: Performed by: INTERNAL MEDICINE

## 2021-09-08 PROCEDURE — 2000000000 HC ICU R&B

## 2021-09-08 PROCEDURE — 82962 GLUCOSE BLOOD TEST: CPT

## 2021-09-08 PROCEDURE — 85025 COMPLETE CBC W/AUTO DIFF WBC: CPT

## 2021-09-08 PROCEDURE — 84478 ASSAY OF TRIGLYCERIDES: CPT

## 2021-09-08 PROCEDURE — 2580000003 HC RX 258: Performed by: INTERNAL MEDICINE

## 2021-09-08 PROCEDURE — 6370000000 HC RX 637 (ALT 250 FOR IP)

## 2021-09-08 PROCEDURE — 80053 COMPREHEN METABOLIC PANEL: CPT

## 2021-09-08 PROCEDURE — 6360000002 HC RX W HCPCS: Performed by: SPECIALIST

## 2021-09-08 PROCEDURE — 94003 VENT MGMT INPAT SUBQ DAY: CPT

## 2021-09-08 PROCEDURE — 82805 BLOOD GASES W/O2 SATURATION: CPT

## 2021-09-08 PROCEDURE — C9113 INJ PANTOPRAZOLE SODIUM, VIA: HCPCS

## 2021-09-08 PROCEDURE — 84100 ASSAY OF PHOSPHORUS: CPT

## 2021-09-08 PROCEDURE — 76937 US GUIDE VASCULAR ACCESS: CPT

## 2021-09-08 PROCEDURE — 2580000003 HC RX 258

## 2021-09-08 PROCEDURE — 2580000003 HC RX 258: Performed by: EMERGENCY MEDICINE

## 2021-09-08 PROCEDURE — 83735 ASSAY OF MAGNESIUM: CPT

## 2021-09-08 PROCEDURE — 99232 SBSQ HOSP IP/OBS MODERATE 35: CPT | Performed by: STUDENT IN AN ORGANIZED HEALTH CARE EDUCATION/TRAINING PROGRAM

## 2021-09-08 PROCEDURE — 36569 INSJ PICC 5 YR+ W/O IMAGING: CPT

## 2021-09-08 PROCEDURE — 87070 CULTURE OTHR SPECIMN AEROBIC: CPT

## 2021-09-08 PROCEDURE — 6360000002 HC RX W HCPCS: Performed by: EMERGENCY MEDICINE

## 2021-09-08 PROCEDURE — 6360000002 HC RX W HCPCS

## 2021-09-08 PROCEDURE — C1751 CATH, INF, PER/CENT/MIDLINE: HCPCS

## 2021-09-08 PROCEDURE — 2580000003 HC RX 258: Performed by: SPECIALIST

## 2021-09-08 PROCEDURE — 94640 AIRWAY INHALATION TREATMENT: CPT

## 2021-09-08 PROCEDURE — 2500000003 HC RX 250 WO HCPCS: Performed by: INTERNAL MEDICINE

## 2021-09-08 PROCEDURE — 2720000010 HC SURG SUPPLY STERILE

## 2021-09-08 RX ORDER — POTASSIUM CHLORIDE 29.8 MG/ML
20 INJECTION INTRAVENOUS ONCE
Status: COMPLETED | OUTPATIENT
Start: 2021-09-08 | End: 2021-09-08

## 2021-09-08 RX ORDER — PROPOFOL 10 MG/ML
5-50 INJECTION, EMULSION INTRAVENOUS
Status: DISCONTINUED | OUTPATIENT
Start: 2021-09-08 | End: 2021-09-10

## 2021-09-08 RX ADMIN — PROPOFOL INJECTABLE EMULSION 50 MCG/KG/MIN: 10 INJECTION, EMULSION INTRAVENOUS at 17:38

## 2021-09-08 RX ADMIN — Medication 10 ML: at 09:26

## 2021-09-08 RX ADMIN — DEXTROSE MONOHYDRATE 12.5 G: 25 INJECTION, SOLUTION INTRAVENOUS at 11:51

## 2021-09-08 RX ADMIN — BACLOFEN 10 MG: 10 TABLET ORAL at 20:57

## 2021-09-08 RX ADMIN — PROPOFOL 50 MCG/KG/MIN: 10 INJECTION, EMULSION INTRAVENOUS at 13:36

## 2021-09-08 RX ADMIN — DEXTROSE MONOHYDRATE 100 MG: 50 INJECTION, SOLUTION INTRAVENOUS at 11:42

## 2021-09-08 RX ADMIN — ENOXAPARIN SODIUM 40 MG: 40 INJECTION SUBCUTANEOUS at 09:23

## 2021-09-08 RX ADMIN — Medication 10 ML: at 21:27

## 2021-09-08 RX ADMIN — Medication 200 MCG/HR: at 18:48

## 2021-09-08 RX ADMIN — IPRATROPIUM BROMIDE AND ALBUTEROL SULFATE 1 AMPULE: .5; 3 SOLUTION RESPIRATORY (INHALATION) at 09:36

## 2021-09-08 RX ADMIN — POLYVINYL ALCOHOL 1 DROP: 14 SOLUTION/ DROPS OPHTHALMIC at 17:40

## 2021-09-08 RX ADMIN — CEFEPIME HYDROCHLORIDE 2000 MG: 2 INJECTION, POWDER, FOR SOLUTION INTRAVENOUS at 14:44

## 2021-09-08 RX ADMIN — HEPARIN 300 UNITS: 100 SYRINGE at 09:25

## 2021-09-08 RX ADMIN — DEXAMETHASONE SODIUM PHOSPHATE 6 MG: 10 INJECTION, SOLUTION INTRAMUSCULAR; INTRAVENOUS at 09:23

## 2021-09-08 RX ADMIN — ZONISAMIDE 300 MG: 100 CAPSULE ORAL at 20:58

## 2021-09-08 RX ADMIN — LACOSAMIDE 150 MG: 100 TABLET, FILM COATED ORAL at 08:50

## 2021-09-08 RX ADMIN — LAMOTRIGINE 200 MG: 100 TABLET ORAL at 08:50

## 2021-09-08 RX ADMIN — PROPOFOL 50 MCG/KG/MIN: 10 INJECTION, EMULSION INTRAVENOUS at 08:48

## 2021-09-08 RX ADMIN — LAMOTRIGINE 200 MG: 100 TABLET ORAL at 13:34

## 2021-09-08 RX ADMIN — LACOSAMIDE 150 MG: 100 TABLET, FILM COATED ORAL at 20:58

## 2021-09-08 RX ADMIN — Medication 10 ML: at 20:59

## 2021-09-08 RX ADMIN — POLYVINYL ALCOHOL 1 DROP: 14 SOLUTION/ DROPS OPHTHALMIC at 22:43

## 2021-09-08 RX ADMIN — IPRATROPIUM BROMIDE AND ALBUTEROL SULFATE 1 AMPULE: .5; 3 SOLUTION RESPIRATORY (INHALATION) at 16:05

## 2021-09-08 RX ADMIN — POLYETHYLENE GLYCOL 3350 17 G: 17 POWDER, FOR SOLUTION ORAL at 09:24

## 2021-09-08 RX ADMIN — LAMOTRIGINE 200 MG: 100 TABLET ORAL at 20:57

## 2021-09-08 RX ADMIN — POTASSIUM CHLORIDE 20 MEQ: 29.8 INJECTION, SOLUTION INTRAVENOUS at 09:40

## 2021-09-08 RX ADMIN — CEFEPIME HYDROCHLORIDE 2000 MG: 2 INJECTION, POWDER, FOR SOLUTION INTRAVENOUS at 03:19

## 2021-09-08 RX ADMIN — IPRATROPIUM BROMIDE AND ALBUTEROL SULFATE 1 AMPULE: .5; 3 SOLUTION RESPIRATORY (INHALATION) at 05:51

## 2021-09-08 RX ADMIN — PANTOPRAZOLE SODIUM 40 MG: 40 INJECTION, POWDER, FOR SOLUTION INTRAVENOUS at 09:23

## 2021-09-08 RX ADMIN — IPRATROPIUM BROMIDE AND ALBUTEROL SULFATE 1 AMPULE: .5; 3 SOLUTION RESPIRATORY (INHALATION) at 12:43

## 2021-09-08 RX ADMIN — MIDAZOLAM HYDROCHLORIDE 7 MG/HR: 5 INJECTION, SOLUTION INTRAMUSCULAR; INTRAVENOUS at 09:30

## 2021-09-08 RX ADMIN — Medication 200 MCG/HR: at 05:36

## 2021-09-08 RX ADMIN — SODIUM CHLORIDE 25 ML: 9 INJECTION, SOLUTION INTRAVENOUS at 07:31

## 2021-09-08 RX ADMIN — DEXAMETHASONE SODIUM PHOSPHATE 6 MG: 10 INJECTION, SOLUTION INTRAMUSCULAR; INTRAVENOUS at 20:58

## 2021-09-08 RX ADMIN — PROPOFOL 50 MCG/KG/MIN: 10 INJECTION, EMULSION INTRAVENOUS at 04:53

## 2021-09-08 RX ADMIN — SODIUM CHLORIDE, PRESERVATIVE FREE 10 ML: 5 INJECTION INTRAVENOUS at 09:25

## 2021-09-08 RX ADMIN — BACLOFEN 10 MG: 10 TABLET ORAL at 13:34

## 2021-09-08 RX ADMIN — SODIUM CHLORIDE 25 ML: 9 INJECTION, SOLUTION INTRAVENOUS at 18:49

## 2021-09-08 RX ADMIN — ENOXAPARIN SODIUM 40 MG: 40 INJECTION SUBCUTANEOUS at 20:58

## 2021-09-08 RX ADMIN — BACLOFEN 10 MG: 10 TABLET ORAL at 08:49

## 2021-09-08 RX ADMIN — BARICITINIB 4 MG: 2 TABLET, FILM COATED ORAL at 09:40

## 2021-09-08 RX ADMIN — Medication 200 MCG/HR: at 12:20

## 2021-09-08 ASSESSMENT — PULMONARY FUNCTION TESTS
PIF_VALUE: 29
PIF_VALUE: 31
PIF_VALUE: 29
PIF_VALUE: 36
PIF_VALUE: 40
PIF_VALUE: 28
PIF_VALUE: 31
PIF_VALUE: 33
PIF_VALUE: 34
PIF_VALUE: 27
PIF_VALUE: 28
PIF_VALUE: 25
PIF_VALUE: 32
PIF_VALUE: 30
PIF_VALUE: 28
PIF_VALUE: 31
PIF_VALUE: 24
PIF_VALUE: 28
PIF_VALUE: 45
PIF_VALUE: 60
PIF_VALUE: 29
PIF_VALUE: 32
PIF_VALUE: 28
PIF_VALUE: 30
PIF_VALUE: 29
PIF_VALUE: 34
PIF_VALUE: 30
PIF_VALUE: 38
PIF_VALUE: 30
PIF_VALUE: 31

## 2021-09-08 NOTE — PROGRESS NOTES
Vascular Access Procedure Note  Procedure Date: 9/8/2022  Pre-procedure Verification/Time-Out: 1630 The proposed risks versus benefits of this procedure were discussed in detail by the RN with family consent was obtained from the patient and the patient's family [facility] consent obtained. Pt sedated and on Vent. All necessary equipment for procedure is available at time of procedure yes. An audible time out was done at 1630PM by ROULA Uribe RN , correctly identifying patients name, medical record number, correct side, correct site, and correct procedure to be performed with registered nurse members of the procedure team all in agreement.       Reason for Insertion: intravenous access, Femoral line to be removed  Procedure: PICC placement   Clinician Performing Procedure: ROULA Uribe RN  Assistant: None  Procedure Details/Findings:  Catheter [place] Size: 5.5CM 55CM  Lot Number:   Product # [id number]  Maximum Barrier Precautions: cap, eye shield, full body drape, gloves, gown, handwashing and mask  Skin Prep: chlorhexidine  Technique: ultrasound guided with VPS  Attempts: 1  Exposed (cm): 4CM  Total (cm): cut to 31CM  Placement Site: R Brachial   Vessel Size: 0.55CM  Dressing: securement device, transparent dressing and 2x2 gauze  Blood Return: Yes   Ultrasound Guidance: Yes   Arm Circumference Mid-Bicep (cm): 27CM  End Placement: 27CM  [place]sey[place] obtained with VPS  Complications: None  Post-operative Condition:  stable  Patient Tolerated Procedure: well   Comments/Post-operative Education:   Fabio Oden RN  09/08/21  5:45 PM

## 2021-09-08 NOTE — PROGRESS NOTES
Acute Problems  Acute hypoxic respiratory failure secondary to COVID-19 pneumonia  Severe ARDS. 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 Patient is on baricitinib. He completed remdesivir. ID is to manage baricitinib and to manage antibiotics. Index of suspicion for PE is extremely low. Lower extremities ultrasound is negative. C/W to DVT prophylaxis dose. Proning for at least 12 to 16 hours a day  Wean FiO2 and PEEP as tolerated  DuoNebs every 4 hours  Already pancultured  UA is suggestive of mild UTI. Replete electrolytes as needed  CMP, CBC, mag and Phos daily  Patient is intubated. He will be started on trickle tube feeding as per dietitian recommendation. Continue patient's home seizure medication, will have to contact family to verify medication list  VTE prophylaxis with  enoxaparin 40 twice daily  GI prophylaxis with Protonix  We ordered a PICC line and remove the femoral central line. Daily sedation vacation and spontaneous breathing trial if possible. Palliative care consult to define goals of care. Patient is currently full code. History of Present Illness: Patient is a 30 y.o. 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 Father is at bedside and is able to give me a detailed description of the events leading up to emergency room arrival.  Symptoms started 4 days ago and the patient was becoming more more lethargic. While giving the patient his normal tube feeds, he began to gag like he was going to vomit.   Father denies that he had any emesis at that time. Later in the evening, the patient was put in bed and father states that he was moaning and his lips turned blue. Father did call EMS and patient presented to the emergency department. Father endorses the patient has had fever as high as 100 and chills. The patient is nonverbal at baseline, the father states that he has been tired and moving less. Everyone in the household has mild symptoms. When he presented to the emergency room he was 41% on room air  Labs and imaging in the ED pertinent for potassium 3.0, CO2 21, magnesium 1.7, lactic acid 4.1, glucose 147, proBNP 390, troponin 6, ALT 42, AST 49, WBC 3.3, Covid swab positive, ABG with pH 7.34, PCO2 36.6, PO2 52.2, bicarb 19.8, bilateral venous Doppler ultrasounds negative for DVT, EKG was sinus tachycardia and a QTC of 4 3, chest x-ray with right lower lobe pneumonia and left lower lobe opacities atelectasis versus pneumonia. CTA was not done as there was concern that patient may become extremely hypoxic when laying flat. Daily progress:  08/29/2021: Patient was intubated in the emergency room yesterday for worsening respiratory failure. He is hypoxemic 100% oxygen. He was started on paralytics and will be prone today. He continues to require Levophed at a low dose. He has low-grade fever but no chills or rigors. 09/06/2021: Patient remained sedated intubated and mechanically ventilated. He is currently proned. He has no fever, chills, rigors. He remains critically ill. We will continue with low tidal volume ventilation and lung protective strategy. 09/07/2021: Patient continues to be intubated and mechanically ventilated. His oxygen requirement has improved slightly to 50% FiO2. Is currently on 12 of PEEP. He has no fever, chills, rigors. He gets easily agitated off sedation and reaches out his endotracheal tube. Were planning to use his PEG tube for feeding. Triglyceride is 316.   Chest x-ray today is with mild to moderate improvement. 09/08/2021: Patient remains sedated intubated and mechanically ventilated. PICC line was ordered September 7, 2021. He is currently on assist control ventilation with rate of 20, tidal volume 325, 45 FiO2 and 10 of PEEP. FiO2 is weaned down to 40%. He continues to be on tube feeding and tolerating it well. We will attempt to wean PEEP slowly by 2 every 4 hours. Past Medical History:  Past Medical History:   Diagnosis Date    Cerebral palsy ([address]     COVID-19     G tube feedings ([facility] [address]     age 3    History of blood transfusion     Mental handicap     mom states severe brain damage    Seizures (HCC)     tremors  last seizure 8/8/2018        Family History:   History reviewed. No pertinent family history. Allergies:         Patient has no known allergies. Social history:  Social History     Socioeconomic History    Marital status: Single     Spouse name: Not on file    Number of children: Not on file    Years of education: Not on file    Highest education level: Not on file   Occupational History    Not on file   Tobacco Use    Smoking status: Never Smoker    Smokeless tobacco: Never Used   Vaping Use    Vaping Use: Never used   Substance and Sexual Activity    Alcohol use: No    Drug use: No    Sexual activity: Not on file   Other Topics Concern    Not on file   Social History Narrative    Not on file     Social Determinants of Health     Financial Resource Strain: Low Risk     Difficulty of Paying Living Expenses: Not hard at all   Food Insecurity: No Food Insecurity    Worried About [address] in the Last Year: Never true    [facility] in the Last Year: Never true   Transportation Needs:     Lack of Transportation (Medical):      Lack of Transportation (Non-Medical):    Physical Activity:     Days of Exercise per Week:     Minutes of Exercise per Session:    Stress:     Feeling of Stress :    Social Connections:     Frequency of Communication with Friends and Family:     Frequency of Social Gatherings with Friends and Family:     Attends Judaism Services:     Active Member of Clubs or Organizations:     Attends Club or Organization Meetings:     Marital Status:    Intimate Partner Violence:     Fear of Current or Ex-Partner:     Emotionally Abused:     Physically Abused:     Sexually Abused:        Current Medications:     Current Facility-Administered Medications:     [COMPLETED] anidulafungin (ERAXIS) 200 mg in dextrose 5 % 260 mL IVPB, 200 mg, IntraVENous, Once, Stopped at 09/07/21 1500 **AND** anidulafungin (ERAXIS) 100 mg in dextrose 5 % 130 mL IVPB, 100 mg, IntraVENous, Q24H, Steve Young MD    lidocaine PF 1 % injection 5 mL, 5 mL, IntraDERmal, Once, Lupillo Gutierrez MD    sodium chloride flush 0.9 % injection 5-40 mL, 5-40 mL, IntraVENous, 2 times per day, Lupillo Gutierrez MD, 10 mL at 09/08/21 0926    sodium chloride flush 0.9 % injection 5-40 mL, 5-40 mL, IntraVENous, PRN, Lupillo Gutierrez MD    0.9 % sodium chloride infusion, 25 mL, IntraVENous, PRN, Lupillo Gutierrez MD    heparin flush 100 UNIT/ML injection 300 Units, 3 mL, IntraVENous, 2 times per day, Lupillo Gutierrez MD, 300 Units at 09/08/21 0925    heparin flush 100 UNIT/ML injection 300 Units, 3 mL, IntraCATHeter, PRN, Lupillo Gutierrez MD    bisacodyl (DULCOLAX) suppository 10 mg, 10 mg, Rectal, Daily PRN, Mars Montoya MD, 10 mg at 09/05/21 1607    midazolam (VERSED) 100 mg in dextrose 5 % 100 mL infusion, 1-10 mg/hr, IntraVENous, Continuous, Mars Montoya MD, Last Rate: 7 mL/hr at 09/08/21 0930, 7 mg/hr at 09/08/21 0930    dexamethasone (PF) (DECADRON) injection 6 mg, 6 mg, IntraVENous, Q12H, Mary Simon MD, 6 mg at 09/08/21 0923    diatrizoate meglumine-sodium (GASTROGRAFIN) 66-10 % solution 50 mL, 50 mL, PEG Tube, ONCE PRN, Gulshan Tillman MD, 50 mL at 09/03/21 1618    polyethylene glycol (GLYCOLAX) packet 17 g, 17 g, Oral, Daily, Pamela Turner MD, 17 g at 09/08/21 5497    baricitinib (OLUMIANT) tablet 4 mg, 4 mg, Oral, Daily, Pamela Turner MD, 4 mg at 09/08/21 0940    glucose (GLUTOSE) 40 % oral gel 15 g, 15 g, Oral, PRN, Pamela Turner MD    dextrose 50 % IV solution, 12.5 g, IntraVENous, PRN, Pamela Turner MD, 12.5 g at 09/05/21 0005    glucagon (rDNA) injection 1 mg, 1 mg, IntraMUSCular, PRN, Pamela Turner MD    dextrose 5 % solution, 100 mL/hr, IntraVENous, PRN, Pamela Turner MD    insulin lispro (HUMALOG) injection vial 0-18 Units, 0-18 Units, SubCUTAneous, Q6H, Pamela Turner MD, 3 Units at 09/02/21 1709    cefepime (MAXIPIME) 2000 mg IVPB minibag, 2,000 mg, IntraVENous, Q12H, Pamela Turner MD, Stopped at 09/08/21 0730    0.9 % sodium chloride infusion, 25 mL, IntraVENous, Q12H, Pamela Turner MD, Last Rate: 12.5 mL/hr at 09/08/21 0731, 25 mL at 09/08/21 0731    sodium chloride flush 0.9 % injection 5-40 mL, 5-40 mL, IntraVENous, 2 times per day, Carmelo Smart, APRN - CNP, 10 mL at 09/08/21 0926    sodium chloride flush 0.9 % injection 10 mL, 10 mL, IntraVENous, PRN, Carmelo Smart, APRN - CNP, 10 mL at 09/07/21 0319    0.9 % sodium chloride infusion, 25 mL, IntraVENous, PRN, Carmelo Smart, APRN - CNP    ondansetron (ZOFRAN-ODT) disintegrating tablet 4 mg, 4 mg, Oral, Q8H PRN **OR** ondansetron (ZOFRAN) injection 4 mg, 4 mg, IntraVENous, Q6H PRN, Carmelo Smart, APRN - CNP    polyethylene glycol (GLYCOLAX) packet 17 g, 17 g, Oral, Daily PRN, Carmelo Smart, APRN - CNP    acetaminophen (TYLENOL) tablet 650 mg, 650 mg, Oral, Q6H PRN, 650 mg at 09/04/21 1247 **OR** acetaminophen (TYLENOL) suppository 650 mg, 650 mg, Rectal, Q6H PRN, Carmelo Mcnally APRN - CNP    ipratropium-albuterol (DUONEB) nebulizer solution 1 ampule, 1 ampule, Inhalation, 4x daily, MINERVA Yuan CNP, 1 ampule at 09/08/21 0936    pantoprazole (PROTONIX) injection 40 mg, 40 mg, IntraVENous, Daily, 40 mg at 09/08/21 0923 **AND** sodium chloride (PF) 0.9 % injection 10 mL, 10 mL, IntraVENous, Daily, MINERVA Maier - CNP, 10 mL at 09/08/21 0925    perflutren lipid microspheres (DEFINITY) injection 1.65 mg, 1.5 mL, IntraVENous, ONCE PRN, MINERVA Maier CNP    fentaNYL 5 mcg/ml in 0.9%  ml infusion, 12.5-200 mcg/hr, IntraVENous, Continuous, Harleen Valenzuela MD, Last Rate: 40 mL/hr at 09/08/21 0536, 200 mcg/hr at 09/08/21 0536    fentaNYL (SUBLIMAZE) injection 25 mcg, 25 mcg, IntraVENous, Q1H PRN, Harleen Valenzuela MD    lacosamide (VIMPAT) tablet 150 mg, 150 mg, Oral, BID, MINERVA Maier - CNP, 150 mg at 09/08/21 0850    lamoTRIgine (LAMICTAL) tablet 200 mg, 200 mg, Oral, TID, MINERVA Maier - CNP, 200 mg at 09/08/21 0850    zonisamide (ZONEGRAN) capsule 300 mg, 300 mg, Oral, Nightly, MINERVA Maier - CNP, 300 mg at 09/07/21 2105    propofol injection, 5-50 mcg/kg/min, IntraVENous, Titrated, MINERVA Maier CNP, Last Rate: 17.7 mL/hr at 09/08/21 0848, 50 mcg/kg/min at 09/08/21 0848    baclofen (LIORESAL) tablet 10 mg, 10 mg, Oral, TID, Lisandro Butler MD, 10 mg at 09/08/21 0849    enoxaparin (LOVENOX) injection 40 mg, 40 mg, SubCUTAneous, BID, Lisandro Butler MD, 40 mg at 09/08/21 2287    polyvinyl alcohol (LIQUIFILM TEARS) 1.4 % ophthalmic solution 1 drop, 1 drop, Both Eyes, Q6H, Lisandro Butler MD, 1 drop at 09/07/21 1614    0.9 % sodium chloride bolus, 30 mL, IntraVENous, PRN, Eve SoriaHernandez, DO, Stopped at 08/29/21 2103    Review of Systems:   Unable to perform review of systems secondary to patient being nonverbal    Physical Exam:   Vital Signs:  BP (!) 132/94   Pulse 106   Temp 99 °F (37.2 °C) (Rectal)   Resp 19   Ht 5' (1.524 m)   Wt 153 lb (69.4 kg)   SpO2 100%   BMI 29.88 kg/m²     Input/Output:   In: 1044.4 [I.V.:806.4; NG/GT:238]  Out: 4025     Oxygen requirements: MV    Ventilator Information:  Vent Information  $Ventilation: $Subsequent Day  Skin Assessment: Clean, dry, & intact  Equipment ID: [id number]  Vent Type: 980  Vent Mode: AC/VC  Vt Ordered: 325 mL  Rate Set: 20 bmp  Peak Flow: 70 L/min  Pressure Support: 0 cmH20  FiO2 : (S) 40 %  SpO2: 100 %  SpO2/FiO2 ratio: 222.22  Sensitivity: 3  PEEP/CPAP: (S) 8  I Time/ I Time %: 0 s  Humidification Source: Heated wire  Humidification Temp: 37  Humidification Temp Measured: 36.9  Circuit Condensation: Drained  Mask Type: Full face mask  Mask Size: Small    General appearance: Thin, in respiratory distress. HEENT: Intubated   neck: Supple, no jugular venous distension, lymphadenopathy, thyromegaly or carotid bruits  Chest: Equal breath sounds, bilateral loud rhonchi noted throughout lung fields, no wheezing, and no tenderness over ribs   Cardiovascular: Normal S1 , S2, increased rate and regular rhythm, no murmur, rub or gallop  Abdomen: Normal sounds present, soft, lax with no tenderness, no hepatosplenomegaly, and no masses, Petey-key button noted in left upper quadrant. Extremities: No edema. Pulses are equally present. No clubbing or cyanosis, no mottling  Skin: intact, no rashes   Neurologic: Sedated and mechanically ventilated. Investigations:  Labs, radiological imaging and cardiac work up were reviewed        ICU STAFF PHYSICIAN NOTE OF PERSONAL INVOLVEMENT IN CARE  As the attending physician, I certify that I personally reviewed the patient's history and personally examined the patient to confirm the physical findings described above, and that I reviewed the relevant imaging studies and available reports. I also discussed the differential diagnosis and all of the proposed management plans with the patient and individuals accompanying the patient to this visit. They had the opportunity to ask questions about the proposed management plans and to have those questions answered.     This patient has a high probability of sudden, clinically significant deterioration, which requires the highest level of physician preparedness to intervene urgently. I managed/supervised life or organ supporting interventions that required frequent physician assessment. I devoted my full attention to the direct care of this patient for the amount of time indicated below. Time I spent with family or surrogate(s) is included only if the patient was incapable of providing the necessary information or participating in medical decision-making. Time devoted to teaching and to any procedures I billed separately is not included.   Critical Care Time: 40 minutes      Electronically signed by Shefali Gallagher MD on 9/8/2021 at 10:50 AM

## 2021-09-08 NOTE — CARE COORDINATION
9/8/21 Positive covid 8/28/21- unvaccinated. Cm transition of care: ICU/ vent (8/28/21)/ fio2 at 40%, peep 8. Tube feedings, Severe ARDS, Palliative care on consult and has spoke to family. Pt is proning/ picc line. Pt (has Cerebral palsy) lives at home with both parents in two story home, has rale system, wc bound and all supplies he needs at this time. Pt has private duty nursing care. Father will provide a ride with a special van at discharge. Watch for oxygen needs. Palliative care on consult. Watch for any HHC needs. Both ltach's to follow- for potential discharge direction of care planning needs. CM/SS to follow.   Electronically signed by Meredith Floyd RN-BC on 9/8/2021 at 11:37 AM

## 2021-09-08 NOTE — PROGRESS NOTES
[address] [place], [place] [address]   Progress Note    Admitting Date and Time: 8/28/2021  3:31 PM  Admit Dx: Hypokalemia [E87.6]  Acute respiratory failure with hypoxia ([facility] [address] [J96.01]  Pneumonia of right lower lobe due to infectious organism [J18.9]  Sepsis with acute hypoxic respiratory failure without septic shock, due to unspecified organism ([facility] [address] [A41.9, R65.20, J96.01]    Subjective:    Patient is currently intubated and sedated so unable to conduct ROS. Per RN: Attempting to wean FiO2 and turn to supine if tolerated. On fentanyl, propofol and versed currently.      anidulafungin  100 mg IntraVENous Q24H    lidocaine PF  5 mL IntraDERmal Once    sodium chloride flush  5-40 mL IntraVENous 2 times per day    heparin flush  3 mL IntraVENous 2 times per day    dexamethasone  6 mg IntraVENous Q12H    polyethylene glycol  17 g Oral Daily    baricitinib  4 mg Oral Daily    insulin lispro  0-18 Units SubCUTAneous Q6H    cefepime  2,000 mg IntraVENous Q12H    sodium chloride flush  5-40 mL IntraVENous 2 times per day    ipratropium-albuterol  1 ampule Inhalation 4x daily    pantoprazole  40 mg IntraVENous Daily    And    sodium chloride (PF)  10 mL IntraVENous Daily    lacosamide  150 mg Oral BID    lamoTRIgine  200 mg Oral TID    zonisamide  300 mg Oral Nightly    baclofen  10 mg Oral TID    enoxaparin  40 mg SubCUTAneous BID    polyvinyl alcohol  1 drop Both Eyes Q6H     sodium chloride flush, 5-40 mL, PRN  sodium chloride, 25 mL, PRN  heparin flush, 3 mL, PRN  bisacodyl, 10 mg, Daily PRN  diatrizoate meglumine-sodium, 50 mL, ONCE PRN  glucose, 15 g, PRN  dextrose, 12.5 g, PRN  glucagon (rDNA), 1 mg, PRN  dextrose, 100 mL/hr, PRN  sodium chloride flush, 10 mL, PRN  sodium chloride, 25 mL, PRN  ondansetron, 4 mg, Q8H PRN   Or  ondansetron, 4 mg, Q6H PRN  polyethylene glycol, 17 g, Daily PRN  acetaminophen, 650 mg, Q6H PRN   Or  acetaminophen, 650 mg, Q6H PRN  perflutren lipid microspheres, 1.5 mL, ONCE PRN  fentanNYL, 25 mcg, Q1H PRN  sodium chloride, 30 mL, PRN         Objective:    BP (!) 129/98   Pulse 121   Temp 99 °F (37.2 °C) (Rectal)   Resp 22   Ht 5' (1.524 m)   Wt 153 lb (69.4 kg)   SpO2 92%   BMI 29.88 kg/m²     Due to the current efforts to prevent transmission of COVID-19 and also the need to preserve PPE for other caregivers, a face-to-face encounter with the patient was not performed. That being said, all relevant records and diagnostic tests were reviewed, including laboratory results and imaging. Please reference any relevant documentation elsewhere. Care will be coordinated with other specialties as appropriate. Recent Labs     09/06/21 0433 09/07/21 0441 09/08/21  0504    140 141   K 4.2 3.9 3.7    103 105   CO2 32* 32* 30*   BUN 8 8 7   CREATININE 0.4* 0.4* 0.4*   GLUCOSE 159* 132* 106*   CALCIUM 8.3* 8.1* 8.5*       Recent Labs     09/06/21 0433 09/07/21 0441 09/08/21  0504   ALKPHOS 204* 194* 199*   PROT 5.3* 5.4* 5.8*   LABALBU 2.1* 2.4* 2.8*   BILITOT 0.5 0.5 0.5   AST 36 32 35   ALT 23 20 22       Recent Labs     09/06/21 0433 09/07/21 0441 09/08/21  0504   WBC 10.7 10.3 10.8   RBC 3.32* 3.16* 3.22*   HGB 10.8* 10.3* 10.5*   HCT 33.1* 31.8* 32.4*   MCV 99.7 100.6* 100.6*   MCH 32.5 32.6 32.6   MCHC 32.6 32.4 32.4   RDW 13.0 12.6 12.4    324 381   MPV 10.5 10.1 10.4                 Radiology:   XR CHEST PORTABLE   Final Result   1. Lines okay. 2. Mild-moderate improved left lung aeration, overall decreased lung volumes. XR CHEST PORTABLE   Final Result   1. Slight advancement of the endotracheal tube which now resides in the   midthoracic trachea. 2.  Stable appearance of the chest.  Stable position of the enteric tube. XR CHEST PORTABLE   Final Result   1. Interval slight advancement of the endotracheal tube which now resides in   the proximal thoracic trachea.       2.  Persistent ill-defined opacities in the right greater than left lungs. No change from prior exam.  No obvious pneumothorax. 3.  Enteric tube. Contrast material identified within the stomach lumen. XR CHEST PORTABLE   Final Result   1. Persistent ill-defined opacities in the bilateral lungs slightly worse in   the left mid to upper lung. Probable trace pleural effusions. 2.  Slight retraction of the endotracheal tube which projects over the   cervical region. Suggest repositioning. 3.  There appears to be a satisfactory position of the enteric tube. The findings were sent to the Radiology Results [address] at 7:12   pm on 9/3/2021to be communicated to a licensed caregiver. XR ABDOMEN FOR NG/OG/NE TUBE PLACEMENT   Final Result   1. Satisfactory position of gastric feeding tube as evidenced by stomach   contour outlined following injection of oral contrast.      2.  Satisfactory position of enteric tube. 3.  Significant reflux of oral contrast material into the esophagus. XR CHEST PORTABLE   Final Result   Significant bilateral infiltrates with partial interval clearing of the left   lower lobe infiltrate since the prior study         XR CHEST PORTABLE   Final Result   Satisfactory position of the ETT and NGT. Otherwise, stable chest.         XR CHEST PORTABLE   Final Result   Increasing right lung opacities consistent with pneumonia. US DUP LOWER EXTREMITIES BILATERAL VENOUS   Final Result   No evidence of DVT in either lower extremity. XR CHEST PORTABLE   Final Result   1. Right lower lobe pneumonia. 2. Left lower lobe atelectasis versus pneumonia. Assessment:  Principal Problem:    Acute respiratory failure with hypoxia (HCC)  Active Problems:    Cerebral palsy (HCC)    Seizures ([address]    Mentally disabled    Convulsions ([address]    Pneumonia due to COVID-19 virus  Resolved Problems:    * No resolved hospital problems. *      Plan:  1.  Acute respiratory failure with hypoxemia  -Patient currently intubated and sedated (fentanyl and propofol and ketamine)   -FiO2 currently at 40%, 8 PEEP, 20 RR and   -Most recent AB.453/41.9/62.9/28.7. O2 saturation 92%   -Planning to wean FiO2 as tolerated and attempt to turn supine today    2. COVID PNA  -Patient unvaccinated, was coughing, congested and had fevers prior to presentation  -Patient currently intubated and sedated   -ID is on board  -Patient switched back to dexamethasone   -Patient completed 5 days of remdesivir and is on duonebs. Was also started on baricitinib   -CXR today showed slight improvement in LLL infiltrate  -WBC 10.8, stable. Afebrile past 24hrs    3. PNA, possibly aspiration  -Sputum Cx growing Klebsiella sensitive to cefepime, currently on cefepime 2gm Q12H  -Repeat sputum Cx growing yeast, not candida - was started on eraxis  -Did receive 3 days of azithromycin as well, however was DCd  -Strep and legionella negative. BCx negative so far  -CXR slight improvement in LLL infiltrate  -WBC 10.8 today, afebrile past 24hrs  -Will follow up final sputum Cx and monitor labs and VS    4. Lactic acidosis (resolved)  -LA peaked at 11.8 on , has improved to 1.1 most recently  -Was getting bicarb infusion for acidosis, now off all IVF  -Will monitor BMP and lactic acid levels     5. Electrolyte abnormalities  -Potassium stable 3.7 today   -Mag and phos also WNL today  -Will continue to monitor    6. Seizure disorder 2/2 cerebral palsy  -Patient has known CP, is nonverbal at baseline with severe developmental delay  -Has seizures, takes baclofen, vimpat, lamictal and zonisamide at home which are continued here    7.  Elevated blood glucose  -Patient has had elevated glucose levels up to 379, this   -No known diagnosis of DM and patient is currently on steroids which will increase glucose  -A1C should be checked to determine if patient is diabetic or just elevated BS 2/2 steroids and current illness

## 2021-09-08 NOTE — PROGRESS NOTES
sodium chloride, ondansetron **OR** ondansetron, polyethylene glycol, acetaminophen **OR** acetaminophen, perflutren lipid microspheres, fentanNYL, sodium chloride    PHYSICAL EXAM     BP (!) 97/57   Pulse 115   Temp 99 °F (37.2 °C) (Rectal)   Resp 20   Ht 5' (1.524 m)   Wt 153 lb (69.4 kg)   SpO2 96%   BMI 29.88 kg/m²   Temp  Av.7 °F (37.1 °C)  Min: 97.9 °F (36.6 °C)  Max: 99 °F (37.2 °C)  Constitutional:  The patient is sedated  Vent  proned  At/nc  Tachy s1/s2  RSdec bs post no wheeze  abd soft nt  Ext edema     Titus      Lines: rfem tlc         DIAGNOSTIC RESULTS   Radiology:    Recent Labs     21  0504   WBC 10.7 10.3 10.8   RBC 3.32* 3.16* 3.22*   HGB 10.8* 10.3* 10.5*   HCT 33.1* 31.8* 32.4*   MCV 99.7 100.6* 100.6*   MCH 32.5 32.6 32.6   MCHC 32.6 32.4 32.4   RDW 13.0 12.6 12.4    324 381   MPV 10.5 10.1 10.4     Recent Labs     21  0504    140 141   K 4.2 3.9 3.7    103 105   CO2 32* 32* 30*   BUN 8 8 7   CREATININE 0.4* 0.4* 0.4*   GLUCOSE 159* 132* 106*   PROT 5.3* 5.4* 5.8*   LABALBU 2.1* 2.4* 2.8*   CALCIUM 8.3* 8.1* 8.5*   BILITOT 0.5 0.5 0.5   ALKPHOS 204* 194* 199*   AST 36 32 35   ALT 23 20 22     Lab Results   Component Value Date    CRP 0.3 2021    CRP 8.4 (H) 2021     Lab Results   Component Value Date    SEDRATE 5 2019    SEDRATE 29 (H) 2018    SEDRATE 21 (H) 08/10/2018     Recent Labs     21  0433 21  0441 21  0504   AST 36 32 35   ALT 23 20 22   TRIG 316*  --  306*        Microbiology:   No results for input(s): COVID19 in the last 72 hours.   Lab Results   Component Value Date    BC 24 Hours no growth 2021    BC 5 Days no growth 2021    BLOODCULT2 24 Hours no growth 2021    BLOODCULT2 5 Days no growth 2021    ORG Yeast, not C. albicans 2021    ORG Klebsiella pneumoniae ssp pneumoniae 2021    ORG Coagulase Negative Staphylococci 07/24/2018    ORG Corynebacterium species 07/24/2018     WOUND/ABSCESS   Date Value Ref Range Status   07/24/2018 Light growth  Final   07/24/2018 Light growth  Mixed morphologies    Final   06/29/2018   Corrected    Light growth  Too fastidious for susceptibility testing.     06/29/2018 Light growth  Corrected   06/29/2018 Moderate growth  Corrected     Smear, Respiratory   Date Value Ref Range Status   09/04/2021   Final    Moderate Polymorphonuclear leukocytes  Epithelial cells not seen  No organisms seen       No results found for: MPNEUMO, CLAMYDCU, LABLEGI, AFBCX, FUNGSM, LABFUNG    MRSA Culture Only   Date Value Ref Range Status   08/29/2021 Methicillin resistant Staph aureus not isolated  Final     CULTURE, RESPIRATORY   Date Value Ref Range Status   09/04/2021 Oral Pharyngeal Adina reduced (A)  Final   09/04/2021 Light growth  Final   08/29/2021 Oral Pharyngeal Adina reduced (A)  Final   08/29/2021 Moderate growth  Final     No results for input(s): CXSURG, ORG in the last 72 hours. FINAL IMPRESSION    Pt had   Chief Complaint   Patient presents with    Altered Mental Status    Respiratory Distress     41% SpO2    Admitted for Hypokalemia [E87.6]  Acute respiratory failure with hypoxia (HCC) [J96.01]  Pneumonia of right lower lobe due to infectious organism [J18.9]  Sepsis with acute hypoxic respiratory failure without septic shock, due to unspecified organism ([facility] [address] [A41.9, R65.20, J96.01]  On treatment for above  And covid + 8/28  S/p  leukopenia/leukocytosis  Klebisella pneumonia check final cx  Mrsa neg   Yeast, not C. Albicans colonized resp cx  Cerebral palsy        anidulafungin (ERAXIS) 100 mg in dextrose 5 % 130 mL IVPB, Q24H     dexamethasone (PF) (DECADRON) injection 6 mg, Q12H     cefepime (MAXIPIME) 2000 mg IVPB minibag, Q12H     enoxaparin (LOVENOX) injection 40 mg, BID    Check fungitell    · Monitor labs    Imaging and labs were reviewed per medical records. .  Thank you for involving me in the care of Ted Young will continue to follow. Please do not hesitate to call for any questions or concerns.     Electronically signed by Gerhard Geronimo MD on 9/8/2021 at 6:34 PM

## 2021-09-09 ENCOUNTER — APPOINTMENT (OUTPATIENT)
Dept: GENERAL RADIOLOGY | Age: 26
DRG: 870 | End: 2021-09-09
Payer: COMMERCIAL

## 2021-09-09 LAB
(1,3)-BETA-D-GLUCAN (FUNGITELL) INTERPRETATION: NEGATIVE
(1,3)-BETA-D-GLUCAN (FUNGITELL): <31 PG/ML
ALBUMIN SERPL-MCNC: 2.9 G/DL (ref 3.5–5.2)
ALP BLD-CCNC: 191 U/L (ref 40–129)
ALT SERPL-CCNC: 20 U/L (ref 0–40)
ANION GAP SERPL CALCULATED.3IONS-SCNC: 6 MMOL/L (ref 7–16)
AST SERPL-CCNC: 34 U/L (ref 0–39)
ATYPICAL LYMPHOCYTE RELATIVE PERCENT: 1 % (ref 0–4)
B.E.: 4.3 MMOL/L (ref -3–3)
BASOPHILS ABSOLUTE: 0 E9/L (ref 0–0.2)
BASOPHILS RELATIVE PERCENT: 0 % (ref 0–2)
BILIRUB SERPL-MCNC: 0.6 MG/DL (ref 0–1.2)
BLOOD CULTURE, ROUTINE: NORMAL
BUN BLDV-MCNC: 8 MG/DL (ref 6–20)
CALCIUM SERPL-MCNC: 8.6 MG/DL (ref 8.6–10.2)
CHLORIDE BLD-SCNC: 107 MMOL/L (ref 98–107)
CO2: 31 MMOL/L (ref 22–29)
COHB: 0.3 % (ref 0–1.5)
CREAT SERPL-MCNC: 0.4 MG/DL (ref 0.7–1.2)
CRITICAL: ABNORMAL
DATE ANALYZED: ABNORMAL
DATE OF COLLECTION: ABNORMAL
EOSINOPHILS ABSOLUTE: 0 E9/L (ref 0.05–0.5)
EOSINOPHILS RELATIVE PERCENT: 0 % (ref 0–6)
FIO2: 40 %
GFR AFRICAN AMERICAN: >60
GFR NON-AFRICAN AMERICAN: >60 ML/MIN/1.73
GLUCOSE BLD-MCNC: 127 MG/DL (ref 74–99)
HCO3: 29 MMOL/L (ref 22–26)
HCT VFR BLD CALC: 33.4 % (ref 37–54)
HEMOGLOBIN: 10.5 G/DL (ref 12.5–16.5)
HHB: 6.4 % (ref 0–5)
LAB: ABNORMAL
LYMPHOCYTES ABSOLUTE: 1.16 E9/L (ref 1.5–4)
LYMPHOCYTES RELATIVE PERCENT: 12 % (ref 20–42)
Lab: ABNORMAL
MAGNESIUM: 2.2 MG/DL (ref 1.6–2.6)
MCH RBC QN AUTO: 32.2 PG (ref 26–35)
MCHC RBC AUTO-ENTMCNC: 31.4 % (ref 32–34.5)
MCV RBC AUTO: 102.5 FL (ref 80–99.9)
METER GLUCOSE: 101 MG/DL (ref 74–99)
METER GLUCOSE: 101 MG/DL (ref 74–99)
METER GLUCOSE: 116 MG/DL (ref 74–99)
METER GLUCOSE: 132 MG/DL (ref 74–99)
METER GLUCOSE: 141 MG/DL (ref 74–99)
METHB: 0.4 % (ref 0–1.5)
MODE: AC
MONOCYTES ABSOLUTE: 0.44 E9/L (ref 0.1–0.95)
MONOCYTES RELATIVE PERCENT: 5 % (ref 2–12)
NEUTROPHILS ABSOLUTE: 7.3 E9/L (ref 1.8–7.3)
NEUTROPHILS RELATIVE PERCENT: 82 % (ref 43–80)
O2 CONTENT: 15.5 ML/DL
O2 SATURATION: 93.6 % (ref 92–98.5)
O2HB: 92.9 % (ref 94–97)
OPERATOR ID: 2323
PATIENT TEMP: 37 C
PCO2: 43.7 MMHG (ref 35–45)
PDW BLD-RTO: 12.6 FL (ref 11.5–15)
PEEP/CPAP: 8 CMH2O
PFO2: 1.7 MMHG/%
PH BLOOD GAS: 7.44 (ref 7.35–7.45)
PHOSPHORUS: 4.2 MG/DL (ref 2.5–4.5)
PLATELET # BLD: 404 E9/L (ref 130–450)
PMV BLD AUTO: 10 FL (ref 7–12)
PO2: 68.2 MMHG (ref 75–100)
POLYCHROMASIA: ABNORMAL
POTASSIUM SERPL-SCNC: 4.2 MMOL/L (ref 3.5–5)
RBC # BLD: 3.26 E12/L (ref 3.8–5.8)
RI(T): 2.3
RR MECHANICAL: 20 B/MIN
SODIUM BLD-SCNC: 144 MMOL/L (ref 132–146)
SOURCE, BLOOD GAS: ABNORMAL
THB: 11.8 G/DL (ref 11.5–16.5)
TIME ANALYZED: 458
TOTAL PROTEIN: 6.2 G/DL (ref 6.4–8.3)
VT MECHANICAL: 325 ML
WBC # BLD: 8.9 E9/L (ref 4.5–11.5)

## 2021-09-09 PROCEDURE — 2000000000 HC ICU R&B

## 2021-09-09 PROCEDURE — 2580000003 HC RX 258: Performed by: INTERNAL MEDICINE

## 2021-09-09 PROCEDURE — 2580000003 HC RX 258

## 2021-09-09 PROCEDURE — 80053 COMPREHEN METABOLIC PANEL: CPT

## 2021-09-09 PROCEDURE — 6360000002 HC RX W HCPCS: Performed by: INTERNAL MEDICINE

## 2021-09-09 PROCEDURE — 94003 VENT MGMT INPAT SUBQ DAY: CPT

## 2021-09-09 PROCEDURE — 2580000003 HC RX 258: Performed by: EMERGENCY MEDICINE

## 2021-09-09 PROCEDURE — 6370000000 HC RX 637 (ALT 250 FOR IP)

## 2021-09-09 PROCEDURE — 82805 BLOOD GASES W/O2 SATURATION: CPT

## 2021-09-09 PROCEDURE — 6370000000 HC RX 637 (ALT 250 FOR IP): Performed by: INTERNAL MEDICINE

## 2021-09-09 PROCEDURE — 6360000002 HC RX W HCPCS

## 2021-09-09 PROCEDURE — C9113 INJ PANTOPRAZOLE SODIUM, VIA: HCPCS

## 2021-09-09 PROCEDURE — 85025 COMPLETE CBC W/AUTO DIFF WBC: CPT

## 2021-09-09 PROCEDURE — 99232 SBSQ HOSP IP/OBS MODERATE 35: CPT | Performed by: STUDENT IN AN ORGANIZED HEALTH CARE EDUCATION/TRAINING PROGRAM

## 2021-09-09 PROCEDURE — 83735 ASSAY OF MAGNESIUM: CPT

## 2021-09-09 PROCEDURE — 6360000002 HC RX W HCPCS: Performed by: EMERGENCY MEDICINE

## 2021-09-09 PROCEDURE — 2580000003 HC RX 258: Performed by: SPECIALIST

## 2021-09-09 PROCEDURE — 99231 SBSQ HOSP IP/OBS SF/LOW 25: CPT | Performed by: FAMILY MEDICINE

## 2021-09-09 PROCEDURE — 82962 GLUCOSE BLOOD TEST: CPT

## 2021-09-09 PROCEDURE — 94640 AIRWAY INHALATION TREATMENT: CPT

## 2021-09-09 PROCEDURE — 2500000003 HC RX 250 WO HCPCS: Performed by: INTERNAL MEDICINE

## 2021-09-09 PROCEDURE — 71045 X-RAY EXAM CHEST 1 VIEW: CPT

## 2021-09-09 PROCEDURE — 6360000002 HC RX W HCPCS: Performed by: SPECIALIST

## 2021-09-09 PROCEDURE — 84100 ASSAY OF PHOSPHORUS: CPT

## 2021-09-09 RX ADMIN — Medication 200 MCG/HR: at 15:34

## 2021-09-09 RX ADMIN — LAMOTRIGINE 200 MG: 100 TABLET ORAL at 21:10

## 2021-09-09 RX ADMIN — BACLOFEN 10 MG: 10 TABLET ORAL at 14:30

## 2021-09-09 RX ADMIN — BARICITINIB 4 MG: 2 TABLET, FILM COATED ORAL at 11:31

## 2021-09-09 RX ADMIN — PROPOFOL INJECTABLE EMULSION 50 MCG/KG/MIN: 10 INJECTION, EMULSION INTRAVENOUS at 06:23

## 2021-09-09 RX ADMIN — POLYVINYL ALCOHOL 1 DROP: 14 SOLUTION/ DROPS OPHTHALMIC at 11:30

## 2021-09-09 RX ADMIN — SODIUM CHLORIDE 25 ML: 9 INJECTION, SOLUTION INTRAVENOUS at 07:15

## 2021-09-09 RX ADMIN — Medication 10 ML: at 21:12

## 2021-09-09 RX ADMIN — IPRATROPIUM BROMIDE AND ALBUTEROL SULFATE 1 AMPULE: .5; 3 SOLUTION RESPIRATORY (INHALATION) at 09:35

## 2021-09-09 RX ADMIN — POLYVINYL ALCOHOL 1 DROP: 14 SOLUTION/ DROPS OPHTHALMIC at 04:40

## 2021-09-09 RX ADMIN — DEXTROSE MONOHYDRATE 100 MG: 50 INJECTION, SOLUTION INTRAVENOUS at 11:00

## 2021-09-09 RX ADMIN — Medication 10 ML: at 08:46

## 2021-09-09 RX ADMIN — PANTOPRAZOLE SODIUM 40 MG: 40 INJECTION, POWDER, FOR SOLUTION INTRAVENOUS at 08:44

## 2021-09-09 RX ADMIN — IPRATROPIUM BROMIDE AND ALBUTEROL SULFATE 1 AMPULE: .5; 3 SOLUTION RESPIRATORY (INHALATION) at 18:36

## 2021-09-09 RX ADMIN — DEXAMETHASONE SODIUM PHOSPHATE 6 MG: 10 INJECTION, SOLUTION INTRAMUSCULAR; INTRAVENOUS at 10:30

## 2021-09-09 RX ADMIN — LACOSAMIDE 150 MG: 100 TABLET, FILM COATED ORAL at 09:30

## 2021-09-09 RX ADMIN — MIDAZOLAM HYDROCHLORIDE 7 MG/HR: 5 INJECTION, SOLUTION INTRAMUSCULAR; INTRAVENOUS at 14:33

## 2021-09-09 RX ADMIN — MIDAZOLAM HYDROCHLORIDE 7 MG/HR: 5 INJECTION, SOLUTION INTRAMUSCULAR; INTRAVENOUS at 01:27

## 2021-09-09 RX ADMIN — SODIUM CHLORIDE, PRESERVATIVE FREE 10 ML: 5 INJECTION INTRAVENOUS at 08:44

## 2021-09-09 RX ADMIN — Medication 200 MCG/HR: at 22:23

## 2021-09-09 RX ADMIN — CEFEPIME HYDROCHLORIDE 2000 MG: 2 INJECTION, POWDER, FOR SOLUTION INTRAVENOUS at 04:27

## 2021-09-09 RX ADMIN — LAMOTRIGINE 200 MG: 100 TABLET ORAL at 11:31

## 2021-09-09 RX ADMIN — DEXAMETHASONE SODIUM PHOSPHATE 6 MG: 10 INJECTION, SOLUTION INTRAMUSCULAR; INTRAVENOUS at 21:09

## 2021-09-09 RX ADMIN — LAMOTRIGINE 200 MG: 100 TABLET ORAL at 14:30

## 2021-09-09 RX ADMIN — BACLOFEN 10 MG: 10 TABLET ORAL at 11:31

## 2021-09-09 RX ADMIN — Medication 10 ML: at 21:11

## 2021-09-09 RX ADMIN — SODIUM CHLORIDE 25 ML: 9 INJECTION, SOLUTION INTRAVENOUS at 18:00

## 2021-09-09 RX ADMIN — BACLOFEN 10 MG: 10 TABLET ORAL at 21:10

## 2021-09-09 RX ADMIN — POLYETHYLENE GLYCOL 3350 17 G: 17 POWDER, FOR SOLUTION ORAL at 08:46

## 2021-09-09 RX ADMIN — IPRATROPIUM BROMIDE AND ALBUTEROL SULFATE 1 AMPULE: .5; 3 SOLUTION RESPIRATORY (INHALATION) at 13:21

## 2021-09-09 RX ADMIN — ENOXAPARIN SODIUM 40 MG: 40 INJECTION SUBCUTANEOUS at 08:44

## 2021-09-09 RX ADMIN — ENOXAPARIN SODIUM 40 MG: 40 INJECTION SUBCUTANEOUS at 21:09

## 2021-09-09 RX ADMIN — LACOSAMIDE 150 MG: 100 TABLET, FILM COATED ORAL at 21:09

## 2021-09-09 RX ADMIN — HEPARIN 300 UNITS: 100 SYRINGE at 08:44

## 2021-09-09 RX ADMIN — Medication 200 MCG/HR: at 08:50

## 2021-09-09 RX ADMIN — ZONISAMIDE 300 MG: 100 CAPSULE ORAL at 21:09

## 2021-09-09 RX ADMIN — IPRATROPIUM BROMIDE AND ALBUTEROL SULFATE 1 AMPULE: .5; 3 SOLUTION RESPIRATORY (INHALATION) at 05:34

## 2021-09-09 RX ADMIN — Medication 200 MCG/HR: at 01:27

## 2021-09-09 RX ADMIN — POLYVINYL ALCOHOL 1 DROP: 14 SOLUTION/ DROPS OPHTHALMIC at 15:56

## 2021-09-09 RX ADMIN — PROPOFOL INJECTABLE EMULSION 50 MCG/KG/MIN: 10 INJECTION, EMULSION INTRAVENOUS at 15:58

## 2021-09-09 ASSESSMENT — PULMONARY FUNCTION TESTS
PIF_VALUE: 29
PIF_VALUE: 33
PIF_VALUE: 30
PIF_VALUE: 31
PIF_VALUE: 33
PIF_VALUE: 30
PIF_VALUE: 29
PIF_VALUE: 28
PIF_VALUE: 31
PIF_VALUE: 35
PIF_VALUE: 29
PIF_VALUE: 33
PIF_VALUE: 31
PIF_VALUE: 36
PIF_VALUE: 30
PIF_VALUE: 32
PIF_VALUE: 31
PIF_VALUE: 31
PIF_VALUE: 28
PIF_VALUE: 27
PIF_VALUE: 30
PIF_VALUE: 35
PIF_VALUE: 29
PIF_VALUE: 29
PIF_VALUE: 36
PIF_VALUE: 34
PIF_VALUE: 31
PIF_VALUE: 36

## 2021-09-09 ASSESSMENT — PAIN SCALES - GENERAL: PAINLEVEL_OUTOF10: 0

## 2021-09-09 NOTE — PLAN OF CARE
Problem: Airway Clearance - Ineffective  Goal: Achieve or maintain patent airway  9/9/2021 0543 by Marylene Corporal, RN  Outcome: Met This Shift     Problem: Gas Exchange - Impaired  Goal: Absence of hypoxia  9/9/2021 0720 by Dre Figueroa RN  Outcome: Met This Shift  9/9/2021 0543 by Marylene Corporal, RN  Outcome: Met This Shift  Goal: Promote optimal lung function  9/9/2021 0720 by Dre Figueroa RN  Outcome: Met This Shift  9/9/2021 0543 by Marylene Corporal, RN  Outcome: Met This Shift     Problem: Breathing Pattern - Ineffective  Goal: Ability to achieve and maintain a regular respiratory rate  9/9/2021 0720 by Dre Figueroa RN  Outcome: Met This Shift  9/9/2021 0543 by Marylene Corporal, RN  Outcome: Met This Shift     Problem: Isolation Precautions - Risk of Spread of Infection  Goal: Prevent transmission of infection  9/9/2021 0543 by Marylene Corporal, RN  Outcome: Met This Shift     Problem: Nutrition Deficits  Goal: Optimize nutritional status  9/9/2021 0543 by Marylene Corporal, RN  Outcome: Met This Shift     Problem: Fatigue  Goal: Verbalize increase energy and improved vitality  Outcome: Met This Shift     Problem: Skin Integrity:  Goal: Will show no infection signs and symptoms  Description: Will show no infection signs and symptoms  Outcome: Met This Shift  Goal: Absence of new skin breakdown  Description: Absence of new skin breakdown  Outcome: Met This Shift     Problem: Falls - Risk of:  Goal: Will remain free from falls  Description: Will remain free from falls  Outcome: Met This Shift  Goal: Absence of physical injury  Description: Absence of physical injury  Outcome: Met This Shift

## 2021-09-09 NOTE — PROGRESS NOTES
Comprehensive Nutrition Assessment    Type and Reason for Visit:  Reassess    Nutrition Recommendations/Plan:  Recommend to increase TF to goal rate to meet optimal nutr;l needs. Osmolite 1.2 (Standard w/o  Fiber) @ 35 ml/hr x 24 hr + 1 ProMod daily to provide: 840 mlTV, 1661 kcal(includes prot. Modular&Propofol)/ 73 gm protein, 689 ml FW. Additional FW per critical care. Nutrition Assessment:  Pt admitted w/ hypokalemia and acute respiratory failure r/t COVID-19 and possible aspiration PNA. PMH of spastic CP. Pt remains intubated and sedated on trickle feeds. Will continue to monitor monitor while admitted. Recommend to increase TF rate to meet optimal nutr'l needs    Malnutrition Assessment:  Malnutrition Status: At risk for malnutrition (Comment)    Context:  Acute Illness     Findings of the 6 clinical characteristics of malnutrition:  Energy Intake:  Mild decrease in energy intake (Comment)  Weight Loss:  Unable to assess (No actual wt this admission and no actual EMR wt hx)     Body Fat Loss:  Unable to assess (Pt is COVID-19 positive)     Muscle Mass Loss:  Unable to assess    Fluid Accumulation:  No significant fluid accumulation     Strength:  Not Performed     Estimated Daily Nutrient Needs:  Energy (kcal):  8433-6278; Weight Used for Energy Requirements:  Admission     Protein (g):  70-85 (1.4-1.7 gm/kg IBW);  Weight Used for Protein Requirements:  Ideal        Fluid (ml/day):  per critical care; Method Used for Fluid Requirements:  Other (Comment)      Nutrition Related Findings:  Intubated, Propofol @20.8ml/hr provides 549kcal/d, abd flat/soft, PEG tube clamped, OGT w/ EN running at trickle, +BS, -I/O -5.0L, Generalized edema +1 non-pitting, BUE contractures and flaccid BLE      Wounds:  Pressure Injury (noted on L heel, not staged)       Current Nutrition Therapies:    Current Tube Feeding (TF) Orders:  · Feeding Route: PEG  · Formula: Standard without Fiber  · Schedule: Continuous  · Current TF & Flush Orders Provides: 480mlTV/576kcal/27gm pro/394ml FW      Anthropometric Measures:  · Height: 5' (152.4 cm)  · Current Body Weight: 130 lb (59 kg)   · Admission Body Weight: 130 lb (59 kg) (8/28 no method)    · Usual Body Weight: 135 lb (61.2 kg) (9/4/2020 stated, no actual EMR wt hx)     · Ideal Body Weight: 106 lbs; % Ideal Body Weight 122.6 %   · BMI: 25.4  · Adjusted Body Weight:  ; No Adjustment   · BMI Categories: Overweight (BMI 25.0-29. 9)       Nutrition Diagnosis:   · Inadequate protein-energy intake related to impaired respiratory function as evidenced by intubation, nutrition support - enteral nutrition      Nutrition Interventions:   Food and/or Nutrient Delivery:  Modify Tube Feeding (Due to increased Propofol recommend Osmolite 1.2 (Standard w/o  Fiber) @ 35 ml/hr x 24 hr + 1 ProMod daily to provide: 840 mlTV, 1112 kcal, 73 gm protein, 689 ml FW. Additional FW per critical care.)  Nutrition Education/Counseling:  No recommendation at this time   Coordination of Nutrition Care:  Continue to monitor while inpatient    Goals:  EN tolerance       Nutrition Monitoring and Evaluation:   Behavioral-Environmental Outcomes:  None Identified   Food/Nutrient Intake Outcomes:  Enteral Nutrition Intake/Tolerance  Physical Signs/Symptoms Outcomes:  Biochemical Data, GI Status, Fluid Status or Edema, Nutrition Focused Physical Findings, Skin, Weight, Hemodynamic Status     Discharge Planning:     Too soon to determine     Electronically signed by Alec Luke RD, LD on 9/9/21 at 9:47 AM EDT    Contact: [phone]

## 2021-09-09 NOTE — PROGRESS NOTES
Pulmonary/Critical Care Progress Note    We are following patient for COVID-19 pneumonitis, acute hypoxemic respiratory failure, cerebral palsy, question aspiration pneumonitis, history of seizures, electrolyte abnormalities    SUBJECTIVE:  Patient is saturating well on an FiO2 of 0.40 and centimeters of PEEP. He seems very comfortable on the current dose of Decadron 6 mg every 12 hours and is tolerating tube feedings and antibiotics well. He is receiving baricitinib. He is currently supine but can be proned again later today.     MEDICATIONS:   insulin lispro  0-18 Units SubCUTAneous Q4H    anidulafungin  100 mg IntraVENous Q24H    lidocaine PF  5 mL IntraDERmal Once    sodium chloride flush  5-40 mL IntraVENous 2 times per day    heparin flush  3 mL IntraVENous 2 times per day    dexamethasone  6 mg IntraVENous Q12H    polyethylene glycol  17 g Oral Daily    baricitinib  4 mg Oral Daily    sodium chloride flush  5-40 mL IntraVENous 2 times per day    ipratropium-albuterol  1 ampule Inhalation 4x daily    pantoprazole  40 mg IntraVENous Daily    And    sodium chloride (PF)  10 mL IntraVENous Daily    lacosamide  150 mg Oral BID    lamoTRIgine  200 mg Oral TID    zonisamide  300 mg Oral Nightly    baclofen  10 mg Oral TID    enoxaparin  40 mg SubCUTAneous BID    polyvinyl alcohol  1 drop Both Eyes Q6H      propofol 50 mcg/kg/min (09/09/21 6443)    sodium chloride      midazolam 7 mg/hr (09/09/21 0127)    dextrose      sodium chloride Stopped (09/09/21 0848)    sodium chloride      fentaNYL 5 mcg/ml in 0.9%  ml infusion 200 mcg/hr (09/09/21 0850)     sodium chloride flush, sodium chloride, heparin flush, bisacodyl, diatrizoate meglumine-sodium, glucose, dextrose, glucagon (rDNA), dextrose, sodium chloride flush, sodium chloride, ondansetron **OR** ondansetron, polyethylene glycol, acetaminophen **OR** acetaminophen, perflutren lipid microspheres, fentanNYL, sodium chloride      REVIEW OF SYSTEMS:  The patient is intubated and sedated and cannot answer questions with regard to review of systems    OBJECTIVE:  Vitals:    09/09/21 0900   BP: 122/87   Pulse: 104   Resp: 20   Temp:    SpO2: 95%     FiO2 : 40 %  O2 Flow Rate (L/min): 60 L/min  O2 Device: Ventilator    PHYSICAL EXAM:  Constitutional: No fever, chills, diaphoresis  Skin: No skin rash, no skin breakdown  HEENT: Tracheal tube secured at lips  Neck: No JVD, lymphadenopathy, thyromegaly  Cardiovascular: S1, S2 regular. No S3 or rubs present  Respiratory: Inspiratory crackles over both posterior lung fields.   Very faint end expiratory wheeze  Gastrointestinal: Soft, flat, nondistended  Genitourinary: No grossly bloody urine  Extremities: Clubbing, cyanosis, or edema  Neurological: Sedated  Psychological: Sedated    LABS:  WBC   Date Value Ref Range Status   09/09/2021 8.9 4.5 - 11.5 E9/L Final   09/08/2021 10.8 4.5 - 11.5 E9/L Final   09/07/2021 10.3 4.5 - 11.5 E9/L Final     Hemoglobin   Date Value Ref Range Status   09/09/2021 10.5 (L) 12.5 - 16.5 g/dL Final   09/08/2021 10.5 (L) 12.5 - 16.5 g/dL Final   09/07/2021 10.3 (L) 12.5 - 16.5 g/dL Final     Hematocrit   Date Value Ref Range Status   09/09/2021 33.4 (L) 37.0 - 54.0 % Final   09/08/2021 32.4 (L) 37.0 - 54.0 % Final   09/07/2021 31.8 (L) 37.0 - 54.0 % Final     MCV   Date Value Ref Range Status   09/09/2021 102.5 (H) 80.0 - 99.9 fL Final   09/08/2021 100.6 (H) 80.0 - 99.9 fL Final   09/07/2021 100.6 (H) 80.0 - 99.9 fL Final     Platelets   Date Value Ref Range Status   09/09/2021 404 130 - 450 E9/L Final   09/08/2021 381 130 - 450 E9/L Final   09/07/2021 324 130 - 450 E9/L Final     Sodium   Date Value Ref Range Status   09/09/2021 144 132 - 146 mmol/L Final   09/08/2021 141 132 - 146 mmol/L Final   09/07/2021 140 132 - 146 mmol/L Final     Potassium   Date Value Ref Range Status   09/09/2021 4.2 3.5 - 5.0 mmol/L Final   09/08/2021 3.7 3.5 - 5.0 mmol/L Final   09/07/2021 3.9 3.5 - 5.0 mmol/L Final     Chloride   Date Value Ref Range Status   09/09/2021 107 98 - 107 mmol/L Final   09/08/2021 105 98 - 107 mmol/L Final   09/07/2021 103 98 - 107 mmol/L Final     CO2   Date Value Ref Range Status   09/09/2021 31 (H) 22 - 29 mmol/L Final   09/08/2021 30 (H) 22 - 29 mmol/L Final   09/07/2021 32 (H) 22 - 29 mmol/L Final     BUN   Date Value Ref Range Status   09/09/2021 8 6 - 20 mg/dL Final   09/08/2021 7 6 - 20 mg/dL Final   09/07/2021 8 6 - 20 mg/dL Final     CREATININE   Date Value Ref Range Status   09/09/2021 0.4 (L) 0.7 - 1.2 mg/dL Final   09/08/2021 0.4 (L) 0.7 - 1.2 mg/dL Final   09/07/2021 0.4 (L) 0.7 - 1.2 mg/dL Final     Glucose   Date Value Ref Range Status   09/09/2021 127 (H) 74 - 99 mg/dL Final   09/08/2021 106 (H) 74 - 99 mg/dL Final   09/07/2021 132 (H) 74 - 99 mg/dL Final     Calcium   Date Value Ref Range Status   09/09/2021 8.6 8.6 - 10.2 mg/dL Final   09/08/2021 8.5 (L) 8.6 - 10.2 mg/dL Final   09/07/2021 8.1 (L) 8.6 - 10.2 mg/dL Final     Total Protein   Date Value Ref Range Status   09/09/2021 6.2 (L) 6.4 - 8.3 g/dL Final   09/08/2021 5.8 (L) 6.4 - 8.3 g/dL Final   09/07/2021 5.4 (L) 6.4 - 8.3 g/dL Final     Albumin   Date Value Ref Range Status   09/09/2021 2.9 (L) 3.5 - 5.2 g/dL Final   09/08/2021 2.8 (L) 3.5 - 5.2 g/dL Final   09/07/2021 2.4 (L) 3.5 - 5.2 g/dL Final     Total Bilirubin   Date Value Ref Range Status   09/09/2021 0.6 0.0 - 1.2 mg/dL Final   09/08/2021 0.5 0.0 - 1.2 mg/dL Final   09/07/2021 0.5 0.0 - 1.2 mg/dL Final     Alkaline Phosphatase   Date Value Ref Range Status   09/09/2021 191 (H) 40 - 129 U/L Final   09/08/2021 199 (H) 40 - 129 U/L Final   09/07/2021 194 (H) 40 - 129 U/L Final     AST   Date Value Ref Range Status   09/09/2021 34 0 - 39 U/L Final   09/08/2021 35 0 - 39 U/L Final   09/07/2021 32 0 - 39 U/L Final     ALT   Date Value Ref Range Status   09/09/2021 20 0 - 40 U/L Final   09/08/2021 22 0 - 40 U/L Final   09/07/2021 20 0 - 40 U/L Final GFR Non-   Date Value Ref Range Status   09/09/2021 >60 >=60 mL/min/1.73 Final     Comment:     Chronic Kidney Disease: less than 60 ml/min/1.73 sq.m. Kidney Failure: less than 15 ml/min/1.73 sq.m. Results valid for patients 18 years and older. 09/08/2021 >60 >=60 mL/min/1.73 Final     Comment:     Chronic Kidney Disease: less than 60 ml/min/1.73 sq.m. Kidney Failure: less than 15 ml/min/1.73 sq.m. Results valid for patients 18 years and older. 09/07/2021 >60 >=60 mL/min/1.73 Final     Comment:     Chronic Kidney Disease: less than 60 ml/min/1.73 sq.m. Kidney Failure: less than 15 ml/min/1.73 sq.m. Results valid for patients 18 years and older. GFR    Date Value Ref Range Status   09/09/2021 >60  Final   09/08/2021 >60  Final   09/07/2021 >60  Final     Magnesium   Date Value Ref Range Status   09/09/2021 2.2 1.6 - 2.6 mg/dL Final   09/08/2021 2.1 1.6 - 2.6 mg/dL Final   09/07/2021 2.1 1.6 - 2.6 mg/dL Final     Phosphorus   Date Value Ref Range Status   09/09/2021 4.2 2.5 - 4.5 mg/dL Final   09/08/2021 3.5 2.5 - 4.5 mg/dL Final   09/07/2021 2.8 2.5 - 4.5 mg/dL Final     Recent Labs     09/09/21  0458   PH 7.440   PO2 68.2*   PCO2 43.7   HCO3 29.0*   BE 4.3*   O2SAT 93.6       RADIOLOGY:  XR CHEST PORTABLE   Final Result   Interval placement of right-sided PICC line. Bilateral parenchymal infiltrates are similar to slightly worsened. Continued follow-up recommended. XR CHEST PORTABLE   Final Result   1. Lines okay. 2. Mild-moderate improved left lung aeration, overall decreased lung volumes. XR CHEST PORTABLE   Final Result   1. Slight advancement of the endotracheal tube which now resides in the   midthoracic trachea. 2.  Stable appearance of the chest.  Stable position of the enteric tube. XR CHEST PORTABLE   Final Result   1.   Interval slight advancement of the endotracheal tube which now resides in   the proximal thoracic trachea. 2.  Persistent ill-defined opacities in the right greater than left lungs. No change from prior exam.  No obvious pneumothorax. 3.  Enteric tube. Contrast material identified within the stomach lumen. XR CHEST PORTABLE   Final Result   1. Persistent ill-defined opacities in the bilateral lungs slightly worse in   the left mid to upper lung. Probable trace pleural effusions. 2.  Slight retraction of the endotracheal tube which projects over the   cervical region. Suggest repositioning. 3.  There appears to be a satisfactory position of the enteric tube. The findings were sent to the Radiology Results [address] at 7:12   pm on 9/3/2021to be communicated to a licensed caregiver. XR ABDOMEN FOR NG/OG/NE TUBE PLACEMENT   Final Result   1. Satisfactory position of gastric feeding tube as evidenced by stomach   contour outlined following injection of oral contrast.      2.  Satisfactory position of enteric tube. 3.  Significant reflux of oral contrast material into the esophagus. XR CHEST PORTABLE   Final Result   Significant bilateral infiltrates with partial interval clearing of the left   lower lobe infiltrate since the prior study         XR CHEST PORTABLE   Final Result   Satisfactory position of the ETT and NGT. Otherwise, stable chest.         XR CHEST PORTABLE   Final Result   Increasing right lung opacities consistent with pneumonia. US DUP LOWER EXTREMITIES BILATERAL VENOUS   Final Result   No evidence of DVT in either lower extremity. XR CHEST PORTABLE   Final Result   1. Right lower lobe pneumonia. 2. Left lower lobe atelectasis versus pneumonia.                  PROBLEM LIST:  Principal Problem:    Acute respiratory failure with hypoxia (HCC)  Active Problems:    Cerebral palsy (HCC)    Seizures ([address]    Mentally disabled    Convulsions ([address]    Pneumonia due to COVID-19 virus  Resolved Problems:    * No resolved hospital problems. *      IMPRESSION:  1. Acute hypoxemic respiratory failure  2. COVID-19 pneumonitisI believe the x-ray is improving and not worse or unchanged  3. ARDS  4. Cerebral palsy  5. History of seizures  6. Previous shock, resolved    PLAN:  1. We proned later today  2. Consider spontaneous breathing trial tomorrow  3. Tube feedings at a higher level when the patient is supine  4. Antibiotics per ID service  5. Continue baricitinib for a total of 4 days    ATTESTATION:  ICU Staff Physician note of personal involvement in Care  As the attending physician, I certify that I personally reviewed the patients history and personally examined the patient to confirm the physical findings described above,  And that I reviewed the relevant imaging studies and available reports. I also discussed the differential diagnosis and all of the proposed management plans with the patient and individuals accompanying the patient to this visit. They had the opportunity to ask questions about the proposed management plans and to have those questions answered. This patient has a high probability of sudden, clinically significant deterioration, which requires the highest level of physician preparedness to intervene urgently. I managed/supervised life or organ supporting interventions that required frequent physician assessment. I devoted my full attention to the direct care of this patient for the amount of time indicated below. Time I spent with the family or surrogate(s) is included only if the patient was incapable of providing the necessary information or participating in medical decisions  Time devoted to teaching and to any procedures I billed separately is not included.     CRITICAL CARE TIME:  33 minutes    Electronically signed by Dalia Edmond MD on 9/9/2021 at 10:48 AM

## 2021-09-09 NOTE — CARE COORDINATION
9/9/21 Positive covid 8/28/21- unvaccinated. Cm transition of care: ICU/vent (8/28/21), sedation, isolation/proning/picc line. Fio2 at 40%, peep 8. Pt (has Cerebral palsy) lives at home with both parents in two story home, has rale system, wc bound and all supplies he needs at this time. Pt has private duty nursing care. Father will provide a ride with a special van at discharge. Watch for oxygen needs. Palliative care on consult. Watch for any HHC needs. Both ltach's to follow- for potential discharge direction of care planning needs. CM/SS following for any discharge needs.  Electronically signed by ROSANGELA Ernst on 9/9/2021 at 10:11 AM

## 2021-09-09 NOTE — PROGRESS NOTES
[address] [place], [place] [address]   Progress Note    Admitting Date and Time: 8/28/2021  3:31 PM  Admit Dx: Hypokalemia [E87.6]  Acute respiratory failure with hypoxia ([facility] [J96.01]  Pneumonia of right lower lobe due to infectious organism [J18.9]  Sepsis with acute hypoxic respiratory failure without septic shock, due to unspecified organism ([facility] [A41.9, R65.20, J96.01]    Subjective:    Patient is currently intubated and sedated so unable to conduct ROS. Per RN: Attempting to wean FiO2. On fentanyl, propofol and versed currently.      insulin lispro  0-18 Units SubCUTAneous Q4H    anidulafungin  100 mg IntraVENous Q24H    lidocaine PF  5 mL IntraDERmal Once    sodium chloride flush  5-40 mL IntraVENous 2 times per day    heparin flush  3 mL IntraVENous 2 times per day    dexamethasone  6 mg IntraVENous Q12H    polyethylene glycol  17 g Oral Daily    baricitinib  4 mg Oral Daily    sodium chloride flush  5-40 mL IntraVENous 2 times per day    ipratropium-albuterol  1 ampule Inhalation 4x daily    pantoprazole  40 mg IntraVENous Daily    And    sodium chloride (PF)  10 mL IntraVENous Daily    lacosamide  150 mg Oral BID    lamoTRIgine  200 mg Oral TID    zonisamide  300 mg Oral Nightly    baclofen  10 mg Oral TID    enoxaparin  40 mg SubCUTAneous BID    polyvinyl alcohol  1 drop Both Eyes Q6H     sodium chloride flush, 5-40 mL, PRN  sodium chloride, 25 mL, PRN  heparin flush, 3 mL, PRN  bisacodyl, 10 mg, Daily PRN  diatrizoate meglumine-sodium, 50 mL, ONCE PRN  glucose, 15 g, PRN  dextrose, 12.5 g, PRN  glucagon (rDNA), 1 mg, PRN  dextrose, 100 mL/hr, PRN  sodium chloride flush, 10 mL, PRN  sodium chloride, 25 mL, PRN  ondansetron, 4 mg, Q8H PRN   Or  ondansetron, 4 mg, Q6H PRN  polyethylene glycol, 17 g, Daily PRN  acetaminophen, 650 mg, Q6H PRN   Or  acetaminophen, 650 mg, Q6H PRN  perflutren lipid microspheres, 1.5 mL, ONCE PRN  fentanNYL, 25 mcg, Q1H PRN  sodium chloride, 30 mL, PRN         Objective:    /73   Pulse 104   Temp 97.6 °F (36.4 °C)   Resp 23   Ht 5' (1.524 m)   Wt 153 lb (69.4 kg)   SpO2 98%   BMI 29.88 kg/m²     Due to the current efforts to prevent transmission of COVID-19 and also the need to preserve PPE for other caregivers, a face-to-face encounter with the patient was not performed. That being said, all relevant records and diagnostic tests were reviewed, including laboratory results and imaging. Please reference any relevant documentation elsewhere. Care will be coordinated with other specialties as appropriate. Recent Labs     09/07/21 0441 09/08/21  0504 09/09/21  0520    141 144   K 3.9 3.7 4.2    105 107   CO2 32* 30* 31*   BUN 8 7 8   CREATININE 0.4* 0.4* 0.4*   GLUCOSE 132* 106* 127*   CALCIUM 8.1* 8.5* 8.6       Recent Labs     09/07/21 0441 09/08/21  0504 09/09/21  0520   ALKPHOS 194* 199* 191*   PROT 5.4* 5.8* 6.2*   LABALBU 2.4* 2.8* 2.9*   BILITOT 0.5 0.5 0.6   AST 32 35 34   ALT 20 22 20       Recent Labs     09/07/21 0441 09/08/21  0504 09/09/21  0520   WBC 10.3 10.8 8.9   RBC 3.16* 3.22* 3.26*   HGB 10.3* 10.5* 10.5*   HCT 31.8* 32.4* 33.4*   .6* 100.6* 102.5*   MCH 32.6 32.6 32.2   MCHC 32.4 32.4 31.4*   RDW 12.6 12.4 12.6    381 404   MPV 10.1 10.4 10.0                 Radiology:   XR CHEST PORTABLE   Final Result   Interval placement of right-sided PICC line. Bilateral parenchymal infiltrates are similar to slightly worsened. Continued follow-up recommended. XR CHEST PORTABLE   Final Result   1. Lines okay. 2. Mild-moderate improved left lung aeration, overall decreased lung volumes. XR CHEST PORTABLE   Final Result   1. Slight advancement of the endotracheal tube which now resides in the   midthoracic trachea. 2.  Stable appearance of the chest.  Stable position of the enteric tube. XR CHEST PORTABLE   Final Result   1.   Interval slight advancement of the endotracheal tube which now resides in   the proximal thoracic trachea. 2.  Persistent ill-defined opacities in the right greater than left lungs. No change from prior exam.  No obvious pneumothorax. 3.  Enteric tube. Contrast material identified within the stomach lumen. XR CHEST PORTABLE   Final Result   1. Persistent ill-defined opacities in the bilateral lungs slightly worse in   the left mid to upper lung. Probable trace pleural effusions. 2.  Slight retraction of the endotracheal tube which projects over the   cervical region. Suggest repositioning. 3.  There appears to be a satisfactory position of the enteric tube. The findings were sent to the Radiology Results [address] at 7:12   pm on 9/3/2021to be communicated to a licensed caregiver. XR ABDOMEN FOR NG/OG/NE TUBE PLACEMENT   Final Result   1. Satisfactory position of gastric feeding tube as evidenced by stomach   contour outlined following injection of oral contrast.      2.  Satisfactory position of enteric tube. 3.  Significant reflux of oral contrast material into the esophagus. XR CHEST PORTABLE   Final Result   Significant bilateral infiltrates with partial interval clearing of the left   lower lobe infiltrate since the prior study         XR CHEST PORTABLE   Final Result   Satisfactory position of the ETT and NGT. Otherwise, stable chest.         XR CHEST PORTABLE   Final Result   Increasing right lung opacities consistent with pneumonia. US DUP LOWER EXTREMITIES BILATERAL VENOUS   Final Result   No evidence of DVT in either lower extremity. XR CHEST PORTABLE   Final Result   1. Right lower lobe pneumonia. 2. Left lower lobe atelectasis versus pneumonia.              Assessment:  Principal Problem:    Acute respiratory failure with hypoxia (HCC)  Active Problems:    Cerebral palsy (HCC)    Seizures ([address]    Mentally disabled    Convulsions ([address]    Pneumonia due to COVID-19 virus  Resolved Problems:    * No resolved hospital problems. *      Plan:  1. Acute respiratory failure with hypoxemia  -Patient currently intubated and sedated (fentanyl and propofol and ketamine)   -FiO2 40%, 8 PEEP, 20 RR and   -Most recent AB.440/43.7/68.2/29. O2 saturation 90%   -Planning to wean FiO2 as tolerated and spontaneous breathing trial tomorrow    2. COVID PNA  -Patient unvaccinated, was coughing, congested and had fevers prior to presentation  -Patient currently intubated and sedated   -ID is on board  -Patient switched back to dexamethasone   -Patient completed 5 days of remdesivir and is on duonebs. Was also started on baricitinib   -CXR today showed slight improvement in LLL infiltrate  -WBC 8.9, improved. Afebrile over 48hrs    3. PNA, possibly aspiration  -Sputum Cx growing Klebsiella sensitive to cefepime, currently on cefepime 2gm Q12H  -Repeat sputum Cx growing yeast, not candida - was started on eraxis  -Did receive 3 days of azithromycin as well, however was DCd  -Strep and legionella negative. Initial BCx negative and repeat also negative so far  -CXR slight improvement in LLL infiltrate  -WBC 8.9 today, afebrile over 48hrs  -Will follow up final sputum Cx and monitor labs and VS    4. Lactic acidosis (resolved)  -LA peaked at 11.8 on , has improved to 1.1 most recently  -Was getting bicarb infusion for acidosis, now off all IVF  -Will monitor BMP and lactic acid levels     5. Electrolyte abnormalities  -Potassium stable 4.2 today   -Mag and phos also WNL today  -Will continue to monitor    6. Seizure disorder 2/2 cerebral palsy  -Patient has known CP, is nonverbal at baseline with severe developmental delay  -Has seizures, takes baclofen, vimpat, lamictal and zonisamide at home which are continued here    7.  Elevated blood glucose  -Patient has had elevated glucose levels up to 379, this   -No known diagnosis of DM and patient is currently on steroids which will increase glucose  -A1C should be checked to determine if patient is diabetic or just elevated BS 2/2 steroids and current illness   -Currently on NPO ISS with hypoglycemic protocol  -Getting trickled tube feeds per dietary recommendations and increase rate when supine    8. Septic shock (resolved)  -Patient hypotensive initially, required levophed but was able to be weaned off  -Off all IVF and pressors now  -/74 today (was 75/39 at the lowest on 8/29)  -WBC 8.9, lactic acid normalized  -So far BCx neg, resp cx growing klebsiella and yeast, UCx neg and tip cx pending      NOTE: This report was transcribed using voice recognition software. Every effort was made to ensure accuracy; however, inadvertent computerized transcription errors may be present.      Electronically signed by Ag Mckenzie MD on 9/9/2021 at 4:51 PM

## 2021-09-09 NOTE — PROGRESS NOTES
[facility]  [facility]  Progress Note    NAME: Sade Harrell  MR:  [id number]  :   1995  DATE OF SERVICE:21    This is a face to face encounter with Sade Harrell 30 y.o. male on 21    CHIEF COMPLAINT     ID following for   Chief Complaint   Patient presents with    Altered Mental Status    Respiratory Distress     41% SpO2     HISTORY OF PRESENT ILLNESS   Pt seen   21     On vent ac40% peep8  proned   afebrile    Patient is tolerating medications. No reported adverse drug reactions. REVIEW OF SYSTEMS     As stated above in the chief complaint, otherwise negative.   CURRENT MEDICATIONS      insulin lispro  0-18 Units SubCUTAneous Q4H    anidulafungin  100 mg IntraVENous Q24H    lidocaine PF  5 mL IntraDERmal Once    sodium chloride flush  5-40 mL IntraVENous 2 times per day    heparin flush  3 mL IntraVENous 2 times per day    dexamethasone  6 mg IntraVENous Q12H    polyethylene glycol  17 g Oral Daily    baricitinib  4 mg Oral Daily    cefepime  2,000 mg IntraVENous Q12H    sodium chloride flush  5-40 mL IntraVENous 2 times per day    ipratropium-albuterol  1 ampule Inhalation 4x daily    pantoprazole  40 mg IntraVENous Daily    And    sodium chloride (PF)  10 mL IntraVENous Daily    lacosamide  150 mg Oral BID    lamoTRIgine  200 mg Oral TID    zonisamide  300 mg Oral Nightly    baclofen  10 mg Oral TID    enoxaparin  40 mg SubCUTAneous BID    polyvinyl alcohol  1 drop Both Eyes Q6H     Continuous Infusions:   propofol 50 mcg/kg/min (21 6672)    sodium chloride      midazolam 7 mg/hr (21 012)    dextrose      sodium chloride Stopped (21)    sodium chloride      fentaNYL 5 mcg/ml in 0.9%  ml infusion 200 mcg/hr (21 012)     PRN Meds:sodium chloride flush, sodium chloride, heparin flush, bisacodyl, diatrizoate meglumine-sodium, glucose, dextrose, glucagon (rDNA), dextrose, sodium chloride flush, sodium chloride, ondansetron **OR** ondansetron, polyethylene glycol, acetaminophen **OR** acetaminophen, perflutren lipid microspheres, fentanNYL, sodium chloride    PHYSICAL EXAM     BP (!) 127/91   Pulse 110   Temp 97.2 °F (36.2 °C) (Rectal)   Resp 19   Ht 5' (1.524 m)   Wt 153 lb (69.4 kg)   SpO2 97%   BMI 29.88 kg/m²   Temp  Av.2 °F (36.8 °C)  Min: 97.2 °F (36.2 °C)  Max: 99 °F (37.2 °C)  Constitutional:  The patient is sedated  Vent  supined  At/nc  Tachy s1/s2  RSdec bs post no wheeze  abd soft nt peg  Ext edema  Fem bruising     Titus      Line rue picc       DIAGNOSTIC RESULTS   Radiology:    Recent Labs     21  0504 21  0520   WBC 10.3 10.8 8.9   RBC 3.16* 3.22* 3.26*   HGB 10.3* 10.5* 10.5*   HCT 31.8* 32.4* 33.4*   .6* 100.6* 102.5*   MCH 32.6 32.6 32.2   MCHC 32.4 32.4 31.4*   RDW 12.6 12.4 12.6    381 404   MPV 10.1 10.4 10.0     Recent Labs     21  0504 21  0520    141 144   K 3.9 3.7 4.2    105 107   CO2 32* 30* 31*   BUN 8 7 8   CREATININE 0.4* 0.4* 0.4*   GLUCOSE 132* 106* 127*   PROT 5.4* 5.8* 6.2*   LABALBU 2.4* 2.8* 2.9*   CALCIUM 8.1* 8.5* 8.6   BILITOT 0.5 0.5 0.6   ALKPHOS 194* 199* 191*   AST 32 35 34   ALT 20 22 20     Lab Results   Component Value Date    CRP 0.3 2021    CRP 8.4 (H) 2021     Lab Results   Component Value Date    SEDRATE 5 2019    SEDRATE 29 (H) 2018    SEDRATE 21 (H) 08/10/2018     Recent Labs     21  0441 21  0504 21  0520   AST 32 35 34   ALT 20 22 20   TRIG  --  306*  --         Microbiology:   No results for input(s): COVID19 in the last 72 hours.   Lab Results   Component Value Date    BC 24 Hours no growth 2021    BC 5 Days no growth 2021    BLOODCULT2 24 Hours no growth 2021    BLOODCULT2 5 Days no growth 2021    ORG Yeast, not C. albicans 2021    ORG Klebsiella pneumoniae ssp pneumoniae 08/29/2021    ORG Coagulase Negative Staphylococci 07/24/2018    ORG Corynebacterium species 07/24/2018     WOUND/ABSCESS   Date Value Ref Range Status   07/24/2018 Light growth  Final   07/24/2018 Light growth  Mixed morphologies    Final   06/29/2018   Corrected    Light growth  Too fastidious for susceptibility testing.     06/29/2018 Light growth  Corrected   06/29/2018 Moderate growth  Corrected     Smear, Respiratory   Date Value Ref Range Status   09/04/2021   Final    Moderate Polymorphonuclear leukocytes  Epithelial cells not seen  No organisms seen       No results found for: MPNEUMO, CLAMYDCU, LABLEGI, AFBCX, FUNGSM, LABFUNG    MRSA Culture Only   Date Value Ref Range Status   08/29/2021 Methicillin resistant Staph aureus not isolated  Final     CULTURE, RESPIRATORY   Date Value Ref Range Status   09/04/2021 Oral Pharyngeal Adina reduced (A)  Final   09/04/2021 Light growth  Final   08/29/2021 Oral Pharyngeal Adina reduced (A)  Final   08/29/2021 Moderate growth  Final        FINAL IMPRESSION    Pt had   Chief Complaint   Patient presents with    Altered Mental Status    Respiratory Distress     41% SpO2    Admitted for Hypokalemia [E87.6]  Acute respiratory failure with hypoxia (HCC) [J96.01]  Pneumonia of right lower lobe due to infectious organism [J18.9]  Sepsis with acute hypoxic respiratory failure without septic shock, due to unspecified organism ([facility] [A41.9, R65.20, J96.01]  On treatment for above  And covid + 8/28  S/p  leukopenia/leukocytosis  Klebisella pneumonia check final cx  Mrsa neg   Yeast, not C.  Albicans colonized resp cx  Cerebral palsy        baricitinib (OLUMIANT) tablet 4 mg, Daily day 9/14  anidulafungin (ERAXIS) 100 mg in dextrose 5 % 130 mL IVPB, Q24H day 3   dexamethasone (PF) (DECADRON) injection 6 mg, Q12H  cefepime (MAXIPIME) 2000 mg IVPB minibag, Q12H can prob stop  enoxaparin (LOVENOX) injection 40 mg, BID    Check fungitell    · Monitor labs    Imaging and labs were reviewed per medical records. .  Thank you for involving me in the care of Angelica Renteria will continue to follow. Please do not hesitate to call for any questions or concerns.     Electronically signed by Juan M Patel MD on 9/9/2021 at 8:23 AM

## 2021-09-09 NOTE — PROGRESS NOTES
Palliative Care Department  [phone]  Palliative Care Progress Note  Provider Nata Ang DO      PATIENT: Michelle Cole  : 1995  MRN: [id number]  ADMISSION DATE: 2021  3:31 PM  Referring Provider: Nikolas Kinney MD    Palliative Medicine was consulted on 2021 for assistance with Goals of care    HPI:     Mulugeta Griggs is a 30 y.o. 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 ASSESSMENT/PLAN:     Pertinent Hospital Diagnoses      COVID-19 pneumonia   Severe ARDS   Acute hypoxic respiratory failure requiring intubation   Aspiration pneumonia vs pneumonitis   Metabolic acidosis   Septic shock   Cerebral palsy   Seizure history   Hypokalemia   Hypomagnesemia   UTI    Palliative Care Encounter / Counseling Regarding Goals of Care  Please see detailed goals of care discussion as below   At this time, Michelle Cole, Does Not have capacity for medical decision-making. Capacity is time limited and situation/question specific   Outcome of goals of care meeting: Spoke with the father. He does not want to change his CODE STATUS at this time. He would like for him to return back home.  Code status Full Code   Advanced Directives: no POA or living will in [facility]   Surrogate/Legal NOK:    o Analilia Ortega, Father, [phone]  Marine Rabago, Mother, [phone]    Spiritual assessment: no spiritual distress identified  Bereavement and grief: to be determined  Referrals to: none today     SUBJECTIVE:     Details of Conversation:   Chart reviewed. Patient seen through glass window. Spoke with bedside RN. Plan for SBT tomorrow. No acute PM needs today.     Prognosis: Guarded    OBJECTIVE:     /88   Pulse 108   Temp 97.6 °F (36.4 °C)   Resp 25   Ht 5' (1.524 m)   Wt 153 lb (69.4 kg)   SpO2 97%   BMI 29.88 kg/m²     Physical Examination:  Due to the current efforts to prevent transmission of COVID-19 and also the need to preserve PPE for other caregivers, a face-to-face encounter with the patient was not performed. That being said, all relevant records and diagnostic tests were reviewed, including laboratory results and imaging. Please reference any relevant documentation elsewhere. Care will be coordinated with the primary service and other specialties as appropriate. Objective data reviewed: labs, images, records, medication use, vitals and chart    Time/Communication  Greater than 50% of time spent, total 15 minutes in counseling and coordination of care at the bedside regarding goals of care. Thank you for allowing Palliative Medicine to participate in the care of [address] [place] [address].

## 2021-09-09 NOTE — PLAN OF CARE
Problem: Airway Clearance - Ineffective  Goal: Achieve or maintain patent airway  Outcome: Met This Shift     Problem: Gas Exchange - Impaired  Goal: Absence of hypoxia  Outcome: Met This Shift  Goal: Promote optimal lung function  Outcome: Met This Shift     Problem: Breathing Pattern - Ineffective  Goal: Ability to achieve and maintain a regular respiratory rate  Outcome: Met This Shift     Problem: Isolation Precautions - Risk of Spread of Infection  Goal: Prevent transmission of infection  Outcome: Met This Shift     Problem: Nutrition Deficits  Goal: Optimize nutritional status  Outcome: Met This Shift

## 2021-09-10 LAB
ALBUMIN SERPL-MCNC: 2.4 G/DL (ref 3.5–5.2)
ALP BLD-CCNC: 157 U/L (ref 40–129)
ALT SERPL-CCNC: 16 U/L (ref 0–40)
ANION GAP SERPL CALCULATED.3IONS-SCNC: 6 MMOL/L (ref 7–16)
AST SERPL-CCNC: 21 U/L (ref 0–39)
B.E.: 4.3 MMOL/L (ref -3–3)
BASOPHILS ABSOLUTE: 0.01 E9/L (ref 0–0.2)
BASOPHILS RELATIVE PERCENT: 0.1 % (ref 0–2)
BILIRUB SERPL-MCNC: 0.4 MG/DL (ref 0–1.2)
BUN BLDV-MCNC: 7 MG/DL (ref 6–20)
CALCIUM SERPL-MCNC: 7.8 MG/DL (ref 8.6–10.2)
CHLORIDE BLD-SCNC: 100 MMOL/L (ref 98–107)
CO2: 29 MMOL/L (ref 22–29)
COHB: 0.1 % (ref 0–1.5)
CREAT SERPL-MCNC: 0.3 MG/DL (ref 0.7–1.2)
CRITICAL: ABNORMAL
DATE ANALYZED: ABNORMAL
DATE OF COLLECTION: ABNORMAL
EOSINOPHILS ABSOLUTE: 0 E9/L (ref 0.05–0.5)
EOSINOPHILS RELATIVE PERCENT: 0 % (ref 0–6)
FIO2: 40 %
GFR AFRICAN AMERICAN: >60
GFR NON-AFRICAN AMERICAN: >60 ML/MIN/1.73
GLUCOSE BLD-MCNC: 104 MG/DL (ref 74–99)
HCO3: 28.3 MMOL/L (ref 22–26)
HCT VFR BLD CALC: 27.2 % (ref 37–54)
HEMOGLOBIN: 9 G/DL (ref 12.5–16.5)
HHB: 4.6 % (ref 0–5)
IMMATURE GRANULOCYTES #: 0.26 E9/L
IMMATURE GRANULOCYTES %: 3.4 % (ref 0–5)
LAB: ABNORMAL
LYMPHOCYTES ABSOLUTE: 1.22 E9/L (ref 1.5–4)
LYMPHOCYTES RELATIVE PERCENT: 15.7 % (ref 20–42)
Lab: ABNORMAL
MAGNESIUM: 2 MG/DL (ref 1.6–2.6)
MCH RBC QN AUTO: 33.2 PG (ref 26–35)
MCHC RBC AUTO-ENTMCNC: 33.1 % (ref 32–34.5)
MCV RBC AUTO: 100.4 FL (ref 80–99.9)
METER GLUCOSE: 106 MG/DL (ref 74–99)
METER GLUCOSE: 108 MG/DL (ref 74–99)
METER GLUCOSE: 122 MG/DL (ref 74–99)
METER GLUCOSE: 148 MG/DL (ref 74–99)
METER GLUCOSE: 75 MG/DL (ref 74–99)
METHB: 0.4 % (ref 0–1.5)
MODE: AC
MONOCYTES ABSOLUTE: 0.86 E9/L (ref 0.1–0.95)
MONOCYTES RELATIVE PERCENT: 11.1 % (ref 2–12)
NEUTROPHILS ABSOLUTE: 5.4 E9/L (ref 1.8–7.3)
NEUTROPHILS RELATIVE PERCENT: 69.7 % (ref 43–80)
O2 CONTENT: 14.1 ML/DL
O2 SATURATION: 95.4 % (ref 92–98.5)
O2HB: 94.9 % (ref 94–97)
OPERATOR ID: 514
PATIENT TEMP: 37 C
PCO2: 40.1 MMHG (ref 35–45)
PDW BLD-RTO: 12.6 FL (ref 11.5–15)
PEEP/CPAP: 5 CMH2O
PFO2: 1.9 MMHG/%
PH BLOOD GAS: 7.47 (ref 7.35–7.45)
PHOSPHORUS: 3.1 MG/DL (ref 2.5–4.5)
PLATELET # BLD: 447 E9/L (ref 130–450)
PMV BLD AUTO: 10.2 FL (ref 7–12)
PO2: 76.2 MMHG (ref 75–100)
POTASSIUM SERPL-SCNC: 3.4 MMOL/L (ref 3.5–5)
RBC # BLD: 2.71 E12/L (ref 3.8–5.8)
RI(T): 2.01
RR MECHANICAL: 20 B/MIN
SODIUM BLD-SCNC: 135 MMOL/L (ref 132–146)
SOURCE, BLOOD GAS: ABNORMAL
THB: 10.5 G/DL (ref 11.5–16.5)
TIME ANALYZED: 512
TOTAL PROTEIN: 5.3 G/DL (ref 6.4–8.3)
TRIGL SERPL-MCNC: 701 MG/DL (ref 0–149)
VT MECHANICAL: 325 ML
WBC # BLD: 7.8 E9/L (ref 4.5–11.5)

## 2021-09-10 PROCEDURE — 2580000003 HC RX 258: Performed by: INTERNAL MEDICINE

## 2021-09-10 PROCEDURE — 6360000002 HC RX W HCPCS

## 2021-09-10 PROCEDURE — 2500000003 HC RX 250 WO HCPCS: Performed by: INTERNAL MEDICINE

## 2021-09-10 PROCEDURE — 85025 COMPLETE CBC W/AUTO DIFF WBC: CPT

## 2021-09-10 PROCEDURE — 80053 COMPREHEN METABOLIC PANEL: CPT

## 2021-09-10 PROCEDURE — 94003 VENT MGMT INPAT SUBQ DAY: CPT

## 2021-09-10 PROCEDURE — 99232 SBSQ HOSP IP/OBS MODERATE 35: CPT | Performed by: STUDENT IN AN ORGANIZED HEALTH CARE EDUCATION/TRAINING PROGRAM

## 2021-09-10 PROCEDURE — C9113 INJ PANTOPRAZOLE SODIUM, VIA: HCPCS

## 2021-09-10 PROCEDURE — 84100 ASSAY OF PHOSPHORUS: CPT

## 2021-09-10 PROCEDURE — 84478 ASSAY OF TRIGLYCERIDES: CPT

## 2021-09-10 PROCEDURE — 2580000003 HC RX 258

## 2021-09-10 PROCEDURE — 6370000000 HC RX 637 (ALT 250 FOR IP): Performed by: INTERNAL MEDICINE

## 2021-09-10 PROCEDURE — 2000000000 HC ICU R&B

## 2021-09-10 PROCEDURE — 6370000000 HC RX 637 (ALT 250 FOR IP)

## 2021-09-10 PROCEDURE — 82805 BLOOD GASES W/O2 SATURATION: CPT

## 2021-09-10 PROCEDURE — 2580000003 HC RX 258: Performed by: EMERGENCY MEDICINE

## 2021-09-10 PROCEDURE — 82962 GLUCOSE BLOOD TEST: CPT

## 2021-09-10 PROCEDURE — 2700000000 HC OXYGEN THERAPY PER DAY

## 2021-09-10 PROCEDURE — 6360000002 HC RX W HCPCS: Performed by: INTERNAL MEDICINE

## 2021-09-10 PROCEDURE — 2580000003 HC RX 258: Performed by: SPECIALIST

## 2021-09-10 PROCEDURE — 94640 AIRWAY INHALATION TREATMENT: CPT

## 2021-09-10 PROCEDURE — 83735 ASSAY OF MAGNESIUM: CPT

## 2021-09-10 PROCEDURE — 6360000002 HC RX W HCPCS: Performed by: SPECIALIST

## 2021-09-10 PROCEDURE — 36592 COLLECT BLOOD FROM PICC: CPT

## 2021-09-10 PROCEDURE — 6360000002 HC RX W HCPCS: Performed by: EMERGENCY MEDICINE

## 2021-09-10 PROCEDURE — 36600 WITHDRAWAL OF ARTERIAL BLOOD: CPT

## 2021-09-10 RX ORDER — LEVETIRACETAM 10 MG/ML
1000 INJECTION INTRAVASCULAR EVERY 12 HOURS
Status: DISCONTINUED | OUTPATIENT
Start: 2021-09-10 | End: 2021-09-13

## 2021-09-10 RX ORDER — DEXMEDETOMIDINE HYDROCHLORIDE 4 UG/ML
.2-1.4 INJECTION, SOLUTION INTRAVENOUS CONTINUOUS
Status: DISCONTINUED | OUTPATIENT
Start: 2021-09-10 | End: 2021-09-13

## 2021-09-10 RX ORDER — LORAZEPAM 2 MG/ML
INJECTION INTRAMUSCULAR
Status: COMPLETED
Start: 2021-09-10 | End: 2021-09-10

## 2021-09-10 RX ORDER — LORAZEPAM 2 MG/ML
2 INJECTION INTRAMUSCULAR ONCE
Status: COMPLETED | OUTPATIENT
Start: 2021-09-10 | End: 2021-09-10

## 2021-09-10 RX ORDER — METOCLOPRAMIDE HYDROCHLORIDE 5 MG/ML
INJECTION INTRAMUSCULAR; INTRAVENOUS
Status: DISPENSED
Start: 2021-09-10 | End: 2021-09-10

## 2021-09-10 RX ORDER — METOCLOPRAMIDE HYDROCHLORIDE 5 MG/ML
5 INJECTION INTRAMUSCULAR; INTRAVENOUS ONCE
Status: COMPLETED | OUTPATIENT
Start: 2021-09-10 | End: 2021-09-10

## 2021-09-10 RX ADMIN — METOCLOPRAMIDE HYDROCHLORIDE 5 MG: 5 INJECTION INTRAMUSCULAR; INTRAVENOUS at 11:40

## 2021-09-10 RX ADMIN — LORAZEPAM 2 MG: 2 INJECTION INTRAMUSCULAR; INTRAVENOUS at 20:51

## 2021-09-10 RX ADMIN — POTASSIUM BICARBONATE 40 MEQ: 782 TABLET, EFFERVESCENT ORAL at 10:45

## 2021-09-10 RX ADMIN — DEXAMETHASONE SODIUM PHOSPHATE 6 MG: 10 INJECTION, SOLUTION INTRAMUSCULAR; INTRAVENOUS at 21:05

## 2021-09-10 RX ADMIN — LAMOTRIGINE 200 MG: 100 TABLET ORAL at 20:57

## 2021-09-10 RX ADMIN — Medication 0.6 MCG/KG/HR: at 23:04

## 2021-09-10 RX ADMIN — PROPOFOL INJECTABLE EMULSION 50 MCG/KG/MIN: 10 INJECTION, EMULSION INTRAVENOUS at 02:58

## 2021-09-10 RX ADMIN — LAMOTRIGINE 200 MG: 100 TABLET ORAL at 09:30

## 2021-09-10 RX ADMIN — POLYETHYLENE GLYCOL 3350 17 G: 17 POWDER, FOR SOLUTION ORAL at 09:31

## 2021-09-10 RX ADMIN — LEVETIRACETAM 1000 MG: 1000 INJECTION, SOLUTION INTRAVENOUS at 20:58

## 2021-09-10 RX ADMIN — SODIUM CHLORIDE, PRESERVATIVE FREE 10 ML: 5 INJECTION INTRAVENOUS at 09:33

## 2021-09-10 RX ADMIN — ENOXAPARIN SODIUM 40 MG: 40 INJECTION SUBCUTANEOUS at 21:00

## 2021-09-10 RX ADMIN — PANTOPRAZOLE SODIUM 40 MG: 40 INJECTION, POWDER, FOR SOLUTION INTRAVENOUS at 09:30

## 2021-09-10 RX ADMIN — LACOSAMIDE 150 MG: 100 TABLET, FILM COATED ORAL at 20:56

## 2021-09-10 RX ADMIN — Medication 10 ML: at 09:33

## 2021-09-10 RX ADMIN — MIDAZOLAM HYDROCHLORIDE 7 MG/HR: 5 INJECTION, SOLUTION INTRAMUSCULAR; INTRAVENOUS at 03:05

## 2021-09-10 RX ADMIN — ZONISAMIDE 300 MG: 100 CAPSULE ORAL at 20:58

## 2021-09-10 RX ADMIN — HEPARIN 300 UNITS: 100 SYRINGE at 21:00

## 2021-09-10 RX ADMIN — LACOSAMIDE 150 MG: 100 TABLET, FILM COATED ORAL at 09:30

## 2021-09-10 RX ADMIN — BARICITINIB 4 MG: 2 TABLET, FILM COATED ORAL at 09:30

## 2021-09-10 RX ADMIN — Medication 10 ML: at 21:05

## 2021-09-10 RX ADMIN — LORAZEPAM 2 MG: 2 INJECTION INTRAMUSCULAR at 20:51

## 2021-09-10 RX ADMIN — ACETAMINOPHEN 650 MG: 325 TABLET ORAL at 20:55

## 2021-09-10 RX ADMIN — Medication 0.2 MCG/KG/HR: at 11:23

## 2021-09-10 RX ADMIN — BACLOFEN 10 MG: 10 TABLET ORAL at 20:56

## 2021-09-10 RX ADMIN — DEXAMETHASONE SODIUM PHOSPHATE 6 MG: 10 INJECTION, SOLUTION INTRAMUSCULAR; INTRAVENOUS at 09:32

## 2021-09-10 RX ADMIN — IPRATROPIUM BROMIDE AND ALBUTEROL SULFATE 1 AMPULE: .5; 3 SOLUTION RESPIRATORY (INHALATION) at 06:56

## 2021-09-10 RX ADMIN — LAMOTRIGINE 200 MG: 100 TABLET ORAL at 14:30

## 2021-09-10 RX ADMIN — ENOXAPARIN SODIUM 40 MG: 40 INJECTION SUBCUTANEOUS at 09:30

## 2021-09-10 RX ADMIN — Medication 200 MCG/HR: at 05:25

## 2021-09-10 RX ADMIN — DEXTROSE MONOHYDRATE 100 MG: 50 INJECTION, SOLUTION INTRAVENOUS at 10:31

## 2021-09-10 RX ADMIN — BACLOFEN 10 MG: 10 TABLET ORAL at 09:31

## 2021-09-10 RX ADMIN — IPRATROPIUM BROMIDE AND ALBUTEROL SULFATE 1 AMPULE: .5; 3 SOLUTION RESPIRATORY (INHALATION) at 09:35

## 2021-09-10 RX ADMIN — POLYVINYL ALCOHOL 1 DROP: 14 SOLUTION/ DROPS OPHTHALMIC at 23:05

## 2021-09-10 RX ADMIN — INSULIN LISPRO 3 UNITS: 100 INJECTION, SOLUTION INTRAVENOUS; SUBCUTANEOUS at 02:47

## 2021-09-10 RX ADMIN — BACLOFEN 10 MG: 10 TABLET ORAL at 14:46

## 2021-09-10 ASSESSMENT — PULMONARY FUNCTION TESTS
PIF_VALUE: 11
PIF_VALUE: 29
PIF_VALUE: 33
PIF_VALUE: 30
PIF_VALUE: 38
PIF_VALUE: 28
PIF_VALUE: 27
PIF_VALUE: 28
PIF_VALUE: 32
PIF_VALUE: 34
PIF_VALUE: 28
PIF_VALUE: 28
PIF_VALUE: 29
PIF_VALUE: 28

## 2021-09-10 ASSESSMENT — ENCOUNTER SYMPTOMS: TACHYPNEA: 1

## 2021-09-10 ASSESSMENT — PAIN SCALES - GENERAL
PAINLEVEL_OUTOF10: 0

## 2021-09-10 NOTE — PLAN OF CARE
Problem: Airway Clearance - Ineffective  Goal: Achieve or maintain patent airway  Outcome: Met This Shift     Problem: Gas Exchange - Impaired  Goal: Absence of hypoxia  Outcome: Met This Shift     Problem: Breathing Pattern - Ineffective  Goal: Ability to achieve and maintain a regular respiratory rate  Outcome: Met This Shift     Problem: Falls - Risk of:  Goal: Will remain free from falls  Description: Will remain free from falls  Outcome: Met This Shift  Goal: Absence of physical injury  Description: Absence of physical injury  Outcome: Met This Shift

## 2021-09-10 NOTE — PROGRESS NOTES
chloride      midazolam Stopped (09/10/21 1020)    dextrose      sodium chloride Stopped (21 1813)    sodium chloride      fentaNYL 5 mcg/ml in 0.9%  ml infusion Stopped (09/10/21 1020)     PRN Meds:sodium chloride flush, sodium chloride, heparin flush, bisacodyl, diatrizoate meglumine-sodium, glucose, dextrose, glucagon (rDNA), dextrose, sodium chloride flush, sodium chloride, ondansetron **OR** ondansetron, polyethylene glycol, acetaminophen **OR** acetaminophen, perflutren lipid microspheres, fentanNYL, sodium chloride    PHYSICAL EXAM     BP (!) 144/106   Pulse 85   Temp 99 °F (37.2 °C) (Rectal)   Resp 20   Ht 5' (1.524 m)   Wt 153 lb (69.4 kg)   SpO2 100%   BMI 29.88 kg/m²   Temp  Av.1 °F (36.7 °C)  Min: 96.4 °F (35.8 °C)  Max: 99.1 °F (37.3 °C)  Constitutional:  The patient is sedated  Vent  supined     Titus      Line [facility]       DIAGNOSTIC RESULTS   Radiology:    Recent Labs     21  0504 21  0520 09/10/21  0507   WBC 10.8 8.9 7.8   RBC 3.22* 3.26* 2.71*   HGB 10.5* 10.5* 9.0*   HCT 32.4* 33.4* 27.2*   .6* 102.5* 100.4*   MCH 32.6 32.2 33.2   MCHC 32.4 31.4* 33.1   RDW 12.4 12.6 12.6    404 447   MPV 10.4 10.0 10.2     Recent Labs     21  0504 21  0520 09/10/21  0507    144 135   K 3.7 4.2 3.4*    107 100   CO2 30* 31* 29   BUN 7 8 7   CREATININE 0.4* 0.4* 0.3*   GLUCOSE 106* 127* 104*   PROT 5.8* 6.2* 5.3*   LABALBU 2.8* 2.9* 2.4*   CALCIUM 8.5* 8.6 7.8*   BILITOT 0.5 0.6 0.4   ALKPHOS 199* 191* 157*   AST 35 34 21   ALT 22 20 16     Lab Results   Component Value Date    CRP 0.3 2021    CRP 8.4 (H) 2021     Lab Results   Component Value Date    SEDRATE 5 2019    SEDRATE 29 (H) 2018    SEDRATE 21 (H) 08/10/2018     Recent Labs     21  0504 21  0520 09/10/21  0507   AST 35 34 21   ALT 22 20 16   TRIG 306*  --  701*        Microbiology:   No results for input(s): COVID19 in the last 72 hours. Lab Results   Component Value Date    BC 5 Days no growth 09/04/2021    BC 5 Days no growth 08/28/2021    BLOODCULT2 24 Hours no growth 09/07/2021    BLOODCULT2 5 Days no growth 08/28/2021    ORG Yeast, not C. albicans 09/04/2021    ORG Klebsiella pneumoniae ssp pneumoniae 08/29/2021    ORG Coagulase Negative Staphylococci 07/24/2018    ORG Corynebacterium species 07/24/2018     WOUND/ABSCESS   Date Value Ref Range Status   07/24/2018 Light growth  Final   07/24/2018 Light growth  Mixed morphologies    Final   06/29/2018   Corrected    Light growth  Too fastidious for susceptibility testing.     06/29/2018 Light growth  Corrected   06/29/2018 Moderate growth  Corrected     Smear, Respiratory   Date Value Ref Range Status   09/04/2021   Final    Moderate Polymorphonuclear leukocytes  Epithelial cells not seen  No organisms seen       No results found for: MPNEUMO, CLAMYDCU, LABLEGI, AFBCX, FUNGSM, LABFUNG    MRSA Culture Only   Date Value Ref Range Status   08/29/2021 Methicillin resistant Staph aureus not isolated  Final     CULTURE, RESPIRATORY   Date Value Ref Range Status   09/04/2021 Oral Pharyngeal Adina reduced (A)  Final   09/04/2021 Light growth  Final   08/29/2021 Oral Pharyngeal Adina reduced (A)  Final   08/29/2021 Moderate growth  Final        FINAL IMPRESSION    Pt had   Chief Complaint   Patient presents with    Altered Mental Status    Respiratory Distress     41% SpO2    Admitted for Hypokalemia [E87.6]  Acute respiratory failure with hypoxia (HCC) [J96.01]  Pneumonia of right lower lobe due to infectious organism [J18.9]  Sepsis with acute hypoxic respiratory failure without septic shock, due to unspecified organism ([facility] [A41.9, R65.20, J96.01]  On treatment for above  And covid + 8/28  S/p  leukopenia/leukocytosis  Klebisella pneumonia check final cx  Mrsa neg   Yeast, not C.  Albicans colonized resp cx  Cerebral palsy     To be extubated     baricitinib (OLUMIANT) tablet 4 mg, Daily day 10/14  anidulafungin (ERAXIS) 100 mg in dextrose 5 % 130 mL IVPB, Q24H day 4  dexamethasone (PF) (DECADRON) injection 6 mg, Q12H  enoxaparin (LOVENOX) injection 40 mg, BID    Check fungitell neg can stop antifungal     · Monitor labs    Imaging and labs were reviewed per medical records. .  Thank you for involving me in the care of Jenifer Peters will continue to follow. Please do not hesitate to call for any questions or concerns.     Electronically signed by Francesca Silva MD on 9/10/2021 at 3:53 PM

## 2021-09-10 NOTE — PROGRESS NOTES
Pulmonary/Critical Care Progress Note    We are following patient for COVID-19 pneumonitis, acute hypoxemic respiratory failure, cerebral palsy, possible aspiration pneumonitis, history of seizures, diabetes mellitus    SUBJECTIVE:  The patient continues to do well on an FiO2 of 0.4 and PEEP of 5. We will attempt to remove sedation and, if this is tolerated, place him on a spontaneous breathing trial with CPAP 5 and pressure support of 5 cmH2O. The patient is tolerating IV steroids tube feedings antimicrobials without difficulty. It may be necessary to substitute Precedex for the sedation that is going to be removed in order for him to undergo the SBT. This will not suppress his breathing but may help keep the patient just mildly sedated without suppressing his breathing. Heart rate will need to be watched carefully however. The patient continues on Versed neb and IV steroids as well as immune enhancing vitamins and minerals.     MEDICATIONS:   insulin lispro  0-18 Units SubCUTAneous Q4H    anidulafungin  100 mg IntraVENous Q24H    lidocaine PF  5 mL IntraDERmal Once    sodium chloride flush  5-40 mL IntraVENous 2 times per day    heparin flush  3 mL IntraVENous 2 times per day    dexamethasone  6 mg IntraVENous Q12H    polyethylene glycol  17 g Oral Daily    baricitinib  4 mg Oral Daily    sodium chloride flush  5-40 mL IntraVENous 2 times per day    ipratropium-albuterol  1 ampule Inhalation 4x daily    pantoprazole  40 mg IntraVENous Daily    And    sodium chloride (PF)  10 mL IntraVENous Daily    lacosamide  150 mg Oral BID    lamoTRIgine  200 mg Oral TID    zonisamide  300 mg Oral Nightly    baclofen  10 mg Oral TID    enoxaparin  40 mg SubCUTAneous BID    polyvinyl alcohol  1 drop Both Eyes Q6H      propofol 50 mcg/kg/min (09/10/21 0258)    sodium chloride      midazolam 7 mg/hr (09/10/21 0305)    dextrose      sodium chloride Stopped (09/09/21 1813)    sodium chloride      fentaNYL 5 mcg/ml in 0.9%  ml infusion 200 mcg/hr (09/10/21 0525)     sodium chloride flush, sodium chloride, heparin flush, bisacodyl, diatrizoate meglumine-sodium, glucose, dextrose, glucagon (rDNA), dextrose, sodium chloride flush, sodium chloride, ondansetron **OR** ondansetron, polyethylene glycol, acetaminophen **OR** acetaminophen, perflutren lipid microspheres, fentanNYL, sodium chloride      REVIEW OF SYSTEMS:    Patient is currently sedated and intubated and unable to answer questions regarding review of systems  OBJECTIVE:  Vitals:    09/10/21 0800   BP: (!) 120/90   Pulse: 82   Resp: 20   Temp: 96.4 °F (35.8 °C)   SpO2: 100%     FiO2 : 40 %  O2 Flow Rate (L/min): 60 L/min  O2 Device: Ventilator    PHYSICAL EXAM:  Constitutional: No significant fever, chills, diaphoresis  Skin: Skin rash, no skin breakdown  HEENT: Endotracheal tube secured at lips  Neck: No JVD, lymphadenopathy, thyromegaly  Cardiovascular: S1, S2 regular.   No S3 murmurs rubs present  Respiratory: Occasional low pitched wheeze and soft bilateral crackles posteriorly  Gastrointestinal: Soft, flat, nondistended  Genitourinary: No CVA tenderness  Extremities: No clubbing, cyanosis, or edema  Neurological: Sedated  Psychological: Sedated    LABS:  WBC   Date Value Ref Range Status   09/10/2021 7.8 4.5 - 11.5 E9/L Final   09/09/2021 8.9 4.5 - 11.5 E9/L Final   09/08/2021 10.8 4.5 - 11.5 E9/L Final     Hemoglobin   Date Value Ref Range Status   09/10/2021 9.0 (L) 12.5 - 16.5 g/dL Final   09/09/2021 10.5 (L) 12.5 - 16.5 g/dL Final   09/08/2021 10.5 (L) 12.5 - 16.5 g/dL Final     Hematocrit   Date Value Ref Range Status   09/10/2021 27.2 (L) 37.0 - 54.0 % Final   09/09/2021 33.4 (L) 37.0 - 54.0 % Final   09/08/2021 32.4 (L) 37.0 - 54.0 % Final     MCV   Date Value Ref Range Status   09/10/2021 100.4 (H) 80.0 - 99.9 fL Final   09/09/2021 102.5 (H) 80.0 - 99.9 fL Final   09/08/2021 100.6 (H) 80.0 - 99.9 fL Final     Platelets   Date Value Ref Range Status   09/10/2021 447 130 - 450 E9/L Final   09/09/2021 404 130 - 450 E9/L Final   09/08/2021 381 130 - 450 E9/L Final     Sodium   Date Value Ref Range Status   09/10/2021 135 132 - 146 mmol/L Final   09/09/2021 144 132 - 146 mmol/L Final   09/08/2021 141 132 - 146 mmol/L Final     Potassium   Date Value Ref Range Status   09/10/2021 3.4 (L) 3.5 - 5.0 mmol/L Final   09/09/2021 4.2 3.5 - 5.0 mmol/L Final   09/08/2021 3.7 3.5 - 5.0 mmol/L Final     Chloride   Date Value Ref Range Status   09/10/2021 100 98 - 107 mmol/L Final   09/09/2021 107 98 - 107 mmol/L Final   09/08/2021 105 98 - 107 mmol/L Final     CO2   Date Value Ref Range Status   09/10/2021 29 22 - 29 mmol/L Final   09/09/2021 31 (H) 22 - 29 mmol/L Final   09/08/2021 30 (H) 22 - 29 mmol/L Final     BUN   Date Value Ref Range Status   09/10/2021 7 6 - 20 mg/dL Final   09/09/2021 8 6 - 20 mg/dL Final   09/08/2021 7 6 - 20 mg/dL Final     CREATININE   Date Value Ref Range Status   09/10/2021 0.3 (L) 0.7 - 1.2 mg/dL Final   09/09/2021 0.4 (L) 0.7 - 1.2 mg/dL Final   09/08/2021 0.4 (L) 0.7 - 1.2 mg/dL Final     Glucose   Date Value Ref Range Status   09/10/2021 104 (H) 74 - 99 mg/dL Final   09/09/2021 127 (H) 74 - 99 mg/dL Final   09/08/2021 106 (H) 74 - 99 mg/dL Final     Calcium   Date Value Ref Range Status   09/10/2021 7.8 (L) 8.6 - 10.2 mg/dL Final   09/09/2021 8.6 8.6 - 10.2 mg/dL Final   09/08/2021 8.5 (L) 8.6 - 10.2 mg/dL Final     Total Protein   Date Value Ref Range Status   09/10/2021 5.3 (L) 6.4 - 8.3 g/dL Final   09/09/2021 6.2 (L) 6.4 - 8.3 g/dL Final   09/08/2021 5.8 (L) 6.4 - 8.3 g/dL Final     Albumin   Date Value Ref Range Status   09/10/2021 2.4 (L) 3.5 - 5.2 g/dL Final   09/09/2021 2.9 (L) 3.5 - 5.2 g/dL Final   09/08/2021 2.8 (L) 3.5 - 5.2 g/dL Final     Total Bilirubin   Date Value Ref Range Status   09/10/2021 0.4 0.0 - 1.2 mg/dL Final   09/09/2021 0.6 0.0 - 1.2 mg/dL Final   09/08/2021 0.5 0.0 - 1.2 mg/dL Final     Alkaline Phosphatase   Date Value Ref Range Status   09/10/2021 157 (H) 40 - 129 U/L Final   09/09/2021 191 (H) 40 - 129 U/L Final   09/08/2021 199 (H) 40 - 129 U/L Final     AST   Date Value Ref Range Status   09/10/2021 21 0 - 39 U/L Final   09/09/2021 34 0 - 39 U/L Final   09/08/2021 35 0 - 39 U/L Final     ALT   Date Value Ref Range Status   09/10/2021 16 0 - 40 U/L Final   09/09/2021 20 0 - 40 U/L Final   09/08/2021 22 0 - 40 U/L Final     GFR Non-   Date Value Ref Range Status   09/10/2021 >60 >=60 mL/min/1.73 Final     Comment:     Chronic Kidney Disease: less than 60 ml/min/1.73 sq.m. Kidney Failure: less than 15 ml/min/1.73 sq.m. Results valid for patients 18 years and older. 09/09/2021 >60 >=60 mL/min/1.73 Final     Comment:     Chronic Kidney Disease: less than 60 ml/min/1.73 sq.m. Kidney Failure: less than 15 ml/min/1.73 sq.m. Results valid for patients 18 years and older. 09/08/2021 >60 >=60 mL/min/1.73 Final     Comment:     Chronic Kidney Disease: less than 60 ml/min/1.73 sq.m. Kidney Failure: less than 15 ml/min/1.73 sq.m. Results valid for patients 18 years and older. GFR    Date Value Ref Range Status   09/10/2021 >60  Final   09/09/2021 >60  Final   09/08/2021 >60  Final     Magnesium   Date Value Ref Range Status   09/10/2021 2.0 1.6 - 2.6 mg/dL Final   09/09/2021 2.2 1.6 - 2.6 mg/dL Final   09/08/2021 2.1 1.6 - 2.6 mg/dL Final     Phosphorus   Date Value Ref Range Status   09/10/2021 3.1 2.5 - 4.5 mg/dL Final   09/09/2021 4.2 2.5 - 4.5 mg/dL Final   09/08/2021 3.5 2.5 - 4.5 mg/dL Final     Recent Labs     09/10/21  0512   PH 7.467*   PO2 76.2   PCO2 40.1   HCO3 28.3*   BE 4.3*   O2SAT 95.4       RADIOLOGY:  XR CHEST PORTABLE   Final Result   Stable bilateral infiltrates. ET tube tip 2.5 cm from the shantell. XR CHEST PORTABLE   Final Result   Interval placement of right-sided PICC line.       Bilateral parenchymal infiltrates are similar to slightly worsened. Continued follow-up recommended. XR CHEST PORTABLE   Final Result   1. Lines okay. 2. Mild-moderate improved left lung aeration, overall decreased lung volumes. XR CHEST PORTABLE   Final Result   1. Slight advancement of the endotracheal tube which now resides in the   midthoracic trachea. 2.  Stable appearance of the chest.  Stable position of the enteric tube. XR CHEST PORTABLE   Final Result   1. Interval slight advancement of the endotracheal tube which now resides in   the proximal thoracic trachea. 2.  Persistent ill-defined opacities in the right greater than left lungs. No change from prior exam.  No obvious pneumothorax. 3.  Enteric tube. Contrast material identified within the stomach lumen. XR CHEST PORTABLE   Final Result   1. Persistent ill-defined opacities in the bilateral lungs slightly worse in   the left mid to upper lung. Probable trace pleural effusions. 2.  Slight retraction of the endotracheal tube which projects over the   cervical region. Suggest repositioning. 3.  There appears to be a satisfactory position of the enteric tube. The findings were sent to the Radiology Results [address] at 7:12   pm on 9/3/2021to be communicated to a licensed caregiver. XR ABDOMEN FOR NG/OG/NE TUBE PLACEMENT   Final Result   1. Satisfactory position of gastric feeding tube as evidenced by stomach   contour outlined following injection of oral contrast.      2.  Satisfactory position of enteric tube. 3.  Significant reflux of oral contrast material into the esophagus. XR CHEST PORTABLE   Final Result   Significant bilateral infiltrates with partial interval clearing of the left   lower lobe infiltrate since the prior study         XR CHEST PORTABLE   Final Result   Satisfactory position of the ETT and NGT.       Otherwise, stable chest.         XR CHEST PORTABLE Final Result   Increasing right lung opacities consistent with pneumonia. US DUP LOWER EXTREMITIES BILATERAL VENOUS   Final Result   No evidence of DVT in either lower extremity. XR CHEST PORTABLE   Final Result   1. Right lower lobe pneumonia. 2. Left lower lobe atelectasis versus pneumonia. PROBLEM LIST:  Principal Problem:    Acute respiratory failure with hypoxia (HCC)  Active Problems:    Cerebral palsy (HCC)    Seizures ([address]    Mentally disabled    Convulsions ([address]    Pneumonia due to COVID-19 virus  Resolved Problems:    * No resolved hospital problems. *      IMPRESSION:  1. Hypoxemic respiratory failure  2. COVID-19 pneumonitis  3. ARDS, perhaps improving  4. Cerebral palsy  5. Hypokalemia  6. History of seizures  7. Prior septic shock, resolved    PLAN:  1. Continue baricitinib  2. Awakening trial  3. If awakening trial goes well, we will advance to spontaneous breathing trial with the possible use of Precedex  4. Antibiotics per ID service  5. Continue baricitinib for a total of 14 days per protocol  6. Supplementary potassium    ATTESTATION:  ICU Staff Physician note of personal involvement in Care  As the attending physician, I certify that I personally reviewed the patients history and personally examined the patient to confirm the physical findings described above,  And that I reviewed the relevant imaging studies and available reports. I also discussed the differential diagnosis and all of the proposed management plans with the patient and individuals accompanying the patient to this visit. They had the opportunity to ask questions about the proposed management plans and to have those questions answered. This patient has a high probability of sudden, clinically significant deterioration, which requires the highest level of physician preparedness to intervene urgently.   I managed/supervised life or organ supporting interventions that required frequent physician assessment. I devoted my full attention to the direct care of this patient for the amount of time indicated below. Time I spent with the family or surrogate(s) is included only if the patient was incapable of providing the necessary information or participating in medical decisions  Time devoted to teaching and to any procedures I billed separately is not included.     CRITICAL CARE TIME:  36 minutes    Electronically signed by Joni Dhaliwal MD on 9/10/2021 at 9:58 AM

## 2021-09-10 NOTE — PROGRESS NOTES
Palliative Medicine   Progression of Care     Patient was extubated today. CM: working with family for discharge needs ie: Oxygen . Family from home where he has a private duty nursing care, rale system, wheel chair. Father will provide a ride with a special van at discharge. No immediate needs for Palliative Care.        Palliative Care following closely for support of patient and family   Princess Etienne APRN-CNP, AGACNP-BC

## 2021-09-10 NOTE — PROGRESS NOTES
[address] [place], [place] [address]   Progress Note    Admitting Date and Time: 8/28/2021  3:31 PM  Admit Dx: Hypokalemia [E87.6]  Acute respiratory failure with hypoxia ([facility] [J96.01]  Pneumonia of right lower lobe due to infectious organism [J18.9]  Sepsis with acute hypoxic respiratory failure without septic shock, due to unspecified organism ([facility] [A41.9, R65.20, J96.01]    Subjective:    Patient was extubated this morning and will be monitoring how he does after extubation. Per RN: As above, patient extubated and now on precedex to help with sedation.      metoclopramide        insulin lispro  0-18 Units SubCUTAneous Q4H    anidulafungin  100 mg IntraVENous Q24H    lidocaine PF  5 mL IntraDERmal Once    sodium chloride flush  5-40 mL IntraVENous 2 times per day    heparin flush  3 mL IntraVENous 2 times per day    dexamethasone  6 mg IntraVENous Q12H    polyethylene glycol  17 g Oral Daily    baricitinib  4 mg Oral Daily    sodium chloride flush  5-40 mL IntraVENous 2 times per day    ipratropium-albuterol  1 ampule Inhalation 4x daily    pantoprazole  40 mg IntraVENous Daily    And    sodium chloride (PF)  10 mL IntraVENous Daily    lacosamide  150 mg Oral BID    lamoTRIgine  200 mg Oral TID    zonisamide  300 mg Oral Nightly    baclofen  10 mg Oral TID    enoxaparin  40 mg SubCUTAneous BID    polyvinyl alcohol  1 drop Both Eyes Q6H     sodium chloride flush, 5-40 mL, PRN  sodium chloride, 25 mL, PRN  heparin flush, 3 mL, PRN  bisacodyl, 10 mg, Daily PRN  diatrizoate meglumine-sodium, 50 mL, ONCE PRN  glucose, 15 g, PRN  dextrose, 12.5 g, PRN  glucagon (rDNA), 1 mg, PRN  dextrose, 100 mL/hr, PRN  sodium chloride flush, 10 mL, PRN  sodium chloride, 25 mL, PRN  ondansetron, 4 mg, Q8H PRN   Or  ondansetron, 4 mg, Q6H PRN  polyethylene glycol, 17 g, Daily PRN  acetaminophen, 650 mg, Q6H PRN   Or  acetaminophen, 650 mg, Q6H PRN  perflutren lipid microspheres, 1.5 mL, ONCE PRN  fentanNYL, 25 mcg, Q1H PRN  sodium chloride, 30 mL, PRN         Objective:    BP (!) 144/106   Pulse 104   Temp 96.4 °F (35.8 °C) (Rectal)   Resp 20   Ht 5' (1.524 m)   Wt 153 lb (69.4 kg)   SpO2 100%   BMI 29.88 kg/m²     Due to the current efforts to prevent transmission of COVID-19 and also the need to preserve PPE for other caregivers, a face-to-face encounter with the patient was not performed. That being said, all relevant records and diagnostic tests were reviewed, including laboratory results and imaging. Please reference any relevant documentation elsewhere. Care will be coordinated with other specialties as appropriate. Recent Labs     09/08/21  0504 09/09/21  0520 09/10/21  0507    144 135   K 3.7 4.2 3.4*    107 100   CO2 30* 31* 29   BUN 7 8 7   CREATININE 0.4* 0.4* 0.3*   GLUCOSE 106* 127* 104*   CALCIUM 8.5* 8.6 7.8*       Recent Labs     09/08/21  0504 09/09/21  0520 09/10/21  0507   ALKPHOS 199* 191* 157*   PROT 5.8* 6.2* 5.3*   LABALBU 2.8* 2.9* 2.4*   BILITOT 0.5 0.6 0.4   AST 35 34 21   ALT 22 20 16       Recent Labs     09/08/21  0504 09/09/21  0520 09/10/21  0507   WBC 10.8 8.9 7.8   RBC 3.22* 3.26* 2.71*   HGB 10.5* 10.5* 9.0*   HCT 32.4* 33.4* 27.2*   .6* 102.5* 100.4*   MCH 32.6 32.2 33.2   MCHC 32.4 31.4* 33.1   RDW 12.4 12.6 12.6    404 447   MPV 10.4 10.0 10.2                 Radiology:   XR CHEST PORTABLE   Final Result   Stable bilateral infiltrates. ET tube tip 2.5 cm from the shantell. XR CHEST PORTABLE   Final Result   Interval placement of right-sided PICC line. Bilateral parenchymal infiltrates are similar to slightly worsened. Continued follow-up recommended. XR CHEST PORTABLE   Final Result   1. Lines okay. 2. Mild-moderate improved left lung aeration, overall decreased lung volumes. XR CHEST PORTABLE   Final Result   1. Slight advancement of the endotracheal tube which now resides in the   midthoracic trachea. 2.  Stable appearance of the chest.  Stable position of the enteric tube. XR CHEST PORTABLE   Final Result   1. Interval slight advancement of the endotracheal tube which now resides in   the proximal thoracic trachea. 2.  Persistent ill-defined opacities in the right greater than left lungs. No change from prior exam.  No obvious pneumothorax. 3.  Enteric tube. Contrast material identified within the stomach lumen. XR CHEST PORTABLE   Final Result   1. Persistent ill-defined opacities in the bilateral lungs slightly worse in   the left mid to upper lung. Probable trace pleural effusions. 2.  Slight retraction of the endotracheal tube which projects over the   cervical region. Suggest repositioning. 3.  There appears to be a satisfactory position of the enteric tube. The findings were sent to the Radiology Results [address] at 7:12   pm on 9/3/2021to be communicated to a licensed caregiver. XR ABDOMEN FOR NG/OG/NE TUBE PLACEMENT   Final Result   1. Satisfactory position of gastric feeding tube as evidenced by stomach   contour outlined following injection of oral contrast.      2.  Satisfactory position of enteric tube. 3.  Significant reflux of oral contrast material into the esophagus. XR CHEST PORTABLE   Final Result   Significant bilateral infiltrates with partial interval clearing of the left   lower lobe infiltrate since the prior study         XR CHEST PORTABLE   Final Result   Satisfactory position of the ETT and NGT. Otherwise, stable chest.         XR CHEST PORTABLE   Final Result   Increasing right lung opacities consistent with pneumonia. US DUP LOWER EXTREMITIES BILATERAL VENOUS   Final Result   No evidence of DVT in either lower extremity. XR CHEST PORTABLE   Final Result   1. Right lower lobe pneumonia. 2. Left lower lobe atelectasis versus pneumonia.          XR CHEST PORTABLE    (Results Pending) glucose  -Patient has had elevated glucose levels up to 379, this   -No known diagnosis of DM and patient is currently on steroids which will increase glucose  -A1C should be checked to determine if patient is diabetic or just elevated BS 2/2 steroids and current illness   -Currently on NPO ISS with hypoglycemic protocol  -Getting trickled tube feeds per dietary recommendations and increase rate when supine    8. Septic shock (resolved)  -Patient hypotensive initially, required levophed but was able to be weaned off  -Off all IVF and pressors now  -/90 today (was 75/39 at the lowest on 8/29)  -WBC 7.8, lactic acid normalized  -So far BCx neg, resp cx growing klebsiella and yeast, UCx neg and tip cx NGTD      NOTE: This report was transcribed using voice recognition software. Every effort was made to ensure accuracy; however, inadvertent computerized transcription errors may be present.      Electronically signed by Vilma Eason MD on 9/10/2021 at 1:17 PM

## 2021-09-10 NOTE — CARE COORDINATION
9/10/21 Positive covid 8/28/21- unvaccinated. Cm transition of care: icu/ extubated today 9/10/21. Pt (has Cerebral palsy) lives at home with both parents in two story home, has rale system, wc bound and all supplies he needs at this time. Pt has private duty nursing care. Father will provide a ride with a special van at discharge. Watch for oxygen needs. Palliative care on consult. Watch for any HHC needs. Both ltach's to follow- for potential discharge direction of care planning needs. CM/SS following.   Electronically signed by Marietta Estrella RN-BC on 9/10/2021 at 2:22 PM

## 2021-09-11 ENCOUNTER — APPOINTMENT (OUTPATIENT)
Dept: GENERAL RADIOLOGY | Age: 26
DRG: 870 | End: 2021-09-11
Payer: COMMERCIAL

## 2021-09-11 LAB
ALBUMIN SERPL-MCNC: 3.3 G/DL (ref 3.5–5.2)
ALP BLD-CCNC: 196 U/L (ref 40–129)
ALT SERPL-CCNC: 18 U/L (ref 0–40)
ANION GAP SERPL CALCULATED.3IONS-SCNC: 10 MMOL/L (ref 7–16)
AST SERPL-CCNC: 29 U/L (ref 0–39)
B.E.: 1.1 MMOL/L (ref -3–3)
BASOPHILS ABSOLUTE: 0 E9/L (ref 0–0.2)
BASOPHILS RELATIVE PERCENT: 0.3 % (ref 0–2)
BILIRUB SERPL-MCNC: 0.7 MG/DL (ref 0–1.2)
BUN BLDV-MCNC: 8 MG/DL (ref 6–20)
CALCIUM SERPL-MCNC: 9.1 MG/DL (ref 8.6–10.2)
CHLORIDE BLD-SCNC: 97 MMOL/L (ref 98–107)
CO2: 26 MMOL/L (ref 22–29)
CREAT SERPL-MCNC: 0.3 MG/DL (ref 0.7–1.2)
CULTURE CATHETER TIP: NORMAL
DELIVERY SYSTEMS: ABNORMAL
DEVICE: ABNORMAL
EOSINOPHILS ABSOLUTE: 0 E9/L (ref 0.05–0.5)
EOSINOPHILS RELATIVE PERCENT: 0 % (ref 0–6)
FIO2 ARTERIAL: 40
GFR AFRICAN AMERICAN: >60
GFR NON-AFRICAN AMERICAN: >60 ML/MIN/1.73
GLUCOSE BLD-MCNC: 95 MG/DL (ref 74–99)
HCO3 ARTERIAL: 24.9 MMOL/L (ref 22–26)
HCT VFR BLD CALC: 33 % (ref 37–54)
HEMOGLOBIN: 10.8 G/DL (ref 12.5–16.5)
LYMPHOCYTES ABSOLUTE: 1.11 E9/L (ref 1.5–4)
LYMPHOCYTES RELATIVE PERCENT: 10.4 % (ref 20–42)
MAGNESIUM: 1.9 MG/DL (ref 1.6–2.6)
MCH RBC QN AUTO: 32.4 PG (ref 26–35)
MCHC RBC AUTO-ENTMCNC: 32.7 % (ref 32–34.5)
MCV RBC AUTO: 99.1 FL (ref 80–99.9)
METER GLUCOSE: 101 MG/DL (ref 74–99)
METER GLUCOSE: 110 MG/DL (ref 74–99)
METER GLUCOSE: 121 MG/DL (ref 74–99)
METER GLUCOSE: 69 MG/DL (ref 74–99)
METER GLUCOSE: 84 MG/DL (ref 74–99)
METER GLUCOSE: 90 MG/DL (ref 74–99)
MONOCYTES ABSOLUTE: 1.11 E9/L (ref 0.1–0.95)
MONOCYTES RELATIVE PERCENT: 10.4 % (ref 2–12)
MYELOCYTE PERCENT: 0.9 % (ref 0–0)
NEUTROPHILS ABSOLUTE: 8.77 E9/L (ref 1.8–7.3)
NEUTROPHILS RELATIVE PERCENT: 78.3 % (ref 43–80)
O2 SATURATION: 97.1 % (ref 92–98.5)
OPERATOR ID: 2124
PCO2 ARTERIAL: 35.7 MMHG (ref 35–45)
PDW BLD-RTO: 12.2 FL (ref 11.5–15)
PH BLOOD GAS: 7.45 (ref 7.35–7.45)
PHOSPHORUS: 3.1 MG/DL (ref 2.5–4.5)
PLATELET # BLD: 591 E9/L (ref 130–450)
PMV BLD AUTO: 10.6 FL (ref 7–12)
PO2 ARTERIAL: 86.2 MMHG (ref 80–100)
POIKILOCYTES: ABNORMAL
POLYCHROMASIA: ABNORMAL
POTASSIUM SERPL-SCNC: 3.5 MMOL/L (ref 3.5–5)
RBC # BLD: 3.33 E12/L (ref 3.8–5.8)
SODIUM BLD-SCNC: 133 MMOL/L (ref 132–146)
SOURCE, BLOOD GAS: ABNORMAL
STOMATOCYTES: ABNORMAL
TOTAL PROTEIN: 6.8 G/DL (ref 6.4–8.3)
WBC # BLD: 11.1 E9/L (ref 4.5–11.5)

## 2021-09-11 PROCEDURE — 80053 COMPREHEN METABOLIC PANEL: CPT

## 2021-09-11 PROCEDURE — 6370000000 HC RX 637 (ALT 250 FOR IP): Performed by: INTERNAL MEDICINE

## 2021-09-11 PROCEDURE — 6360000002 HC RX W HCPCS: Performed by: SPECIALIST

## 2021-09-11 PROCEDURE — 6370000000 HC RX 637 (ALT 250 FOR IP)

## 2021-09-11 PROCEDURE — 94660 CPAP INITIATION&MGMT: CPT

## 2021-09-11 PROCEDURE — 85025 COMPLETE CBC W/AUTO DIFF WBC: CPT

## 2021-09-11 PROCEDURE — 82803 BLOOD GASES ANY COMBINATION: CPT

## 2021-09-11 PROCEDURE — 2580000003 HC RX 258: Performed by: INTERNAL MEDICINE

## 2021-09-11 PROCEDURE — 6360000002 HC RX W HCPCS: Performed by: INTERNAL MEDICINE

## 2021-09-11 PROCEDURE — 6360000002 HC RX W HCPCS

## 2021-09-11 PROCEDURE — 84100 ASSAY OF PHOSPHORUS: CPT

## 2021-09-11 PROCEDURE — 2000000000 HC ICU R&B

## 2021-09-11 PROCEDURE — 82962 GLUCOSE BLOOD TEST: CPT

## 2021-09-11 PROCEDURE — C9113 INJ PANTOPRAZOLE SODIUM, VIA: HCPCS

## 2021-09-11 PROCEDURE — 94003 VENT MGMT INPAT SUBQ DAY: CPT

## 2021-09-11 PROCEDURE — 83735 ASSAY OF MAGNESIUM: CPT

## 2021-09-11 PROCEDURE — 2500000003 HC RX 250 WO HCPCS: Performed by: INTERNAL MEDICINE

## 2021-09-11 PROCEDURE — 2580000003 HC RX 258: Performed by: SPECIALIST

## 2021-09-11 PROCEDURE — 2700000000 HC OXYGEN THERAPY PER DAY

## 2021-09-11 PROCEDURE — 87040 BLOOD CULTURE FOR BACTERIA: CPT

## 2021-09-11 PROCEDURE — 2580000003 HC RX 258

## 2021-09-11 PROCEDURE — 71045 X-RAY EXAM CHEST 1 VIEW: CPT

## 2021-09-11 PROCEDURE — 36592 COLLECT BLOOD FROM PICC: CPT

## 2021-09-11 PROCEDURE — 36415 COLL VENOUS BLD VENIPUNCTURE: CPT

## 2021-09-11 PROCEDURE — 99232 SBSQ HOSP IP/OBS MODERATE 35: CPT | Performed by: INTERNAL MEDICINE

## 2021-09-11 RX ORDER — FUROSEMIDE 10 MG/ML
10 INJECTION INTRAMUSCULAR; INTRAVENOUS ONCE
Status: DISCONTINUED | OUTPATIENT
Start: 2021-09-11 | End: 2021-09-11

## 2021-09-11 RX ORDER — CLONAZEPAM 0.5 MG/1
0.5 TABLET, ORALLY DISINTEGRATING ORAL 2 TIMES DAILY
Status: DISCONTINUED | OUTPATIENT
Start: 2021-09-11 | End: 2021-09-12

## 2021-09-11 RX ORDER — 0.9 % SODIUM CHLORIDE 0.9 %
25 INTRAVENOUS SOLUTION INTRAVENOUS EVERY 8 HOURS
Status: DISCONTINUED | OUTPATIENT
Start: 2021-09-11 | End: 2021-09-12 | Stop reason: ALTCHOICE

## 2021-09-11 RX ORDER — DEXAMETHASONE SODIUM PHOSPHATE 10 MG/ML
6 INJECTION, SOLUTION INTRAMUSCULAR; INTRAVENOUS EVERY 24 HOURS
Status: DISCONTINUED | OUTPATIENT
Start: 2021-09-11 | End: 2021-09-12

## 2021-09-11 RX ORDER — BACLOFEN 10 MG/1
20 TABLET ORAL 3 TIMES DAILY
Status: DISCONTINUED | OUTPATIENT
Start: 2021-09-11 | End: 2021-09-19 | Stop reason: HOSPADM

## 2021-09-11 RX ADMIN — ENOXAPARIN SODIUM 40 MG: 40 INJECTION SUBCUTANEOUS at 08:48

## 2021-09-11 RX ADMIN — POLYVINYL ALCOHOL 1 DROP: 14 SOLUTION/ DROPS OPHTHALMIC at 16:30

## 2021-09-11 RX ADMIN — ENOXAPARIN SODIUM 40 MG: 40 INJECTION SUBCUTANEOUS at 21:26

## 2021-09-11 RX ADMIN — POLYETHYLENE GLYCOL 3350 17 G: 17 POWDER, FOR SOLUTION ORAL at 08:48

## 2021-09-11 RX ADMIN — SODIUM CHLORIDE, PRESERVATIVE FREE 10 ML: 5 INJECTION INTRAVENOUS at 08:48

## 2021-09-11 RX ADMIN — LACOSAMIDE 150 MG: 100 TABLET, FILM COATED ORAL at 21:25

## 2021-09-11 RX ADMIN — HEPARIN 300 UNITS: 100 SYRINGE at 21:28

## 2021-09-11 RX ADMIN — LAMOTRIGINE 200 MG: 100 TABLET ORAL at 08:47

## 2021-09-11 RX ADMIN — Medication 10 ML: at 21:30

## 2021-09-11 RX ADMIN — POLYVINYL ALCOHOL 1 DROP: 14 SOLUTION/ DROPS OPHTHALMIC at 23:00

## 2021-09-11 RX ADMIN — CLONAZEPAM 0.5 MG: 0.5 TABLET, ORALLY DISINTEGRATING ORAL at 21:27

## 2021-09-11 RX ADMIN — ZONISAMIDE 300 MG: 100 CAPSULE ORAL at 21:25

## 2021-09-11 RX ADMIN — POLYVINYL ALCOHOL 1 DROP: 14 SOLUTION/ DROPS OPHTHALMIC at 05:00

## 2021-09-11 RX ADMIN — DEXAMETHASONE SODIUM PHOSPHATE 6 MG: 10 INJECTION, SOLUTION INTRAMUSCULAR; INTRAVENOUS at 21:28

## 2021-09-11 RX ADMIN — Medication 0.8 MCG/KG/HR: at 21:29

## 2021-09-11 RX ADMIN — LAMOTRIGINE 200 MG: 100 TABLET ORAL at 13:56

## 2021-09-11 RX ADMIN — ACETAMINOPHEN 650 MG: 325 TABLET ORAL at 08:47

## 2021-09-11 RX ADMIN — LAMOTRIGINE 200 MG: 100 TABLET ORAL at 21:27

## 2021-09-11 RX ADMIN — BACLOFEN 10 MG: 10 TABLET ORAL at 08:46

## 2021-09-11 RX ADMIN — SODIUM CHLORIDE 25 ML: 9 INJECTION, SOLUTION INTRAVENOUS at 21:24

## 2021-09-11 RX ADMIN — CLONAZEPAM 0.5 MG: 0.5 TABLET, ORALLY DISINTEGRATING ORAL at 12:52

## 2021-09-11 RX ADMIN — ACETAMINOPHEN 650 MG: 325 TABLET ORAL at 21:27

## 2021-09-11 RX ADMIN — PIPERACILLIN SODIUM AND TAZOBACTAM SODIUM 3375 MG: 3; .375 INJECTION, POWDER, LYOPHILIZED, FOR SOLUTION INTRAVENOUS at 18:51

## 2021-09-11 RX ADMIN — LEVETIRACETAM 1000 MG: 1000 INJECTION, SOLUTION INTRAVENOUS at 21:29

## 2021-09-11 RX ADMIN — Medication 0.8 MCG/KG/HR: at 07:06

## 2021-09-11 RX ADMIN — LEVETIRACETAM 1000 MG: 1000 INJECTION, SOLUTION INTRAVENOUS at 08:43

## 2021-09-11 RX ADMIN — BARICITINIB 4 MG: 2 TABLET, FILM COATED ORAL at 08:47

## 2021-09-11 RX ADMIN — LACOSAMIDE 150 MG: 100 TABLET, FILM COATED ORAL at 08:46

## 2021-09-11 RX ADMIN — PANTOPRAZOLE SODIUM 40 MG: 40 INJECTION, POWDER, FOR SOLUTION INTRAVENOUS at 08:48

## 2021-09-11 RX ADMIN — Medication 10 ML: at 08:49

## 2021-09-11 RX ADMIN — POLYVINYL ALCOHOL 1 DROP: 14 SOLUTION/ DROPS OPHTHALMIC at 09:07

## 2021-09-11 RX ADMIN — BACLOFEN 20 MG: 10 TABLET ORAL at 13:55

## 2021-09-11 RX ADMIN — BACLOFEN 20 MG: 10 TABLET ORAL at 21:27

## 2021-09-11 NOTE — PROGRESS NOTES
CRITICAL CARE PROGRESS NOTE    The patient's case was discussed in multidisciplinary rounds including critical care specialist, nursing, RT and pharmacy. Her evaluation is as follows:      30year old man with PMH as described below admitted to ICU for management of COVID-19 pneumonia, additional Klebsiella pneumonia right upper lobe, acute respiratory failure, ARDS, cerebral palsy, septic shock, metabolic acidosis, respiratory acidosis. --Mr Cameron Mantilla is extubated, on HFNC 60L and 50% FiO2  --On Precedex drip --> Will start clonazepam BID with goal of stopping precedex  --Has episodic tachycardia  --Loaded with keppra due to seizure overnight  --Worsening hyponatremia, not on IV fluids    BP (!) 147/101   Pulse 85   Temp 99.7 °F (37.6 °C) (Rectal)   Resp 22   Ht 5' (1.524 m)   Wt 153 lb (69.4 kg)   SpO2 99%   BMI 29.88 kg/m²     General: Awake, closes eyes when I try to lift his eyelids, moves arms spontaneously, on HFNC  HEENT: Microcephaly, PERRL, EOMI, mouth with ETT, no nasal lesions   Respiratory: Lungs with diminished breath sounds bilaterally, no adventitious sounds auscultated, no accessory muscle use  CV: Regular rate, no murmurs, no JVD, 1+ leg edema  Abdomen: Soft, non tender, + bowel sounds, PEG present  Skin: Hydrated, adequate turgor, no rash, capillary refill <2 seconds  Extremities: Muscular strength 1-2/5 in 4 limbs, moves 4 limbs spontaneously, contracted and hypotrophic limbs, distal pulses present  Neurology: Awake, contracted limbs, moves 4 limbs spontaneously, neck is supple, no meningitic signs present.       A/P:  Acute hypoxemic respiratory failure secondary to severe COVID-19 pneumonia and ARDS + Klebsiella pneumonia  --Continue oxygen supplementation to keep sats >89%, on HFNC  --Antimicrobial regimen: Cefepime and anidulafungin  --Antiviral regimen: Remdesivir X5 days, baricitinib daily and dexamethasone 6 mg/day  --Anticoagulation: Enoxaparin BID    Metabolic encephalopathy due to 1 with delirium  --Improving    Hyponatremia, multifactorial  --Monitor    Cerebral palsy  --Continue baclofen, increase dose to 20 mg TID    Epilepsy  --Continue Lacosamide, Lamotrigine, keppra and zonisamide    Tube feedings at goal    Last 3 CMP:    Recent Labs     09/09/21  0520 09/10/21  0507 09/11/21  0552    135 133   K 4.2 3.4* 3.5    100 97*   CO2 31* 29 26   BUN 8 7 8   CREATININE 0.4* 0.3* 0.3*   GLUCOSE 127* 104* 95   CALCIUM 8.6 7.8* 9.1   PROT 6.2* 5.3* 6.8   LABALBU 2.9* 2.4* 3.3*   BILITOT 0.6 0.4 0.7   ALKPHOS 191* 157* 196*   AST 34 21 29   ALT 20 16 18     Recent Labs     09/11/21  0552   WBC 11.1   RBC 3.33*   HGB 10.8*   HCT 33.0*   MCV 99.1   MCH 32.4   MCHC 32.7   RDW 12.2   *   MPV 10.6       No results for input(s): BC in the last 72 hours. No results for input(s): Starlene Ewings in the last 72 hours.     24 HR INTAKE/OUTPUT:      Intake/Output Summary (Last 24 hours) at 9/11/2021 0923  Last data filed at 9/11/2021 0700  Gross per 24 hour   Intake 860.28 ml   Output 5950 ml   Net -5089.72 ml     MEDICATIONS:   levETIRAcetam in NaCl  1,000 mg IntraVENous Q12H    insulin lispro  0-18 Units SubCUTAneous Q4H    anidulafungin  100 mg IntraVENous Q24H    lidocaine PF  5 mL IntraDERmal Once    sodium chloride flush  5-40 mL IntraVENous 2 times per day    heparin flush  3 mL IntraVENous 2 times per day    dexamethasone  6 mg IntraVENous Q12H    polyethylene glycol  17 g Oral Daily    baricitinib  4 mg Oral Daily    sodium chloride flush  5-40 mL IntraVENous 2 times per day    ipratropium-albuterol  1 ampule Inhalation 4x daily    pantoprazole  40 mg IntraVENous Daily    And    sodium chloride (PF)  10 mL IntraVENous Daily    lacosamide  150 mg Oral BID    lamoTRIgine  200 mg Oral TID    zonisamide  300 mg Oral Nightly    baclofen  10 mg Oral TID    enoxaparin  40 mg SubCUTAneous BID    polyvinyl alcohol  1 drop Both Eyes Q6H      dexmedetomidine HCl in NaCl 0.9 mcg/kg/hr (09/11/21 0810)    sodium chloride      dextrose      sodium chloride       sodium chloride flush, sodium chloride, heparin flush, bisacodyl, diatrizoate meglumine-sodium, glucose, dextrose, glucagon (rDNA), dextrose, sodium chloride flush, sodium chloride, ondansetron **OR** ondansetron, polyethylene glycol, acetaminophen **OR** acetaminophen, perflutren lipid microspheres, fentanNYL, sodium chloride    OBJECTIVE:  Vitals:    09/11/21 0700   BP: (!) 147/101   Pulse: 85   Resp:    Temp:    SpO2: 99%     FiO2 : 50 %  O2 Flow Rate (L/min): 60 L/min  O2 Device: Heated high flow cannula        LABS:  WBC   Date Value Ref Range Status   09/11/2021 11.1 4.5 - 11.5 E9/L Final   09/10/2021 7.8 4.5 - 11.5 E9/L Final   09/09/2021 8.9 4.5 - 11.5 E9/L Final     Hemoglobin   Date Value Ref Range Status   09/11/2021 10.8 (L) 12.5 - 16.5 g/dL Final   09/10/2021 9.0 (L) 12.5 - 16.5 g/dL Final   09/09/2021 10.5 (L) 12.5 - 16.5 g/dL Final     Hematocrit   Date Value Ref Range Status   09/11/2021 33.0 (L) 37.0 - 54.0 % Final   09/10/2021 27.2 (L) 37.0 - 54.0 % Final   09/09/2021 33.4 (L) 37.0 - 54.0 % Final     MCV   Date Value Ref Range Status   09/11/2021 99.1 80.0 - 99.9 fL Final   09/10/2021 100.4 (H) 80.0 - 99.9 fL Final   09/09/2021 102.5 (H) 80.0 - 99.9 fL Final     Platelets   Date Value Ref Range Status   09/11/2021 591 (H) 130 - 450 E9/L Final   09/10/2021 447 130 - 450 E9/L Final   09/09/2021 404 130 - 450 E9/L Final     Sodium   Date Value Ref Range Status   09/11/2021 133 132 - 146 mmol/L Final   09/10/2021 135 132 - 146 mmol/L Final   09/09/2021 144 132 - 146 mmol/L Final     Potassium   Date Value Ref Range Status   09/11/2021 3.5 3.5 - 5.0 mmol/L Final   09/10/2021 3.4 (L) 3.5 - 5.0 mmol/L Final   09/09/2021 4.2 3.5 - 5.0 mmol/L Final     Chloride   Date Value Ref Range Status   09/11/2021 97 (L) 98 - 107 mmol/L Final   09/10/2021 100 98 - 107 mmol/L Final   09/09/2021 107 98 - 107 mmol/L Final     CO2 Date Value Ref Range Status   09/11/2021 26 22 - 29 mmol/L Final   09/10/2021 29 22 - 29 mmol/L Final   09/09/2021 31 (H) 22 - 29 mmol/L Final     BUN   Date Value Ref Range Status   09/11/2021 8 6 - 20 mg/dL Final   09/10/2021 7 6 - 20 mg/dL Final   09/09/2021 8 6 - 20 mg/dL Final     CREATININE   Date Value Ref Range Status   09/11/2021 0.3 (L) 0.7 - 1.2 mg/dL Final   09/10/2021 0.3 (L) 0.7 - 1.2 mg/dL Final   09/09/2021 0.4 (L) 0.7 - 1.2 mg/dL Final     Glucose   Date Value Ref Range Status   09/11/2021 95 74 - 99 mg/dL Final   09/10/2021 104 (H) 74 - 99 mg/dL Final   09/09/2021 127 (H) 74 - 99 mg/dL Final     Calcium   Date Value Ref Range Status   09/11/2021 9.1 8.6 - 10.2 mg/dL Final   09/10/2021 7.8 (L) 8.6 - 10.2 mg/dL Final   09/09/2021 8.6 8.6 - 10.2 mg/dL Final     Total Protein   Date Value Ref Range Status   09/11/2021 6.8 6.4 - 8.3 g/dL Final   09/10/2021 5.3 (L) 6.4 - 8.3 g/dL Final   09/09/2021 6.2 (L) 6.4 - 8.3 g/dL Final     Albumin   Date Value Ref Range Status   09/11/2021 3.3 (L) 3.5 - 5.2 g/dL Final   09/10/2021 2.4 (L) 3.5 - 5.2 g/dL Final   09/09/2021 2.9 (L) 3.5 - 5.2 g/dL Final     Total Bilirubin   Date Value Ref Range Status   09/11/2021 0.7 0.0 - 1.2 mg/dL Final   09/10/2021 0.4 0.0 - 1.2 mg/dL Final   09/09/2021 0.6 0.0 - 1.2 mg/dL Final     Alkaline Phosphatase   Date Value Ref Range Status   09/11/2021 196 (H) 40 - 129 U/L Final   09/10/2021 157 (H) 40 - 129 U/L Final   09/09/2021 191 (H) 40 - 129 U/L Final     AST   Date Value Ref Range Status   09/11/2021 29 0 - 39 U/L Final   09/10/2021 21 0 - 39 U/L Final   09/09/2021 34 0 - 39 U/L Final     ALT   Date Value Ref Range Status   09/11/2021 18 0 - 40 U/L Final   09/10/2021 16 0 - 40 U/L Final   09/09/2021 20 0 - 40 U/L Final     GFR Non-   Date Value Ref Range Status   09/11/2021 >60 >=60 mL/min/1.73 Final     Comment:     Chronic Kidney Disease: less than 60 ml/min/1.73 sq.m.           Kidney Failure: less than 15 ml/min/1.73 sq.m. Results valid for patients 18 years and older. 09/10/2021 >60 >=60 mL/min/1.73 Final     Comment:     Chronic Kidney Disease: less than 60 ml/min/1.73 sq.m. Kidney Failure: less than 15 ml/min/1.73 sq.m. Results valid for patients 18 years and older. 09/09/2021 >60 >=60 mL/min/1.73 Final     Comment:     Chronic Kidney Disease: less than 60 ml/min/1.73 sq.m. Kidney Failure: less than 15 ml/min/1.73 sq.m. Results valid for patients 18 years and older. GFR    Date Value Ref Range Status   09/11/2021 >60  Final   09/10/2021 >60  Final   09/09/2021 >60  Final     Magnesium   Date Value Ref Range Status   09/11/2021 1.9 1.6 - 2.6 mg/dL Final   09/10/2021 2.0 1.6 - 2.6 mg/dL Final   09/09/2021 2.2 1.6 - 2.6 mg/dL Final     Phosphorus   Date Value Ref Range Status   09/11/2021 3.1 2.5 - 4.5 mg/dL Final   09/10/2021 3.1 2.5 - 4.5 mg/dL Final   09/09/2021 4.2 2.5 - 4.5 mg/dL Final     Recent Labs     09/10/21  0512 09/10/21  0512 09/11/21  0442   PH 7.467*   < > 7.452*   PO2 76.2  --   --    PCO2 40.1  --   --    HCO3 28.3*  --   --    BE 4.3*   < > 1.1   O2SAT 95.4   < > 97.1    < > = values in this interval not displayed. RADIOLOGY:  XR CHEST PORTABLE   Preliminary Result   Improved aeration of the lungs with persistent bilateral reticular airspace   opacities that may be on the basis of pulmonary edema or atypical/viral   pneumonia. XR CHEST PORTABLE   Final Result   Stable bilateral infiltrates. ET tube tip 2.5 cm from the shantell. XR CHEST PORTABLE   Final Result   Interval placement of right-sided PICC line. Bilateral parenchymal infiltrates are similar to slightly worsened. Continued follow-up recommended. XR CHEST PORTABLE   Final Result   1. Lines okay. 2. Mild-moderate improved left lung aeration, overall decreased lung volumes. XR CHEST PORTABLE   Final Result   1.   Slight advancement of the endotracheal tube which now resides in the   midthoracic trachea. 2.  Stable appearance of the chest.  Stable position of the enteric tube. XR CHEST PORTABLE   Final Result   1. Interval slight advancement of the endotracheal tube which now resides in   the proximal thoracic trachea. 2.  Persistent ill-defined opacities in the right greater than left lungs. No change from prior exam.  No obvious pneumothorax. 3.  Enteric tube. Contrast material identified within the stomach lumen. XR CHEST PORTABLE   Final Result   1. Persistent ill-defined opacities in the bilateral lungs slightly worse in   the left mid to upper lung. Probable trace pleural effusions. 2.  Slight retraction of the endotracheal tube which projects over the   cervical region. Suggest repositioning. 3.  There appears to be a satisfactory position of the enteric tube. The findings were sent to the Radiology Results [address] at 7:12   pm on 9/3/2021to be communicated to a licensed caregiver. XR ABDOMEN FOR NG/OG/NE TUBE PLACEMENT   Final Result   1. Satisfactory position of gastric feeding tube as evidenced by stomach   contour outlined following injection of oral contrast.      2.  Satisfactory position of enteric tube. 3.  Significant reflux of oral contrast material into the esophagus. XR CHEST PORTABLE   Final Result   Significant bilateral infiltrates with partial interval clearing of the left   lower lobe infiltrate since the prior study         XR CHEST PORTABLE   Final Result   Satisfactory position of the ETT and NGT. Otherwise, stable chest.         XR CHEST PORTABLE   Final Result   Increasing right lung opacities consistent with pneumonia. US DUP LOWER EXTREMITIES BILATERAL VENOUS   Final Result   No evidence of DVT in either lower extremity. XR CHEST PORTABLE   Final Result   1. Right lower lobe pneumonia.    2. Left lower lobe atelectasis versus pneumonia. PROBLEM LIST:  Principal Problem:    Acute respiratory failure with hypoxia (HCC)  Active Problems:    Cerebral palsy (HCC)    Seizures ([address]    Mentally disabled    Convulsions ([address]    Pneumonia due to COVID-19 virus  Resolved Problems:    * No resolved hospital problems. *      ATTESTATION:  ICU Staff Physician note of personal involvement in Care  As the attending physician, I certify that I personally reviewed the patients history and personnally examined the patient to confirm the physical findings described above,  And that I reviewed the relevant imaging studies and available reports. I also discussed the differential diagnosis and all of the proposed management plans with the patient and individuals accompanying the patient to this visit. They had the opportunity to ask questions about the proposed management plans and to have those questions answered. This patient has a high probability of sudden, clinically significant deterioration, which requires the highest level of physician preparedness to intervene urgently. I managed/supervised life or organ supporting interventions that required frequent physician assessment. I devoted my full attention to the direct care of this patient for the amount of time indicated below. Time I spent with the family or surrogate(s) is included only if the patient was incapable of providing the necessary information or participating in medical decisions  Time devoted to teaching and to any procedures I billed separately is not included.      CRITICAL CARE TIME:  30 minutes    Mane Ortiz MD  Pulmonary and Critical Care Medicine

## 2021-09-11 NOTE — PROGRESS NOTES
[address] [facility]  [facility]  Progress Note    NAME: Rico Ha  MR:  [id number]  :   1995  DATE OF SERVICE:21    This is a face to face encounter with Rico Ha 30 y.o. male on 21    CHIEF COMPLAINT     ID following for   Chief Complaint   Patient presents with    Altered Mental Status    Respiratory Distress     41% SpO2     HISTORY OF PRESENT ILLNESS   Pt seen   21     Extubated supine     afebrile    Patient is tolerating medications. No reported adverse drug reactions. REVIEW OF SYSTEMS     As stated above in the chief complaint, otherwise negative.   CURRENT MEDICATIONS      dexamethasone  6 mg IntraVENous Q24H    clonazePAM  0.5 mg Per NG tube BID    baclofen  20 mg Oral TID    levETIRAcetam in NaCl  1,000 mg IntraVENous Q12H    insulin lispro  0-18 Units SubCUTAneous Q4H    anidulafungin  100 mg IntraVENous Q24H    lidocaine PF  5 mL IntraDERmal Once    sodium chloride flush  5-40 mL IntraVENous 2 times per day    heparin flush  3 mL IntraVENous 2 times per day    polyethylene glycol  17 g Oral Daily    baricitinib  4 mg Oral Daily    sodium chloride flush  5-40 mL IntraVENous 2 times per day    ipratropium-albuterol  1 ampule Inhalation 4x daily    pantoprazole  40 mg IntraVENous Daily    And    sodium chloride (PF)  10 mL IntraVENous Daily    lacosamide  150 mg Oral BID    lamoTRIgine  200 mg Oral TID    zonisamide  300 mg Oral Nightly    enoxaparin  40 mg SubCUTAneous BID    polyvinyl alcohol  1 drop Both Eyes Q6H     Continuous Infusions:   dexmedetomidine HCl in NaCl 1 mcg/kg/hr (21 0911)    sodium chloride      dextrose      sodium chloride       PRN Meds:sodium chloride flush, sodium chloride, heparin flush, bisacodyl, diatrizoate meglumine-sodium, glucose, dextrose, glucagon (rDNA), dextrose, sodium chloride flush, sodium chloride, ondansetron **OR** ondansetron, polyethylene glycol, acetaminophen **OR** acetaminophen, perflutren lipid microspheres, fentanNYL, sodium chloride    PHYSICAL EXAM     BP (!) 147/101   Pulse 85   Temp 99.7 °F (37.6 °C) (Rectal)   Resp 22   Ht 5' (1.524 m)   Wt 153 lb (69.4 kg)   SpO2 99%   BMI 29.88 kg/m²   Temp  Av.7 °F (37.6 °C)  Min: 99 °F (37.2 °C)  Max: 100.2 °F (37.9 °C)  Constitutional:  The patient is   supined  AT/NC  HEAT S1/S2  RS COARSE BS ANT   ABD SOFT   EXT EDEMA CONTGRACTED  CNS AWAKE  Titus      Line rue picc       DIAGNOSTIC RESULTS   Radiology:    Recent Labs     21  0520 09/10/21  05021  0552   WBC 8.9 7.8 11.1   RBC 3.26* 2.71* 3.33*   HGB 10.5* 9.0* 10.8*   HCT 33.4* 27.2* 33.0*   .5* 100.4* 99.1   MCH 32.2 33.2 32.4   MCHC 31.4* 33.1 32.7   RDW 12.6 12.6 12.2    447 591*   MPV 10.0 10.2 10.6     Recent Labs     21  0520 09/10/21  05021  0552    135 133   K 4.2 3.4* 3.5    100 97*   CO2 31* 29 26   BUN 8 7 8   CREATININE 0.4* 0.3* 0.3*   GLUCOSE 127* 104* 95   PROT 6.2* 5.3* 6.8   LABALBU 2.9* 2.4* 3.3*   CALCIUM 8.6 7.8* 9.1   BILITOT 0.6 0.4 0.7   ALKPHOS 191* 157* 196*   AST 34 21 29   ALT 20 16 18     Lab Results   Component Value Date    CRP 0.3 2021    CRP 8.4 (H) 2021     Lab Results   Component Value Date    SEDRATE 5 2019    SEDRATE 29 (H) 2018    SEDRATE 21 (H) 08/10/2018     Recent Labs     21  0520 09/10/21  0507 21  0552   AST 34 21 29   ALT 20 16 18   TRIG  --  701*  --         Microbiology:   No results for input(s): COVID19 in the last 72 hours.   Lab Results   Component Value Date    BC 5 Days no growth 2021    BC 5 Days no growth 2021    BLOODCULT2 24 Hours no growth 2021    BLOODCULT2 5 Days no growth 2021    ORG Yeast, not C. albicans 2021    ORG Klebsiella pneumoniae ssp pneumoniae 2021    ORG Coagulase Negative Staphylococci 2018    ORG Corynebacterium species 2018     WOUND/ABSCESS   Date Value Ref Range Status   07/24/2018 Light growth  Final   07/24/2018 Light growth  Mixed morphologies    Final   06/29/2018   Corrected    Light growth  Too fastidious for susceptibility testing.     06/29/2018 Light growth  Corrected   06/29/2018 Moderate growth  Corrected     Smear, Respiratory   Date Value Ref Range Status   09/04/2021   Final    Moderate Polymorphonuclear leukocytes  Epithelial cells not seen  No organisms seen            MRSA Culture Only   Date Value Ref Range Status   08/29/2021 Methicillin resistant Staph aureus not isolated  Final     CULTURE, RESPIRATORY   Date Value Ref Range Status   09/04/2021 Oral Pharyngeal Adina reduced (A)  Final   09/04/2021 Light growth  Final   08/29/2021 Oral Pharyngeal Adina reduced (A)  Final   08/29/2021 Moderate growth  Final        FINAL IMPRESSION    Pt had   Chief Complaint   Patient presents with    Altered Mental Status    Respiratory Distress     41% SpO2    Admitted for Hypokalemia [E87.6]  Acute respiratory failure with hypoxia (HCC) [J96.01]  Pneumonia of right lower lobe due to infectious organism [J18.9]  Sepsis with acute hypoxic respiratory failure without septic shock, due to unspecified organism ([address] [A41.9, R65.20, J96.01]  On treatment for above  And covid + 8/28  Extubated 9/10  S/p  leukopenia/leukocytosis  Klebisella pneumonia check final cx  Mrsa neg   Yeast, not C. Albicans colonized resp cx  Cerebral palsy        baricitinib (OLUMIANT) tablet 4 mg, Daily day 11/14  anidulafungin (ERAXIS) 100 mg in dextrose 5 % 130 mL IVPB, Q24H day 5 will stop  dexamethasone (PF) (DECADRON) injection 6 mg, Q12H  enoxaparin (LOVENOX) injection 40 mg, BID     ADD PIPTAZO  WATCH FEVERS  · Monitor labs    Imaging and labs were reviewed per medical records. .  Thank you for involving me in the care of Raleigh Parks will continue to follow. Please do not hesitate to call for any questions or concerns.     Electronically signed by Piyush Callahan MD on 9/11/2021 at 11:56 AM

## 2021-09-11 NOTE — PROGRESS NOTES
Significant event:  Patient was extubated today and seems to be doing fine on heated high flow nasal cannula. However patient's heart rate jumped into the 160s and upon evaluation seem to be having a seizure. He does have a history of epilepsy and spastic CP. He is on multiple antielliptic medications. 2 mg of IV Ativan was given with cessation of seizure. This is most likely due to illness as well as fever. I did give him an IV Keppra load and will continue Keppra every 12 hours. It is questionable as to whether the patient aspirated during this event. He did not become hypoxic during or after the event and continues on the same settings of heated high flow nasal cannula. I will continue to reevaluate and if the patient continues to have seizures he will need to be reintubated for airway protection. Past Medical History:  Past Medical History:   Diagnosis Date    Cerebral palsy ([facility]     COVID-19     G tube feedings ([facility]     age 3    History of blood transfusion     Mental handicap     mom states severe brain damage    Seizures (HCC)     tremors  last seizure 8/8/2018        Family History:   History reviewed. No pertinent family history. Allergies:         Patient has no known allergies.     Social history:  Social History     Socioeconomic History    Marital status: Single     Spouse name: Not on file    Number of children: Not on file    Years of education: Not on file    Highest education level: Not on file   Occupational History    Not on file   Tobacco Use    Smoking status: Never Smoker    Smokeless tobacco: Never Used   Vaping Use    Vaping Use: Never used   Substance and Sexual Activity    Alcohol use: No    Drug use: No    Sexual activity: Not on file   Other Topics Concern    Not on file   Social History Narrative    Not on file     Social Determinants of Health     Financial Resource Strain: Low Risk     Difficulty of Paying Living Expenses: Not hard at all   Food Insecurity: No Food Insecurity    Worried About Running Out of Food in the Last Year: Never true    Ran Out of Food in the Last Year: Never true   Transportation Needs:     Lack of Transportation (Medical):      Lack of Transportation (Non-Medical):    Physical Activity:     Days of Exercise per Week:     Minutes of Exercise per Session:    Stress:     Feeling of Stress :    Social Connections:     Frequency of Communication with Friends and Family:     Frequency of Social Gatherings with Friends and Family:     Attends Orthodoxy Services:     Active Member of Clubs or Organizations:     Attends Club or Organization Meetings:     Marital Status:    Intimate Partner Violence:     Fear of Current or Ex-Partner:     Emotionally Abused:     Physically Abused:     Sexually Abused:        Current Medications:     Current Facility-Administered Medications:     dexmedetomidine (PRECEDEX) 400 mcg in sodium chloride 0.9 % 100 mL infusion, 0.2-1.4 mcg/kg/hr, IntraVENous, Continuous, Lisy Aguilera MD, Last Rate: 10.4 mL/hr at 09/10/21 2304, 0.6 mcg/kg/hr at 09/10/21 2304    levETIRAcetam (KEPPRA) 1000 mg/100 mL IVPB, 1,000 mg, IntraVENous, Q12H, Cala July, APRN - CNP, Stopped at 09/10/21 2121    insulin lispro (HUMALOG) injection vial 0-18 Units, 0-18 Units, SubCUTAneous, Q4H, Cala July, APRN - CNP, 3 Units at 09/10/21 0247    [COMPLETED] anidulafungin (ERAXIS) 200 mg in dextrose 5 % 260 mL IVPB, 200 mg, IntraVENous, Once, Stopped at 09/07/21 1500 **AND** anidulafungin (ERAXIS) 100 mg in dextrose 5 % 130 mL IVPB, 100 mg, IntraVENous, Q24H, Marichuy Jones MD, Stopped at 09/10/21 1200    lidocaine PF 1 % injection 5 mL, 5 mL, IntraDERmal, Once, Mami Mendez MD    sodium chloride flush 0.9 % injection 5-40 mL, 5-40 mL, IntraVENous, 2 times per day, Mami Mendez MD, 10 mL at 09/10/21 2105    sodium chloride flush 0.9 % injection 5-40 mL, 5-40 mL, IntraVENous, PRN, Vester Party, MD    0.9 % sodium chloride infusion, 25 mL, IntraVENous, PRN, Joe Alexandre MD    heparin flush 100 UNIT/ML injection 300 Units, 3 mL, IntraVENous, 2 times per day, Joe Alexandre MD, 300 Units at 09/10/21 2100    heparin flush 100 UNIT/ML injection 300 Units, 3 mL, IntraCATHeter, PRN, Joe Alexandre MD    bisacodyl (DULCOLAX) suppository 10 mg, 10 mg, Rectal, Daily PRN, Rhonda Howrad MD, 10 mg at 09/05/21 1607    dexamethasone (PF) (DECADRON) injection 6 mg, 6 mg, IntraVENous, Q12H, Denny Littlejohn MD, 6 mg at 09/10/21 2105    diatrizoate meglumine-sodium (GASTROGRAFIN) 66-10 % solution 50 mL, 50 mL, PEG Tube, ONCE PRN, Jocelyne Vail MD, 50 mL at 09/03/21 1618    polyethylene glycol (GLYCOLAX) packet 17 g, 17 g, Oral, Daily, Denny Littlejohn MD, 17 g at 09/10/21 0931    baricitinib (OLUMIANT) tablet 4 mg, 4 mg, Oral, Daily, Denny Littlejohn MD, 4 mg at 09/10/21 0930    glucose (GLUTOSE) 40 % oral gel 15 g, 15 g, Oral, PRN, Denny Littlejohn MD    dextrose 50 % IV solution, 12.5 g, IntraVENous, PRN, Denny Littlejohn MD, 12.5 g at 09/08/21 1151    glucagon (rDNA) injection 1 mg, 1 mg, IntraMUSCular, PRN, Denny Littlejohn MD    dextrose 5 % solution, 100 mL/hr, IntraVENous, PRN, Denny Littlejohn MD    sodium chloride flush 0.9 % injection 5-40 mL, 5-40 mL, IntraVENous, 2 times per day, Augie Nissen, APRN - CNP, 10 mL at 09/10/21 2105    sodium chloride flush 0.9 % injection 10 mL, 10 mL, IntraVENous, PRN, Augie Nissen, APRN - CNP, 10 mL at 09/07/21 0319    0.9 % sodium chloride infusion, 25 mL, IntraVENous, PRN, Augie Nissen, APRN - CNP    ondansetron (ZOFRAN-ODT) disintegrating tablet 4 mg, 4 mg, Oral, Q8H PRN **OR** ondansetron (ZOFRAN) injection 4 mg, 4 mg, IntraVENous, Q6H PRN, Augie Nissen, APRN - CNP    polyethylene glycol (GLYCOLAX) packet 17 g, 17 g, Oral, Daily PRN, Augie Nissen, APRN - CNP    acetaminophen (TYLENOL) tablet 650 mg, 650 mg, Oral, Q6H PRN, 650 mg at 09/10/21 2055 **OR** acetaminophen (TYLENOL) suppository 650 mg, 650 mg, Rectal, Q6H PRN, Georgann Flow, APRN - CNP    ipratropium-albuterol (DUONEB) nebulizer solution 1 ampule, 1 ampule, Inhalation, 4x daily, Georgann Flow, APRN - CNP, 1 ampule at 09/10/21 0935    pantoprazole (PROTONIX) injection 40 mg, 40 mg, IntraVENous, Daily, 40 mg at 09/10/21 0930 **AND** sodium chloride (PF) 0.9 % injection 10 mL, 10 mL, IntraVENous, Daily, Georgann Flow, APRN - CNP, 10 mL at 09/10/21 0933    perflutren lipid microspheres (DEFINITY) injection 1.65 mg, 1.5 mL, IntraVENous, ONCE PRN, Georgann Flow, APRN - CNP    fentaNYL (SUBLIMAZE) injection 25 mcg, 25 mcg, IntraVENous, Q1H PRN, Yevgeniy Yee MD    lacosamide (VIMPAT) tablet 150 mg, 150 mg, Oral, BID, Georgann Flow, APRN - CNP, 150 mg at 09/10/21 2056    lamoTRIgine (LAMICTAL) tablet 200 mg, 200 mg, Oral, TID, Georgann Flow, APRN - CNP, 200 mg at 09/10/21 2057    zonisamide (ZONEGRAN) capsule 300 mg, 300 mg, Oral, Nightly, Georgann Flow, APRN - CNP, 300 mg at 09/10/21 2058    baclofen (LIORESAL) tablet 10 mg, 10 mg, Oral, TID, Shefali Gallagher MD, 10 mg at 09/10/21 2056    enoxaparin (LOVENOX) injection 40 mg, 40 mg, SubCUTAneous, BID, Shefali Gallagher MD, 40 mg at 09/10/21 2100    polyvinyl alcohol (LIQUIFILM TEARS) 1.4 % ophthalmic solution 1 drop, 1 drop, Both Eyes, Q6H, Shefali Gallagher MD, 1 drop at 09/10/21 2305    0.9 % sodium chloride bolus, 30 mL, IntraVENous, PRN, Eve Baeza DO, Stopped at 08/29/21 2103    Unable to participate in review of systems secondary to nonverbal and baseline mental status    Physical Exam:   Vital Signs:  BP (!) 125/102   Pulse 84   Temp 99.7 °F (37.6 °C) (Rectal)   Resp 24   Ht 5' (1.524 m)   Wt 153 lb (69.4 kg)   SpO2 97%   BMI 29.88 kg/m²     Input/Output:   In: 787.5 [I.V.:408.5; NG/GT:149]  Out: 4050     Oxygen patient has a high probability of sudden, clinically significant deterioration, which requires the highest level of physician preparedness to intervene urgently. I managed/supervised life or organ supporting interventions that required frequent physician assessment. I devoted my full attention to the direct care of this patient for the amount of time indicated below. Time I spent with family or surrogate(s) is included only if the patient was incapable of providing the necessary information or participating in medical decision-making. Time devoted to teaching and to any procedures I billed separately is not included.   Critical Care Time: 30 minutes      Electronically signed by MINERVA Patterson CNP on 9/11/2021 at 3:43 AM

## 2021-09-11 NOTE — PROGRESS NOTES
[address] [place], [place] [address]ist   Progress Note    Admitting Date and Time: 8/28/2021  3:31 PM  Admit Dx: Hypokalemia [E87.6]  Acute respiratory failure with hypoxia ([facility] [J96.01]  Pneumonia of right lower lobe due to infectious organism [J18.9]  Sepsis with acute hypoxic respiratory failure without septic shock, due to unspecified organism ([facility] [facility] [A41.9, R65.20, J96.01]    Subjective/plan:    Pt was extubated yesterday morning, currently on heated high flow. Overnight he had a seizure for which he was started on IV Keppra. He does have a history of seizure disorder and has been on multiple antiepileptic medications including Vimpat, Lamictal, Zonegran, and Klonopin. He is awake, alert, does not appear to be in any distress. ROS: denies fever, chills, cp, sob, n/v, HA unless stated above.      dexamethasone  6 mg IntraVENous Q24H    clonazePAM  0.5 mg Per NG tube BID    baclofen  20 mg Oral TID    levETIRAcetam in NaCl  1,000 mg IntraVENous Q12H    insulin lispro  0-18 Units SubCUTAneous Q4H    anidulafungin  100 mg IntraVENous Q24H    lidocaine PF  5 mL IntraDERmal Once    sodium chloride flush  5-40 mL IntraVENous 2 times per day    heparin flush  3 mL IntraVENous 2 times per day    polyethylene glycol  17 g Oral Daily    baricitinib  4 mg Oral Daily    sodium chloride flush  5-40 mL IntraVENous 2 times per day    ipratropium-albuterol  1 ampule Inhalation 4x daily    pantoprazole  40 mg IntraVENous Daily    And    sodium chloride (PF)  10 mL IntraVENous Daily    lacosamide  150 mg Oral BID    lamoTRIgine  200 mg Oral TID    zonisamide  300 mg Oral Nightly    enoxaparin  40 mg SubCUTAneous BID    polyvinyl alcohol  1 drop Both Eyes Q6H     sodium chloride flush, 5-40 mL, PRN  sodium chloride, 25 mL, PRN  heparin flush, 3 mL, PRN  bisacodyl, 10 mg, Daily PRN  diatrizoate meglumine-sodium, 50 mL, ONCE PRN  glucose, 15 g, PRN  dextrose, 12.5 g, PRN  glucagon (rDNA), 1 mg, PRN  dextrose, 100 mL/hr, PRN  sodium chloride flush, 10 mL, PRN  sodium chloride, 25 mL, PRN  ondansetron, 4 mg, Q8H PRN   Or  ondansetron, 4 mg, Q6H PRN  polyethylene glycol, 17 g, Daily PRN  acetaminophen, 650 mg, Q6H PRN   Or  acetaminophen, 650 mg, Q6H PRN  perflutren lipid microspheres, 1.5 mL, ONCE PRN  fentanNYL, 25 mcg, Q1H PRN  sodium chloride, 30 mL, PRN         Objective:    BP (!) 147/101   Pulse 85   Temp 99.7 °F (37.6 °C) (Rectal)   Resp 22   Ht 5' (1.524 m)   Wt 153 lb (69.4 kg)   SpO2 99%   BMI 29.88 kg/m²   General Appearance: Awake, alert, but does not converse. Does not appear to be in any distress. Skin: warm and dry  Head: normocephalic and atraumatic  Eyes: pupils equal, round, and reactive to light, extraocular eye movements intact, conjunctivae normal  Neck: neck supple and without mass. Chronic contracture to the right  Pulmonary/Chest: clear to auscultation bilaterally- no wheezes, rales or rhonchi, normal air movement, no respiratory distress  Cardiovascular: normal rate, normal S1 and S2 and no carotid bruits  Abdomen: soft, , non-distended, normal bowel sounds, no masses or organomegaly  Extremities: Chronic contractures of the upper and lower extremities. Trace bilateral pedal edema. No cyanosis or clubbing. Neurologic: Chronic contractures as noted above.   Does not otherwise participate in neurologic exam.  Reflexes normal.      Recent Labs     09/09/21  0520 09/10/21  0507 09/11/21  0552    135 133   K 4.2 3.4* 3.5    100 97*   CO2 31* 29 26   BUN 8 7 8   CREATININE 0.4* 0.3* 0.3*   GLUCOSE 127* 104* 95   CALCIUM 8.6 7.8* 9.1       Recent Labs     09/09/21  0520 09/10/21  0507 09/11/21  0552   ALKPHOS 191* 157* 196*   PROT 6.2* 5.3* 6.8   LABALBU 2.9* 2.4* 3.3*   BILITOT 0.6 0.4 0.7   AST 34 21 29   ALT 20 16 18       Recent Labs     09/09/21  0520 09/10/21  0507 09/11/21  0552   WBC 8.9 7.8 11.1   RBC 3.26* 2.71* 3.33*   HGB 10.5* 9.0* 10.8*   HCT 33.4* 27.2* 33.0*   .5* 100.4* 99.1   MCH 32.2 33.2 32.4   MCHC 31.4* 33.1 32.7   RDW 12.6 12.6 12.2    447 591*   MPV 10.0 10.2 10.6       Radiology:   XR CHEST PORTABLE   Preliminary Result   Improved aeration of the lungs with persistent bilateral reticular airspace   opacities that may be on the basis of pulmonary edema or atypical/viral   pneumonia. XR CHEST PORTABLE   Final Result   Stable bilateral infiltrates. ET tube tip 2.5 cm from the shantell. XR CHEST PORTABLE   Final Result   Interval placement of right-sided PICC line. Bilateral parenchymal infiltrates are similar to slightly worsened. Continued follow-up recommended. XR CHEST PORTABLE   Final Result   1. Lines okay. 2. Mild-moderate improved left lung aeration, overall decreased lung volumes. XR CHEST PORTABLE   Final Result   1. Slight advancement of the endotracheal tube which now resides in the   midthoracic trachea. 2.  Stable appearance of the chest.  Stable position of the enteric tube. XR CHEST PORTABLE   Final Result   1. Interval slight advancement of the endotracheal tube which now resides in   the proximal thoracic trachea. 2.  Persistent ill-defined opacities in the right greater than left lungs. No change from prior exam.  No obvious pneumothorax. 3.  Enteric tube. Contrast material identified within the stomach lumen. XR CHEST PORTABLE   Final Result   1. Persistent ill-defined opacities in the bilateral lungs slightly worse in   the left mid to upper lung. Probable trace pleural effusions. 2.  Slight retraction of the endotracheal tube which projects over the   cervical region. Suggest repositioning. 3.  There appears to be a satisfactory position of the enteric tube. The findings were sent to the Radiology Results [id number] at 7:12   pm on 9/3/2021to be communicated to a licensed caregiver.          XR ABDOMEN FOR NG/OG/NE TUBE PLACEMENT   Final Result   1. Satisfactory position of gastric feeding tube as evidenced by stomach   contour outlined following injection of oral contrast.      2.  Satisfactory position of enteric tube. 3.  Significant reflux of oral contrast material into the esophagus. XR CHEST PORTABLE   Final Result   Significant bilateral infiltrates with partial interval clearing of the left   lower lobe infiltrate since the prior study         XR CHEST PORTABLE   Final Result   Satisfactory position of the ETT and NGT. Otherwise, stable chest.         XR CHEST PORTABLE   Final Result   Increasing right lung opacities consistent with pneumonia. US DUP LOWER EXTREMITIES BILATERAL VENOUS   Final Result   No evidence of DVT in either lower extremity. XR CHEST PORTABLE   Final Result   1. Right lower lobe pneumonia. 2. Left lower lobe atelectasis versus pneumonia. Assessment/Plan:  Principal Problem:    Acute respiratory failure with hypoxia (HCC)  Active Problems:    Cerebral palsy (HCC)    Seizures ([address]    Mentally disabled    Convulsions ([address]    Pneumonia due to COVID-19 virus  Resolved Problems:    * No resolved hospital problems. *      1. Acute respiratory failure with hypoxemia due to COVID-19 pneumonia  -Patient was extubated 9/10, will be monitoring his progress. On Precedex for sedation without suppressing respiratory drive. -Most recent AB.452/35.7/86.2/28. 3. On Vapotherm FiO2 50%     2. COVID-19 pneumonia  -Continue dexamethasone 6 mg IV every 24 hours and baricitinib 4 mg oral daily  -Infectious disease following     3. Superimposed bacterial pneumonia with concern for aspiration  -Sputum Cx growing Klebsiella sensitive to cefepime, was on cefepime but this was discontinued per ID  -Repeat sputum Cx growing yeast, not candida - was started on eraxis  -Did receive 3 days of azithromycin as well, however was DCd  -Strep and legionella negative. Initial BCx negative and repeat also negative so far  -CXR slight improvement in LLL infiltrate  -WBC 11.1 today, afebrile over 72hrs  -Will follow up final sputum Cx and monitor labs and VS     4. Lactic acidosis (resolved)  -LA peaked at 11.8 on 8/30, has improved to 1.1 most recently  -Treated with IV fluids and treatment of infections as noted above     5. Electrolyte abnormalities  -As needed IV replacement     6. Seizure disorder 2/2 cerebral palsy with breakthrough seizure  -Patient has known CP, is nonverbal at baseline with severe developmental delay  -Has seizures, takes baclofen, vimpat, lamictal and zonisamide at home which are continued here  -Keppra added overnight for breakthrough seizure     7. Elevated blood glucose  -Patient has had elevated glucose levels up to 379, this   -No known diagnosis of DM and patient is currently on steroids which will increase glucose  -A1C should be checked to determine if patient is diabetic or just elevated BS 2/2 steroids and current illness   -Currently on NPO ISS with hypoglycemic protocol  -Getting trickled tube feeds per dietary recommendations and increase rate when supine     8. Septic shock (resolved)  -Patient hypotensive initially, required levophed but was able to be weaned off  -Off all IVF and pressors now  -/101 today (was 75/39 at the lowest on 8/29)  -WBC 7.8, lactic acid normalized  -So far BCx neg, resp cx growing klebsiella and yeast, UCx neg and tip cx NGTD    DVT prophylaxis: Twice daily subcutaneous enoxaparin  CODE STATUS: Full code    NOTE: This report was transcribed using voice recognition software. Every effort was made to ensure accuracy; however, inadvertent computerized transcription errors may be present.      Electronically signed by Liz Cardoso DO on 9/11/2021 at 9:49 AM

## 2021-09-12 ENCOUNTER — APPOINTMENT (OUTPATIENT)
Dept: GENERAL RADIOLOGY | Age: 26
DRG: 870 | End: 2021-09-12
Payer: COMMERCIAL

## 2021-09-12 PROBLEM — J69.0 ASPIRATION PNEUMONIA (HCC): Status: ACTIVE | Noted: 2021-09-12

## 2021-09-12 LAB
ALBUMIN SERPL-MCNC: 3.6 G/DL (ref 3.5–5.2)
ALP BLD-CCNC: 188 U/L (ref 40–129)
ALT SERPL-CCNC: 20 U/L (ref 0–40)
ANION GAP SERPL CALCULATED.3IONS-SCNC: 12 MMOL/L (ref 7–16)
AST SERPL-CCNC: 26 U/L (ref 0–39)
BASOPHILS ABSOLUTE: 0 E9/L (ref 0–0.2)
BASOPHILS RELATIVE PERCENT: 0.3 % (ref 0–2)
BILIRUB SERPL-MCNC: 0.8 MG/DL (ref 0–1.2)
BUN BLDV-MCNC: 11 MG/DL (ref 6–20)
CALCIUM SERPL-MCNC: 9.1 MG/DL (ref 8.6–10.2)
CHLORIDE BLD-SCNC: 104 MMOL/L (ref 98–107)
CO2: 22 MMOL/L (ref 22–29)
CREAT SERPL-MCNC: 0.4 MG/DL (ref 0.7–1.2)
CULTURE, BLOOD 2: NORMAL
EOSINOPHILS ABSOLUTE: 0.12 E9/L (ref 0.05–0.5)
EOSINOPHILS RELATIVE PERCENT: 0.9 % (ref 0–6)
GFR AFRICAN AMERICAN: >60
GFR NON-AFRICAN AMERICAN: >60 ML/MIN/1.73
GLUCOSE BLD-MCNC: 128 MG/DL (ref 74–99)
HCT VFR BLD CALC: 33.4 % (ref 37–54)
HEMOGLOBIN: 11.3 G/DL (ref 12.5–16.5)
LYMPHOCYTES ABSOLUTE: 1.38 E9/L (ref 1.5–4)
LYMPHOCYTES RELATIVE PERCENT: 9.6 % (ref 20–42)
MAGNESIUM: 1.9 MG/DL (ref 1.6–2.6)
MCH RBC QN AUTO: 32.8 PG (ref 26–35)
MCHC RBC AUTO-ENTMCNC: 33.8 % (ref 32–34.5)
MCV RBC AUTO: 97.1 FL (ref 80–99.9)
METER GLUCOSE: 122 MG/DL (ref 74–99)
METER GLUCOSE: 131 MG/DL (ref 74–99)
METER GLUCOSE: 98 MG/DL (ref 74–99)
MONOCYTES ABSOLUTE: 2.76 E9/L (ref 0.1–0.95)
MONOCYTES RELATIVE PERCENT: 20 % (ref 2–12)
MYELOCYTE PERCENT: 0.9 % (ref 0–0)
NEUTROPHILS ABSOLUTE: 9.66 E9/L (ref 1.8–7.3)
NEUTROPHILS RELATIVE PERCENT: 68.7 % (ref 43–80)
PDW BLD-RTO: 12.1 FL (ref 11.5–15)
PHOSPHORUS: 3 MG/DL (ref 2.5–4.5)
PLATELET # BLD: 723 E9/L (ref 130–450)
PMV BLD AUTO: 10.1 FL (ref 7–12)
POTASSIUM SERPL-SCNC: 3.3 MMOL/L (ref 3.5–5)
RBC # BLD: 3.44 E12/L (ref 3.8–5.8)
SODIUM BLD-SCNC: 138 MMOL/L (ref 132–146)
TOTAL PROTEIN: 7.2 G/DL (ref 6.4–8.3)
WBC # BLD: 13.8 E9/L (ref 4.5–11.5)

## 2021-09-12 PROCEDURE — 83735 ASSAY OF MAGNESIUM: CPT

## 2021-09-12 PROCEDURE — 87040 BLOOD CULTURE FOR BACTERIA: CPT

## 2021-09-12 PROCEDURE — 6370000000 HC RX 637 (ALT 250 FOR IP): Performed by: INTERNAL MEDICINE

## 2021-09-12 PROCEDURE — 85025 COMPLETE CBC W/AUTO DIFF WBC: CPT

## 2021-09-12 PROCEDURE — 2000000000 HC ICU R&B

## 2021-09-12 PROCEDURE — 36415 COLL VENOUS BLD VENIPUNCTURE: CPT

## 2021-09-12 PROCEDURE — 6370000000 HC RX 637 (ALT 250 FOR IP)

## 2021-09-12 PROCEDURE — 80053 COMPREHEN METABOLIC PANEL: CPT

## 2021-09-12 PROCEDURE — 6360000002 HC RX W HCPCS

## 2021-09-12 PROCEDURE — 6360000002 HC RX W HCPCS: Performed by: SPECIALIST

## 2021-09-12 PROCEDURE — 6360000002 HC RX W HCPCS: Performed by: INTERNAL MEDICINE

## 2021-09-12 PROCEDURE — 99232 SBSQ HOSP IP/OBS MODERATE 35: CPT | Performed by: INTERNAL MEDICINE

## 2021-09-12 PROCEDURE — C9113 INJ PANTOPRAZOLE SODIUM, VIA: HCPCS

## 2021-09-12 PROCEDURE — 71045 X-RAY EXAM CHEST 1 VIEW: CPT

## 2021-09-12 PROCEDURE — 82962 GLUCOSE BLOOD TEST: CPT

## 2021-09-12 PROCEDURE — 36592 COLLECT BLOOD FROM PICC: CPT

## 2021-09-12 PROCEDURE — 84100 ASSAY OF PHOSPHORUS: CPT

## 2021-09-12 PROCEDURE — 2580000003 HC RX 258: Performed by: SPECIALIST

## 2021-09-12 PROCEDURE — 2580000003 HC RX 258: Performed by: INTERNAL MEDICINE

## 2021-09-12 PROCEDURE — 2580000003 HC RX 258

## 2021-09-12 PROCEDURE — 2700000000 HC OXYGEN THERAPY PER DAY

## 2021-09-12 RX ORDER — QUETIAPINE FUMARATE 25 MG/1
25 TABLET, FILM COATED ORAL 2 TIMES DAILY
Status: DISCONTINUED | OUTPATIENT
Start: 2021-09-12 | End: 2021-09-14

## 2021-09-12 RX ORDER — DEXTROSE AND SODIUM CHLORIDE 5; .9 G/100ML; G/100ML
INJECTION, SOLUTION INTRAVENOUS CONTINUOUS
Status: DISCONTINUED | OUTPATIENT
Start: 2021-09-12 | End: 2021-09-19 | Stop reason: HOSPADM

## 2021-09-12 RX ORDER — DEXAMETHASONE SODIUM PHOSPHATE 4 MG/ML
6 INJECTION, SOLUTION INTRA-ARTICULAR; INTRALESIONAL; INTRAMUSCULAR; INTRAVENOUS; SOFT TISSUE EVERY 24 HOURS
Status: DISCONTINUED | OUTPATIENT
Start: 2021-09-12 | End: 2021-09-13

## 2021-09-12 RX ORDER — POTASSIUM CHLORIDE 29.8 MG/ML
20 INJECTION INTRAVENOUS 2 TIMES DAILY
Status: COMPLETED | OUTPATIENT
Start: 2021-09-12 | End: 2021-09-12

## 2021-09-12 RX ORDER — CLONAZEPAM 0.5 MG/1
0.5 TABLET, ORALLY DISINTEGRATING ORAL 3 TIMES DAILY
Status: DISCONTINUED | OUTPATIENT
Start: 2021-09-12 | End: 2021-09-14 | Stop reason: ALTCHOICE

## 2021-09-12 RX ORDER — POTASSIUM CHLORIDE 29.8 MG/ML
20 INJECTION INTRAVENOUS ONCE
Status: COMPLETED | OUTPATIENT
Start: 2021-09-12 | End: 2021-09-12

## 2021-09-12 RX ADMIN — LAMOTRIGINE 200 MG: 100 TABLET ORAL at 21:25

## 2021-09-12 RX ADMIN — MEROPENEM 1000 MG: 1 INJECTION, POWDER, FOR SOLUTION INTRAVENOUS at 18:40

## 2021-09-12 RX ADMIN — POTASSIUM CHLORIDE 20 MEQ: 29.8 INJECTION, SOLUTION INTRAVENOUS at 09:01

## 2021-09-12 RX ADMIN — ENOXAPARIN SODIUM 40 MG: 40 INJECTION SUBCUTANEOUS at 08:59

## 2021-09-12 RX ADMIN — SODIUM CHLORIDE, PRESERVATIVE FREE 10 ML: 5 INJECTION INTRAVENOUS at 09:00

## 2021-09-12 RX ADMIN — MEROPENEM 1000 MG: 1 INJECTION, POWDER, FOR SOLUTION INTRAVENOUS at 12:26

## 2021-09-12 RX ADMIN — Medication 10 ML: at 21:39

## 2021-09-12 RX ADMIN — ACETAMINOPHEN 650 MG: 325 TABLET ORAL at 08:59

## 2021-09-12 RX ADMIN — HEPARIN 300 UNITS: 100 SYRINGE at 09:00

## 2021-09-12 RX ADMIN — CLONAZEPAM 0.5 MG: 0.5 TABLET, ORALLY DISINTEGRATING ORAL at 13:45

## 2021-09-12 RX ADMIN — Medication 10 ML: at 09:03

## 2021-09-12 RX ADMIN — LAMOTRIGINE 200 MG: 100 TABLET ORAL at 13:45

## 2021-09-12 RX ADMIN — QUETIAPINE FUMARATE 25 MG: 25 TABLET ORAL at 16:41

## 2021-09-12 RX ADMIN — SODIUM CHLORIDE 25 ML: 9 INJECTION, SOLUTION INTRAVENOUS at 05:44

## 2021-09-12 RX ADMIN — ZONISAMIDE 300 MG: 100 CAPSULE ORAL at 21:25

## 2021-09-12 RX ADMIN — IPRATROPIUM BROMIDE AND ALBUTEROL SULFATE 1 AMPULE: .5; 3 SOLUTION RESPIRATORY (INHALATION) at 19:22

## 2021-09-12 RX ADMIN — DEXAMETHASONE SODIUM PHOSPHATE 6 MG: 4 INJECTION, SOLUTION INTRAMUSCULAR; INTRAVENOUS at 21:43

## 2021-09-12 RX ADMIN — BARICITINIB 4 MG: 2 TABLET, FILM COATED ORAL at 08:59

## 2021-09-12 RX ADMIN — POLYVINYL ALCOHOL 1 DROP: 14 SOLUTION/ DROPS OPHTHALMIC at 22:16

## 2021-09-12 RX ADMIN — PIPERACILLIN SODIUM AND TAZOBACTAM SODIUM 3375 MG: 3; .375 INJECTION, POWDER, LYOPHILIZED, FOR SOLUTION INTRAVENOUS at 02:20

## 2021-09-12 RX ADMIN — Medication 10 ML: at 09:02

## 2021-09-12 RX ADMIN — LEVETIRACETAM 1000 MG: 1000 INJECTION, SOLUTION INTRAVENOUS at 09:14

## 2021-09-12 RX ADMIN — POLYVINYL ALCOHOL 1 DROP: 14 SOLUTION/ DROPS OPHTHALMIC at 03:46

## 2021-09-12 RX ADMIN — LACOSAMIDE 150 MG: 100 TABLET, FILM COATED ORAL at 21:25

## 2021-09-12 RX ADMIN — POLYETHYLENE GLYCOL 3350 17 G: 17 POWDER, FOR SOLUTION ORAL at 09:00

## 2021-09-12 RX ADMIN — VANCOMYCIN HYDROCHLORIDE 1250 MG: 10 INJECTION, POWDER, LYOPHILIZED, FOR SOLUTION INTRAVENOUS at 13:45

## 2021-09-12 RX ADMIN — LAMOTRIGINE 200 MG: 100 TABLET ORAL at 09:00

## 2021-09-12 RX ADMIN — BACLOFEN 20 MG: 10 TABLET ORAL at 08:59

## 2021-09-12 RX ADMIN — POLYVINYL ALCOHOL 1 DROP: 14 SOLUTION/ DROPS OPHTHALMIC at 09:01

## 2021-09-12 RX ADMIN — PIPERACILLIN SODIUM AND TAZOBACTAM SODIUM 3375 MG: 3; .375 INJECTION, POWDER, LYOPHILIZED, FOR SOLUTION INTRAVENOUS at 09:03

## 2021-09-12 RX ADMIN — BACLOFEN 20 MG: 10 TABLET ORAL at 21:32

## 2021-09-12 RX ADMIN — POTASSIUM CHLORIDE 20 MEQ: 29.8 INJECTION, SOLUTION INTRAVENOUS at 10:44

## 2021-09-12 RX ADMIN — PANTOPRAZOLE SODIUM 40 MG: 40 INJECTION, POWDER, FOR SOLUTION INTRAVENOUS at 09:00

## 2021-09-12 RX ADMIN — CLONAZEPAM 0.5 MG: 0.5 TABLET, ORALLY DISINTEGRATING ORAL at 21:25

## 2021-09-12 RX ADMIN — DEXTROSE AND SODIUM CHLORIDE: 5; 900 INJECTION, SOLUTION INTRAVENOUS at 10:43

## 2021-09-12 RX ADMIN — ACETAMINOPHEN 650 MG: 325 TABLET ORAL at 16:41

## 2021-09-12 RX ADMIN — LACOSAMIDE 150 MG: 100 TABLET, FILM COATED ORAL at 08:59

## 2021-09-12 RX ADMIN — POTASSIUM CHLORIDE 20 MEQ: 29.8 INJECTION, SOLUTION INTRAVENOUS at 21:34

## 2021-09-12 RX ADMIN — CLONAZEPAM 0.5 MG: 0.5 TABLET, ORALLY DISINTEGRATING ORAL at 06:42

## 2021-09-12 RX ADMIN — LEVETIRACETAM 1000 MG: 1000 INJECTION, SOLUTION INTRAVENOUS at 21:43

## 2021-09-12 RX ADMIN — BACLOFEN 20 MG: 10 TABLET ORAL at 13:45

## 2021-09-12 RX ADMIN — POLYVINYL ALCOHOL 1 DROP: 14 SOLUTION/ DROPS OPHTHALMIC at 16:41

## 2021-09-12 NOTE — PROGRESS NOTES
Physical Therapy    Room #: IC03/IC03-01  Date: 2021       Patient Name: Vasiel Betts  : 1995      MRN: [id number]     Patient unavailable for physical therapy eval due to hold per nursing due to tachycardia most of day. Will attempt PT evaluation at a later time. Thank you.        Sebastian Andre, PT

## 2021-09-12 NOTE — PROGRESS NOTES
[facility]  [facility]  Progress Note    NAME: Vinnie Villa  MR:  [id number]  :   1995  DATE OF SERVICE:21    This is a face to face encounter with Vinnie Villa 30 y.o. male on 21    CHIEF COMPLAINT     ID following for   Chief Complaint   Patient presents with    Altered Mental Status    Respiratory Distress     41% SpO2     HISTORY OF PRESENT ILLNESS   Pt seen   21     No physical  examination today due to the efforts to preserve PPE and limit transmission/exposure to COVID-19. All relevant records and diagnostic tests were reviewed, including laboratory results and imaging. Please reference any relevant documentation elsewhere. Care will be coordinated with patient's care team.    Extubated supine   per nurse has tan secretions   Temp 101.1 [id number]    Patient is tolerating medications. No reported adverse drug reactions. REVIEW OF SYSTEMS     As stated above in the chief complaint, otherwise negative.   CURRENT MEDICATIONS      clonazePAM  0.5 mg Per NG tube TID    dexamethasone  6 mg IntraVENous Q24H    baclofen  20 mg Oral TID    piperacillin-tazobactam  3,375 mg IntraVENous Q8H    sodium chloride  25 mL IntraVENous Q8H    levETIRAcetam in NaCl  1,000 mg IntraVENous Q12H    insulin lispro  0-18 Units SubCUTAneous Q4H    lidocaine PF  5 mL IntraDERmal Once    sodium chloride flush  5-40 mL IntraVENous 2 times per day    heparin flush  3 mL IntraVENous 2 times per day    polyethylene glycol  17 g Oral Daily    baricitinib  4 mg Oral Daily    sodium chloride flush  5-40 mL IntraVENous 2 times per day    ipratropium-albuterol  1 ampule Inhalation 4x daily    pantoprazole  40 mg IntraVENous Daily    And    sodium chloride (PF)  10 mL IntraVENous Daily    lacosamide  150 mg Oral BID    lamoTRIgine  200 mg Oral TID    zonisamide  300 mg Oral Nightly    enoxaparin  40 mg SubCUTAneous BID    polyvinyl alcohol  1 drop Both Eyes Q6H Continuous Infusions:   dexmedetomidine HCl in NaCl Stopped (21 0600)    sodium chloride      dextrose      sodium chloride       PRN Meds:sodium chloride flush, sodium chloride, heparin flush, bisacodyl, diatrizoate meglumine-sodium, glucose, dextrose, glucagon (rDNA), dextrose, sodium chloride flush, sodium chloride, ondansetron **OR** ondansetron, polyethylene glycol, acetaminophen **OR** acetaminophen, perflutren lipid microspheres, fentanNYL, sodium chloride    PHYSICAL EXAM     /84   Pulse 139   Temp 101.1 °F (38.4 °C) (Rectal)   Resp 20   Ht 5' (1.524 m)   Wt 153 lb (69.4 kg)   SpO2 95%   BMI 29.88 kg/m²   Temp  Av.5 °F (38.1 °C)  Min: 100 °F (37.8 °C)  Max: 101.1 °F (38.4 °C)  Constitutional:  The patient is   supined        [facility]       DIAGNOSTIC RESULTS   Radiology:    Recent Labs     09/10/21  [id number] 21  0617   WBC 7.8 11.1 13.8*   RBC 2.71* 3.33* 3.44*   HGB 9.0* 10.8* 11.3*   HCT 27.2* 33.0* 33.4*   .4* 99.1 97.1   MCH 33.2 32.4 32.8   MCHC 33.1 32.7 33.8   RDW 12.6 12.2 12.1    591* 723*   MPV 10.2 10.6 10.1     Recent Labs     09/10/21  05021  0552 21  0617    133 138   K 3.4* 3.5 3.3*    97* 104   CO2 29 26 22   BUN 7 8 11   CREATININE 0.3* 0.3* 0.4*   GLUCOSE 104* 95 128*   PROT 5.3* 6.8 7.2   LABALBU 2.4* 3.3* 3.6   CALCIUM 7.8* 9.1 9.1   BILITOT 0.4 0.7 0.8   ALKPHOS 157* 196* 188*   AST 21 29 26   ALT 16 18 20     Lab Results   Component Value Date    CRP 0.3 2021    CRP 8.4 (H) 2021     Lab Results   Component Value Date    SEDRATE 5 2019    SEDRATE 29 (H) 2018    SEDRATE 21 (H) 08/10/2018     Recent Labs     09/10/21  0507 21  0552 21  0617   AST 21 29 26   ALT 16 18 20   TRIG 701*  --   --         Microbiology:   No results for input(s): COVID19 in the last 72 hours.   Lab Results   Component Value Date    BC 5 Days no growth 2021    BC 5 Days no growth 08/28/2021    BLOODCULT2 24 Hours no growth 09/07/2021    BLOODCULT2 5 Days no growth 08/28/2021    ORG Yeast, not C. albicans 09/04/2021    ORG Klebsiella pneumoniae ssp pneumoniae 08/29/2021    ORG Coagulase Negative Staphylococci 07/24/2018    ORG Corynebacterium species 07/24/2018     WOUND/ABSCESS   Date Value Ref Range Status   07/24/2018 Light growth  Final   07/24/2018 Light growth  Mixed morphologies    Final   06/29/2018   Corrected    Light growth  Too fastidious for susceptibility testing.     06/29/2018 Light growth  Corrected   06/29/2018 Moderate growth  Corrected     Smear, Respiratory   Date Value Ref Range Status   09/04/2021   Final    Moderate Polymorphonuclear leukocytes  Epithelial cells not seen  No organisms seen            MRSA Culture Only   Date Value Ref Range Status   08/29/2021 Methicillin resistant Staph aureus not isolated  Final     CULTURE, RESPIRATORY   Date Value Ref Range Status   09/04/2021 Oral Pharyngeal Adina reduced (A)  Final   09/04/2021 Light growth  Final   08/29/2021 Oral Pharyngeal Adina reduced (A)  Final   08/29/2021 Moderate growth  Final        FINAL IMPRESSION    Pt had   Chief Complaint   Patient presents with    Altered Mental Status    Respiratory Distress     41% SpO2    Admitted for Hypokalemia [E87.6]  Acute respiratory failure with hypoxia (HCC) [J96.01]  Pneumonia of right lower lobe due to infectious organism [J18.9]  Sepsis with acute hypoxic respiratory failure without septic shock, due to unspecified organism ([facility] [address] [A41.9, R65.20, J96.01]  On treatment for above  And covid + 8/28  Extubated 9/10  S/p  leukopenia/leukocytosis  Klebisella pneumonia check final cx  Mrsa neg   Yeast, not C.  Albicans colonized resp cx  Cerebral palsy     New fevers/leukocytosis 9/12 re cx add vanco   baricitinib (OLUMIANT) tablet 4 mg, Daily day 12/14  anidulafungin (ERAXIS) 100 mg in dextrose 5 % 130 mL IVPB, Q24H day 5 will stop  dexamethasone (PF) (DECADRON) injection 6 mg, Q12H  enoxaparin (LOVENOX) injection 40 mg, BID     Cont PIPTAZO  WATCH FEVERS  · Monitor labs    Imaging and labs were reviewed per medical records. .  Thank you for involving me in the care of Judi Damionor will continue to follow. Please do not hesitate to call for any questions or concerns.     Electronically signed by Mariah Loco MD on 9/12/2021 at 9:22 AM

## 2021-09-12 NOTE — PROGRESS NOTES
Pharmacy Consultation Note  (Antibiotic Dosing and Monitoring)    Initial consult date: 9/12/2021  Consulting physician/provider: Dr. Lanie Cruz  Drug: Vancomycin  Indication: HAP    Age/  Gender Height Weight IBW  Allergy Information   25 y.o./male 5' (152.4 cm) 130 lb (59 kg)     Ideal body weight: 50 kg (110 lb 3.7 oz)  Adjusted ideal body weight: 57.8 kg (127 lb 5.4 oz)   Patient has no known allergies. Renal Function:  Recent Labs     09/10/21  0507 09/11/21  0552 09/12/21  0617   BUN 7 8 11   CREATININE 0.3* 0.3* 0.4*       Intake/Output Summary (Last 24 hours) at 9/12/2021 1136  Last data filed at 9/12/2021 0644  Gross per 24 hour   Intake 1407.99 ml   Output 2625 ml   Net -1217.01 ml       Vancomycin Monitoring:  Trough:  No results for input(s): VANCOTROUGH in the last 72 hours. Random:  No results for input(s): VANCORANDOM in the last 72 hours. Vancomycin Administration Times:  Recent vancomycin administrations      No vancomycin IV orders with administrations found. Orders not given:          vancomycin (VANCOCIN) 1,250 mg in dextrose 5 % 250 mL IVPB                Assessment:  · Patient is a 30 y.o. male who has been initiated on vancomycin  · Estimated Creatinine Clearance: 227 mL/min (A) (based on SCr of 0.4 mg/dL (L)).   · To dose vancomycin, pharmacy will be utilizing Wikidot calculation software for goal AUC/DERIAN 400-600 mg/L-hr    Plan:  · Will continue vancomycin 1250 mg IV every 12 hours  · Will check vancomycin levels when appropriate  · Will continue to monitor renal function   · Clinical pharmacy to follow    Ronny Alvares PharmD, BCCCP  9/12/2021  11:37 AM

## 2021-09-12 NOTE — PROGRESS NOTES
Pulmonary/Critical Care Progress Note    We are following patient for acute respiratory failure, COVID-19 pneumonitis, possible aspiration pneumonitis, status post extubation on 9/10/2021, history of seizures requiring Keppra prior Klebsiella right upper lobe pneumonia for which she has received treatment, cerebral palsy    SUBJECTIVE:  The patient continues on Zosyn, dexamethasone 6 mg every 24 hours, and baricitinib 4 mg/day for a total of 14 days. He apparently had a seizure 2 nights ago following extubation but is been doing well on Keppra. The patient sodium was 133 yesterday and is 138 today now on sodium chloride intermittently. No white blood cell count has gone up slightly    The patient will need to be mobilized out of bed or he will have trouble with retained secretions and is at risk for a repeat episode of acute respiratory failure. Therefore, we will asked the patient's family to bring the patient specialized wheelchair to the hospital so that he can be lifted into the chair to help clear secretions and stabilize his tenuous respiratory condition. If the patient seized 2 nights ago, it is very possible he aspirated and set up this current febrile episode.     MEDICATIONS:   clonazePAM  0.5 mg Per NG tube TID    dexamethasone  6 mg IntraVENous Q24H    baclofen  20 mg Oral TID    piperacillin-tazobactam  3,375 mg IntraVENous Q8H    sodium chloride  25 mL IntraVENous Q8H    levETIRAcetam in NaCl  1,000 mg IntraVENous Q12H    insulin lispro  0-18 Units SubCUTAneous Q4H    lidocaine PF  5 mL IntraDERmal Once    sodium chloride flush  5-40 mL IntraVENous 2 times per day    heparin flush  3 mL IntraVENous 2 times per day    polyethylene glycol  17 g Oral Daily    baricitinib  4 mg Oral Daily    sodium chloride flush  5-40 mL IntraVENous 2 times per day    ipratropium-albuterol  1 ampule Inhalation 4x daily    pantoprazole  40 mg IntraVENous Daily    And    sodium chloride (PF)  10 mL IntraVENous Daily    lacosamide  150 mg Oral BID    lamoTRIgine  200 mg Oral TID    zonisamide  300 mg Oral Nightly    enoxaparin  40 mg SubCUTAneous BID    polyvinyl alcohol  1 drop Both Eyes Q6H      dexmedetomidine HCl in NaCl Stopped (09/12/21 0600)    sodium chloride      dextrose      sodium chloride       sodium chloride flush, sodium chloride, heparin flush, bisacodyl, diatrizoate meglumine-sodium, glucose, dextrose, glucagon (rDNA), dextrose, sodium chloride flush, sodium chloride, ondansetron **OR** ondansetron, polyethylene glycol, acetaminophen **OR** acetaminophen, perflutren lipid microspheres, fentanNYL, sodium chloride      REVIEW OF SYSTEMS:  Patient is unable to answer questions regarding review of systems because of his speech impairment secondary to cerebral palsy. OBJECTIVE:  Vitals:    09/12/21 0800   BP: 113/84   Pulse: 139   Resp: 20   Temp: 101.1 °F (38.4 °C)   SpO2: 95%     FiO2 : 40 %  O2 Flow Rate (L/min): 4 L/min  O2 Device: Nasal cannula    PHYSICAL EXAM:  Constitutional: Fever as noted.   Heart rate has increased, blood pressure stable, saturation is within acceptable limits  Skin: No fever, chills, diaphoresis  HEENT: No skin rash, no skin breakdown  Neck: No fever, chills, diaphoresis  Cardiovascular: No skin rash, no skin breakdown  Respiratory: Coarse breath sounds anteriorly and posteriorly both sides  Gastrointestinal: Soft, flat, nontender  Genitourinary: No CVA tenderness  Extremities: Clubbing, cyanosis, or edema  Neurological: Baseline function and patient with cerebral palsy  Psychological: Cannot evaluate    LABS:  WBC   Date Value Ref Range Status   09/12/2021 13.8 (H) 4.5 - 11.5 E9/L Final   09/11/2021 11.1 4.5 - 11.5 E9/L Final   09/10/2021 7.8 4.5 - 11.5 E9/L Final     Hemoglobin   Date Value Ref Range Status   09/12/2021 11.3 (L) 12.5 - 16.5 g/dL Final   09/11/2021 10.8 (L) 12.5 - 16.5 g/dL Final   09/10/2021 9.0 (L) 12.5 - 16.5 g/dL Final     Hematocrit Date Value Ref Range Status   09/12/2021 33.4 (L) 37.0 - 54.0 % Final   09/11/2021 33.0 (L) 37.0 - 54.0 % Final   09/10/2021 27.2 (L) 37.0 - 54.0 % Final     MCV   Date Value Ref Range Status   09/12/2021 97.1 80.0 - 99.9 fL Final   09/11/2021 99.1 80.0 - 99.9 fL Final   09/10/2021 100.4 (H) 80.0 - 99.9 fL Final     Platelets   Date Value Ref Range Status   09/12/2021 723 (H) 130 - 450 E9/L Final   09/11/2021 591 (H) 130 - 450 E9/L Final   09/10/2021 447 130 - 450 E9/L Final     Sodium   Date Value Ref Range Status   09/12/2021 138 132 - 146 mmol/L Final   09/11/2021 133 132 - 146 mmol/L Final   09/10/2021 135 132 - 146 mmol/L Final     Potassium   Date Value Ref Range Status   09/12/2021 3.3 (L) 3.5 - 5.0 mmol/L Final   09/11/2021 3.5 3.5 - 5.0 mmol/L Final   09/10/2021 3.4 (L) 3.5 - 5.0 mmol/L Final     Chloride   Date Value Ref Range Status   09/12/2021 104 98 - 107 mmol/L Final   09/11/2021 97 (L) 98 - 107 mmol/L Final   09/10/2021 100 98 - 107 mmol/L Final     CO2   Date Value Ref Range Status   09/12/2021 22 22 - 29 mmol/L Final   09/11/2021 26 22 - 29 mmol/L Final   09/10/2021 29 22 - 29 mmol/L Final     BUN   Date Value Ref Range Status   09/12/2021 11 6 - 20 mg/dL Final   09/11/2021 8 6 - 20 mg/dL Final   09/10/2021 7 6 - 20 mg/dL Final     CREATININE   Date Value Ref Range Status   09/12/2021 0.4 (L) 0.7 - 1.2 mg/dL Final   09/11/2021 0.3 (L) 0.7 - 1.2 mg/dL Final   09/10/2021 0.3 (L) 0.7 - 1.2 mg/dL Final     Glucose   Date Value Ref Range Status   09/12/2021 128 (H) 74 - 99 mg/dL Final   09/11/2021 95 74 - 99 mg/dL Final   09/10/2021 104 (H) 74 - 99 mg/dL Final     Calcium   Date Value Ref Range Status   09/12/2021 9.1 8.6 - 10.2 mg/dL Final   09/11/2021 9.1 8.6 - 10.2 mg/dL Final   09/10/2021 7.8 (L) 8.6 - 10.2 mg/dL Final     Total Protein   Date Value Ref Range Status   09/12/2021 7.2 6.4 - 8.3 g/dL Final   09/11/2021 6.8 6.4 - 8.3 g/dL Final   09/10/2021 5.3 (L) 6.4 - 8.3 g/dL Final     Albumin Date Value Ref Range Status   09/12/2021 3.6 3.5 - 5.2 g/dL Final   09/11/2021 3.3 (L) 3.5 - 5.2 g/dL Final   09/10/2021 2.4 (L) 3.5 - 5.2 g/dL Final     Total Bilirubin   Date Value Ref Range Status   09/12/2021 0.8 0.0 - 1.2 mg/dL Final   09/11/2021 0.7 0.0 - 1.2 mg/dL Final   09/10/2021 0.4 0.0 - 1.2 mg/dL Final     Alkaline Phosphatase   Date Value Ref Range Status   09/12/2021 188 (H) 40 - 129 U/L Final   09/11/2021 196 (H) 40 - 129 U/L Final   09/10/2021 157 (H) 40 - 129 U/L Final     AST   Date Value Ref Range Status   09/12/2021 26 0 - 39 U/L Final   09/11/2021 29 0 - 39 U/L Final   09/10/2021 21 0 - 39 U/L Final     ALT   Date Value Ref Range Status   09/12/2021 20 0 - 40 U/L Final   09/11/2021 18 0 - 40 U/L Final   09/10/2021 16 0 - 40 U/L Final     GFR Non-   Date Value Ref Range Status   09/12/2021 >60 >=60 mL/min/1.73 Final     Comment:     Chronic Kidney Disease: less than 60 ml/min/1.73 sq.m. Kidney Failure: less than 15 ml/min/1.73 sq.m. Results valid for patients 18 years and older. 09/11/2021 >60 >=60 mL/min/1.73 Final     Comment:     Chronic Kidney Disease: less than 60 ml/min/1.73 sq.m. Kidney Failure: less than 15 ml/min/1.73 sq.m. Results valid for patients 18 years and older. 09/10/2021 >60 >=60 mL/min/1.73 Final     Comment:     Chronic Kidney Disease: less than 60 ml/min/1.73 sq.m. Kidney Failure: less than 15 ml/min/1.73 sq.m. Results valid for patients 18 years and older.        GFR    Date Value Ref Range Status   09/12/2021 >60  Final   09/11/2021 >60  Final   09/10/2021 >60  Final     Magnesium   Date Value Ref Range Status   09/12/2021 1.9 1.6 - 2.6 mg/dL Final   09/11/2021 1.9 1.6 - 2.6 mg/dL Final   09/10/2021 2.0 1.6 - 2.6 mg/dL Final     Phosphorus   Date Value Ref Range Status   09/12/2021 3.0 2.5 - 4.5 mg/dL Final   09/11/2021 3.1 2.5 - 4.5 mg/dL Final   09/10/2021 3.1 2.5 - 4.5 mg/dL Final     Recent Labs 09/10/21  0512 09/10/21  0512 09/11/21  0442   PH 7.467*   < > 7.452*   PO2 76.2  --   --    PCO2 40.1  --   --    HCO3 28.3*  --   --    BE 4.3*   < > 1.1   O2SAT 95.4   < > 97.1    < > = values in this interval not displayed. RADIOLOGY:  XR CHEST PORTABLE   Final Result   Improved aeration of the lungs with persistent bilateral reticular airspace   opacities that may be on the basis of pulmonary edema or atypical/viral   pneumonia. XR CHEST PORTABLE   Final Result   Stable bilateral infiltrates. ET tube tip 2.5 cm from the shantell. XR CHEST PORTABLE   Final Result   Interval placement of right-sided PICC line. Bilateral parenchymal infiltrates are similar to slightly worsened. Continued follow-up recommended. XR CHEST PORTABLE   Final Result   1. Lines okay. 2. Mild-moderate improved left lung aeration, overall decreased lung volumes. XR CHEST PORTABLE   Final Result   1. Slight advancement of the endotracheal tube which now resides in the   midthoracic trachea. 2.  Stable appearance of the chest.  Stable position of the enteric tube. XR CHEST PORTABLE   Final Result   1. Interval slight advancement of the endotracheal tube which now resides in   the proximal thoracic trachea. 2.  Persistent ill-defined opacities in the right greater than left lungs. No change from prior exam.  No obvious pneumothorax. 3.  Enteric tube. Contrast material identified within the stomach lumen. XR CHEST PORTABLE   Final Result   1. Persistent ill-defined opacities in the bilateral lungs slightly worse in   the left mid to upper lung. Probable trace pleural effusions. 2.  Slight retraction of the endotracheal tube which projects over the   cervical region. Suggest repositioning. 3.  There appears to be a satisfactory position of the enteric tube.       The findings were sent to the Radiology Results [address] at 7:12   pm on 9/3/2021to be communicated to a licensed caregiver. XR ABDOMEN FOR NG/OG/NE TUBE PLACEMENT   Final Result   1. Satisfactory position of gastric feeding tube as evidenced by stomach   contour outlined following injection of oral contrast.      2.  Satisfactory position of enteric tube. 3.  Significant reflux of oral contrast material into the esophagus. XR CHEST PORTABLE   Final Result   Significant bilateral infiltrates with partial interval clearing of the left   lower lobe infiltrate since the prior study         XR CHEST PORTABLE   Final Result   Satisfactory position of the ETT and NGT. Otherwise, stable chest.         XR CHEST PORTABLE   Final Result   Increasing right lung opacities consistent with pneumonia. US DUP LOWER EXTREMITIES BILATERAL VENOUS   Final Result   No evidence of DVT in either lower extremity. XR CHEST PORTABLE   Final Result   1. Right lower lobe pneumonia. 2. Left lower lobe atelectasis versus pneumonia. XR CHEST PORTABLE    (Results Pending)           PROBLEM LIST:  Principal Problem:    Acute respiratory failure with hypoxia (HCC)  Active Problems:    Cerebral palsy (HCC)    Seizures ([address]    Mentally disabled    Convulsions ([address]    Pneumonia due to COVID-19 virus  Resolved Problems:    * No resolved hospital problems. *      IMPRESSION:  1. Probable aspiration pneumonia following seizure activity 2 days ago  2. Cerebral palsy  3. Status post COVID-19 pneumonitis with acute respiratory failure, extubated 9/10/2021  4. New seizures, now on Keppra    PLAN:  1. IV fluids  2. N.p.o. for now  3. Discontinue Zosyn  4. Add vancomycin and meropenem and fluconazole  5. Follow chest x-ray and other labs  6. Ask family to bring in specialized wheelchair to enable patient to sit up to help with secretion clearance  7.  Aerosolized bronchodilators    ATTESTATION:  ICU Staff Physician note of personal involvement in Care  As the attending physician, I certify that I personally reviewed the patients history and personally examined the patient to confirm the physical findings described above,  And that I reviewed the relevant imaging studies and available reports. I also discussed the differential diagnosis and all of the proposed management plans with the patient and individuals accompanying the patient to this visit. They had the opportunity to ask questions about the proposed management plans and to have those questions answered. This patient has a high probability of sudden, clinically significant deterioration, which requires the highest level of physician preparedness to intervene urgently. I managed/supervised life or organ supporting interventions that required frequent physician assessment. I devoted my full attention to the direct care of this patient for the amount of time indicated below. Time I spent with the family or surrogate(s) is included only if the patient was incapable of providing the necessary information or participating in medical decisions  Time devoted to teaching and to any procedures I billed separately is not included.     CRITICAL CARE TIME:  34 minutes    Electronically signed by Liborio Gagnon MD on 9/12/2021 at 9:50 AM

## 2021-09-12 NOTE — PROGRESS NOTES
[address] [place], [place] [address]ist   Progress Note    Admitting Date and Time: 8/28/2021  3:31 PM  Admit Dx: Hypokalemia [E87.6]  Acute respiratory failure with hypoxia ([facility] [J96.01]  Pneumonia of right lower lobe due to infectious organism [J18.9]  Sepsis with acute hypoxic respiratory failure without septic shock, due to unspecified organism ([facility] [A41.9, R65.20, J96.01]    Subjective/plan:    9/11: Pt was extubated yesterday morning, currently on heated high flow. Overnight he had a seizure for which he was started on IV Keppra. He does have a history of seizure disorder and has been on multiple antiepileptic medications including Vimpat, Lamictal, Zonegran, and Klonopin. He is awake, alert, does not appear to be in any distress. 9/12: Patient is awake, alert, sitting up in bed. Does not appear to be in any distress. He is tachycardic this morning, but spoke with RN at bedside and this happened yesterday morning prior to taking medications.     ROS: Unable to obtain review of systems due to baseline MRDD     clonazePAM  0.5 mg Per NG tube TID    potassium chloride  20 mEq IntraVENous BID    meropenem  1,000 mg IntraVENous Q8H    [START ON 9/13/2021] enoxaparin  40 mg SubCUTAneous Daily    dexamethasone  6 mg IntraVENous Q24H    baclofen  20 mg Oral TID    levETIRAcetam in NaCl  1,000 mg IntraVENous Q12H    insulin lispro  0-18 Units SubCUTAneous Q4H    lidocaine PF  5 mL IntraDERmal Once    sodium chloride flush  5-40 mL IntraVENous 2 times per day    heparin flush  3 mL IntraVENous 2 times per day    polyethylene glycol  17 g Oral Daily    baricitinib  4 mg Oral Daily    sodium chloride flush  5-40 mL IntraVENous 2 times per day    ipratropium-albuterol  1 ampule Inhalation 4x daily    pantoprazole  40 mg IntraVENous Daily    And    sodium chloride (PF)  10 mL IntraVENous Daily    lacosamide  150 mg Oral BID    lamoTRIgine  200 mg Oral TID    zonisamide  300 mg Oral Nightly    polyvinyl alcohol  1 drop Both Eyes Q6H     sodium chloride flush, 5-40 mL, PRN  sodium chloride, 25 mL, PRN  heparin flush, 3 mL, PRN  bisacodyl, 10 mg, Daily PRN  diatrizoate meglumine-sodium, 50 mL, ONCE PRN  glucose, 15 g, PRN  dextrose, 12.5 g, PRN  glucagon (rDNA), 1 mg, PRN  dextrose, 100 mL/hr, PRN  sodium chloride flush, 10 mL, PRN  sodium chloride, 25 mL, PRN  ondansetron, 4 mg, Q8H PRN   Or  ondansetron, 4 mg, Q6H PRN  polyethylene glycol, 17 g, Daily PRN  acetaminophen, 650 mg, Q6H PRN   Or  acetaminophen, 650 mg, Q6H PRN  perflutren lipid microspheres, 1.5 mL, ONCE PRN  fentanNYL, 25 mcg, Q1H PRN  sodium chloride, 30 mL, PRN         Objective:    /84   Pulse 139   Temp 101.1 °F (38.4 °C) (Rectal)   Resp 20   Ht 5' (1.524 m)   Wt 153 lb (69.4 kg)   SpO2 95%   BMI 29.88 kg/m²   General Appearance: Awake, alert, but does not converse. Does not appear to be in any distress. Skin: warm and dry  Head: normocephalic and atraumatic  Eyes: pupils equal, round, and reactive to light, extraocular eye movements intact, conjunctivae normal  Neck: neck supple and without mass. Chronic contracture to the right  Pulmonary/Chest: Nonlabored on heated high flow. Coarse breath sounds with rhonchi bilaterally today  Cardiovascular: normal rate, normal S1 and S2 and no carotid bruits  Abdomen: soft, , non-distended, normal bowel sounds, no masses or organomegaly  Extremities: Chronic contractures of the upper and lower extremities. Trace bilateral pedal edema. No cyanosis or clubbing. Neurologic: Chronic contractures as noted above.   Does not otherwise participate in neurologic exam.  Reflexes normal.      Recent Labs     09/10/21  0507 09/11/21  0552 09/12/21  0617    133 138   K 3.4* 3.5 3.3*    97* 104   CO2 29 26 22   BUN 7 8 11   CREATININE 0.3* 0.3* 0.4*   GLUCOSE 104* 95 128*   CALCIUM 7.8* 9.1 9.1       Recent Labs     09/10/21  0507 09/11/21  0552 09/12/21  0617   ALKPHOS 157* 196* 188*   PROT 5.3* 6.8 7.2   LABALBU 2.4* 3.3* 3.6   BILITOT 0.4 0.7 0.8   AST 21 29 26   ALT 16 18 20       Recent Labs     09/10/21  0507 09/11/21  0552 09/12/21  0617   WBC 7.8 11.1 13.8*   RBC 2.71* 3.33* 3.44*   HGB 9.0* 10.8* 11.3*   HCT 27.2* 33.0* 33.4*   .4* 99.1 97.1   MCH 33.2 32.4 32.8   MCHC 33.1 32.7 33.8   RDW 12.6 12.2 12.1    591* 723*   MPV 10.2 10.6 10.1       Radiology:   XR CHEST PORTABLE   Final Result   No significant change in the appearance of the chest compared with   yesterday's examination. XR CHEST PORTABLE   Final Result   Improved aeration of the lungs with persistent bilateral reticular airspace   opacities that may be on the basis of pulmonary edema or atypical/viral   pneumonia. XR CHEST PORTABLE   Final Result   Stable bilateral infiltrates. ET tube tip 2.5 cm from the shantell. XR CHEST PORTABLE   Final Result   Interval placement of right-sided PICC line. Bilateral parenchymal infiltrates are similar to slightly worsened. Continued follow-up recommended. XR CHEST PORTABLE   Final Result   1. Lines okay. 2. Mild-moderate improved left lung aeration, overall decreased lung volumes. XR CHEST PORTABLE   Final Result   1. Slight advancement of the endotracheal tube which now resides in the   midthoracic trachea. 2.  Stable appearance of the chest.  Stable position of the enteric tube. XR CHEST PORTABLE   Final Result   1. Interval slight advancement of the endotracheal tube which now resides in   the proximal thoracic trachea. 2.  Persistent ill-defined opacities in the right greater than left lungs. No change from prior exam.  No obvious pneumothorax. 3.  Enteric tube. Contrast material identified within the stomach lumen. XR CHEST PORTABLE   Final Result   1. Persistent ill-defined opacities in the bilateral lungs slightly worse in   the left mid to upper lung.   Probable trace pleural effusions. 2.  Slight retraction of the endotracheal tube which projects over the   cervical region. Suggest repositioning. 3.  There appears to be a satisfactory position of the enteric tube. The findings were sent to the Radiology Results [address] at 7:12   pm on 9/3/2021to be communicated to a licensed caregiver. XR ABDOMEN FOR NG/OG/NE TUBE PLACEMENT   Final Result   1. Satisfactory position of gastric feeding tube as evidenced by stomach   contour outlined following injection of oral contrast.      2.  Satisfactory position of enteric tube. 3.  Significant reflux of oral contrast material into the esophagus. XR CHEST PORTABLE   Final Result   Significant bilateral infiltrates with partial interval clearing of the left   lower lobe infiltrate since the prior study         XR CHEST PORTABLE   Final Result   Satisfactory position of the ETT and NGT. Otherwise, stable chest.         XR CHEST PORTABLE   Final Result   Increasing right lung opacities consistent with pneumonia. US DUP LOWER EXTREMITIES BILATERAL VENOUS   Final Result   No evidence of DVT in either lower extremity. XR CHEST PORTABLE   Final Result   1. Right lower lobe pneumonia. 2. Left lower lobe atelectasis versus pneumonia. XR CHEST PORTABLE    (Results Pending)       Assessment/Plan:  Principal Problem:    Acute respiratory failure with hypoxia (HCC)  Active Problems:    Cerebral palsy (HCC)    Seizures ([address]    Mentally disabled    Convulsions ([address]    Pneumonia due to COVID-19 virus    Aspiration pneumonia ([address]  Resolved Problems:    * No resolved hospital problems. *      1. Acute respiratory failure with hypoxemia due to COVID-19 pneumonia  -Patient was extubated 9/10, will be monitoring his progress. On Precedex for sedation without suppressing respiratory drive. -Most recent AB.452/35.7/86.2/28. 3.   On Vapotherm FiO2 40%     2. COVID-19 pneumonia  -Continue dexamethasone 6 mg IV every 24 hours and baricitinib 4 mg oral daily  -Infectious disease following     3. Superimposed bacterial pneumonia-probable aspiration pneumonia  -Sputum Cx growing Klebsiella sensitive to cefepime, was on cefepime but this was discontinued per ID  -Repeat sputum Cx growing yeast, not candida - was started on eraxis  -Did receive 3 days of azithromycin as well, however was DCd  -Strep and legionella negative. Initial BCx negative and repeat also negative so far  -Started on meropenem today per pulmonary critical care to cover for aspiration     4. Lactic acidosis (resolved)  -LA peaked at 11.8 on 8/30, has improved to 1.1 most recently  -Treated with IV fluids and treatment of infections as noted above     5. Electrolyte abnormalities  -As needed IV replacement     6. Seizure disorder 2/2 cerebral palsy with breakthrough seizure  -Patient has known CP, is nonverbal at baseline with severe developmental delay  -Has seizures, takes baclofen, vimpat, lamictal and zonisamide at home which are continued here  -Keppra added overnight 9/10 for breakthrough seizure     7. Elevated blood glucose  -Patient has had elevated glucose levels up to 379, this   -No known diagnosis of DM and patient is currently on steroids which will increase glucose  -A1C should be checked to determine if patient is diabetic or just elevated BS 2/2 steroids and current illness   -Currently on NPO ISS with hypoglycemic protocol  -Getting trickled tube feeds per dietary recommendations and increase rate when supine     8.    Septic shock (resolved)  -Patient hypotensive initially, required levophed but was able to be weaned off  -Off all IVF and pressors now  -/101 today (was 75/39 at the lowest on 8/29)  -WBC 7.8, lactic acid normalized  -So far BCx neg, resp cx growing klebsiella and yeast, UCx neg and tip cx NGTD    DVT prophylaxis: Twice daily subcutaneous enoxaparin  CODE STATUS: Full code    NOTE: This report was transcribed using voice recognition software. Every effort was made to ensure accuracy; however, inadvertent computerized transcription errors may be present.      Electronically signed by Harleen Chavarria DO on 9/12/2021 at 10:59 AM

## 2021-09-13 ENCOUNTER — APPOINTMENT (OUTPATIENT)
Dept: GENERAL RADIOLOGY | Age: 26
DRG: 870 | End: 2021-09-13
Payer: COMMERCIAL

## 2021-09-13 LAB
ALBUMIN SERPL-MCNC: 3.7 G/DL (ref 3.5–5.2)
ALP BLD-CCNC: 160 U/L (ref 40–129)
ALT SERPL-CCNC: 20 U/L (ref 0–40)
AMMONIA: 39 UMOL/L (ref 16–60)
ANION GAP SERPL CALCULATED.3IONS-SCNC: 10 MMOL/L (ref 7–16)
AST SERPL-CCNC: 23 U/L (ref 0–39)
BACTERIA: ABNORMAL /HPF
BASOPHILS ABSOLUTE: 0.12 E9/L (ref 0–0.2)
BASOPHILS RELATIVE PERCENT: 0.9 % (ref 0–2)
BILIRUB SERPL-MCNC: 0.7 MG/DL (ref 0–1.2)
BILIRUBIN URINE: NEGATIVE
BLOOD, URINE: ABNORMAL
BUN BLDV-MCNC: 8 MG/DL (ref 6–20)
BURR CELLS: ABNORMAL
CALCIUM SERPL-MCNC: 9.2 MG/DL (ref 8.6–10.2)
CHLORIDE BLD-SCNC: 107 MMOL/L (ref 98–107)
CLARITY: ABNORMAL
CO2: 21 MMOL/L (ref 22–29)
COLOR: ABNORMAL
CREAT SERPL-MCNC: 0.3 MG/DL (ref 0.7–1.2)
EOSINOPHILS ABSOLUTE: 0 E9/L (ref 0.05–0.5)
EOSINOPHILS RELATIVE PERCENT: 0 % (ref 0–6)
EPITHELIAL CELLS, UA: ABNORMAL /HPF
GFR AFRICAN AMERICAN: >60
GFR NON-AFRICAN AMERICAN: >60 ML/MIN/1.73
GLUCOSE BLD-MCNC: 124 MG/DL (ref 74–99)
GLUCOSE URINE: NEGATIVE MG/DL
HCT VFR BLD CALC: 34.3 % (ref 37–54)
HEMOGLOBIN: 11.2 G/DL (ref 12.5–16.5)
KETONES, URINE: NEGATIVE MG/DL
LACTIC ACID: 0.7 MMOL/L (ref 0.5–2.2)
LEUKOCYTE ESTERASE, URINE: NEGATIVE
LYMPHOCYTES ABSOLUTE: 1.56 E9/L (ref 1.5–4)
LYMPHOCYTES RELATIVE PERCENT: 12.2 % (ref 20–42)
MAGNESIUM: 2 MG/DL (ref 1.6–2.6)
MCH RBC QN AUTO: 32.3 PG (ref 26–35)
MCHC RBC AUTO-ENTMCNC: 32.7 % (ref 32–34.5)
MCV RBC AUTO: 98.8 FL (ref 80–99.9)
MONOCYTES ABSOLUTE: 1.17 E9/L (ref 0.1–0.95)
MONOCYTES RELATIVE PERCENT: 8.7 % (ref 2–12)
NEUTROPHILS ABSOLUTE: 10.14 E9/L (ref 1.8–7.3)
NEUTROPHILS RELATIVE PERCENT: 78.3 % (ref 43–80)
NITRITE, URINE: NEGATIVE
PDW BLD-RTO: 12.5 FL (ref 11.5–15)
PH UA: 7 (ref 5–9)
PHOSPHORUS: 2.7 MG/DL (ref 2.5–4.5)
PLATELET # BLD: 661 E9/L (ref 130–450)
PMV BLD AUTO: 9.7 FL (ref 7–12)
POIKILOCYTES: ABNORMAL
POLYCHROMASIA: ABNORMAL
POTASSIUM SERPL-SCNC: 3.5 MMOL/L (ref 3.5–5)
PROTEIN UA: 100 MG/DL
RBC # BLD: 3.47 E12/L (ref 3.8–5.8)
RBC UA: >20 /HPF (ref 0–2)
SCHISTOCYTES: ABNORMAL
SODIUM BLD-SCNC: 138 MMOL/L (ref 132–146)
SPECIFIC GRAVITY UA: >=1.03 (ref 1–1.03)
TEAR DROP CELLS: ABNORMAL
TOTAL PROTEIN: 7.3 G/DL (ref 6.4–8.3)
TRIGL SERPL-MCNC: 180 MG/DL (ref 0–149)
UROBILINOGEN, URINE: 2 E.U./DL
WBC # BLD: 13 E9/L (ref 4.5–11.5)
WBC UA: ABNORMAL /HPF (ref 0–5)

## 2021-09-13 PROCEDURE — 99232 SBSQ HOSP IP/OBS MODERATE 35: CPT | Performed by: STUDENT IN AN ORGANIZED HEALTH CARE EDUCATION/TRAINING PROGRAM

## 2021-09-13 PROCEDURE — 83735 ASSAY OF MAGNESIUM: CPT

## 2021-09-13 PROCEDURE — 6360000002 HC RX W HCPCS

## 2021-09-13 PROCEDURE — 2580000003 HC RX 258: Performed by: INTERNAL MEDICINE

## 2021-09-13 PROCEDURE — 6360000002 HC RX W HCPCS: Performed by: INTERNAL MEDICINE

## 2021-09-13 PROCEDURE — U0003 INFECTIOUS AGENT DETECTION BY NUCLEIC ACID (DNA OR RNA); SEVERE ACUTE RESPIRATORY SYNDROME CORONAVIRUS 2 (SARS-COV-2) (CORONAVIRUS DISEASE [COVID-19]), AMPLIFIED PROBE TECHNIQUE, MAKING USE OF HIGH THROUGHPUT TECHNOLOGIES AS DESCRIBED BY CMS-2020-01-R: HCPCS

## 2021-09-13 PROCEDURE — 81001 URINALYSIS AUTO W/SCOPE: CPT

## 2021-09-13 PROCEDURE — 6360000002 HC RX W HCPCS: Performed by: SPECIALIST

## 2021-09-13 PROCEDURE — 80053 COMPREHEN METABOLIC PANEL: CPT

## 2021-09-13 PROCEDURE — 80202 ASSAY OF VANCOMYCIN: CPT

## 2021-09-13 PROCEDURE — 6370000000 HC RX 637 (ALT 250 FOR IP)

## 2021-09-13 PROCEDURE — 87088 URINE BACTERIA CULTURE: CPT

## 2021-09-13 PROCEDURE — 2580000003 HC RX 258

## 2021-09-13 PROCEDURE — C9113 INJ PANTOPRAZOLE SODIUM, VIA: HCPCS

## 2021-09-13 PROCEDURE — 83605 ASSAY OF LACTIC ACID: CPT

## 2021-09-13 PROCEDURE — 84100 ASSAY OF PHOSPHORUS: CPT

## 2021-09-13 PROCEDURE — 71045 X-RAY EXAM CHEST 1 VIEW: CPT

## 2021-09-13 PROCEDURE — 82140 ASSAY OF AMMONIA: CPT

## 2021-09-13 PROCEDURE — 36415 COLL VENOUS BLD VENIPUNCTURE: CPT

## 2021-09-13 PROCEDURE — 2000000000 HC ICU R&B

## 2021-09-13 PROCEDURE — 36592 COLLECT BLOOD FROM PICC: CPT

## 2021-09-13 PROCEDURE — 2580000003 HC RX 258: Performed by: SPECIALIST

## 2021-09-13 PROCEDURE — 6370000000 HC RX 637 (ALT 250 FOR IP): Performed by: INTERNAL MEDICINE

## 2021-09-13 PROCEDURE — 97165 OT EVAL LOW COMPLEX 30 MIN: CPT | Performed by: OCCUPATIONAL THERAPIST

## 2021-09-13 PROCEDURE — 84478 ASSAY OF TRIGLYCERIDES: CPT

## 2021-09-13 PROCEDURE — 85025 COMPLETE CBC W/AUTO DIFF WBC: CPT

## 2021-09-13 PROCEDURE — 2700000000 HC OXYGEN THERAPY PER DAY

## 2021-09-13 RX ORDER — LEVETIRACETAM 15 MG/ML
1500 INJECTION INTRAVASCULAR EVERY 12 HOURS
Status: DISCONTINUED | OUTPATIENT
Start: 2021-09-13 | End: 2021-09-19 | Stop reason: HOSPADM

## 2021-09-13 RX ORDER — DEXAMETHASONE SODIUM PHOSPHATE 4 MG/ML
4 INJECTION, SOLUTION INTRA-ARTICULAR; INTRALESIONAL; INTRAMUSCULAR; INTRAVENOUS; SOFT TISSUE EVERY 24 HOURS
Status: DISCONTINUED | OUTPATIENT
Start: 2021-09-13 | End: 2021-09-15

## 2021-09-13 RX ORDER — POTASSIUM CHLORIDE 29.8 MG/ML
20 INJECTION INTRAVENOUS
Status: COMPLETED | OUTPATIENT
Start: 2021-09-13 | End: 2021-09-13

## 2021-09-13 RX ORDER — LORAZEPAM 2 MG/ML
INJECTION INTRAMUSCULAR
Status: COMPLETED
Start: 2021-09-13 | End: 2021-09-13

## 2021-09-13 RX ORDER — LORAZEPAM 2 MG/ML
2 INJECTION INTRAMUSCULAR ONCE
Status: COMPLETED | OUTPATIENT
Start: 2021-09-13 | End: 2021-09-13

## 2021-09-13 RX ADMIN — Medication 10 ML: at 09:03

## 2021-09-13 RX ADMIN — ENOXAPARIN SODIUM 40 MG: 40 INJECTION SUBCUTANEOUS at 10:07

## 2021-09-13 RX ADMIN — CLONAZEPAM 0.5 MG: 0.5 TABLET, ORALLY DISINTEGRATING ORAL at 14:13

## 2021-09-13 RX ADMIN — LACOSAMIDE 150 MG: 100 TABLET, FILM COATED ORAL at 10:14

## 2021-09-13 RX ADMIN — BACLOFEN 20 MG: 10 TABLET ORAL at 14:05

## 2021-09-13 RX ADMIN — SODIUM CHLORIDE, PRESERVATIVE FREE 10 ML: 5 INJECTION INTRAVENOUS at 10:15

## 2021-09-13 RX ADMIN — LEVETIRACETAM 1500 MG: 15 INJECTION INTRAVENOUS at 21:51

## 2021-09-13 RX ADMIN — MEROPENEM 1000 MG: 1 INJECTION, POWDER, FOR SOLUTION INTRAVENOUS at 10:08

## 2021-09-13 RX ADMIN — LAMOTRIGINE 200 MG: 100 TABLET ORAL at 20:23

## 2021-09-13 RX ADMIN — POLYVINYL ALCOHOL 1 DROP: 14 SOLUTION/ DROPS OPHTHALMIC at 17:08

## 2021-09-13 RX ADMIN — LEVETIRACETAM 1000 MG: 1000 INJECTION, SOLUTION INTRAVENOUS at 10:04

## 2021-09-13 RX ADMIN — HEPARIN 300 UNITS: 100 SYRINGE at 20:25

## 2021-09-13 RX ADMIN — VANCOMYCIN HYDROCHLORIDE 1250 MG: 10 INJECTION, POWDER, LYOPHILIZED, FOR SOLUTION INTRAVENOUS at 00:16

## 2021-09-13 RX ADMIN — QUETIAPINE FUMARATE 25 MG: 25 TABLET ORAL at 08:59

## 2021-09-13 RX ADMIN — DEXTROSE AND SODIUM CHLORIDE: 5; 900 INJECTION, SOLUTION INTRAVENOUS at 14:12

## 2021-09-13 RX ADMIN — POLYVINYL ALCOHOL 1 DROP: 14 SOLUTION/ DROPS OPHTHALMIC at 04:30

## 2021-09-13 RX ADMIN — POTASSIUM CHLORIDE 20 MEQ: 29.8 INJECTION, SOLUTION INTRAVENOUS at 15:04

## 2021-09-13 RX ADMIN — LORAZEPAM 2 MG: 2 INJECTION INTRAMUSCULAR at 19:30

## 2021-09-13 RX ADMIN — Medication 10 ML: at 21:51

## 2021-09-13 RX ADMIN — POLYVINYL ALCOHOL 1 DROP: 14 SOLUTION/ DROPS OPHTHALMIC at 22:00

## 2021-09-13 RX ADMIN — HEPARIN 300 UNITS: 100 SYRINGE at 09:01

## 2021-09-13 RX ADMIN — DEXAMETHASONE SODIUM PHOSPHATE 4 MG: 4 INJECTION, SOLUTION INTRAMUSCULAR; INTRAVENOUS at 21:51

## 2021-09-13 RX ADMIN — VANCOMYCIN HYDROCHLORIDE 1250 MG: 10 INJECTION, POWDER, LYOPHILIZED, FOR SOLUTION INTRAVENOUS at 13:57

## 2021-09-13 RX ADMIN — LACOSAMIDE 150 MG: 100 TABLET, FILM COATED ORAL at 20:24

## 2021-09-13 RX ADMIN — MEROPENEM 1000 MG: 1 INJECTION, POWDER, FOR SOLUTION INTRAVENOUS at 03:08

## 2021-09-13 RX ADMIN — DEXTROSE MONOHYDRATE 200 MG: 50 INJECTION, SOLUTION INTRAVENOUS at 13:56

## 2021-09-13 RX ADMIN — LAMOTRIGINE 200 MG: 100 TABLET ORAL at 10:14

## 2021-09-13 RX ADMIN — CLONAZEPAM 0.5 MG: 0.5 TABLET, ORALLY DISINTEGRATING ORAL at 20:23

## 2021-09-13 RX ADMIN — ACETAMINOPHEN 650 MG: 325 TABLET ORAL at 20:24

## 2021-09-13 RX ADMIN — LORAZEPAM 2 MG: 2 INJECTION INTRAMUSCULAR; INTRAVENOUS at 19:30

## 2021-09-13 RX ADMIN — PANTOPRAZOLE SODIUM 40 MG: 40 INJECTION, POWDER, FOR SOLUTION INTRAVENOUS at 10:12

## 2021-09-13 RX ADMIN — LAMOTRIGINE 200 MG: 100 TABLET ORAL at 14:05

## 2021-09-13 RX ADMIN — BACLOFEN 20 MG: 10 TABLET ORAL at 20:24

## 2021-09-13 RX ADMIN — MEROPENEM 1000 MG: 1 INJECTION, POWDER, FOR SOLUTION INTRAVENOUS at 18:49

## 2021-09-13 RX ADMIN — QUETIAPINE FUMARATE 25 MG: 25 TABLET ORAL at 20:24

## 2021-09-13 RX ADMIN — CLONAZEPAM 0.5 MG: 0.5 TABLET, ORALLY DISINTEGRATING ORAL at 08:59

## 2021-09-13 RX ADMIN — BARICITINIB 4 MG: 2 TABLET, FILM COATED ORAL at 10:12

## 2021-09-13 RX ADMIN — ZONISAMIDE 300 MG: 100 CAPSULE ORAL at 20:24

## 2021-09-13 RX ADMIN — BACLOFEN 20 MG: 10 TABLET ORAL at 10:13

## 2021-09-13 RX ADMIN — POLYVINYL ALCOHOL 1 DROP: 14 SOLUTION/ DROPS OPHTHALMIC at 10:15

## 2021-09-13 RX ADMIN — POTASSIUM CHLORIDE 20 MEQ: 29.8 INJECTION, SOLUTION INTRAVENOUS at 15:31

## 2021-09-13 RX ADMIN — Medication 10 ML: at 10:16

## 2021-09-13 ASSESSMENT — PAIN SCALES - GENERAL: PAINLEVEL_OUTOF10: 0

## 2021-09-13 NOTE — PROGRESS NOTES
[address] [facility]  [facility]         Date:2021                                                   Patient Name: Kyung Nageotte     MRN: [id number]     : 1995     Room: [place] [address]       Evaluating OT: Nikolay Pierre OTR/L; [id number]       Referring Provider and Orders/Date:   OT eval and treat Start: 21, End: 21, ONE TIME, Standing Count: 1 Occurrences, R    Jon Pearson MD       Diagnosis:   1. Acute respiratory failure with hypoxia ([facility]    2. Pneumonia of right lower lobe due to infectious organism    3. Hypokalemia    4. Pneumonia due to COVID-19 virus    5.  Sepsis with acute hypoxic respiratory failure and septic shock, due to unspecified organism [facility])         Pertinent Medical History:        Past Medical History:   Diagnosis Date    Cerebral palsy ([address]     COVID-19     G tube feedings ([facility]     age 3    History of blood transfusion     Mental handicap     mom states severe brain damage    Seizures (HCC)     tremors  last seizure 2018          Past Surgical History:   Procedure Laterality Date    DENTAL SURGERY  2015    xrays, restorations and extractions x1    DENTAL SURGERY  2016    DENTAL SURGERY N/A 2020    EXAM, RADIOGRAPHS, PROPHYLAXIS, DENTAL RESTORATIONS performed by Aniceto Stephen DDS at [place] [address]  2020    LEG TENDON SURGERY Bilateral     WI BIOPSY, EACH ADDED LESION Right 2018    EXCISION OF SOFT TISSUE NEOPLASM RIGHT LEG performed by Cameron Morgan MD at [facility] OR       Precautions:  Fall Risk, 2L, covid + with droplet    Recommended placement: home with family care and  services    Assessment of current deficits     [x] Functional mobility  []ADLs  [] Strength               []Cognition     [x] Functional transfers   [] IADLs         [] Safety Awareness   [x]Endurance     [] Fine Coordination              [x] Balance      [] Vision/perception   []Sensation      [x]Gross Motor Coordination  [] ROM  [] Delirium                   [] Motor Control     OT PLAN OF CARE   OT POC based on physician orders, patient diagnosis and results of clinical assessment    Specific OT Treatment Interventions not recommended with pt demonstrating PLOF. Recommended Adaptive Equipment/DME: none     Home Living: Pt lives at home with parents in a 2 story home with rale system, w/c bound. Pt has private duty nursing care. Palative care is also consulted at this time.     Bathroom setup: unknown    DME owned: unknown     Prior Level of Function: dep with ADLs , dep with IADLs; ambulated dep from w/c   Driving: no   Occupation: no   Enjoys: none reported    Pain Level: none  Cognition: A&O: 1/4; Follows 1 step directions; due to PLOF unable to assess memory, sequencing, problem solving and judgement with suspected PLOF    AM-PAC Daily Activity Inpatient   How much help for putting on and taking off regular lower body clothing?: Total  How much help for Bathing?: Total  How much help for Toileting?: Total  How much help for putting on and taking off regular upper body clothing?: Total  How much help for taking care of personal grooming?: Total  How much help for eating meals?: Total  AM-PAC Inpatient Daily Activity Raw Score: 6  AM-PAC Inpatient ADL T-Scale Score : 17.07  ADL Inpatient CMS 0-100% Score: 100  ADL Inpatient CMS G-Code Modifier : CN     Functional Assessment:     Initial Eval Status  Date: 9/13/2021   Treatment Status  Date: STGs = LTGs  Time frame: 10-14 days   Feeding Dependent   NA-PLOF   Grooming Dependent for hair comb  NA-PLOF    UB Dressing Dependent with gown management  NA-PLOF    LB Dressing Dependent with karli socks  NA-PLOF    Bathing Dependent from supine  NA-PLOF    Toileting Dependent  From supine  NA-PLOF    Bed Mobility  Supine to sit: Dependent   Sit to supine: Dependent   NA-PLOF Functional Transfers NT due to pt overall debility, decreased activity tolerance, balance deficits, safety and fall risk. NA-PLOF    Functional Mobility NT due to pt overall debility, decreased activity tolerance, balance deficits, safety and fall risk. NA-PLOF    Balance Sitting:     NT  Standing: NT  Sitting:   Standing: Not Appropriate-PLOF     Activity Tolerance Vitals with activity:145/83; 136 98%    NA     Visual/  Perceptual Glasses: not Present; pt unable to follow simple commands with testing    Reports change in vision since admission: No     NA     Hand Dominance  [] Right  [] Left    AROM (PROM) Strength Additional Info:    RUE  WFL with PROM. Resting with flex of wrist Unable to test   Opposition [] Intact [x] Impaired  Finger to nose [] Intact [x] Impaired     LUE WFL with PROM with decerebrate posturing  Unable to test   Opposition [] Intact [x] Impaired  Finger to nose [] Intact [x] Impaired     Hearing: WFL   Sensation:  No c/o numbness or tingling   Tone: hypotonic  Edema: none    Comments: Upon arrival patient resting in bed with ataxic, volitional movements. Pt required dep A for most UB ADLs and dep A LB ADLs tasks. Suspected that pt is PLOF for ADLs with Dep assist from family. Nursing recommending to Kathie Rim in bed for safety and seizure precautions. Palliative care is consulted. The biggest barriers reflect that of functional transfers, functional mobility, UB/LB ADLs, cognition, activity tolerance, balance, safety and strengthening. At end of session, patient supine with call light and phone within reach, all lines and tubes intact. Overall patient demonstrated PLOF regarding dependence and safety during completion of ADL/functional transfer/mobility tasks.  Nursing updated on pt position and status following OT eval. Pt would not benefit from continued skilled OT to increase safety and independence with completion of ADL/IADL tasks for functional independence and quality of life.    Rehab Potential: poor for established goals     Patient / Family Goal: none reported    Patient and/or family were instructed on functional diagnosis, prognosis/goals and OT plan of care. Demonstrated poor understanding. Eval Complexity: Low  · History: Brief review of medical records and additional review of physical, cognitive, or psychosocial history related to current functional performance  · Exam: 1-3 performance deficits  · Assistance/Modification: Min assistance or modifications required to perform tasks. May have comorbidities that affect occupational performance. Time In: 1145  Time Out: 1312  Total Treatment Time: 0    Min Units   OT Eval Low 97165  x  1   OT Eval Medium 11050      OT Eval High 47296      OT Re-Eval [id number]       Therapeutic Ex 52319       Therapeutic Activities 32496       ADL/Self Care 35827       Orthotic Management 94960       Manual [id number]     Neuro Re-Ed [id number]       Non-Billable Time          Evaluation Time additionally includes thorough review of current medical information, gathering information on past medical history/social history and prior level of function, interpretation of standardized testing/informal observation of tasks, assessment of data and development of plan of care and goals.             Torie Edmonds OTR/L; [id number]

## 2021-09-13 NOTE — PROGRESS NOTES
[address] [place], [place] [address]ist   Progress Note    Admitting Date and Time: 8/28/2021  3:31 PM  Admit Dx: Hypokalemia [E87.6]  Acute respiratory failure with hypoxia ([facility] [address] [J96.01]  Pneumonia of right lower lobe due to infectious organism [J18.9]  Sepsis with acute hypoxic respiratory failure without septic shock, due to unspecified organism ([facility] [address] [A41.9, R65.20, J96.01]    Subjective:    Patient was extubated 9/10 and has been doing well      Per RN: As above, patient extubated and now on precedex to help with sedation.      clonazePAM  0.5 mg Per NG tube TID    meropenem  1,000 mg IntraVENous Q8H    enoxaparin  40 mg SubCUTAneous Daily    vancomycin  1,250 mg IntraVENous Q12H    QUEtiapine  25 mg Oral BID    dexamethasone  6 mg IntraVENous Q24H    baclofen  20 mg Oral TID    levETIRAcetam in NaCl  1,000 mg IntraVENous Q12H    lidocaine PF  5 mL IntraDERmal Once    sodium chloride flush  5-40 mL IntraVENous 2 times per day    heparin flush  3 mL IntraVENous 2 times per day    polyethylene glycol  17 g Oral Daily    baricitinib  4 mg Oral Daily    sodium chloride flush  5-40 mL IntraVENous 2 times per day    ipratropium-albuterol  1 ampule Inhalation 4x daily    pantoprazole  40 mg IntraVENous Daily    And    sodium chloride (PF)  10 mL IntraVENous Daily    lacosamide  150 mg Oral BID    lamoTRIgine  200 mg Oral TID    zonisamide  300 mg Oral Nightly    polyvinyl alcohol  1 drop Both Eyes Q6H     sodium chloride flush, 5-40 mL, PRN  sodium chloride, 25 mL, PRN  heparin flush, 3 mL, PRN  bisacodyl, 10 mg, Daily PRN  diatrizoate meglumine-sodium, 50 mL, ONCE PRN  glucose, 15 g, PRN  dextrose, 12.5 g, PRN  glucagon (rDNA), 1 mg, PRN  dextrose, 100 mL/hr, PRN  sodium chloride flush, 10 mL, PRN  sodium chloride, 25 mL, PRN  ondansetron, 4 mg, Q8H PRN   Or  ondansetron, 4 mg, Q6H PRN  polyethylene glycol, 17 g, Daily PRN  acetaminophen, 650 mg, Q6H PRN   Or  acetaminophen, 650 mg, Q6H PRN  perflutren lipid microspheres, 1.5 mL, ONCE PRN  fentanNYL, 25 mcg, Q1H PRN  sodium chloride, 30 mL, PRN         Objective:    /82   Pulse 124   Temp 100.4 °F (38 °C) (Rectal)   Resp (!) 31   Ht 5' (1.524 m)   Wt 153 lb (69.4 kg)   SpO2 95%   BMI 29.88 kg/m²     Due to the current efforts to prevent transmission of COVID-19 and also the need to preserve PPE for other caregivers, a face-to-face encounter with the patient was not performed. That being said, all relevant records and diagnostic tests were reviewed, including laboratory results and imaging. Please reference any relevant documentation elsewhere. Care will be coordinated with other specialties as appropriate. Recent Labs     09/11/21  0552 09/12/21 0617 09/13/21  0514    138 138   K 3.5 3.3* 3.5   CL 97* 104 107   CO2 26 22 21*   BUN 8 11 8   CREATININE 0.3* 0.4* 0.3*   GLUCOSE 95 128* 124*   CALCIUM 9.1 9.1 9.2       Recent Labs     09/11/21  0552 09/12/21  0617 09/13/21  0514   ALKPHOS 196* 188* 160*   PROT 6.8 7.2 7.3   LABALBU 3.3* 3.6 3.7   BILITOT 0.7 0.8 0.7   AST 29 26 23   ALT 18 20 20       Recent Labs     09/11/21  0552 09/12/21 0617 09/13/21  0514   WBC 11.1 13.8* 13.0*   RBC 3.33* 3.44* 3.47*   HGB 10.8* 11.3* 11.2*   HCT 33.0* 33.4* 34.3*   MCV 99.1 97.1 98.8   MCH 32.4 32.8 32.3   MCHC 32.7 33.8 32.7   RDW 12.2 12.1 12.5   * 723* 661*   MPV 10.6 10.1 9.7                 Radiology:   XR CHEST PORTABLE   Preliminary Result   Patchy multifocal bilateral airspace disease, more prominent within the right   upper lobe. XR CHEST PORTABLE   Final Result   No significant change in the appearance of the chest compared with   yesterday's examination. XR CHEST PORTABLE   Final Result   Improved aeration of the lungs with persistent bilateral reticular airspace   opacities that may be on the basis of pulmonary edema or atypical/viral   pneumonia.          XR CHEST PORTABLE   Final Result   Stable lobe infiltrate since the prior study         XR CHEST PORTABLE   Final Result   Satisfactory position of the ETT and NGT. Otherwise, stable chest.         XR CHEST PORTABLE   Final Result   Increasing right lung opacities consistent with pneumonia. US DUP LOWER EXTREMITIES BILATERAL VENOUS   Final Result   No evidence of DVT in either lower extremity. XR CHEST PORTABLE   Final Result   1. Right lower lobe pneumonia. 2. Left lower lobe atelectasis versus pneumonia. Assessment:  Principal Problem:    Acute respiratory failure with hypoxia (HCC)  Active Problems:    Cerebral palsy (HCC)    Seizures ([facility] [address]    Mentally disabled    Convulsions ([facility] [address]    Pneumonia due to COVID-19 virus    Aspiration pneumonia ([facility] [address]  Resolved Problems:    * No resolved hospital problems. *      Plan:  1. Acute respiratory failure with hypoxemia  -Patient was extubated this morning, will be monitoring his progress. On Precedex for sedation without suppressing respiratory drive. Goes between RA to 2L NC  -Most recent AB.452/35.7/86.2/24.9. O2 saturation 95 %     2. COVID PNA  -Patient unvaccinated, was coughing, congested and had fevers prior to presentation  -Patient was extubated 9/10  -ID is on board  -Patient switched back to dexamethasone   -Patient completed 5 days of remdesivir and is on duonebs. Was also started on baricitinib scheduled to stop 9/15   -CXR today showed slight improvement in LLL infiltrate  -WBC 7.8, improved. Afebrile over 72hrs    3. PNA, possibly aspiration  -Sputum Cx growing Klebsiella sensitive to cefepime, was switched to meropenem and vancomycin  -Repeat sputum Cx growing yeast, not candida - was started on eraxis  -Did receive 3 days of azithromycin as well, however was DCd  -Strep and legionella negative.  Initial BCx negative and repeat also negative so far  -WBC 13 today, spiked a temp of 100.6 this AM. Possible aspiration event during breakthrough seizure  -Will follow up final sputum Cx and monitor labs and VS    4. Lactic acidosis (resolved)  -LA peaked at 11.8 on 8/30, has improved to 1.1 most recently  -Was getting bicarb infusion for acidosis, now off all IVF  -Will monitor BMP and lactic acid levels     5. Electrolyte abnormalities  -Potassium 3.5 today  -Mag and phos WNL today  -Will continue to monitor    6. Seizure disorder 2/2 cerebral palsy  -Patient has known CP, is nonverbal at baseline with severe developmental delay  -Has seizures, takes baclofen, vimpat, lamictal and zonisamide at home which are continued here  -Had a breakthrough seizure and was started on keppra, has been stable since    7. Elevated blood glucose  -Patient has had elevated glucose levels up to 379, this   -No known diagnosis of DM and patient is currently on steroids which will increase glucose  -A1C should be checked to determine if patient is diabetic or just elevated BS 2/2 steroids and current illness   -Currently on NPO ISS with hypoglycemic protocol  -Currently on tube feeds    8. Septic shock (resolved)  -Patient hypotensive initially, required levophed but was able to be weaned off  -Off all IVF and pressors now  -/73 today (was 75/39 at the lowest on 8/29)  -WBC 13, lactic acid normalized  -So far BCx neg, resp cx growing klebsiella and yeast, UCx neg and tip cx NGTD      NOTE: This report was transcribed using voice recognition software. Every effort was made to ensure accuracy; however, inadvertent computerized transcription errors may be present.      Electronically signed by Wolfgang Barragan MD on 9/13/2021 at 11:07 AM

## 2021-09-13 NOTE — PROGRESS NOTES
Pulmonary/Critical Care Progress Note    We are following patient for acute respiratory failure, COVID-19 pneumonitis, fever, history of seizures requiring Keppra, previous Klebsiella pneumonia in the right upper lobe which has largely cleared, possible urinary tract infection, status post extubation on 9/10/2021    SUBJECTIVE:  Patient has been weaned off oxygen and saturating 92% on room air. However, he still produces a great amount of secretions and in fact may be aspirating some of them which is common for him to do at home according to his father. Nevertheless, we have added Eraxis as discussed yesterday to his regimen of vancomycin and meropenem. The patient's urine also looks cloudy and perhaps is infected. Therefore, we will send a UA and C&S for this as well. The patient is tolerating tube feedings without difficulty via his NG tube with relatively small residuals. We have again asked that the patient's father bring his wheelchair to the lobby but so far, this has not been done.     MEDICATIONS:   anidulafungin  200 mg IntraVENous Once    And    [START ON 9/14/2021] anidulafungin  100 mg IntraVENous Q24H    dexamethasone  4 mg IntraVENous Q24H    clonazePAM  0.5 mg Per NG tube TID    meropenem  1,000 mg IntraVENous Q8H    enoxaparin  40 mg SubCUTAneous Daily    vancomycin  1,250 mg IntraVENous Q12H    QUEtiapine  25 mg Oral BID    baclofen  20 mg Oral TID    levETIRAcetam in NaCl  1,000 mg IntraVENous Q12H    lidocaine PF  5 mL IntraDERmal Once    sodium chloride flush  5-40 mL IntraVENous 2 times per day    heparin flush  3 mL IntraVENous 2 times per day    polyethylene glycol  17 g Oral Daily    baricitinib  4 mg Oral Daily    sodium chloride flush  5-40 mL IntraVENous 2 times per day    ipratropium-albuterol  1 ampule Inhalation 4x daily    pantoprazole  40 mg IntraVENous Daily    And    sodium chloride (PF)  10 mL IntraVENous Daily    lacosamide  150 mg Oral BID    lamoTRIgine  200 mg Oral TID    zonisamide  300 mg Oral Nightly    polyvinyl alcohol  1 drop Both Eyes Q6H      dextrose 5 % and 0.9 % NaCl 50 mL/hr at 09/13/21 1412    dexmedetomidine HCl in NaCl Stopped (09/12/21 0600)    sodium chloride      dextrose      sodium chloride       sodium chloride flush, sodium chloride, heparin flush, bisacodyl, diatrizoate meglumine-sodium, glucose, dextrose, glucagon (rDNA), dextrose, sodium chloride flush, sodium chloride, ondansetron **OR** ondansetron, polyethylene glycol, acetaminophen **OR** acetaminophen, perflutren lipid microspheres, fentanNYL, sodium chloride      REVIEW OF SYSTEMS:  Constitutional: Denies fever, weight loss, night sweats, and fatigue  Skin: Denies pigmentation, dark lesions, and rashes   HEENT: Denies hearing loss, tinnitus, ear drainage, epistaxis, sore throat, and hoarseness. Cardiovascular: Denies palpitations, chest pain, and chest pressure. Respiratory: Denies cough, dyspnea at rest, hemoptysis, apnea, and choking. Gastrointestinal: Denies nausea, vomiting, poor appetite, diarrhea, heartburn or reflux  Genitourinary: Denies dysuria, frequency, urgency or hematuria  Musculoskeletal: Denies myalgias, muscle weakness, and bone pain  Neurological: Denies dizziness, vertigo, headache, and focal weakness  Psychological: Denies anxiety and depression  Endocrine: Denies heat intolerance and cold intolerance  Hematopoietic/Lymphatic: Denies bleeding problems and blood transfusions    OBJECTIVE:  Vitals:    09/13/21 1400   BP: (!) 114/93   Pulse: 124   Resp: 26   Temp:    SpO2: 96%     FiO2 : 87 %  O2 Flow Rate (L/min): 2 L/min  O2 Device: None (Room air)    PHYSICAL EXAM:  Constitutional: Temperature approximately 100 °F, no chills, no diaphoresis  Skin: No skin rash, no skin breakdown  HEENT: Unremarkable  Neck: No JVD, lymphadenopathy, thyromegaly  Cardiovascular: S1, S2 regular.   No S3 murmurs rubs present  Respiratory: Low pitched wheezes anteriorly and posteriorly  Gastrointestinal: Soft, flat, nontender  Genitourinary: No grossly bloody urine  Extremities: No clubbing, cyanosis, or edema  Neurological: At baseline for his usual condition  Psychological: Cannot evaluate    LABS:  WBC   Date Value Ref Range Status   09/13/2021 13.0 (H) 4.5 - 11.5 E9/L Final   09/12/2021 13.8 (H) 4.5 - 11.5 E9/L Final   09/11/2021 11.1 4.5 - 11.5 E9/L Final     Hemoglobin   Date Value Ref Range Status   09/13/2021 11.2 (L) 12.5 - 16.5 g/dL Final   09/12/2021 11.3 (L) 12.5 - 16.5 g/dL Final   09/11/2021 10.8 (L) 12.5 - 16.5 g/dL Final     Hematocrit   Date Value Ref Range Status   09/13/2021 34.3 (L) 37.0 - 54.0 % Final   09/12/2021 33.4 (L) 37.0 - 54.0 % Final   09/11/2021 33.0 (L) 37.0 - 54.0 % Final     MCV   Date Value Ref Range Status   09/13/2021 98.8 80.0 - 99.9 fL Final   09/12/2021 97.1 80.0 - 99.9 fL Final   09/11/2021 99.1 80.0 - 99.9 fL Final     Platelets   Date Value Ref Range Status   09/13/2021 661 (H) 130 - 450 E9/L Final   09/12/2021 723 (H) 130 - 450 E9/L Final   09/11/2021 591 (H) 130 - 450 E9/L Final     Sodium   Date Value Ref Range Status   09/13/2021 138 132 - 146 mmol/L Final   09/12/2021 138 132 - 146 mmol/L Final   09/11/2021 133 132 - 146 mmol/L Final     Potassium   Date Value Ref Range Status   09/13/2021 3.5 3.5 - 5.0 mmol/L Final   09/12/2021 3.3 (L) 3.5 - 5.0 mmol/L Final   09/11/2021 3.5 3.5 - 5.0 mmol/L Final     Chloride   Date Value Ref Range Status   09/13/2021 107 98 - 107 mmol/L Final   09/12/2021 104 98 - 107 mmol/L Final   09/11/2021 97 (L) 98 - 107 mmol/L Final     CO2   Date Value Ref Range Status   09/13/2021 21 (L) 22 - 29 mmol/L Final   09/12/2021 22 22 - 29 mmol/L Final   09/11/2021 26 22 - 29 mmol/L Final     BUN   Date Value Ref Range Status   09/13/2021 8 6 - 20 mg/dL Final   09/12/2021 11 6 - 20 mg/dL Final   09/11/2021 8 6 - 20 mg/dL Final     CREATININE   Date Value Ref Range Status   09/13/2021 0.3 (L) 0.7 - 1.2 mg/dL Final   09/12/2021 0.4 (L) 0.7 - 1.2 mg/dL Final   09/11/2021 0.3 (L) 0.7 - 1.2 mg/dL Final     Glucose   Date Value Ref Range Status   09/13/2021 124 (H) 74 - 99 mg/dL Final   09/12/2021 128 (H) 74 - 99 mg/dL Final   09/11/2021 95 74 - 99 mg/dL Final     Calcium   Date Value Ref Range Status   09/13/2021 9.2 8.6 - 10.2 mg/dL Final   09/12/2021 9.1 8.6 - 10.2 mg/dL Final   09/11/2021 9.1 8.6 - 10.2 mg/dL Final     Total Protein   Date Value Ref Range Status   09/13/2021 7.3 6.4 - 8.3 g/dL Final   09/12/2021 7.2 6.4 - 8.3 g/dL Final   09/11/2021 6.8 6.4 - 8.3 g/dL Final     Albumin   Date Value Ref Range Status   09/13/2021 3.7 3.5 - 5.2 g/dL Final   09/12/2021 3.6 3.5 - 5.2 g/dL Final   09/11/2021 3.3 (L) 3.5 - 5.2 g/dL Final     Total Bilirubin   Date Value Ref Range Status   09/13/2021 0.7 0.0 - 1.2 mg/dL Final   09/12/2021 0.8 0.0 - 1.2 mg/dL Final   09/11/2021 0.7 0.0 - 1.2 mg/dL Final     Alkaline Phosphatase   Date Value Ref Range Status   09/13/2021 160 (H) 40 - 129 U/L Final   09/12/2021 188 (H) 40 - 129 U/L Final   09/11/2021 196 (H) 40 - 129 U/L Final     AST   Date Value Ref Range Status   09/13/2021 23 0 - 39 U/L Final   09/12/2021 26 0 - 39 U/L Final   09/11/2021 29 0 - 39 U/L Final     ALT   Date Value Ref Range Status   09/13/2021 20 0 - 40 U/L Final   09/12/2021 20 0 - 40 U/L Final   09/11/2021 18 0 - 40 U/L Final     GFR Non-   Date Value Ref Range Status   09/13/2021 >60 >=60 mL/min/1.73 Final     Comment:     Chronic Kidney Disease: less than 60 ml/min/1.73 sq.m. Kidney Failure: less than 15 ml/min/1.73 sq.m. Results valid for patients 18 years and older. 09/12/2021 >60 >=60 mL/min/1.73 Final     Comment:     Chronic Kidney Disease: less than 60 ml/min/1.73 sq.m. Kidney Failure: less than 15 ml/min/1.73 sq.m. Results valid for patients 18 years and older.      09/11/2021 >60 >=60 mL/min/1.73 Final     Comment:     Chronic Kidney Disease: less than 60 ml/min/1.73 sq.m. Kidney Failure: less than 15 ml/min/1.73 sq.m. Results valid for patients 18 years and older. GFR    Date Value Ref Range Status   09/13/2021 >60  Final   09/12/2021 >60  Final   09/11/2021 >60  Final     Magnesium   Date Value Ref Range Status   09/13/2021 2.0 1.6 - 2.6 mg/dL Final   09/12/2021 1.9 1.6 - 2.6 mg/dL Final   09/11/2021 1.9 1.6 - 2.6 mg/dL Final     Phosphorus   Date Value Ref Range Status   09/13/2021 2.7 2.5 - 4.5 mg/dL Final   09/12/2021 3.0 2.5 - 4.5 mg/dL Final   09/11/2021 3.1 2.5 - 4.5 mg/dL Final     Recent Labs     09/11/21  0442   PH 7.452*   BE 1.1   O2SAT 97.1       RADIOLOGY:  XR CHEST PORTABLE   Final Result   Patchy multifocal bilateral airspace disease, more prominent within the right   upper lobe. XR CHEST PORTABLE   Final Result   No significant change in the appearance of the chest compared with   yesterday's examination. XR CHEST PORTABLE   Final Result   Improved aeration of the lungs with persistent bilateral reticular airspace   opacities that may be on the basis of pulmonary edema or atypical/viral   pneumonia. XR CHEST PORTABLE   Final Result   Stable bilateral infiltrates. ET tube tip 2.5 cm from the shantell. XR CHEST PORTABLE   Final Result   Interval placement of right-sided PICC line. Bilateral parenchymal infiltrates are similar to slightly worsened. Continued follow-up recommended. XR CHEST PORTABLE   Final Result   1. Lines okay. 2. Mild-moderate improved left lung aeration, overall decreased lung volumes. XR CHEST PORTABLE   Final Result   1. Slight advancement of the endotracheal tube which now resides in the   midthoracic trachea. 2.  Stable appearance of the chest.  Stable position of the enteric tube. XR CHEST PORTABLE   Final Result   1.   Interval slight advancement of the endotracheal tube which now resides in   the proximal thoracic trachea. 2.  Persistent ill-defined opacities in the right greater than left lungs. No change from prior exam.  No obvious pneumothorax. 3.  Enteric tube. Contrast material identified within the stomach lumen. XR CHEST PORTABLE   Final Result   1. Persistent ill-defined opacities in the bilateral lungs slightly worse in   the left mid to upper lung. Probable trace pleural effusions. 2.  Slight retraction of the endotracheal tube which projects over the   cervical region. Suggest repositioning. 3.  There appears to be a satisfactory position of the enteric tube. The findings were sent to the Radiology Results [address] at 7:12   pm on 9/3/2021to be communicated to a licensed caregiver. XR ABDOMEN FOR NG/OG/NE TUBE PLACEMENT   Final Result   1. Satisfactory position of gastric feeding tube as evidenced by stomach   contour outlined following injection of oral contrast.      2.  Satisfactory position of enteric tube. 3.  Significant reflux of oral contrast material into the esophagus. XR CHEST PORTABLE   Final Result   Significant bilateral infiltrates with partial interval clearing of the left   lower lobe infiltrate since the prior study         XR CHEST PORTABLE   Final Result   Satisfactory position of the ETT and NGT. Otherwise, stable chest.         XR CHEST PORTABLE   Final Result   Increasing right lung opacities consistent with pneumonia. US DUP LOWER EXTREMITIES BILATERAL VENOUS   Final Result   No evidence of DVT in either lower extremity. XR CHEST PORTABLE   Final Result   1. Right lower lobe pneumonia. 2. Left lower lobe atelectasis versus pneumonia.                  PROBLEM LIST:  Principal Problem:    Acute respiratory failure with hypoxia (HCC)  Active Problems:    Cerebral palsy (HCC)    Seizures ([address]    Mentally disabled    Convulsions ([address]    Pneumonia due to COVID-19 virus    Aspiration pneumonia [facility])  Resolved Problems:    * No resolved hospital problems. *      IMPRESSION:  1. COVID-19 pneumonitis, ARDS  2. Acute hypoxemic respiratory failure  3. Possible current urinary tract infection  4. Probable recurrent aspiration pneumonitis  5. Previous Klebsiella pneumonia  6. History of seizures  7. Cerebral palsy  8. Thrombocytosis    PLAN:  1. Continue IV levetiracetam  2. Continue tube feedings  3. Decrease IV dexamethasone slowly  4. Add Eraxis following urine culture and C&S and urinalysis  5. Monitor thrombocytosis  6. Continue vancomycin and meropenem  7. Labs, chest x-ray in a.m.  8. Continue baricitinib    ATTESTATION:  ICU Staff Physician note of personal involvement in Care  As the attending physician, I certify that I personally reviewed the patients history and personally examined the patient to confirm the physical findings described above,  And that I reviewed the relevant imaging studies and available reports. I also discussed the differential diagnosis and all of the proposed management plans with the patient and individuals accompanying the patient to this visit. They had the opportunity to ask questions about the proposed management plans and to have those questions answered. This patient has a high probability of sudden, clinically significant deterioration, which requires the highest level of physician preparedness to intervene urgently. I managed/supervised life or organ supporting interventions that required frequent physician assessment. I devoted my full attention to the direct care of this patient for the amount of time indicated below. Time I spent with the family or surrogate(s) is included only if the patient was incapable of providing the necessary information or participating in medical decisions  Time devoted to teaching and to any procedures I billed separately is not included.     CRITICAL CARE TIME:  32 minutes    Electronically signed by Lisy Aguilera MD on

## 2021-09-13 NOTE — PROGRESS NOTES
[facility]  [facility]  Progress Note    NAME: Tabatha Ozuna  MR:  [id number]  :   1995  DATE OF SERVICE:21    This is a face to face encounter with Tabatha Ozuna 30 y.o. male on 21    CHIEF COMPLAINT     ID following for   Chief Complaint   Patient presents with    Altered Mental Status    Respiratory Distress     41% SpO2     HISTORY OF PRESENT ILLNESS   Pt seen   21   Extubated supine  Coarse airways  Fevers [id number]  Patient is tolerating medications. No reported adverse drug reactions. REVIEW OF SYSTEMS     As stated above in the chief complaint, otherwise negative.   CURRENT MEDICATIONS      [START ON 2021] anidulafungin  100 mg IntraVENous Q24H    dexamethasone  4 mg IntraVENous Q24H    clonazePAM  0.5 mg Per NG tube TID    meropenem  1,000 mg IntraVENous Q8H    enoxaparin  40 mg SubCUTAneous Daily    vancomycin  1,250 mg IntraVENous Q12H    QUEtiapine  25 mg Oral BID    baclofen  20 mg Oral TID    levETIRAcetam in NaCl  1,000 mg IntraVENous Q12H    lidocaine PF  5 mL IntraDERmal Once    sodium chloride flush  5-40 mL IntraVENous 2 times per day    heparin flush  3 mL IntraVENous 2 times per day    polyethylene glycol  17 g Oral Daily    baricitinib  4 mg Oral Daily    sodium chloride flush  5-40 mL IntraVENous 2 times per day    ipratropium-albuterol  1 ampule Inhalation 4x daily    pantoprazole  40 mg IntraVENous Daily    And    sodium chloride (PF)  10 mL IntraVENous Daily    lacosamide  150 mg Oral BID    lamoTRIgine  200 mg Oral TID    zonisamide  300 mg Oral Nightly    polyvinyl alcohol  1 drop Both Eyes Q6H     Continuous Infusions:   dextrose 5 % and 0.9 % NaCl 50 mL/hr at 21 1412    dexmedetomidine HCl in NaCl Stopped (21 0600)    sodium chloride      dextrose      sodium chloride       PRN Meds:sodium chloride flush, sodium chloride, heparin flush, bisacodyl, diatrizoate meglumine-sodium, glucose, dextrose, glucagon (rDNA), dextrose, sodium chloride flush, sodium chloride, ondansetron **OR** ondansetron, polyethylene glycol, acetaminophen **OR** acetaminophen, perflutren lipid microspheres, fentanNYL, sodium chloride    PHYSICAL EXAM     BP (!) 129/90   Pulse 130   Temp 100 °F (37.8 °C) (Rectal)   Resp 17   Ht 5' (1.524 m)   Wt 153 lb (69.4 kg)   SpO2 94%   BMI 29.88 kg/m²   Temp  Av.4 °F (38 °C)  Min: 99.9 °F (37.7 °C)  Max: 100.9 °F (38.3 °C)  Constitutional:  The patient is   supined  At/nc  Not awake   Heart tachy  RS dec ant coarse  abd soft   Ext edema no rash         Titus      Line rue picc       DIAGNOSTIC RESULTS   Radiology:    Recent Labs     2114   WBC 11.1 13.8* 13.0*   RBC 3.33* 3.44* 3.47*   HGB 10.8* 11.3* 11.2*   HCT 33.0* 33.4* 34.3*   MCV 99.1 97.1 98.8   MCH 32.4 32.8 32.3   MCHC 32.7 33.8 32.7   RDW 12.2 12.1 12.5   * 723* 661*   MPV 10.6 10.1 9.7     Recent Labs     2152 2114    138 138   K 3.5 3.3* 3.5   CL 97* 104 107   CO2 26 22 21*   BUN 8 11 8   CREATININE 0.3* 0.4* 0.3*   GLUCOSE 95 128* 124*   PROT 6.8 7.2 7.3   LABALBU 3.3* 3.6 3.7   CALCIUM 9.1 9.1 9.2   BILITOT 0.7 0.8 0.7   ALKPHOS 196* 188* 160*   AST 29 26 23   ALT 18 20 20     Lab Results   Component Value Date    CRP 0.3 2021    CRP 8.4 (H) 2021     Lab Results   Component Value Date    SEDRATE 5 2019    SEDRATE 29 (H) 2018    SEDRATE 21 (H) 08/10/2018     Recent Labs     21  0552 21  0617 21  0514   AST 29 26 23   ALT 18 20 20   TRIG  --   --  180*        Microbiology:   No results for input(s): COVID19 in the last 72 hours.   Lab Results   Component Value Date    BC 24 Hours no growth 2021    BC 5 Days no growth 2021    BC 5 Days no growth 2021    BLOODCULT2 24 Hours no growth 2021    BLOODCULT2 5 Days no growth 2021    BLOODCULT2 5 Days no growth 08/28/2021    ORG Yeast, not C. albicans 09/04/2021    ORG Klebsiella pneumoniae ssp pneumoniae 08/29/2021    ORG Coagulase Negative Staphylococci 07/24/2018    ORG Corynebacterium species 07/24/2018     WOUND/ABSCESS   Date Value Ref Range Status   07/24/2018 Light growth  Final   07/24/2018 Light growth  Mixed morphologies    Final   06/29/2018   Corrected    Light growth  Too fastidious for susceptibility testing.     06/29/2018 Light growth  Corrected   06/29/2018 Moderate growth  Corrected     Smear, Respiratory   Date Value Ref Range Status   09/04/2021   Final    Moderate Polymorphonuclear leukocytes  Epithelial cells not seen  No organisms seen            MRSA Culture Only   Date Value Ref Range Status   08/29/2021 Methicillin resistant Staph aureus not isolated  Final     CULTURE, RESPIRATORY   Date Value Ref Range Status   09/04/2021 Oral Pharyngeal Adina reduced (A)  Final   09/04/2021 Light growth  Final   08/29/2021 Oral Pharyngeal Adina reduced (A)  Final   08/29/2021 Moderate growth  Final        FINAL IMPRESSION    Pt had   Chief Complaint   Patient presents with    Altered Mental Status    Respiratory Distress     41% SpO2    Admitted for Hypokalemia [E87.6]  Acute respiratory failure with hypoxia (HCC) [J96.01]  Pneumonia of right lower lobe due to infectious organism [J18.9]  Sepsis with acute hypoxic respiratory failure without septic shock, due to unspecified organism ([facility] [address] [A41.9, R65.20, J96.01]  On treatment for above  And covid + 8/28  Extubated 9/10  S/p  leukopenia/leukocytosis  Klebisella pneumonia check final cx  Mrsa neg   Yeast, not C. Albicans colonized resp cx  Cerebral palsy    atbx being managed by ICU will sign off    · Monitor labs    Imaging and labs were reviewed per medical records. .  Thank you for involving me in the care of Jena Howe will continue to follow. Please do not hesitate to call for any questions or concerns.     Electronically signed by Dank Hayes MD on 9/13/2021 at 7:15 PM

## 2021-09-13 NOTE — PROGRESS NOTES
Physical Therapy    Physical Therapy Initial Evaluation/Plan of Care    Room #:  IC03/IC03-01  Patient Name: Evens Avila  YOB: 1995  MRN: [id number]    Date of Service: 9/13/2021     Tentative placement recommendation: Home Health Physical Therapy with 24/7 assist  Equipment recommendation: dane lift, hospital bed      Evaluating Physical Therapist: Kavitha Monteiro, PT  #[id number]      Specific Provider Orders/Date/Referring Provider :  09/12/21 0730   PT eval and treat Start: 09/12/21 0730, End: 09/12/21 0730, ONE TIME, Standing Count: 1 Occurrences, Tatiana Sanchez MD      Admitting Diagnosis:   Hypokalemia [E87.6]  Acute respiratory failure with hypoxia ([address] [J96.01]  Pneumonia of right lower lobe due to infectious organism [J18.9]  Sepsis with acute hypoxic respiratory failure without septic shock, due to unspecified organism ([address] [A41.9, R65.20, J96.01]      Visit Diagnoses       Codes    Pneumonia of right lower lobe due to infectious organism     J18.9    Hypokalemia     E87.6    Sepsis with acute hypoxic respiratory failure and septic shock, due to unspecified organism ([address]     A41.9, R65.21, J96.01        Admitted with    fever, respiratory distress, and change in mental status  covid +  pneumonia  Intubated: 8/29-9/10  Patient Active Problem List   Diagnosis    Periodontal disease    Gingivitis    Dehiscence of surgical wound    Cerebral palsy ([address]    Seizures ([address]    Abnormal liver function tests    Anxiety    Constipation    Esophageal reflux    Mentally disabled    Non-refractory epilepsy ([address]    Scoliosis associated with other condition    Tremor    Convulsions ([address]    Acute respiratory failure with hypoxia ([address]    Pneumonia due to COVID-19 virus    Aspiration pneumonia ([address]        ASSESSMENT of Current Deficits Patient exhibits decreased strength impairing tolerance to activity and positioning are barriers to d/c and require skilled intervention during hospital stay to attain pre hospital level of function. Decreased rom and low tone   increases patient's risk for skin breakdown       PHYSICAL THERAPY  PLAN OF CARE       Physical therapy plan of care is established based on physician order,  patient diagnosis and clinical assessment    Current Treatment Recommendations:    -Bed Mobility: Lower extremity exercises , Upper extremity exercises , Trunk control activities  and cervical rom    positioning for comfort and skin protection    PT long term treatment goals are located in below grid    Patient and or family understand(s) diagnosis, prognosis, and plan of care. Frequency of treatments: Patient will be seen  1-2/week.          Prior Level of Function:dependent wheelchair user;  trf status unknown  Rehab Potential: fair    for baseline    Past medical history:   Past Medical History:   Diagnosis Date    [facility] [address]     COVID-19     G tube feedings ([facility] [address]     age 3    History of blood transfusion     Mental handicap     mom states severe brain damage    Seizures (HCC)     tremors  last seizure 8/8/2018     Past Surgical History:   Procedure Laterality Date    DENTAL SURGERY  03/26/2015    xrays, restorations and extractions x1    DENTAL SURGERY  12/08/2016    DENTAL SURGERY N/A 09/04/2020    EXAM, RADIOGRAPHS, PROPHYLAXIS, DENTAL RESTORATIONS performed by Oriana Renteria DDS at [facility]  09/04/2020    LEG TENDON SURGERY Bilateral     [place] BIOPSY, EACH ADDED LESION Right 03/26/2018    EXCISION OF SOFT TISSUE NEOPLASM RIGHT LEG performed by Ligia Mena MD at [facility]:    Precautions: , falls, O2 and Droplet plus/COVID-19 , non verbal  Social history: Patient lives with parents and aides in a ranch home  with Ramp  to enter    Equipment owned: Wheelchair,       [id number] [facility]  Mobility Inpatient   How much difficulty turning over in bed?: A Lot  How much difficulty sitting down on / standing up from a chair with arms?: Unable  How much difficulty moving from lying on back to sitting on side of bed?: Unable  How much help from another person moving to and from a bed to a chair?: Total  How much help from another person needed to walk in hospital room?: Total  How much help from another person for climbing 3-5 steps with a railing?: Total  AM-PAC Inpatient Mobility Raw Score : 7  AM-PAC Inpatient T-Scale Score : 26.42  Mobility Inpatient CMS 0-100% Score: 92.36  Mobility Inpatient CMS G-Code Modifier : CM    Nursing cleared patient for PT evaluation. The admitting diagnosis and active problem list as listed above have been reviewed prior to the initiation of this evaluation. OBJECTIVE;   Initial Evaluation  Date: 9/13/2021 Treatment Date:     Short Term/ Long Term   Goals   Was pt agreeable to Eval/treatment?  Yes  To be met in 5 days   Pain level   0/10  No grimacing with activitiy     Bed Mobility    Rolling: Maximal assist of 1    Supine to sit: Not assessed     Sit to supine: Not assessed     Scooting: Not assessed     Rolling: Maximal assist of 1    Supine to sit: Maximal assist of 1    Sit to supine: Maximal assist of 1    Scooting: Maximal assist of 1     ROM Within functional limits with exception of tightness end range 10%     Increase range of motion 10% of affected joints    Strength BUE:  refer to OT eval  bilateral lower extremities : low tone throughout with minimal volitional movement; tends to position Right lower extremity in flexion and reflexively assumes that posture  Increase strength in affected mm groups by 1/3 grade     Patient is Alert & unable to assess orientation, eyes open with name  Sensation: not assessed   Edema:  none noted   Endurance: poor         Patient education  Patient educated on role of Physical Therapy, risks of immobility, safety and plan of care,  importance of mobility while in hospital  and positioning for skin integrity and comfort Patient response to education:   Pt verbalized understanding Pt demonstrated skill Pt requires further education in this area   No No Yes      Treatment:  Patient practiced and was instructed/facilitated in the following treatment: rom ex all joints, planes and extremities x 10 reps with little resistance; positioned on left side      Therapeutic Exercises:  see above  x 10 reps. At end of session, patient in bed with alarm call light and phone within reach,  all lines and tubes intact, nursing notified. Patient would benefit from continued skilled Physical Therapy to improve functional independence and quality of life. Patient's/ family goals   home    Time in  945  Time out  1000    Total Treatment Time  0 minutes    Evaluation time includes thorough review of current medical information, gathering information on past medical history/social history and prior level of function, completion of standardized testing/informal observation of tasks, assessment of data, and development of Plan of care and goals.      CPT codes:  Low Complexity PT evaluation (22154)  No treatment    Emeli Colon, PT

## 2021-09-13 NOTE — CARE COORDINATION
9/13/21 Positive covid 8/28/21- unvaccinated. Cm transition of care: pt is for transfer to [facility] )isolation. CM placed to call to pts father- Edson Samano. He is declining the need for any HHC. They have all the help and are well versed on aquatherapy that they have available in the home. There is no oxygen- pt is currently on room air. Provided Estevan with the phone number to the Telemetry unit ext [phone] if he has further visiting hour questions. CM/SS assigned will continue to follow. Father has a medical/handicapped equipped Garth Chime and will provide a ride at discharge.  Electronically signed by ROSANGELA Calixto on 9/13/2021 at 12:20 PM

## 2021-09-14 ENCOUNTER — APPOINTMENT (OUTPATIENT)
Dept: GENERAL RADIOLOGY | Age: 26
DRG: 870 | End: 2021-09-14
Payer: COMMERCIAL

## 2021-09-14 LAB
ALBUMIN SERPL-MCNC: 3.5 G/DL (ref 3.5–5.2)
ALP BLD-CCNC: 162 U/L (ref 40–129)
ALT SERPL-CCNC: 22 U/L (ref 0–40)
ANION GAP SERPL CALCULATED.3IONS-SCNC: 10 MMOL/L (ref 7–16)
AST SERPL-CCNC: 21 U/L (ref 0–39)
BASOPHILS ABSOLUTE: 0.08 E9/L (ref 0–0.2)
BASOPHILS RELATIVE PERCENT: 0.7 % (ref 0–2)
BILIRUB SERPL-MCNC: 0.6 MG/DL (ref 0–1.2)
BUN BLDV-MCNC: 7 MG/DL (ref 6–20)
CALCIUM SERPL-MCNC: 9.3 MG/DL (ref 8.6–10.2)
CHLORIDE BLD-SCNC: 105 MMOL/L (ref 98–107)
CO2: 21 MMOL/L (ref 22–29)
CREAT SERPL-MCNC: 0.3 MG/DL (ref 0.7–1.2)
EOSINOPHILS ABSOLUTE: 0.01 E9/L (ref 0.05–0.5)
EOSINOPHILS RELATIVE PERCENT: 0.1 % (ref 0–6)
GFR AFRICAN AMERICAN: >60
GFR NON-AFRICAN AMERICAN: >60 ML/MIN/1.73
GLUCOSE BLD-MCNC: 121 MG/DL (ref 74–99)
HCT VFR BLD CALC: 34.9 % (ref 37–54)
HEMOGLOBIN: 11.5 G/DL (ref 12.5–16.5)
IMMATURE GRANULOCYTES #: 0.14 E9/L
IMMATURE GRANULOCYTES %: 1.3 % (ref 0–5)
LYMPHOCYTES ABSOLUTE: 1.97 E9/L (ref 1.5–4)
LYMPHOCYTES RELATIVE PERCENT: 18.3 % (ref 20–42)
MAGNESIUM: 2 MG/DL (ref 1.6–2.6)
MCH RBC QN AUTO: 32.1 PG (ref 26–35)
MCHC RBC AUTO-ENTMCNC: 33 % (ref 32–34.5)
MCV RBC AUTO: 97.5 FL (ref 80–99.9)
MONOCYTES ABSOLUTE: 1.48 E9/L (ref 0.1–0.95)
MONOCYTES RELATIVE PERCENT: 13.7 % (ref 2–12)
NEUTROPHILS ABSOLUTE: 7.09 E9/L (ref 1.8–7.3)
NEUTROPHILS RELATIVE PERCENT: 65.9 % (ref 43–80)
PDW BLD-RTO: 12.4 FL (ref 11.5–15)
PHOSPHORUS: 2.9 MG/DL (ref 2.5–4.5)
PLATELET # BLD: 628 E9/L (ref 130–450)
PMV BLD AUTO: 9.6 FL (ref 7–12)
POTASSIUM SERPL-SCNC: 3.7 MMOL/L (ref 3.5–5)
RBC # BLD: 3.58 E12/L (ref 3.8–5.8)
SODIUM BLD-SCNC: 136 MMOL/L (ref 132–146)
TOTAL PROTEIN: 7.3 G/DL (ref 6.4–8.3)
VANCOMYCIN TROUGH: 6.6 MCG/ML (ref 5–16)
WBC # BLD: 10.8 E9/L (ref 4.5–11.5)

## 2021-09-14 PROCEDURE — 6370000000 HC RX 637 (ALT 250 FOR IP): Performed by: INTERNAL MEDICINE

## 2021-09-14 PROCEDURE — 87206 SMEAR FLUORESCENT/ACID STAI: CPT

## 2021-09-14 PROCEDURE — 2580000003 HC RX 258: Performed by: INTERNAL MEDICINE

## 2021-09-14 PROCEDURE — 2580000003 HC RX 258: Performed by: PSYCHIATRY & NEUROLOGY

## 2021-09-14 PROCEDURE — 36592 COLLECT BLOOD FROM PICC: CPT

## 2021-09-14 PROCEDURE — 6360000002 HC RX W HCPCS: Performed by: INTERNAL MEDICINE

## 2021-09-14 PROCEDURE — 2500000003 HC RX 250 WO HCPCS: Performed by: NURSE PRACTITIONER

## 2021-09-14 PROCEDURE — 6360000002 HC RX W HCPCS

## 2021-09-14 PROCEDURE — 2000000000 HC ICU R&B

## 2021-09-14 PROCEDURE — 6360000002 HC RX W HCPCS: Performed by: PSYCHIATRY & NEUROLOGY

## 2021-09-14 PROCEDURE — 2580000003 HC RX 258

## 2021-09-14 PROCEDURE — 87070 CULTURE OTHR SPECIMN AEROBIC: CPT

## 2021-09-14 PROCEDURE — 85025 COMPLETE CBC W/AUTO DIFF WBC: CPT

## 2021-09-14 PROCEDURE — 2580000003 HC RX 258: Performed by: NURSE PRACTITIONER

## 2021-09-14 PROCEDURE — 6370000000 HC RX 637 (ALT 250 FOR IP)

## 2021-09-14 PROCEDURE — C9113 INJ PANTOPRAZOLE SODIUM, VIA: HCPCS

## 2021-09-14 PROCEDURE — 6360000002 HC RX W HCPCS: Performed by: SPECIALIST

## 2021-09-14 PROCEDURE — 80053 COMPREHEN METABOLIC PANEL: CPT

## 2021-09-14 PROCEDURE — 99232 SBSQ HOSP IP/OBS MODERATE 35: CPT | Performed by: STUDENT IN AN ORGANIZED HEALTH CARE EDUCATION/TRAINING PROGRAM

## 2021-09-14 PROCEDURE — 36415 COLL VENOUS BLD VENIPUNCTURE: CPT

## 2021-09-14 PROCEDURE — 2580000003 HC RX 258: Performed by: SPECIALIST

## 2021-09-14 PROCEDURE — 71045 X-RAY EXAM CHEST 1 VIEW: CPT

## 2021-09-14 PROCEDURE — 83735 ASSAY OF MAGNESIUM: CPT

## 2021-09-14 PROCEDURE — 84100 ASSAY OF PHOSPHORUS: CPT

## 2021-09-14 RX ORDER — METOPROLOL TARTRATE 5 MG/5ML
5 INJECTION INTRAVENOUS ONCE
Status: COMPLETED | OUTPATIENT
Start: 2021-09-14 | End: 2021-09-14

## 2021-09-14 RX ORDER — 0.9 % SODIUM CHLORIDE 0.9 %
500 INTRAVENOUS SOLUTION INTRAVENOUS ONCE
Status: COMPLETED | OUTPATIENT
Start: 2021-09-14 | End: 2021-09-15

## 2021-09-14 RX ADMIN — POLYETHYLENE GLYCOL 3350 17 G: 17 POWDER, FOR SOLUTION ORAL at 08:32

## 2021-09-14 RX ADMIN — LEVETIRACETAM 1500 MG: 15 INJECTION INTRAVENOUS at 21:48

## 2021-09-14 RX ADMIN — HEPARIN 300 UNITS: 100 SYRINGE at 08:27

## 2021-09-14 RX ADMIN — LAMOTRIGINE 200 MG: 100 TABLET ORAL at 20:47

## 2021-09-14 RX ADMIN — QUETIAPINE FUMARATE 25 MG: 25 TABLET ORAL at 20:46

## 2021-09-14 RX ADMIN — BARICITINIB 4 MG: 2 TABLET, FILM COATED ORAL at 08:58

## 2021-09-14 RX ADMIN — SODIUM CHLORIDE, PRESERVATIVE FREE 10 ML: 5 INJECTION INTRAVENOUS at 08:59

## 2021-09-14 RX ADMIN — LAMOTRIGINE 200 MG: 100 TABLET ORAL at 14:03

## 2021-09-14 RX ADMIN — MEROPENEM 1000 MG: 1 INJECTION, POWDER, FOR SOLUTION INTRAVENOUS at 03:27

## 2021-09-14 RX ADMIN — METOPROLOL TARTRATE 5 MG: 5 INJECTION INTRAVENOUS at 22:41

## 2021-09-14 RX ADMIN — Medication 10 ML: at 20:45

## 2021-09-14 RX ADMIN — DEXTROSE AND SODIUM CHLORIDE: 5; 900 INJECTION, SOLUTION INTRAVENOUS at 09:04

## 2021-09-14 RX ADMIN — MEROPENEM 1000 MG: 1 INJECTION, POWDER, FOR SOLUTION INTRAVENOUS at 18:02

## 2021-09-14 RX ADMIN — ENOXAPARIN SODIUM 40 MG: 40 INJECTION SUBCUTANEOUS at 08:59

## 2021-09-14 RX ADMIN — Medication 10 ML: at 20:46

## 2021-09-14 RX ADMIN — PANTOPRAZOLE SODIUM 40 MG: 40 INJECTION, POWDER, FOR SOLUTION INTRAVENOUS at 08:59

## 2021-09-14 RX ADMIN — MEROPENEM 1000 MG: 1 INJECTION, POWDER, FOR SOLUTION INTRAVENOUS at 10:50

## 2021-09-14 RX ADMIN — Medication 10 ML: at 10:50

## 2021-09-14 RX ADMIN — CLONAZEPAM 0.5 MG: 0.5 TABLET, ORALLY DISINTEGRATING ORAL at 08:26

## 2021-09-14 RX ADMIN — LACOSAMIDE 150 MG: 100 TABLET, FILM COATED ORAL at 08:25

## 2021-09-14 RX ADMIN — LEVETIRACETAM 1500 MG: 15 INJECTION INTRAVENOUS at 08:34

## 2021-09-14 RX ADMIN — VANCOMYCIN HYDROCHLORIDE 1250 MG: 10 INJECTION, POWDER, LYOPHILIZED, FOR SOLUTION INTRAVENOUS at 12:00

## 2021-09-14 RX ADMIN — LAMOTRIGINE 200 MG: 100 TABLET ORAL at 08:25

## 2021-09-14 RX ADMIN — VANCOMYCIN HYDROCHLORIDE 1250 MG: 10 INJECTION, POWDER, LYOPHILIZED, FOR SOLUTION INTRAVENOUS at 00:32

## 2021-09-14 RX ADMIN — ZONISAMIDE 300 MG: 100 CAPSULE ORAL at 20:46

## 2021-09-14 RX ADMIN — DEXAMETHASONE SODIUM PHOSPHATE 4 MG: 4 INJECTION, SOLUTION INTRAMUSCULAR; INTRAVENOUS at 21:47

## 2021-09-14 RX ADMIN — Medication 10 ML: at 10:57

## 2021-09-14 RX ADMIN — DEXTROSE MONOHYDRATE 1390 MG PE: 50 INJECTION, SOLUTION INTRAVENOUS at 22:11

## 2021-09-14 RX ADMIN — BACLOFEN 20 MG: 10 TABLET ORAL at 15:48

## 2021-09-14 RX ADMIN — BACLOFEN 20 MG: 10 TABLET ORAL at 20:49

## 2021-09-14 RX ADMIN — LACOSAMIDE 150 MG: 100 TABLET, FILM COATED ORAL at 20:47

## 2021-09-14 RX ADMIN — SODIUM CHLORIDE 500 ML: 9 INJECTION, SOLUTION INTRAVENOUS at 22:42

## 2021-09-14 RX ADMIN — DEXTROSE MONOHYDRATE 100 MG: 50 INJECTION, SOLUTION INTRAVENOUS at 12:03

## 2021-09-14 RX ADMIN — BACLOFEN 20 MG: 10 TABLET ORAL at 08:58

## 2021-09-14 RX ADMIN — QUETIAPINE FUMARATE 25 MG: 25 TABLET ORAL at 08:26

## 2021-09-14 RX ADMIN — VANCOMYCIN HYDROCHLORIDE 1000 MG: 1 INJECTION, POWDER, LYOPHILIZED, FOR SOLUTION INTRAVENOUS at 20:30

## 2021-09-14 ASSESSMENT — PAIN SCALES - GENERAL
PAINLEVEL_OUTOF10: 0

## 2021-09-14 NOTE — PROGRESS NOTES
Call placed to [facility]  for update on patient transfer. Everything has been received on their end and the patient has been accepted, however they are waiting on a bed to open up for him. I called patient's father, Gemma Simmonds, and updated him on this information. I also gave him an update on the patient. All questions were answered.

## 2021-09-14 NOTE — PROGRESS NOTES
Call placed to patient's father. Informed him about the patient having breakthrough seizures and the Intensivist wanting neurology to be consulted. Per father, the patient has seen a neurologist at [facility] and per the father, he wants only the neurologist that is familiar with the patient the only neurologist to handle his son's seizure medications. Neurologist information given to Dr Ashley Garduno. Dr Ashley Garduno initiated transfer.

## 2021-09-14 NOTE — PROGRESS NOTES
Above noted  Question validity of \"seizures\" as noted for first time today (not noted during my exam). Plan was to obtain eeg and continue high dose anticonvulsants.  I have no problem with transfer if intensivist uncomfortable with patient receiving care at this hospital.

## 2021-09-14 NOTE — PROGRESS NOTES
Pharmacy Consultation Note  (Antibiotic Dosing and Monitoring)    Initial consult date: 9/12/2021  Consulting physician/provider: Dr. Katrin Sagastume  Drug: Vancomycin  Indication: HAP    Age/  Gender Height Weight IBW  Allergy Information   25 y.o./male 5' (152.4 cm) 130 lb (59 kg)     Ideal body weight: 50 kg (110 lb 3.7 oz)  Adjusted ideal body weight: 57.8 kg (127 lb 5.4 oz)   Patient has no known allergies. Renal Function:  Recent Labs     09/12/21  0617 09/13/21  0514 09/14/21  0535   BUN 11 8 7   CREATININE 0.4* 0.3* 0.3*       Intake/Output Summary (Last 24 hours) at 9/14/2021 1004  Last data filed at 9/14/2021 0839  Gross per 24 hour   Intake 2508 ml   Output 2500 ml   Net 8 ml       Vancomycin Monitoring:  Trough:    Recent Labs     09/13/21  2339   VANCOTROUGH 6.6     Random:  No results for input(s): VANCORANDOM in the last 72 hours. Recent vancomycin administrations                   vancomycin (VANCOCIN) 1,250 mg in dextrose 5 % 250 mL IVPB (mg) 1,250 mg New Bag 09/14/21 1200     1,250 mg New Bag  0032     1,250 mg New Bag 09/13/21 1357     1,250 mg New Bag  0016     1,250 mg New Bag 09/12/21 1345              Assessment:  · Patient is a 30 y.o. male who has been initiated on vancomycin  Estimated Creatinine Clearance: 302 mL/min (A) (based on SCr of 0.3 mg/dL (L)). · To dose vancomycin, pharmacy will be utilizing [employer] calculation software for goal AUC/DERIAN 400-600 mg/L-hr   · 9/13: Trough = 6.6 mcg/mL.  AUC/DERIAN 380    Plan:  · Will increase to vancomycin 1000 mg IV every 8 hours  · Repeat trough when needed  · Will continue to monitor renal function   · Clinical pharmacy to follow    Trudi Pryor PharmD, BCPS 9/14/2021 10:04 AM   Ext: [phone]

## 2021-09-14 NOTE — CONSULTS
History Of Present Illness:  Patient is a 27 y. o. male well-known to me, with a past medical history of cerebral palsy, mentally handicapped, seizures, wheelchair-bound, presenting to the Emergency Department for altered mental status and respiratory distress. History obtained by parents. Symptoms are severe in severity and constant since onset. Patient presents for altered mental status and hypoxia.  41% on room air on arrival.  History limited secondary to patient baseline mental status. Senora Modest state patient is been sick for the last few days.  Congested, coughing.  Also states that patient has fever as high as 100.  He is not vaccinated. Vandana Garcia in the house with similar symptoms.  He is nonverbal at baseline and moves minimally at baseline. Senora Modest state he has been more tired and moving less. Patient is still full code so patient ended up being intubated and sedated and was admitted to the ICU and currently is being proned. As above per primary care. The patient is a 30 y.o. male with significant past medical history of see below who presents with above.       The patient has the following symptoms:    Change in level of consciousness: lethargic    New Weakness: no    Numbness or Tingling: no    Difficulty Swallowing: no    Current Medications:   Scheduled Meds:   anidulafungin  100 mg IntraVENous Q24H    dexamethasone  4 mg IntraVENous Q24H    levETIRAcetam  1,500 mg IntraVENous Q12H    clonazePAM  0.5 mg Per NG tube TID    meropenem  1,000 mg IntraVENous Q8H    enoxaparin  40 mg SubCUTAneous Daily    vancomycin  1,250 mg IntraVENous Q12H    QUEtiapine  25 mg Oral BID    baclofen  20 mg Oral TID    lidocaine PF  5 mL IntraDERmal Once    sodium chloride flush  5-40 mL IntraVENous 2 times per day    heparin flush  3 mL IntraVENous 2 times per day    polyethylene glycol  17 g Oral Daily    baricitinib  4 mg Oral Daily    sodium chloride flush  5-40 mL IntraVENous 2 times per day    ipratropium-albuterol  1 ampule Inhalation 4x daily    pantoprazole  40 mg IntraVENous Daily    And    sodium chloride (PF)  10 mL IntraVENous Daily    lacosamide  150 mg Oral BID    lamoTRIgine  200 mg Oral TID    zonisamide  300 mg Oral Nightly     Continuous Infusions:   dextrose 5 % and 0.9 % NaCl 50 mL/hr at 09/13/21 1412    sodium chloride      dextrose      sodium chloride       PRN Meds:sodium chloride flush, sodium chloride, heparin flush, bisacodyl, diatrizoate meglumine-sodium, glucose, dextrose, glucagon (rDNA), dextrose, sodium chloride flush, sodium chloride, ondansetron **OR** ondansetron, polyethylene glycol, acetaminophen **OR** acetaminophen, perflutren lipid microspheres, fentanNYL, sodium chloride    Allergies:  Patient has no known allergies. Social History:   TOBACCO:   reports that he has never smoked. He has never used smokeless tobacco.  ETOH:   reports no history of alcohol use. Past Medical History:        Diagnosis Date    Cerebral [facility] [address]     COVID-19     G tube feedings ([facility] [address]     age 3    History of blood transfusion     Mental handicap     mom states severe brain damage    Seizures (HCC)     tremors  last seizure 8/8/2018       Past Surgical History:        Procedure Laterality Date    DENTAL SURGERY  03/26/2015    xrays, restorations and extractions x1    DENTAL SURGERY  12/08/2016   [facility] DENTAL SURGERY N/A 09/04/2020    EXAM, RADIOGRAPHS, PROPHYLAXIS, DENTAL RESTORATIONS performed by Aniceto Stephen DDS at [address] [place] [address]  09/04/2020    LEG TENDON SURGERY Bilateral     [place] BIOPSY, EACH ADDED LESION Right 03/26/2018    EXCISION OF SOFT TISSUE NEOPLASM RIGHT LEG performed by Cameron Morgan MD at [address]         Outside reports reviewed: ER records, historical medical records, lab reports and radiology reports.     Patient's medications, allergies, past medical, surgical, social and family histories were reviewed and updated as appropriate. Review of Systems  A comprehensive review of systems was negative except for:       Objective:     Neuro exam 130/86 p 120 t 98  General: lethargic. Cranial nerve testing  unable to cooperate. PERRL, corneal reflexes +  . Funduscopic eye exam revealed not testable. Motor exam: . [place] Deep tendon reflexes were 1+ bilaterally. Plantar responses were flexor bilaterally. Cerebellar exam noted . [place] Sensation was . 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

## 2021-09-14 NOTE — PROGRESS NOTES
[place] [address] [place]   Progress Note    Admitting Date and Time: 8/28/2021  3:31 PM  Admit Dx: Hypokalemia [E87.6]  Acute respiratory failure with hypoxia ([facility] [address] [J96.01]  Pneumonia of right lower lobe due to infectious organism [J18.9]  Sepsis with acute hypoxic respiratory failure without septic shock, due to unspecified organism ([facility] [address] [A41.9, R65.20, J96.01]    Subjective:    Patient was extubated 9/10 and was doing well on RA yesterday during the day, however was getting more tachycardic and spiking fevers so was kept in the ICU. The patient then started having multiple breakthrough seizures overnight and this morning as well. The patient's father was informed and stated that he has a neurologist familiar with his case at [facility] and he wants the patient transferred there. Transfer was initiated by Dr. Allan Courtney and patient was accepted, just waiting for a bed.           vancomycin  1,000 mg IntraVENous Q8H    anidulafungin  100 mg IntraVENous Q24H    dexamethasone  4 mg IntraVENous Q24H    levETIRAcetam  1,500 mg IntraVENous Q12H    meropenem  1,000 mg IntraVENous Q8H    enoxaparin  40 mg SubCUTAneous Daily    QUEtiapine  25 mg Oral BID    baclofen  20 mg Oral TID    lidocaine PF  5 mL IntraDERmal Once    sodium chloride flush  5-40 mL IntraVENous 2 times per day    heparin flush  3 mL IntraVENous 2 times per day    polyethylene glycol  17 g Oral Daily    sodium chloride flush  5-40 mL IntraVENous 2 times per day    ipratropium-albuterol  1 ampule Inhalation 4x daily    pantoprazole  40 mg IntraVENous Daily    And    sodium chloride (PF)  10 mL IntraVENous Daily    lacosamide  150 mg Oral BID    lamoTRIgine  200 mg Oral TID    zonisamide  300 mg Oral Nightly     sodium chloride flush, 5-40 mL, PRN  sodium chloride, 25 mL, PRN  heparin flush, 3 mL, PRN  bisacodyl, 10 mg, Daily PRN  diatrizoate meglumine-sodium, 50 mL, ONCE PRN  glucose, 15 g, PRN  dextrose, 12.5 g, PRN  glucagon (rDNA), 1 mg, PRN  dextrose, 100 mL/hr, PRN  sodium chloride flush, 10 mL, PRN  sodium chloride, 25 mL, PRN  ondansetron, 4 mg, Q8H PRN   Or  ondansetron, 4 mg, Q6H PRN  polyethylene glycol, 17 g, Daily PRN  acetaminophen, 650 mg, Q6H PRN   Or  acetaminophen, 650 mg, Q6H PRN  perflutren lipid microspheres, 1.5 mL, ONCE PRN  fentanNYL, 25 mcg, Q1H PRN  sodium chloride, 30 mL, PRN         Objective:    /83   Pulse 111   Temp 100 °F (37.8 °C) (Core)   Resp (!) 32   Ht 5' (1.524 m)   Wt 153 lb (69.4 kg)   SpO2 99%   BMI 29.88 kg/m²     Due to the current efforts to prevent transmission of COVID-19 and also the need to preserve PPE for other caregivers, a face-to-face encounter with the patient was not performed. That being said, all relevant records and diagnostic tests were reviewed, including laboratory results and imaging. Please reference any relevant documentation elsewhere. Care will be coordinated with other specialties as appropriate. Recent Labs     09/12/21 0617 09/13/21  0514 09/14/21  0535    138 136   K 3.3* 3.5 3.7    107 105   CO2 22 21* 21*   BUN 11 8 7   CREATININE 0.4* 0.3* 0.3*   GLUCOSE 128* 124* 121*   CALCIUM 9.1 9.2 9.3       Recent Labs     09/12/21 0617 09/13/21  0514 09/14/21  0535   ALKPHOS 188* 160* 162*   PROT 7.2 7.3 7.3   LABALBU 3.6 3.7 3.5   BILITOT 0.8 0.7 0.6   AST 26 23 21   ALT 20 20 22       Recent Labs     09/12/21 0617 09/13/21  0514 09/14/21  0535   WBC 13.8* 13.0* 10.8   RBC 3.44* 3.47* 3.58*   HGB 11.3* 11.2* 11.5*   HCT 33.4* 34.3* 34.9*   MCV 97.1 98.8 97.5   MCH 32.8 32.3 32.1   MCHC 33.8 32.7 33.0   RDW 12.1 12.5 12.4   * 661* 628*   MPV 10.1 9.7 9.6                 Radiology:   XR CHEST PORTABLE   Final Result   Little change in patchy bilateral interstitial and alveolar opacities that   may represent pneumonia.          XR CHEST PORTABLE   Final Result   Patchy multifocal bilateral airspace disease, more prominent within the right upper lobe. XR CHEST PORTABLE   Final Result   No significant change in the appearance of the chest compared with   yesterday's examination. XR CHEST PORTABLE   Final Result   Improved aeration of the lungs with persistent bilateral reticular airspace   opacities that may be on the basis of pulmonary edema or atypical/viral   pneumonia. XR CHEST PORTABLE   Final Result   Stable bilateral infiltrates. ET tube tip 2.5 cm from the shantell. XR CHEST PORTABLE   Final Result   Interval placement of right-sided PICC line. Bilateral parenchymal infiltrates are similar to slightly worsened. Continued follow-up recommended. XR CHEST PORTABLE   Final Result   1. Lines okay. 2. Mild-moderate improved left lung aeration, overall decreased lung volumes. XR CHEST PORTABLE   Final Result   1. Slight advancement of the endotracheal tube which now resides in the   midthoracic trachea. 2.  Stable appearance of the chest.  Stable position of the enteric tube. XR CHEST PORTABLE   Final Result   1. Interval slight advancement of the endotracheal tube which now resides in   the proximal thoracic trachea. 2.  Persistent ill-defined opacities in the right greater than left lungs. No change from prior exam.  No obvious pneumothorax. 3.  Enteric tube. Contrast material identified within the stomach lumen. XR CHEST PORTABLE   Final Result   1. Persistent ill-defined opacities in the bilateral lungs slightly worse in   the left mid to upper lung. Probable trace pleural effusions. 2.  Slight retraction of the endotracheal tube which projects over the   cervical region. Suggest repositioning. 3.  There appears to be a satisfactory position of the enteric tube. The findings were sent to the Radiology Results [address] at 7:12   pm on 9/3/2021to be communicated to a licensed caregiver.          XR ABDOMEN FOR NG/OG/NE TUBE PLACEMENT   Final Result   1. Satisfactory position of gastric feeding tube as evidenced by stomach   contour outlined following injection of oral contrast.      2.  Satisfactory position of enteric tube. 3.  Significant reflux of oral contrast material into the esophagus. XR CHEST PORTABLE   Final Result   Significant bilateral infiltrates with partial interval clearing of the left   lower lobe infiltrate since the prior study         XR CHEST PORTABLE   Final Result   Satisfactory position of the ETT and NGT. Otherwise, stable chest.         XR CHEST PORTABLE   Final Result   Increasing right lung opacities consistent with pneumonia. US DUP LOWER EXTREMITIES BILATERAL VENOUS   Final Result   No evidence of DVT in either lower extremity. XR CHEST PORTABLE   Final Result   1. Right lower lobe pneumonia. 2. Left lower lobe atelectasis versus pneumonia. Assessment:  Principal Problem:    Acute respiratory failure with hypoxia (HCC)  Active Problems:    Cerebral palsy (HCC)    Seizures ([address]    Mentally disabled    Convulsions ([address]    Pneumonia due to COVID-19 virus    Aspiration pneumonia ([address]  Resolved Problems:    * No resolved hospital problems. *      Plan:  1. Acute respiratory failure with hypoxemia  -Patient was extubated this morning, will be monitoring his progress. On Precedex for sedation without suppressing respiratory drive. Now on RA, saturating 99%      2. COVID PNA  -Patient unvaccinated, was coughing, congested and had fevers prior to presentation  -Patient was extubated 9/10  -ID is on board  -Patient switched back to dexamethasone   -Patient completed 5 days of remdesivir and is on duonebs. Was also started on baricitinib which was completed today  -CXR today showed slight improvement in LLL infiltrate  -WBC 10.8, improved.  Spiking fevers again, Tmax 100.9 yesterday around 1200.     3. PNA, possibly aspiration  -Sputum Cx growing Klebsiella sensitive to cefepime, was switched to meropenem and vancomycin  -Repeat sputum Cx growing yeast, not candida - was started on eraxis  -Strep and legionella negative. Initial BCx negative and repeat also negative so far  -WBC 10.8 today, spiking temps again, Tmax 100.9 in past 24hrs. Possible aspiration event during breakthrough seizure  -Will follow up final sputum Cx and monitor labs and VS    4. Lactic acidosis (resolved)  -LA peaked at 11.8 on 8/30, has improved to 0.9 most recently  -Was getting bicarb infusion for acidosis, now off all IVF  -Will monitor BMP and lactic acid levels     5. Electrolyte abnormalities  -Potassium 3.7 today  -Mag and phos WNL today  -Will continue to monitor    6. Seizure disorder 2/2 cerebral palsy  -Patient has known CP, is nonverbal at baseline with severe developmental delay  -Has seizures, takes baclofen, vimpat, lamictal and zonisamide at home which are continued here  -Has been continuing to have breakthrough seizures and keppra was increased  -Neurology consulted and per patient's father's request will be transferred to [facility] where the patient has a neurologist.    7. Elevated blood glucose  -Patient has had elevated glucose levels up to 379, this   -No known diagnosis of DM and patient is currently on steroids which will increase glucose  -A1C should be checked to determine if patient is diabetic or just elevated BS 2/2 steroids and current illness   -Currently on NPO ISS with hypoglycemic protocol  -Currently on tube feeds    8. Septic shock (resolved)  -Patient hypotensive initially, required levophed but was able to be weaned off  -Off all IVF and pressors now  -/79 today (was 75/39 at the lowest on 8/29)  -WBC 10.8, lactic acid normalized  -So far BCx neg, resp cx growing klebsiella and yeast, UCx neg and tip cx NGTD      NOTE: This report was transcribed using voice recognition software.  Every effort was made to ensure accuracy; however, inadvertent computerized transcription errors may be present.      Electronically signed by Stalin Butler MD on 9/14/2021 at 5:16 PM

## 2021-09-14 NOTE — PROGRESS NOTES
Witnessed seizure at 63. Seizure lasted for 3 mins. Rhythmic motions and patient's eyes rolled in the back of the head. Dr Oleg Love notified.

## 2021-09-14 NOTE — PROGRESS NOTES
Pulmonary/Critical Care Progress Note    We are following patient for acute respiratory failure, resolved status post COVID-19 pneumonitis, intermittent fever, history of seizures more prominent now that the patient is not on propofol; history of Klebsiella pneumonia, possible urinary tract infection, status post extubation 9/10/2021    SUBJECTIVE:  We are concerned with the patient's increasing seizure frequency. The patient does see a pediatric neurologist at [facility] and the family would like for him to be transferred there. I completely agree and have been in touch with the neurology intensive care unit at that institution. He is accepted to that unit and will be transferred later today. The patient had a seizure at approximately 3 AM; an additional dose of Keppra was administered at that time. White blood cell count is declining. Overall, the patient's fever seems to have come down after the addition of Eraxis which replaced his fluconazole.     MEDICATIONS:   anidulafungin  100 mg IntraVENous Q24H    dexamethasone  4 mg IntraVENous Q24H    levETIRAcetam  1,500 mg IntraVENous Q12H    meropenem  1,000 mg IntraVENous Q8H    enoxaparin  40 mg SubCUTAneous Daily    vancomycin  1,250 mg IntraVENous Q12H    QUEtiapine  25 mg Oral BID    baclofen  20 mg Oral TID    lidocaine PF  5 mL IntraDERmal Once    sodium chloride flush  5-40 mL IntraVENous 2 times per day    heparin flush  3 mL IntraVENous 2 times per day    polyethylene glycol  17 g Oral Daily    sodium chloride flush  5-40 mL IntraVENous 2 times per day    ipratropium-albuterol  1 ampule Inhalation 4x daily    pantoprazole  40 mg IntraVENous Daily    And    sodium chloride (PF)  10 mL IntraVENous Daily    lacosamide  150 mg Oral BID    lamoTRIgine  200 mg Oral TID    zonisamide  300 mg Oral Nightly      dextrose 5 % and 0.9 % NaCl 50 mL/hr at 09/14/21 0904    sodium chloride      dextrose      sodium chloride sodium chloride flush, sodium chloride, heparin flush, bisacodyl, diatrizoate meglumine-sodium, glucose, dextrose, glucagon (rDNA), dextrose, sodium chloride flush, sodium chloride, ondansetron **OR** ondansetron, polyethylene glycol, acetaminophen **OR** acetaminophen, perflutren lipid microspheres, fentanNYL, sodium chloride      REVIEW OF SYSTEMS:  Patient is nonverbal and cannot answer questions with respect to review of systems    OBJECTIVE:  Vitals:    09/14/21 0800   BP: (!) 126/91   Pulse: 127   Resp: 20   Temp:    SpO2: 94%     FiO2 : 87 %  O2 Flow Rate (L/min): 2 L/min  O2 Device: None (Room air)    PHYSICAL EXAM:  Constitutional: No current fever, chills, diaphoresis  Skin: Skin rash, no skin breakdown  HEENT: Unremarkable  Neck: JVD, lymphadenopathy, thyromegaly  Cardiovascular: S1, S2 regular. No S3 murmurs rubs present  Respiratory: Few low pitched anterior wheezes. No crackles posteriorly  Gastrointestinal: PEG tube intact. Abdomen is soft flat and does not appear tender to palpation  Genitourinary: No CVA tenderness elicited  Extremities: Clubbing, cyanosis, or edema  Neurological: At baseline.   No further seizures over the last 6 to 7 hours  Psychological: Cannot evaluate    LABS:  WBC   Date Value Ref Range Status   09/14/2021 10.8 4.5 - 11.5 E9/L Final   09/13/2021 13.0 (H) 4.5 - 11.5 E9/L Final   09/12/2021 13.8 (H) 4.5 - 11.5 E9/L Final     Hemoglobin   Date Value Ref Range Status   09/14/2021 11.5 (L) 12.5 - 16.5 g/dL Final   09/13/2021 11.2 (L) 12.5 - 16.5 g/dL Final   09/12/2021 11.3 (L) 12.5 - 16.5 g/dL Final     Hematocrit   Date Value Ref Range Status   09/14/2021 34.9 (L) 37.0 - 54.0 % Final   09/13/2021 34.3 (L) 37.0 - 54.0 % Final   09/12/2021 33.4 (L) 37.0 - 54.0 % Final     MCV   Date Value Ref Range Status   09/14/2021 97.5 80.0 - 99.9 fL Final   09/13/2021 98.8 80.0 - 99.9 fL Final   09/12/2021 97.1 80.0 - 99.9 fL Final     Platelets   Date Value Ref Range Status   09/14/2021 628 (H) 130 - 450 E9/L Final   09/13/2021 661 (H) 130 - 450 E9/L Final   09/12/2021 723 (H) 130 - 450 E9/L Final     Sodium   Date Value Ref Range Status   09/14/2021 136 132 - 146 mmol/L Final   09/13/2021 138 132 - 146 mmol/L Final   09/12/2021 138 132 - 146 mmol/L Final     Potassium   Date Value Ref Range Status   09/14/2021 3.7 3.5 - 5.0 mmol/L Final   09/13/2021 3.5 3.5 - 5.0 mmol/L Final   09/12/2021 3.3 (L) 3.5 - 5.0 mmol/L Final     Chloride   Date Value Ref Range Status   09/14/2021 105 98 - 107 mmol/L Final   09/13/2021 107 98 - 107 mmol/L Final   09/12/2021 104 98 - 107 mmol/L Final     CO2   Date Value Ref Range Status   09/14/2021 21 (L) 22 - 29 mmol/L Final   09/13/2021 21 (L) 22 - 29 mmol/L Final   09/12/2021 22 22 - 29 mmol/L Final     BUN   Date Value Ref Range Status   09/14/2021 7 6 - 20 mg/dL Final   09/13/2021 8 6 - 20 mg/dL Final   09/12/2021 11 6 - 20 mg/dL Final     CREATININE   Date Value Ref Range Status   09/14/2021 0.3 (L) 0.7 - 1.2 mg/dL Final   09/13/2021 0.3 (L) 0.7 - 1.2 mg/dL Final   09/12/2021 0.4 (L) 0.7 - 1.2 mg/dL Final     Glucose   Date Value Ref Range Status   09/14/2021 121 (H) 74 - 99 mg/dL Final   09/13/2021 124 (H) 74 - 99 mg/dL Final   09/12/2021 128 (H) 74 - 99 mg/dL Final     Calcium   Date Value Ref Range Status   09/14/2021 9.3 8.6 - 10.2 mg/dL Final   09/13/2021 9.2 8.6 - 10.2 mg/dL Final   09/12/2021 9.1 8.6 - 10.2 mg/dL Final     Total Protein   Date Value Ref Range Status   09/14/2021 7.3 6.4 - 8.3 g/dL Final   09/13/2021 7.3 6.4 - 8.3 g/dL Final   09/12/2021 7.2 6.4 - 8.3 g/dL Final     Albumin   Date Value Ref Range Status   09/14/2021 3.5 3.5 - 5.2 g/dL Final   09/13/2021 3.7 3.5 - 5.2 g/dL Final   09/12/2021 3.6 3.5 - 5.2 g/dL Final     Total Bilirubin   Date Value Ref Range Status   09/14/2021 0.6 0.0 - 1.2 mg/dL Final   09/13/2021 0.7 0.0 - 1.2 mg/dL Final   09/12/2021 0.8 0.0 - 1.2 mg/dL Final     Alkaline Phosphatase   Date Value Ref Range Status Final Result   Improved aeration of the lungs with persistent bilateral reticular airspace   opacities that may be on the basis of pulmonary edema or atypical/viral   pneumonia. XR CHEST PORTABLE   Final Result   Stable bilateral infiltrates. ET tube tip 2.5 cm from the shantell. XR CHEST PORTABLE   Final Result   Interval placement of right-sided PICC line. Bilateral parenchymal infiltrates are similar to slightly worsened. Continued follow-up recommended. XR CHEST PORTABLE   Final Result   1. Lines okay. 2. Mild-moderate improved left lung aeration, overall decreased lung volumes. XR CHEST PORTABLE   Final Result   1. Slight advancement of the endotracheal tube which now resides in the   midthoracic trachea. 2.  Stable appearance of the chest.  Stable position of the enteric tube. XR CHEST PORTABLE   Final Result   1. Interval slight advancement of the endotracheal tube which now resides in   the proximal thoracic trachea. 2.  Persistent ill-defined opacities in the right greater than left lungs. No change from prior exam.  No obvious pneumothorax. 3.  Enteric tube. Contrast material identified within the stomach lumen. XR CHEST PORTABLE   Final Result   1. Persistent ill-defined opacities in the bilateral lungs slightly worse in   the left mid to upper lung. Probable trace pleural effusions. 2.  Slight retraction of the endotracheal tube which projects over the   cervical region. Suggest repositioning. 3.  There appears to be a satisfactory position of the enteric tube. The findings were sent to the Radiology Results [address] at 7:12   pm on 9/3/2021to be communicated to a licensed caregiver. XR ABDOMEN FOR NG/OG/NE TUBE PLACEMENT   Final Result   1.   Satisfactory position of gastric feeding tube as evidenced by stomach   contour outlined following injection of oral contrast.      2. findings described above,  And that I reviewed the relevant imaging studies and available reports. I also discussed the differential diagnosis and all of the proposed management plans with the patient and individuals accompanying the patient to this visit. They had the opportunity to ask questions about the proposed management plans and to have those questions answered. This patient has a high probability of sudden, clinically significant deterioration, which requires the highest level of physician preparedness to intervene urgently. I managed/supervised life or organ supporting interventions that required frequent physician assessment. I devoted my full attention to the direct care of this patient for the amount of time indicated below. Time I spent with the family or surrogate(s) is included only if the patient was incapable of providing the necessary information or participating in medical decisions  Time devoted to teaching and to any procedures I billed separately is not included.     CRITICAL CARE TIME:  39 minutes    Electronically signed by Joni Dhaliwal MD on 9/14/2021 at 10:33 AM

## 2021-09-14 NOTE — PROGRESS NOTES
Patient witnessed having a seizure lasting 6 min. Intermittent rhythmic motions with intermittent moaning and eyes rolled up in head. Dr Moreland Guardian called to bedside to witness event. No new orders at this time.

## 2021-09-14 NOTE — PLAN OF CARE
Problem: Airway Clearance - Ineffective  Goal: Achieve or maintain patent airway  Outcome: Met This Shift     Problem: Gas Exchange - Impaired  Goal: Absence of hypoxia  Outcome: Met This Shift     Problem: Breathing Pattern - Ineffective  Goal: Ability to achieve and maintain a regular respiratory rate  Outcome: Met This Shift     Problem: Isolation Precautions - Risk of Spread of Infection  Goal: Prevent transmission of infection  Outcome: Met This Shift     Problem: Fatigue  Goal: Verbalize increase energy and improved vitality  Outcome: Met This Shift     Problem: Skin Integrity:  Goal: Absence of new skin breakdown  Description: Absence of new skin breakdown  Outcome: Met This Shift     Problem: Falls - Risk of:  Goal: Will remain free from falls  Description: Will remain free from falls  Outcome: Met This Shift     Problem: Falls - Risk of:  Goal: Absence of physical injury  Description: Absence of physical injury  Outcome: Met This Shift     Problem: Gas Exchange - Impaired  Goal: Promote optimal lung function  Outcome: Ongoing     Problem: Nutrition Deficits  Goal: Optimize nutritional status  Outcome: Ongoing

## 2021-09-14 NOTE — CARE COORDINATION
9/14/21 Positive covid 8/28/21- unvaccinated. CM transition of care: pt's transfer was canceled to telemetry yesterday due to increased temp and tachycardia. Family declining the need for HHC. They have assistance at home with therapies and have aqua therapy set up in the home for [facility]. CM placed call to home- Crystal pts mom will bring one of his specialized wheelchairs to the hospital as previously requested. Pt for EEG today- neurology on consult. Pt is on room air. Father will provide a ride in family [employer], upon discharge. CM/SS following.  Electronically signed by Oscar Uriarte RN-BC on 9/14/2021 at 9:53 AM

## 2021-09-15 LAB
ALBUMIN SERPL-MCNC: 3.8 G/DL (ref 3.5–5.2)
ALP BLD-CCNC: 177 U/L (ref 40–129)
ALT SERPL-CCNC: 42 U/L (ref 0–40)
ANION GAP SERPL CALCULATED.3IONS-SCNC: 12 MMOL/L (ref 7–16)
AST SERPL-CCNC: 41 U/L (ref 0–39)
BASOPHILS ABSOLUTE: 0.09 E9/L (ref 0–0.2)
BASOPHILS RELATIVE PERCENT: 1 % (ref 0–2)
BILIRUB SERPL-MCNC: 0.4 MG/DL (ref 0–1.2)
BUN BLDV-MCNC: 8 MG/DL (ref 6–20)
CALCIUM SERPL-MCNC: 9 MG/DL (ref 8.6–10.2)
CHLORIDE BLD-SCNC: 106 MMOL/L (ref 98–107)
CO2: 21 MMOL/L (ref 22–29)
CREAT SERPL-MCNC: 0.3 MG/DL (ref 0.7–1.2)
EOSINOPHILS ABSOLUTE: 0.01 E9/L (ref 0.05–0.5)
EOSINOPHILS RELATIVE PERCENT: 0.1 % (ref 0–6)
GFR AFRICAN AMERICAN: >60
GFR NON-AFRICAN AMERICAN: >60 ML/MIN/1.73
GLUCOSE BLD-MCNC: 100 MG/DL (ref 74–99)
HCT VFR BLD CALC: 36.5 % (ref 37–54)
HEMOGLOBIN: 12 G/DL (ref 12.5–16.5)
IMMATURE GRANULOCYTES #: 0.18 E9/L
IMMATURE GRANULOCYTES %: 2 % (ref 0–5)
LYMPHOCYTES ABSOLUTE: 1.66 E9/L (ref 1.5–4)
LYMPHOCYTES RELATIVE PERCENT: 18.5 % (ref 20–42)
MAGNESIUM: 2.3 MG/DL (ref 1.6–2.6)
MCH RBC QN AUTO: 32.6 PG (ref 26–35)
MCHC RBC AUTO-ENTMCNC: 32.9 % (ref 32–34.5)
MCV RBC AUTO: 99.2 FL (ref 80–99.9)
MONOCYTES ABSOLUTE: 1.23 E9/L (ref 0.1–0.95)
MONOCYTES RELATIVE PERCENT: 13.7 % (ref 2–12)
NEUTROPHILS ABSOLUTE: 5.81 E9/L (ref 1.8–7.3)
NEUTROPHILS RELATIVE PERCENT: 64.7 % (ref 43–80)
PDW BLD-RTO: 12.7 FL (ref 11.5–15)
PHOSPHORUS: 2.8 MG/DL (ref 2.5–4.5)
PLATELET # BLD: 631 E9/L (ref 130–450)
PMV BLD AUTO: 9.6 FL (ref 7–12)
POTASSIUM SERPL-SCNC: 3.7 MMOL/L (ref 3.5–5)
RBC # BLD: 3.68 E12/L (ref 3.8–5.8)
SODIUM BLD-SCNC: 139 MMOL/L (ref 132–146)
TOTAL PROTEIN: 7.4 G/DL (ref 6.4–8.3)
URINE CULTURE, ROUTINE: NORMAL
WBC # BLD: 9 E9/L (ref 4.5–11.5)

## 2021-09-15 PROCEDURE — 2580000003 HC RX 258: Performed by: INTERNAL MEDICINE

## 2021-09-15 PROCEDURE — 84100 ASSAY OF PHOSPHORUS: CPT

## 2021-09-15 PROCEDURE — 6360000002 HC RX W HCPCS: Performed by: INTERNAL MEDICINE

## 2021-09-15 PROCEDURE — 80053 COMPREHEN METABOLIC PANEL: CPT

## 2021-09-15 PROCEDURE — 6370000000 HC RX 637 (ALT 250 FOR IP): Performed by: INTERNAL MEDICINE

## 2021-09-15 PROCEDURE — 83735 ASSAY OF MAGNESIUM: CPT

## 2021-09-15 PROCEDURE — 6370000000 HC RX 637 (ALT 250 FOR IP)

## 2021-09-15 PROCEDURE — 2580000003 HC RX 258: Performed by: SPECIALIST

## 2021-09-15 PROCEDURE — 2000000000 HC ICU R&B

## 2021-09-15 PROCEDURE — 85025 COMPLETE CBC W/AUTO DIFF WBC: CPT

## 2021-09-15 PROCEDURE — 6360000002 HC RX W HCPCS

## 2021-09-15 PROCEDURE — 2580000003 HC RX 258

## 2021-09-15 PROCEDURE — 6360000002 HC RX W HCPCS: Performed by: SPECIALIST

## 2021-09-15 PROCEDURE — 2700000000 HC OXYGEN THERAPY PER DAY

## 2021-09-15 PROCEDURE — 99232 SBSQ HOSP IP/OBS MODERATE 35: CPT | Performed by: STUDENT IN AN ORGANIZED HEALTH CARE EDUCATION/TRAINING PROGRAM

## 2021-09-15 PROCEDURE — C9113 INJ PANTOPRAZOLE SODIUM, VIA: HCPCS

## 2021-09-15 PROCEDURE — 6360000002 HC RX W HCPCS: Performed by: PSYCHIATRY & NEUROLOGY

## 2021-09-15 RX ORDER — SODIUM CHLORIDE 9 MG/ML
25 INJECTION, SOLUTION INTRAVENOUS EVERY 8 HOURS
Status: DISCONTINUED | OUTPATIENT
Start: 2021-09-15 | End: 2021-09-19 | Stop reason: HOSPADM

## 2021-09-15 RX ORDER — FOSPHENYTOIN SODIUM 50 MG/ML
200 INJECTION, SOLUTION INTRAMUSCULAR; INTRAVENOUS DAILY
Status: DISCONTINUED | OUTPATIENT
Start: 2021-09-15 | End: 2021-09-15 | Stop reason: CLARIF

## 2021-09-15 RX ADMIN — CEFEPIME HYDROCHLORIDE 2000 MG: 2 INJECTION, POWDER, FOR SOLUTION INTRAVENOUS at 22:09

## 2021-09-15 RX ADMIN — ENOXAPARIN SODIUM 40 MG: 40 INJECTION SUBCUTANEOUS at 09:39

## 2021-09-15 RX ADMIN — LEVETIRACETAM 1500 MG: 15 INJECTION INTRAVENOUS at 22:08

## 2021-09-15 RX ADMIN — Medication 10 ML: at 21:00

## 2021-09-15 RX ADMIN — LACOSAMIDE 150 MG: 100 TABLET, FILM COATED ORAL at 20:54

## 2021-09-15 RX ADMIN — BACLOFEN 20 MG: 10 TABLET ORAL at 14:19

## 2021-09-15 RX ADMIN — CEFEPIME HYDROCHLORIDE 2000 MG: 2 INJECTION, POWDER, FOR SOLUTION INTRAVENOUS at 14:17

## 2021-09-15 RX ADMIN — Medication 10 ML: at 09:45

## 2021-09-15 RX ADMIN — VANCOMYCIN HYDROCHLORIDE 1000 MG: 1 INJECTION, POWDER, LYOPHILIZED, FOR SOLUTION INTRAVENOUS at 04:22

## 2021-09-15 RX ADMIN — DEXTROSE AND SODIUM CHLORIDE: 5; 900 INJECTION, SOLUTION INTRAVENOUS at 07:54

## 2021-09-15 RX ADMIN — SODIUM CHLORIDE, PRESERVATIVE FREE 10 ML: 5 INJECTION INTRAVENOUS at 08:14

## 2021-09-15 RX ADMIN — LEVETIRACETAM 1500 MG: 15 INJECTION INTRAVENOUS at 09:40

## 2021-09-15 RX ADMIN — ZONISAMIDE 300 MG: 100 CAPSULE ORAL at 20:52

## 2021-09-15 RX ADMIN — FOSPHENYTOIN SODIUM 200 MG PE: 50 INJECTION, SOLUTION INTRAMUSCULAR; INTRAVENOUS at 09:46

## 2021-09-15 RX ADMIN — BACLOFEN 20 MG: 10 TABLET ORAL at 08:15

## 2021-09-15 RX ADMIN — MEROPENEM 1000 MG: 1 INJECTION, POWDER, FOR SOLUTION INTRAVENOUS at 10:35

## 2021-09-15 RX ADMIN — PANTOPRAZOLE SODIUM 40 MG: 40 INJECTION, POWDER, FOR SOLUTION INTRAVENOUS at 09:39

## 2021-09-15 RX ADMIN — BACLOFEN 20 MG: 10 TABLET ORAL at 20:52

## 2021-09-15 RX ADMIN — LACOSAMIDE 150 MG: 100 TABLET, FILM COATED ORAL at 08:14

## 2021-09-15 RX ADMIN — VANCOMYCIN HYDROCHLORIDE 1000 MG: 1 INJECTION, POWDER, LYOPHILIZED, FOR SOLUTION INTRAVENOUS at 20:28

## 2021-09-15 RX ADMIN — DEXTROSE MONOHYDRATE 100 MG: 50 INJECTION, SOLUTION INTRAVENOUS at 12:23

## 2021-09-15 RX ADMIN — LAMOTRIGINE 200 MG: 100 TABLET ORAL at 14:18

## 2021-09-15 RX ADMIN — LAMOTRIGINE 200 MG: 100 TABLET ORAL at 20:53

## 2021-09-15 RX ADMIN — VANCOMYCIN HYDROCHLORIDE 1000 MG: 1 INJECTION, POWDER, LYOPHILIZED, FOR SOLUTION INTRAVENOUS at 12:23

## 2021-09-15 RX ADMIN — LAMOTRIGINE 200 MG: 100 TABLET ORAL at 08:13

## 2021-09-15 RX ADMIN — HEPARIN 300 UNITS: 100 SYRINGE at 09:39

## 2021-09-15 RX ADMIN — POLYETHYLENE GLYCOL 3350 17 G: 17 POWDER, FOR SOLUTION ORAL at 10:37

## 2021-09-15 RX ADMIN — MEROPENEM 1000 MG: 1 INJECTION, POWDER, FOR SOLUTION INTRAVENOUS at 03:10

## 2021-09-15 NOTE — PROGRESS NOTES
Exam stable; no seizures again noted during my exam  Objective:      Neuro exam 102/76 p 120 t 100  General: opens eyes to verbal stimulation  Cranial nerve testing  unable to cooperate. PERRL, corneal reflexes +  . Funduscopic eye exam revealed not testable. Motor exam: ..spontaneous movement of fingers/toes; contractures noted  Deep tendon reflexes were 1+ bilaterally. Plantar responses were flexor bilaterally. Cerebellar exam noted . Leigh Ann  Sensation was . .        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 If                     This is  the case then he would obviously benefit from a deescalation of current lilo dosing.

## 2021-09-15 NOTE — PROGRESS NOTES
Comprehensive Nutrition Assessment    Type and Reason for Visit:  Reassess    Nutrition Recommendations/Plan: Modify TF Standard w/o fiber Osmolite 1.2 @55ml/hr x 24hrs to provide 1320mlTV/1584kcal/73gm pro/1082ml FW: When IVF are d/c'd add'l water flushes 130ml every 6 hrs=520ml+8707=8704yz total fluid/d. Nutrition Assessment:  Pt admitted w/ hypokalemia and acute respiratory failure r/t COVID-19 and possible aspiration PNA. PMH of spastic CP. Pt remains intubated and sedated on trickle feeds. Will continue to monitor monitor while admitted. Recommend to increase TF rate to meet optimal nutr'l needs    Malnutrition Assessment:  Malnutrition Status: At risk for malnutrition (Comment)    Context:  Acute Illness     Findings of the 6 clinical characteristics of malnutrition:  Energy Intake:  Mild decrease in energy intake (Comment)  Weight Loss:  Unable to assess (No actual wt this admission and no actual EMR wt hx)     Body Fat Loss:  Unable to assess (Pt is COVID-19 positive)     Muscle Mass Loss:  Unable to assess    Fluid Accumulation:  No significant fluid accumulation     Strength:  Not Performed    Estimated Daily Nutrient Needs:  Energy (kcal):  4703-2662; Weight Used for Energy Requirements:  Current     Protein (g):  70-85 (1.4-1.7 gm/kg IBW);  Weight Used for Protein Requirements:  Ideal        Fluid (ml/day):  per critical care; Method Used for Fluid Requirements:  Other (Comment)      Nutrition Related Findings:  Pt extubated 9/10, abd flat, hypoactive BS, Generalized +1 non-pitting edema, -I/O -14.7L      Wounds:  Pressure Injury (noted on L heel, not staged)       Current Nutrition Therapies:    Current Tube Feeding (TF) Orders:  · Feeding Route: PEG  · Formula: Standard without Fiber  · Schedule: Continuous    · Current TF & Flush Orders Provides: 480mlTV/576kcal/27gm pro/394ml FW        Anthropometric Measures:  · Height: 5' (152.4 cm)  · Current Body Weight: 153 lb (69.4 kg) (9/15 bed scale)   · Admission Body Weight: 130 lb (59 kg) (8/28 no method)    · Usual Body Weight: 135 lb (61.2 kg) (9/4/2020 stated, no actual EMR wt hx)     · Ideal Body Weight: 106 lbs; % Ideal Body Weight 144.3 %   · BMI: 29.9  · Adjusted Body Weight:  ; No Adjustment   · BMI Categories: Overweight (BMI 25.0-29. 9)       Nutrition Diagnosis:   · Inadequate protein-energy intake related to impaired respiratory function as evidenced by intubation, nutrition support - enteral nutrition      Nutrition Interventions:   Food and/or Nutrient Delivery:  Modify Oral Nutrition Supplement (Standard w/o fiber Osmolite 1.2 @55ml/hr x 24hrs to provide 1320mlTV/1584kcal/73gm pro/1082ml FW: When IVF are d/c'd add'l water flushes 130ml every 6 hrs=520ml+3757=6058op total fluid/d)  Nutrition Education/Counseling:  No recommendation at this time   Coordination of Nutrition Care:  Continue to monitor while inpatient    Goals:  EN tolerance       Nutrition Monitoring and Evaluation:   Behavioral-Environmental Outcomes:  None Identified   Food/Nutrient Intake Outcomes:  Enteral Nutrition Intake/Tolerance  Physical Signs/Symptoms Outcomes:  Biochemical Data, GI Status, Fluid Status or Edema, Nutrition Focused Physical Findings, Skin, Weight, Hemodynamic Status     Discharge Planning:     Too soon to determine     Electronically signed by Piyush Noel RD, LD on 9/15/21 at 2:02 PM EDT    Contact: [phone]

## 2021-09-15 NOTE — PROGRESS NOTES
[place] [id number] Hospital[place]   Progress Note    Admitting Date and Time: 8/28/2021  3:31 PM  Admit Dx: Hypokalemia [E87.6]  Acute respiratory failure with hypoxia ([facility] [address] [J96.01]  Pneumonia of right lower lobe due to infectious organism [J18.9]  Sepsis with acute hypoxic respiratory failure without septic shock, due to unspecified organism ([facility] [address] [A41.9, R65.20, J96.01]    Subjective:    Patient was extubated 9/10 and was doing well on RA yesterday during the day, however was getting more tachycardic and spiking fevers so was kept in the ICU. The patient then started having multiple breakthrough seizures overnight and this morning as well. The patient's father was informed and stated that he has a neurologist familiar with his case at [facility] and he wants the patient transferred there. Transfer was initiated by Dr. Tino Flanagan and patient was accepted, just waiting for a bed. Was also started on cefepime and fosphenytoin today.          [START ON 9/16/2021] fosphenytoin (CEREBYX) maintenance dose IVPB  200 mg PE IntraVENous Daily    cefepime  2,000 mg IntraVENous Q8H    vancomycin  1,000 mg IntraVENous Q8H    anidulafungin  100 mg IntraVENous Q24H    levETIRAcetam  1,500 mg IntraVENous Q12H    enoxaparin  40 mg SubCUTAneous Daily    baclofen  20 mg Oral TID    lidocaine PF  5 mL IntraDERmal Once    sodium chloride flush  5-40 mL IntraVENous 2 times per day    heparin flush  3 mL IntraVENous 2 times per day    polyethylene glycol  17 g Oral Daily    sodium chloride flush  5-40 mL IntraVENous 2 times per day    ipratropium-albuterol  1 ampule Inhalation 4x daily    pantoprazole  40 mg IntraVENous Daily    And    sodium chloride (PF)  10 mL IntraVENous Daily    lacosamide  150 mg Oral BID    lamoTRIgine  200 mg Oral TID    zonisamide  300 mg Oral Nightly     sodium chloride flush, 5-40 mL, PRN  sodium chloride, 25 mL, PRN  heparin flush, 3 mL, PRN  bisacodyl, 10 mg, Daily PRN  diatrizoate meglumine-sodium, 50 mL, ONCE PRN  glucose, 15 g, PRN  dextrose, 12.5 g, PRN  glucagon (rDNA), 1 mg, PRN  dextrose, 100 mL/hr, PRN  sodium chloride flush, 10 mL, PRN  sodium chloride, 25 mL, PRN  ondansetron, 4 mg, Q8H PRN   Or  ondansetron, 4 mg, Q6H PRN  polyethylene glycol, 17 g, Daily PRN  acetaminophen, 650 mg, Q6H PRN   Or  acetaminophen, 650 mg, Q6H PRN  perflutren lipid microspheres, 1.5 mL, ONCE PRN  fentanNYL, 25 mcg, Q1H PRN  sodium chloride, 30 mL, PRN         Objective:    /89   Pulse 128   Temp 99 °F (37.2 °C) (Core)   Resp 23   Ht 5' (1.524 m)   Wt 153 lb (69.4 kg)   SpO2 97%   BMI 29.88 kg/m²     Due to the current efforts to prevent transmission of COVID-19 and also the need to preserve PPE for other caregivers, a face-to-face encounter with the patient was not performed. That being said, all relevant records and diagnostic tests were reviewed, including laboratory results and imaging. Please reference any relevant documentation elsewhere. Care will be coordinated with other specialties as appropriate. Recent Labs     09/13/21  0514 09/14/21  0535 09/15/21  0440    136 139   K 3.5 3.7 3.7    105 106   CO2 21* 21* 21*   BUN 8 7 8   CREATININE 0.3* 0.3* 0.3*   GLUCOSE 124* 121* 100*   CALCIUM 9.2 9.3 9.0       Recent Labs     09/13/21  0514 09/14/21  0535 09/15/21  0440   ALKPHOS 160* 162* 177*   PROT 7.3 7.3 7.4   LABALBU 3.7 3.5 3.8   BILITOT 0.7 0.6 0.4   AST 23 21 41*   ALT 20 22 42*       Recent Labs     09/13/21  0514 09/14/21  0535 09/15/21  0440   WBC 13.0* 10.8 9.0   RBC 3.47* 3.58* 3.68*   HGB 11.2* 11.5* 12.0*   HCT 34.3* 34.9* 36.5*   MCV 98.8 97.5 99.2   MCH 32.3 32.1 32.6   MCHC 32.7 33.0 32.9   RDW 12.5 12.4 12.7   * 628* 631*   MPV 9.7 9.6 9.6                 Radiology:   XR CHEST PORTABLE   Final Result   Little change in patchy bilateral interstitial and alveolar opacities that   may represent pneumonia.          XR CHEST PORTABLE   Final Result 9/3/2021to be communicated to a licensed caregiver. XR ABDOMEN FOR NG/OG/NE TUBE PLACEMENT   Final Result   1. Satisfactory position of gastric feeding tube as evidenced by stomach   contour outlined following injection of oral contrast.      2.  Satisfactory position of enteric tube. 3.  Significant reflux of oral contrast material into the esophagus. XR CHEST PORTABLE   Final Result   Significant bilateral infiltrates with partial interval clearing of the left   lower lobe infiltrate since the prior study         XR CHEST PORTABLE   Final Result   Satisfactory position of the ETT and NGT. Otherwise, stable chest.         XR CHEST PORTABLE   Final Result   Increasing right lung opacities consistent with pneumonia. US DUP LOWER EXTREMITIES BILATERAL VENOUS   Final Result   No evidence of DVT in either lower extremity. XR CHEST PORTABLE   Final Result   1. Right lower lobe pneumonia. 2. Left lower lobe atelectasis versus pneumonia. Assessment:  Principal Problem:    Acute respiratory failure with hypoxia (HCC)  Active Problems:    Cerebral palsy (HCC)    Seizures ([address]    Mentally disabled    Convulsions ([address]    Pneumonia due to COVID-19 virus    Aspiration pneumonia ([address]  Resolved Problems:    * No resolved hospital problems. *      Plan:  1. Acute respiratory failure with hypoxemia  -Patient was extubated this morning, will be monitoring his progress. On Precedex for sedation without suppressing respiratory drive. Now on RA, saturating %      2. COVID PNA  -Patient unvaccinated, was coughing, congested and had fevers prior to presentation  -Patient was extubated 9/10  -ID is on board  -Patient switched back to dexamethasone   -Patient completed 5 days of remdesivir and is on duonebs. Was also started on baricitinib which was completed  -CXR today showed slight improvement in LLL infiltrate  -WBC 10.8, improved.  Spiking fevers again, Tmax 100.9 yesterday around 1200.     3. PNA, possibly aspiration  -Sputum Cx growing Klebsiella sensitive to cefepime, was switched to meropenem and vancomycin  -Repeat sputum Cx growing yeast, not candida - was started on eraxis  -Strep and legionella negative. Initial BCx negative and repeat also negative so far  -WBC 9 today, spiking temps again, Tmax 100.0 in past 24hrs. Possible aspiration event during breakthrough seizure  -Will follow up final sputum Cx and monitor labs and VS    4. Lactic acidosis (resolved)  -LA peaked at 11.8 on 8/30, has improved to 0.9 most recently  -Was getting bicarb infusion for acidosis, now off all IVF  -Will monitor BMP and lactic acid levels     5. Electrolyte abnormalities  -Potassium 3.7 today  -Mag and phos WNL today  -Will continue to monitor    6. Seizure disorder 2/2 cerebral palsy  -Patient has known CP, is nonverbal at baseline with severe developmental delay  -Has seizures, takes baclofen, vimpat, lamictal and zonisamide at home which are continued here  -Has been continuing to have breakthrough seizures and keppra was increased  -Neurology consulted and per patient's father's request will be transferred to [facility] where the patient has a neurologist.    7. Elevated blood glucose  -Patient has had elevated glucose levels up to 379, this   -No known diagnosis of DM and patient is currently on steroids which will increase glucose  -A1C should be checked to determine if patient is diabetic or just elevated BS 2/2 steroids and current illness   -Currently on NPO ISS with hypoglycemic protocol  -Currently on tube feeds    8.    Septic shock (resolved)  -Patient hypotensive initially, required levophed but was able to be weaned off  -Off all IVF and pressors now  -/78 today (was 75/39 at the lowest on 8/29)  -WBC 9, lactic acid normalized  -So far BCx neg, resp cx growing klebsiella and yeast, UCx neg and tip cx NGTD      NOTE: This report was transcribed using voice recognition software. Every effort was made to ensure accuracy; however, inadvertent computerized transcription errors may be present.      Electronically signed by Geary Homans, MD on 9/15/2021 at 5:47 PM

## 2021-09-15 NOTE — PROGRESS NOTES
Pharmacy Consultation Note  (Antibiotic Dosing and Monitoring)    Initial consult date: 9/12/2021  Consulting physician/provider: Dr. Pettit Senegalese  Drug: Vancomycin  Indication: HAP    Age/  Gender Height Weight IBW  Allergy Information   25 y.o./male 5' (152.4 cm) 130 lb (59 kg)     Ideal body weight: 50 kg (110 lb 3.7 oz)  Adjusted ideal body weight: 57.8 kg (127 lb 5.4 oz)   Patient has no known allergies. Renal Function:  Recent Labs     09/13/21  0514 09/14/21  0535 09/15/21  0440   BUN 8 7 8   CREATININE 0.3* 0.3* 0.3*       Intake/Output Summary (Last 24 hours) at 9/15/2021 0913  Last data filed at 9/15/2021 0606  Gross per 24 hour   Intake 3284.17 ml   Output 3500 ml   Net -215.83 ml       Vancomycin Monitoring:  Trough:    Recent Labs     09/13/21  2339   VANCOTROUGH 6.6     Random:  No results for input(s): VANCORANDOM in the last 72 hours. Recent vancomycin administrations                   vancomycin (VANCOCIN) 1,000 mg in dextrose 5 % 250 mL IVPB (mg) 1,000 mg New Bag 09/15/21 1223     1,000 mg New Bag  0422     1,000 mg New Bag 09/14/21 2030    vancomycin (VANCOCIN) 1,250 mg in dextrose 5 % 250 mL IVPB (mg) 1,250 mg New Bag 09/14/21 1200     1,250 mg New Bag  0032     1,250 mg New Bag 09/13/21 1357     1,250 mg New Bag  0016     1,250 mg New Bag 09/12/21 1345              Assessment:  · Patient is a 30 y.o. male who has been initiated on vancomycin  Estimated Creatinine Clearance: 302 mL/min (A) (based on SCr of 0.3 mg/dL (L)). · To dose vancomycin, pharmacy will be utilizing Sim[employer] calculation software for goal AUC/DERIAN 400-600 mg/L-hr   · 9/13: Trough = 6.6 mcg/mL. AUC/DERIAN 380    Plan:  · Will increase to vancomycin 1000 mg IV every 8 hours  · Repeat trough tomorrow @ 0330.  Hold dose for level > 20 mcg/mL  · Will continue to monitor renal function   · Clinical pharmacy to follow    Di Mohamud, PharmD, BCPS 9/15/2021 9:13 AM   Ext: [phone]

## 2021-09-15 NOTE — PROGRESS NOTES
CRITICAL CARE PROGRESS NOTE    The patient's case was discussed in multidisciplinary rounds including critical care specialist, nursing, RT and pharmacy. Her evaluation is as follows:      30year old man with PMH as described below admitted to ICU for management of COVID-19 pneumonia, additional Klebsiella pneumonia right upper lobe, acute respiratory failure, ARDS, cerebral palsy, septic shock, metabolic acidosis, respiratory acidosis, extubated on 9/10.    --Currently on room air  --Afebrile, no seizures noticed  --Neurology stopped seroquel  --Stopped meropenem and started cefepime as there is no MDRO growth      /68   Pulse 118   Temp 99 °F (37.2 °C) (Core)   Resp 23   Ht 5' (1.524 m)   Wt 153 lb (69.4 kg)   SpO2 100%   BMI 29.88 kg/m²     General: Awake, closes eyes when I touch him, moves arms when stimulated with touch, with limb contractures  HEENT: No head lesions, PERRL, EOMI, mouth without lesions, no nasal lesions, no cervical adenopathy palpated  Respiratory: Lungs with diminished breath sounds bilaterally, no adventitious sounds auscultated, no accessory muscle use  CV: Regular rate, no murmurs, no JVD, trace leg edema  Abdomen: Soft, non tender, + bowel sounds, PEG present  Skin: Hydrated, adequate turgor, no rash, capillary refill <2 seconds  Extremities: Muscular strength: moves 4 limbs spontaneously, distal pulses present  Neurology: Awake, spontaneously moves arms, no abnormal movement noticed, neck is supple, no meningitic signs present.       A/P:  Acute hypoxemic respiratory failure secondary to severe COVID-19 pneumonia and ARDS + Klebsiella pneumonia  --Continue oxygen supplementation to keep sats >89%, on room air  --Antimicrobial regimen: Cefepime and anidulafungin  --Antiviral regimen: Completed Remdesivir X5 days, baricitinib  and dexamethasone   --Anticoagulation: Enoxaparin BID    Metabolic encephalopathy due to 1 with delirium  --Improved, seroquel is Chronic Kidney Disease: less than 60 ml/min/1.73 sq.m. Kidney Failure: less than 15 ml/min/1.73 sq.m. Results valid for patients 18 years and older. 09/14/2021 >60 >=60 mL/min/1.73 Final     Comment:     Chronic Kidney Disease: less than 60 ml/min/1.73 sq.m. Kidney Failure: less than 15 ml/min/1.73 sq.m. Results valid for patients 18 years and older. 09/13/2021 >60 >=60 mL/min/1.73 Final     Comment:     Chronic Kidney Disease: less than 60 ml/min/1.73 sq.m. Kidney Failure: less than 15 ml/min/1.73 sq.m. Results valid for patients 18 years and older. GFR    Date Value Ref Range Status   09/15/2021 >60  Final   09/14/2021 >60  Final   09/13/2021 >60  Final     Magnesium   Date Value Ref Range Status   09/15/2021 2.3 1.6 - 2.6 mg/dL Final   09/14/2021 2.0 1.6 - 2.6 mg/dL Final   09/13/2021 2.0 1.6 - 2.6 mg/dL Final     Phosphorus   Date Value Ref Range Status   09/15/2021 2.8 2.5 - 4.5 mg/dL Final   09/14/2021 2.9 2.5 - 4.5 mg/dL Final   09/13/2021 2.7 2.5 - 4.5 mg/dL Final     No results for input(s): PH, PO2, PCO2, HCO3, BE, O2SAT in the last 72 hours. RADIOLOGY:  XR CHEST PORTABLE   Final Result   Little change in patchy bilateral interstitial and alveolar opacities that   may represent pneumonia. XR CHEST PORTABLE   Final Result   Patchy multifocal bilateral airspace disease, more prominent within the right   upper lobe. XR CHEST PORTABLE   Final Result   No significant change in the appearance of the chest compared with   yesterday's examination. XR CHEST PORTABLE   Final Result   Improved aeration of the lungs with persistent bilateral reticular airspace   opacities that may be on the basis of pulmonary edema or atypical/viral   pneumonia. XR CHEST PORTABLE   Final Result   Stable bilateral infiltrates. ET tube tip 2.5 cm from the shantell.          XR CHEST PORTABLE   Final Result   Interval placement of right-sided PICC line. Bilateral parenchymal infiltrates are similar to slightly worsened. Continued follow-up recommended. XR CHEST PORTABLE   Final Result   1. Lines okay. 2. Mild-moderate improved left lung aeration, overall decreased lung volumes. XR CHEST PORTABLE   Final Result   1. Slight advancement of the endotracheal tube which now resides in the   midthoracic trachea. 2.  Stable appearance of the chest.  Stable position of the enteric tube. XR CHEST PORTABLE   Final Result   1. Interval slight advancement of the endotracheal tube which now resides in   the proximal thoracic trachea. 2.  Persistent ill-defined opacities in the right greater than left lungs. No change from prior exam.  No obvious pneumothorax. 3.  Enteric tube. Contrast material identified within the stomach lumen. XR CHEST PORTABLE   Final Result   1. Persistent ill-defined opacities in the bilateral lungs slightly worse in   the left mid to upper lung. Probable trace pleural effusions. 2.  Slight retraction of the endotracheal tube which projects over the   cervical region. Suggest repositioning. 3.  There appears to be a satisfactory position of the enteric tube. The findings were sent to the Radiology Results [address] at 7:12   pm on 9/3/2021to be communicated to a licensed caregiver. XR ABDOMEN FOR NG/OG/NE TUBE PLACEMENT   Final Result   1. Satisfactory position of gastric feeding tube as evidenced by stomach   contour outlined following injection of oral contrast.      2.  Satisfactory position of enteric tube. 3.  Significant reflux of oral contrast material into the esophagus. XR CHEST PORTABLE   Final Result   Significant bilateral infiltrates with partial interval clearing of the left   lower lobe infiltrate since the prior study         XR CHEST PORTABLE   Final Result   Satisfactory position of the ETT and NGT.       Otherwise, stable chest.         XR CHEST PORTABLE   Final Result   Increasing right lung opacities consistent with pneumonia. US DUP LOWER EXTREMITIES BILATERAL VENOUS   Final Result   No evidence of DVT in either lower extremity. XR CHEST PORTABLE   Final Result   1. Right lower lobe pneumonia. 2. Left lower lobe atelectasis versus pneumonia. PROBLEM LIST:  Principal Problem:    Acute respiratory failure with hypoxia (HCC)  Active Problems:    Cerebral palsy (HCC)    Seizures ([address]    Mentally disabled    Convulsions ([address]    Pneumonia due to COVID-19 virus    Aspiration pneumonia ([address]  Resolved Problems:    * No resolved hospital problems. *      ATTESTATION:  ICU Staff Physician note of personal involvement in Care  As the attending physician, I certify that I personally reviewed the patients history and personnally examined the patient to confirm the physical findings described above,  And that I reviewed the relevant imaging studies and available reports. I also discussed the differential diagnosis and all of the proposed management plans with the patient and individuals accompanying the patient to this visit. They had the opportunity to ask questions about the proposed management plans and to have those questions answered. This patient has a high probability of sudden, clinically significant deterioration, which requires the highest level of physician preparedness to intervene urgently. I managed/supervised life or organ supporting interventions that required frequent physician assessment. I devoted my full attention to the direct care of this patient for the amount of time indicated below. Time I spent with the family or surrogate(s) is included only if the patient was incapable of providing the necessary information or participating in medical decisions  Time devoted to teaching and to any procedures I billed separately is not included.      CRITICAL CARE TIME:  30 minutes    Vince Woodard MD  Pulmonary and Critical Care Medicine

## 2021-09-15 NOTE — PROGRESS NOTES
Call placed to [facility] regarding status of transfer for this patient. I was told there is no beds available at this time. I then ask what their procedure was for us downgrading this patient to imcu on our end. I was told the physician would have to re-conference and it would not change anything except where this patient would go. Dr. Andre Gamble placed a call to [facility] and she was told that this patient may in fact get a bed this evening d/t they are going to have discharges.

## 2021-09-15 NOTE — CARE COORDINATION
9/15/21 Positive covid 8/28/21. Cm  transition of care: icu/room air- \"seizure like activity\" Neurology Dr. Morfin Bars following. Transfer to [facility]- \"seizures\"-Neurology- ambulance sheet completed and placed in soft blue chart. Hoping  will make their own arrangements- if transportation needed call [phone]. CM/SS to follow.  Electronically signed by Hugh Adan RN-BC on 9/15/21 at 10:24 AM EDT

## 2021-09-16 ENCOUNTER — APPOINTMENT (OUTPATIENT)
Dept: NEUROLOGY | Age: 26
DRG: 870 | End: 2021-09-16
Payer: COMMERCIAL

## 2021-09-16 LAB
ALBUMIN SERPL-MCNC: 3.9 G/DL (ref 3.5–5.2)
ALP BLD-CCNC: 183 U/L (ref 40–129)
ALT SERPL-CCNC: 71 U/L (ref 0–40)
ANION GAP SERPL CALCULATED.3IONS-SCNC: 9 MMOL/L (ref 7–16)
AST SERPL-CCNC: 63 U/L (ref 0–39)
BASOPHILS ABSOLUTE: 0.11 E9/L (ref 0–0.2)
BASOPHILS RELATIVE PERCENT: 1 % (ref 0–2)
BILIRUB SERPL-MCNC: 0.5 MG/DL (ref 0–1.2)
BLOOD CULTURE, ROUTINE: NORMAL
BUN BLDV-MCNC: 8 MG/DL (ref 6–20)
CALCIUM SERPL-MCNC: 9.6 MG/DL (ref 8.6–10.2)
CHLORIDE BLD-SCNC: 105 MMOL/L (ref 98–107)
CO2: 23 MMOL/L (ref 22–29)
CREAT SERPL-MCNC: 0.4 MG/DL (ref 0.7–1.2)
CULTURE, BLOOD 2: NORMAL
CULTURE, RESPIRATORY: NORMAL
EOSINOPHILS ABSOLUTE: 0.11 E9/L (ref 0.05–0.5)
EOSINOPHILS RELATIVE PERCENT: 1 % (ref 0–6)
GFR AFRICAN AMERICAN: >60
GFR NON-AFRICAN AMERICAN: >60 ML/MIN/1.73
GLUCOSE BLD-MCNC: 126 MG/DL (ref 74–99)
HCT VFR BLD CALC: 37 % (ref 37–54)
HEMOGLOBIN: 11.8 G/DL (ref 12.5–16.5)
IMMATURE GRANULOCYTES #: 0.18 E9/L
IMMATURE GRANULOCYTES %: 1.7 % (ref 0–5)
LYMPHOCYTES ABSOLUTE: 1.75 E9/L (ref 1.5–4)
LYMPHOCYTES RELATIVE PERCENT: 16.1 % (ref 20–42)
MAGNESIUM: 2.2 MG/DL (ref 1.6–2.6)
MCH RBC QN AUTO: 32.2 PG (ref 26–35)
MCHC RBC AUTO-ENTMCNC: 31.9 % (ref 32–34.5)
MCV RBC AUTO: 101.1 FL (ref 80–99.9)
MONOCYTES ABSOLUTE: 1.41 E9/L (ref 0.1–0.95)
MONOCYTES RELATIVE PERCENT: 13 % (ref 2–12)
NEUTROPHILS ABSOLUTE: 7.32 E9/L (ref 1.8–7.3)
NEUTROPHILS RELATIVE PERCENT: 67.2 % (ref 43–80)
PDW BLD-RTO: 12.8 FL (ref 11.5–15)
PHOSPHORUS: 3.1 MG/DL (ref 2.5–4.5)
PLATELET # BLD: 614 E9/L (ref 130–450)
PMV BLD AUTO: 10.1 FL (ref 7–12)
POTASSIUM SERPL-SCNC: 3.6 MMOL/L (ref 3.5–5)
RBC # BLD: 3.66 E12/L (ref 3.8–5.8)
SARS-COV-2: DETECTED
SMEAR, RESPIRATORY: NORMAL
SODIUM BLD-SCNC: 137 MMOL/L (ref 132–146)
SOURCE: ABNORMAL
TOTAL PROTEIN: 7.3 G/DL (ref 6.4–8.3)
VANCOMYCIN TROUGH: 13.4 MCG/ML (ref 5–16)
WBC # BLD: 10.9 E9/L (ref 4.5–11.5)

## 2021-09-16 PROCEDURE — 6360000002 HC RX W HCPCS: Performed by: INTERNAL MEDICINE

## 2021-09-16 PROCEDURE — C9113 INJ PANTOPRAZOLE SODIUM, VIA: HCPCS

## 2021-09-16 PROCEDURE — 6370000000 HC RX 637 (ALT 250 FOR IP): Performed by: INTERNAL MEDICINE

## 2021-09-16 PROCEDURE — 6360000002 HC RX W HCPCS: Performed by: SPECIALIST

## 2021-09-16 PROCEDURE — 2580000003 HC RX 258: Performed by: INTERNAL MEDICINE

## 2021-09-16 PROCEDURE — 6370000000 HC RX 637 (ALT 250 FOR IP)

## 2021-09-16 PROCEDURE — 2580000003 HC RX 258

## 2021-09-16 PROCEDURE — 95816 EEG AWAKE AND DROWSY: CPT

## 2021-09-16 PROCEDURE — 2580000003 HC RX 258: Performed by: SPECIALIST

## 2021-09-16 PROCEDURE — 6360000002 HC RX W HCPCS: Performed by: PSYCHIATRY & NEUROLOGY

## 2021-09-16 PROCEDURE — 80202 ASSAY OF VANCOMYCIN: CPT

## 2021-09-16 PROCEDURE — 99233 SBSQ HOSP IP/OBS HIGH 50: CPT | Performed by: STUDENT IN AN ORGANIZED HEALTH CARE EDUCATION/TRAINING PROGRAM

## 2021-09-16 PROCEDURE — 6360000002 HC RX W HCPCS

## 2021-09-16 PROCEDURE — 94640 AIRWAY INHALATION TREATMENT: CPT

## 2021-09-16 PROCEDURE — 2580000003 HC RX 258: Performed by: PSYCHIATRY & NEUROLOGY

## 2021-09-16 PROCEDURE — 84100 ASSAY OF PHOSPHORUS: CPT

## 2021-09-16 PROCEDURE — 83735 ASSAY OF MAGNESIUM: CPT

## 2021-09-16 PROCEDURE — 85025 COMPLETE CBC W/AUTO DIFF WBC: CPT

## 2021-09-16 PROCEDURE — 80053 COMPREHEN METABOLIC PANEL: CPT

## 2021-09-16 PROCEDURE — 2060000000 HC ICU INTERMEDIATE R&B

## 2021-09-16 RX ADMIN — VANCOMYCIN HYDROCHLORIDE 1000 MG: 1 INJECTION, POWDER, LYOPHILIZED, FOR SOLUTION INTRAVENOUS at 21:24

## 2021-09-16 RX ADMIN — ENOXAPARIN SODIUM 40 MG: 40 INJECTION SUBCUTANEOUS at 09:30

## 2021-09-16 RX ADMIN — BACLOFEN 20 MG: 10 TABLET ORAL at 09:35

## 2021-09-16 RX ADMIN — CEFEPIME HYDROCHLORIDE 2000 MG: 2 INJECTION, POWDER, FOR SOLUTION INTRAVENOUS at 06:30

## 2021-09-16 RX ADMIN — CEFEPIME HYDROCHLORIDE 2000 MG: 2 INJECTION, POWDER, FOR SOLUTION INTRAVENOUS at 14:40

## 2021-09-16 RX ADMIN — LEVETIRACETAM 1500 MG: 15 INJECTION INTRAVENOUS at 09:37

## 2021-09-16 RX ADMIN — BACLOFEN 20 MG: 10 TABLET ORAL at 21:27

## 2021-09-16 RX ADMIN — Medication 10 ML: at 09:37

## 2021-09-16 RX ADMIN — HEPARIN 300 UNITS: 100 SYRINGE at 09:36

## 2021-09-16 RX ADMIN — DEXTROSE AND SODIUM CHLORIDE: 5; 900 INJECTION, SOLUTION INTRAVENOUS at 09:48

## 2021-09-16 RX ADMIN — LACOSAMIDE 150 MG: 100 TABLET, FILM COATED ORAL at 09:35

## 2021-09-16 RX ADMIN — LEVETIRACETAM 1500 MG: 15 INJECTION INTRAVENOUS at 22:31

## 2021-09-16 RX ADMIN — HEPARIN 300 UNITS: 100 SYRINGE at 22:38

## 2021-09-16 RX ADMIN — LAMOTRIGINE 200 MG: 100 TABLET ORAL at 14:39

## 2021-09-16 RX ADMIN — LAMOTRIGINE 200 MG: 100 TABLET ORAL at 09:35

## 2021-09-16 RX ADMIN — Medication 10 ML: at 22:37

## 2021-09-16 RX ADMIN — IPRATROPIUM BROMIDE AND ALBUTEROL SULFATE 1 AMPULE: .5; 3 SOLUTION RESPIRATORY (INHALATION) at 23:29

## 2021-09-16 RX ADMIN — LAMOTRIGINE 200 MG: 100 TABLET ORAL at 21:31

## 2021-09-16 RX ADMIN — VANCOMYCIN HYDROCHLORIDE 1000 MG: 1 INJECTION, POWDER, LYOPHILIZED, FOR SOLUTION INTRAVENOUS at 04:30

## 2021-09-16 RX ADMIN — LACOSAMIDE 150 MG: 100 TABLET, FILM COATED ORAL at 21:30

## 2021-09-16 RX ADMIN — SODIUM CHLORIDE, PRESERVATIVE FREE 10 ML: 5 INJECTION INTRAVENOUS at 09:29

## 2021-09-16 RX ADMIN — ZONISAMIDE 300 MG: 100 CAPSULE ORAL at 21:28

## 2021-09-16 RX ADMIN — DEXTROSE MONOHYDRATE 100 MG: 50 INJECTION, SOLUTION INTRAVENOUS at 14:39

## 2021-09-16 RX ADMIN — FOSPHENYTOIN SODIUM 200 MG PE: 50 INJECTION, SOLUTION INTRAMUSCULAR; INTRAVENOUS at 09:42

## 2021-09-16 RX ADMIN — ACETAMINOPHEN 650 MG: 325 TABLET ORAL at 11:26

## 2021-09-16 RX ADMIN — CEFEPIME HYDROCHLORIDE 2000 MG: 2 INJECTION, POWDER, FOR SOLUTION INTRAVENOUS at 22:55

## 2021-09-16 RX ADMIN — BACLOFEN 20 MG: 10 TABLET ORAL at 14:39

## 2021-09-16 RX ADMIN — SODIUM CHLORIDE 25 ML: 9 INJECTION, SOLUTION INTRAVENOUS at 02:10

## 2021-09-16 RX ADMIN — PANTOPRAZOLE SODIUM 40 MG: 40 INJECTION, POWDER, FOR SOLUTION INTRAVENOUS at 09:29

## 2021-09-16 RX ADMIN — VANCOMYCIN HYDROCHLORIDE 1000 MG: 1 INJECTION, POWDER, LYOPHILIZED, FOR SOLUTION INTRAVENOUS at 11:25

## 2021-09-16 RX ADMIN — Medication 10 ML: at 21:32

## 2021-09-16 ASSESSMENT — PAIN SCALES - GENERAL
PAINLEVEL_OUTOF10: 0
PAINLEVEL_OUTOF10: 2
PAINLEVEL_OUTOF10: 0

## 2021-09-16 NOTE — PLAN OF CARE
Problem: Airway Clearance - Ineffective  Goal: Achieve or maintain patent airway  Outcome: Met This Shift     Problem: Gas Exchange - Impaired  Goal: Absence of hypoxia  Outcome: Met This Shift     Problem: Isolation Precautions - Risk of Spread of Infection  Goal: Prevent transmission of infection  Outcome: Met This Shift     Problem: Nutrition Deficits  Goal: Optimize nutritional status  Outcome: Met This Shift     Problem: Patient Education: Go to Patient Education Activity  Goal: Patient/Family Education  Outcome: Met This Shift     Problem: Skin Integrity:  Goal: Will show no infection signs and symptoms  Description: Will show no infection signs and symptoms  Outcome: Met This Shift  Goal: Absence of new skin breakdown  Description: Absence of new skin breakdown  Outcome: Met This Shift     Problem: Falls - Risk of:  Goal: Will remain free from falls  Description: Will remain free from falls  Outcome: Met This Shift     Problem: Breathing Pattern - Ineffective  Goal: Ability to achieve and maintain a regular respiratory rate  Outcome: Not Met This Shift

## 2021-09-16 NOTE — PROGRESS NOTES
Pharmacy Consultation Note  (Antibiotic Dosing and Monitoring)    Initial consult date: 9/12/2021  Consulting physician/provider: Dr. Emma Weiner  Drug: Vancomycin  Indication: HAP    Age/  Gender Height Weight IBW  Allergy Information   25 y.o./male 5' (152.4 cm) 130 lb (59 kg)     Ideal body weight: 50 kg (110 lb 3.7 oz)  Adjusted ideal body weight: 57.8 kg (127 lb 5.4 oz)   Patient has no known allergies. Renal Function:  Recent Labs     09/14/21  0535 09/15/21  0440 09/16/21  0404   BUN 7 8 8   CREATININE 0.3* 0.3* 0.4*       Intake/Output Summary (Last 24 hours) at 9/16/2021 0840  Last data filed at 9/16/2021 5604  Gross per 24 hour   Intake 3012 ml   Output 3150 ml   Net -138 ml       Vancomycin Monitoring:  Trough:    Recent Labs     09/13/21  2339 09/16/21  0346   VANCOTROUGH 6.6 13.4     Random:  No results for input(s): VANCORANDOM in the last 72 hours. Recent vancomycin administrations                   vancomycin (VANCOCIN) 1,000 mg in dextrose 5 % 250 mL IVPB (mg) 1,000 mg New Bag 09/16/21 0430     1,000 mg New Bag 09/15/21 2028     1,000 mg New Bag  1223     1,000 mg New Bag  0422     1,000 mg New Bag 09/14/21 2030    vancomycin (VANCOCIN) 1,250 mg in dextrose 5 % 250 mL IVPB (mg) 1,250 mg New Bag 09/14/21 1200     1,250 mg New Bag  0032     1,250 mg New Bag 09/13/21 1357                Assessment:  · Patient is a 30 y.o. male who has been initiated on vancomycin  Estimated Creatinine Clearance: 227 mL/min (A) (based on SCr of 0.4 mg/dL (L)). · To dose vancomycin, pharmacy will be utilizing [employer] calculation software for goal AUC/DERIAN 400-600 mg/L-hr   · 9/13: Trough = 6.6 mcg/mL. AUC/DERIAN 380  · 9/16: Trough @ 0346 = 13.4 mcg/mL.  AUC/DERIAN = 460    Plan:  · Continue vancomycin 1000 mg IV every 8 hours  · Will continue to monitor renal function   · Clinical pharmacy to follow    Rosalia MorenoD, BCPS 9/16/2021 8:40 AM   Ext: [phone]

## 2021-09-16 NOTE — CARE COORDINATION
9/16/21 Positive covid 8/28/21. CM transition of care: transfer to [facility]- may have denied transfer to their facility per charge nurse report plans were for: . ... Arnoldo Hernandez Transfer to [facility]- - seizures-Neurology- ambulance sheet completed and placed in soft blue chart. Hoping  will make their own arrangements- if transportation needed call -[phone]. Pt on room air, EEG- showing seizure activity, tachycardia continues. pt from home has all resources at home- father will provide a ride in his handicapped accessible [employer] to home. Has all need services at [facility] and declining the need for [address] at discharge at this time. CM/SS to follow.  Electronically signed by Han Kenney RN on 9/16/2021 at 12:11 PM

## 2021-09-16 NOTE — PROGRESS NOTES
10 mg, Daily PRN  diatrizoate meglumine-sodium, 50 mL, ONCE PRN  glucose, 15 g, PRN  dextrose, 12.5 g, PRN  glucagon (rDNA), 1 mg, PRN  dextrose, 100 mL/hr, PRN  sodium chloride flush, 10 mL, PRN  sodium chloride, 25 mL, PRN  ondansetron, 4 mg, Q8H PRN   Or  ondansetron, 4 mg, Q6H PRN  polyethylene glycol, 17 g, Daily PRN  acetaminophen, 650 mg, Q6H PRN   Or  acetaminophen, 650 mg, Q6H PRN  perflutren lipid microspheres, 1.5 mL, ONCE PRN  fentanNYL, 25 mcg, Q1H PRN  sodium chloride, 30 mL, PRN         Objective:    /72   Pulse 127   Temp 100 °F (37.8 °C) (Core)   Resp 30   Ht 5' (1.524 m)   Wt 153 lb (69.4 kg)   SpO2 93%   BMI 29.88 kg/m²     General: Awake, moves limbs spontaneously, does not follow commands, with contractures  HEENT: No head lesions, PERRL, EOMI, mouth without lesions, no nasal lesions, no cervical adenopathy palpated  Respiratory: Lungs with equal breath sounds bilaterally, upper airway congestion, no accessory muscle use  CV: Regular rate, no murmurs, JVD, no leg edema  Abdomen: Soft, non tender, + bowel sounds, PEG present  Skin: Hydrated, adequate turgor, no rash, capillary refill <2 seconds. Has small wound on left face  Extremities: Muscular strength 2/5 in 4 limbs, moves 4 limbs spontaneously, distal pulses present  Neurology: Awake and alert, follows commands, moves 4 limbs on command and spontaneously, neck is supple, no meningitic signs present.       Recent Labs     09/14/21  0535 09/15/21  0440 09/16/21  0404    139 137   K 3.7 3.7 3.6    106 105   CO2 21* 21* 23   BUN 7 8 8   CREATININE 0.3* 0.3* 0.4*   GLUCOSE 121* 100* 126*   CALCIUM 9.3 9.0 9.6       Recent Labs     09/14/21  0535 09/15/21  0440 09/16/21  0404   ALKPHOS 162* 177* 183*   PROT 7.3 7.4 7.3   LABALBU 3.5 3.8 3.9   BILITOT 0.6 0.4 0.5   AST 21 41* 63*   ALT 22 42* 71*       Recent Labs     09/14/21  0535 09/15/21  0440 09/16/21  0404   WBC 10.8 9.0 10.9   RBC 3.58* 3.68* 3.66*   HGB 11.5* 12.0* worse in   the left mid to upper lung. Probable trace pleural effusions. 2.  Slight retraction of the endotracheal tube which projects over the   cervical region. Suggest repositioning. 3.  There appears to be a satisfactory position of the enteric tube. The findings were sent to the Radiology Results [address] at 7:12   pm on 9/3/2021to be communicated to a licensed caregiver. XR ABDOMEN FOR NG/OG/NE TUBE PLACEMENT   Final Result   1. Satisfactory position of gastric feeding tube as evidenced by stomach   contour outlined following injection of oral contrast.      2.  Satisfactory position of enteric tube. 3.  Significant reflux of oral contrast material into the esophagus. XR CHEST PORTABLE   Final Result   Significant bilateral infiltrates with partial interval clearing of the left   lower lobe infiltrate since the prior study         XR CHEST PORTABLE   Final Result   Satisfactory position of the ETT and NGT. Otherwise, stable chest.         XR CHEST PORTABLE   Final Result   Increasing right lung opacities consistent with pneumonia. US DUP LOWER EXTREMITIES BILATERAL VENOUS   Final Result   No evidence of DVT in either lower extremity. XR CHEST PORTABLE   Final Result   1. Right lower lobe pneumonia. 2. Left lower lobe atelectasis versus pneumonia. Assessment:  Principal Problem:    Acute respiratory failure with hypoxia (HCC)  Active Problems:    Cerebral palsy (HCC)    Seizures ([address]    Mentally disabled    Convulsions ([address]    Pneumonia due to COVID-19 virus    Aspiration pneumonia ([address]  Resolved Problems:    * No resolved hospital problems. *      Plan:  1. Acute respiratory failure with hypoxemia  -Patient was extubated 9/10, now on RA, saturating %      2.  COVID PNA  -Patient unvaccinated, was coughing, congested and had fevers prior to presentation  -Patient was extubated 9/10  -ID is on board  -Patient switched back to dexamethasone   -Patient completed 5 days of remdesivir and is on duonebs. Was also started on baricitinib which was completed  -CXR today showed slight improvement in LLL infiltrate  -WBC 10.9, improved. Spiking fevers again, Tmax 100.4 today    3. PNA, possibly aspiration  -Sputum Cx growing Klebsiella sensitive to cefepime, was switched to meropenem and vancomycin  -Repeat sputum Cx growing yeast, not candida - was started on eraxis  -Strep and legionella negative. Initial BCx negative and repeat also negative so far  -WBC 10.9 today, spiking temps again, Tmax 100.4 in past 24hrs. Possible aspiration event during breakthrough seizures  -Will follow up final sputum Cx and monitor labs and VS    4. Lactic acidosis (resolved)  -LA peaked at 11.8 on 8/30, has improved to 0.9 most recently  -Was getting bicarb infusion for acidosis, now off all IVF  -Will monitor BMP and lactic acid levels     5. Electrolyte abnormalities  -Potassium 3.5 today  -Mag and phos WNL today  -Will continue to monitor    6. Seizure disorder 2/2 cerebral palsy  -Patient has known CP, is nonverbal at baseline with severe developmental delay  -Has seizures, takes baclofen, vimpat, lamictal and zonisamide at home which are continued here  -Has been continuing to have breakthrough seizures and keppra was increased  -Neurology consulted and per patient's father's request will be transferred to [facility] where the patient has a neurologist. Seroquel was [facility] by neurology  -Patient had EEG done, results not read yet    7. Elevated blood glucose  -Patient has had elevated glucose levels up to 379, this   -No known diagnosis of DM and patient is currently on steroids which will increase glucose  -A1C should be checked to determine if patient is diabetic or just elevated BS 2/2 steroids and current illness   -Currently on NPO ISS with hypoglycemic protocol  -Currently on tube feeds    8.    Septic shock (resolved)  -Patient hypotensive initially, required levophed but was able to be weaned off  -Off all IVF and pressors now  -/78 today (was 75/39 at the lowest on 8/29)  -WBC 9, lactic acid normalized  -So far BCx neg, resp cx growing klebsiella and yeast, UCx neg and tip cx NGTD      NOTE: This report was transcribed using voice recognition software. Every effort was made to ensure accuracy; however, inadvertent computerized transcription errors may be present.      Electronically signed by Artur Lopez MD on 9/16/2021 at 1:52 PM

## 2021-09-16 NOTE — PROGRESS NOTES
encephalopathy due to 1 with delirium  --Improved    Hyponatremia, multifactorial  --Monitor    Cerebral palsy  --Continue baclofen    Epilepsy  --Continue Lacosamide, Lamotrigine, keppra and zonisamide    Tube feedings at goal    Transfer to intermediate care unit. I called , no beds available for now    Last 3 CMP:    Recent Labs     09/14/21  0535 09/15/21  0440 09/16/21  0404    139 137   K 3.7 3.7 3.6    106 105   CO2 21* 21* 23   BUN 7 8 8   CREATININE 0.3* 0.3* 0.4*   GLUCOSE 121* 100* 126*   CALCIUM 9.3 9.0 9.6   PROT 7.3 7.4 7.3   LABALBU 3.5 3.8 3.9   BILITOT 0.6 0.4 0.5   ALKPHOS 162* 177* 183*   AST 21 41* 63*   ALT 22 42* 71*     Recent Labs     09/16/21  0404   WBC 10.9   RBC 3.66*   HGB 11.8*   HCT 37.0   .1*   MCH 32.2   MCHC 31.9*   RDW 12.8   *   MPV 10.1       No results for input(s): BC in the last 72 hours. No results for input(s): Marilee Ruts in the last 72 hours.     24 HR INTAKE/OUTPUT:      Intake/Output Summary (Last 24 hours) at 9/16/2021 1014  Last data filed at 9/16/2021 0800  Gross per 24 hour   Intake 3012 ml   Output 3150 ml   Net -138 ml     MEDICATIONS:   [START ON 9/17/2021] fosphenytoin (CEREBYX) maintenance dose IVPB  250 mg PE IntraVENous Daily    cefepime  2,000 mg IntraVENous Q8H    vancomycin  1,000 mg IntraVENous Q8H    anidulafungin  100 mg IntraVENous Q24H    levETIRAcetam  1,500 mg IntraVENous Q12H    enoxaparin  40 mg SubCUTAneous Daily    baclofen  20 mg Oral TID    lidocaine PF  5 mL IntraDERmal Once    sodium chloride flush  5-40 mL IntraVENous 2 times per day    heparin flush  3 mL IntraVENous 2 times per day    polyethylene glycol  17 g Oral Daily    sodium chloride flush  5-40 mL IntraVENous 2 times per day    ipratropium-albuterol  1 ampule Inhalation 4x daily    pantoprazole  40 mg IntraVENous Daily    And    sodium chloride (PF)  10 mL IntraVENous Daily    lacosamide  150 mg Oral BID    lamoTRIgine  200 mg Oral TID  zonisamide  300 mg Oral Nightly      sodium chloride Stopped (09/16/21 0410)    dextrose 5 % and 0.9 % NaCl 50 mL/hr at 09/16/21 0948    sodium chloride      dextrose      sodium chloride       sodium chloride flush, sodium chloride, heparin flush, bisacodyl, diatrizoate meglumine-sodium, glucose, dextrose, glucagon (rDNA), dextrose, sodium chloride flush, sodium chloride, ondansetron **OR** ondansetron, polyethylene glycol, acetaminophen **OR** acetaminophen, perflutren lipid microspheres, fentanNYL, sodium chloride    OBJECTIVE:  Vitals:    09/16/21 0900   BP: 108/83   Pulse: 137   Resp: 19   Temp:    SpO2: 92%     FiO2 : 87 %  O2 Flow Rate (L/min): 2 L/min  O2 Device: None (Room air)        LABS:  WBC   Date Value Ref Range Status   09/16/2021 10.9 4.5 - 11.5 E9/L Final   09/15/2021 9.0 4.5 - 11.5 E9/L Final   09/14/2021 10.8 4.5 - 11.5 E9/L Final     Hemoglobin   Date Value Ref Range Status   09/16/2021 11.8 (L) 12.5 - 16.5 g/dL Final   09/15/2021 12.0 (L) 12.5 - 16.5 g/dL Final   09/14/2021 11.5 (L) 12.5 - 16.5 g/dL Final     Hematocrit   Date Value Ref Range Status   09/16/2021 37.0 37.0 - 54.0 % Final   09/15/2021 36.5 (L) 37.0 - 54.0 % Final   09/14/2021 34.9 (L) 37.0 - 54.0 % Final     MCV   Date Value Ref Range Status   09/16/2021 101.1 (H) 80.0 - 99.9 fL Final   09/15/2021 99.2 80.0 - 99.9 fL Final   09/14/2021 97.5 80.0 - 99.9 fL Final     Platelets   Date Value Ref Range Status   09/16/2021 614 (H) 130 - 450 E9/L Final   09/15/2021 631 (H) 130 - 450 E9/L Final   09/14/2021 628 (H) 130 - 450 E9/L Final     Sodium   Date Value Ref Range Status   09/16/2021 137 132 - 146 mmol/L Final   09/15/2021 139 132 - 146 mmol/L Final   09/14/2021 136 132 - 146 mmol/L Final     Potassium   Date Value Ref Range Status   09/16/2021 3.6 3.5 - 5.0 mmol/L Final   09/15/2021 3.7 3.5 - 5.0 mmol/L Final   09/14/2021 3.7 3.5 - 5.0 mmol/L Final     Chloride   Date Value Ref Range Status   09/16/2021 105 98 - 107 mmol/L Final   09/15/2021 106 98 - 107 mmol/L Final   09/14/2021 105 98 - 107 mmol/L Final     CO2   Date Value Ref Range Status   09/16/2021 23 22 - 29 mmol/L Final   09/15/2021 21 (L) 22 - 29 mmol/L Final   09/14/2021 21 (L) 22 - 29 mmol/L Final     BUN   Date Value Ref Range Status   09/16/2021 8 6 - 20 mg/dL Final   09/15/2021 8 6 - 20 mg/dL Final   09/14/2021 7 6 - 20 mg/dL Final     CREATININE   Date Value Ref Range Status   09/16/2021 0.4 (L) 0.7 - 1.2 mg/dL Final   09/15/2021 0.3 (L) 0.7 - 1.2 mg/dL Final   09/14/2021 0.3 (L) 0.7 - 1.2 mg/dL Final     Glucose   Date Value Ref Range Status   09/16/2021 126 (H) 74 - 99 mg/dL Final   09/15/2021 100 (H) 74 - 99 mg/dL Final   09/14/2021 121 (H) 74 - 99 mg/dL Final     Calcium   Date Value Ref Range Status   09/16/2021 9.6 8.6 - 10.2 mg/dL Final   09/15/2021 9.0 8.6 - 10.2 mg/dL Final   09/14/2021 9.3 8.6 - 10.2 mg/dL Final     Total Protein   Date Value Ref Range Status   09/16/2021 7.3 6.4 - 8.3 g/dL Final   09/15/2021 7.4 6.4 - 8.3 g/dL Final   09/14/2021 7.3 6.4 - 8.3 g/dL Final     Albumin   Date Value Ref Range Status   09/16/2021 3.9 3.5 - 5.2 g/dL Final   09/15/2021 3.8 3.5 - 5.2 g/dL Final   09/14/2021 3.5 3.5 - 5.2 g/dL Final     Total Bilirubin   Date Value Ref Range Status   09/16/2021 0.5 0.0 - 1.2 mg/dL Final   09/15/2021 0.4 0.0 - 1.2 mg/dL Final   09/14/2021 0.6 0.0 - 1.2 mg/dL Final     Alkaline Phosphatase   Date Value Ref Range Status   09/16/2021 183 (H) 40 - 129 U/L Final   09/15/2021 177 (H) 40 - 129 U/L Final   09/14/2021 162 (H) 40 - 129 U/L Final     AST   Date Value Ref Range Status   09/16/2021 63 (H) 0 - 39 U/L Final   09/15/2021 41 (H) 0 - 39 U/L Final   09/14/2021 21 0 - 39 U/L Final     ALT   Date Value Ref Range Status   09/16/2021 71 (H) 0 - 40 U/L Final   09/15/2021 42 (H) 0 - 40 U/L Final   09/14/2021 22 0 - 40 U/L Final     GFR Non-   Date Value Ref Range Status   09/16/2021 >60 >=60 mL/min/1.73 Final     Comment: Chronic Kidney Disease: less than 60 ml/min/1.73 sq.m. Kidney Failure: less than 15 ml/min/1.73 sq.m. Results valid for patients 18 years and older. 09/15/2021 >60 >=60 mL/min/1.73 Final     Comment:     Chronic Kidney Disease: less than 60 ml/min/1.73 sq.m. Kidney Failure: less than 15 ml/min/1.73 sq.m. Results valid for patients 18 years and older. 09/14/2021 >60 >=60 mL/min/1.73 Final     Comment:     Chronic Kidney Disease: less than 60 ml/min/1.73 sq.m. Kidney Failure: less than 15 ml/min/1.73 sq.m. Results valid for patients 18 years and older. GFR    Date Value Ref Range Status   09/16/2021 >60  Final   09/15/2021 >60  Final   09/14/2021 >60  Final     Magnesium   Date Value Ref Range Status   09/16/2021 2.2 1.6 - 2.6 mg/dL Final   09/15/2021 2.3 1.6 - 2.6 mg/dL Final   09/14/2021 2.0 1.6 - 2.6 mg/dL Final     Phosphorus   Date Value Ref Range Status   09/16/2021 3.1 2.5 - 4.5 mg/dL Final   09/15/2021 2.8 2.5 - 4.5 mg/dL Final   09/14/2021 2.9 2.5 - 4.5 mg/dL Final     No results for input(s): PH, PO2, PCO2, HCO3, BE, O2SAT in the last 72 hours. RADIOLOGY:  XR CHEST PORTABLE   Final Result   Little change in patchy bilateral interstitial and alveolar opacities that   may represent pneumonia. XR CHEST PORTABLE   Final Result   Patchy multifocal bilateral airspace disease, more prominent within the right   upper lobe. XR CHEST PORTABLE   Final Result   No significant change in the appearance of the chest compared with   yesterday's examination. XR CHEST PORTABLE   Final Result   Improved aeration of the lungs with persistent bilateral reticular airspace   opacities that may be on the basis of pulmonary edema or atypical/viral   pneumonia. XR CHEST PORTABLE   Final Result   Stable bilateral infiltrates. ET tube tip 2.5 cm from the shantell.          XR CHEST PORTABLE   Final Result   Interval placement of right-sided PICC line. Bilateral parenchymal infiltrates are similar to slightly worsened. Continued follow-up recommended. XR CHEST PORTABLE   Final Result   1. Lines okay. 2. Mild-moderate improved left lung aeration, overall decreased lung volumes. XR CHEST PORTABLE   Final Result   1. Slight advancement of the endotracheal tube which now resides in the   midthoracic trachea. 2.  Stable appearance of the chest.  Stable position of the enteric tube. XR CHEST PORTABLE   Final Result   1. Interval slight advancement of the endotracheal tube which now resides in   the proximal thoracic trachea. 2.  Persistent ill-defined opacities in the right greater than left lungs. No change from prior exam.  No obvious pneumothorax. 3.  Enteric tube. Contrast material identified within the stomach lumen. XR CHEST PORTABLE   Final Result   1. Persistent ill-defined opacities in the bilateral lungs slightly worse in   the left mid to upper lung. Probable trace pleural effusions. 2.  Slight retraction of the endotracheal tube which projects over the   cervical region. Suggest repositioning. 3.  There appears to be a satisfactory position of the enteric tube. The findings were sent to the Radiology Results [address] at 7:12   pm on 9/3/2021to be communicated to a licensed caregiver. XR ABDOMEN FOR NG/OG/NE TUBE PLACEMENT   Final Result   1. Satisfactory position of gastric feeding tube as evidenced by stomach   contour outlined following injection of oral contrast.      2.  Satisfactory position of enteric tube. 3.  Significant reflux of oral contrast material into the esophagus. XR CHEST PORTABLE   Final Result   Significant bilateral infiltrates with partial interval clearing of the left   lower lobe infiltrate since the prior study         XR CHEST PORTABLE   Final Result   Satisfactory position of the ETT and NGT.       Otherwise, stable chest.         XR CHEST PORTABLE   Final Result   Increasing right lung opacities consistent with pneumonia. US DUP LOWER EXTREMITIES BILATERAL VENOUS   Final Result   No evidence of DVT in either lower extremity. XR CHEST PORTABLE   Final Result   1. Right lower lobe pneumonia. 2. Left lower lobe atelectasis versus pneumonia. PROBLEM LIST:  Principal Problem:    Acute respiratory failure with hypoxia (HCC)  Active Problems:    Cerebral palsy (HCC)    Seizures ([address]    Mentally disabled    Convulsions ([address]    Pneumonia due to COVID-19 virus    Aspiration pneumonia ([address]  Resolved Problems:    * No resolved hospital problems. *      ATTESTATION:  ICU Staff Physician note of personal involvement in Care  As the attending physician, I certify that I personally reviewed the patients history and personnally examined the patient to confirm the physical findings described above,  And that I reviewed the relevant imaging studies and available reports. I also discussed the differential diagnosis and all of the proposed management plans with the patient and individuals accompanying the patient to this visit. They had the opportunity to ask questions about the proposed management plans and to have those questions answered. This patient has a high probability of sudden, clinically significant deterioration, which requires the highest level of physician preparedness to intervene urgently. I managed/supervised life or organ supporting interventions that required frequent physician assessment. I devoted my full attention to the direct care of this patient for the amount of time indicated below. Time I spent with the family or surrogate(s) is included only if the patient was incapable of providing the necessary information or participating in medical decisions  Time devoted to teaching and to any procedures I billed separately is not included.      CRITICAL CARE TIME:  30 minutes    Reva Mariah CASANOVA  Pulmonary and Critical Care Medicine

## 2021-09-16 NOTE — PROGRESS NOTES
S: no communication  Objective:      Neuro exam 102/76 p 130 t 100  General: opens eyes to verbal stimulation  Cranial nerve testing  unable to cooperate. PERRL, corneal reflexes +  . Funduscopic eye exam revealed not testable. Motor exam: ..spontaneous movement of fingers/toes; contractures noted  Deep tendon reflexes were 1+ bilaterally. Plantar responses were flexor bilaterally. Cerebellar exam noted ..   Sensation was ..        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

## 2021-09-16 NOTE — CARE COORDINATION
The patient/or patient representative has been provided with the Frequently Asked Questions Information regarding Screening Tests for Carbapenemase-Producing Carbapenem-Resistant Enterobacteriaceae (CP-CRE) as provided by the [address] The patient/or patient representative has given consent to perform the screening. The specimen was obtained and processed per instruction from the [address] If patient/or patient representative not able/available for provision of FAQ information to be discussed and consented, a physician provider has determined to be medically necessary. The specimen was obtained and processed per instruction from the [address]

## 2021-09-17 ENCOUNTER — APPOINTMENT (OUTPATIENT)
Dept: GENERAL RADIOLOGY | Age: 26
DRG: 870 | End: 2021-09-17
Payer: COMMERCIAL

## 2021-09-17 LAB
ALBUMIN SERPL-MCNC: 4 G/DL (ref 3.5–5.2)
ALP BLD-CCNC: 198 U/L (ref 40–129)
ALT SERPL-CCNC: 92 U/L (ref 0–40)
ANION GAP SERPL CALCULATED.3IONS-SCNC: 10 MMOL/L (ref 7–16)
AST SERPL-CCNC: 60 U/L (ref 0–39)
BASOPHILS ABSOLUTE: 0.1 E9/L (ref 0–0.2)
BASOPHILS RELATIVE PERCENT: 1.4 % (ref 0–2)
BILIRUB SERPL-MCNC: 0.4 MG/DL (ref 0–1.2)
BLOOD CULTURE, ROUTINE: NORMAL
BUN BLDV-MCNC: 8 MG/DL (ref 6–20)
CALCIUM SERPL-MCNC: 9.6 MG/DL (ref 8.6–10.2)
CHLORIDE BLD-SCNC: 104 MMOL/L (ref 98–107)
CO2: 24 MMOL/L (ref 22–29)
CREAT SERPL-MCNC: 0.4 MG/DL (ref 0.7–1.2)
CULTURE, BLOOD 2: NORMAL
EOSINOPHILS ABSOLUTE: 0.08 E9/L (ref 0.05–0.5)
EOSINOPHILS RELATIVE PERCENT: 1.2 % (ref 0–6)
GFR AFRICAN AMERICAN: >60
GFR NON-AFRICAN AMERICAN: >60 ML/MIN/1.73
GLUCOSE BLD-MCNC: 115 MG/DL (ref 74–99)
HCT VFR BLD CALC: 35.1 % (ref 37–54)
HEMOGLOBIN: 11.6 G/DL (ref 12.5–16.5)
IMMATURE GRANULOCYTES #: 0.15 E9/L
IMMATURE GRANULOCYTES %: 2.2 % (ref 0–5)
LYMPHOCYTES ABSOLUTE: 1.57 E9/L (ref 1.5–4)
LYMPHOCYTES RELATIVE PERCENT: 22.8 % (ref 20–42)
MAGNESIUM: 2.3 MG/DL (ref 1.6–2.6)
MCH RBC QN AUTO: 32.2 PG (ref 26–35)
MCHC RBC AUTO-ENTMCNC: 33 % (ref 32–34.5)
MCV RBC AUTO: 97.5 FL (ref 80–99.9)
METER GLUCOSE: 104 MG/DL (ref 74–99)
MONOCYTES ABSOLUTE: 1.07 E9/L (ref 0.1–0.95)
MONOCYTES RELATIVE PERCENT: 15.5 % (ref 2–12)
NEUTROPHILS ABSOLUTE: 3.93 E9/L (ref 1.8–7.3)
NEUTROPHILS RELATIVE PERCENT: 56.9 % (ref 43–80)
PDW BLD-RTO: 12.6 FL (ref 11.5–15)
PHOSPHORUS: 3.8 MG/DL (ref 2.5–4.5)
PLATELET # BLD: 494 E9/L (ref 130–450)
PMV BLD AUTO: 9.8 FL (ref 7–12)
POTASSIUM SERPL-SCNC: 3.7 MMOL/L (ref 3.5–5)
RBC # BLD: 3.6 E12/L (ref 3.8–5.8)
SODIUM BLD-SCNC: 138 MMOL/L (ref 132–146)
TOTAL PROTEIN: 7.4 G/DL (ref 6.4–8.3)
WBC # BLD: 6.9 E9/L (ref 4.5–11.5)

## 2021-09-17 PROCEDURE — 2580000003 HC RX 258: Performed by: SPECIALIST

## 2021-09-17 PROCEDURE — 6360000002 HC RX W HCPCS: Performed by: PSYCHIATRY & NEUROLOGY

## 2021-09-17 PROCEDURE — 2580000003 HC RX 258: Performed by: PSYCHIATRY & NEUROLOGY

## 2021-09-17 PROCEDURE — 84100 ASSAY OF PHOSPHORUS: CPT

## 2021-09-17 PROCEDURE — 6360000002 HC RX W HCPCS

## 2021-09-17 PROCEDURE — 2580000003 HC RX 258

## 2021-09-17 PROCEDURE — 2580000003 HC RX 258: Performed by: INTERNAL MEDICINE

## 2021-09-17 PROCEDURE — 6360000002 HC RX W HCPCS: Performed by: INTERNAL MEDICINE

## 2021-09-17 PROCEDURE — 71045 X-RAY EXAM CHEST 1 VIEW: CPT

## 2021-09-17 PROCEDURE — 6370000000 HC RX 637 (ALT 250 FOR IP): Performed by: INTERNAL MEDICINE

## 2021-09-17 PROCEDURE — 2700000000 HC OXYGEN THERAPY PER DAY

## 2021-09-17 PROCEDURE — 99233 SBSQ HOSP IP/OBS HIGH 50: CPT | Performed by: STUDENT IN AN ORGANIZED HEALTH CARE EDUCATION/TRAINING PROGRAM

## 2021-09-17 PROCEDURE — 83735 ASSAY OF MAGNESIUM: CPT

## 2021-09-17 PROCEDURE — 36415 COLL VENOUS BLD VENIPUNCTURE: CPT

## 2021-09-17 PROCEDURE — 94640 AIRWAY INHALATION TREATMENT: CPT

## 2021-09-17 PROCEDURE — 82962 GLUCOSE BLOOD TEST: CPT

## 2021-09-17 PROCEDURE — 85025 COMPLETE CBC W/AUTO DIFF WBC: CPT

## 2021-09-17 PROCEDURE — 2060000000 HC ICU INTERMEDIATE R&B

## 2021-09-17 PROCEDURE — 80053 COMPREHEN METABOLIC PANEL: CPT

## 2021-09-17 PROCEDURE — C9113 INJ PANTOPRAZOLE SODIUM, VIA: HCPCS

## 2021-09-17 PROCEDURE — 6370000000 HC RX 637 (ALT 250 FOR IP): Performed by: STUDENT IN AN ORGANIZED HEALTH CARE EDUCATION/TRAINING PROGRAM

## 2021-09-17 PROCEDURE — 6360000002 HC RX W HCPCS: Performed by: STUDENT IN AN ORGANIZED HEALTH CARE EDUCATION/TRAINING PROGRAM

## 2021-09-17 PROCEDURE — 6360000002 HC RX W HCPCS: Performed by: SPECIALIST

## 2021-09-17 PROCEDURE — 6370000000 HC RX 637 (ALT 250 FOR IP)

## 2021-09-17 RX ORDER — LEVALBUTEROL INHALATION SOLUTION 0.63 MG/3ML
0.63 SOLUTION RESPIRATORY (INHALATION) 4 TIMES DAILY
Status: DISCONTINUED | OUTPATIENT
Start: 2021-09-17 | End: 2021-09-19 | Stop reason: HOSPADM

## 2021-09-17 RX ADMIN — CEFEPIME HYDROCHLORIDE 2000 MG: 2 INJECTION, POWDER, FOR SOLUTION INTRAVENOUS at 21:20

## 2021-09-17 RX ADMIN — LEVETIRACETAM 1500 MG: 15 INJECTION INTRAVENOUS at 22:34

## 2021-09-17 RX ADMIN — SODIUM CHLORIDE, PRESERVATIVE FREE 10 ML: 5 INJECTION INTRAVENOUS at 08:57

## 2021-09-17 RX ADMIN — SODIUM CHLORIDE 25 ML: 9 INJECTION, SOLUTION INTRAVENOUS at 18:00

## 2021-09-17 RX ADMIN — ENOXAPARIN SODIUM 40 MG: 40 INJECTION SUBCUTANEOUS at 12:58

## 2021-09-17 RX ADMIN — LACOSAMIDE 150 MG: 100 TABLET, FILM COATED ORAL at 21:21

## 2021-09-17 RX ADMIN — LAMOTRIGINE 200 MG: 100 TABLET ORAL at 21:21

## 2021-09-17 RX ADMIN — ZONISAMIDE 300 MG: 100 CAPSULE ORAL at 21:22

## 2021-09-17 RX ADMIN — FOSPHENYTOIN SODIUM 250 MG PE: 50 INJECTION, SOLUTION INTRAMUSCULAR; INTRAVENOUS at 11:28

## 2021-09-17 RX ADMIN — LEVALBUTEROL HYDROCHLORIDE 0.63 MG: 0.63 SOLUTION RESPIRATORY (INHALATION) at 12:34

## 2021-09-17 RX ADMIN — VANCOMYCIN HYDROCHLORIDE 1000 MG: 1 INJECTION, POWDER, LYOPHILIZED, FOR SOLUTION INTRAVENOUS at 12:55

## 2021-09-17 RX ADMIN — PANTOPRAZOLE SODIUM 40 MG: 40 INJECTION, POWDER, FOR SOLUTION INTRAVENOUS at 08:58

## 2021-09-17 RX ADMIN — LEVALBUTEROL HYDROCHLORIDE 0.63 MG: 0.63 SOLUTION RESPIRATORY (INHALATION) at 15:36

## 2021-09-17 RX ADMIN — METOPROLOL TARTRATE 12.5 MG: 25 TABLET, FILM COATED ORAL at 15:29

## 2021-09-17 RX ADMIN — BACLOFEN 20 MG: 10 TABLET ORAL at 12:56

## 2021-09-17 RX ADMIN — CEFEPIME HYDROCHLORIDE 2000 MG: 2 INJECTION, POWDER, FOR SOLUTION INTRAVENOUS at 05:37

## 2021-09-17 RX ADMIN — BACLOFEN 20 MG: 10 TABLET ORAL at 21:22

## 2021-09-17 RX ADMIN — VANCOMYCIN HYDROCHLORIDE 1000 MG: 1 INJECTION, POWDER, LYOPHILIZED, FOR SOLUTION INTRAVENOUS at 04:12

## 2021-09-17 RX ADMIN — Medication 10 ML: at 21:15

## 2021-09-17 RX ADMIN — LEVALBUTEROL HYDROCHLORIDE 0.63 MG: 0.63 SOLUTION RESPIRATORY (INHALATION) at 19:09

## 2021-09-17 RX ADMIN — Medication 10 ML: at 21:23

## 2021-09-17 RX ADMIN — LEVETIRACETAM 1500 MG: 15 INJECTION INTRAVENOUS at 08:57

## 2021-09-17 RX ADMIN — HEPARIN 300 UNITS: 100 SYRINGE at 21:22

## 2021-09-17 RX ADMIN — LAMOTRIGINE 200 MG: 100 TABLET ORAL at 12:56

## 2021-09-17 RX ADMIN — HEPARIN 300 UNITS: 100 SYRINGE at 12:58

## 2021-09-17 RX ADMIN — DEXTROSE MONOHYDRATE 100 MG: 50 INJECTION, SOLUTION INTRAVENOUS at 12:55

## 2021-09-17 RX ADMIN — ACETAMINOPHEN 650 MG: 325 TABLET ORAL at 02:03

## 2021-09-17 RX ADMIN — Medication 10 ML: at 12:58

## 2021-09-17 RX ADMIN — CEFEPIME HYDROCHLORIDE 2000 MG: 2 INJECTION, POWDER, FOR SOLUTION INTRAVENOUS at 15:28

## 2021-09-17 RX ADMIN — LACOSAMIDE 150 MG: 100 TABLET, FILM COATED ORAL at 12:56

## 2021-09-17 RX ADMIN — VANCOMYCIN HYDROCHLORIDE 1000 MG: 1 INJECTION, POWDER, LYOPHILIZED, FOR SOLUTION INTRAVENOUS at 21:19

## 2021-09-17 ASSESSMENT — PAIN SCALES - GENERAL
PAINLEVEL_OUTOF10: 0
PAINLEVEL_OUTOF10: 0

## 2021-09-17 ASSESSMENT — PAIN SCALES - WONG BAKER: WONGBAKER_NUMERICALRESPONSE: 0

## 2021-09-17 NOTE — PROCEDURES
[address]                          ELECTROENCEPHALOGRAM REPORT    PATIENT NAME: Priscilla Welsh                      :        1995  MED REC NO:   [id number]                            ROOM:       [id number]  ACCOUNT NO:   [de-identified]                           ADMIT DATE: 2021  PROVIDER:     Mitul Ray MD    DATE OF EE2021    PLACE OF SERVICE:  hospitals. EEG DIAGNOSIS:  Abnormal report. A 19-channel EEG was recorded and demonstrates a background rhythm of  approximately 4 Hz independent of eye closure. Throughout the  recording, there is significant 60 cycle, electrode and movement  artifact noted. During the recording, epileptiform activity noted with  clinical correlation of eye deviation as noted by technician. During  the sleep portion of the record, there is no activation. Hyperventilation and photic stimulation were not performed. INTERPRETATION:  Abnormal EEG with nonspecific theta range slowing as  well as epileptiform activity. This EEG shows definite seizural  tendency. Clinical correlation advised.         Bill Daniels MD    D: 2021 8:30:35       T: 2021 8:33:02     [id number]  Job#: [id number]     Doc#: [id number]

## 2021-09-17 NOTE — PROGRESS NOTES
[address] [place], [place] [address]   Progress Note    Admitting Date and Time: 8/28/2021  3:31 PM  Admit Dx: Hypokalemia [E87.6]  Acute respiratory failure with hypoxia ([facility] [address] [J96.01]  Pneumonia of right lower lobe due to infectious organism [J18.9]  Sepsis with acute hypoxic respiratory failure without septic shock, due to unspecified organism ([facility] [address] [A41.9, R65.20, J96.01]    Subjective:    Patient was extubated 9/10 and was doing well on RA yesterday during the day, however was getting more tachycardic and spiking fevers so was kept in the ICU. The patient then started having multiple breakthrough seizures overnight and this morning as well. The patient's father was informed and stated that he has a neurologist familiar with his case at [facility] and he wants the patient transferred there. Transfer was initiated by Dr. Nancy Ledesma and patient was accepted, just waiting for a bed. Was also switched to cefepime and fosphenytoin yesterday. Patient was transferred to [facility] yesterday. Today, patient was becoming more tachycardic and tachypneic and nurses were having difficulty suctioning patient. Patient was switched to levalbuterol from [facility] due to tachycardia and had improvement. EEG was read today and did confirm seizure activity. Still waiting on bed at [facility].          levalbuterol  0.63 mg Nebulization 4x Daily    metoprolol tartrate  12.5 mg Oral BID    fosphenytoin (CEREBYX) maintenance dose IVPB  250 mg PE IntraVENous Daily    cefepime  2,000 mg IntraVENous Q8H    vancomycin  1,000 mg IntraVENous Q8H    anidulafungin  100 mg IntraVENous Q24H    levETIRAcetam  1,500 mg IntraVENous Q12H    enoxaparin  40 mg SubCUTAneous Daily    baclofen  20 mg Oral TID    lidocaine PF  5 mL IntraDERmal Once    sodium chloride flush  5-40 mL IntraVENous 2 times per day    heparin flush  3 mL IntraVENous 2 times per day    polyethylene glycol  17 g Oral Daily    sodium chloride flush  5-40 mL IntraVENous 2 times per day  pantoprazole  40 mg IntraVENous Daily    And    sodium chloride (PF)  10 mL IntraVENous Daily    lacosamide  150 mg Oral BID    lamoTRIgine  200 mg Oral TID    zonisamide  300 mg Oral Nightly     sodium chloride flush, 5-40 mL, PRN  sodium chloride, 25 mL, PRN  heparin flush, 3 mL, PRN  bisacodyl, 10 mg, Daily PRN  diatrizoate meglumine-sodium, 50 mL, ONCE PRN  glucose, 15 g, PRN  dextrose, 12.5 g, PRN  glucagon (rDNA), 1 mg, PRN  dextrose, 100 mL/hr, PRN  sodium chloride flush, 10 mL, PRN  sodium chloride, 25 mL, PRN  ondansetron, 4 mg, Q8H PRN   Or  ondansetron, 4 mg, Q6H PRN  polyethylene glycol, 17 g, Daily PRN  acetaminophen, 650 mg, Q6H PRN   Or  acetaminophen, 650 mg, Q6H PRN  perflutren lipid microspheres, 1.5 mL, ONCE PRN  fentanNYL, 25 mcg, Q1H PRN  sodium chloride, 30 mL, PRN         Objective:    /79   Pulse 119   Temp 98.3 °F (36.8 °C) (Infrared)   Resp 20   Ht 5' (1.524 m)   Wt 153 lb (69.4 kg)   SpO2 97%   BMI 29.88 kg/m²     General: Awake, moves limbs spontaneously, does not follow commands, with contractures  HEENT: No head lesions, PERRL, EOMI, mouth without lesions, no nasal lesions, no cervical adenopathy palpated  Respiratory: Lungs with equal breath sounds bilaterally, upper airway congestion, no accessory muscle use  CV: Regular rate, no murmurs, JVD, no leg edema  Abdomen: Soft, non tender, + bowel sounds, PEG present  Skin: Hydrated, adequate turgor, no rash, capillary refill <2 seconds. Has small wound on left face  Extremities: Muscular strength 2/5 in 4 limbs, moves 4 limbs spontaneously, distal pulses present  Neurology: Awake and alert, follows commands, moves 4 limbs on command and spontaneously, neck is supple, no meningitic signs present.       Recent Labs     09/15/21  0440 09/16/21  0404 09/17/21  0424    137 138   K 3.7 3.6 3.7    105 104   CO2 21* 23 24   BUN 8 8 8   CREATININE 0.3* 0.4* 0.4*   GLUCOSE 100* 126* 115*   CALCIUM 9.0 9.6 9.6 Recent Labs     09/15/21  0440 09/16/21  0404 09/17/21  0424   ALKPHOS 177* 183* 198*   PROT 7.4 7.3 7.4   LABALBU 3.8 3.9 4.0   BILITOT 0.4 0.5 0.4   AST 41* 63* 60*   ALT 42* 71* 92*       Recent Labs     09/15/21  0440 09/16/21  0404 09/17/21  0424   WBC 9.0 10.9 6.9   RBC 3.68* 3.66* 3.60*   HGB 12.0* 11.8* 11.6*   HCT 36.5* 37.0 35.1*   MCV 99.2 101.1* 97.5   MCH 32.6 32.2 32.2   MCHC 32.9 31.9* 33.0   RDW 12.7 12.8 12.6   * 614* 494*   MPV 9.6 10.1 9.8                 Radiology:   XR CHEST PORTABLE   Final Result   Improved bilateral airspace opacities. XR CHEST PORTABLE   Final Result   Little change in patchy bilateral interstitial and alveolar opacities that   may represent pneumonia. XR CHEST PORTABLE   Final Result   Patchy multifocal bilateral airspace disease, more prominent within the right   upper lobe. XR CHEST PORTABLE   Final Result   No significant change in the appearance of the chest compared with   yesterday's examination. XR CHEST PORTABLE   Final Result   Improved aeration of the lungs with persistent bilateral reticular airspace   opacities that may be on the basis of pulmonary edema or atypical/viral   pneumonia. XR CHEST PORTABLE   Final Result   Stable bilateral infiltrates. ET tube tip 2.5 cm from the shantell. XR CHEST PORTABLE   Final Result   Interval placement of right-sided PICC line. Bilateral parenchymal infiltrates are similar to slightly worsened. Continued follow-up recommended. XR CHEST PORTABLE   Final Result   1. Lines okay. 2. Mild-moderate improved left lung aeration, overall decreased lung volumes. XR CHEST PORTABLE   Final Result   1. Slight advancement of the endotracheal tube which now resides in the   midthoracic trachea. 2.  Stable appearance of the chest.  Stable position of the enteric tube. XR CHEST PORTABLE   Final Result   1.   Interval slight advancement of the endotracheal tube which now resides in   the proximal thoracic trachea. 2.  Persistent ill-defined opacities in the right greater than left lungs. No change from prior exam.  No obvious pneumothorax. 3.  Enteric tube. Contrast material identified within the stomach lumen. XR CHEST PORTABLE   Final Result   1. Persistent ill-defined opacities in the bilateral lungs slightly worse in   the left mid to upper lung. Probable trace pleural effusions. 2.  Slight retraction of the endotracheal tube which projects over the   cervical region. Suggest repositioning. 3.  There appears to be a satisfactory position of the enteric tube. The findings were sent to the Radiology Results [address] at 7:12   pm on 9/3/2021to be communicated to a licensed caregiver. XR ABDOMEN FOR NG/OG/NE TUBE PLACEMENT   Final Result   1. Satisfactory position of gastric feeding tube as evidenced by stomach   contour outlined following injection of oral contrast.      2.  Satisfactory position of enteric tube. 3.  Significant reflux of oral contrast material into the esophagus. XR CHEST PORTABLE   Final Result   Significant bilateral infiltrates with partial interval clearing of the left   lower lobe infiltrate since the prior study         XR CHEST PORTABLE   Final Result   Satisfactory position of the ETT and NGT. Otherwise, stable chest.         XR CHEST PORTABLE   Final Result   Increasing right lung opacities consistent with pneumonia. US DUP LOWER EXTREMITIES BILATERAL VENOUS   Final Result   No evidence of DVT in either lower extremity. XR CHEST PORTABLE   Final Result   1. Right lower lobe pneumonia. 2. Left lower lobe atelectasis versus pneumonia.              Assessment:  Principal Problem:    Acute respiratory failure with hypoxia (HCC)  Active Problems:    Cerebral palsy (HCC)    Seizures ([address]    Mentally disabled    Convulsions ([address] Pneumonia due to COVID-19 virus    Aspiration pneumonia ([facility] [address]  Resolved Problems:    * No resolved hospital problems. *      Plan:  1. Acute respiratory failure with hypoxemia  -Patient was extubated 9/10, now on 4L, saturating 92%  -Breathing treatments switched to levalbuterol from duonebs due to tachycardia    2. COVID PNA  -Patient unvaccinated, was coughing, congested and had fevers prior to presentation  -Patient was extubated 9/10  -ID is on board  -Patient switched back to dexamethasone   -Patient completed 5 days of remdesivir. Was also started on baricitinib which was completed  -CXR today showed slight improvement in LLL infiltrate  -WBC 6.9, improved. Spiking fevers again, Tmax 100.4 today    3. PNA, possibly aspiration  -Sputum Cx growing Klebsiella sensitive to cefepime, was switched to meropenem and vancomycin  -Repeat sputum Cx growing yeast, not candida - was started on eraxis  -Strep and legionella negative. Initial BCx negative and repeat also negative so far  -WBC 10.9 today, spiking temps again, Tmax 100.0 in past 24hrs. Possible aspiration event during breakthrough seizures  -Will follow up final sputum Cx and monitor labs and VS    4. Lactic acidosis (resolved)  -LA peaked at 11.8 on 8/30, has improved to 0.9 most recently  -Was getting bicarb infusion for acidosis, now off all IVF  -Will monitor BMP and lactic acid levels     5. Electrolyte abnormalities  -Potassium 3.7 today  -Mag and phos WNL today  -Will continue to monitor    6. Seizure disorder 2/2 cerebral palsy  -Patient has known CP, is nonverbal at baseline with severe developmental delay  -Has seizures, takes baclofen, vimpat, lamictal and zonisamide at home which are continued here  -Has been continuing to have breakthrough seizures and keppra was increased  -Neurology consulted and per patient's father's request will be transferred to [facility] where the patient has a neurologist - still waiting on bed.  Seroquel was DCd by neurology  -Patient had EEG done, confirmed seizure activity    7. Elevated blood glucose  -Patient has had elevated glucose levels up to 379, this   -No known diagnosis of DM and patient is currently on steroids which will increase glucose  -A1C should be checked to determine if patient is diabetic or just elevated BS 2/2 steroids and current illness   -Currently on NPO ISS with hypoglycemic protocol  -Currently on tube feeds    8. Septic shock (resolved)  -Patient hypotensive initially, required levophed but was able to be weaned off  -Off all IVF and pressors now  -/84 today (was 75/39 at the lowest on 8/29)  -WBC 6.9, lactic acid normalized  -So far BCx neg, resp cx growing klebsiella and yeast, UCx neg and tip cx NGTD    9. Tachycardia  -patient has been tachycardic most of admission, likely his baseline  -Did go over 140s today, but stayed in sinus  -Will add metoprolol tartrate 12.5mg BID and monitor HR    NOTE: This report was transcribed using voice recognition software. Every effort was made to ensure accuracy; however, inadvertent computerized transcription errors may be present.      Electronically signed by Breann Watts MD on 9/17/2021 at 3:40 PM

## 2021-09-17 NOTE — PROGRESS NOTES
Pharmacy Consultation Note  (Antibiotic Dosing and Monitoring)    Initial consult date: 9/12/2021  Consulting physician/provider: Dr. Papi Fry  Drug: Vancomycin  Indication: HAP    Age/  Gender Height Weight IBW  Allergy Information   25 y.o./male 5' (152.4 cm) 130 lb (59 kg)     Ideal body weight: 50 kg (110 lb 3.7 oz)  Adjusted ideal body weight: 57.8 kg (127 lb 5.4 oz)   Patient has no known allergies. Renal Function:  Recent Labs     09/15/21  0440 09/16/21  0404 09/17/21  0424   BUN 8 8 8   CREATININE 0.3* 0.4* 0.4*       Intake/Output Summary (Last 24 hours) at 9/17/2021 1455  Last data filed at 9/17/2021 1405  Gross per 24 hour   Intake 825 ml   Output 1525 ml   Net -700 ml       Vancomycin Monitoring:  Trough:    Recent Labs     09/16/21  0346   VANCOTROUGH 13.4     Random:  No results for input(s): VANCORANDOM in the last 72 hours. Recent vancomycin administrations                   vancomycin (VANCOCIN) 1,000 mg in dextrose 5 % 250 mL IVPB (mg) 1,000 mg New Bag 09/17/21 1255     1,000 mg New Bag  0412     1,000 mg New Bag 09/16/21 2124     1,000 mg New Bag  1125     1,000 mg New Bag  0430     1,000 mg New Bag 09/15/21 2028     1,000 mg New Bag  1223     1,000 mg New Bag  0422     1,000 mg New Bag 09/14/21 2030              Assessment:  · Patient is a 30 y.o. male who has been initiated on vancomycin  Estimated Creatinine Clearance: 227 mL/min (A) (based on SCr of 0.4 mg/dL (L)). · To dose vancomycin, pharmacy will be utilizing [employer] calculation software for goal AUC/DERIAN 400-600 mg/L-hr   · 9/13: Trough = 6.6 mcg/mL. AUC/DERIAN 380  · 9/16: Trough @ 0346 = 13.4 mcg/mL. AUC/DERIAN = 460    Plan:  · Continue vancomycin 1000 mg IV every 8 hours  · Repeat level tomorrow @ 0330.  Hold dose for level > 20 mcg/mL  · Will continue to monitor renal function   · Clinical pharmacy to follow    Alexei Liang PharmD, BCPS 9/17/2021 2:55 PM   Ext: [phone]

## 2021-09-17 NOTE — CARE COORDINATION
SOCIAL WORK / DISCHARGE PLANNING:  COVID positive 8/28. Pt transferred to [facility] from ICU. Notes indicate plan transfer to [facility] as has neurologist there. Per Dr Fantasma Gentile notes accepted, Dr Normajean Apley. This is not through "[employer] In rounds, noted that night shift spoke with [facility] still no beds available. Pt with frequent suctioning today. 4L O2 Pt usually resides at home with parents,reported to have all resources at home, handicap accessible [facility] for home going transport. Father has been declining [facility] per notes, dc planning to continue.                Electronically signed by RIAZ Sheehan on 9/17/2021 at 1:05 PM

## 2021-09-18 LAB
ALBUMIN SERPL-MCNC: 3.7 G/DL (ref 3.5–5.2)
ALP BLD-CCNC: 213 U/L (ref 40–129)
ALT SERPL-CCNC: 83 U/L (ref 0–40)
ANION GAP SERPL CALCULATED.3IONS-SCNC: 10 MMOL/L (ref 7–16)
AST SERPL-CCNC: 44 U/L (ref 0–39)
BASOPHILS ABSOLUTE: 0.08 E9/L (ref 0–0.2)
BASOPHILS RELATIVE PERCENT: 1 % (ref 0–2)
BILIRUB SERPL-MCNC: 0.4 MG/DL (ref 0–1.2)
BUN BLDV-MCNC: 9 MG/DL (ref 6–20)
CALCIUM SERPL-MCNC: 9.5 MG/DL (ref 8.6–10.2)
CHLORIDE BLD-SCNC: 102 MMOL/L (ref 98–107)
CO2: 25 MMOL/L (ref 22–29)
CREAT SERPL-MCNC: 0.4 MG/DL (ref 0.7–1.2)
EOSINOPHILS ABSOLUTE: 0.1 E9/L (ref 0.05–0.5)
EOSINOPHILS RELATIVE PERCENT: 1.3 % (ref 0–6)
GFR AFRICAN AMERICAN: >60
GFR NON-AFRICAN AMERICAN: >60 ML/MIN/1.73
GLUCOSE BLD-MCNC: 112 MG/DL (ref 74–99)
HCT VFR BLD CALC: 35 % (ref 37–54)
HEMOGLOBIN: 11.7 G/DL (ref 12.5–16.5)
IMMATURE GRANULOCYTES #: 0.14 E9/L
IMMATURE GRANULOCYTES %: 1.8 % (ref 0–5)
LYMPHOCYTES ABSOLUTE: 1.43 E9/L (ref 1.5–4)
LYMPHOCYTES RELATIVE PERCENT: 18.2 % (ref 20–42)
MAGNESIUM: 2.3 MG/DL (ref 1.6–2.6)
MCH RBC QN AUTO: 32.4 PG (ref 26–35)
MCHC RBC AUTO-ENTMCNC: 33.4 % (ref 32–34.5)
MCV RBC AUTO: 97 FL (ref 80–99.9)
METER GLUCOSE: 106 MG/DL (ref 74–99)
MONOCYTES ABSOLUTE: 1.06 E9/L (ref 0.1–0.95)
MONOCYTES RELATIVE PERCENT: 13.5 % (ref 2–12)
NEUTROPHILS ABSOLUTE: 5.05 E9/L (ref 1.8–7.3)
NEUTROPHILS RELATIVE PERCENT: 64.2 % (ref 43–80)
PDW BLD-RTO: 12.4 FL (ref 11.5–15)
PHOSPHORUS: 3.7 MG/DL (ref 2.5–4.5)
PLATELET # BLD: 435 E9/L (ref 130–450)
PMV BLD AUTO: 9.9 FL (ref 7–12)
POTASSIUM SERPL-SCNC: 3.9 MMOL/L (ref 3.5–5)
RBC # BLD: 3.61 E12/L (ref 3.8–5.8)
SODIUM BLD-SCNC: 137 MMOL/L (ref 132–146)
TOTAL PROTEIN: 7.7 G/DL (ref 6.4–8.3)
VANCOMYCIN TROUGH: 17.1 MCG/ML (ref 5–16)
WBC # BLD: 7.9 E9/L (ref 4.5–11.5)

## 2021-09-18 PROCEDURE — 6370000000 HC RX 637 (ALT 250 FOR IP): Performed by: INTERNAL MEDICINE

## 2021-09-18 PROCEDURE — 6370000000 HC RX 637 (ALT 250 FOR IP)

## 2021-09-18 PROCEDURE — 82962 GLUCOSE BLOOD TEST: CPT

## 2021-09-18 PROCEDURE — 2580000003 HC RX 258: Performed by: INTERNAL MEDICINE

## 2021-09-18 PROCEDURE — 85025 COMPLETE CBC W/AUTO DIFF WBC: CPT

## 2021-09-18 PROCEDURE — 80202 ASSAY OF VANCOMYCIN: CPT

## 2021-09-18 PROCEDURE — 2580000003 HC RX 258: Performed by: SPECIALIST

## 2021-09-18 PROCEDURE — 6360000002 HC RX W HCPCS

## 2021-09-18 PROCEDURE — 6360000002 HC RX W HCPCS: Performed by: STUDENT IN AN ORGANIZED HEALTH CARE EDUCATION/TRAINING PROGRAM

## 2021-09-18 PROCEDURE — 2580000003 HC RX 258: Performed by: PSYCHIATRY & NEUROLOGY

## 2021-09-18 PROCEDURE — 83735 ASSAY OF MAGNESIUM: CPT

## 2021-09-18 PROCEDURE — C9113 INJ PANTOPRAZOLE SODIUM, VIA: HCPCS

## 2021-09-18 PROCEDURE — 2500000003 HC RX 250 WO HCPCS: Performed by: INTERNAL MEDICINE

## 2021-09-18 PROCEDURE — 36415 COLL VENOUS BLD VENIPUNCTURE: CPT

## 2021-09-18 PROCEDURE — 6360000002 HC RX W HCPCS: Performed by: PSYCHIATRY & NEUROLOGY

## 2021-09-18 PROCEDURE — 2060000000 HC ICU INTERMEDIATE R&B

## 2021-09-18 PROCEDURE — 80053 COMPREHEN METABOLIC PANEL: CPT

## 2021-09-18 PROCEDURE — 6360000002 HC RX W HCPCS: Performed by: INTERNAL MEDICINE

## 2021-09-18 PROCEDURE — 6360000002 HC RX W HCPCS: Performed by: SPECIALIST

## 2021-09-18 PROCEDURE — 2700000000 HC OXYGEN THERAPY PER DAY

## 2021-09-18 PROCEDURE — 84100 ASSAY OF PHOSPHORUS: CPT

## 2021-09-18 PROCEDURE — 99233 SBSQ HOSP IP/OBS HIGH 50: CPT | Performed by: INTERNAL MEDICINE

## 2021-09-18 PROCEDURE — 2580000003 HC RX 258

## 2021-09-18 PROCEDURE — 6370000000 HC RX 637 (ALT 250 FOR IP): Performed by: STUDENT IN AN ORGANIZED HEALTH CARE EDUCATION/TRAINING PROGRAM

## 2021-09-18 PROCEDURE — 94640 AIRWAY INHALATION TREATMENT: CPT

## 2021-09-18 PROCEDURE — 31720 CLEARANCE OF AIRWAYS: CPT

## 2021-09-18 RX ORDER — METOPROLOL TARTRATE 5 MG/5ML
5 INJECTION INTRAVENOUS ONCE
Status: COMPLETED | OUTPATIENT
Start: 2021-09-18 | End: 2021-09-18

## 2021-09-18 RX ADMIN — LEVALBUTEROL HYDROCHLORIDE 0.63 MG: 0.63 SOLUTION RESPIRATORY (INHALATION) at 18:13

## 2021-09-18 RX ADMIN — VANCOMYCIN HYDROCHLORIDE 1000 MG: 1 INJECTION, POWDER, LYOPHILIZED, FOR SOLUTION INTRAVENOUS at 06:07

## 2021-09-18 RX ADMIN — BACLOFEN 20 MG: 10 TABLET ORAL at 13:08

## 2021-09-18 RX ADMIN — SODIUM CHLORIDE 25 ML: 9 INJECTION, SOLUTION INTRAVENOUS at 17:12

## 2021-09-18 RX ADMIN — LACOSAMIDE 150 MG: 100 TABLET, FILM COATED ORAL at 09:35

## 2021-09-18 RX ADMIN — SODIUM CHLORIDE, PRESERVATIVE FREE 10 ML: 5 INJECTION INTRAVENOUS at 09:36

## 2021-09-18 RX ADMIN — LEVALBUTEROL HYDROCHLORIDE 0.63 MG: 0.63 SOLUTION RESPIRATORY (INHALATION) at 10:20

## 2021-09-18 RX ADMIN — POLYETHYLENE GLYCOL 3350 17 G: 17 POWDER, FOR SOLUTION ORAL at 09:48

## 2021-09-18 RX ADMIN — ENOXAPARIN SODIUM 40 MG: 40 INJECTION SUBCUTANEOUS at 09:34

## 2021-09-18 RX ADMIN — BACLOFEN 20 MG: 10 TABLET ORAL at 09:48

## 2021-09-18 RX ADMIN — LEVETIRACETAM 1500 MG: 15 INJECTION INTRAVENOUS at 21:06

## 2021-09-18 RX ADMIN — HEPARIN 300 UNITS: 100 SYRINGE at 21:10

## 2021-09-18 RX ADMIN — SODIUM CHLORIDE 25 ML: 9 INJECTION, SOLUTION INTRAVENOUS at 01:55

## 2021-09-18 RX ADMIN — ZONISAMIDE 300 MG: 100 CAPSULE ORAL at 21:11

## 2021-09-18 RX ADMIN — CEFEPIME HYDROCHLORIDE 2000 MG: 2 INJECTION, POWDER, FOR SOLUTION INTRAVENOUS at 13:48

## 2021-09-18 RX ADMIN — PANTOPRAZOLE SODIUM 40 MG: 40 INJECTION, POWDER, FOR SOLUTION INTRAVENOUS at 09:35

## 2021-09-18 RX ADMIN — Medication 10 ML: at 21:12

## 2021-09-18 RX ADMIN — METOPROLOL TARTRATE 12.5 MG: 25 TABLET, FILM COATED ORAL at 09:35

## 2021-09-18 RX ADMIN — VANCOMYCIN HYDROCHLORIDE 1000 MG: 1 INJECTION, POWDER, LYOPHILIZED, FOR SOLUTION INTRAVENOUS at 21:23

## 2021-09-18 RX ADMIN — VANCOMYCIN HYDROCHLORIDE 1000 MG: 1 INJECTION, POWDER, LYOPHILIZED, FOR SOLUTION INTRAVENOUS at 12:49

## 2021-09-18 RX ADMIN — LAMOTRIGINE 200 MG: 100 TABLET ORAL at 13:08

## 2021-09-18 RX ADMIN — CEFEPIME HYDROCHLORIDE 2000 MG: 2 INJECTION, POWDER, FOR SOLUTION INTRAVENOUS at 06:07

## 2021-09-18 RX ADMIN — LEVETIRACETAM 1500 MG: 15 INJECTION INTRAVENOUS at 09:27

## 2021-09-18 RX ADMIN — HEPARIN 300 UNITS: 100 SYRINGE at 09:49

## 2021-09-18 RX ADMIN — LEVALBUTEROL HYDROCHLORIDE 0.63 MG: 0.63 SOLUTION RESPIRATORY (INHALATION) at 07:54

## 2021-09-18 RX ADMIN — METOPROLOL TARTRATE 5 MG: 1 INJECTION, SOLUTION INTRAVENOUS at 03:37

## 2021-09-18 RX ADMIN — DEXTROSE MONOHYDRATE 100 MG: 50 INJECTION, SOLUTION INTRAVENOUS at 12:49

## 2021-09-18 RX ADMIN — CEFEPIME HYDROCHLORIDE 2000 MG: 2 INJECTION, POWDER, FOR SOLUTION INTRAVENOUS at 21:03

## 2021-09-18 RX ADMIN — BACLOFEN 20 MG: 10 TABLET ORAL at 21:10

## 2021-09-18 RX ADMIN — METOPROLOL TARTRATE 12.5 MG: 25 TABLET, FILM COATED ORAL at 21:10

## 2021-09-18 RX ADMIN — LACOSAMIDE 150 MG: 100 TABLET, FILM COATED ORAL at 21:10

## 2021-09-18 RX ADMIN — LAMOTRIGINE 200 MG: 100 TABLET ORAL at 09:35

## 2021-09-18 RX ADMIN — FOSPHENYTOIN SODIUM 250 MG PE: 50 INJECTION, SOLUTION INTRAMUSCULAR; INTRAVENOUS at 09:36

## 2021-09-18 RX ADMIN — SODIUM CHLORIDE 25 ML: 9 INJECTION, SOLUTION INTRAVENOUS at 09:32

## 2021-09-18 RX ADMIN — LAMOTRIGINE 200 MG: 100 TABLET ORAL at 21:10

## 2021-09-18 ASSESSMENT — PAIN SCALES - WONG BAKER
WONGBAKER_NUMERICALRESPONSE: 0

## 2021-09-18 ASSESSMENT — PAIN SCALES - GENERAL
PAINLEVEL_OUTOF10: 0

## 2021-09-18 NOTE — PROGRESS NOTES
Pt's heart rate sustaining in the 130's-140's. Pt was suctioned and went to the range of 100-110 for roughly a half hour. Currently 138. Dr notified and new orders received.  See [facility]

## 2021-09-18 NOTE — PROGRESS NOTES
Pharmacy Consultation Note  (Antibiotic Dosing and Monitoring)    Initial consult date: 9/12/2021  Consulting physician/provider: Dr. Katrin Sagastume  Drug: Vancomycin  Indication: HAP    Age/  Gender Height Weight IBW  Allergy Information   25 y.o./male 5' (152.4 cm) 130 lb (59 kg)     Ideal body weight: 50 kg (110 lb 3.7 oz)  Adjusted ideal body weight: 57.8 kg (127 lb 5.4 oz)   Patient has no known allergies. Renal Function:  Recent Labs     09/16/21  0404 09/17/21  0424 09/18/21  0449   BUN 8 8 9   CREATININE 0.4* 0.4* 0.4*       Intake/Output Summary (Last 24 hours) at 9/18/2021 1510  Last data filed at 9/18/2021 1445  Gross per 24 hour   Intake 410 ml   Output 3150 ml   Net -2740 ml       Vancomycin Monitoring:  Trough:    Recent Labs     09/16/21  0346 09/18/21  0449   VANCOTROUGH 13.4 17.1*     Random:  No results for input(s): VANCORANDOM in the last 72 hours. Recent vancomycin administrations                   vancomycin (VANCOCIN) 1,000 mg in dextrose 5 % 250 mL IVPB (mg) 1,000 mg New Bag 09/17/21 1255     1,000 mg New Bag  0412     1,000 mg New Bag 09/16/21 2124     1,000 mg New Bag  1125     1,000 mg New Bag  0430     1,000 mg New Bag 09/15/21 2028     1,000 mg New Bag  1223     1,000 mg New Bag  0422     1,000 mg New Bag 09/14/21 2030              Assessment:  · Patient is a 30 y.o. male who has been initiated on vancomycin  Estimated Creatinine Clearance: 227 mL/min (A) (based on SCr of 0.4 mg/dL (L)). · To dose vancomycin, pharmacy will be utilizing [employer] calculation software for goal AUC/DERIAN 400-600 mg/L-hr   · 9/13: Trough = 6.6 mcg/mL. AUC/DERIAN 380  · 9/16: Trough @ 0346 = 13.4 mcg/mL.  AUC/DERIAN = 460  · 9/18: Trough @ 0449 = 17.1 mcg/mL (AUC = 503)    Plan:  · Continue vancomycin 1000 mg IV every 8 hours  · Repeat level as needed  · Will continue to monitor renal function   · Clinical pharmacy to follow    Chelly Thomas, PharmD 9/18/2021 3:10 PM   [phone]

## 2021-09-18 NOTE — PROGRESS NOTES
[address] [place], [place] [address]ist   Progress Note    Admitting Date and Time: 8/28/2021  3:31 PM  Admit Dx: Hypokalemia [E87.6]  Acute respiratory failure with hypoxia ([facility] [address] [J96.01]  Pneumonia of right lower lobe due to infectious organism [J18.9]  Sepsis with acute hypoxic respiratory failure without septic shock, due to unspecified organism ([facility] [address] [A41.9, R65.20, J96.01]    Subjective:    9/16: Patient was extubated 9/10 and was doing well on RA yesterday during the day, however was getting more tachycardic and spiking fevers so was kept in the ICU. The patient then started having multiple breakthrough seizures overnight and this morning as well. The patient's father was informed and stated that he has a neurologist familiar with his case at [facility] and he wants the patient transferred there. Transfer was initiated by Dr. Elisa Easton and patient was accepted, just waiting for a bed. Was also switched to cefepime and fosphenytoin yesterday. Patient was transferred to [facility] yesterday. 9/17: Patient was becoming more tachycardic and tachypneic and nurses were having difficulty suctioning patient. Patient was switched to levalbuterol from [facility] due to tachycardia and had improvement. EEG was read today and did confirm seizure activity. Still waiting on bed at [facility].    9/18: Patient resting comfortably. RN states he has not had BM for few days. Still waiting on bed at [facility].      levalbuterol  0.63 mg Nebulization 4x Daily    metoprolol tartrate  12.5 mg Oral BID    fosphenytoin (CEREBYX) maintenance dose IVPB  250 mg PE IntraVENous Daily    cefepime  2,000 mg IntraVENous Q8H    vancomycin  1,000 mg IntraVENous Q8H    anidulafungin  100 mg IntraVENous Q24H    levETIRAcetam  1,500 mg IntraVENous Q12H    enoxaparin  40 mg SubCUTAneous Daily    baclofen  20 mg Oral TID    lidocaine PF  5 mL IntraDERmal Once    sodium chloride flush  5-40 mL IntraVENous 2 times per day    heparin flush  3 mL IntraVENous 2 times per 23 24 25   BUN 8 8 9   CREATININE 0.4* 0.4* 0.4*   GLUCOSE 126* 115* 112*   CALCIUM 9.6 9.6 9.5       Recent Labs     09/16/21  0404 09/17/21  0424 09/18/21  0449   ALKPHOS 183* 198* 213*   PROT 7.3 7.4 7.7   LABALBU 3.9 4.0 3.7   BILITOT 0.5 0.4 0.4   AST 63* 60* 44*   ALT 71* 92* 83*       Recent Labs     09/16/21  0404 09/17/21  0424 09/18/21  0449   WBC 10.9 6.9 7.9   RBC 3.66* 3.60* 3.61*   HGB 11.8* 11.6* 11.7*   HCT 37.0 35.1* 35.0*   .1* 97.5 97.0   MCH 32.2 32.2 32.4   MCHC 31.9* 33.0 33.4   RDW 12.8 12.6 12.4   * 494* 435   MPV 10.1 9.8 9.9          Culture, Blood 1 [[id number]] Collected: 09/12/21 1052     Specimen: Blood Updated: 09/17/21 1935      Blood Culture, Routine 5 Days no growth     Narrative:       Source: BLOOD       Site: [place], [place]               Culture, Blood 2 [[id number]] Collected: 09/12/21 1052     Specimen: Blood Updated: 09/17/21 1935      Culture, Blood 2 5 Days no growth     Narrative:       Source: BLOOD       Site: [place], [place]          Radiology:   XR CHEST PORTABLE   Final Result   Improved bilateral airspace opacities. XR CHEST PORTABLE   Final Result   Little change in patchy bilateral interstitial and alveolar opacities that   may represent pneumonia. XR CHEST PORTABLE   Final Result   Patchy multifocal bilateral airspace disease, more prominent within the right   upper lobe. XR CHEST PORTABLE   Final Result   No significant change in the appearance of the chest compared with   yesterday's examination. XR CHEST PORTABLE   Final Result   Improved aeration of the lungs with persistent bilateral reticular airspace   opacities that may be on the basis of pulmonary edema or atypical/viral   pneumonia. XR CHEST PORTABLE   Final Result   Stable bilateral infiltrates. ET tube tip 2.5 cm from the shantell. XR CHEST PORTABLE   Final Result   Interval placement of right-sided PICC line.       Bilateral parenchymal infiltrates are similar to slightly worsened. Continued follow-up recommended. XR CHEST PORTABLE   Final Result   1. Lines okay. 2. Mild-moderate improved left lung aeration, overall decreased lung volumes. XR CHEST PORTABLE   Final Result   1. Slight advancement of the endotracheal tube which now resides in the   midthoracic trachea. 2.  Stable appearance of the chest.  Stable position of the enteric tube. XR CHEST PORTABLE   Final Result   1. Interval slight advancement of the endotracheal tube which now resides in   the proximal thoracic trachea. 2.  Persistent ill-defined opacities in the right greater than left lungs. No change from prior exam.  No obvious pneumothorax. 3.  Enteric tube. Contrast material identified within the stomach lumen. XR CHEST PORTABLE   Final Result   1. Persistent ill-defined opacities in the bilateral lungs slightly worse in   the left mid to upper lung. Probable trace pleural effusions. 2.  Slight retraction of the endotracheal tube which projects over the   cervical region. Suggest repositioning. 3.  There appears to be a satisfactory position of the enteric tube. The findings were sent to the Radiology Results [address] at 7:12   pm on 9/3/2021to be communicated to a licensed caregiver. XR ABDOMEN FOR NG/OG/NE TUBE PLACEMENT   Final Result   1. Satisfactory position of gastric feeding tube as evidenced by stomach   contour outlined following injection of oral contrast.      2.  Satisfactory position of enteric tube. 3.  Significant reflux of oral contrast material into the esophagus. XR CHEST PORTABLE   Final Result   Significant bilateral infiltrates with partial interval clearing of the left   lower lobe infiltrate since the prior study         XR CHEST PORTABLE   Final Result   Satisfactory position of the ETT and NGT.       Otherwise, stable chest.         XR CHEST PORTABLE   Final abnormalities  -Potassium 3.9 today  -Mag and phos WNL today at 2.3 and 3.7  -Will continue to monitor    6. Seizure disorder due to cerebral palsy  -Patient has known CP, is nonverbal at baseline with severe developmental delay  -Has seizures, takes baclofen, vimpat, lamictal and zonisamide at home which are continued here  -Has been continuing to have breakthrough seizures and keppra was increased  -Neurology consulted and per patient's father's request will be transferred to [facility] where the patient has a neurologist - still waiting on bed. Seroquel was discontinued by neurology  -Patient had EEG done which confirmed seizure activity    7. Elevated blood glucose  -Patient has had elevated glucose levels up to 379, this   -No known diagnosis of DM and patient is currently on steroids which will increase glucose  -A1C should be checked to determine if patient is diabetic or just elevated BS 2/2 steroids and current illness   -Currently on NPO ISS with hypoglycemic protocol  -Currently on tube feeds    8. Septic shock (resolved)  -Patient hypotensive initially, required levophed but was able to be weaned off  -Off all IVF and pressors now  -/84 today (was 75/39 at the lowest on 8/29)  -WBC 6.9, lactic acid normalized  -So far BCx neg, resp cx growing klebsiella and yeast, UCx neg and tip cx NGTD    9. Tachycardia  -patient has been tachycardic most of admission, likely his baseline  -Did go over 140s 9/16, but stayed in sinus  -Added metoprolol tartrate 12.5mg BID and monitor HR. He did received dose of IV Lopressor 5 mg overnight. NOTE: This report was transcribed using voice recognition software. Every effort was made to ensure accuracy; however, inadvertent computerized transcription errors may be present.      Electronically signed by Yola Silva MD on 9/18/2021 at 2:10 PM

## 2021-09-19 VITALS
TEMPERATURE: 99.1 F | WEIGHT: 153 LBS | OXYGEN SATURATION: 96 % | DIASTOLIC BLOOD PRESSURE: 78 MMHG | HEIGHT: 60 IN | RESPIRATION RATE: 12 BRPM | SYSTOLIC BLOOD PRESSURE: 117 MMHG | BODY MASS INDEX: 30.04 KG/M2 | HEART RATE: 108 BPM

## 2021-09-19 LAB — METER GLUCOSE: 152 MG/DL (ref 74–99)

## 2021-09-19 PROCEDURE — 6360000002 HC RX W HCPCS: Performed by: SPECIALIST

## 2021-09-19 PROCEDURE — 2580000003 HC RX 258: Performed by: SPECIALIST

## 2021-09-19 PROCEDURE — 2580000003 HC RX 258: Performed by: INTERNAL MEDICINE

## 2021-09-19 PROCEDURE — 82962 GLUCOSE BLOOD TEST: CPT

## 2021-09-19 PROCEDURE — 6360000002 HC RX W HCPCS: Performed by: INTERNAL MEDICINE

## 2021-09-19 PROCEDURE — 99238 HOSP IP/OBS DSCHRG MGMT 30/<: CPT | Performed by: FAMILY MEDICINE

## 2021-09-19 PROCEDURE — 94640 AIRWAY INHALATION TREATMENT: CPT

## 2021-09-19 PROCEDURE — 2700000000 HC OXYGEN THERAPY PER DAY

## 2021-09-19 RX ADMIN — LEVALBUTEROL HYDROCHLORIDE 0.63 MG: 0.63 SOLUTION RESPIRATORY (INHALATION) at 06:54

## 2021-09-19 RX ADMIN — CEFEPIME HYDROCHLORIDE 2000 MG: 2 INJECTION, POWDER, FOR SOLUTION INTRAVENOUS at 06:19

## 2021-09-19 RX ADMIN — DEXTROSE AND SODIUM CHLORIDE: 5; 900 INJECTION, SOLUTION INTRAVENOUS at 02:31

## 2021-09-19 RX ADMIN — VANCOMYCIN HYDROCHLORIDE 1000 MG: 1 INJECTION, POWDER, LYOPHILIZED, FOR SOLUTION INTRAVENOUS at 06:18

## 2021-09-19 RX ADMIN — LEVALBUTEROL HYDROCHLORIDE 0.63 MG: 0.63 SOLUTION RESPIRATORY (INHALATION) at 09:56

## 2021-09-19 RX ADMIN — SODIUM CHLORIDE 25 ML: 9 INJECTION, SOLUTION INTRAVENOUS at 01:45

## 2021-09-19 ASSESSMENT — PAIN SCALES - GENERAL
PAINLEVEL_OUTOF10: 0
PAINLEVEL_OUTOF10: 0

## 2021-09-19 ASSESSMENT — PAIN SCALES - WONG BAKER
WONGBAKER_NUMERICALRESPONSE: 0
WONGBAKER_NUMERICALRESPONSE: 0

## 2021-09-19 NOTE — PROGRESS NOTES
Bed was obtained at [address] [facility] [address]. Phone [phone]. Access center contacted to initiate transport. Awaiting return call. Physician notified.

## 2021-09-21 NOTE — DISCHARGE SUMMARY
[facility] Physician Discharge Summary       No follow-up provider specified. Activity level: per /as tolerated    Diet:  Tube feedings  Labs: per     Condition at discharge: stable    Dispo: transfer to [facility]    Continue supplemental oxygen via nasal canulla round-the-clock. Patient ID:  Vinnie Villa  [id number]  30 y.o.  1995    Admit date: 8/28/2021    Discharge date and time:  9/21/2021  12:53 PM    Admission Diagnoses: Principal Problem:    Acute respiratory failure with hypoxia ([address]  Active Problems:    Cerebral palsy ([address]    Seizures ([address]    Mentally disabled    Convulsions ([address]    Pneumonia due to COVID-19 virus    Aspiration pneumonia ([address]  Resolved Problems:    * No resolved hospital problems. *      Discharge Diagnoses: Principal Problem:    Acute respiratory failure with hypoxia ([address]  Active Problems:    Cerebral palsy (HCC)    Seizures ([address]    Mentally disabled    Convulsions ([address]    Pneumonia due to COVID-19 virus    Aspiration pneumonia ([address]  Resolved Problems:    * No resolved hospital problems. *      Consults:  IP CONSULT TO CRITICAL CARE  IP CONSULT TO INTERNAL MEDICINE  IP CONSULT TO PRIMARY CARE PROVIDER  IP CONSULT TO PHARMACY  IP CONSULT TO INFECTIOUS DISEASES  IP CONSULT TO PALLIATIVE CARE  IP CONSULT TO DIETITIAN  IP CONSULT TO GENERAL SURGERY  IP CONSULT TO PHARMACY  IP CONSULT TO PHARMACY  IP CONSULT TO NEUROLOGY    Procedures:   ECHO on 8/2021    Intubation and mechanical ventilation    Hospital Course:   Patient is a 27 y. o. male with a past medical history of cerebral palsy, cognitive delay, seizures, wheelchair-bound, presenting to the Emergency Department for altered mental status and respiratory distress. History obtained by parents.    Patient presents for altered mental status and hypoxia.  41% on room air on arrival.  They state patient has been sick for the last few days.  Congested, coughing.  Also states that patient has fever as high as 100.  He is not vaccinated. Simran Becerrat in the house with similar symptoms.  He is nonverbal at baseline and moves minimally at baseline.  Patient ended up being intubated and sedated and was admitted to the ICU upon admission and was proned. COVID-19 testing was positive. Also with metabolic acidosis -- due to bacterial pneumonia. Developed bacterial and COVID pneumonitis. Septic shock and probably underlying cardiogenic shock as well. Received IV fluid, hydrocortisone, as well as cefepime, metronidazole, and azithromycin all IV for bacterial PNA and septic shock. Found to have a right upper lobe cavitary lesion with surrounding infiltrate. Likely gram negative pneumonia. Several sputum samples sent and all were negative except there was one showing non-albicans Candida, light growth. Blood cultures negative. Extubated on 9/16. Then began to have tachycardia and spiking fevers. Was kept in the ICU at that point. Multiple breakthrough seizures. Neurology consulted. The patient's father requested pt be transferred to [facility] as there is a neurologist that the patient has been following there for years. EEG done which did show seizures spikes. Duoneb switched to Levalbuterol with improvement in tachycardia. Patient's mental status still altered -- not alert, not much spontaneous movement. No fevers for 24 hrs prior to transfer. Patient transferred to [facility] in a stable condition on the AM of 9/19.       Discharge Exam:  Somnolent, not arousable to touch, in NAD, not in respiratory distress, breathing comfortably on 2 L NC at rest in bed, mental status somnolent/obtunded  Eyes and mouth clear, moist, tongue and uvula midline, neck supple, no JVD  S1/S2 with RRR, no M/R/G  Lungs with bilateral rales left greater than right, pos wheezing  Abd soft/NT/ND/normoactive BS's  Trace pitting LE edema, pos pedal pulses, no cyanosis, good cap refill of digits  Skin warm, dry, good turgor, no rashes, no jaundice       I/O last 3 completed shifts: In: 586 [NG/GT:586]  Out: 2000 [Urine:2000]  No intake/output data recorded.       LABS:    CBC with Differential:    Lab Results   Component Value Date    WBC 7.9 09/18/2021    RBC 3.61 09/18/2021    HGB 11.7 09/18/2021    HCT 35.0 09/18/2021     09/18/2021    MCV 97.0 09/18/2021    MCH 32.4 09/18/2021    MCHC 33.4 09/18/2021    RDW 12.4 09/18/2021    METASPCT 0.9 09/08/2021    LYMPHOPCT 18.2 09/18/2021    MONOPCT 13.5 09/18/2021    MYELOPCT 0.9 09/12/2021    BASOPCT 1.0 09/18/2021    MONOSABS 1.06 09/18/2021    LYMPHSABS 1.43 09/18/2021    EOSABS 0.10 09/18/2021    BASOSABS 0.08 09/18/2021     BMP:    Lab Results   Component Value Date     09/18/2021    K 3.9 09/18/2021     09/18/2021    CO2 25 09/18/2021    BUN 9 09/18/2021    LABALBU 3.7 09/18/2021    CREATININE 0.4 09/18/2021    CALCIUM 9.5 09/18/2021    GFRAA >60 09/18/2021    LABGLOM >60 09/18/2021    GLUCOSE 112 09/18/2021     Hepatic Function Panel:    Lab Results   Component Value Date    ALKPHOS 213 09/18/2021    ALT 83 09/18/2021    AST 44 09/18/2021    PROT 7.7 09/18/2021    BILITOT 0.4 09/18/2021    LABALBU 3.7 09/18/2021     Albumin:    Lab Results   Component Value Date    LABALBU 3.7 09/18/2021     Calcium:    Lab Results   Component Value Date    CALCIUM 9.5 09/18/2021     Magnesium:    Lab Results   Component Value Date    MG 2.3 09/18/2021     Phosphorus:    Lab Results   Component Value Date    PHOS 3.7 09/18/2021     LDH:    Lab Results   Component Value Date     08/30/2021     U/A:    Lab Results   Component Value Date    COLORU DKYELLOW 09/13/2021    PROTEINU 100 09/13/2021    PHUR 7.0 09/13/2021    WBCUA 1-3 09/13/2021    RBCUA >20 09/13/2021    BACTERIA RARE 09/13/2021    CLARITYU SLCLOUDY 09/13/2021    SPECGRAV >=1.030 09/13/2021    LEUKOCYTESUR Negative 09/13/2021    UROBILINOGEN 2.0 09/13/2021    BILIRUBINUR Negative 09/13/2021 technique. There is focal airspace opacity / consolidation in the right lower lobe, consistent with pneumonia. Mild atelectasis lies in the medial left base. 1. Right lower lobe pneumonia. 2. Left lower lobe atelectasis versus pneumonia. US DUP LOWER EXTREMITIES BILATERAL VENOUS  Result Date: 8/28/2021  EXAMINATION: DUPLEX VENOUS ULTRASOUND OF THE BILATERAL LOWER EXTREMITIES, 8/28/2021 6:05 pm TECHNIQUE: Duplex ultrasound using B-mode/gray scaled imaging and Doppler spectral analysis and color flow was obtained of the bilateral lower extremities. COMPARISON: None. HISTORY: ORDERING SYSTEM PROVIDED HISTORY: r/o dvt TECHNOLOGIST PROVIDED HISTORY: Reason for exam:->r/o dvt What reading provider will be dictating this exam?->CRC FINDINGS: The visualized veins of the bilateral lower extremities are patent and free of echogenic thrombus. The veins demonstrate good compressibility with normal color flow study and spectral analysis. No evidence of DVT in either lower extremity. Patient Instructions:   Discharge Medication List as of 9/19/2021 10:59 AM      CONTINUE these medications which have NOT CHANGED    Details   fluticasone (FLONASE) 50 MCG/ACT nasal spray by Nasal routeHistorical Med      gentamicin (GENTAK) 0.3 % ophthalmic ointment Apply to eyeHistorical Med      glycopyrrolate (ROBINUL) 1 MG tablet TAKE 2 TABLETS BY MOUTH THREE TIMES A DAYHistorical Med      nystatin (MYCOSTATIN) 405069 UNIT/GM powder Apply 3 times daily. , Disp-1 Bottle, R-1, Normal      baclofen (LIORESAL) 20 MG tablet Take 1 tablet by mouth 3 times daily, Disp-270 tablet, R-0Normal      citalopram (CELEXA) 10 MG/5ML solution Take 5 mLs by mouth daily, Disp-300 mL, R-1Normal      lactulose (CHRONULAC) 10 GM/15ML solution Take 15 mLs by mouth daily, Disp-450 mL, R-2Normal      lansoprazole (PREVACID SOLUTAB) 15 MG disintegrating tablet Take 1 tablet by mouth daily, Disp-90 tablet, R-0Normal      acetaminophen (TYLENOL) 160 MG/5ML suspension 19.13 mLs by Per G Tube route every 6 hours as needed for Fever, Disp-229.56 mL, R-0Print      Polyethylene Glycol 3350 (MIRALAX PO) Take by mouth as needed Historical Med      lacosamide (VIMPAT) 150 MG TABS tablet Take 150 mg by mouth 2 times daily. Takes two tablets bidHistorical Med      clonazePAM (KLONOPIN) 0.5 MG tablet Take 0.5 mg by mouth 3 times daily as needed for Anxiety 11/2 tab three times daily  Take morning of surgery with a sip of water      lamoTRIgine (LAMICTAL) 200 MG tablet Take 200 mg by mouth 3 times daily.       zonisamide (ZONEGRAN) 100 MG capsule Take 300 mg by mouth nightly Historical Med           Note that less than 30 minutes was spent in preparing discharge papers, discussing discharge with patient, medication review, etc.    Signed:  Electronically signed by Carly Kaur DO on 9/21/2021 at 12:53 PM

## 2021-10-14 ENCOUNTER — OFFICE VISIT (OUTPATIENT)
Dept: PRIMARY CARE CLINIC | Age: 26
End: 2021-10-14
Payer: COMMERCIAL

## 2021-10-14 DIAGNOSIS — J12.82 PNEUMONIA DUE TO COVID-19 VIRUS: ICD-10-CM

## 2021-10-14 DIAGNOSIS — K21.9 GASTROESOPHAGEAL REFLUX DISEASE WITHOUT ESOPHAGITIS: ICD-10-CM

## 2021-10-14 DIAGNOSIS — G80.9 CEREBRAL PALSY, UNSPECIFIED TYPE (HCC): ICD-10-CM

## 2021-10-14 DIAGNOSIS — J96.01 ACUTE RESPIRATORY FAILURE WITH HYPOXIA (HCC): ICD-10-CM

## 2021-10-14 DIAGNOSIS — R56.9 SEIZURES (HCC): ICD-10-CM

## 2021-10-14 DIAGNOSIS — G40.909 NON-REFRACTORY EPILEPSY (HCC): ICD-10-CM

## 2021-10-14 DIAGNOSIS — F41.9 ANXIETY: ICD-10-CM

## 2021-10-14 DIAGNOSIS — F79 MENTALLY DISABLED: ICD-10-CM

## 2021-10-14 DIAGNOSIS — R79.89 ABNORMAL LIVER FUNCTION TESTS: ICD-10-CM

## 2021-10-14 DIAGNOSIS — U07.1 PNEUMONIA DUE TO COVID-19 VIRUS: ICD-10-CM

## 2021-10-14 PROBLEM — I26.99 PULMONARY EMBOLISM (HCC): Status: ACTIVE | Noted: 2021-09-24

## 2021-10-14 PROCEDURE — 99214 OFFICE O/P EST MOD 30 MIN: CPT | Performed by: INTERNAL MEDICINE

## 2021-10-14 PROCEDURE — 1111F DSCHRG MED/CURRENT MED MERGE: CPT | Performed by: INTERNAL MEDICINE

## 2021-10-14 RX ORDER — MICONAZOLE NITRATE 20.6 MG/G
POWDER TOPICAL
COMMUNITY
End: 2022-09-27 | Stop reason: SDUPTHER

## 2021-10-14 RX ORDER — CLOTRIMAZOLE AND BETAMETHASONE DIPROPIONATE 10; .64 MG/G; MG/G
CREAM TOPICAL
COMMUNITY

## 2021-10-14 RX ORDER — BACLOFEN 20 MG/1
20 TABLET ORAL 3 TIMES DAILY
Qty: 270 TABLET | Refills: 1 | Status: SHIPPED
Start: 2021-10-14 | End: 2022-04-20 | Stop reason: SDUPTHER

## 2021-10-14 RX ORDER — CITALOPRAM HYDROBROMIDE 20 MG/10ML
10 SOLUTION, ORAL ORAL DAILY
Qty: 300 ML | Refills: 1 | Status: SHIPPED
Start: 2021-10-14 | End: 2022-04-20 | Stop reason: SDUPTHER

## 2021-10-14 RX ORDER — LACTULOSE 10 G/15ML
10 SOLUTION ORAL DAILY
Qty: 450 ML | Refills: 2 | Status: SHIPPED | OUTPATIENT
Start: 2021-10-14 | End: 2022-01-12

## 2021-10-14 NOTE — PROGRESS NOTES
Post-Discharge Transitional Care Management Services or Hospital Follow Up      [address]   YOB: 1995    Date of Office Visit:  10/14/2021  Date of Hospital Admission: 8/28/21  Date of Hospital Discharge: 9/19/21  Risk of hospital readmission (high >=14%.  Medium >=10%) :Readmission Risk Score: 24      Care management risk score Rising risk (score 2-5) and Complex Care (Scores >=6): 0     Non face to face  following discharge, date last encounter closed (first attempt may have been earlier): *No documented post hospital discharge outreach found in the last 14 days    Call initiated 2 business days of discharge: *No response recorded in the last 14 days    Patient Active Problem List   Diagnosis    Periodontal disease    Gingivitis    Dehiscence of surgical wound    Cerebral palsy ([address]    Seizures ([address]    Abnormal liver function tests    Anxiety    Constipation    Esophageal reflux    Mentally disabled    Non-refractory epilepsy ([address]    Scoliosis associated with other condition    Tremor    Convulsions ([address]    Acute respiratory failure with hypoxia ([address]    Pneumonia due to COVID-19 virus    Aspiration pneumonia ([address]    Disease due to severe acute respiratory syndrome coronavirus 2 (SARS-CoV-2)    Pulmonary embolism ([address]    Seizure ([address]       No Known Allergies    Medications listed as ordered at the time of discharge from hospital   [place] Medication Instructions JOSÉ:    Printed on:10/14/21 9573   Medication Information                      acetaminophen (TYLENOL) 160 MG/5ML suspension  19.13 mLs by Per G Tube route every 6 hours as needed for Fever             baclofen (LIORESAL) 20 MG tablet  Take 1 tablet by mouth 3 times daily             citalopram (CELEXA) 10 MG/5ML solution  Take 5 mLs by mouth daily             clonazePAM (KLONOPIN) 0.5 MG tablet  Take 0.5 mg by mouth 3 times daily as needed for Anxiety 11/2 tab three times daily  Take morning of surgery with a sip of water             clotrimazole-betamethasone (LOTRISONE) 1-0.05 % cream  Apply topically             gentamicin (GENTAK) 0.3 % ophthalmic ointment  Apply to eye             glycopyrrolate (ROBINUL) 1 MG tablet  TAKE 2 TABLETS BY MOUTH THREE TIMES A DAY             lacosamide (VIMPAT) 150 MG TABS tablet  Take 150 mg by mouth 2 times daily. Takes two tablets bid             lactulose (CHRONULAC) 10 GM/15ML solution  Take 15 mLs by mouth daily             lamoTRIgine (LAMICTAL) 200 MG tablet  Take 200 mg by mouth 3 times daily. lansoprazole (PREVACID SOLUTAB) 15 MG disintegrating tablet  Take 1 tablet by mouth daily             miconazole (ZEASORB-AF) 2 % powder  Apply topically             nystatin (MYCOSTATIN) 895141 UNIT/GM powder  Apply 3 times daily. Polyethylene Glycol 3350 (MIRALAX PO)  Take by mouth as needed              zonisamide (ZONEGRAN) 100 MG capsule  Take 300 mg by mouth nightly                    Medications marked \"taking\" at this time  Outpatient Medications Marked as Taking for the 10/14/21 encounter (Office Visit) with Cheyenne Baker MD   Medication Sig Dispense Refill    clotrimazole-betamethasone (LOTRISONE) 1-0.05 % cream Apply topically      miconazole (ZEASORB-AF) 2 % powder Apply topically      lansoprazole (PREVACID SOLUTAB) 15 MG disintegrating tablet Take 1 tablet by mouth daily 90 tablet 1    lactulose (CHRONULAC) 10 GM/15ML solution Take 15 mLs by mouth daily 450 mL 2    citalopram (CELEXA) 10 MG/5ML solution Take 5 mLs by mouth daily 300 mL 1    baclofen (LIORESAL) 20 MG tablet Take 1 tablet by mouth 3 times daily 270 tablet 1    gentamicin (GENTAK) 0.3 % ophthalmic ointment Apply to eye      glycopyrrolate (ROBINUL) 1 MG tablet TAKE 2 TABLETS BY MOUTH THREE TIMES A DAY      nystatin (MYCOSTATIN) 555495 UNIT/GM powder Apply 3 times daily.  1 Bottle 1    Polyethylene Glycol 3350 (MIRALAX PO) Take by mouth as needed       lacosamide (VIMPAT) 150 MG TABS tablet Take 150 mg by mouth 2 times daily. Takes two tablets bid      clonazePAM (KLONOPIN) 0.5 MG tablet Take 0.5 mg by mouth 3 times daily as needed for Anxiety 11/2 tab three times daily  Take morning of surgery with a sip of water      lamoTRIgine (LAMICTAL) 200 MG tablet Take 200 mg by mouth 3 times daily.  zonisamide (ZONEGRAN) 100 MG capsule Take 300 mg by mouth nightly           Medications patient taking as of now reconciled against medications ordered at time of hospital discharge: Yes    Chief Complaint   Patient presents with   [address] from Hospital     This is a virtual visit over the phone conducted by patient's mother. They are actually going out of town tomorrow for 3 weeks. History of Present illness - Follow up of Hospital diagnosis(es):   Acute respiratory failure with hypoxia  COVID-19 pneumonia  Septic shock  Intractable seizures    Inpatient course: Discharge summary reviewed- see chart. Interval history/Current status:   Patient was admitted with acute respiratory failure with hypoxia found to be positive for COVID-19 and had a COVID-19 pneumonia was placed on multiple antibiotics and was intubated. He continued to be followed over the course of the next few weeks and he was improving from the respiratory standpoint where he got extubated but his seizure activity has worsened so family asked them to transfer patient to [facility] where he is neurology specialist as there and patient was maintained there for another week until seizure disorder got better control and then he was discharged home in a stable comfortable condition. According to his mom he is not on any oxygen support at this point and he is back to his baseline. A comprehensive review of systems was negative except for what was noted in the HPI. There were no vitals filed for this visit. There is no height or weight on file to calculate BMI.    Wt Readings from Last 3 Encounters:   09/01/21 153 lb (69.4 kg)   09/04/20 135 lb (61.2 kg)   08/29/19 120 lb (54.4 kg)     BP Readings from Last 3 Encounters:   09/19/21 117/78   08/18/21 100/70   01/19/21 94/68         Assessment/Plan:  1- Pneumonia due to COVID-19 virus      2-. Acute respiratory failure with hypoxia (HCC)    3- Cerebral palsy, unspecified type (HCC)      4- Non-refractory epilepsy (HCC)      5- Abnormal liver function tests    - Comprehensive Metabolic Panel; Future    6- Mentally disabled      7- Gastroesophageal reflux disease without esophagitis    - lansoprazole (PREVACID SOLUTAB) 15 MG disintegrating tablet; Take 1 tablet by mouth daily  Dispense: 90 tablet; Refill: 1  8-. Anxiety    - citalopram (CELEXA) 10 MG/5ML solution; Take 5 mLs by mouth daily  Dispense: 300 mL; Refill: 1    9- Seizures (HCC)    - baclofen (LIORESAL) 20 MG tablet; Take 1 tablet by mouth 3 times daily  Dispense: 270 tablet;  Refill: 1  - CBC Auto Differential; Future        Medical Decision Making: moderate complexity

## 2021-11-08 ENCOUNTER — OFFICE VISIT (OUTPATIENT)
Dept: PRIMARY CARE CLINIC | Age: 26
End: 2021-11-08
Payer: COMMERCIAL

## 2021-11-08 ENCOUNTER — TELEPHONE (OUTPATIENT)
Dept: PRIMARY CARE CLINIC | Age: 26
End: 2021-11-08

## 2021-11-08 VITALS
SYSTOLIC BLOOD PRESSURE: 102 MMHG | TEMPERATURE: 98.9 F | DIASTOLIC BLOOD PRESSURE: 62 MMHG | HEIGHT: 60 IN | OXYGEN SATURATION: 97 % | BODY MASS INDEX: 29.88 KG/M2 | HEART RATE: 88 BPM

## 2021-11-08 DIAGNOSIS — J12.82 PNEUMONIA DUE TO COVID-19 VIRUS: Primary | ICD-10-CM

## 2021-11-08 DIAGNOSIS — G40.909 NON-REFRACTORY EPILEPSY (HCC): ICD-10-CM

## 2021-11-08 DIAGNOSIS — Z93.1 G TUBE FEEDINGS (HCC): ICD-10-CM

## 2021-11-08 DIAGNOSIS — F79 MENTALLY DISABLED: ICD-10-CM

## 2021-11-08 DIAGNOSIS — U07.1 PNEUMONIA DUE TO COVID-19 VIRUS: Primary | ICD-10-CM

## 2021-11-08 DIAGNOSIS — G80.9 CEREBRAL PALSY, UNSPECIFIED TYPE (HCC): ICD-10-CM

## 2021-11-08 PROCEDURE — 99213 OFFICE O/P EST LOW 20 MIN: CPT | Performed by: INTERNAL MEDICINE

## 2021-11-08 RX ORDER — GLYCOPYRROLATE 1 MG/1
1 TABLET ORAL 3 TIMES DAILY
Qty: 270 TABLET | Refills: 0 | Status: SHIPPED
Start: 2021-11-08 | End: 2022-04-20 | Stop reason: SDUPTHER

## 2021-11-08 ASSESSMENT — PATIENT HEALTH QUESTIONNAIRE - PHQ9
1. LITTLE INTEREST OR PLEASURE IN DOING THINGS: 0
2. FEELING DOWN, DEPRESSED OR HOPELESS: 0
SUM OF ALL RESPONSES TO PHQ QUESTIONS 1-9: 0
SUM OF ALL RESPONSES TO PHQ9 QUESTIONS 1 & 2: 0
SUM OF ALL RESPONSES TO PHQ QUESTIONS 1-9: 0
SUM OF ALL RESPONSES TO PHQ QUESTIONS 1-9: 0

## 2021-11-08 NOTE — PROGRESS NOTES
Chief Complaint   Patient presents with    Skin Problem     Pt is here as a F/U, from Covid and also has an ulcer to be evaluated on his left heel and drainage from his stoma that needs to be checked. HPI:  Patient is here for follow-up of COVID-19 infection and seizure disorder. Patient is back to normal limits appears in his baseline state of health. Mother denies any problems with breathing or any coughing. She has a little bit of drainage from the PEG tube since he was discharged from the hospital but otherwise doing well. Work from the hospital was reviewed and the mother was advised to get the lab work to follow-up on the liver enzymes and his blood count that was ordered prior. Past Medical History, Surgical History, and Family History has been reviewed and updated.     Review of Systems:  Constitutional:  No fever, no fatigue, no chills, no headaches, no weight change  Dermatology:  No rash, no mole, no dry or sensitive skin  ENT:  No cough, no sore throat, no sinus pain, no runny nose, no ear pain  Cardiology:  No chest pain, no palpitations, no leg edema, no shortness of breath, no PND  Gastroenterology:  No dysphagia, no abdominal pain, no nausea, no vomiting, no constipation, no diarrhea, no heartburn  Musculoskeletal:  No joint pain, no leg cramps, no back pain, no muscle aches  Respiratory:  No shortness of breath, no orthopnea, no wheezing, no GARCIA, no hemoptysis  Urology:  No blood in the urine, no urinary frequency, no urinary incontinence, no urinary urgency, no nocturia, no dysuria    Vitals:    11/08/21 1124   BP: 102/62   Pulse: 88   Temp: 98.9 °F (37.2 °C)   TempSrc: Temporal   SpO2: 97%   Height: 5' (1.524 m)       General:  Patient alert and oriented x 3, NAD, pleasant  HEENT:  Atraumatic, normocephalic, PERRLA, EOMI, clear conjunctiva, TMs clear, nose-clear, throat - no erythema  Neck:  Supple, no goiter, no carotid bruits, no LAD  Lungs:  CTA   Heart:  RRR, no murmurs, gallops or rubs  Abdomen:  Soft/nt/nd, + bowel sounds  Extremities:  No clubbing, cyanosis or edema  Skin: unremarkable    Triglycerides   Date Value Ref Range Status   09/13/2021 180 (H) 0 - 149 mg/dL Final     Sodium   Date Value Ref Range Status   09/18/2021 137 132 - 146 mmol/L Final     Potassium   Date Value Ref Range Status   09/18/2021 3.9 3.5 - 5.0 mmol/L Final     Chloride   Date Value Ref Range Status   09/18/2021 102 98 - 107 mmol/L Final     CO2   Date Value Ref Range Status   09/18/2021 25 22 - 29 mmol/L Final     BUN   Date Value Ref Range Status   09/18/2021 9 6 - 20 mg/dL Final     CREATININE   Date Value Ref Range Status   09/18/2021 0.4 (L) 0.7 - 1.2 mg/dL Final     Glucose   Date Value Ref Range Status   09/18/2021 112 (H) 74 - 99 mg/dL Final     Calcium   Date Value Ref Range Status   09/18/2021 9.5 8.6 - 10.2 mg/dL Final     Total Protein   Date Value Ref Range Status   09/18/2021 7.7 6.4 - 8.3 g/dL Final     Albumin   Date Value Ref Range Status   09/18/2021 3.7 3.5 - 5.2 g/dL Final     Total Bilirubin   Date Value Ref Range Status   09/18/2021 0.4 0.0 - 1.2 mg/dL Final     Alkaline Phosphatase   Date Value Ref Range Status   09/18/2021 213 (H) 40 - 129 U/L Final     AST   Date Value Ref Range Status   09/18/2021 44 (H) 0 - 39 U/L Final     ALT   Date Value Ref Range Status   09/18/2021 83 (H) 0 - 40 U/L Final     GFR Non-   Date Value Ref Range Status   09/18/2021 >60 >=60 mL/min/1.73 Final     Comment:     Chronic Kidney Disease: less than 60 ml/min/1.73 sq.m. Kidney Failure: less than 15 ml/min/1.73 sq.m. Results valid for patients 18 years and older. GFR    Date Value Ref Range Status   09/18/2021 >60  Final        No results found.      Assessment/Plan:      Outpatient Encounter Medications as of 11/8/2021   Medication Sig Dispense Refill    glycopyrrolate (ROBINUL) 1 MG tablet Take 1 tablet by mouth 3 times daily 270 tablet 0    miconazole (ZEASORB-AF) 2 % powder Apply topically      lansoprazole (PREVACID SOLUTAB) 15 MG disintegrating tablet Take 1 tablet by mouth daily 90 tablet 1    lactulose (CHRONULAC) 10 GM/15ML solution Take 15 mLs by mouth daily 450 mL 2    citalopram (CELEXA) 10 MG/5ML solution Take 5 mLs by mouth daily 300 mL 1    baclofen (LIORESAL) 20 MG tablet Take 1 tablet by mouth 3 times daily 270 tablet 1    gentamicin (GENTAK) 0.3 % ophthalmic ointment Apply to eye      nystatin (MYCOSTATIN) 295403 UNIT/GM powder Apply 3 times daily. 1 Bottle 1    acetaminophen (TYLENOL) 160 MG/5ML suspension 19.13 mLs by Per G Tube route every 6 hours as needed for Fever 229.56 mL 0    Polyethylene Glycol 3350 (MIRALAX PO) Take by mouth as needed       lacosamide (VIMPAT) 150 MG TABS tablet Take 150 mg by mouth 2 times daily. Takes two tablets bid      clonazePAM (KLONOPIN) 0.5 MG tablet Take 0.5 mg by mouth 3 times daily as needed for Anxiety 11/2 tab three times daily  Take morning of surgery with a sip of water      lamoTRIgine (LAMICTAL) 200 MG tablet Take 200 mg by mouth 3 times daily.  zonisamide (ZONEGRAN) 100 MG capsule Take 300 mg by mouth nightly       clotrimazole-betamethasone (LOTRISONE) 1-0.05 % cream Apply topically (Patient not taking: Reported on 11/8/2021)      [DISCONTINUED] glycopyrrolate (ROBINUL) 1 MG tablet Pt's mom gives this as she desires       No facility-administered encounter medications on file as of 11/8/2021. Samia Thmopson was seen today for skin problem. Diagnoses and all orders for this visit:    Pneumonia due to COVID-19 virus    Non-refractory epilepsy ([address]    Cerebral palsy, unspecified type ([address]    Mentally disabled    G tube feedings ([address]    Other orders  -     glycopyrrolate (ROBINUL) 1 MG tablet; Take 1 tablet by mouth 3 times daily         There are no Patient Instructions on file for this visit.      On this date 11/8/2021 I have spent 25 minutes reviewing previous notes, test results and face to face with the patient discussing the diagnosis and importance of compliance with the treatment plan as well as documenting on the day of the visit.       Maria Guadalupe Vera MD   11/8/21

## 2022-01-18 ENCOUNTER — TELEPHONE (OUTPATIENT)
Dept: PRIMARY CARE CLINIC | Age: 27
End: 2022-01-18

## 2022-01-18 NOTE — TELEPHONE ENCOUNTER
Pt mother needs letter stating pt is disabled and requires constant care from parent and is unable to work

## 2022-04-09 ENCOUNTER — HOSPITAL ENCOUNTER (EMERGENCY)
Age: 27
Discharge: HOME | End: 2022-04-09
Payer: COMMERCIAL

## 2022-04-09 VITALS
RESPIRATION RATE: 24 BRPM | WEIGHT: 130 LBS | OXYGEN SATURATION: 99 % | TEMPERATURE: 98.4 F | SYSTOLIC BLOOD PRESSURE: 113 MMHG | DIASTOLIC BLOOD PRESSURE: 90 MMHG | HEART RATE: 110 BPM | BODY MASS INDEX: 25.39 KG/M2

## 2022-04-09 DIAGNOSIS — H02.843 SWELLING OF RIGHT EYELID: ICD-10-CM

## 2022-04-09 DIAGNOSIS — L03.90 CELLULITIS, UNSPECIFIED CELLULITIS SITE: Primary | ICD-10-CM

## 2022-04-09 PROCEDURE — 99211 OFF/OP EST MAY X REQ PHY/QHP: CPT

## 2022-04-09 RX ORDER — PREDNISOLONE 15 MG/5 ML
1 SOLUTION, ORAL ORAL DAILY
Qty: 78.8 ML | Refills: 0 | Status: SHIPPED | OUTPATIENT
Start: 2022-04-09 | End: 2022-04-13

## 2022-04-09 RX ORDER — AMOXICILLIN AND CLAVULANATE POTASSIUM 250; 62.5 MG/5ML; MG/5ML
500 POWDER, FOR SUSPENSION ORAL 2 TIMES DAILY
Qty: 200 ML | Refills: 0 | Status: SHIPPED | OUTPATIENT
Start: 2022-04-09 | End: 2022-04-19

## 2022-04-09 RX ORDER — LACTULOSE 10 G/15ML
20 SOLUTION ORAL 3 TIMES DAILY
COMMUNITY
End: 2022-04-20 | Stop reason: SDUPTHER

## 2022-04-09 NOTE — ED PROVIDER NOTES
Department of Emergency [address] [facility]  Provider Note  Admit Date/Time: 2022  2:55 PM  Room:   NAME: Darcie Sam  : 1995  MRN: [id number]     Chief Complaint:  Facial Swelling (right eye, swollen shut, started yesterday, draining,he is on some medication for it because it always drains, gentac)    History of Present Illness        Darcie Sam is a 27 y.o. male who has a past medical history of:   Past Medical History:   Diagnosis Date    Cerebral palsy ([address]     COVID-19     G tube feedings ([address]     age 3    History of blood transfusion     Mental handicap     mom states severe brain damage    Seizures ([address]     tremors  last seizure 2018    presents to the urgent care center by private car for evaluation. He is right eyelid is swollen the eyes almost swollen shut because of the eyelid swelling he also has problems with the right eye because he keeps rubbing his hand over due to his spasticity of his extremities. He has cerebral palsy. He uses ointment called gentech to the eye routinely his mother puts it in but now the whole eyelid has become red and swollen and is almost swollen shut he is not having any trouble breathing hat does not have a fever he does not have any other problems according to the mother he is tube fed. ROS    Pertinent positives and negatives are stated within HPI, all other systems reviewed and are negative.     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 He has never used smokeless tobacco. He reports that he does not drink alcohol and does not use drugs. Family History: family history is not on file. Allergies: Patient has no known allergies. Physical Exam   Oxygen Saturation Interpretation: Normal.   ED Triage Vitals [04/09/22 1456]   BP Temp Temp src Pulse Resp SpO2 Height Weight   (!) 113/90 98.4 °F (36.9 °C) -- 110 24 99 % -- 130 lb (59 kg)       Physical Exam  · Constitutional/General: Alert, in a wheelchair, uncooperative with any assessment has spastic movements of his extremities  · HEENT:  NC/NT. PERRLA, bilateral conjunctiva are clear the right upper eyelid is erythematous and edematous, there is no periorbital tenderness or erythema. · Neck: Supple, full ROM, non tender to palpation in the midline, no stridor, no crepitus, no meningeal signs  · Respiratory: Lungs clear to auscultation bilaterally,  ·   · CV:  Regular rate. Regular rhythm. · GI:  Abdomen Soft, Non tender, feeding tube in place  · Musculoskeletal: In a wheelchair has spastic movements of his arms   · integument: skin warm and dry. No rashes. · Lymphatic: no lymphadenopathy noted  · Neurologic: GCS 15, no focal deficits, symmetric strength 5/5 in the upper and lower extremities bilaterally  · Psychiatric: Normal Affect    Lab / Imaging Results   (All laboratory and radiology results have been personally reviewed by myself)  Labs:  No results found for this visit on 04/09/22. Imaging: All Radiology results interpreted by Radiologist unless otherwise noted. No orders to display       ED Course / Medical Decision Making   Medications - No data to display       Consult(s):   None      MDM:   Right upper eyelid is extremely swollen and erythematous. I am not sure if he got bit by something or if as  his mother thinks it could just be because of his spastic movements that he keeps bumping  himself around the eye.   I did put him on some Orapred for inflammation and also some Augmentin if any worsening he should get reevaluated. Assessment      1. Cellulitis, unspecified cellulitis site    2. Swelling of right eyelid      Plan   Discharge to home and advised to contact Nya Vallejo MD  [place] [id number] [phone]    Schedule an appointment as soon as possible for a visit      Patient condition is good    New Medications     New Prescriptions    AMOXICILLIN-CLAVULANATE (AUGMENTIN) 250-62.5 MG/5ML SUSPENSION    Take 10 mLs by mouth 2 times daily for 10 days    PREDNISOLONE (PRELONE) 15 MG/5ML SYRUP    Take 19.7 mLs by mouth daily for 4 days     Electronically signed by MINREVA Miguel CNP   DD: 4/9/22  **This report was transcribed using voice recognition software. Every effort was made to ensure accuracy; however, inadvertent computerized transcription errors may be present.   END OF ED PROVIDER NOTE     MINERVA Miguel CNP  04/09/22 1527

## 2022-04-20 ENCOUNTER — OFFICE VISIT (OUTPATIENT)
Dept: PRIMARY CARE CLINIC | Age: 27
End: 2022-04-20
Payer: COMMERCIAL

## 2022-04-20 VITALS
SYSTOLIC BLOOD PRESSURE: 118 MMHG | BODY MASS INDEX: 25.52 KG/M2 | TEMPERATURE: 97.6 F | WEIGHT: 130 LBS | DIASTOLIC BLOOD PRESSURE: 74 MMHG | HEIGHT: 60 IN | OXYGEN SATURATION: 93 % | HEART RATE: 90 BPM

## 2022-04-20 DIAGNOSIS — K21.9 GASTROESOPHAGEAL REFLUX DISEASE WITHOUT ESOPHAGITIS: ICD-10-CM

## 2022-04-20 DIAGNOSIS — D50.9 IRON DEFICIENCY ANEMIA, UNSPECIFIED IRON DEFICIENCY ANEMIA TYPE: ICD-10-CM

## 2022-04-20 DIAGNOSIS — R56.9 SEIZURES (HCC): Primary | ICD-10-CM

## 2022-04-20 DIAGNOSIS — F41.9 ANXIETY: ICD-10-CM

## 2022-04-20 PROCEDURE — 99213 OFFICE O/P EST LOW 20 MIN: CPT | Performed by: INTERNAL MEDICINE

## 2022-04-20 RX ORDER — LACTULOSE 10 G/15ML
20 SOLUTION ORAL 3 TIMES DAILY
Qty: 236 ML | Refills: 0 | Status: SHIPPED
Start: 2022-04-20 | End: 2022-05-05 | Stop reason: SDUPTHER

## 2022-04-20 RX ORDER — BACLOFEN 20 MG/1
20 TABLET ORAL 3 TIMES DAILY
Qty: 270 TABLET | Refills: 1 | Status: SHIPPED
Start: 2022-04-20 | End: 2022-09-27 | Stop reason: SDUPTHER

## 2022-04-20 RX ORDER — CITALOPRAM HYDROBROMIDE 20 MG/10ML
10 SOLUTION, ORAL ORAL DAILY
Qty: 300 ML | Refills: 1 | Status: SHIPPED
Start: 2022-04-20 | End: 2022-09-27 | Stop reason: SDUPTHER

## 2022-04-20 RX ORDER — GLYCOPYRROLATE 1 MG/1
1 TABLET ORAL 3 TIMES DAILY
Qty: 270 TABLET | Refills: 0 | Status: SHIPPED
Start: 2022-04-20 | End: 2022-08-29 | Stop reason: SDUPTHER

## 2022-04-20 NOTE — PROGRESS NOTES
Chief Complaint   Patient presents with    6 Month Follow-Up     Pt is here as a 6 month F/U, and also an Urgent Care vs.F/U,  for right eye edema with redness, and pt's mom was infomed pt had Cellulitis. Pt was treated with Augmentin and developed a rash, at that time mom discontinued the medication.  ED Follow-up       HPI:  Patient is here for follow-up of cerebral palsy and abnormal LFTs. Also patient has been to the urgent care about 10 days ago with right thigh swelling cellulitis and erythema. He was placed on Augmentin at that time and he developed a rash all over his body so it was discontinued. His mom has been using gentamicin eyedrops and that has been helping him. Cellulitis and erythema completely resolved. Patient keeps banging his hand against his face. No blood work is available since September. He had a very fine tremors on his left hand during the exam the patient will contact his neurologist to increase his clonazepam.  Plan he has been in a good state of health without any newly developed problems. Past Medical History, Surgical History, and Family History has been reviewed and updated.     Review of Systems:  Constitutional:  No fever, no fatigue, no chills, no headaches, no weight change  Dermatology:  No rash, no mole, no dry or sensitive skin  ENT:  No cough, no sore throat, no sinus pain, no runny nose, no ear pain  Cardiology:  No chest pain, no palpitations, no leg edema, no shortness of breath, no PND  Gastroenterology:  No dysphagia, no abdominal pain, no nausea, no vomiting, no constipation, no diarrhea, no heartburn  Musculoskeletal:  No joint pain, no leg cramps, no back pain, no muscle aches  Respiratory:  No shortness of breath, no orthopnea, no wheezing, no GARCIA, no hemoptysis  Urology:  No blood in the urine, no urinary frequency, no urinary incontinence, no urinary urgency, no nocturia, no dysuria    Vitals:    04/20/22 0914   BP: 118/74   Pulse: 90   Temp: 97.6 °F (36.4 °C)   TempSrc: Temporal   SpO2: 93%   Weight: 130 lb (59 kg)   Height: 5' (1.524 m)       General:  Patient alert and oriented x 3, NAD, wheelchair-bound with cerebral palsy  HEENT:  Atraumatic, normocephalic, , clear conjunctiva, TMs clear, nose-clear, throat - no erythema  Neck:  Supple, no goiter, no carotid bruits, no LAD  Lungs:  CTA   Heart:  RRR, no murmurs, gallops or rubs  Abdomen:  Soft/nt/nd, + bowel sounds  Extremities:  No clubbing, cyanosis or edema  Skin: unremarkable    Triglycerides   Date Value Ref Range Status   09/13/2021 180 (H) 0 - 149 mg/dL Final     Sodium   Date Value Ref Range Status   09/18/2021 137 132 - 146 mmol/L Final     Potassium   Date Value Ref Range Status   09/18/2021 3.9 3.5 - 5.0 mmol/L Final     Chloride   Date Value Ref Range Status   09/18/2021 102 98 - 107 mmol/L Final     CO2   Date Value Ref Range Status   09/18/2021 25 22 - 29 mmol/L Final     BUN   Date Value Ref Range Status   09/18/2021 9 6 - 20 mg/dL Final     CREATININE   Date Value Ref Range Status   09/18/2021 0.4 (L) 0.7 - 1.2 mg/dL Final     Glucose   Date Value Ref Range Status   09/18/2021 112 (H) 74 - 99 mg/dL Final     Calcium   Date Value Ref Range Status   09/18/2021 9.5 8.6 - 10.2 mg/dL Final     Total Protein   Date Value Ref Range Status   09/18/2021 7.7 6.4 - 8.3 g/dL Final     Albumin   Date Value Ref Range Status   09/18/2021 3.7 3.5 - 5.2 g/dL Final     Total Bilirubin   Date Value Ref Range Status   09/18/2021 0.4 0.0 - 1.2 mg/dL Final     Alkaline Phosphatase   Date Value Ref Range Status   09/18/2021 213 (H) 40 - 129 U/L Final     AST   Date Value Ref Range Status   09/18/2021 44 (H) 0 - 39 U/L Final     ALT   Date Value Ref Range Status   09/18/2021 83 (H) 0 - 40 U/L Final     GFR Non-   Date Value Ref Range Status   09/18/2021 >60 >=60 mL/min/1.73 Final     Comment:     Chronic Kidney Disease: less than 60 ml/min/1.73 sq.m.           Kidney Failure: less than 15 ml/min/1.73 sq.m.  Results valid for patients 18 years and older. GFR    Date Value Ref Range Status   09/18/2021 >60  Final        No results found. Assessment/Plan:      Outpatient Encounter Medications as of 4/20/2022   Medication Sig Dispense Refill    baclofen (LIORESAL) 20 MG tablet Take 1 tablet by mouth 3 times daily 270 tablet 1    citalopram (CELEXA) 10 MG/5ML solution Take 5 mLs by mouth daily 300 mL 1    glycopyrrolate (ROBINUL) 1 MG tablet Take 1 tablet by mouth 3 times daily 270 tablet 0    lansoprazole (PREVACID SOLUTAB) 15 MG disintegrating tablet Take 1 tablet by mouth daily 90 tablet 1    lactulose (CHRONULAC) 10 GM/15ML solution Take 30 mLs by mouth 3 times daily 236 mL 0    clotrimazole-betamethasone (LOTRISONE) 1-0.05 % cream Apply topically       miconazole (ZEASORB-AF) 2 % powder Apply topically      gentamicin (GENTAK) 0.3 % ophthalmic ointment Apply to eye      nystatin (MYCOSTATIN) 092085 UNIT/GM powder Apply 3 times daily. 1 Bottle 1    acetaminophen (TYLENOL) 160 MG/5ML suspension 19.13 mLs by Per G Tube route every 6 hours as needed for Fever 229.56 mL 0    Polyethylene Glycol 3350 (MIRALAX PO) Take by mouth as needed       lacosamide (VIMPAT) 150 MG TABS tablet Take 150 mg by mouth 2 times daily. Takes two tablets bid      clonazePAM (KLONOPIN) 0.5 MG tablet Take 0.5 mg by mouth 3 times daily as needed for Anxiety 11/2 tab three times daily  Take morning of surgery with a sip of water      lamoTRIgine (LAMICTAL) 200 MG tablet Take 200 mg by mouth 3 times daily.       zonisamide (ZONEGRAN) 100 MG capsule Take 300 mg by mouth nightly       [DISCONTINUED] lactulose (CHRONULAC) 10 GM/15ML solution Take 20 g by mouth 3 times daily      [DISCONTINUED] glycopyrrolate (ROBINUL) 1 MG tablet Take 1 tablet by mouth 3 times daily 270 tablet 0    [DISCONTINUED] lansoprazole (PREVACID SOLUTAB) 15 MG disintegrating tablet Take 1 tablet by mouth daily 90 tablet 1    [DISCONTINUED] citalopram (CELEXA) 10 MG/5ML solution Take 5 mLs by mouth daily 300 mL 1    [DISCONTINUED] baclofen (LIORESAL) 20 MG tablet Take 1 tablet by mouth 3 times daily 270 tablet 1     No facility-administered encounter medications on file as of 4/20/2022. Barry Screen was seen today for 6 month follow-up and ed follow-up. Diagnoses and all orders for this visit:    Seizures ([address]  -     baclofen (LIORESAL) 20 MG tablet; Take 1 tablet by mouth 3 times daily    Anxiety  -     citalopram (CELEXA) 10 MG/5ML solution; Take 5 mLs by mouth daily    Gastroesophageal reflux disease without esophagitis  -     lansoprazole (PREVACID SOLUTAB) 15 MG disintegrating tablet; Take 1 tablet by mouth daily    Iron deficiency anemia, unspecified iron deficiency anemia type  -     CBC with Auto Differential; Future  -     Comprehensive Metabolic Panel; Future  -     Vitamin D 25 Hydroxy; Future  -     Ferritin; Future  -     Iron and TIBC; Future    Other orders  -     glycopyrrolate (ROBINUL) 1 MG tablet; Take 1 tablet by mouth 3 times daily  -     lactulose (CHRONULAC) 10 GM/15ML solution; Take 30 mLs by mouth 3 times daily         There are no Patient Instructions on file for this visit. On this date 4/20/2022 I have spent 25 minutes reviewing previous notes, test results and face to face with the patient discussing the diagnosis and importance of compliance with the treatment plan as well as documenting on the day of the visit.       Brandy Kiser MD   4/20/22

## 2022-05-05 RX ORDER — LACTULOSE 10 G/15ML
20 SOLUTION ORAL 3 TIMES DAILY
Qty: 236 ML | Refills: 0 | Status: SHIPPED
Start: 2022-05-05 | End: 2022-09-27 | Stop reason: SDUPTHER

## 2022-08-29 RX ORDER — GLYCOPYRROLATE 1 MG/1
1 TABLET ORAL 3 TIMES DAILY
Qty: 270 TABLET | Refills: 0 | Status: SHIPPED
Start: 2022-08-29 | End: 2022-09-27 | Stop reason: SDUPTHER

## 2022-09-06 ENCOUNTER — HOSPITAL ENCOUNTER (OUTPATIENT)
Age: 27
Discharge: HOME | End: 2022-09-06

## 2022-09-06 DIAGNOSIS — D50.9 IRON DEFICIENCY ANEMIA, UNSPECIFIED IRON DEFICIENCY ANEMIA TYPE: ICD-10-CM

## 2022-09-20 DIAGNOSIS — R56.9 SEIZURES (HCC): ICD-10-CM

## 2022-09-20 DIAGNOSIS — R56.9 SEIZURES (HCC): Primary | ICD-10-CM

## 2022-09-20 LAB
ALBUMIN SERPL-MCNC: 4.3 G/DL (ref 3.5–5.2)
ALP BLD-CCNC: 145 U/L (ref 40–129)
ALT SERPL-CCNC: 65 U/L (ref 0–40)
ANION GAP SERPL CALCULATED.3IONS-SCNC: 13 MMOL/L (ref 7–16)
AST SERPL-CCNC: 41 U/L (ref 0–39)
BILIRUB SERPL-MCNC: 0.7 MG/DL (ref 0–1.2)
BUN BLDV-MCNC: 12 MG/DL (ref 6–20)
CALCIUM SERPL-MCNC: 9.8 MG/DL (ref 8.6–10.2)
CHLORIDE BLD-SCNC: 105 MMOL/L (ref 98–107)
CO2: 20 MMOL/L (ref 22–29)
CREAT SERPL-MCNC: 0.6 MG/DL (ref 0.7–1.2)
GFR AFRICAN AMERICAN: >60
GFR NON-AFRICAN AMERICAN: >60 ML/MIN/1.73
GLUCOSE BLD-MCNC: 82 MG/DL (ref 74–99)
MAGNESIUM: 2.7 MG/DL (ref 1.6–2.6)
PHOSPHORUS: 3.9 MG/DL (ref 2.5–4.5)
POTASSIUM SERPL-SCNC: 4.5 MMOL/L (ref 3.5–5)
SODIUM BLD-SCNC: 138 MMOL/L (ref 132–146)
TOTAL PROTEIN: 7.6 G/DL (ref 6.4–8.3)

## 2022-09-21 LAB
BASOPHILS ABSOLUTE: 0.05 E9/L (ref 0–0.2)
BASOPHILS RELATIVE PERCENT: 1 % (ref 0–2)
EOSINOPHILS ABSOLUTE: 0.32 E9/L (ref 0.05–0.5)
EOSINOPHILS RELATIVE PERCENT: 6.4 % (ref 0–6)
HCT VFR BLD CALC: 47.5 % (ref 37–54)
HEMOGLOBIN: 15.6 G/DL (ref 12.5–16.5)
IMMATURE GRANULOCYTES #: 0.02 E9/L
IMMATURE GRANULOCYTES %: 0.4 % (ref 0–5)
LYMPHOCYTES ABSOLUTE: 1.71 E9/L (ref 1.5–4)
LYMPHOCYTES RELATIVE PERCENT: 34.3 % (ref 20–42)
MCH RBC QN AUTO: 32.4 PG (ref 26–35)
MCHC RBC AUTO-ENTMCNC: 32.8 % (ref 32–34.5)
MCV RBC AUTO: 98.5 FL (ref 80–99.9)
MONOCYTES ABSOLUTE: 0.5 E9/L (ref 0.1–0.95)
MONOCYTES RELATIVE PERCENT: 10 % (ref 2–12)
NEUTROPHILS ABSOLUTE: 2.39 E9/L (ref 1.8–7.3)
NEUTROPHILS RELATIVE PERCENT: 47.9 % (ref 43–80)
PDW BLD-RTO: 12.8 FL (ref 11.5–15)
PLATELET # BLD: 173 E9/L (ref 130–450)
PMV BLD AUTO: 14.3 FL (ref 7–12)
RBC # BLD: 4.82 E12/L (ref 3.8–5.8)
WBC # BLD: 5 E9/L (ref 4.5–11.5)

## 2022-09-27 ENCOUNTER — OFFICE VISIT (OUTPATIENT)
Dept: PRIMARY CARE CLINIC | Age: 27
End: 2022-09-27
Payer: COMMERCIAL

## 2022-09-27 VITALS
TEMPERATURE: 97.1 F | HEIGHT: 60 IN | BODY MASS INDEX: 25.52 KG/M2 | SYSTOLIC BLOOD PRESSURE: 128 MMHG | HEART RATE: 100 BPM | DIASTOLIC BLOOD PRESSURE: 72 MMHG | WEIGHT: 130 LBS | OXYGEN SATURATION: 94 %

## 2022-09-27 DIAGNOSIS — Z00.00 ENCOUNTER FOR WELL ADULT EXAM WITHOUT ABNORMAL FINDINGS: Primary | ICD-10-CM

## 2022-09-27 DIAGNOSIS — F41.9 ANXIETY: ICD-10-CM

## 2022-09-27 DIAGNOSIS — K21.9 GASTROESOPHAGEAL REFLUX DISEASE WITHOUT ESOPHAGITIS: ICD-10-CM

## 2022-09-27 DIAGNOSIS — R79.89 ABNORMAL LIVER FUNCTION TESTS: ICD-10-CM

## 2022-09-27 DIAGNOSIS — R56.9 SEIZURES (HCC): ICD-10-CM

## 2022-09-27 PROCEDURE — 99214 OFFICE O/P EST MOD 30 MIN: CPT | Performed by: INTERNAL MEDICINE

## 2022-09-27 RX ORDER — MICONAZOLE NITRATE 20.6 MG/G
POWDER TOPICAL PRN
Qty: 45 G | Refills: 1 | Status: SHIPPED | OUTPATIENT
Start: 2022-09-27

## 2022-09-27 RX ORDER — BACLOFEN 20 MG/1
20 TABLET ORAL 3 TIMES DAILY
Qty: 270 TABLET | Refills: 1 | Status: SHIPPED | OUTPATIENT
Start: 2022-09-27

## 2022-09-27 RX ORDER — NYSTATIN 100000 [USP'U]/G
POWDER TOPICAL
Qty: 1 EACH | Refills: 1 | Status: SHIPPED | OUTPATIENT
Start: 2022-09-27

## 2022-09-27 RX ORDER — LACTULOSE 10 G/15ML
20 SOLUTION ORAL 3 TIMES DAILY
Qty: 236 ML | Refills: 0 | Status: SHIPPED | OUTPATIENT
Start: 2022-09-27

## 2022-09-27 RX ORDER — CITALOPRAM HYDROBROMIDE 20 MG/10ML
10 SOLUTION, ORAL ORAL DAILY
Qty: 300 ML | Refills: 1 | Status: SHIPPED | OUTPATIENT
Start: 2022-09-27

## 2022-09-27 RX ORDER — GLYCOPYRROLATE 1 MG/1
1 TABLET ORAL 3 TIMES DAILY
Qty: 270 TABLET | Refills: 0 | Status: SHIPPED | OUTPATIENT
Start: 2022-09-27 | End: 2022-12-26

## 2022-09-27 SDOH — ECONOMIC STABILITY: FOOD INSECURITY: WITHIN THE PAST 12 MONTHS, THE FOOD YOU BOUGHT JUST DIDN'T LAST AND YOU DIDN'T HAVE MONEY TO GET MORE.: NEVER TRUE

## 2022-09-27 SDOH — ECONOMIC STABILITY: FOOD INSECURITY: WITHIN THE PAST 12 MONTHS, YOU WORRIED THAT YOUR FOOD WOULD RUN OUT BEFORE YOU GOT MONEY TO BUY MORE.: NEVER TRUE

## 2022-09-27 ASSESSMENT — PATIENT HEALTH QUESTIONNAIRE - PHQ9
SUM OF ALL RESPONSES TO PHQ QUESTIONS 1-9: 0
2. FEELING DOWN, DEPRESSED OR HOPELESS: 0
SUM OF ALL RESPONSES TO PHQ9 QUESTIONS 1 & 2: 0
SUM OF ALL RESPONSES TO PHQ QUESTIONS 1-9: 0
SUM OF ALL RESPONSES TO PHQ QUESTIONS 1-9: 0
1. LITTLE INTEREST OR PLEASURE IN DOING THINGS: 0
SUM OF ALL RESPONSES TO PHQ QUESTIONS 1-9: 0

## 2022-09-27 ASSESSMENT — ANXIETY QUESTIONNAIRES
4. TROUBLE RELAXING: 0
2. NOT BEING ABLE TO STOP OR CONTROL WORRYING: 0
7. FEELING AFRAID AS IF SOMETHING AWFUL MIGHT HAPPEN: 0
1. FEELING NERVOUS, ANXIOUS, OR ON EDGE: 0
3. WORRYING TOO MUCH ABOUT DIFFERENT THINGS: 0
IF YOU CHECKED OFF ANY PROBLEMS ON THIS QUESTIONNAIRE, HOW DIFFICULT HAVE THESE PROBLEMS MADE IT FOR YOU TO DO YOUR WORK, TAKE CARE OF THINGS AT HOME, OR GET ALONG WITH OTHER PEOPLE: NOT DIFFICULT AT ALL
6. BECOMING EASILY ANNOYED OR IRRITABLE: 0

## 2022-09-27 ASSESSMENT — SOCIAL DETERMINANTS OF HEALTH (SDOH): HOW HARD IS IT FOR YOU TO PAY FOR THE VERY BASICS LIKE FOOD, HOUSING, MEDICAL CARE, AND HEATING?: NOT HARD AT ALL

## 2022-09-27 NOTE — PROGRESS NOTES
Well Adult Note  Name: Vesta Chiu Date: 2022   MRN: [id number] Sex: Male   Age: 27 y.o. Ethnicity: Non- / Non    : 1995 Race: White (non-)      Fatoumata Maciel is here for well adult exam.  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 He is under the care of of neurologist in [facility]. He is doing well without any new complaints according to his mom. He is wheelchair-bound and not communicative. He appears to be well taking care of with no rashes on the body or any lacerations. He has some fine tremors that were noted today. We have to fill the probate court paperwork for his caregiver    Review of Systems    Allergies   Allergen Reactions    Augmentin [Amoxicillin-Pot Clavulanate] Rash         Prior to Visit Medications    Medication Sig Taking? Authorizing Provider   glycopyrrolate (ROBINUL) 1 MG tablet Take 1 tablet by mouth 3 times daily Yes Debbie Rodriguez MD   lactulose (CHRONULAC) 10 GM/15ML solution Take 30 mLs by mouth 3 times daily Yes Debbie Rodriguez MD   baclofen (LIORESAL) 20 MG tablet Take 1 tablet by mouth 3 times daily Yes Kevin Wilson MD   citalopram (CELEXA) 10 MG/5ML solution Take 5 mLs by mouth daily Yes Kevin Wilson MD   lansoprazole (PREVACID SOLUTAB) 15 MG disintegrating tablet Take 1 tablet by mouth daily Yes Kevin Wilson MD   clotrimazole-betamethasone (LOTRISONE) 1-0.05 % cream Apply topically  Yes Historical Provider, MD   miconazole (ZEASORB-AF) 2 % powder Apply topically Yes Historical Provider, MD   gentamicin (GENTAK) 0.3 % ophthalmic ointment Place 0.5 inches into both eyes daily as needed Yes Historical Provider, MD   nystatin (MYCOSTATIN) 684370 UNIT/GM powder Apply 3 times daily.  Yes Kevin Wilson MD   acetaminophen (TYLENOL) 160 MG/5ML suspension 19.13 mLs by Per G Tube route every 6 hours as needed for Fever Yes Pamela Hemphill DDS   Polyethylene Glycol 3350 (MIRALAX PO) Take by mouth as needed  Yes Historical Provider, MD   lacosamide (VIMPAT) 150 MG TABS tablet Take 150 mg by mouth 2 times daily. Takes two tablets bid Yes Historical Provider, MD   clonazePAM (KLONOPIN) 0.5 MG tablet Take 0.5 mg by mouth 3 times daily as needed for Anxiety 11/2 tab three times daily  Take morning of surgery with a sip of water Yes Historical Provider, MD   lamoTRIgine (LAMICTAL) 200 MG tablet Take 200 mg by mouth 3 times daily. Yes Historical Provider, MD   zonisamide (ZONEGRAN) 100 MG capsule Take 300 mg by mouth nightly  Yes Historical Provider, MD         Past Medical History:   Diagnosis Date    Cerebral palsy ([facility]     COVID-19     G tube feedings [facility])     age 3    History of blood transfusion     Mental handicap     mom states severe brain damage    Seizures ([facility])     tremors  last seizure 8/8/2018       Past Surgical History:   Procedure Laterality Date    DENTAL SURGERY  03/26/2015    xrays, restorations and extractions x1    DENTAL SURGERY  12/08/2016    DENTAL SURGERY N/A 09/04/2020    EXAM, RADIOGRAPHS, PROPHYLAXIS, DENTAL RESTORATIONS performed by Apple Maloney DDS at [address] [place] [address]  09/04/2020    LEG TENDON SURGERY Bilateral     [place] BIOPSY, EACH ADDED LESION Right 03/26/2018    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 Social History     Tobacco Use    Smoking status: Never    Smokeless tobacco: Never   Vaping Use    Vaping Use: Never used   Substance Use Topics    Alcohol use: No    Drug use: No       Objective   /72   Pulse 100   Temp 97.1 °F (36.2 °C) (Temporal)   Ht 5' (1.524 m)   Wt 130 lb (59 kg)   SpO2 94%   BMI 25.39 kg/m²   Wt Readings from Last 3 Encounters:   09/27/22 130 lb (59 kg)   04/20/22 130 lb (59 kg)   04/09/22 130 lb (59 kg)     There were no vitals filed for this visit.       Physical Exam  HEENT normocephalic atraumatic  Lungs clear to auscultation bilaterally  Cardiovascular system S1 and S2 with regular rate and rhythm  Abdomen soft with positive bowel sounds with feeding tube in place  Extremities with no cyanosis clubbing or edema  Skin appears dry and intact    Assessment   Plan   1. Seizures ([address]  The following orders have not been finalized:  -     baclofen (LIORESAL) 20 MG tablet  2. Anxiety  The following orders have not been finalized:  -     citalopram (CELEXA) 10 MG/5ML solution  3.  Gastroesophageal reflux disease without esophagitis  The following orders have not been finalized:  -     lansoprazole (PREVACID SOLUTAB) 15 MG disintegrating tablet         Personalized Preventive Plan   Current Health Maintenance Status  Immunization History   Administered Date(s) Administered    DT (pediatric) 08/24/1999    DTP 01/26/1994, 03/30/1994, 06/22/1994    DTaP (Infanrix) 11/28/1995    Hepatitis A Ped/Adol (Havrix, Vaqta) 06/18/2007, 06/20/2008    Hepatitis B Ped/Adol (Engerix-B, Recombivax HB) 1995, 01/05/1994, 06/22/1994    Hib vaccine 01/26/1994, 03/30/1994, 06/22/1994, 12/14/1994    Influenza A (N1F5-68) Vaccine PF IM 11/12/2009    Influenza Virus Vaccine 11/01/2005, 09/15/2011, 11/20/2013    MMR 12/14/1994, 08/24/1999    Meningococcal MCV4P (Menactra) 06/09/2005, 09/15/2011    Polio IPV (IPOL) 08/24/1999    Polio OPV 01/26/1994, 03/30/1994, 06/22/1994    Tdap (Boostrix, Adacel) 06/09/2005, 08/29/2019    Varicella (Varivax) 04/29/1997, 06/18/2007        Health Maintenance   Topic Date Due    COVID-19 Vaccine (1) Never done    HIV screen  Never done    Hepatitis C screen  Never done    Flu vaccine (1) 08/01/2022    Depression Screen  11/08/2022    DTaP/Tdap/Td vaccine (8 - Td or Tdap) 08/29/2029    Hepatitis A vaccine  Completed    Hepatitis B vaccine  Completed    Hib vaccine  Completed    Varicella vaccine  Completed    Meningococcal (ACWY) vaccine  Completed    Pneumococcal 0-64 years Vaccine  Aged Out     Recommendations for [employer] Due: see orders and patient instructions/AVS.    No follow-ups on file.

## 2022-12-28 ENCOUNTER — TELEPHONE (OUTPATIENT)
Dept: PRIMARY CARE CLINIC | Age: 27
End: 2022-12-28

## 2022-12-28 NOTE — TELEPHONE ENCOUNTER
PT'S MOTHER CALLED THEY NEED A LETTER STATING ELIZABETH CANNOT DRINK ALCOHOL OR TAKE ANYTHING BY MOUTH.

## 2023-02-20 RX ORDER — GLYCOPYRROLATE 1 MG/1
1 TABLET ORAL 3 TIMES DAILY
Qty: 90 TABLET | Refills: 0 | OUTPATIENT
Start: 2023-02-20 | End: 2023-05-21

## 2023-03-02 RX ORDER — NUTRITIONAL SUPPLEMENT 0.04G-1/ML
LIQUID (ML) ORAL
Qty: 8 EACH | Refills: 0 | Status: SHIPPED | OUTPATIENT
Start: 2023-03-02

## 2023-03-23 ENCOUNTER — OFFICE VISIT (OUTPATIENT)
Dept: PRIMARY CARE CLINIC | Age: 28
End: 2023-03-23
Payer: COMMERCIAL

## 2023-03-23 VITALS
HEART RATE: 86 BPM | BODY MASS INDEX: 27.09 KG/M2 | TEMPERATURE: 97.1 F | DIASTOLIC BLOOD PRESSURE: 74 MMHG | SYSTOLIC BLOOD PRESSURE: 112 MMHG | WEIGHT: 138 LBS | OXYGEN SATURATION: 97 % | HEIGHT: 60 IN

## 2023-03-23 DIAGNOSIS — F79 MENTALLY DISABLED: ICD-10-CM

## 2023-03-23 DIAGNOSIS — A08.4 VIRAL GASTROENTERITIS: ICD-10-CM

## 2023-03-23 DIAGNOSIS — G80.9 CEREBRAL PALSY, UNSPECIFIED TYPE (HCC): Primary | ICD-10-CM

## 2023-03-23 DIAGNOSIS — F41.9 ANXIETY: ICD-10-CM

## 2023-03-23 DIAGNOSIS — R79.89 ABNORMAL LIVER FUNCTION TESTS: ICD-10-CM

## 2023-03-23 DIAGNOSIS — R56.9 SEIZURES (HCC): ICD-10-CM

## 2023-03-23 DIAGNOSIS — Z11.59 NEED FOR HEPATITIS C SCREENING TEST: ICD-10-CM

## 2023-03-23 PROCEDURE — 99213 OFFICE O/P EST LOW 20 MIN: CPT | Performed by: INTERNAL MEDICINE

## 2023-03-23 RX ORDER — LACTULOSE 10 G/15ML
SOLUTION ORAL
Qty: 946 ML | Refills: 0 | Status: SHIPPED | OUTPATIENT
Start: 2023-03-23

## 2023-03-23 RX ORDER — CITALOPRAM HYDROBROMIDE 20 MG/10ML
10 SOLUTION, ORAL ORAL DAILY
Qty: 300 ML | Refills: 1 | Status: SHIPPED | OUTPATIENT
Start: 2023-03-23

## 2023-03-23 RX ORDER — BACLOFEN 20 MG/1
20 TABLET ORAL 3 TIMES DAILY
Qty: 270 TABLET | Refills: 1 | Status: SHIPPED | OUTPATIENT
Start: 2023-03-23

## 2023-03-23 SDOH — ECONOMIC STABILITY: FOOD INSECURITY: WITHIN THE PAST 12 MONTHS, YOU WORRIED THAT YOUR FOOD WOULD RUN OUT BEFORE YOU GOT MONEY TO BUY MORE.: NEVER TRUE

## 2023-03-23 SDOH — ECONOMIC STABILITY: INCOME INSECURITY: HOW HARD IS IT FOR YOU TO PAY FOR THE VERY BASICS LIKE FOOD, HOUSING, MEDICAL CARE, AND HEATING?: NOT HARD AT ALL

## 2023-03-23 SDOH — ECONOMIC STABILITY: HOUSING INSECURITY
IN THE LAST 12 MONTHS, WAS THERE A TIME WHEN YOU DID NOT HAVE A STEADY PLACE TO SLEEP OR SLEPT IN A SHELTER (INCLUDING NOW)?: NO

## 2023-03-23 SDOH — ECONOMIC STABILITY: FOOD INSECURITY: WITHIN THE PAST 12 MONTHS, THE FOOD YOU BOUGHT JUST DIDN'T LAST AND YOU DIDN'T HAVE MONEY TO GET MORE.: NEVER TRUE

## 2023-03-23 ASSESSMENT — ANXIETY QUESTIONNAIRES
4. TROUBLE RELAXING: 0
GAD7 TOTAL SCORE: 0
6. BECOMING EASILY ANNOYED OR IRRITABLE: 0
7. FEELING AFRAID AS IF SOMETHING AWFUL MIGHT HAPPEN: 0
2. NOT BEING ABLE TO STOP OR CONTROL WORRYING: 0
1. FEELING NERVOUS, ANXIOUS, OR ON EDGE: 0
3. WORRYING TOO MUCH ABOUT DIFFERENT THINGS: 0
5. BEING SO RESTLESS THAT IT IS HARD TO SIT STILL: 0
IF YOU CHECKED OFF ANY PROBLEMS ON THIS QUESTIONNAIRE, HOW DIFFICULT HAVE THESE PROBLEMS MADE IT FOR YOU TO DO YOUR WORK, TAKE CARE OF THINGS AT HOME, OR GET ALONG WITH OTHER PEOPLE: NOT DIFFICULT AT ALL

## 2023-03-23 ASSESSMENT — PATIENT HEALTH QUESTIONNAIRE - PHQ9
SUM OF ALL RESPONSES TO PHQ9 QUESTIONS 1 & 2: 0
SUM OF ALL RESPONSES TO PHQ QUESTIONS 1-9: 0
SUM OF ALL RESPONSES TO PHQ QUESTIONS 1-9: 0
1. LITTLE INTEREST OR PLEASURE IN DOING THINGS: 0
SUM OF ALL RESPONSES TO PHQ QUESTIONS 1-9: 0
SUM OF ALL RESPONSES TO PHQ QUESTIONS 1-9: 0
2. FEELING DOWN, DEPRESSED OR HOPELESS: 0

## 2023-03-23 NOTE — PROGRESS NOTES
valid for patients 18 years and older. GFR    Date Value Ref Range Status   09/20/2022 >60  Final        No results found. Assessment/Plan:      Outpatient Encounter Medications as of 3/23/2023   Medication Sig Dispense Refill    baclofen (LIORESAL) 20 MG tablet Take 1 tablet by mouth 3 times daily 270 tablet 1    citalopram (CELEXA) 10 MG/5ML solution Take 5 mLs by mouth daily 300 mL 1    lactulose (CHRONULAC) 10 GM/15ML solution 15 ml daily 946 mL 0    Nutritional Supplements (NUTREN 1.0) LIQD 250 mL per container. Patient uses 6/day  Dispense 8 cases 8 each 0    glycopyrrolate (ROBINUL) 1 MG tablet Take 1 tablet by mouth 3 times daily 270 tablet 0    lansoprazole (PREVACID SOLUTAB) 15 MG disintegrating tablet Take 1 tablet by mouth daily 90 tablet 1    nystatin (MYCOSTATIN) 246742 UNIT/GM powder Apply 3 times daily. 1 each 1    miconazole (ZEASORB-AF) 2 % powder Apply topically as needed for Itching 45 g 1    clotrimazole-betamethasone (LOTRISONE) 1-0.05 % cream Apply topically       gentamicin (GENTAK) 0.3 % ophthalmic ointment Place 0.5 inches into both eyes daily as needed      acetaminophen (TYLENOL) 160 MG/5ML suspension 19.13 mLs by Per G Tube route every 6 hours as needed for Fever 229.56 mL 0    Polyethylene Glycol 3350 (MIRALAX PO) Take by mouth as needed       lacosamide (VIMPAT) 150 MG TABS tablet Take 150 mg by mouth 2 times daily. Takes two tablets bid      clonazePAM (KLONOPIN) 0.5 MG tablet Take 0.5 mg by mouth 3 times daily as needed for Anxiety 11/2 tab three times daily  Take morning of surgery with a sip of water      lamoTRIgine (LAMICTAL) 200 MG tablet Take 200 mg by mouth 3 times daily.       zonisamide (ZONEGRAN) 100 MG capsule Take 300 mg by mouth nightly       [DISCONTINUED] baclofen (LIORESAL) 20 MG tablet Take 1 tablet by mouth 3 times daily 270 tablet 1    [DISCONTINUED] citalopram (CELEXA) 10 MG/5ML solution Take 5 mLs by mouth daily 300 mL 1

## 2023-04-06 ENCOUNTER — TELEPHONE (OUTPATIENT)
Dept: PRIMARY CARE CLINIC | Age: 28
End: 2023-04-06

## 2023-04-06 DIAGNOSIS — F41.9 ANXIETY: ICD-10-CM

## 2023-04-06 DIAGNOSIS — K21.9 GASTROESOPHAGEAL REFLUX DISEASE WITHOUT ESOPHAGITIS: ICD-10-CM

## 2023-04-06 DIAGNOSIS — G80.9 CEREBRAL PALSY, UNSPECIFIED TYPE (HCC): ICD-10-CM

## 2023-04-06 DIAGNOSIS — R56.9 SEIZURES (HCC): ICD-10-CM

## 2023-04-06 DIAGNOSIS — R79.89 ABNORMAL LIVER FUNCTION TESTS: Primary | ICD-10-CM

## 2023-04-06 DIAGNOSIS — Z11.59 NEED FOR HEPATITIS C SCREENING TEST: ICD-10-CM

## 2023-04-06 DIAGNOSIS — R79.89 ABNORMAL LIVER FUNCTION TESTS: ICD-10-CM

## 2023-05-01 RX ORDER — GLYCOPYRROLATE 1 MG/1
1 TABLET ORAL 3 TIMES DAILY
Qty: 270 TABLET | Refills: 0 | Status: SHIPPED | OUTPATIENT
Start: 2023-05-01 | End: 2023-07-30

## 2023-05-31 RX ORDER — NUTRITIONAL SUPPLEMENT 0.04G-1/ML
LIQUID (ML) ORAL
Qty: 8 EACH | Refills: 5 | Status: SHIPPED | OUTPATIENT
Start: 2023-05-31

## 2023-06-01 ENCOUNTER — TELEPHONE (OUTPATIENT)
Dept: PRIMARY CARE CLINIC | Age: 28
End: 2023-06-01

## 2023-06-14 ENCOUNTER — TELEPHONE (OUTPATIENT)
Dept: PRIMARY CARE CLINIC | Age: 28
End: 2023-06-14

## 2023-06-21 RX ORDER — GLYCOPYRROLATE 1 MG/1
2 TABLET ORAL 3 TIMES DAILY
Qty: 270 TABLET | Refills: 1 | Status: SHIPPED | OUTPATIENT
Start: 2023-06-21 | End: 2023-09-19

## 2023-07-05 ENCOUNTER — TELEPHONE (OUTPATIENT)
Dept: PRIMARY CARE CLINIC | Age: 28
End: 2023-07-05

## 2023-07-05 DIAGNOSIS — K94.23 LEAKING PEG TUBE (HCC): Primary | ICD-10-CM

## 2023-07-05 NOTE — TELEPHONE ENCOUNTER
PT NEEDS REFERRED TO GI FOR HIS TUBE, [facility]

## 2023-07-19 RX ORDER — LACTULOSE 10 G/15ML
SOLUTION ORAL
Qty: 946 ML | Refills: 0 | Status: SHIPPED | OUTPATIENT
Start: 2023-07-19

## 2023-08-14 ENCOUNTER — TELEPHONE (OUTPATIENT)
Dept: PRIMARY CARE CLINIC | Age: 28
End: 2023-08-14

## 2023-08-23 ENCOUNTER — PREP FOR PROCEDURE (OUTPATIENT)
Dept: DENTISTRY | Age: 28
End: 2023-08-23

## 2023-08-23 RX ORDER — SODIUM CHLORIDE, SODIUM LACTATE, POTASSIUM CHLORIDE, CALCIUM CHLORIDE 600; 310; 30; 20 MG/100ML; MG/100ML; MG/100ML; MG/100ML
INJECTION, SOLUTION INTRAVENOUS CONTINUOUS
Status: CANCELLED | OUTPATIENT
Start: 2023-08-23

## 2023-08-23 RX ORDER — SODIUM CHLORIDE 9 MG/ML
INJECTION, SOLUTION INTRAVENOUS PRN
Status: CANCELLED | OUTPATIENT
Start: 2023-08-23

## 2023-08-23 RX ORDER — SODIUM CHLORIDE 0.9 % (FLUSH) 0.9 %
5-40 SYRINGE (ML) INJECTION PRN
Status: CANCELLED | OUTPATIENT
Start: 2023-08-23

## 2023-08-23 RX ORDER — SODIUM CHLORIDE 0.9 % (FLUSH) 0.9 %
5-40 SYRINGE (ML) INJECTION EVERY 12 HOURS SCHEDULED
Status: CANCELLED | OUTPATIENT
Start: 2023-08-23

## 2023-08-28 DIAGNOSIS — F41.9 ANXIETY: ICD-10-CM

## 2023-08-28 DIAGNOSIS — R56.9 SEIZURES (HCC): ICD-10-CM

## 2023-08-28 DIAGNOSIS — G80.9 CEREBRAL PALSY, UNSPECIFIED TYPE (HCC): ICD-10-CM

## 2023-08-28 DIAGNOSIS — R79.89 ABNORMAL LIVER FUNCTION TESTS: ICD-10-CM

## 2023-08-28 LAB
ABSOLUTE IMMATURE GRANULOCYTE: <0.03 K/UL (ref 0–0.58)
ALBUMIN SERPL-MCNC: 4.8 G/DL (ref 3.5–5.2)
ALP BLD-CCNC: 136 U/L (ref 40–129)
ALT SERPL-CCNC: 45 U/L (ref 0–40)
ANION GAP SERPL CALCULATED.3IONS-SCNC: 11 MMOL/L (ref 7–16)
AST SERPL-CCNC: 34 U/L (ref 0–39)
BASOPHILS ABSOLUTE: 0.05 K/UL (ref 0–0.2)
BASOPHILS RELATIVE PERCENT: 1 % (ref 0–2)
BILIRUB SERPL-MCNC: 0.7 MG/DL (ref 0–1.2)
BUN BLDV-MCNC: 9 MG/DL (ref 6–20)
CALCIUM SERPL-MCNC: 9.7 MG/DL (ref 8.6–10.2)
CHLORIDE BLD-SCNC: 104 MMOL/L (ref 98–107)
CO2: 25 MMOL/L (ref 22–29)
CREAT SERPL-MCNC: 0.6 MG/DL (ref 0.7–1.2)
EOSINOPHILS ABSOLUTE: 0.23 K/UL (ref 0.05–0.5)
EOSINOPHILS RELATIVE PERCENT: 5 % (ref 0–6)
GFR SERPL CREATININE-BSD FRML MDRD: >60 ML/MIN/1.73M2
GLUCOSE BLD-MCNC: 81 MG/DL (ref 74–99)
HCT VFR BLD CALC: 48.8 % (ref 37–54)
HEMOGLOBIN: 15.9 G/DL (ref 12.5–16.5)
IMMATURE GRANULOCYTES: 0 % (ref 0–5)
LYMPHOCYTES ABSOLUTE: 1.69 K/UL (ref 1.5–4)
LYMPHOCYTES RELATIVE PERCENT: 35 % (ref 20–42)
MAGNESIUM: 2.3 MG/DL (ref 1.6–2.6)
MCH RBC QN AUTO: 32.7 PG (ref 26–35)
MCHC RBC AUTO-ENTMCNC: 32.6 G/DL (ref 32–34.5)
MCV RBC AUTO: 100.4 FL (ref 80–99.9)
MONOCYTES ABSOLUTE: 0.43 K/UL (ref 0.1–0.95)
MONOCYTES RELATIVE PERCENT: 9 % (ref 2–12)
NEUTROPHILS ABSOLUTE: 2.36 K/UL (ref 1.8–7.3)
NEUTROPHILS RELATIVE PERCENT: 50 % (ref 43–80)
PDW BLD-RTO: 12.6 % (ref 11.5–15)
PHOSPHORUS: 4 MG/DL (ref 2.5–4.5)
PLATELET # BLD: 211 K/UL (ref 130–450)
PMV BLD AUTO: 12.8 FL (ref 7–12)
POTASSIUM SERPL-SCNC: 4.6 MMOL/L (ref 3.5–5)
RBC # BLD: 4.86 M/UL (ref 3.8–5.8)
SODIUM BLD-SCNC: 140 MMOL/L (ref 132–146)
TOTAL PROTEIN: 8 G/DL (ref 6.4–8.3)
WBC # BLD: 4.8 K/UL (ref 4.5–11.5)

## 2023-08-31 ENCOUNTER — OFFICE VISIT (OUTPATIENT)
Dept: PRIMARY CARE CLINIC | Age: 28
End: 2023-08-31
Payer: COMMERCIAL

## 2023-08-31 VITALS
OXYGEN SATURATION: 98 % | DIASTOLIC BLOOD PRESSURE: 76 MMHG | WEIGHT: 138 LBS | BODY MASS INDEX: 27.09 KG/M2 | HEIGHT: 60 IN | SYSTOLIC BLOOD PRESSURE: 116 MMHG | HEART RATE: 94 BPM | TEMPERATURE: 97 F

## 2023-08-31 DIAGNOSIS — G40.909 NON-REFRACTORY EPILEPSY (HCC): ICD-10-CM

## 2023-08-31 DIAGNOSIS — F79 MENTALLY DISABLED: ICD-10-CM

## 2023-08-31 DIAGNOSIS — Z93.1 G TUBE FEEDINGS (HCC): ICD-10-CM

## 2023-08-31 DIAGNOSIS — K05.6 PERIODONTAL DISEASE: ICD-10-CM

## 2023-08-31 DIAGNOSIS — R25.1 TREMOR: ICD-10-CM

## 2023-08-31 DIAGNOSIS — K21.9 GASTROESOPHAGEAL REFLUX DISEASE WITHOUT ESOPHAGITIS: ICD-10-CM

## 2023-08-31 DIAGNOSIS — Z01.818 PREOP EXAMINATION: Primary | ICD-10-CM

## 2023-08-31 DIAGNOSIS — R56.9 SEIZURE (HCC): ICD-10-CM

## 2023-08-31 DIAGNOSIS — R79.89 ABNORMAL LIVER FUNCTION TESTS: ICD-10-CM

## 2023-08-31 DIAGNOSIS — G80.9 CEREBRAL PALSY, UNSPECIFIED TYPE (HCC): ICD-10-CM

## 2023-08-31 PROCEDURE — 99214 OFFICE O/P EST MOD 30 MIN: CPT | Performed by: INTERNAL MEDICINE

## 2023-08-31 RX ORDER — GENTAMICIN SULFATE 3 MG/G
0.5 OINTMENT OPHTHALMIC DAILY PRN
Qty: 3.5 G | Refills: 1 | Status: SHIPPED | OUTPATIENT
Start: 2023-08-31

## 2023-09-11 ENCOUNTER — TELEPHONE (OUTPATIENT)
Dept: PRIMARY CARE CLINIC | Age: 28
End: 2023-09-11

## 2023-09-13 RX ORDER — GLYCOPYRROLATE 1 MG/1
2 TABLET ORAL 3 TIMES DAILY
Qty: 300 TABLET | Refills: 1 | Status: SHIPPED | OUTPATIENT
Start: 2023-09-13 | End: 2023-12-12

## 2023-09-13 NOTE — H&P
Dental History and Physical    [address] [place] [id number]    The patient is a 32 y.o. male     Chief Complaint: Needs comprehensive dental care    History of present illness: Patient was last seen 2 years ago. Patient needs comprehensive dental care and is unable to cooperate in the dental clinic. Past Medical History:      Diagnosis Date    Cerebral palsy ([address])     COVID-19     G tube feedings [facility])     age 3    History of blood transfusion     Mental handicap     mom states severe brain damage    Seizures ([facility]CC)     tremors  last seizure 8/8/2018       Past Surgical History:        Procedure Laterality Date    DENTAL SURGERY  03/26/2015    xrays, restorations and extractions x1    DENTAL SURGERY  12/08/2016    DENTAL SURGERY N/A 09/04/2020    EXAM, RADIOGRAPHS, PROPHYLAXIS, DENTAL RESTORATIONS performed by Stella Harkins DDS at [address] [place] [address]  09/04/2020    LEG TENDON SURGERY Bilateral     [place] BIOPSY, EACH ADDED LESION Right 03/26/2018    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 Authorizing Provider   glycopyrrolate (ROBINUL) 1 MG tablet TAKE 2 TABLETS BY MOUTH 3 TIMES DAILY 9/13/23 12/12/23  Steffany Styles MD   gentamicin (GENTAK) 0.3 % ophthalmic ointment Place 0.5 inches into both eyes daily as needed (3.5) 8/31/23   Steffany Styles MD   lansoprazole (PREVACID SOLUTAB) 15 MG disintegrating tablet Take 1 tablet by mouth daily 8/31/23   Steffany Styles MD   lactulose (CHRONULAC) 10 GM/15ML solution TAKE 15ML BY MOUTH DAILY 7/19/23   Steffany Styles MD   Nutritional Supplements (NUTREN 1.0) LIQD 250 mL per container.   Patient uses 6/day  Dispense 8 cases    May dispense equivalent product if not available 5/31/23   Steffany Styles MD   baclofen (LIORESAL) 20 MG tablet Take 1 tablet by mouth 3

## 2023-09-14 RX ORDER — NUTRITIONAL SUPPLEMENT 0.04G-1/ML
LIQUID (ML) ORAL
Qty: 8 EACH | Refills: 5 | Status: SHIPPED | OUTPATIENT
Start: 2023-09-14

## 2023-09-14 RX ORDER — LACTULOSE 10 G/15ML
SOLUTION ORAL
Qty: 473 ML | Refills: 0 | Status: SHIPPED | OUTPATIENT
Start: 2023-09-14

## 2023-09-14 RX ORDER — NUTRITIONAL SUPPLEMENT 0.04G-1/ML
LIQUID (ML) ORAL
Qty: 8 EACH | Refills: 5 | Status: SHIPPED | OUTPATIENT
Start: 2023-09-14 | End: 2023-09-14 | Stop reason: SDUPTHER

## 2023-09-19 ENCOUNTER — ANESTHESIA EVENT (OUTPATIENT)
Dept: OPERATING ROOM | Age: 28
End: 2023-09-19
Payer: COMMERCIAL

## 2023-09-19 NOTE — PROGRESS NOTES
Patient's mother will be here DOS to sign all permits. She is aware of where to park and enter for surgery tomorrow. Also, aware of the morning medications he is required to take before getting to hospital (Klonopin, Vimpat), Lamictal). Arrival time is 0600. Surgery is scheduled for 0730.

## 2023-09-20 ENCOUNTER — HOSPITAL ENCOUNTER (OUTPATIENT)
Age: 28
Setting detail: OUTPATIENT SURGERY
Discharge: HOME | End: 2023-09-20
Attending: DENTIST | Admitting: DENTIST
Payer: COMMERCIAL

## 2023-09-20 ENCOUNTER — ANESTHESIA (OUTPATIENT)
Dept: OPERATING ROOM | Age: 28
End: 2023-09-20
Payer: COMMERCIAL

## 2023-09-20 VITALS
WEIGHT: 138 LBS | HEIGHT: 60 IN | HEART RATE: 79 BPM | BODY MASS INDEX: 27.09 KG/M2 | DIASTOLIC BLOOD PRESSURE: 74 MMHG | OXYGEN SATURATION: 95 % | RESPIRATION RATE: 24 BRPM | SYSTOLIC BLOOD PRESSURE: 111 MMHG | TEMPERATURE: 97.7 F

## 2023-09-20 PROCEDURE — 7100000011 HC PHASE II RECOVERY - ADDTL 15 MIN: Performed by: DENTIST

## 2023-09-20 PROCEDURE — C9399 UNCLASSIFIED DRUGS OR BIOLOG: HCPCS

## 2023-09-20 PROCEDURE — 7100000001 HC PACU RECOVERY - ADDTL 15 MIN: Performed by: DENTIST

## 2023-09-20 PROCEDURE — 3700000001 HC ADD 15 MINUTES (ANESTHESIA): Performed by: DENTIST

## 2023-09-20 PROCEDURE — 3600000014 HC SURGERY LEVEL 4 ADDTL 15MIN: Performed by: DENTIST

## 2023-09-20 PROCEDURE — 6360000002 HC RX W HCPCS

## 2023-09-20 PROCEDURE — 7100000010 HC PHASE II RECOVERY - FIRST 15 MIN: Performed by: DENTIST

## 2023-09-20 PROCEDURE — 2580000003 HC RX 258

## 2023-09-20 PROCEDURE — 3600000004 HC SURGERY LEVEL 4 BASE: Performed by: DENTIST

## 2023-09-20 PROCEDURE — 2709999900 HC NON-CHARGEABLE SUPPLY: Performed by: DENTIST

## 2023-09-20 PROCEDURE — 3700000000 HC ANESTHESIA ATTENDED CARE: Performed by: DENTIST

## 2023-09-20 PROCEDURE — 2500000003 HC RX 250 WO HCPCS

## 2023-09-20 PROCEDURE — 7100000000 HC PACU RECOVERY - FIRST 15 MIN: Performed by: DENTIST

## 2023-09-20 RX ORDER — ACETAMINOPHEN 325 MG/1
650 TABLET ORAL
Status: DISCONTINUED | OUTPATIENT
Start: 2023-09-20 | End: 2023-09-20 | Stop reason: HOSPADM

## 2023-09-20 RX ORDER — SODIUM CHLORIDE 0.9 % (FLUSH) 0.9 %
5-40 SYRINGE (ML) INJECTION PRN
Status: DISCONTINUED | OUTPATIENT
Start: 2023-09-20 | End: 2023-09-20 | Stop reason: HOSPADM

## 2023-09-20 RX ORDER — SODIUM CHLORIDE, SODIUM LACTATE, POTASSIUM CHLORIDE, CALCIUM CHLORIDE 600; 310; 30; 20 MG/100ML; MG/100ML; MG/100ML; MG/100ML
INJECTION, SOLUTION INTRAVENOUS CONTINUOUS
Status: DISCONTINUED | OUTPATIENT
Start: 2023-09-20 | End: 2023-09-20 | Stop reason: HOSPADM

## 2023-09-20 RX ORDER — FENTANYL CITRATE 50 UG/ML
INJECTION, SOLUTION INTRAMUSCULAR; INTRAVENOUS PRN
Status: DISCONTINUED | OUTPATIENT
Start: 2023-09-20 | End: 2023-09-20 | Stop reason: SDUPTHER

## 2023-09-20 RX ORDER — SODIUM CHLORIDE 0.9 % (FLUSH) 0.9 %
5-40 SYRINGE (ML) INJECTION EVERY 12 HOURS SCHEDULED
Status: DISCONTINUED | OUTPATIENT
Start: 2023-09-20 | End: 2023-09-20 | Stop reason: HOSPADM

## 2023-09-20 RX ORDER — LABETALOL HYDROCHLORIDE 5 MG/ML
10 INJECTION, SOLUTION INTRAVENOUS
Status: DISCONTINUED | OUTPATIENT
Start: 2023-09-20 | End: 2023-09-20 | Stop reason: HOSPADM

## 2023-09-20 RX ORDER — DROPERIDOL 2.5 MG/ML
0.62 INJECTION, SOLUTION INTRAMUSCULAR; INTRAVENOUS
Status: DISCONTINUED | OUTPATIENT
Start: 2023-09-20 | End: 2023-09-20 | Stop reason: HOSPADM

## 2023-09-20 RX ORDER — SODIUM CHLORIDE 9 MG/ML
INJECTION, SOLUTION INTRAVENOUS PRN
Status: DISCONTINUED | OUTPATIENT
Start: 2023-09-20 | End: 2023-09-20 | Stop reason: HOSPADM

## 2023-09-20 RX ORDER — OXYCODONE HYDROCHLORIDE 5 MG/1
5 TABLET ORAL
Status: DISCONTINUED | OUTPATIENT
Start: 2023-09-20 | End: 2023-09-20 | Stop reason: HOSPADM

## 2023-09-20 RX ORDER — DEXAMETHASONE SODIUM PHOSPHATE 10 MG/ML
INJECTION INTRAMUSCULAR; INTRAVENOUS PRN
Status: DISCONTINUED | OUTPATIENT
Start: 2023-09-20 | End: 2023-09-20 | Stop reason: SDUPTHER

## 2023-09-20 RX ORDER — ONDANSETRON 2 MG/ML
4 INJECTION INTRAMUSCULAR; INTRAVENOUS
Status: DISCONTINUED | OUTPATIENT
Start: 2023-09-20 | End: 2023-09-20 | Stop reason: HOSPADM

## 2023-09-20 RX ORDER — LIDOCAINE HYDROCHLORIDE 20 MG/ML
INJECTION, SOLUTION INTRAVENOUS PRN
Status: DISCONTINUED | OUTPATIENT
Start: 2023-09-20 | End: 2023-09-20 | Stop reason: SDUPTHER

## 2023-09-20 RX ORDER — DIPHENHYDRAMINE HYDROCHLORIDE 50 MG/ML
12.5 INJECTION INTRAMUSCULAR; INTRAVENOUS
Status: DISCONTINUED | OUTPATIENT
Start: 2023-09-20 | End: 2023-09-20 | Stop reason: HOSPADM

## 2023-09-20 RX ORDER — PHENYLEPHRINE HCL IN 0.9% NACL 1 MG/10 ML
SYRINGE (ML) INTRAVENOUS PRN
Status: DISCONTINUED | OUTPATIENT
Start: 2023-09-20 | End: 2023-09-20 | Stop reason: SDUPTHER

## 2023-09-20 RX ORDER — HYDROMORPHONE HYDROCHLORIDE 1 MG/ML
0.5 INJECTION, SOLUTION INTRAMUSCULAR; INTRAVENOUS; SUBCUTANEOUS EVERY 5 MIN PRN
Status: DISCONTINUED | OUTPATIENT
Start: 2023-09-20 | End: 2023-09-20 | Stop reason: HOSPADM

## 2023-09-20 RX ORDER — PROPOFOL 10 MG/ML
INJECTION, EMULSION INTRAVENOUS PRN
Status: DISCONTINUED | OUTPATIENT
Start: 2023-09-20 | End: 2023-09-20 | Stop reason: SDUPTHER

## 2023-09-20 RX ORDER — ROCURONIUM BROMIDE 10 MG/ML
INJECTION, SOLUTION INTRAVENOUS PRN
Status: DISCONTINUED | OUTPATIENT
Start: 2023-09-20 | End: 2023-09-20 | Stop reason: SDUPTHER

## 2023-09-20 RX ORDER — HYDROMORPHONE HYDROCHLORIDE 1 MG/ML
0.25 INJECTION, SOLUTION INTRAMUSCULAR; INTRAVENOUS; SUBCUTANEOUS EVERY 5 MIN PRN
Status: DISCONTINUED | OUTPATIENT
Start: 2023-09-20 | End: 2023-09-20 | Stop reason: HOSPADM

## 2023-09-20 RX ORDER — SODIUM CHLORIDE 9 MG/ML
INJECTION, SOLUTION INTRAVENOUS CONTINUOUS PRN
Status: DISCONTINUED | OUTPATIENT
Start: 2023-09-20 | End: 2023-09-20 | Stop reason: SDUPTHER

## 2023-09-20 RX ORDER — MIDAZOLAM HYDROCHLORIDE 1 MG/ML
INJECTION INTRAMUSCULAR; INTRAVENOUS PRN
Status: DISCONTINUED | OUTPATIENT
Start: 2023-09-20 | End: 2023-09-20 | Stop reason: SDUPTHER

## 2023-09-20 RX ORDER — DEXTROSE MONOHYDRATE 100 MG/ML
INJECTION, SOLUTION INTRAVENOUS CONTINUOUS PRN
Status: DISCONTINUED | OUTPATIENT
Start: 2023-09-20 | End: 2023-09-20 | Stop reason: HOSPADM

## 2023-09-20 RX ORDER — HYDRALAZINE HYDROCHLORIDE 20 MG/ML
10 INJECTION INTRAMUSCULAR; INTRAVENOUS
Status: DISCONTINUED | OUTPATIENT
Start: 2023-09-20 | End: 2023-09-20 | Stop reason: HOSPADM

## 2023-09-20 RX ADMIN — FENTANYL CITRATE 50 MCG: 50 INJECTION, SOLUTION INTRAMUSCULAR; INTRAVENOUS at 07:40

## 2023-09-20 RX ADMIN — LIDOCAINE HYDROCHLORIDE 60 MG: 20 INJECTION, SOLUTION INTRAVENOUS at 07:40

## 2023-09-20 RX ADMIN — MIDAZOLAM 2 MG: 1 INJECTION INTRAMUSCULAR; INTRAVENOUS at 07:32

## 2023-09-20 RX ADMIN — Medication 100 MCG: at 07:50

## 2023-09-20 RX ADMIN — Medication 100 MCG: at 08:19

## 2023-09-20 RX ADMIN — SUGAMMADEX 200 MG: 100 INJECTION, SOLUTION INTRAVENOUS at 09:18

## 2023-09-20 RX ADMIN — PROPOFOL 130 MG: 10 INJECTION, EMULSION INTRAVENOUS at 07:40

## 2023-09-20 RX ADMIN — ROCURONIUM BROMIDE 50 MG: 10 INJECTION, SOLUTION INTRAVENOUS at 07:40

## 2023-09-20 RX ADMIN — DEXAMETHASONE SODIUM PHOSPHATE 10 MG: 10 INJECTION INTRAMUSCULAR; INTRAVENOUS at 07:59

## 2023-09-20 RX ADMIN — SODIUM CHLORIDE: 9 INJECTION, SOLUTION INTRAVENOUS at 07:31

## 2023-09-20 ASSESSMENT — PAIN - FUNCTIONAL ASSESSMENT: PAIN_FUNCTIONAL_ASSESSMENT: ADULT NONVERBAL PAIN SCALE (NPVS)

## 2023-09-20 ASSESSMENT — LIFESTYLE VARIABLES: SMOKING_STATUS: 0

## 2023-09-20 NOTE — ANESTHESIA PRE PROCEDURE
Department of Anesthesiology  Preprocedure Note       Name:  Inocente Goldstein   Age:  32 y.o.  :  1995                                          MRN:  [id number]         Date:  2023      Surgeon: Jose Camacho):  Stella Harkins DDS    Procedure: Procedure(s):  DENTAL RESTORATIONS    Medications prior to admission:   Prior to Admission medications    Medication Sig Start Date End Date Taking? Authorizing Provider   lactulose (CHRONULAC) 10 GM/15ML solution TAKE 15ML BY MOUTH DAILY 23   Steffany Styles MD   Nutritional Supplements (NUTREN 1.0) LIQD 250 mL per container. Patient uses 6/day  Dispense 8 cases    May dispense equivalent product if not available  Patient taking differently: 250 mL per container. Patient uses 6/day  Dispense 8 cases    May dispense equivalent product if not available, Please order Complete 1.4 Nutritional Supplement when re-ordering. 23   Steffany Styles MD   glycopyrrolate (ROBINUL) 1 MG tablet TAKE 2 TABLETS BY MOUTH 3 TIMES DAILY 23  Steffany Styles MD   gentamicin (GENTAK) 0.3 % ophthalmic ointment Place 0.5 inches into both eyes daily as needed (3.5) 23   Steffany Styles MD   baclofen (LIORESAL) 20 MG tablet Take 1 tablet by mouth 3 times daily 3/23/23   Steffany Styles MD   citalopram (CELEXA) 10 MG/5ML solution Take 5 mLs by mouth daily 3/23/23   Steffany Styles MD   nystatin (MYCOSTATIN) 733004 UNIT/GM powder Apply 3 times daily.  22   Steffany Styles MD   miconazole (ZEASORB-AF) 2 % powder Apply topically as needed for Itching 22   Steffany Styles MD   clotrimazole-betamethasone (LOTRISONE) 1-0.05 % cream Apply topically     Historical Provider, MD   acetaminophen (TYLENOL) 160 MG/5ML suspension 19.13 mLs by Per G Tube route every 6 hours as needed for Fever 20  Wendi Simpson DDS   Polyethylene Glycol 3350 (MIRALAX PO) Take by mouth as needed     Historical Provider, MD   lacosamide (VIMPAT) 150 MG TABS tablet Take 1 tablet by mouth

## 2023-09-20 NOTE — INTERVAL H&P NOTE
Update History & Physical    The patient's History and Physical of August 31, 2023 was reviewed with the patient and I examined the patient. There was no change. The surgical site was confirmed by the patient, parent and me. Plan: The risks, benefits, expected outcome, and alternative to the recommended procedure have been discussed with the patient and parent. Parent/guardian consents to today's procedure. Electronically signed by Mayuri Velazquez DDS on 9/20/2023 at 7:26 AM    I agree with the above.  Electronically signed by Stella Harkins DDS on 9/20/2023 at 2:53 PM

## 2023-09-20 NOTE — OP NOTE
and post-operative care. I agree with the above. Written and verbal post-op instructions given to patient's family who verbalized their understanding. All questions addressed.  Electronically signed by Wesley West DDS on 9/20/2023 at 3:03 PM

## 2023-09-20 NOTE — DISCHARGE INSTRUCTIONS
anesthesia during your surgery normally has a 3 hour duration and it may be difficult to control the pain once the anesthesia wears off. Therefore we advise you to give your child the pain medication 2 hours immediately after their surgery. Taking pain medications with soft food and a large volume of water will lessen any side effects of nausea or upset stomach. If an antibiotic has been prescribed, have your child finish their prescription regardless of your symptoms. If your chid is prescribed an antibiotic and are currently taking oral contraceptives, they will need to use an alternative method of birth control to prevent a pregnancy for the remainder of this cycle. Instructions for the following day  Oral Hygiene: Keeping your child's mouth clean after oral surgery is essential.  Do not rinse your child's mouth for the first post-operative day, or while there is bleeding. After the first day, warm salt water rinse made be used every 4 hours and following meals to flush out particles of food and debris which may lodge in the operated area if your child is able to spit (One half teaspoon of salt in a glass of lukewarm water. ). Keep using warm salt water rinses to rinse your child's mouth at least 2-3 times a day and after eating for the next 5 days if able to spit. Soreness and swelling may prevent rigorous brushing of all areas, but make every effort to clean your child's teeth within their comfort level. The gum tissue when brushing around the crowns and/or restorations may be sore for the next few days. Care of Surgical Area: Apply cold compresses to the skin overlying areas of swelling for 20 minutes on and 20 minutes off to help soothe these tender areas if needed. This will also aid in reducing swelling and stiffness.

## 2023-09-20 NOTE — ANESTHESIA POSTPROCEDURE EVALUATION
Department of Anesthesiology  Postprocedure Note    Patient: Alessia Soriano  MRN: [id number]  YOB: 1995  Date of evaluation: 9/20/2023      Procedure Summary     Date: 09/20/23 Room / Location: [place] [address] / [facility]    Anesthesia Start: 3378 Anesthesia Stop: 5637    Procedure: DENTAL RESTORATIONS (Mouth) Diagnosis:       Dental caries      (Dental caries [K02.9])    Surgeons: Remi Asif DDS Responsible Provider: Sharifa Can MD    Anesthesia Type: General ASA Status: 3          Anesthesia Type: General    Alex Phase I: Alex Score: 10    Alex Phase II: Alex Score: 10      Anesthesia Post Evaluation    Patient location during evaluation: PACU  Patient participation: complete - patient participated  Level of consciousness: awake and alert  Airway patency: patent  Nausea & Vomiting: no nausea and no vomiting  Complications: no  Cardiovascular status: blood pressure returned to baseline  Respiratory status: acceptable  Hydration status: euvolemic  Pain management: adequate

## 2023-09-30 PROBLEM — Z01.818 PREOP EXAMINATION: Status: RESOLVED | Noted: 2023-08-31 | Resolved: 2023-09-30

## 2023-10-12 DIAGNOSIS — G40.909 SEIZURE DISORDER (MULTI): Primary | ICD-10-CM

## 2023-10-12 DIAGNOSIS — F41.9 ANXIETY: ICD-10-CM

## 2023-10-12 RX ORDER — NUTRITIONAL SUPPLEMENT 0.04G-1/ML
LIQUID (ML) ORAL
Qty: 8 EACH | Refills: 5 | Status: SHIPPED | OUTPATIENT
Start: 2023-10-12

## 2023-10-13 RX ORDER — LACTULOSE 10 G/15ML
SOLUTION ORAL
Qty: 473 ML | Refills: 0 | Status: SHIPPED | OUTPATIENT
Start: 2023-10-13

## 2023-10-13 RX ORDER — CLONAZEPAM 0.5 MG/1
1 TABLET ORAL 3 TIMES DAILY
Qty: 180 TABLET | Refills: 2 | Status: SHIPPED | OUTPATIENT
Start: 2023-10-13 | End: 2024-05-29 | Stop reason: SDUPTHER

## 2023-10-13 RX ORDER — LAMOTRIGINE 200 MG/1
TABLET ORAL 3 TIMES DAILY
Qty: 90 TABLET | Refills: 5 | Status: SHIPPED | OUTPATIENT
Start: 2023-10-13 | End: 2024-04-25

## 2023-10-19 ENCOUNTER — TELEPHONE (OUTPATIENT)
Dept: PRIMARY CARE CLINIC | Age: 28
End: 2023-10-19

## 2023-10-19 NOTE — TELEPHONE ENCOUNTER
[facility] called and requested a new script be sent in with the following information in order to fill the nutritional supplements.     Name of supplement:  Compleat Standard 1.4    How it is administered:  Method Administered  Gravity    It needs to state the amount dispensed a day or how much patient needs in a 30 day increment      Fax [phone]

## 2023-10-25 RX ORDER — ZINC OXIDE 216 MG/ML
LOTION TOPICAL
Qty: 237 EACH | Refills: 5 | Status: SHIPPED | OUTPATIENT
Start: 2023-10-25

## 2023-11-09 ENCOUNTER — TELEPHONE (OUTPATIENT)
Dept: PRIMARY CARE CLINIC | Age: 28
End: 2023-11-09

## 2023-11-09 NOTE — TELEPHONE ENCOUNTER
Refill citalopram pharm [facility]    Mom would like Malena Jimenez to give her a call to discuss increase of citalopram

## 2023-11-10 DIAGNOSIS — F41.9 ANXIETY: ICD-10-CM

## 2023-11-10 RX ORDER — CITALOPRAM HYDROBROMIDE 20 MG/10ML
10 SOLUTION, ORAL ORAL DAILY
Qty: 300 ML | Refills: 1 | Status: SHIPPED | OUTPATIENT
Start: 2023-11-10

## 2023-11-18 RX ORDER — LACTULOSE 10 G/15ML
SOLUTION ORAL
Qty: 473 ML | Refills: 0 | Status: SHIPPED | OUTPATIENT
Start: 2023-11-18

## 2023-12-19 DIAGNOSIS — G40.909 SEIZURE DISORDER (MULTI): Primary | ICD-10-CM

## 2023-12-19 RX ORDER — LACOSAMIDE 150 MG/1
300 TABLET ORAL 2 TIMES DAILY
Qty: 120 TABLET | Refills: 5 | Status: SHIPPED | OUTPATIENT
Start: 2023-12-19 | End: 2024-05-29 | Stop reason: SDUPTHER

## 2023-12-20 ENCOUNTER — TELEPHONE (OUTPATIENT)
Dept: PRIMARY CARE CLINIC | Age: 28
End: 2023-12-20

## 2023-12-20 DIAGNOSIS — G40.909 SEIZURE DISORDER (MULTI): Primary | ICD-10-CM

## 2023-12-20 DIAGNOSIS — R16.0 HEPATOMEGALY: Primary | ICD-10-CM

## 2023-12-20 DIAGNOSIS — G40.909 SEIZURE DISORDER (MULTI): ICD-10-CM

## 2023-12-20 RX ORDER — ZONISAMIDE 100 MG/1
300 CAPSULE ORAL DAILY
Qty: 90 CAPSULE | Refills: 5 | Status: CANCELLED | OUTPATIENT
Start: 2023-12-20 | End: 2024-06-17

## 2023-12-20 RX ORDER — LACOSAMIDE 150 MG/1
300 TABLET ORAL 2 TIMES DAILY
Qty: 120 TABLET | Refills: 5 | OUTPATIENT
Start: 2023-12-20 | End: 2024-06-17

## 2023-12-20 RX ORDER — ZONISAMIDE 100 MG/1
300 CAPSULE ORAL DAILY
Qty: 90 CAPSULE | Refills: 5 | Status: SHIPPED | OUTPATIENT
Start: 2023-12-20 | End: 2024-05-29 | Stop reason: SDUPTHER

## 2023-12-20 RX ORDER — ZONISAMIDE 100 MG/1
300 CAPSULE ORAL DAILY
COMMUNITY
End: 2023-12-20 | Stop reason: SDUPTHER

## 2023-12-20 NOTE — TELEPHONE ENCOUNTER
Patient had an xray done and they informed patient's mom that patient's liver is severely enlarged    She would like another x-ray to make sure everything is okay. Please advise asap

## 2023-12-21 ENCOUNTER — TELEPHONE (OUTPATIENT)
Dept: PRIMARY CARE CLINIC | Age: 28
End: 2023-12-21

## 2023-12-28 RX ORDER — GLYCOPYRROLATE 1 MG/1
TABLET ORAL
Qty: 60 TABLET | Refills: 1 | Status: SHIPPED | OUTPATIENT
Start: 2023-12-28

## 2023-12-29 ENCOUNTER — HOSPITAL ENCOUNTER (OUTPATIENT)
Dept: ULTRASOUND IMAGING | Age: 28
Discharge: HOME | End: 2023-12-29
Payer: COMMERCIAL

## 2023-12-29 DIAGNOSIS — R16.0 HEPATOMEGALY: ICD-10-CM

## 2023-12-29 PROCEDURE — 76705 ECHO EXAM OF ABDOMEN: CPT

## 2024-01-02 ENCOUNTER — TELEPHONE (OUTPATIENT)
Dept: PRIMARY CARE CLINIC | Age: 29
End: 2024-01-02

## 2024-01-02 DIAGNOSIS — G80.0 SPASTIC QUADRIPLEGIC CEREBRAL PALSY (HCC): Primary | ICD-10-CM

## 2024-01-02 RX ORDER — GLYCOPYRROLATE 1 MG/1
2 TABLET ORAL 3 TIMES DAILY
Qty: 180 TABLET | Refills: 3 | Status: SHIPPED | OUTPATIENT
Start: 2024-01-02

## 2024-01-10 DIAGNOSIS — R79.89 ABNORMAL LFTS: Primary | ICD-10-CM

## 2024-01-10 DIAGNOSIS — R79.89 ABNORMAL LFTS: ICD-10-CM

## 2024-01-11 LAB
HAV IGM SER IA-ACNC: NONREACTIVE
HEPATITIS B CORE IGM ANTIBODY: NONREACTIVE
HEPATITIS B SURF AG,XHBAGS: NONREACTIVE
HEPATITIS C ANTIBODY: NONREACTIVE

## 2024-01-12 ENCOUNTER — OFFICE VISIT (OUTPATIENT)
Dept: PRIMARY CARE CLINIC | Age: 29
End: 2024-01-12
Payer: COMMERCIAL

## 2024-01-12 VITALS
TEMPERATURE: 96.9 F | SYSTOLIC BLOOD PRESSURE: 126 MMHG | OXYGEN SATURATION: 96 % | DIASTOLIC BLOOD PRESSURE: 78 MMHG | HEIGHT: 60 IN | HEART RATE: 76 BPM | BODY MASS INDEX: 26.95 KG/M2

## 2024-01-12 DIAGNOSIS — F79 MENTALLY DISABLED: ICD-10-CM

## 2024-01-12 DIAGNOSIS — R79.89 ABNORMAL LIVER FUNCTION TESTS: ICD-10-CM

## 2024-01-12 DIAGNOSIS — G80.0 SPASTIC QUADRIPLEGIC CEREBRAL PALSY (HCC): ICD-10-CM

## 2024-01-12 DIAGNOSIS — G40.909 NON-REFRACTORY EPILEPSY (HCC): ICD-10-CM

## 2024-01-12 DIAGNOSIS — R74.8 ABNORMAL TRANSAMINASES: Primary | ICD-10-CM

## 2024-01-12 PROCEDURE — 99213 OFFICE O/P EST LOW 20 MIN: CPT | Performed by: INTERNAL MEDICINE

## 2024-01-12 ASSESSMENT — PATIENT HEALTH QUESTIONNAIRE - PHQ9
SUM OF ALL RESPONSES TO PHQ QUESTIONS 1-9: 0
SUM OF ALL RESPONSES TO PHQ QUESTIONS 1-9: 0
SUM OF ALL RESPONSES TO PHQ9 QUESTIONS 1 & 2: 0
SUM OF ALL RESPONSES TO PHQ QUESTIONS 1-9: 0
SUM OF ALL RESPONSES TO PHQ QUESTIONS 1-9: 0
1. LITTLE INTEREST OR PLEASURE IN DOING THINGS: 0
2. FEELING DOWN, DEPRESSED OR HOPELESS: 0

## 2024-01-12 NOTE — PROGRESS NOTES
Chief Complaint   Patient presents with    Results     Pt is here to discuss U/S Results and recent Hep. Panel results. Pt was diagnosed with Influenza A last month on a cruise and was quarantined.        HPI:  Patient is here for follow-up of liver ultrasound and abnormal transaminases.  He is accompanied today by his mother and his caregiver.  He is wheelchair-bound.  Ultrasound of the liver showed cholelithiasis without any acute inflammation of the gallbladder or any dilatation of the common bile duct.  Transaminases have been elevated but stable relatively.  He has not been noticed to have any abdominal pain or groaning according to his mother.  On exam abdomen is benign without any tenderness or guarding or rebound.  They were instructed to use a low-fat nutritional supplement and they have been actually diluting his nutritional supplement with the same amount in water so he is actually receiving the last fatty content then the nutritional supplement about half the amount..    Past Medical History, Surgical History, and Family History has been reviewed and updated.    Review of Systems:  Constitutional:  No fever, no fatigue, no chills, no headaches, no weight change  Dermatology:  No rash, no mole, no dry or sensitive skin  ENT:  No cough, no sore throat, no sinus pain, no runny nose, no ear pain  Cardiology:  No chest pain, no palpitations, no leg edema, no shortness of breath, no PND  Gastroenterology:  No dysphagia, no abdominal pain, no nausea, no vomiting, no constipation, no diarrhea, no heartburn  Musculoskeletal:  No joint pain, no leg cramps, no back pain, no muscle aches  Respiratory:  No shortness of breath, no orthopnea, no wheezing, no GARCIA, no hemoptysis  Urology:  No blood in the urine, no urinary frequency, no urinary incontinence, no urinary urgency, no nocturia, no dysuria    Vitals:    01/12/24 1134   BP: 126/78   Pulse: 76   Temp: 96.9 °F (36.1 °C)   TempSrc: Temporal   SpO2: 96%   Height:

## 2024-01-15 RX ORDER — LACTULOSE 10 G/15ML
SOLUTION ORAL
Qty: 473 ML | Refills: 0 | Status: SHIPPED | OUTPATIENT
Start: 2024-01-15

## 2024-02-15 DIAGNOSIS — F41.9 ANXIETY: ICD-10-CM

## 2024-02-15 RX ORDER — CITALOPRAM HYDROBROMIDE 20 MG/10ML
20 SOLUTION, ORAL ORAL DAILY
Qty: 300 ML | Refills: 1 | Status: SHIPPED | OUTPATIENT
Start: 2024-02-15

## 2024-02-19 RX ORDER — LACTULOSE 10 G/15ML
SOLUTION ORAL
Qty: 473 ML | Refills: 0 | Status: SHIPPED | OUTPATIENT
Start: 2024-02-19

## 2024-03-15 DIAGNOSIS — R56.9 SEIZURES (HCC): ICD-10-CM

## 2024-03-15 RX ORDER — BACLOFEN 20 MG/1
20 TABLET ORAL 3 TIMES DAILY
Qty: 90 TABLET | Refills: 1 | Status: SHIPPED | OUTPATIENT
Start: 2024-03-15

## 2024-03-15 RX ORDER — LACTULOSE 10 G/15ML
SOLUTION ORAL
Qty: 473 ML | Refills: 0 | Status: SHIPPED | OUTPATIENT
Start: 2024-03-15

## 2024-04-06 DIAGNOSIS — F41.9 ANXIETY: ICD-10-CM

## 2024-04-08 RX ORDER — CITALOPRAM HYDROBROMIDE 20 MG/10ML
20 SOLUTION, ORAL ORAL DAILY
Qty: 300 ML | Refills: 1 | Status: SHIPPED | OUTPATIENT
Start: 2024-04-08

## 2024-04-08 RX ORDER — CITALOPRAM HYDROBROMIDE 20 MG/10ML
20 SOLUTION, ORAL ORAL DAILY
Qty: 300 ML | Refills: 1 | OUTPATIENT
Start: 2024-04-08

## 2024-04-22 ENCOUNTER — TELEPHONE (OUTPATIENT)
Dept: PEDIATRIC NEUROLOGY | Facility: CLINIC | Age: 29
End: 2024-04-22

## 2024-04-22 NOTE — TELEPHONE ENCOUNTER
Call from mom.  Tremors started 4/15. Increased clonazepam to 1mg (2 tabs) TID on 4/17 tremors have not subsided

## 2024-04-22 NOTE — TELEPHONE ENCOUNTER
tristen is having tremors. Started 4/15. On 4/17 Mom increased clonazepam to 1mg (2 tabs )TID. Tremors have been slightly better since then but not a favorable difference.  Mom states its constant tremors in his hands all day. No time of day is worse, they are constant- no breaks. Per mom, There have been no changes with anything else or in life for him.

## 2024-04-23 DIAGNOSIS — G40.909 SEIZURE DISORDER (MULTI): ICD-10-CM

## 2024-04-23 RX ORDER — LACTULOSE 10 G/15ML
SOLUTION ORAL
Qty: 473 ML | Refills: 0 | Status: SHIPPED | OUTPATIENT
Start: 2024-04-23

## 2024-04-25 RX ORDER — LAMOTRIGINE 200 MG/1
TABLET ORAL 3 TIMES DAILY
Qty: 90 TABLET | Refills: 5 | Status: SHIPPED | OUTPATIENT
Start: 2024-04-25

## 2024-04-25 NOTE — TELEPHONE ENCOUNTER
Discussed with Dr Dumont. He asked that the family send us a video so he can see what is going on with Juan Carlos.

## 2024-04-25 NOTE — TELEPHONE ENCOUNTER
Called and spoke with mom. Mom states Juan Carlos's tremors have resolved so she is getting ready to take him back down to 1.5 tablets (0.75mg) clonazepam TID. It seems it just took a little bit longer than usual for them to resolve this time. Asked that mom call if this occurs again as Dr Dumont would like to see a video of what is going on with Juan Carlos. Mom verbalized understanding and states no further questions at this time.

## 2024-05-02 DIAGNOSIS — R56.9 SEIZURES (HCC): ICD-10-CM

## 2024-05-02 RX ORDER — BACLOFEN 20 MG/1
20 TABLET ORAL 3 TIMES DAILY
Qty: 90 TABLET | Refills: 1 | Status: SHIPPED | OUTPATIENT
Start: 2024-05-02

## 2024-05-28 RX ORDER — LACTULOSE 10 G/15ML
20 SOLUTION ORAL
Qty: 946 ML | Refills: 2 | Status: SHIPPED | OUTPATIENT
Start: 2024-05-28

## 2024-05-29 DIAGNOSIS — G40.909 SEIZURE DISORDER (MULTI): ICD-10-CM

## 2024-05-29 DIAGNOSIS — F41.9 ANXIETY: ICD-10-CM

## 2024-05-29 DIAGNOSIS — G80.0 SPASTIC QUADRIPLEGIC CEREBRAL PALSY (HCC): ICD-10-CM

## 2024-05-29 RX ORDER — ZONISAMIDE 100 MG/1
300 CAPSULE ORAL DAILY
Qty: 90 CAPSULE | Refills: 5 | Status: SHIPPED | OUTPATIENT
Start: 2024-05-29 | End: 2024-11-25

## 2024-05-29 RX ORDER — LACOSAMIDE 150 MG/1
300 TABLET ORAL 2 TIMES DAILY
Qty: 120 TABLET | Refills: 5 | Status: SHIPPED | OUTPATIENT
Start: 2024-05-29

## 2024-05-29 RX ORDER — CLONAZEPAM 0.5 MG/1
1 TABLET ORAL 3 TIMES DAILY
Qty: 180 TABLET | Refills: 2 | Status: SHIPPED | OUTPATIENT
Start: 2024-05-29

## 2024-05-30 RX ORDER — GLYCOPYRROLATE 1 MG/1
2 TABLET ORAL 3 TIMES DAILY
Qty: 180 TABLET | Refills: 3 | Status: SHIPPED | OUTPATIENT
Start: 2024-05-30

## 2024-06-04 DIAGNOSIS — F41.9 ANXIETY: ICD-10-CM

## 2024-06-05 DIAGNOSIS — F41.9 ANXIETY: ICD-10-CM

## 2024-06-05 RX ORDER — CITALOPRAM HYDROBROMIDE 20 MG/10ML
20 SOLUTION, ORAL ORAL DAILY
Qty: 300 ML | Refills: 1 | Status: SHIPPED | OUTPATIENT
Start: 2024-06-05

## 2024-06-06 RX ORDER — CITALOPRAM HYDROBROMIDE 20 MG/10ML
20 SOLUTION, ORAL ORAL DAILY
Qty: 300 ML | Refills: 1 | OUTPATIENT
Start: 2024-06-06

## 2024-07-29 DIAGNOSIS — F41.9 ANXIETY: ICD-10-CM

## 2024-07-29 DIAGNOSIS — R56.9 SEIZURES (HCC): ICD-10-CM

## 2024-07-30 ENCOUNTER — OFFICE VISIT (OUTPATIENT)
Dept: PRIMARY CARE CLINIC | Age: 29
End: 2024-07-30
Payer: COMMERCIAL

## 2024-07-30 VITALS
OXYGEN SATURATION: 91 % | TEMPERATURE: 97.5 F | HEIGHT: 60 IN | DIASTOLIC BLOOD PRESSURE: 70 MMHG | SYSTOLIC BLOOD PRESSURE: 124 MMHG | BODY MASS INDEX: 26.95 KG/M2 | HEART RATE: 73 BPM

## 2024-07-30 DIAGNOSIS — R79.89 ABNORMAL LIVER FUNCTION TESTS: Primary | ICD-10-CM

## 2024-07-30 DIAGNOSIS — R09.81 SINUS CONGESTION: ICD-10-CM

## 2024-07-30 DIAGNOSIS — K21.9 GASTROESOPHAGEAL REFLUX DISEASE WITHOUT ESOPHAGITIS: ICD-10-CM

## 2024-07-30 DIAGNOSIS — F79 MENTALLY DISABLED: ICD-10-CM

## 2024-07-30 DIAGNOSIS — G80.8 OTHER CEREBRAL PALSY (HCC): ICD-10-CM

## 2024-07-30 DIAGNOSIS — G40.909 NON-REFRACTORY EPILEPSY (HCC): ICD-10-CM

## 2024-07-30 PROCEDURE — 99213 OFFICE O/P EST LOW 20 MIN: CPT | Performed by: INTERNAL MEDICINE

## 2024-07-30 RX ORDER — CITALOPRAM HYDROBROMIDE 20 MG/10ML
20 SOLUTION, ORAL ORAL DAILY
Qty: 300 ML | Refills: 2 | Status: SHIPPED | OUTPATIENT
Start: 2024-07-30

## 2024-07-30 RX ORDER — CETIRIZINE HYDROCHLORIDE 1 MG/ML
5 SOLUTION ORAL DAILY
Qty: 300 ML | Refills: 2 | Status: SHIPPED | OUTPATIENT
Start: 2024-07-30

## 2024-07-30 RX ORDER — BACLOFEN 20 MG/1
20 TABLET ORAL 3 TIMES DAILY
Qty: 90 TABLET | Refills: 2 | Status: SHIPPED | OUTPATIENT
Start: 2024-07-30

## 2024-07-30 SDOH — ECONOMIC STABILITY: FOOD INSECURITY: WITHIN THE PAST 12 MONTHS, YOU WORRIED THAT YOUR FOOD WOULD RUN OUT BEFORE YOU GOT MONEY TO BUY MORE.: NEVER TRUE

## 2024-07-30 SDOH — ECONOMIC STABILITY: FOOD INSECURITY: WITHIN THE PAST 12 MONTHS, THE FOOD YOU BOUGHT JUST DIDN'T LAST AND YOU DIDN'T HAVE MONEY TO GET MORE.: NEVER TRUE

## 2024-07-30 SDOH — ECONOMIC STABILITY: INCOME INSECURITY: HOW HARD IS IT FOR YOU TO PAY FOR THE VERY BASICS LIKE FOOD, HOUSING, MEDICAL CARE, AND HEATING?: NOT HARD AT ALL

## 2024-07-30 NOTE — PROGRESS NOTES
Chief Complaint   Patient presents with    6 Month Follow-Up       HPI:  Patient is here for follow-up of cerebral palsy , seizure disorder and depression.  He is accompanied by his mother and his caretaker.  He is wheelchair-bound.  He appears to be doing well, has been compliant with medications.  His mom has noticed that he has been having a lot of coughing sometimes and unable to swallow most of his saliva with some sinus congestion.  We will try him on some Zyrtec daily.  We will check blood work to follow-up on his abnormal liver function test..    Past Medical History, Surgical History, and Family History has been reviewed and updated.    Review of Systems:  Constitutional:  No fever, no fatigue, no chills, no headaches, no weight change  Dermatology:  No rash, no mole, no dry or sensitive skin  ENT:  No cough, no sore throat, no sinus pain, no runny nose, no ear pain  Cardiology:  No chest pain, no palpitations, no leg edema, no shortness of breath, no PND  Gastroenterology:  No dysphagia, no abdominal pain, no nausea, no vomiting, no constipation, no diarrhea, no heartburn  Musculoskeletal:  No joint pain, no leg cramps, no back pain, no muscle aches  Respiratory:  No shortness of breath, no orthopnea, no wheezing, no GARCIA, no hemoptysis  Urology:  No blood in the urine, no urinary frequency, no urinary incontinence, no urinary urgency, no nocturia, no dysuria    Vitals:    07/30/24 1159   BP: 124/70   Pulse: 73   Temp: 97.5 °F (36.4 °C)   TempSrc: Temporal   SpO2: 91%   Height: 1.524 m (5')       General:  Patient is wheelchair-bound not communicating except by hand gestures  HEENT:  Atraumatic, normocephalic, PERRLA, EOMI, clear conjunctiva, TMs clear, nose-clear, throat - no erythema  Neck:  Supple, no goiter, no carotid bruits, no LAD  Lungs: Fairly CTA   Heart:  RRR, no murmurs, gallops or rubs  Abdomen:  Soft/nt/nd, + bowel sounds, G-tube in place  Lymph node examination: unremarkable  Neurological

## 2024-08-03 DIAGNOSIS — F41.9 ANXIETY: ICD-10-CM

## 2024-08-06 RX ORDER — CITALOPRAM HYDROBROMIDE 20 MG/10ML
20 SOLUTION, ORAL ORAL DAILY
Qty: 300 ML | Refills: 2 | OUTPATIENT
Start: 2024-08-06

## 2024-08-13 ENCOUNTER — TELEPHONE (OUTPATIENT)
Dept: PRIMARY CARE CLINIC | Age: 29
End: 2024-08-13

## 2024-08-13 ENCOUNTER — HOSPITAL ENCOUNTER (EMERGENCY)
Age: 29
Discharge: HOME | End: 2024-08-13
Attending: EMERGENCY MEDICINE
Payer: COMMERCIAL

## 2024-08-13 ENCOUNTER — APPOINTMENT (OUTPATIENT)
Dept: CT IMAGING | Age: 29
End: 2024-08-13
Payer: COMMERCIAL

## 2024-08-13 ENCOUNTER — APPOINTMENT (OUTPATIENT)
Dept: ULTRASOUND IMAGING | Age: 29
End: 2024-08-13
Payer: COMMERCIAL

## 2024-08-13 VITALS
DIASTOLIC BLOOD PRESSURE: 80 MMHG | OXYGEN SATURATION: 97 % | TEMPERATURE: 98.7 F | BODY MASS INDEX: 26.95 KG/M2 | HEART RATE: 88 BPM | SYSTOLIC BLOOD PRESSURE: 110 MMHG | WEIGHT: 138 LBS | RESPIRATION RATE: 20 BRPM

## 2024-08-13 DIAGNOSIS — R11.2 NAUSEA AND VOMITING, UNSPECIFIED VOMITING TYPE: Primary | ICD-10-CM

## 2024-08-13 LAB
ALBUMIN SERPL-MCNC: 4.1 G/DL (ref 3.5–5.2)
ALP SERPL-CCNC: 103 U/L (ref 40–129)
ALT SERPL-CCNC: 29 U/L (ref 0–40)
ANION GAP SERPL CALCULATED.3IONS-SCNC: 16 MMOL/L (ref 7–16)
AST SERPL-CCNC: 34 U/L (ref 0–39)
BASOPHILS # BLD: 0 K/UL (ref 0–0.2)
BASOPHILS NFR BLD: 0 % (ref 0–2)
BILIRUB SERPL-MCNC: 0.8 MG/DL (ref 0–1.2)
BUN SERPL-MCNC: 12 MG/DL (ref 6–20)
CALCIUM SERPL-MCNC: 9.6 MG/DL (ref 8.6–10.2)
CHLORIDE SERPL-SCNC: 100 MMOL/L (ref 98–107)
CO2 SERPL-SCNC: 26 MMOL/L (ref 22–29)
CREAT SERPL-MCNC: 0.7 MG/DL (ref 0.7–1.2)
EOSINOPHIL # BLD: 0.09 K/UL (ref 0.05–0.5)
EOSINOPHILS RELATIVE PERCENT: 1 % (ref 0–6)
ERYTHROCYTE [DISTWIDTH] IN BLOOD BY AUTOMATED COUNT: 11.9 % (ref 11.5–15)
GFR, ESTIMATED: >90 ML/MIN/1.73M2
GLUCOSE SERPL-MCNC: 168 MG/DL (ref 74–99)
HCT VFR BLD AUTO: 47.2 % (ref 37–54)
HGB BLD-MCNC: 15.8 G/DL (ref 12.5–16.5)
LACTATE BLDV-SCNC: 1 MMOL/L (ref 0.5–1.9)
LACTATE BLDV-SCNC: 2.7 MMOL/L (ref 0.5–1.9)
LIPASE SERPL-CCNC: 11 U/L (ref 13–60)
LYMPHOCYTES NFR BLD: 0.8 K/UL (ref 1.5–4)
LYMPHOCYTES RELATIVE PERCENT: 8 % (ref 20–42)
MCH RBC QN AUTO: 33.2 PG (ref 26–35)
MCHC RBC AUTO-ENTMCNC: 33.5 G/DL (ref 32–34.5)
MCV RBC AUTO: 99.2 FL (ref 80–99.9)
MONOCYTES NFR BLD: 0.98 K/UL (ref 0.1–0.95)
MONOCYTES NFR BLD: 10 % (ref 2–12)
NEUTROPHILS NFR BLD: 82 % (ref 43–80)
NEUTS SEG NFR BLD: 8.34 K/UL (ref 1.8–7.3)
PLATELET CONFIRMATION: NORMAL
PLATELET, FLUORESCENCE: 203 K/UL (ref 130–450)
PMV BLD AUTO: 11.7 FL (ref 7–12)
POTASSIUM SERPL-SCNC: 4.2 MMOL/L (ref 3.5–5)
PROT SERPL-MCNC: 7.8 G/DL (ref 6.4–8.3)
RBC # BLD AUTO: 4.76 M/UL (ref 3.8–5.8)
SODIUM SERPL-SCNC: 142 MMOL/L (ref 132–146)
WBC OTHER # BLD: 10.2 K/UL (ref 4.5–11.5)

## 2024-08-13 PROCEDURE — 6360000004 HC RX CONTRAST MEDICATION: Performed by: RADIOLOGY

## 2024-08-13 PROCEDURE — 96374 THER/PROPH/DIAG INJ IV PUSH: CPT

## 2024-08-13 PROCEDURE — 6360000002 HC RX W HCPCS: Performed by: EMERGENCY MEDICINE

## 2024-08-13 PROCEDURE — 80053 COMPREHEN METABOLIC PANEL: CPT

## 2024-08-13 PROCEDURE — 83605 ASSAY OF LACTIC ACID: CPT

## 2024-08-13 PROCEDURE — 87040 BLOOD CULTURE FOR BACTERIA: CPT

## 2024-08-13 PROCEDURE — 85025 COMPLETE CBC W/AUTO DIFF WBC: CPT

## 2024-08-13 PROCEDURE — 2580000003 HC RX 258: Performed by: EMERGENCY MEDICINE

## 2024-08-13 PROCEDURE — 83690 ASSAY OF LIPASE: CPT

## 2024-08-13 PROCEDURE — 74177 CT ABD & PELVIS W/CONTRAST: CPT

## 2024-08-13 PROCEDURE — 76705 ECHO EXAM OF ABDOMEN: CPT

## 2024-08-13 PROCEDURE — 99285 EMERGENCY DEPT VISIT HI MDM: CPT

## 2024-08-13 PROCEDURE — 96375 TX/PRO/DX INJ NEW DRUG ADDON: CPT

## 2024-08-13 PROCEDURE — 96361 HYDRATE IV INFUSION ADD-ON: CPT

## 2024-08-13 RX ORDER — ONDANSETRON 4 MG/1
4 TABLET, FILM COATED ORAL 3 TIMES DAILY PRN
Qty: 10 TABLET | Refills: 0 | Status: SHIPPED | OUTPATIENT
Start: 2024-08-13

## 2024-08-13 RX ORDER — 0.9 % SODIUM CHLORIDE 0.9 %
1000 INTRAVENOUS SOLUTION INTRAVENOUS ONCE
Status: COMPLETED | OUTPATIENT
Start: 2024-08-13 | End: 2024-08-13

## 2024-08-13 RX ORDER — ONDANSETRON 2 MG/ML
4 INJECTION INTRAMUSCULAR; INTRAVENOUS ONCE
Status: COMPLETED | OUTPATIENT
Start: 2024-08-13 | End: 2024-08-13

## 2024-08-13 RX ORDER — LORAZEPAM 2 MG/ML
1 INJECTION INTRAMUSCULAR ONCE
Status: COMPLETED | OUTPATIENT
Start: 2024-08-13 | End: 2024-08-13

## 2024-08-13 RX ADMIN — SODIUM CHLORIDE 1000 ML: 9 INJECTION, SOLUTION INTRAVENOUS at 14:08

## 2024-08-13 RX ADMIN — ONDANSETRON 4 MG: 2 INJECTION INTRAMUSCULAR; INTRAVENOUS at 14:09

## 2024-08-13 RX ADMIN — LORAZEPAM 1 MG: 2 INJECTION INTRAMUSCULAR; INTRAVENOUS at 16:04

## 2024-08-13 RX ADMIN — IOPAMIDOL 70 ML: 755 INJECTION, SOLUTION INTRAVENOUS at 16:33

## 2024-08-13 ASSESSMENT — ENCOUNTER SYMPTOMS
NAUSEA: 1
CHEST TIGHTNESS: 0
DIARRHEA: 0
SHORTNESS OF BREATH: 0
VOMITING: 1

## 2024-08-13 NOTE — ED PROVIDER NOTES
28-year-old male, severe developmental delay based on intracranial bleed when he was a .  Lives with his mother.  Reportedly has had a temperature of 100.4 couple of days ago, diminished oral intake, diminished wet diapers, nausea and vomiting.  No fever now, patient is at his baseline neurologically.  Mother is concerned about not being able to get nutrition into him based on his nutritional going through a PEG tube.         History reviewed. No pertinent family history.  Past Surgical History:   Procedure Laterality Date    DENTAL SURGERY  2015    xrays, restorations and extractions x1    DENTAL SURGERY  2016    DENTAL SURGERY N/A 2020    EXAM, RADIOGRAPHS, PROPHYLAXIS, DENTAL RESTORATIONS performed by Luly Gilbert DDS [place] [facility]    DENTAL SURGERY N/A 2023    DENTAL RESTORATIONS performed by Luly Gilbert DDS at [facility]    GASTROSTOMY TUBE PLACEMENT  2020    LEG TENDON SURGERY Bilateral     AR BIOPSY, EACH ADDED LESION Right 2018    EXCISION OF SOFT TISSUE NEOPLASM RIGHT LEG performed by Anselmo Delacruz MD at [facility] OR       Review of Systems   Constitutional:  Positive for fever. Negative for chills.   Respiratory:  Negative for chest tightness and shortness of breath.    Gastrointestinal:  Positive for nausea and vomiting. Negative for diarrhea.   Genitourinary:  Positive for decreased urine volume.        Physical Exam  Constitutional:       General: He is not in acute distress.     Appearance: He is well-developed.      Comments: /JOSHUA   Nonverbal  nonambulatory   HENT:      Head: Normocephalic and atraumatic.   Eyes:      Pupils: Pupils are equal, round, and reactive to light.   Neck:      Thyroid: No thyromegaly.   Cardiovascular:      Rate and Rhythm: Normal rate and regular rhythm.   Pulmonary:      Effort: Pulmonary effort is normal. No respiratory distress.      Breath sounds: Normal breath sounds. No wheezing.   Abdominal:      General: There is no

## 2024-08-13 NOTE — ED TRIAGE NOTES
Department of Emergency Medicine  FIRST PROVIDER TRIAGE NOTE             Independent MLP           8/13/24  12:45 PM EDT    Date of Encounter: 8/13/24   MRN: [id number]      HPI: Erik Daniels is a 28 y.o. male who presents to the ED for Emesis (X2 days. Patient is tube fed and is unable to keep anything down. Decreased urine and stool output )       ROS: Negative for cp, sob, abd pain, back pain, fever, cough, diarrhea, urinary complaints, rash, headache, or dizziness.    PE: Gen Appearance/Constitutional: baseline  CV: tachycardia  Pulm: CTA bilat     Initial Plan of Care: All treatment areas with department are currently occupied. Plan to order/Initiate the following while awaiting opening in ED: labs, imaging studies, analgesics, and antiemetics.  Initiate Treatment-Testing, Proceed toTreatment Area When Bed Available for ED Attending/MLP to Continue Care    Electronically signed by MIC Adams   DD: 8/13/24

## 2024-08-13 NOTE — TELEPHONE ENCOUNTER
Mother called stated patient has been throwing up for 2 days. Dr Beth wants patient to go to the ER. I informed patient's mother.

## 2024-08-18 LAB
MICROORGANISM SPEC CULT: NORMAL
MICROORGANISM SPEC CULT: NORMAL
SERVICE CMNT-IMP: NORMAL
SERVICE CMNT-IMP: NORMAL
SPECIMEN DESCRIPTION: NORMAL
SPECIMEN DESCRIPTION: NORMAL

## 2024-08-20 ENCOUNTER — OFFICE VISIT (OUTPATIENT)
Dept: PRIMARY CARE CLINIC | Age: 29
End: 2024-08-20
Payer: COMMERCIAL

## 2024-08-20 VITALS
DIASTOLIC BLOOD PRESSURE: 78 MMHG | SYSTOLIC BLOOD PRESSURE: 112 MMHG | HEIGHT: 60 IN | BODY MASS INDEX: 26.95 KG/M2 | OXYGEN SATURATION: 95 % | HEART RATE: 64 BPM | TEMPERATURE: 97 F

## 2024-08-20 DIAGNOSIS — J69.0 ASPIRATION PNEUMONIA OF BOTH LOWER LOBES, UNSPECIFIED ASPIRATION PNEUMONIA TYPE (HCC): Primary | ICD-10-CM

## 2024-08-20 DIAGNOSIS — R11.2 INTRACTABLE NAUSEA AND VOMITING: ICD-10-CM

## 2024-08-20 DIAGNOSIS — K80.20 CALCULUS OF GALLBLADDER WITHOUT CHOLECYSTITIS WITHOUT OBSTRUCTION: ICD-10-CM

## 2024-08-20 PROCEDURE — 99214 OFFICE O/P EST MOD 30 MIN: CPT | Performed by: INTERNAL MEDICINE

## 2024-08-20 RX ORDER — AZITHROMYCIN 200 MG/5ML
200 POWDER, FOR SUSPENSION ORAL DAILY
Qty: 25 ML | Refills: 0 | Status: SHIPPED | OUTPATIENT
Start: 2024-08-20 | End: 2024-08-25

## 2024-08-20 NOTE — PROGRESS NOTES
Chief Complaint   Patient presents with    ED Follow-up     Pt is an ER F/U, where he was evaluated for N/V, and abdominal discomfort and was given Zofran, CT and U/S done and results on file.        HPI:  Patient is here for follow-up of emergency room visit with complaints of intractable nausea and vomiting for 2 days.  Patient is accompanied by his mother and caregiver in his wheelchair.  He started having projectile vomiting and nausea for 2 days.  He went to the emergency room where he had some blood work which was all within reasonable range, including LFTs.  He had a CT scan of the abdomen and pelvis which showed bilateral infiltrates on lung bases suggestive of aspiration pneumonia.  He also had some gallstones and sludge that was noted and an ultrasound was recommended.  He had an ultrasound of the gallbladder which was not confirming the image seen on the CT scan.  He was treated in the emergency room and afterwards nausea and vomiting had subsided.  According to his mother he developed some diarrhea yesterday which was only for 1 day.  He also does not express himself if he is having pain.  On exam today he was moving a lot more than usual but according the family that is normal for him questionable pain  I  reviewed the CT scan results with the family and we will start him on Zithromax 200 mg daily for 5 days.  Family is denying any change in appetite or any fevers or chills lately.  He has been having some sinus drainage which is common for him to have..    Past Medical History, Surgical History, and Family History has been reviewed and updated.    Review of Systems:  Constitutional:  No fever, no fatigue, no chills, no headaches, no weight change  Dermatology:  No rash, no mole, no dry or sensitive skin  ENT:  No cough, no sore throat, no sinus pain, no runny nose, no ear pain  Cardiology:  No chest pain, no palpitations, no leg edema, no shortness of breath, no PND  Gastroenterology:  No dysphagia, no

## 2024-08-27 ENCOUNTER — PATIENT MESSAGE (OUTPATIENT)
Dept: PRIMARY CARE CLINIC | Age: 29
End: 2024-08-27

## 2024-08-28 NOTE — TELEPHONE ENCOUNTER
Pt/s mother notified at home of the need to increase his water intake and start Miralax daily, and pt's mother states that he did have a BM and she has been giving him Miralax daily.

## 2024-09-12 DIAGNOSIS — F41.9 ANXIETY: ICD-10-CM

## 2024-09-12 RX ORDER — CLONAZEPAM 0.5 MG/1
1 TABLET ORAL 3 TIMES DAILY
Qty: 180 TABLET | Refills: 2 | Status: SHIPPED | OUTPATIENT
Start: 2024-09-12

## 2024-09-18 DIAGNOSIS — G40.909 SEIZURE DISORDER (MULTI): ICD-10-CM

## 2024-09-18 DIAGNOSIS — G80.0 SPASTIC QUADRIPLEGIC CEREBRAL PALSY (HCC): ICD-10-CM

## 2024-09-18 RX ORDER — GLYCOPYRROLATE 1 MG/1
2 TABLET ORAL 3 TIMES DAILY
Qty: 180 TABLET | Refills: 3 | Status: SHIPPED | OUTPATIENT
Start: 2024-09-18

## 2024-09-18 RX ORDER — GLYCOPYRROLATE 1 MG/1
2 TABLET ORAL 3 TIMES DAILY
Qty: 180 TABLET | Refills: 3 | OUTPATIENT
Start: 2024-09-18

## 2024-09-18 RX ORDER — LAMOTRIGINE 200 MG/1
TABLET ORAL 3 TIMES DAILY
Qty: 90 TABLET | Refills: 5 | Status: SHIPPED | OUTPATIENT
Start: 2024-09-18

## 2024-10-10 ENCOUNTER — TELEPHONE (OUTPATIENT)
Dept: PRIMARY CARE CLINIC | Age: 29
End: 2024-10-10

## 2024-10-10 NOTE — TELEPHONE ENCOUNTER
Patient's mom requested Dr Beth to fill out form to renew handicap placard. I called Michele Frances and informed her document is ready for

## 2024-10-24 DIAGNOSIS — G40.909 SEIZURE DISORDER (MULTI): ICD-10-CM

## 2024-10-24 RX ORDER — LACOSAMIDE 150 MG/1
300 TABLET ORAL 2 TIMES DAILY
Qty: 120 TABLET | Refills: 5 | Status: SHIPPED | OUTPATIENT
Start: 2024-10-24

## 2024-10-24 NOTE — TELEPHONE ENCOUNTER
Name of Medication(s) Requested:  Requested Prescriptions     Pending Prescriptions Disp Refills    lactulose (CHRONULAC) 10 GM/15ML solution [Pharmacy Med Name: LACTULOSE 10 GM/15ML SOLN 10 Solution] 473 mL 2     Sig: TAKE 30 MLS BY MOUTH DAILY (WITH BREAKFAST)       Medication is on current medication list Yes    Dosage and directions were verified? Yes    Quantity verified: 90 day supply     Pharmacy Verified?  Yes    Last Appointment:  8/20/2024    Future appts:  Future Appointments   Date Time Provider Department Center   12/3/2024 12:00 PM [facility]   1/30/2025  2:00 PM Amanda Beth MD [facility] DEP        (If no appt send self scheduling link. .REFILLAPPT)  Scheduling request sent?     [] Yes  [x] No    Does patient need updated?  [] Yes  [x] No

## 2024-10-25 DIAGNOSIS — R56.9 SEIZURES (HCC): ICD-10-CM

## 2024-10-25 NOTE — TELEPHONE ENCOUNTER
Name of Medication(s) Requested:  Requested Prescriptions     Pending Prescriptions Disp Refills    baclofen (LIORESAL) 20 MG tablet 90 tablet 1     Sig: Take 1 tablet by mouth 3 times daily       Medication is on current medication list Yes    Dosage and directions were verified? Yes    Quantity verified: 90 day supply     Pharmacy Verified?  Yes    Last Appointment:  8/20/2024    Future appts:  Future Appointments   Date Time Provider Department Center   12/3/2024 12:00 PM [facility]   1/30/2025  2:00 PM Amanda Beth MD [facility] DEP        (If no appt send self scheduling link. .REFILLAPPT)  Scheduling request sent?     [] Yes  [] No    Does patient need updated?  [] Yes  [] No

## 2024-10-26 DIAGNOSIS — F41.9 ANXIETY: ICD-10-CM

## 2024-10-26 RX ORDER — LACTULOSE 10 G/15ML
20 SOLUTION ORAL
Qty: 473 ML | Refills: 2 | Status: SHIPPED | OUTPATIENT
Start: 2024-10-26

## 2024-10-28 RX ORDER — BACLOFEN 20 MG/1
20 TABLET ORAL 3 TIMES DAILY
Qty: 90 TABLET | Refills: 2 | Status: SHIPPED | OUTPATIENT
Start: 2024-10-28 | End: 2025-01-26

## 2024-10-28 RX ORDER — CITALOPRAM HYDROBROMIDE 20 MG/10ML
20 SOLUTION, ORAL ORAL DAILY
Qty: 300 ML | Refills: 2 | Status: SHIPPED | OUTPATIENT
Start: 2024-10-28

## 2024-10-28 NOTE — TELEPHONE ENCOUNTER
Name of Medication(s) Requested:  Requested Prescriptions     Pending Prescriptions Disp Refills    baclofen (LIORESAL) 20 MG tablet 90 tablet 1     Sig: Take 1 tablet by mouth 3 times daily       Medication is on current medication list Yes    Dosage and directions were verified? Yes    Quantity verified: 90 day supply     Pharmacy Verified?  Yes    Last Appointment:  8/20/2024    Future appts:  Future Appointments   Date Time Provider Department Center   12/3/2024 12:00 PM [facility]   1/30/2025  2:00 PM Amanda Beth MD [facility] DEP        (If no appt send self scheduling link. .REFILLAPPT)  Scheduling request sent?     [] Yes  [] No    Does patient need updated?  [] Yes  [] No

## 2024-10-28 NOTE — TELEPHONE ENCOUNTER
Name of Medication(s) Requested:  Requested Prescriptions     Pending Prescriptions Disp Refills    citalopram (CELEXA) 10 MG/5ML solution 300 mL 2     Sig: Take 10 mLs by mouth daily       Medication is on current medication list Yes    Dosage and directions were verified? Yes    Quantity verified: 30 day supply     Pharmacy Verified?  Yes    Last Appointment:  8/20/2024    Future appts:  Future Appointments   Date Time Provider Department Center   12/3/2024 12:00 PM Amanda Beth MD [facility] DEP   1/30/2025  2:00 PM [facility]        (If no appt send self scheduling link. .REFILLAPPT)  Scheduling request sent?     [] Yes  [] No    Does patient need updated?  [] Yes  [] No

## 2024-12-03 ENCOUNTER — OFFICE VISIT (OUTPATIENT)
Dept: PRIMARY CARE CLINIC | Age: 29
End: 2024-12-03
Payer: COMMERCIAL

## 2024-12-03 VITALS
OXYGEN SATURATION: 93 % | BODY MASS INDEX: 26.95 KG/M2 | DIASTOLIC BLOOD PRESSURE: 80 MMHG | HEART RATE: 76 BPM | HEIGHT: 60 IN | TEMPERATURE: 97.1 F | SYSTOLIC BLOOD PRESSURE: 112 MMHG

## 2024-12-03 DIAGNOSIS — F79 MENTALLY DISABLED: ICD-10-CM

## 2024-12-03 DIAGNOSIS — G40.909 NON-REFRACTORY EPILEPSY (HCC): ICD-10-CM

## 2024-12-03 DIAGNOSIS — G80.0 SPASTIC QUADRIPLEGIC CEREBRAL PALSY (HCC): Primary | ICD-10-CM

## 2024-12-03 DIAGNOSIS — Z93.1 G TUBE FEEDINGS (HCC): ICD-10-CM

## 2024-12-03 DIAGNOSIS — R79.89 ABNORMAL LIVER FUNCTION TESTS: ICD-10-CM

## 2024-12-03 DIAGNOSIS — F41.9 ANXIETY: ICD-10-CM

## 2024-12-03 PROCEDURE — 99213 OFFICE O/P EST LOW 20 MIN: CPT | Performed by: INTERNAL MEDICINE

## 2025-01-10 DIAGNOSIS — F41.9 ANXIETY: ICD-10-CM

## 2025-01-10 DIAGNOSIS — R56.9 SEIZURES (HCC): ICD-10-CM

## 2025-01-10 DIAGNOSIS — G80.0 SPASTIC QUADRIPLEGIC CEREBRAL PALSY (HCC): ICD-10-CM

## 2025-01-10 NOTE — TELEPHONE ENCOUNTER
Name of Medication(s) Requested:  Requested Prescriptions     Pending Prescriptions Disp Refills    glycopyrrolate (ROBINUL) 1 MG tablet 180 tablet 3     Sig: Take 2 tablets by mouth 3 times daily    baclofen (LIORESAL) 20 MG tablet 90 tablet 2     Sig: Take 1 tablet by mouth 3 times daily    citalopram (CELEXA) 10 MG/5ML solution 300 mL 2     Sig: Take 10 mLs by mouth daily       Medication is on current medication list Yes    Dosage and directions were verified? Yes    Quantity verified: 90 day supply     Pharmacy Verified?  Yes    Last Appointment:  12/3/2024    Future appts:  Future Appointments   Date Time Provider Department Center   1/30/2025  2:00 PM [facility]   6/5/2025  2:30 PM [facility]        (If no appt send self scheduling link. .REFILLAPPT)  Scheduling request sent?     [] Yes  [x] No    Does patient need updated?  [] Yes  [x] No

## 2025-01-13 DIAGNOSIS — F41.9 ANXIETY: ICD-10-CM

## 2025-01-13 RX ORDER — CITALOPRAM HYDROBROMIDE 20 MG/10ML
20 SOLUTION, ORAL ORAL DAILY
Qty: 300 ML | Refills: 2 | Status: SHIPPED | OUTPATIENT
Start: 2025-01-13

## 2025-01-13 RX ORDER — GLYCOPYRROLATE 1 MG/1
2 TABLET ORAL 3 TIMES DAILY
Qty: 180 TABLET | Refills: 3 | Status: SHIPPED | OUTPATIENT
Start: 2025-01-13

## 2025-01-13 RX ORDER — GLYCOPYRROLATE 1 MG/1
2 TABLET ORAL 3 TIMES DAILY
Qty: 180 TABLET | Refills: 5 | Status: SHIPPED | OUTPATIENT
Start: 2025-01-13 | End: 2025-07-12

## 2025-01-13 RX ORDER — GLYCOPYRROLATE 1 MG/1
2 TABLET ORAL 3 TIMES DAILY
COMMUNITY
Start: 2020-08-14 | End: 2025-01-13 | Stop reason: SDUPTHER

## 2025-01-13 RX ORDER — CLONAZEPAM 0.5 MG/1
1 TABLET ORAL 3 TIMES DAILY
Qty: 180 TABLET | Refills: 2 | Status: SHIPPED | OUTPATIENT
Start: 2025-01-13 | End: 2025-04-13

## 2025-01-13 RX ORDER — BACLOFEN 20 MG/1
20 TABLET ORAL 3 TIMES DAILY
Qty: 90 TABLET | Refills: 2 | Status: SHIPPED | OUTPATIENT
Start: 2025-01-13 | End: 2025-04-13

## 2025-01-13 RX ORDER — CITALOPRAM HYDROBROMIDE 10 MG/5ML
20 SOLUTION ORAL DAILY
Qty: 300 ML | Refills: 5 | Status: SHIPPED | OUTPATIENT
Start: 2025-01-13 | End: 2025-07-12

## 2025-01-13 RX ORDER — CITALOPRAM HYDROBROMIDE 10 MG/5ML
20 SOLUTION ORAL DAILY
COMMUNITY
End: 2025-01-13 | Stop reason: SDUPTHER

## 2025-01-16 DIAGNOSIS — G40.909 SEIZURE DISORDER (MULTI): ICD-10-CM

## 2025-01-17 RX ORDER — ZONISAMIDE 100 MG/1
300 CAPSULE ORAL DAILY
Qty: 90 CAPSULE | Refills: 5 | Status: SHIPPED | OUTPATIENT
Start: 2025-01-17 | End: 2025-07-16

## 2025-01-24 ENCOUNTER — HOSPITAL ENCOUNTER (OUTPATIENT)
Age: 30
Discharge: HOME | End: 2025-01-24
Payer: COMMERCIAL

## 2025-01-24 DIAGNOSIS — R79.89 ABNORMAL LIVER FUNCTION TESTS: ICD-10-CM

## 2025-01-24 LAB
ALBUMIN SERPL-MCNC: 4.5 G/DL (ref 3.5–5.2)
ALP SERPL-CCNC: 114 U/L (ref 40–129)
ALT SERPL-CCNC: 59 U/L (ref 0–40)
ANION GAP SERPL CALCULATED.3IONS-SCNC: 10 MMOL/L (ref 7–16)
AST SERPL-CCNC: 39 U/L (ref 0–39)
BILIRUB SERPL-MCNC: 0.6 MG/DL (ref 0–1.2)
BUN SERPL-MCNC: 12 MG/DL (ref 6–20)
CALCIUM SERPL-MCNC: 9.6 MG/DL (ref 8.6–10.2)
CHLORIDE SERPL-SCNC: 106 MMOL/L (ref 98–107)
CO2 SERPL-SCNC: 26 MMOL/L (ref 22–29)
CREAT SERPL-MCNC: 0.7 MG/DL (ref 0.7–1.2)
GFR, ESTIMATED: >90 ML/MIN/1.73M2
GLUCOSE SERPL-MCNC: 97 MG/DL (ref 74–99)
POTASSIUM SERPL-SCNC: 4.5 MMOL/L (ref 3.5–5)
PROT SERPL-MCNC: 7.6 G/DL (ref 6.4–8.3)
SODIUM SERPL-SCNC: 142 MMOL/L (ref 132–146)

## 2025-01-24 PROCEDURE — 36415 COLL VENOUS BLD VENIPUNCTURE: CPT

## 2025-01-24 PROCEDURE — 80053 COMPREHEN METABOLIC PANEL: CPT

## 2025-01-30 ENCOUNTER — OFFICE VISIT (OUTPATIENT)
Dept: PRIMARY CARE CLINIC | Age: 30
End: 2025-01-30
Payer: COMMERCIAL

## 2025-01-30 VITALS
HEART RATE: 88 BPM | HEIGHT: 60 IN | TEMPERATURE: 97.6 F | DIASTOLIC BLOOD PRESSURE: 78 MMHG | OXYGEN SATURATION: 94 % | SYSTOLIC BLOOD PRESSURE: 110 MMHG | BODY MASS INDEX: 26.95 KG/M2

## 2025-01-30 DIAGNOSIS — K59.04 CHRONIC IDIOPATHIC CONSTIPATION: ICD-10-CM

## 2025-01-30 DIAGNOSIS — G40.909 NON-REFRACTORY EPILEPSY (HCC): ICD-10-CM

## 2025-01-30 DIAGNOSIS — F79 MENTALLY DISABLED: ICD-10-CM

## 2025-01-30 DIAGNOSIS — G80.0 SPASTIC QUADRIPLEGIC CEREBRAL PALSY (HCC): Primary | ICD-10-CM

## 2025-01-30 DIAGNOSIS — R79.89 ABNORMAL LIVER FUNCTION TESTS: ICD-10-CM

## 2025-01-30 DIAGNOSIS — F41.9 ANXIETY: ICD-10-CM

## 2025-01-30 PROCEDURE — 99213 OFFICE O/P EST LOW 20 MIN: CPT | Performed by: INTERNAL MEDICINE

## 2025-01-30 RX ORDER — LACTULOSE 10 G/15ML
10 SOLUTION ORAL DAILY
COMMUNITY
Start: 2025-01-27

## 2025-01-30 RX ORDER — ONDANSETRON 4 MG/1
4 TABLET, FILM COATED ORAL EVERY 8 HOURS PRN
Qty: 30 TABLET | Refills: 1 | Status: SHIPPED | OUTPATIENT
Start: 2025-01-30

## 2025-01-30 SDOH — ECONOMIC STABILITY: FOOD INSECURITY: WITHIN THE PAST 12 MONTHS, THE FOOD YOU BOUGHT JUST DIDN'T LAST AND YOU DIDN'T HAVE MONEY TO GET MORE.: NEVER TRUE

## 2025-01-30 SDOH — ECONOMIC STABILITY: FOOD INSECURITY: WITHIN THE PAST 12 MONTHS, YOU WORRIED THAT YOUR FOOD WOULD RUN OUT BEFORE YOU GOT MONEY TO BUY MORE.: NEVER TRUE

## 2025-01-30 ASSESSMENT — ANXIETY QUESTIONNAIRES
7. FEELING AFRAID AS IF SOMETHING AWFUL MIGHT HAPPEN: NOT AT ALL
3. WORRYING TOO MUCH ABOUT DIFFERENT THINGS: NOT AT ALL
GAD7 TOTAL SCORE: 0
6. BECOMING EASILY ANNOYED OR IRRITABLE: NOT AT ALL
4. TROUBLE RELAXING: NOT AT ALL
2. NOT BEING ABLE TO STOP OR CONTROL WORRYING: NOT AT ALL
IF YOU CHECKED OFF ANY PROBLEMS ON THIS QUESTIONNAIRE, HOW DIFFICULT HAVE THESE PROBLEMS MADE IT FOR YOU TO DO YOUR WORK, TAKE CARE OF THINGS AT HOME, OR GET ALONG WITH OTHER PEOPLE: NOT DIFFICULT AT ALL
1. FEELING NERVOUS, ANXIOUS, OR ON EDGE: NOT AT ALL
5. BEING SO RESTLESS THAT IT IS HARD TO SIT STILL: NOT AT ALL

## 2025-01-30 ASSESSMENT — PATIENT HEALTH QUESTIONNAIRE - PHQ9
2. FEELING DOWN, DEPRESSED OR HOPELESS: NOT AT ALL
SUM OF ALL RESPONSES TO PHQ QUESTIONS 1-9: 0
SUM OF ALL RESPONSES TO PHQ9 QUESTIONS 1 & 2: 0
1. LITTLE INTEREST OR PLEASURE IN DOING THINGS: NOT AT ALL
SUM OF ALL RESPONSES TO PHQ QUESTIONS 1-9: 0

## 2025-01-30 NOTE — PROGRESS NOTES
Chief Complaint   Patient presents with    6 Month Follow-Up     Pt is a routine F/U, and has recent labs on file.        HPI:  Patient is here for follow-up of cerebral palsy , seizure disorder and depression.  He is accompanied by his mother .  He is wheelchair-bound.  He appears to be doing well, has been compliant with medications.    Blood work was discussed with family.  All appears within normal limits.  LFTs are much improved.  Denying any shortness of breath or coughing or fever or chills lately.    Patient is going on a cruise with his parents and he gets seasick so they need some Zofran.    His constipation has improved a lot since he has been using MiraLAX and lactulose.  He appears to be stable and in good spirits today..    Past Medical History, Surgical History, and Family History has been reviewed and updated.    Review of Systems:  Constitutional:  No fever, no fatigue, no chills, no headaches, no weight change  Dermatology:  No rash, no mole, no dry or sensitive skin  ENT:  No cough, no sore throat, no sinus pain, no runny nose, no ear pain  Cardiology:  No chest pain, no palpitations, no leg edema, no shortness of breath, no PND  Gastroenterology:  No dysphagia, no abdominal pain, no nausea, no vomiting, no constipation, no diarrhea, no heartburn  Musculoskeletal:  No joint pain, no leg cramps, no back pain, no muscle aches  Respiratory:  No shortness of breath, no orthopnea, no wheezing, no GARCIA, no hemoptysis  Urology:  No blood in the urine, no urinary frequency, no urinary incontinence, no urinary urgency, no nocturia, no dysuria    Vitals:    01/30/25 1414   BP: 110/78   Pulse: 88   Temp: 97.6 °F (36.4 °C)   TempSrc: Temporal   SpO2: 94%   Height: 1.524 m (5')       General:  Patient alert and oriented x 3, NAD, wheelchair-bound  HEENT:  Atraumatic, normocephalic, PERRLA, EOMI, clear conjunctiva, TMs clear, nose-clear, throat - no erythema  Neck:  Supple, no goiter, no carotid bruits, no

## 2025-01-31 ENCOUNTER — TELEPHONE (OUTPATIENT)
Dept: PRIMARY CARE CLINIC | Age: 30
End: 2025-01-31

## 2025-01-31 NOTE — TELEPHONE ENCOUNTER
Pharmacist called and stated they do not make gentamicin in a ointment, but they do make it in a solution. Please advise

## 2025-03-19 DIAGNOSIS — G40.909 SEIZURE DISORDER (MULTI): ICD-10-CM

## 2025-03-19 RX ORDER — LAMOTRIGINE 200 MG/1
200 TABLET ORAL 3 TIMES DAILY
Qty: 90 TABLET | Refills: 5 | Status: SHIPPED | OUTPATIENT
Start: 2025-03-19 | End: 2025-09-15

## 2025-04-16 ENCOUNTER — APPOINTMENT (OUTPATIENT)
Dept: CARDIOLOGY | Facility: HOSPITAL | Age: 30
End: 2025-04-16
Payer: COMMERCIAL

## 2025-04-16 ENCOUNTER — APPOINTMENT (OUTPATIENT)
Dept: RADIOLOGY | Facility: HOSPITAL | Age: 30
DRG: 871 | End: 2025-04-16
Payer: COMMERCIAL

## 2025-04-16 ENCOUNTER — HOSPITAL ENCOUNTER (INPATIENT)
Facility: HOSPITAL | Age: 30
End: 2025-04-16
Attending: STUDENT IN AN ORGANIZED HEALTH CARE EDUCATION/TRAINING PROGRAM | Admitting: INTERNAL MEDICINE
Payer: COMMERCIAL

## 2025-04-16 DIAGNOSIS — R06.02 SHORTNESS OF BREATH: ICD-10-CM

## 2025-04-16 DIAGNOSIS — R09.02 HYPOXIC: ICD-10-CM

## 2025-04-16 DIAGNOSIS — J18.9 PNEUMONIA OF LEFT LOWER LOBE DUE TO INFECTIOUS ORGANISM: Primary | ICD-10-CM

## 2025-04-16 DIAGNOSIS — J98.8 CONGESTION OF UPPER AIRWAY: ICD-10-CM

## 2025-04-16 DIAGNOSIS — G80.9 CEREBRAL PALSY, UNSPECIFIED TYPE (MULTI): ICD-10-CM

## 2025-04-16 PROBLEM — R56.9 SEIZURE (MULTI): Status: ACTIVE | Noted: 2021-10-14

## 2025-04-16 LAB
ALBUMIN SERPL BCP-MCNC: 4.6 G/DL (ref 3.4–5)
ALP SERPL-CCNC: 82 U/L (ref 33–120)
ALT SERPL W P-5'-P-CCNC: 45 U/L (ref 10–52)
ANION GAP BLDA CALCULATED.4IONS-SCNC: 7 MMO/L (ref 10–25)
ANION GAP BLDV CALCULATED.4IONS-SCNC: 7 MMOL/L (ref 10–25)
ANION GAP SERPL CALC-SCNC: 17 MMOL/L (ref 10–20)
AST SERPL W P-5'-P-CCNC: 47 U/L (ref 9–39)
BASE EXCESS BLDA CALC-SCNC: -2.5 MMOL/L (ref -2–3)
BASE EXCESS BLDV CALC-SCNC: 1.6 MMOL/L (ref -2–3)
BASOPHILS # BLD AUTO: 0.03 X10*3/UL (ref 0–0.1)
BASOPHILS NFR BLD AUTO: 0.6 %
BILIRUB DIRECT SERPL-MCNC: 0.3 MG/DL (ref 0–0.3)
BILIRUB SERPL-MCNC: 2.4 MG/DL (ref 0–1.2)
BNP SERPL-MCNC: 54 PG/ML (ref 0–99)
BODY TEMPERATURE: ABNORMAL
BODY TEMPERATURE: ABNORMAL
BUN SERPL-MCNC: 22 MG/DL (ref 6–23)
CA-I BLDA-SCNC: 1.1 MMOL/L (ref 1.1–1.33)
CA-I BLDV-SCNC: 1.22 MMOL/L (ref 1.1–1.33)
CALCIUM SERPL-MCNC: 9.3 MG/DL (ref 8.6–10.3)
CARDIAC TROPONIN I PNL SERPL HS: 4 NG/L (ref 0–20)
CARDIAC TROPONIN I PNL SERPL HS: <3 NG/L (ref 0–20)
CHLORIDE BLDA-SCNC: 107 MMOL/L (ref 98–107)
CHLORIDE BLDV-SCNC: 101 MMOL/L (ref 98–107)
CHLORIDE SERPL-SCNC: 99 MMOL/L (ref 98–107)
CO2 SERPL-SCNC: 25 MMOL/L (ref 21–32)
CREAT SERPL-MCNC: 0.87 MG/DL (ref 0.5–1.3)
EGFRCR SERPLBLD CKD-EPI 2021: >90 ML/MIN/1.73M*2
EOSINOPHIL # BLD AUTO: 0.01 X10*3/UL (ref 0–0.7)
EOSINOPHIL NFR BLD AUTO: 0.2 %
ERYTHROCYTE [DISTWIDTH] IN BLOOD BY AUTOMATED COUNT: 12.7 % (ref 11.5–14.5)
FLUAV RNA RESP QL NAA+PROBE: NOT DETECTED
FLUBV RNA RESP QL NAA+PROBE: NOT DETECTED
GLUCOSE BLDA-MCNC: 121 MG/DL (ref 74–99)
GLUCOSE BLDV-MCNC: 136 MG/DL (ref 74–99)
GLUCOSE SERPL-MCNC: 122 MG/DL (ref 74–99)
HCO3 BLDA-SCNC: 22.5 MMOL/L (ref 22–26)
HCO3 BLDV-SCNC: 28.2 MMOL/L (ref 22–26)
HCT VFR BLD AUTO: 49.9 % (ref 41–52)
HCT VFR BLD EST: 41 % (ref 41–52)
HCT VFR BLD EST: 51 % (ref 41–52)
HGB BLD-MCNC: 16.8 G/DL (ref 13.5–17.5)
HGB BLDA-MCNC: 13.7 G/DL (ref 13.5–17.5)
HGB BLDV-MCNC: 17.1 G/DL (ref 13.5–17.5)
IMM GRANULOCYTES # BLD AUTO: 0.01 X10*3/UL (ref 0–0.7)
IMM GRANULOCYTES NFR BLD AUTO: 0.2 % (ref 0–0.9)
INHALED O2 CONCENTRATION: 100 %
INHALED O2 CONCENTRATION: 29 %
LACTATE BLDA-SCNC: 1.9 MMOL/L (ref 0.4–2)
LACTATE BLDV-SCNC: 4.6 MMOL/L (ref 0.4–2)
LACTATE SERPL-SCNC: 2.7 MMOL/L (ref 0.4–2)
LACTATE SERPL-SCNC: 4.3 MMOL/L (ref 0.4–2)
LYMPHOCYTES # BLD AUTO: 1.07 X10*3/UL (ref 1.2–4.8)
LYMPHOCYTES NFR BLD AUTO: 22.6 %
MAGNESIUM SERPL-MCNC: 2.3 MG/DL (ref 1.6–2.4)
MCH RBC QN AUTO: 33.3 PG (ref 26–34)
MCHC RBC AUTO-ENTMCNC: 33.7 G/DL (ref 32–36)
MCV RBC AUTO: 99 FL (ref 80–100)
MONOCYTES # BLD AUTO: 0.25 X10*3/UL (ref 0.1–1)
MONOCYTES NFR BLD AUTO: 5.3 %
MRSA DNA SPEC QL NAA+PROBE: NOT DETECTED
NEUTROPHILS # BLD AUTO: 3.37 X10*3/UL (ref 1.2–7.7)
NEUTROPHILS NFR BLD AUTO: 71.1 %
NRBC BLD-RTO: 0 /100 WBCS (ref 0–0)
OXYHGB MFR BLDA: 92.3 % (ref 94–98)
OXYHGB MFR BLDV: 35.6 % (ref 45–75)
PCO2 BLDA: 39 MM HG (ref 38–42)
PCO2 BLDV: 50 MM HG (ref 41–51)
PH BLDA: 7.37 PH (ref 7.38–7.42)
PH BLDV: 7.36 PH (ref 7.33–7.43)
PLATELET # BLD AUTO: 186 X10*3/UL (ref 150–450)
PO2 BLDA: 70 MM HG (ref 85–95)
PO2 BLDV: 31 MM HG (ref 35–45)
POTASSIUM BLDA-SCNC: 3.1 MMOL/L (ref 3.5–5.3)
POTASSIUM BLDV-SCNC: 3.9 MMOL/L (ref 3.5–5.3)
POTASSIUM SERPL-SCNC: 5.3 MMOL/L (ref 3.5–5.3)
PROT SERPL-MCNC: 8.3 G/DL (ref 6.4–8.2)
RBC # BLD AUTO: 5.04 X10*6/UL (ref 4.5–5.9)
RSV RNA RESP QL NAA+PROBE: NOT DETECTED
SAO2 % BLDA: 96 % (ref 94–100)
SAO2 % BLDV: 36 % (ref 45–75)
SARS-COV-2 RNA RESP QL NAA+PROBE: NOT DETECTED
SODIUM BLDA-SCNC: 133 MMOL/L (ref 136–145)
SODIUM BLDV-SCNC: 132 MMOL/L (ref 136–145)
SODIUM SERPL-SCNC: 136 MMOL/L (ref 136–145)
WBC # BLD AUTO: 4.7 X10*3/UL (ref 4.4–11.3)

## 2025-04-16 PROCEDURE — 96367 TX/PROPH/DG ADDL SEQ IV INF: CPT | Mod: 59

## 2025-04-16 PROCEDURE — 96365 THER/PROPH/DIAG IV INF INIT: CPT | Mod: 59

## 2025-04-16 PROCEDURE — 96375 TX/PRO/DX INJ NEW DRUG ADDON: CPT | Mod: 59

## 2025-04-16 PROCEDURE — 83880 ASSAY OF NATRIURETIC PEPTIDE: CPT

## 2025-04-16 PROCEDURE — 87641 MR-STAPH DNA AMP PROBE: CPT

## 2025-04-16 PROCEDURE — 84132 ASSAY OF SERUM POTASSIUM: CPT

## 2025-04-16 PROCEDURE — 93005 ELECTROCARDIOGRAM TRACING: CPT

## 2025-04-16 PROCEDURE — 2500000005 HC RX 250 GENERAL PHARMACY W/O HCPCS: Performed by: INTERNAL MEDICINE

## 2025-04-16 PROCEDURE — 84484 ASSAY OF TROPONIN QUANT: CPT

## 2025-04-16 PROCEDURE — 87040 BLOOD CULTURE FOR BACTERIA: CPT | Mod: GEALAB

## 2025-04-16 PROCEDURE — 2500000004 HC RX 250 GENERAL PHARMACY W/ HCPCS (ALT 636 FOR OP/ED): Mod: JZ

## 2025-04-16 PROCEDURE — 2500000005 HC RX 250 GENERAL PHARMACY W/O HCPCS: Performed by: STUDENT IN AN ORGANIZED HEALTH CARE EDUCATION/TRAINING PROGRAM

## 2025-04-16 PROCEDURE — 5A0945A ASSISTANCE WITH RESPIRATORY VENTILATION, 24-96 CONSECUTIVE HOURS, HIGH NASAL FLOW/VELOCITY: ICD-10-PCS | Performed by: INTERNAL MEDICINE

## 2025-04-16 PROCEDURE — 83605 ASSAY OF LACTIC ACID: CPT

## 2025-04-16 PROCEDURE — 71045 X-RAY EXAM CHEST 1 VIEW: CPT | Performed by: RADIOLOGY

## 2025-04-16 PROCEDURE — 36415 COLL VENOUS BLD VENIPUNCTURE: CPT

## 2025-04-16 PROCEDURE — 71045 X-RAY EXAM CHEST 1 VIEW: CPT

## 2025-04-16 PROCEDURE — 94760 N-INVAS EAR/PLS OXIMETRY 1: CPT

## 2025-04-16 PROCEDURE — 85025 COMPLETE CBC W/AUTO DIFF WBC: CPT

## 2025-04-16 PROCEDURE — 87637 SARSCOV2&INF A&B&RSV AMP PRB: CPT

## 2025-04-16 PROCEDURE — 96361 HYDRATE IV INFUSION ADD-ON: CPT | Mod: 59

## 2025-04-16 PROCEDURE — 31720 CLEARANCE OF AIRWAYS: CPT

## 2025-04-16 PROCEDURE — 2500000004 HC RX 250 GENERAL PHARMACY W/ HCPCS (ALT 636 FOR OP/ED)

## 2025-04-16 PROCEDURE — 83735 ASSAY OF MAGNESIUM: CPT

## 2025-04-16 PROCEDURE — 36600 WITHDRAWAL OF ARTERIAL BLOOD: CPT

## 2025-04-16 PROCEDURE — 99291 CRITICAL CARE FIRST HOUR: CPT

## 2025-04-16 PROCEDURE — 82248 BILIRUBIN DIRECT: CPT

## 2025-04-16 RX ORDER — ONDANSETRON HYDROCHLORIDE 2 MG/ML
4 INJECTION, SOLUTION INTRAVENOUS ONCE
Status: COMPLETED | OUTPATIENT
Start: 2025-04-16 | End: 2025-04-16

## 2025-04-16 RX ORDER — VANCOMYCIN HYDROCHLORIDE 1 G/20ML
INJECTION, POWDER, LYOPHILIZED, FOR SOLUTION INTRAVENOUS DAILY PRN
Status: DISCONTINUED | OUTPATIENT
Start: 2025-04-16 | End: 2025-04-16

## 2025-04-16 RX ORDER — KETOROLAC TROMETHAMINE 15 MG/ML
15 INJECTION, SOLUTION INTRAMUSCULAR; INTRAVENOUS ONCE
Status: COMPLETED | OUTPATIENT
Start: 2025-04-16 | End: 2025-04-16

## 2025-04-16 RX ORDER — ONDANSETRON HYDROCHLORIDE 2 MG/ML
INJECTION, SOLUTION INTRAVENOUS
Status: COMPLETED
Start: 2025-04-16 | End: 2025-04-16

## 2025-04-16 RX ADMIN — SODIUM CHLORIDE 1000 ML: 0.9 INJECTION, SOLUTION INTRAVENOUS at 21:12

## 2025-04-16 RX ADMIN — Medication 12 L/MIN: at 20:49

## 2025-04-16 RX ADMIN — SODIUM CHLORIDE 1000 ML: 0.9 INJECTION, SOLUTION INTRAVENOUS at 22:14

## 2025-04-16 RX ADMIN — Medication 60 L/MIN: at 23:06

## 2025-04-16 RX ADMIN — ONDANSETRON HYDROCHLORIDE 4 MG: 2 INJECTION, SOLUTION INTRAVENOUS at 21:17

## 2025-04-16 RX ADMIN — SODIUM CHLORIDE, SODIUM LACTATE, POTASSIUM CHLORIDE, AND CALCIUM CHLORIDE 1000 ML: .6; .31; .03; .02 INJECTION, SOLUTION INTRAVENOUS at 18:57

## 2025-04-16 RX ADMIN — KETOROLAC TROMETHAMINE 15 MG: 15 INJECTION, SOLUTION INTRAMUSCULAR; INTRAVENOUS at 19:23

## 2025-04-16 RX ADMIN — AZITHROMYCIN MONOHYDRATE 500 MG: 500 INJECTION, POWDER, LYOPHILIZED, FOR SOLUTION INTRAVENOUS at 20:37

## 2025-04-16 RX ADMIN — ONDANSETRON 4 MG: 2 INJECTION, SOLUTION INTRAMUSCULAR; INTRAVENOUS at 21:17

## 2025-04-16 RX ADMIN — PIPERACILLIN SODIUM AND TAZOBACTAM SODIUM 4.5 G: 4; .5 INJECTION, SOLUTION INTRAVENOUS at 19:23

## 2025-04-16 ASSESSMENT — LIFESTYLE VARIABLES
HAVE PEOPLE ANNOYED YOU BY CRITICIZING YOUR DRINKING: NO
HAVE YOU EVER FELT YOU SHOULD CUT DOWN ON YOUR DRINKING: NO
EVER HAD A DRINK FIRST THING IN THE MORNING TO STEADY YOUR NERVES TO GET RID OF A HANGOVER: NO
TOTAL SCORE: 0
EVER FELT BAD OR GUILTY ABOUT YOUR DRINKING: NO

## 2025-04-16 ASSESSMENT — COLUMBIA-SUICIDE SEVERITY RATING SCALE - C-SSRS
2. HAVE YOU ACTUALLY HAD ANY THOUGHTS OF KILLING YOURSELF?: NO
6. HAVE YOU EVER DONE ANYTHING, STARTED TO DO ANYTHING, OR PREPARED TO DO ANYTHING TO END YOUR LIFE?: NO
1. IN THE PAST MONTH, HAVE YOU WISHED YOU WERE DEAD OR WISHED YOU COULD GO TO SLEEP AND NOT WAKE UP?: NO

## 2025-04-16 ASSESSMENT — PAIN SCALES - WONG BAKER: WONGBAKER_NUMERICALRESPONSE: NO HURT

## 2025-04-16 ASSESSMENT — ENCOUNTER SYMPTOMS
FEVER: 1
CONSTIPATION: 1
COUGH: 1

## 2025-04-16 ASSESSMENT — PAIN - FUNCTIONAL ASSESSMENT: PAIN_FUNCTIONAL_ASSESSMENT: WONG-BAKER FACES

## 2025-04-16 NOTE — ED TRIAGE NOTES
Patient with a hx of cerebral palsy brought in by his parents today for fevers, cough, congestion, SOB and hypoxia, patients parents state he was in the 70s on their home pulse ox. Patient has a hx of aspirating and seizures, he did have multiple seizures yesterday and last night. Patient was 83% on RA, patient put on 4L NC.

## 2025-04-16 NOTE — ED PROVIDER NOTES
HPI   Chief Complaint   Patient presents with   • Shortness of Breath       Patient is a 29-year-old male with significant history of epilepsy, cerebral palsy presents to the ED for shortness of breath times last night.  Patient has had some congestion and productive cough unknown color of sputum.  Family members have had URIs around him.  Patient had a fever last night 102.  Patient did have a seizure last night.  Patient takes seizure meds compliantly.  Patient's PO2 at home was 77% on room air.  Patient does not normally wear oxygen.  Patient has no history of asthma.  Patient has no history of blood clots recent travel or surgery.  Patient is nonverbal history obtained by family at bedside.  Family deny any vomiting or diarrhea.  Patient has been constipated has not had a bowel movement in 4 days.              Patient History   Medical History[1]  Surgical History[2]  Family History[3]  Social History[4]    Physical Exam   ED Triage Vitals [04/16/25 1823]   Temperature Heart Rate Respirations BP   37.2 °C (99 °F) (!) 106 20 96/79      Pulse Ox Temp Source Heart Rate Source Patient Position   (!) 83 % Temporal Monitor --      BP Location FiO2 (%)     -- --       Physical Exam  Cardiovascular:      Rate and Rhythm: Regular rhythm. Tachycardia present.   Pulmonary:      Effort: Pulmonary effort is normal.      Breath sounds: Decreased breath sounds and rales present.   Abdominal:      Palpations: Abdomen is soft.      Tenderness: There is no abdominal tenderness. There is no guarding or rebound.   Musculoskeletal:      Right lower leg: No edema.      Left lower leg: No edema.   Neurological:      Mental Status: He is alert.           ED Course & [facility]   ED Course as of 04/17/25 0008   Wed Apr 16, 2025 1827 EKG performed at 625 sinus tachycardia normal axis no acute signs of ischemia ventricular rate 104 bpm []   2010 ED Attending Attestation: 29-year-old male with history of MRDD, seizures, history of aspiration  presenting to the emergency department with low pulse ox at home.  Parents state he was febrile last night, had questionable seizure activity.  Has been slow to respond today and lethargic.  On arrival here he is hypoxic, tachycardic.  Concern for sepsis.  He has significant rhonchorous breath sounds bilaterally.  Concern for pneumonia.  Chest x-ray concerning for pneumonia as well.  Sepsis protocol activated, blood cultures obtained.  He is beginning to act more normal per parents with IV fluids and as his oxygen comes up.  Eventually, does need high flow oxygen.  Will admit to stepdown unit for further management of his pneumonia. [SH]   2023 SEP-1 CORE MEASURE DATA    I suspected septic shock evidenced by acute circulatory dysfunction with lactate > 4 on 4/16/2025  6:23 PM.    /81   Pulse (!) 106   Temp 37.2 °C (99 °F) (Temporal)   Resp 20   SpO2 (!) 88%      Lab Results       Component                Value               Date/Time                  WBC                      4.7                 04/16/2025 1858            Lactate                  4.3 (HH)            04/16/2025 1858            POCT Lactate, Venous     4.6 (HH)            04/16/2025 1858            Creatinine               0.87                04/16/2025 1858            Bilirubin, Total         2.4 (H)             04/16/2025 1858            Platelets                186                 04/16/2025 1858            POCT pH, Venous          7.36                04/16/2025 1858            POCT Base Excess, V*     1.6                 04/16/2025 1858            Glucose                  122 (H)             04/16/2025 1858            POCT Glucose, Venous     136 (H)             04/16/2025 1858          A targeted fluid bolus of at least 30ml/kg based on entered actual body weight at time of triage was given.    Suspected infection source   Pulmonary    Patient recently received an antibiotic (last 24 hours)     Date/Time Action Medication Dose     04/16/25 1923 New Bag    piperacillin-tazobactam (Zosyn) 4.5 g in dextrose (iso)  mL 4.5 g     I performed a sepsis reperfusion exam on Juan Carlos Ybarra on 04/16/25 at 19:20 PM   []   u Apr 17, 2025   0007 Patient's blood pressure had decreased and updated the hospitalist, also up to the hospitalist that patient maxed on Airvo at 92%.  Discussed possible ICU admission instead of stepdown however the hospitalist would like to keep patient in stepdown and add a 4th L of fluids. []      ED Course User Index  [] Racheal Galan PA-C  [] Roc Cole MD         Diagnoses as of 04/17/25 0008   Pneumonia of left lower lobe due to infectious organism   Shortness of breath   Hypoxic                 No data recorded     Kelli Coma Scale Score: 10 (04/16/25 1824 : cO Umanzor RN)                           Medical Decision Making  Medical Decision Making:  Patient presented as described in HPI. Patient case including ROS, PE, and treatment and plan discussed with ED attending if attached as cosigner. Due to patients presentation orders completed include as documented.  Patient presents to the ED for shortness of breath times last night.  Patient has had a fever highest temp was 102.  Patient has been taking Tylenol and Mucinex for this.  Patient has had URI symptoms.  Patient is 83% on room air and placed on 6 L of oxygen.  Rales diffusely diminished breath sounds.  Abdomen is soft nontender pending labs and imaging started on IV antibiotics sepsis protocol was placed.  Respiratory: Started on high flow.  Lactate is 4.3 patient given 2 L of fluids.  No leukocytosis COVID flu RSV negative x-ray does show pneumonia.  Pending repeat lactate.  Patient family educated his findings patient will need admission.  Spoke to the hospitalist who accepts the patient to stepdown.  Patient nhung stable in the ER pending admission.  Patient was advised to follow up with PCP or recommended provider in 2-3 days for  another evaluation and exam. I advised patient/guardian to return or go to closest emergency room immediately if symptoms change, get worse, new symptoms develop prior to follow up. If there is no improvement in symptoms in the next 24 hours they are advised to return for further evaluation and exam. I also explained the plan and treatment course. Patient/guardian is in agreement with plan, treatment course, and follow up and states verbally that they will comply.      Patient care discussed with: N/A  Social Determinants affecting care: N/A    Final diagnosis and disposition as below.  See CI    Hospitalize. I discussed the differential; results and admit plan with the patient and/or family/friend/caregiver if present.  I emphasized the importance of hospitalization need for re-evaluation/continued monitoring/interventions.. They agreed that if they feel their condition is worsening or if they have any other concern they should alert staff immediately for further assistance. I gave the patient an opportunity to ask all questions they had and answered all of them accordingly. The patient and/or family/friend/caregiver expressed understanding verbally and that they would comply.       Disposition:  admit    Hospitalize. Discussed findings and treatment done here in ED with admitting physician. It would be a risk to discharge the patient in their condition due to possibility of deterioration in their condition and the need for urgent interventions.    This note has been transcribed using voice recognition and may contain grammatical errors, misplaced words, incorrect words, incorrect phrases or other errors.        Labs Reviewed   CBC WITH AUTO DIFFERENTIAL - Abnormal       Result Value    WBC 4.7      nRBC 0.0      RBC 5.04      Hemoglobin 16.8      Hematocrit 49.9      MCV 99      MCH 33.3      MCHC 33.7      RDW 12.7      Platelets 186      Neutrophils % 71.1      Immature Granulocytes %, Automated 0.2       Lymphocytes % 22.6      Monocytes % 5.3      Eosinophils % 0.2      Basophils % 0.6      Neutrophils Absolute 3.37      Immature Granulocytes Absolute, Automated 0.01      Lymphocytes Absolute 1.07 (*)     Monocytes Absolute 0.25      Eosinophils Absolute 0.01      Basophils Absolute 0.03     COMPREHENSIVE METABOLIC PANEL - Abnormal    Glucose 122 (*)     Sodium 136      Potassium 5.3      Chloride 99      Bicarbonate 25      Anion Gap 17      Urea Nitrogen 22      Creatinine 0.87      eGFR >90      Calcium 9.3      Albumin 4.6      Alkaline Phosphatase 82      Total Protein 8.3 (*)     AST 47 (*)     Bilirubin, Total 2.4 (*)     ALT 45     LACTATE - Abnormal    Lactate 4.3 (*)     Narrative:     Venipuncture immediately after or during the administration of Metamizole may lead to falsely low results. Testing should be performed immediately prior to Metamizole dosing.   BLOOD GAS VENOUS FULL PANEL - Abnormal    POCT pH, Venous 7.36      POCT pCO2, Venous 50      POCT pO2, Venous 31 (*)     POCT SO2, Venous 36 (*)     POCT Oxy Hemoglobin, Venous 35.6 (*)     POCT Hematocrit Calculated, Venous 51.0      POCT Sodium, Venous 132 (*)     POCT Potassium, Venous 3.9      POCT Chloride, Venous 101      POCT Ionized Calicum, Venous 1.22      POCT Glucose, Venous 136 (*)     POCT Lactate, Venous 4.6 (*)     POCT Base Excess, Venous 1.6      POCT HCO3 Calculated, Venous 28.2 (*)     POCT Hemoglobin, Venous 17.1      POCT Anion Gap, Venous 7.0 (*)     Patient Temperature        FiO2 29     MRSA SURVEILLANCE FOR VANCOMYCIN DE-ESCALATION, PCR - Normal    MRSA PCR Not Detected      Narrative:     This assay is an FDA-approved in vitro diagnostic nucleic acid amplification test for the detection of methicillin-resistant Staphylococcus aureus (MRSA) DNA directly from nasal swabs in patients at risk for nasal colonization. MRSA NxG is intended to aid in the prevention and control of MRSA infections in healthcare settings. This assay  is NOT intended to diagnose, guide, or monitor treatment for MRSA infections, or provide results of susceptibility to methicillin. A negative result does not preclude MRSA nasal colonization. Test performance has not been evaluated in patients less than two years of age.   SARS-COV-2 AND INFLUENZA A/B PCR - Normal    Flu A Result Not Detected      Flu B Result Not Detected      Coronavirus 2019, PCR Not Detected      Narrative:     This assay is an FDA-cleared, in vitro diagnostic nucleic acid amplification test for the qualitative detection and differentiation of SARS CoV-2/ Influenza A/B from nasopharyngeal specimens collected from individuals with signs and symptoms of respiratory tract infections, and has been validated for use at [facility]. Negative results do not preclude COVID-19/ Influenza A/B infections and should not be used as the sole basis for diagnosis, treatment, or other management decisions. Testing for SARS CoV-2 is recommended only for patients who meet current clinical and/or epidemiological criteria defined by federal, state, or local public health directives.   MAGNESIUM - Normal    Magnesium 2.30     B-TYPE NATRIURETIC PEPTIDE - Normal    BNP 54      Narrative:        <100 pg/mL - Heart failure unlikely  100-299 pg/mL - Intermediate probability of acute heart                  failure exacerbation. Correlate with clinical                  context and patient history.    >=300 pg/mL - Heart Failure likely. Correlate with clinical                  context and patient history.    BNP testing is performed using different testing methodology at [facility] than at other [facility] hospitals. Direct result comparisons should only be made within the same method.      SERIAL TROPONIN-INITIAL - Normal    Troponin I, High Sensitivity 4      Narrative:     Less than 99th percentile of normal range cutoff-  Female and children under 18 years old <14 ng/L; Male <21 ng/L:  Negative  Repeat testing should be performed if clinically indicated.     Female and children under 18 years old 14-50 ng/L; Male 21-50 ng/L:  Consistent with possible cardiac damage and possible increased clinical   risk. Serial measurements may help to assess extent of myocardial damage.     >50 ng/L: Consistent with cardiac damage, increased clinical risk and  myocardial infarction. Serial measurements may help assess extent of   myocardial damage.      NOTE: Children less than 1 year old may have higher baseline troponin   levels and results should be interpreted in conjunction with the overall   clinical context.     NOTE: Troponin I testing is performed using a different   testing methodology at [facility] than at other   [facility]. Direct result comparisons should only   be made within the same method.   RSV PCR - Normal    RSV PCR Not Detected      Narrative:     This assay is an FDA-cleared, in vitro diagnostic nucleic acid amplification test for the detection of RSV from nasopharyngeal specimens, and has been validated for use at [facility]. Negative results do not preclude RSV infections, and should not be used as the sole basis for diagnosis, treatment, or other management decisions. If Influenza A/B and RSV PCR results are negative, testing for Parainfluenza virus, Adenovirus and Metapneumovirus is routinely performed for pediatric oncology and intensive care inpatients at [facility], and is available on other patients by placing an add-on request.       BLOOD CULTURE   BLOOD CULTURE   URINALYSIS WITH REFLEX CULTURE AND MICROSCOPIC    Narrative:     The following orders were created for panel order Urinalysis with Reflex Culture and Microscopic.  Procedure                               Abnormality         Status                     ---------                               -----------         ------                     Urinalysis with Reflex C...[[id number]]                                                  Extra Urine Gray Tube[[id number]]                                                         Please view results for these tests on the individual orders.   URINALYSIS WITH REFLEX CULTURE AND MICROSCOPIC   EXTRA URINE GRAY TUBE   TROPONIN SERIES- (INITIAL, 1 HR)    Narrative:     The following orders were created for panel order Troponin I Series, High Sensitivity (0, 1 HR).  Procedure                               Abnormality         Status                     ---------                               -----------         ------                     Troponin I, High Sensiti...[[id number]]  Normal              Final result               Troponin, High Sensitivi...[[id number]]                                                   Please view results for these tests on the individual orders.   SERIAL TROPONIN, 1 HOUR   BLOOD GAS LACTIC ACID, VENOUS   LACTATE      XR chest 1 view   Final Result   Airspace opacities asymmetric in the left lung compatible with   pneumonia; short-term progress imaging is recommended after treatment   to assure resolution and exclude other underlying pathology.        MACRO:   None        Signed by: Samuel Joshi 4/16/2025 7:02 PM   Dictation workstation:   [id number]           Procedure  Procedures       Racheal Galan PA-C  04/16/25 2023         [1]  No past medical history on file.  [2]  No past surgical history on file.  [3]  No family history on file.  [4]  Social History  Tobacco Use   • Smoking status: Not on file   • Smokeless tobacco: Not on file   Substance Use Topics   • Alcohol use: Not on file   • Drug use: Not on file      Racheal Galan PA-C  04/17/25 0008

## 2025-04-17 ENCOUNTER — APPOINTMENT (OUTPATIENT)
Dept: RADIOLOGY | Facility: HOSPITAL | Age: 30
DRG: 871 | End: 2025-04-17
Payer: COMMERCIAL

## 2025-04-17 ENCOUNTER — APPOINTMENT (OUTPATIENT)
Dept: RADIOLOGY | Facility: HOSPITAL | Age: 30
End: 2025-04-17
Payer: COMMERCIAL

## 2025-04-17 LAB
ALBUMIN SERPL BCP-MCNC: 3 G/DL (ref 3.4–5)
ALBUMIN SERPL BCP-MCNC: 3.5 G/DL (ref 3.4–5)
ALP SERPL-CCNC: 51 U/L (ref 33–120)
ALP SERPL-CCNC: 63 U/L (ref 33–120)
ALT SERPL W P-5'-P-CCNC: 25 U/L (ref 10–52)
ALT SERPL W P-5'-P-CCNC: 31 U/L (ref 10–52)
ANION GAP BLDA CALCULATED.4IONS-SCNC: 8 MMO/L (ref 10–25)
ANION GAP BLDV CALCULATED.4IONS-SCNC: 4 MMOL/L (ref 10–25)
ANION GAP SERPL CALC-SCNC: 12 MMOL/L (ref 10–20)
ANION GAP SERPL CALC-SCNC: 13 MMOL/L (ref 10–20)
APPARATUS: ABNORMAL
APPEARANCE UR: CLEAR
ARTERIAL PATENCY WRIST A: POSITIVE
AST SERPL W P-5'-P-CCNC: 17 U/L (ref 9–39)
AST SERPL W P-5'-P-CCNC: 21 U/L (ref 9–39)
BASE EXCESS BLDA CALC-SCNC: -1.7 MMOL/L (ref -2–3)
BASE EXCESS BLDV CALC-SCNC: 0.7 MMOL/L (ref -2–3)
BASOPHILS # BLD MANUAL: 0 X10*3/UL (ref 0–0.1)
BASOPHILS NFR BLD MANUAL: 0 %
BILIRUB SERPL-MCNC: 0.9 MG/DL (ref 0–1.2)
BILIRUB SERPL-MCNC: 1.4 MG/DL (ref 0–1.2)
BILIRUB UR STRIP.AUTO-MCNC: NEGATIVE MG/DL
BODY TEMPERATURE: ABNORMAL
BODY TEMPERATURE: ABNORMAL
BUN SERPL-MCNC: 11 MG/DL (ref 6–23)
BUN SERPL-MCNC: 13 MG/DL (ref 6–23)
CA-I BLDA-SCNC: 1.17 MMOL/L (ref 1.1–1.33)
CA-I BLDV-SCNC: 1.21 MMOL/L (ref 1.1–1.33)
CALCIUM SERPL-MCNC: 7.2 MG/DL (ref 8.6–10.3)
CALCIUM SERPL-MCNC: 7.6 MG/DL (ref 8.6–10.3)
CHLORIDE BLDA-SCNC: 106 MMOL/L (ref 98–107)
CHLORIDE BLDV-SCNC: 104 MMOL/L (ref 98–107)
CHLORIDE SERPL-SCNC: 105 MMOL/L (ref 98–107)
CHLORIDE SERPL-SCNC: 106 MMOL/L (ref 98–107)
CO2 SERPL-SCNC: 22 MMOL/L (ref 21–32)
CO2 SERPL-SCNC: 22 MMOL/L (ref 21–32)
COLOR UR: YELLOW
CREAT SERPL-MCNC: 0.51 MG/DL (ref 0.5–1.3)
CREAT SERPL-MCNC: 0.57 MG/DL (ref 0.5–1.3)
EGFRCR SERPLBLD CKD-EPI 2021: >90 ML/MIN/1.73M*2
EGFRCR SERPLBLD CKD-EPI 2021: >90 ML/MIN/1.73M*2
EOSINOPHIL # BLD MANUAL: 0 X10*3/UL (ref 0–0.7)
EOSINOPHIL NFR BLD MANUAL: 0 %
ERYTHROCYTE [DISTWIDTH] IN BLOOD BY AUTOMATED COUNT: 12.6 % (ref 11.5–14.5)
ERYTHROCYTE [DISTWIDTH] IN BLOOD BY AUTOMATED COUNT: 12.8 % (ref 11.5–14.5)
GLUCOSE BLD MANUAL STRIP-MCNC: 101 MG/DL (ref 74–99)
GLUCOSE BLD MANUAL STRIP-MCNC: 130 MG/DL (ref 74–99)
GLUCOSE BLD MANUAL STRIP-MCNC: 137 MG/DL (ref 74–99)
GLUCOSE BLD MANUAL STRIP-MCNC: 79 MG/DL (ref 74–99)
GLUCOSE BLDA-MCNC: 116 MG/DL (ref 74–99)
GLUCOSE BLDV-MCNC: 120 MG/DL (ref 74–99)
GLUCOSE SERPL-MCNC: 121 MG/DL (ref 74–99)
GLUCOSE SERPL-MCNC: 93 MG/DL (ref 74–99)
GLUCOSE UR STRIP.AUTO-MCNC: NORMAL MG/DL
HCO3 BLDA-SCNC: 23 MMOL/L (ref 22–26)
HCO3 BLDV-SCNC: 25.4 MMOL/L (ref 22–26)
HCT VFR BLD AUTO: 35.1 % (ref 41–52)
HCT VFR BLD AUTO: 44.6 % (ref 41–52)
HCT VFR BLD EST: 35 % (ref 41–52)
HCT VFR BLD EST: 38 % (ref 41–52)
HGB BLD-MCNC: 11.6 G/DL (ref 13.5–17.5)
HGB BLD-MCNC: 14.2 G/DL (ref 13.5–17.5)
HGB BLDA-MCNC: 11.6 G/DL (ref 13.5–17.5)
HGB BLDV-MCNC: 12.7 G/DL (ref 13.5–17.5)
IMM GRANULOCYTES # BLD AUTO: 0.03 X10*3/UL (ref 0–0.7)
IMM GRANULOCYTES NFR BLD AUTO: 0.9 % (ref 0–0.9)
INHALED O2 CONCENTRATION: 44 %
INHALED O2 CONCENTRATION: 90 %
KETONES UR STRIP.AUTO-MCNC: NEGATIVE MG/DL
LACTATE BLDA-SCNC: 3.7 MMOL/L (ref 0.4–2)
LACTATE BLDV-SCNC: 2.8 MMOL/L (ref 0.4–2)
LACTATE BLDV-SCNC: 2.8 MMOL/L (ref 0.4–2)
LACTATE SERPL-SCNC: 2.2 MMOL/L (ref 0.4–2)
LACTATE SERPL-SCNC: 2.7 MMOL/L (ref 0.4–2)
LACTATE SERPL-SCNC: 3.5 MMOL/L (ref 0.4–2)
LEUKOCYTE ESTERASE UR QL STRIP.AUTO: NEGATIVE
LYMPHOCYTES # BLD MANUAL: 1.43 X10*3/UL (ref 1.2–4.8)
LYMPHOCYTES NFR BLD MANUAL: 42 %
MAGNESIUM SERPL-MCNC: 1.54 MG/DL (ref 1.6–2.4)
MAGNESIUM SERPL-MCNC: 1.6 MG/DL (ref 1.6–2.4)
MCH RBC QN AUTO: 32.8 PG (ref 26–34)
MCH RBC QN AUTO: 33.1 PG (ref 26–34)
MCHC RBC AUTO-ENTMCNC: 31.8 G/DL (ref 32–36)
MCHC RBC AUTO-ENTMCNC: 33 G/DL (ref 32–36)
MCV RBC AUTO: 104 FL (ref 80–100)
MCV RBC AUTO: 99 FL (ref 80–100)
MONOCYTES # BLD MANUAL: 0.17 X10*3/UL (ref 0.1–1)
MONOCYTES NFR BLD MANUAL: 5 %
NEUTROPHILS # BLD MANUAL: 1.8 X10*3/UL (ref 1.2–7.7)
NEUTS BAND # BLD MANUAL: 1.02 X10*3/UL (ref 0–0.7)
NEUTS BAND NFR BLD MANUAL: 30 %
NEUTS SEG # BLD MANUAL: 0.78 X10*3/UL (ref 1.2–7)
NEUTS SEG NFR BLD MANUAL: 23 %
NITRITE UR QL STRIP.AUTO: NEGATIVE
NRBC BLD-RTO: 0 /100 WBCS (ref 0–0)
NRBC BLD-RTO: 0 /100 WBCS (ref 0–0)
OXYHGB MFR BLDA: 90.8 % (ref 94–98)
OXYHGB MFR BLDV: 92.2 % (ref 45–75)
PCO2 BLDA: 38 MM HG (ref 38–42)
PCO2 BLDV: 40 MM HG (ref 41–51)
PH BLDA: 7.39 PH (ref 7.38–7.42)
PH BLDV: 7.41 PH (ref 7.33–7.43)
PH UR STRIP.AUTO: 7 [PH]
PHOSPHATE SERPL-MCNC: 1.9 MG/DL (ref 2.5–4.9)
PHOSPHATE SERPL-MCNC: 2.3 MG/DL (ref 2.5–4.9)
PLATELET # BLD AUTO: 138 X10*3/UL (ref 150–450)
PLATELET # BLD AUTO: 143 X10*3/UL (ref 150–450)
PO2 BLDA: 58 MM HG (ref 85–95)
PO2 BLDV: 61 MM HG (ref 35–45)
POTASSIUM BLDA-SCNC: 3.2 MMOL/L (ref 3.5–5.3)
POTASSIUM BLDV-SCNC: 3.4 MMOL/L (ref 3.5–5.3)
POTASSIUM SERPL-SCNC: 3.1 MMOL/L (ref 3.5–5.3)
POTASSIUM SERPL-SCNC: 3.4 MMOL/L (ref 3.5–5.3)
PROT SERPL-MCNC: 5.3 G/DL (ref 6.4–8.2)
PROT SERPL-MCNC: 6.1 G/DL (ref 6.4–8.2)
PROT UR STRIP.AUTO-MCNC: ABNORMAL MG/DL
RBC # BLD AUTO: 3.54 X10*6/UL (ref 4.5–5.9)
RBC # BLD AUTO: 4.29 X10*6/UL (ref 4.5–5.9)
RBC # UR STRIP.AUTO: NEGATIVE MG/DL
RBC #/AREA URNS AUTO: ABNORMAL /HPF
RBC MORPH BLD: ABNORMAL
SAO2 % BLDA: 93 % (ref 94–100)
SAO2 % BLDV: 95 % (ref 45–75)
SODIUM BLDA-SCNC: 134 MMOL/L (ref 136–145)
SODIUM BLDV-SCNC: 130 MMOL/L (ref 136–145)
SODIUM SERPL-SCNC: 136 MMOL/L (ref 136–145)
SODIUM SERPL-SCNC: 138 MMOL/L (ref 136–145)
SP GR UR STRIP.AUTO: >1.05
SPECIMEN DRAWN FROM PATIENT: ABNORMAL
TOTAL CELLS COUNTED BLD: 100
UROBILINOGEN UR STRIP.AUTO-MCNC: NORMAL MG/DL
WBC # BLD AUTO: 3.4 X10*3/UL (ref 4.4–11.3)
WBC # BLD AUTO: 4.1 X10*3/UL (ref 4.4–11.3)
WBC #/AREA URNS AUTO: ABNORMAL /HPF

## 2025-04-17 PROCEDURE — 94760 N-INVAS EAR/PLS OXIMETRY 1: CPT

## 2025-04-17 PROCEDURE — 74177 CT ABD & PELVIS W/CONTRAST: CPT | Mod: FOREIGN READ | Performed by: RADIOLOGY

## 2025-04-17 PROCEDURE — 87449 NOS EACH ORGANISM AG IA: CPT | Mod: GEALAB

## 2025-04-17 PROCEDURE — 0D9670Z DRAINAGE OF STOMACH WITH DRAINAGE DEVICE, VIA NATURAL OR ARTIFICIAL OPENING: ICD-10-PCS | Performed by: INTERNAL MEDICINE

## 2025-04-17 PROCEDURE — 84100 ASSAY OF PHOSPHORUS: CPT

## 2025-04-17 PROCEDURE — 2500000005 HC RX 250 GENERAL PHARMACY W/O HCPCS

## 2025-04-17 PROCEDURE — 94664 DEMO&/EVAL PT USE INHALER: CPT

## 2025-04-17 PROCEDURE — 2500000001 HC RX 250 WO HCPCS SELF ADMINISTERED DRUGS (ALT 637 FOR MEDICARE OP)

## 2025-04-17 PROCEDURE — 94640 AIRWAY INHALATION TREATMENT: CPT

## 2025-04-17 PROCEDURE — 2500000005 HC RX 250 GENERAL PHARMACY W/O HCPCS: Performed by: INTERNAL MEDICINE

## 2025-04-17 PROCEDURE — 81003 URINALYSIS AUTO W/O SCOPE: CPT

## 2025-04-17 PROCEDURE — 74177 CT ABD & PELVIS W/CONTRAST: CPT

## 2025-04-17 PROCEDURE — 83735 ASSAY OF MAGNESIUM: CPT

## 2025-04-17 PROCEDURE — 36600 WITHDRAWAL OF ARTERIAL BLOOD: CPT

## 2025-04-17 PROCEDURE — 80053 COMPREHEN METABOLIC PANEL: CPT

## 2025-04-17 PROCEDURE — 87899 AGENT NOS ASSAY W/OPTIC: CPT | Mod: GEALAB

## 2025-04-17 PROCEDURE — 2500000002 HC RX 250 W HCPCS SELF ADMINISTERED DRUGS (ALT 637 FOR MEDICARE OP, ALT 636 FOR OP/ED): Performed by: INTERNAL MEDICINE

## 2025-04-17 PROCEDURE — 2550000001 HC RX 255 CONTRASTS

## 2025-04-17 PROCEDURE — 2500000002 HC RX 250 W HCPCS SELF ADMINISTERED DRUGS (ALT 637 FOR MEDICARE OP, ALT 636 FOR OP/ED)

## 2025-04-17 PROCEDURE — 85027 COMPLETE CBC AUTOMATED: CPT

## 2025-04-17 PROCEDURE — 74018 RADEX ABDOMEN 1 VIEW: CPT

## 2025-04-17 PROCEDURE — 71045 X-RAY EXAM CHEST 1 VIEW: CPT | Performed by: RADIOLOGY

## 2025-04-17 PROCEDURE — 2500000004 HC RX 250 GENERAL PHARMACY W/ HCPCS (ALT 636 FOR OP/ED): Mod: JZ

## 2025-04-17 PROCEDURE — 99223 1ST HOSP IP/OBS HIGH 75: CPT | Performed by: NURSE PRACTITIONER

## 2025-04-17 PROCEDURE — 36415 COLL VENOUS BLD VENIPUNCTURE: CPT

## 2025-04-17 PROCEDURE — 82947 ASSAY GLUCOSE BLOOD QUANT: CPT

## 2025-04-17 PROCEDURE — 83605 ASSAY OF LACTIC ACID: CPT

## 2025-04-17 PROCEDURE — 94799 UNLISTED PULMONARY SVC/PX: CPT

## 2025-04-17 PROCEDURE — 84132 ASSAY OF SERUM POTASSIUM: CPT

## 2025-04-17 PROCEDURE — C9254 INJECTION, LACOSAMIDE: HCPCS

## 2025-04-17 PROCEDURE — 71045 X-RAY EXAM CHEST 1 VIEW: CPT

## 2025-04-17 PROCEDURE — 99233 SBSQ HOSP IP/OBS HIGH 50: CPT | Performed by: NURSE PRACTITIONER

## 2025-04-17 PROCEDURE — 2500000004 HC RX 250 GENERAL PHARMACY W/ HCPCS (ALT 636 FOR OP/ED)

## 2025-04-17 PROCEDURE — 85007 BL SMEAR W/DIFF WBC COUNT: CPT

## 2025-04-17 PROCEDURE — 2020000001 HC ICU ROOM DAILY

## 2025-04-17 PROCEDURE — 81001 URINALYSIS AUTO W/SCOPE: CPT

## 2025-04-17 PROCEDURE — 84132 ASSAY OF SERUM POTASSIUM: CPT | Performed by: NURSE PRACTITIONER

## 2025-04-17 PROCEDURE — 99291 CRITICAL CARE FIRST HOUR: CPT

## 2025-04-17 PROCEDURE — 99497 ADVNCD CARE PLAN 30 MIN: CPT | Performed by: NURSE PRACTITIONER

## 2025-04-17 PROCEDURE — 99223 1ST HOSP IP/OBS HIGH 75: CPT

## 2025-04-17 RX ORDER — LAMOTRIGINE 100 MG/1
200 TABLET ORAL 3 TIMES DAILY
Status: DISCONTINUED | OUTPATIENT
Start: 2025-04-17 | End: 2025-04-25 | Stop reason: HOSPADM

## 2025-04-17 RX ORDER — IPRATROPIUM BROMIDE AND ALBUTEROL SULFATE 2.5; .5 MG/3ML; MG/3ML
3 SOLUTION RESPIRATORY (INHALATION)
Status: DISCONTINUED | OUTPATIENT
Start: 2025-04-17 | End: 2025-04-17

## 2025-04-17 RX ORDER — ZONISAMIDE 100 MG/1
300 CAPSULE ORAL NIGHTLY
Status: DISCONTINUED | OUTPATIENT
Start: 2025-04-17 | End: 2025-04-25 | Stop reason: HOSPADM

## 2025-04-17 RX ORDER — INSULIN LISPRO 100 [IU]/ML
0-5 INJECTION, SOLUTION INTRAVENOUS; SUBCUTANEOUS EVERY 4 HOURS
Status: DISCONTINUED | OUTPATIENT
Start: 2025-04-17 | End: 2025-04-18

## 2025-04-17 RX ORDER — CEFTRIAXONE 1 G/50ML
1 INJECTION, SOLUTION INTRAVENOUS EVERY 24 HOURS
Status: DISCONTINUED | OUTPATIENT
Start: 2025-04-17 | End: 2025-04-17

## 2025-04-17 RX ORDER — DEXTROSE, SODIUM CHLORIDE, SODIUM LACTATE, POTASSIUM CHLORIDE, AND CALCIUM CHLORIDE 5; .6; .31; .03; .02 G/100ML; G/100ML; G/100ML; G/100ML; G/100ML
100 INJECTION, SOLUTION INTRAVENOUS CONTINUOUS
Status: DISCONTINUED | OUTPATIENT
Start: 2025-04-17 | End: 2025-04-17

## 2025-04-17 RX ORDER — BACLOFEN 10 MG/1
20 TABLET ORAL 3 TIMES DAILY
Status: DISCONTINUED | OUTPATIENT
Start: 2025-04-17 | End: 2025-04-25 | Stop reason: HOSPADM

## 2025-04-17 RX ORDER — IPRATROPIUM BROMIDE AND ALBUTEROL SULFATE 2.5; .5 MG/3ML; MG/3ML
3 SOLUTION RESPIRATORY (INHALATION) EVERY 2 HOUR PRN
Status: DISCONTINUED | OUTPATIENT
Start: 2025-04-17 | End: 2025-04-25 | Stop reason: HOSPADM

## 2025-04-17 RX ORDER — LACTULOSE 10 G/15ML
10 SOLUTION ORAL DAILY
Status: DISCONTINUED | OUTPATIENT
Start: 2025-04-17 | End: 2025-04-21

## 2025-04-17 RX ORDER — POLYETHYLENE GLYCOL 3350 17 G/17G
17 POWDER, FOR SOLUTION ORAL DAILY
COMMUNITY

## 2025-04-17 RX ORDER — LACOSAMIDE 50 MG/1
300 TABLET ORAL 2 TIMES DAILY
Status: DISCONTINUED | OUTPATIENT
Start: 2025-04-17 | End: 2025-04-21

## 2025-04-17 RX ORDER — POLYETHYLENE GLYCOL 3350 17 G/17G
17 POWDER, FOR SOLUTION ORAL DAILY
Status: DISCONTINUED | OUTPATIENT
Start: 2025-04-17 | End: 2025-04-21

## 2025-04-17 RX ORDER — LACTULOSE 10 G/15ML
10 SOLUTION ORAL; RECTAL DAILY
COMMUNITY
Start: 2013-04-15

## 2025-04-17 RX ORDER — LORAZEPAM 2 MG/ML
2 INJECTION INTRAMUSCULAR EVERY 5 MIN PRN
Status: DISCONTINUED | OUTPATIENT
Start: 2025-04-17 | End: 2025-04-25 | Stop reason: HOSPADM

## 2025-04-17 RX ORDER — ACETAMINOPHEN 650 MG/1
650 SUPPOSITORY RECTAL EVERY 6 HOURS PRN
Status: DISCONTINUED | OUTPATIENT
Start: 2025-04-17 | End: 2025-04-25 | Stop reason: HOSPADM

## 2025-04-17 RX ORDER — ACETAMINOPHEN 325 MG/1
650 TABLET ORAL EVERY 4 HOURS PRN
Status: DISCONTINUED | OUTPATIENT
Start: 2025-04-17 | End: 2025-04-25 | Stop reason: HOSPADM

## 2025-04-17 RX ORDER — SENNOSIDES 8.8 MG/5ML
10 LIQUID ORAL 2 TIMES DAILY
Status: DISCONTINUED | OUTPATIENT
Start: 2025-04-17 | End: 2025-04-23

## 2025-04-17 RX ORDER — DEXTROSE 50 % IN WATER (D50W) INTRAVENOUS SYRINGE
25
Status: DISCONTINUED | OUTPATIENT
Start: 2025-04-17 | End: 2025-04-25 | Stop reason: HOSPADM

## 2025-04-17 RX ORDER — GENTAMICIN SULFATE 0.3 %
0.5 OINTMENT (GRAM) OPHTHALMIC (EYE) DAILY PRN
Status: ON HOLD | COMMUNITY
Start: 2020-08-07 | End: 2025-04-22 | Stop reason: WASHOUT

## 2025-04-17 RX ORDER — GLYCOPYRROLATE 1 MG/1
2 TABLET ORAL 3 TIMES DAILY
Status: DISCONTINUED | OUTPATIENT
Start: 2025-04-17 | End: 2025-04-25 | Stop reason: HOSPADM

## 2025-04-17 RX ORDER — IPRATROPIUM BROMIDE AND ALBUTEROL SULFATE 2.5; .5 MG/3ML; MG/3ML
3 SOLUTION RESPIRATORY (INHALATION)
Status: DISCONTINUED | OUTPATIENT
Start: 2025-04-17 | End: 2025-04-25 | Stop reason: HOSPADM

## 2025-04-17 RX ORDER — CLONAZEPAM 1 MG/1
1 TABLET ORAL 3 TIMES DAILY
Status: DISCONTINUED | OUTPATIENT
Start: 2025-04-17 | End: 2025-04-25 | Stop reason: HOSPADM

## 2025-04-17 RX ORDER — ONDANSETRON 4 MG/1
4 TABLET, ORALLY DISINTEGRATING ORAL EVERY 8 HOURS PRN
Status: DISCONTINUED | OUTPATIENT
Start: 2025-04-17 | End: 2025-04-25 | Stop reason: HOSPADM

## 2025-04-17 RX ORDER — DEXTROSE 50 % IN WATER (D50W) INTRAVENOUS SYRINGE
12.5
Status: DISCONTINUED | OUTPATIENT
Start: 2025-04-17 | End: 2025-04-25 | Stop reason: HOSPADM

## 2025-04-17 RX ORDER — BACLOFEN 20 MG/1
20 TABLET ORAL 3 TIMES DAILY
COMMUNITY

## 2025-04-17 RX ORDER — BISACODYL 10 MG/1
10 SUPPOSITORY RECTAL DAILY
Status: DISCONTINUED | OUTPATIENT
Start: 2025-04-17 | End: 2025-04-21

## 2025-04-17 RX ORDER — ACETAMINOPHEN 160 MG/5ML
650 SOLUTION ORAL EVERY 4 HOURS PRN
Status: DISCONTINUED | OUTPATIENT
Start: 2025-04-17 | End: 2025-04-25 | Stop reason: HOSPADM

## 2025-04-17 RX ORDER — ENOXAPARIN SODIUM 100 MG/ML
40 INJECTION SUBCUTANEOUS EVERY 24 HOURS
Status: DISCONTINUED | OUTPATIENT
Start: 2025-04-17 | End: 2025-04-25 | Stop reason: HOSPADM

## 2025-04-17 RX ORDER — ONDANSETRON HYDROCHLORIDE 2 MG/ML
4 INJECTION, SOLUTION INTRAVENOUS EVERY 8 HOURS PRN
Status: DISCONTINUED | OUTPATIENT
Start: 2025-04-17 | End: 2025-04-25 | Stop reason: HOSPADM

## 2025-04-17 RX ORDER — CITALOPRAM 20 MG/1
20 TABLET, FILM COATED ORAL DAILY
Status: DISCONTINUED | OUTPATIENT
Start: 2025-04-17 | End: 2025-04-25 | Stop reason: HOSPADM

## 2025-04-17 RX ADMIN — POTASSIUM PHOSPHATE, MONOBASIC AND POTASSIUM PHOSPHATE, DIBASIC 30 MMOL: 224; 236 INJECTION, SOLUTION, CONCENTRATE INTRAVENOUS at 11:40

## 2025-04-17 RX ADMIN — ENOXAPARIN SODIUM 40 MG: 40 INJECTION SUBCUTANEOUS at 17:53

## 2025-04-17 RX ADMIN — IPRATROPIUM BROMIDE AND ALBUTEROL SULFATE 3 ML: 2.5; .5 SOLUTION RESPIRATORY (INHALATION) at 08:06

## 2025-04-17 RX ADMIN — PIPERACILLIN SODIUM AND TAZOBACTAM SODIUM 4.5 G: 4; .5 INJECTION, SOLUTION INTRAVENOUS at 14:30

## 2025-04-17 RX ADMIN — CEFTRIAXONE 1 G: 1 INJECTION, SOLUTION INTRAVENOUS at 02:54

## 2025-04-17 RX ADMIN — BISACODYL 10 MG: 10 SUPPOSITORY RECTAL at 14:37

## 2025-04-17 RX ADMIN — Medication 60 L/MIN: at 15:00

## 2025-04-17 RX ADMIN — LACOSAMIDE 300 MG: 10 INJECTION INTRAVENOUS at 17:50

## 2025-04-17 RX ADMIN — ACETAMINOPHEN 650 MG: 650 SUPPOSITORY RECTAL at 20:10

## 2025-04-17 RX ADMIN — Medication 50 L/MIN: at 23:00

## 2025-04-17 RX ADMIN — IOHEXOL 75 ML: 350 INJECTION, SOLUTION INTRAVENOUS at 14:57

## 2025-04-17 RX ADMIN — ONDANSETRON 4 MG: 2 INJECTION, SOLUTION INTRAMUSCULAR; INTRAVENOUS at 15:07

## 2025-04-17 RX ADMIN — ACETAMINOPHEN 650 MG: 650 SOLUTION ORAL at 09:19

## 2025-04-17 RX ADMIN — SODIUM CHLORIDE, SODIUM LACTATE, POTASSIUM CHLORIDE, CALCIUM CHLORIDE AND DEXTROSE MONOHYDRATE 100 ML/HR: 5; 600; 310; 30; 20 INJECTION, SOLUTION INTRAVENOUS at 13:30

## 2025-04-17 RX ADMIN — SODIUM CHLORIDE, POTASSIUM CHLORIDE, SODIUM LACTATE AND CALCIUM CHLORIDE 1000 ML: 600; 310; 30; 20 INJECTION, SOLUTION INTRAVENOUS at 09:20

## 2025-04-17 RX ADMIN — IPRATROPIUM BROMIDE AND ALBUTEROL SULFATE 3 ML: 2.5; .5 SOLUTION RESPIRATORY (INHALATION) at 01:46

## 2025-04-17 RX ADMIN — Medication 60 L/MIN: at 01:50

## 2025-04-17 RX ADMIN — POLYETHYLENE GLYCOL 3350 17 G: 17 POWDER, FOR SOLUTION ORAL at 11:24

## 2025-04-17 RX ADMIN — CITALOPRAM HYDROBROMIDE 20 MG: 20 TABLET ORAL at 09:19

## 2025-04-17 RX ADMIN — PIPERACILLIN SODIUM AND TAZOBACTAM SODIUM 4.5 G: 4; .5 INJECTION, SOLUTION INTRAVENOUS at 20:09

## 2025-04-17 RX ADMIN — IPRATROPIUM BROMIDE AND ALBUTEROL SULFATE 3 ML: 2.5; .5 SOLUTION RESPIRATORY (INHALATION) at 19:19

## 2025-04-17 RX ADMIN — AZITHROMYCIN MONOHYDRATE 500 MG: 500 INJECTION, POWDER, LYOPHILIZED, FOR SOLUTION INTRAVENOUS at 21:18

## 2025-04-17 RX ADMIN — Medication 50 L/MIN: at 08:06

## 2025-04-17 RX ADMIN — LAMOTRIGINE 200 MG: 100 TABLET ORAL at 09:19

## 2025-04-17 RX ADMIN — LACTULOSE 10 G: 20 SOLUTION ORAL at 09:18

## 2025-04-17 RX ADMIN — GLYCOPYRROLATE 2 MG: 1 TABLET ORAL at 09:22

## 2025-04-17 RX ADMIN — SODIUM CHLORIDE, SODIUM LACTATE, POTASSIUM CHLORIDE, AND CALCIUM CHLORIDE 1000 ML: .6; .31; .03; .02 INJECTION, SOLUTION INTRAVENOUS at 11:22

## 2025-04-17 RX ADMIN — SODIUM CHLORIDE, SODIUM LACTATE, POTASSIUM CHLORIDE, AND CALCIUM CHLORIDE 1000 ML: .6; .31; .03; .02 INJECTION, SOLUTION INTRAVENOUS at 00:57

## 2025-04-17 RX ADMIN — ONDANSETRON 4 MG: 2 INJECTION, SOLUTION INTRAMUSCULAR; INTRAVENOUS at 21:28

## 2025-04-17 RX ADMIN — Medication 60 L/MIN: at 19:19

## 2025-04-17 RX ADMIN — SENNOSIDES 10 ML: 8.8 LIQUID ORAL at 09:19

## 2025-04-17 RX ADMIN — Medication 60 L/MIN: at 13:24

## 2025-04-17 SDOH — ECONOMIC STABILITY: FOOD INSECURITY
WITHIN THE PAST 12 MONTHS, THE FOOD YOU BOUGHT JUST DIDN'T LAST AND YOU DIDN'T HAVE MONEY TO GET MORE.: PATIENT UNABLE TO ANSWER

## 2025-04-17 SDOH — ECONOMIC STABILITY: HOUSING INSECURITY
IN THE LAST 12 MONTHS, WAS THERE A TIME WHEN YOU WERE NOT ABLE TO PAY THE MORTGAGE OR RENT ON TIME?: PATIENT UNABLE TO ANSWER

## 2025-04-17 SDOH — HEALTH STABILITY: MENTAL HEALTH: HOW OFTEN DO YOU HAVE A DRINK CONTAINING ALCOHOL?: NEVER

## 2025-04-17 SDOH — ECONOMIC STABILITY: HOUSING INSECURITY: AT ANY TIME IN THE PAST 12 MONTHS, WERE YOU HOMELESS OR LIVING IN A SHELTER (INCLUDING NOW)?: PATIENT UNABLE TO ANSWER

## 2025-04-17 SDOH — ECONOMIC STABILITY: FOOD INSECURITY
HOW HARD IS IT FOR YOU TO PAY FOR THE VERY BASICS LIKE FOOD, HOUSING, MEDICAL CARE, AND HEATING?: PATIENT UNABLE TO ANSWER

## 2025-04-17 SDOH — SOCIAL STABILITY: SOCIAL INSECURITY
WITHIN THE LAST YEAR, HAVE YOU BEEN RAPED OR FORCED TO HAVE ANY KIND OF SEXUAL ACTIVITY BY YOUR PARTNER OR EX-PARTNER?: PATIENT UNABLE TO ANSWER

## 2025-04-17 SDOH — ECONOMIC STABILITY: TRANSPORTATION INSECURITY
IN THE PAST 12 MONTHS, HAS LACK OF TRANSPORTATION KEPT YOU FROM MEDICAL APPOINTMENTS OR FROM GETTING MEDICATIONS?: PATIENT UNABLE TO ANSWER

## 2025-04-17 SDOH — HEALTH STABILITY: MENTAL HEALTH: HOW OFTEN DO YOU HAVE SIX OR MORE DRINKS ON ONE OCCASION?: NEVER

## 2025-04-17 SDOH — SOCIAL STABILITY: SOCIAL INSECURITY
WITHIN THE LAST YEAR, HAVE YOU BEEN HUMILIATED OR EMOTIONALLY ABUSED IN OTHER WAYS BY YOUR PARTNER OR EX-PARTNER?: PATIENT UNABLE TO ANSWER

## 2025-04-17 SDOH — ECONOMIC STABILITY: FOOD INSECURITY
WITHIN THE PAST 12 MONTHS, YOU WORRIED THAT YOUR FOOD WOULD RUN OUT BEFORE YOU GOT THE MONEY TO BUY MORE.: PATIENT UNABLE TO ANSWER

## 2025-04-17 SDOH — ECONOMIC STABILITY: HOUSING INSECURITY: IN THE PAST 12 MONTHS, HOW MANY TIMES HAVE YOU MOVED WHERE YOU WERE LIVING?: 1

## 2025-04-17 SDOH — SOCIAL STABILITY: SOCIAL INSECURITY: WITHIN THE LAST YEAR, HAVE YOU BEEN AFRAID OF YOUR PARTNER OR EX-PARTNER?: PATIENT UNABLE TO ANSWER

## 2025-04-17 SDOH — HEALTH STABILITY: MENTAL HEALTH: HOW MANY DRINKS CONTAINING ALCOHOL DO YOU HAVE ON A TYPICAL DAY WHEN YOU ARE DRINKING?: PATIENT DOES NOT DRINK

## 2025-04-17 SDOH — SOCIAL STABILITY: SOCIAL INSECURITY: ABUSE: ADULT

## 2025-04-17 SDOH — SOCIAL STABILITY: SOCIAL INSECURITY
WITHIN THE LAST YEAR, HAVE YOU BEEN KICKED, HIT, SLAPPED, OR OTHERWISE PHYSICALLY HURT BY YOUR PARTNER OR EX-PARTNER?: PATIENT UNABLE TO ANSWER

## 2025-04-17 SDOH — SOCIAL STABILITY: SOCIAL INSECURITY: ARE YOU OR HAVE YOU BEEN THREATENED OR ABUSED PHYSICALLY, EMOTIONALLY, OR SEXUALLY BY ANYONE?: UNABLE TO ASSESS

## 2025-04-17 SDOH — SOCIAL STABILITY: SOCIAL INSECURITY: WERE YOU ABLE TO COMPLETE ALL THE BEHAVIORAL HEALTH SCREENINGS?: NO

## 2025-04-17 SDOH — SOCIAL STABILITY: SOCIAL INSECURITY: HAVE YOU HAD THOUGHTS OF HARMING ANYONE ELSE?: UNABLE TO ASSESS

## 2025-04-17 SDOH — SOCIAL STABILITY: SOCIAL INSECURITY: ARE THERE ANY APPARENT SIGNS OF INJURIES/BEHAVIORS THAT COULD BE RELATED TO ABUSE/NEGLECT?: UNABLE TO ASSESS

## 2025-04-17 SDOH — SOCIAL STABILITY: SOCIAL INSECURITY: DOES ANYONE TRY TO KEEP YOU FROM HAVING/CONTACTING OTHER FRIENDS OR DOING THINGS OUTSIDE YOUR HOME?: UNABLE TO ASSESS

## 2025-04-17 SDOH — ECONOMIC STABILITY: INCOME INSECURITY
IN THE PAST 12 MONTHS HAS THE ELECTRIC, GAS, OIL, OR WATER COMPANY THREATENED TO SHUT OFF SERVICES IN YOUR HOME?: PATIENT UNABLE TO ANSWER

## 2025-04-17 SDOH — SOCIAL STABILITY: SOCIAL INSECURITY: DO YOU FEEL UNSAFE GOING BACK TO THE PLACE WHERE YOU ARE LIVING?: UNABLE TO ASSESS

## 2025-04-17 SDOH — SOCIAL STABILITY: SOCIAL INSECURITY: DO YOU FEEL ANYONE HAS EXPLOITED OR TAKEN ADVANTAGE OF YOU FINANCIALLY OR OF YOUR PERSONAL PROPERTY?: UNABLE TO ASSESS

## 2025-04-17 SDOH — SOCIAL STABILITY: SOCIAL INSECURITY: HAVE YOU HAD ANY THOUGHTS OF HARMING ANYONE ELSE?: UNABLE TO ASSESS

## 2025-04-17 SDOH — SOCIAL STABILITY: SOCIAL INSECURITY: HAS ANYONE EVER THREATENED TO HURT YOUR FAMILY OR YOUR PETS?: UNABLE TO ASSESS

## 2025-04-17 ASSESSMENT — COGNITIVE AND FUNCTIONAL STATUS - GENERAL
MOVING TO AND FROM BED TO CHAIR: TOTAL
DRESSING REGULAR LOWER BODY CLOTHING: TOTAL
MOBILITY SCORE: 6
CLIMB 3 TO 5 STEPS WITH RAILING: TOTAL
HELP NEEDED FOR BATHING: TOTAL
DRESSING REGULAR UPPER BODY CLOTHING: TOTAL
WALKING IN HOSPITAL ROOM: TOTAL
MOVING FROM LYING ON BACK TO SITTING ON SIDE OF FLAT BED WITH BEDRAILS: TOTAL
DAILY ACTIVITIY SCORE: 6
PATIENT BASELINE BEDBOUND: NO
TURNING FROM BACK TO SIDE WHILE IN FLAT BAD: TOTAL
TOILETING: TOTAL
PERSONAL GROOMING: TOTAL
STANDING UP FROM CHAIR USING ARMS: TOTAL
EATING MEALS: TOTAL

## 2025-04-17 ASSESSMENT — PAIN SCALES - PAIN ASSESSMENT IN ADVANCED DEMENTIA (PAINAD)
BODYLANGUAGE: RELAXED
BREATHING: NORMAL
FACIALEXPRESSION: SMILING OR INEXPRESSIVE
TOTALSCORE: 0
CONSOLABILITY: NO NEED TO CONSOLE

## 2025-04-17 ASSESSMENT — ENCOUNTER SYMPTOMS
JOINT SWELLING: 0
COUGH: 1
FATIGUE: 1
ABDOMINAL DISTENTION: 1
SHORTNESS OF BREATH: 1
ACTIVITY CHANGE: 1
APPETITE CHANGE: 1
DECREASED CONCENTRATION: 1
FEVER: 1
CONSTIPATION: 1
DIFFICULTY URINATING: 1
CHILLS: 1
VOMITING: 1

## 2025-04-17 ASSESSMENT — ACTIVITIES OF DAILY LIVING (ADL)
HEARING - RIGHT EAR: FUNCTIONAL
LACK_OF_TRANSPORTATION: PATIENT UNABLE TO ANSWER
JUDGMENT_ADEQUATE_SAFELY_COMPLETE_DAILY_ACTIVITIES: UNABLE TO ASSESS
ADEQUATE_TO_COMPLETE_ADL: UNABLE TO ASSESS
GROOMING: DEPENDENT
HEARING - LEFT EAR: FUNCTIONAL
WALKS IN HOME: DEPENDENT
BATHING: DEPENDENT
TOILETING: DEPENDENT
PATIENT'S MEMORY ADEQUATE TO SAFELY COMPLETE DAILY ACTIVITIES?: UNABLE TO ASSESS
FEEDING YOURSELF: DEPENDENT
LACK_OF_TRANSPORTATION: PATIENT UNABLE TO ANSWER
DRESSING YOURSELF: DEPENDENT
LACK_OF_TRANSPORTATION: PATIENT UNABLE TO ANSWER
ASSISTIVE_DEVICE: WHEELCHAIR

## 2025-04-17 ASSESSMENT — LIFESTYLE VARIABLES
AUDIT-C TOTAL SCORE: 0
HOW OFTEN DO YOU HAVE 6 OR MORE DRINKS ON ONE OCCASION: NEVER
HOW MANY STANDARD DRINKS CONTAINING ALCOHOL DO YOU HAVE ON A TYPICAL DAY: PATIENT DOES NOT DRINK
SKIP TO QUESTIONS 9-10: 1
HOW OFTEN DO YOU HAVE A DRINK CONTAINING ALCOHOL: NEVER
SKIP TO QUESTIONS 9-10: 1
AUDIT-C TOTAL SCORE: 0
AUDIT-C TOTAL SCORE: 0

## 2025-04-17 ASSESSMENT — PAIN - FUNCTIONAL ASSESSMENT
PAIN_FUNCTIONAL_ASSESSMENT: PAINAD (PAIN ASSESSMENT IN ADVANCED DEMENTIA SCALE)
PAIN_FUNCTIONAL_ASSESSMENT: CPOT (CRITICAL CARE PAIN OBSERVATION TOOL)
PAIN_FUNCTIONAL_ASSESSMENT: CPOT (CRITICAL CARE PAIN OBSERVATION TOOL)

## 2025-04-17 ASSESSMENT — PATIENT HEALTH QUESTIONNAIRE - PHQ9
SUM OF ALL RESPONSES TO PHQ9 QUESTIONS 1 & 2: 0
2. FEELING DOWN, DEPRESSED OR HOPELESS: NOT AT ALL
1. LITTLE INTEREST OR PLEASURE IN DOING THINGS: NOT AT ALL

## 2025-04-17 ASSESSMENT — PAIN SCALES - WONG BAKER
WONGBAKER_NUMERICALRESPONSE: NO HURT
WONGBAKER_NUMERICALRESPONSE: NO HURT

## 2025-04-17 NOTE — NURSING NOTE
Patient's mom Brianna called to bring in ARELI-KEY button extension to be able to give medications through ARELI-COBB button. Dr. George already notified that we do not have extension at this time to give medications. Patient does not take medication orally.

## 2025-04-17 NOTE — CARE PLAN
The patient's goals for the shift include      The clinical goals for the shift include pt will be hemodynamically stable.

## 2025-04-17 NOTE — PROGRESS NOTES
"Subjective   Subjective:   History Of Present Illness:  Juan Carlos Ybarra is a 29 y.o. male was seen and evaluated at bedside today. Overnight, no reported acute events. Patient was maxed out on Airvo. Mom was at bedside this morning and said that his blood pressure is usually not this low. He has been struggling to cough up mucus. Has not had a bowel movement in about 5 days.        Objective   Objective:     BP (!) 75/49   Pulse 107   Temp 36.3 °C (97.3 °F) (Temporal)   Resp (!) 27   Ht 1.524 m (5')   Wt 60 kg (132 lb 4.4 oz)   SpO2 (!) 86%   BMI 25.83 kg/m²     Physical Exam  Cardiovascular:      Rate and Rhythm: Normal rate and regular rhythm.      Pulses: Normal pulses.      Heart sounds: Normal heart sounds.   Pulmonary:      Effort: Pulmonary effort is normal.      Breath sounds: Normal breath sounds.   Abdominal:      General: Abdomen is flat. Bowel sounds are normal.      Palpations: Abdomen is soft.       Lab Work:     Lab Results   Component Value Date    WBC 3.4 (L) 04/17/2025    HGB 14.2 04/17/2025    HCT 44.6 04/17/2025     (H) 04/17/2025     (L) 04/17/2025     Lab Results   Component Value Date    GLUCOSE 93 04/17/2025    CALCIUM 7.6 (L) 04/17/2025     04/17/2025    K 3.4 (L) 04/17/2025    CO2 22 04/17/2025     04/17/2025    BUN 13 04/17/2025    CREATININE 0.57 04/17/2025     No results found for: \"HGBA1C\", \"TSH\", \"VITD25\"  Cultures:   No results found for the last 90 days.    Images:     XR chest 1 view   Final Result   Airspace opacities asymmetric in the left lung compatible with   pneumonia; short-term progress imaging is recommended after treatment   to assure resolution and exclude other underlying pathology.        MACRO:   None        Signed by: Samuel Joshi 4/16/2025 7:02 PM   Dictation workstation:   [id number]         Medications:     Scheduled:  Scheduled Medications[1]Continuous:  Continuous Medications[2]  PRN:  PRN Medications[3]     Assessment & Plan: "   Juan Carlos Ybarra is a 29 y.o. male with a PMH of spastic CP, seizure disorder admitted for management of suspected aspiration pneumonia.      ACUTE MEDICAL ISSUES:  #Aspiration PNA  #AHRF  #Septic shock  - Suspect 2/2 aspiration  - MRSA nares negative  - No recent hospitalizations/abx use  - Maxed out on Airvo. BP dropped to 75/49 and was not responsive to 5 L of fluid  - Elevated lactate  PLAN:  - Ceftriaxone and Azithromycin  - Bronchopulmonary hygiene per RT  - Continuous pulse oximetry   - DuoNebs  - Legionella/strep urine antigens  - Continue home glycopyrrolate 1mg TID to help control secretions  - Tylenol PRN for fevers  - Pulm consulted  - Transferred to ICU due to low pressures that were unresponsive to fluids     #Elevated bilirubin  - On chart review appears he has had some abnormal LFT in the past but last total bilirubin documented was WNL, unclear etiology from prior notes  - Had RUQ done in 2024 w/ cholelithiasis and sludge, normal appearance of liver, CBD  - Elevated total bili to 2.4 on presentation, AST slightly elevated to 47, otherwise LFT WNL  PLAN:  - Direct bilirubin ordered  - Can consider RUQ/further workup but will hold off for now  - Trend daily w/ CMP on AM labs     #C/f ileus/SBO:   - Constipation x 5 days w/ emesis on admission   - Holding feeds  PLAN:  - Abdominal x-ray ordered  - Miralax, kobi colace ordered  - CT abdomen pelvis w contrast ordered     CHRONIC MEDICAL ISSUES:  #Seizure disorder - continue home clonazepam 0.5mg TID, lacosamide 300mg BID, lamotrigine 200mg TID, zonisamide 100mg daily;seizure precautions in place  #CF #Chronic dysphagia - S/p PEG tube      Fluid: Replete PRN  Electrolytes: Replete PRN  Nutrition: NPO for now, can continue TF pending results of KUB  GI ppx: NA  DVT/PE ppx: None given low risk, SCD  Abx: Ceftriaxone/Azithromycin   IV Lines: PIV  O2: Airvo     Dispo: Transferred to ICU for septic shock.     Nik Golden, PGY-1  Transitional Year         [1]  azithromycin, 500 mg, intravenous, q24h  baclofen, 20 mg, oral, TID  cefTRIAXone, 1 g, intravenous, q24h  citalopram, 20 mg, oral, Daily  clonazePAM, 1 mg, oral, TID  glycopyrrolate, 2 mg, oral, TID  insulin lispro, 0-5 Units, subcutaneous, q4h  ipratropium-albuteroL, 3 mL, nebulization, TID  lacosamide, 300 mg, oral, BID  lactated Ringer's, 1,000 mL, intravenous, Once  lactulose, 10 g, oral, Daily  lamoTRIgine, 200 mg, oral, TID  polyethylene glycol, 17 g, oral, Daily  potassium phosphate, 30 mmol, intravenous, Once  senna, 10 mL, oral, BID  zonisamide, 300 mg, oral, Nightly  [2] dextrose 5 % and lactated Ringer's, 100 mL/hr  [3] PRN medications: acetaminophen **OR** acetaminophen, dextrose, dextrose, glucagon, glucagon, ipratropium-albuteroL, ondansetron ODT **OR** ondansetron, oxygen

## 2025-04-17 NOTE — CARE PLAN
The patient's goals for the shift include      The clinical goals for the shift include Patient will be HDS by end of shift.

## 2025-04-17 NOTE — CARE PLAN
The clinical goals for the shift include Patient will be HDS by end of shift.      Problem: Pain - Adult  Goal: Verbalizes/displays adequate comfort level or baseline comfort level  Outcome: Progressing     Problem: Safety - Adult  Goal: Free from fall injury  Outcome: Progressing     Problem: Discharge Planning  Goal: Discharge to home or other facility with appropriate resources  Outcome: Progressing     Problem: Chronic Conditions and Co-morbidities  Goal: Patient's chronic conditions and co-morbidity symptoms are monitored and maintained or improved  Outcome: Progressing     Problem: Nutrition  Goal: Nutrient intake appropriate for maintaining nutritional needs  Outcome: Progressing     Problem: Respiratory  Goal: Clear secretions with interventions this shift  Outcome: Progressing     Problem: Skin  Goal: Promote skin healing  Outcome: Progressing  Flowsheets (Taken 4/17/2025 0149)  Promote skin healing: Turn/reposition every 2 hours/use positioning/transfer devices

## 2025-04-17 NOTE — PROGRESS NOTES
Vancomycin Dosing by Pharmacy- Cessation of Therapy    Consult to pharmacy for vancomycin dosing has been discontinued by the prescriber, pharmacy will sign off at this time.    Please call pharmacy if there are further questions or re-enter a consult if vancomycin is resumed.     Jacque Gee, PharmD

## 2025-04-17 NOTE — ED PROCEDURE NOTE
Procedure  Critical Care    Performed by: Racheal Galan PA-C  Authorized by: Roc Cole MD    Critical care provider statement:     Critical care time (minutes):  30    Critical care time was exclusive of:  Separately billable procedures and treating other patients and teaching time    Critical care was necessary to treat or prevent imminent or life-threatening deterioration of the following conditions:  Respiratory failure and sepsis    Critical care was time spent personally by me on the following activities:  Blood draw for specimens, evaluation of patient's response to treatment, examination of patient, ordering and review of laboratory studies, ordering and review of radiographic studies, re-evaluation of patient's condition, pulse oximetry, ordering and performing treatments and interventions, development of treatment plan with patient or surrogate, obtaining history from patient or surrogate and review of old charts    Care discussed with: admitting provider    Comments:      I was present for the entirety of the procedure(s).      MD Racheal Moses PA-C  04/16/25 2024       Roc Cole MD  04/16/25 2033

## 2025-04-17 NOTE — PROGRESS NOTES
Patient: Juan Carlos Ybarra   Age: 29 y.o.   Gender: male  Room/bed: 239/239-A     Attending: Paulo Moctezuma DO  Code Status:  Full Code    Subjective    28 y/o male w/ PMH of Spastic CP, Intellectual disability, Dysphagia s/p PEG tube, H/o intubation and Seizure disorder admitted for management of pneumonia. Patient is non-verbal at baseline. Per chart review and family, he was exposed to family members who had flu like symptoms over the past week. Yester day he was found to have fever of 102F as well as productive cough and congestion. He was hypoxic on arrival to ED satting at 83% on RA. He was placed initially on 6L O2 which was increased to 10L. His O2 requirements worsened this morning and he had to be placed Airvo which was at max setting. There was also concerns of SBO. Initial plan was to place him on BiPAP but he had a few episodes of vomiting. He was brought to the ICU on Airvo. Family did not want him to be intubated yet stating that they don't want him to be intubated unless absolutely necessary. He was satting at 89-90% on Airvo. Will continue Airvo for now. NG tube was placed and put to suction. CT scan was done which showed significant stool burden. General Surgery consulted and recommended to keep him on NG tube and PEG tube as well as NPO until resolution of bowel function. He was given Dulcolax suppository without relief. Will try soap suds enema. Jackson catheter placed due to urinary retention.    ROS: 12 points review of system is negative except as stated as above.     Objective    Physical Exam     Temp:  [35.8 °C (96.4 °F)-37.2 °C (99 °F)] 36.3 °C (97.3 °F)  Heart Rate:  [] 107  Resp:  [17-31] 27  BP: ()/(48-81) 75/49  FiO2 (%):  [70 %-90 %] 90 %      Intake/Output Summary (Last 24 hours) at 4/17/2025 1246  Last data filed at 4/17/2025 1122  Gross per 24 hour   Intake 5150 ml   Output 350 ml   Net 4800 ml       Vitals:    04/17/25 0151   Weight: 60 kg (132 lb 4.4 oz)       FiO2 (%):   [70 %-90 %] 90 %     Labs:   CBC:  Recent Labs     04/17/25  0605 04/16/25 1858 09/24/21  0529   WBC 3.4* 4.7 6.5   HGB 14.2 16.8 11.3*   HCT 44.6 49.9 34.3*   * 186 416   * 99 99     CMP:  Recent Labs     04/17/25  0605 04/16/25 1858 09/24/21  0529    136 140   K 3.4* 5.3 3.7    99 103   CO2 22 25 25   ANIONGAP 13 17 16   BUN 13 22 10   CREATININE 0.57 0.87 0.32*   EGFR >90 >90  --    GLUCOSE 93 122* 92     Recent Labs     04/17/25  0605 04/16/25 1858 09/24/21  0529 09/21/21  0321 09/20/21  0231   ALBUMIN 3.5 4.6 4.2   < > 3.9   ALKPHOS 63 82  --   --  190*   ALT 31 45  --   --  77*   AST 21 47*  --   --  41*   BILITOT 1.4* 2.4*  --   --  0.5    < > = values in this interval not displayed.     Calcium/Phos:   Lab Results   Component Value Date    CALCIUM 7.6 (L) 04/17/2025    PHOS 2.3 (L) 04/17/2025      COAG:   Recent Labs     09/19/21 1856   INR 1.1     CARDIAC:   Recent Labs     04/16/25  2111 04/16/25  1858   TROPHS <3 4   BNP  --  54     Lab Results   Component Value Date    BLOODCULT Loaded on Instrument - Culture in progress 04/16/2025    BLOODCULT Loaded on Instrument - Culture in progress 04/16/2025       Images:  ECG 12 lead  Sinus tachycardia  ST & T wave abnormality, consider anterior ischemia  Abnormal ECG  When compared with ECG of 22-SEP-2021 03:57,  ST no longer elevated in Inferior leads  ST no longer elevated in Anterior leads  T wave inversion now evident in Inferior leads  T wave inversion now evident in Anterior leads  XR abdomen 1 view  Narrative: Interpreted By:  Kristi Strickland,   STUDY:  XR ABDOMEN 1 VIEW; ;  4/17/2025 1:50 am      INDICATION:  Signs/Symptoms:c/f sbo/ileus.          COMPARISON:  Abdomen radiographs dated 08/14/2020.      ACCESSION NUMBER(S):  [id number]      ORDERING CLINICIAN:  MAGALY ODELL      FINDINGS:  Portable supine and upright radiographs of the abdomen were performed.      There is large volume formed stool in the rectum and left  hemicolon.  There is diffuse gaseous distention of bowel loops in the mid abdomen  with a distended colonic loop measuring up to 9.5 cm in maximum  width. This could be related to fecal impaction. No evidence of  pneumoperitoneum on provided images. Visualized lung bases  demonstrate airspace opacity in the left lower lung zone within  limits of evaluation. A gastrostomy/jejunostomy tube projects over  the mid right hemiabdomen. No acute osseous findings.      Impression: Large volume dense stool in the rectum and left hemicolon. Diffuse  gaseous distention of the bowel loops in the mid abdomen raises  concern for a degree of bowel obstruction and could be related to  fecal impaction. Recommend clinical correlation. Dedicated CT abdomen  pelvis can be performed for further evaluation as clinically  warranted.          MACRO:  None      Signed by: Kristi Srtickland 4/17/2025 10:37 AM  Dictation workstation:   [id number]     Medications:  Scheduled medications  Scheduled Medications[1]  Continuous medications  Continuous Medications[2]  PRN medications  PRN Medications[3]     Assessment and Plan:  Juan Carlos Ybarra is a 29 y.o. male with a PMH of spastic CP, developmental delay, seizure disorder admitted for management of suspected aspiration pneumonia now placed on Airvo w/ worsening Oxygen saturation transferred to ICU for possible Intubation. Also suspecting possible SBO, NG tube placed, surgery consulted, CT abdomen shows significant stool burden.    NEUROLOGY  #Spastic Cerebral Palsy  - Nonverbal at baseline  - Baclofen 20 mg    #Seizure Disorder  - Clonazepam 0.5 mg TID  - Lacosamide 300 mg BID  - Lamotrigine 200 mg TID  - Zonisamide 100 mg daily  - Seizure precautions    CARDIOVASCULAR  No active issues    PULMONARY  #Acute Hypoxic Respiratory Failure  #Aspiration Pneumonia  - On Max Airvo  - On Zosyn and Azithromycin  - On DuoNeb PRN  - Legionella, strep pneumo pending  - Discussed with parents - they want to hold  "off on intubation until \"absolutely necessary\". Currently he is satting at 90% on Max Airvo. Plan is to intubate if he has increased respiratory distress/persistently satting <88%  - Palliative aided in conversation and define parameters    RENAL/GENITOURINARY  #Urinary retention  - Bladder scan  - Will place gaitan if retaining    GASTROENTEROLOGY  #Constipation  #Stool burden  #C/f SBO  - NG tube placed, NPO for now, will change oral medications to IV if available  - Surgery consulted  - Significant stool burden seen in CT, no SBO, likely nausea/vomiting precipitated due to the stool burden  - PEG tube placed. Will use to give meds when bowels cleared  - Dulcolax suppository  - Soap suds enema  - Will increase bowel regimen    HEMATOLOGY/ONCOLOGY  No active issues    RHEUMATOLOGY  No active issues    INFECTIOUS DISEASE  #Aspiration pneumonia  - On Zosyn and Azithromycin  - Blood cultures pending  - UA pending  - Lactate 4.5 -> 3.5, continue to trend  - On Zosyn    Fluids: PRN  Electrolytes: PRN  Nutrition: NPO  Antimicrobials: Zosyn, Azithromycin (4/17 - )  DVT ppx: Lovenox  GI ppx: None  Bowel care: Enema, Lactulose, Miralax, Senna  Catheter: None  Lines: PIV  Supplemental Oxygen: Airvo Max  Drips: None  Emergency Contact: Brianna Ybarra (Parent) [phone]  Code Status: Full Code    Dispo: 30 y/o male w/ spastic cerebral palsy admitted to ICU for acute hypoxic respiratory failure 2/2 aspiration pneumonia. ELOS >48 hrs    Zeferino Fournier MD  Internal Medicine, PGY-II         [1] azithromycin, 500 mg, intravenous, q24h  baclofen, 20 mg, oral, TID  cefTRIAXone, 1 g, intravenous, q24h  citalopram, 20 mg, oral, Daily  clonazePAM, 1 mg, oral, TID  glycopyrrolate, 2 mg, oral, TID  insulin lispro, 0-5 Units, subcutaneous, q4h  ipratropium-albuteroL, 3 mL, nebulization, TID  lacosamide, 300 mg, oral, BID  lactated Ringer's, 1,000 mL, intravenous, Once  lactulose, 10 g, oral, Daily  lamoTRIgine, 200 mg, oral, " TID  polyethylene glycol, 17 g, oral, Daily  potassium phosphate, 30 mmol, intravenous, Once  senna, 10 mL, oral, BID  zonisamide, 300 mg, oral, Nightly  [2] dextrose 5 % and lactated Ringer's, 100 mL/hr  [3] PRN medications: acetaminophen **OR** acetaminophen, dextrose, dextrose, glucagon, glucagon, ipratropium-albuteroL, ondansetron ODT **OR** ondansetron, oxygen

## 2025-04-17 NOTE — SIGNIFICANT EVENT
Pt transported to 239A on NRB, upon arrival to room pt placed back on 60L 90% airvo, sat 91%, no complications noted, RN aware

## 2025-04-17 NOTE — CONSULTS
Patient has Malnutrition Diagnosis: No  Nutrition Assessment    Reason for Assessment: Admission nursing screening (home TF)    Patient is a 29 y.o. male presenting with: Pneumonia of left lower lobe due to infectious organism,   Pt with constipation, no BM x 4 days PTA, worsening respiratory status and hypotension, prompted transfer to ICU  Palliative care consulted for GOC discussion    PMHx: spastic CP, seizure disorder, chronic PEG 2/2 dysphagia          Nutrition History:  Food and Nutrient History: Pt non-verbal, met with pts parents to obtain history. Pt is NPO, receives EN via PEG tube to meet nutrition and hydration needs. Pt with no BM x 4 days, mom reports small BM this AM.     RX Allergies[1]   GI Symptoms: Constipation  Vitamin/Herbal Supplement Use: none  Oral Problems: Swallowing difficulty     Previously prescribed diets: Enteral nutrition order  Previous Diet / Nutrition Education / Counselin cartons of Compleat TF twice daily via PEG; additional 24 oz of free water throughout the day in PEG. 8 oz of this water is mixed in with pts 2 cartons of Compleat TF daily. Pt normally recieves 2 cartons of Compleat TF at 1:30 pm and 2 cans at 6 or 7 pm. Pt receives one of the feeds with gravity bags, the other with bolus via syringe. TF provides 1400 kcal, 64 g fat, 16 g fiber, 152 g CHO, 72 g protein, 776 ml free water  in 1000 ml volume of TF; 1496 ml total free water/24 hours.    Anthropometrics:  Height: 152.4 cm (5')   Weight: 60 kg (132 lb 4.4 oz)   BMI (Calculated): 25.83  IBW/kg (Dietitian Calculated): 48 kg  Percent of IBW: 125 %       Weight History:     Daily Weight  25 : 60 kg (132 lb 4.4 oz)  22 : 62.6 kg (138 lb 0.1 oz)    Weight         2025  1823 2025  0151          Weight: 59 kg (130 lb) 60 kg (132 lb 4.4 oz)              Weight Change %:     Significant Weight Loss: No          Nutrition Focused Physical Exam Findings:    Subcutaneous Fat Loss  Defer Subcutaneous  "Fat Loss Assessment: Defer all  Defer All Reason: pt bed/chair bound     Edema  Edema: none  Physical Findings  Skin: Negative (skin intact)    Nutrition Significant Labs:    Results from last 7 days   Lab Units 04/17/25  1314 04/17/25  0605 04/16/25  1858   GLUCOSE mg/dL 121* 93 122*   SODIUM mmol/L 136 138 136   POTASSIUM mmol/L 3.1* 3.4* 5.3   CHLORIDE mmol/L 105 106 99   CO2 mmol/L 22 22 25   BUN mg/dL 11 13 22   CREATININE mg/dL 0.51 0.57 0.87   EGFR mL/min/1.73m*2 >90 >90 >90   CALCIUM mg/dL 7.2* 7.6* 9.3   PHOSPHORUS mg/dL 1.9* 2.3*  --    MAGNESIUM mg/dL 1.54* 1.60 2.30     No results found for: \"HGBA1C\"  Results from last 7 days   Lab Units 04/17/25  1438 04/17/25  1130   POCT GLUCOSE mg/dL 130* 137*     Lab Results   Component Value Date    ALBUMIN 3.0 (L) 04/17/2025      No results found for: \"CRP\"    Nutrition Specific Medications:   Scheduled medications:  Scheduled Medications[2]  Continuous medications:  Continuous Medications[3]  PRN medications:  PRN Medications[4]     Nursing Data Per flowsheet:   Stool Appearance: Soft (04/17/25 1027)  Gastrointestinal  Gastrointestinal (WDL): Exceptions to WDL  Abdomen Inspection: Firm  Abdominal Tenderness: Nontender  Bowel Sounds: All quadrants  Bowel Sounds (All Quadrants): Hypoactive  Passing Flatus: No  Last BM Date: 04/17/25  Bowel Incontinence: Yes  Stool Appearance: Soft  Stool Color: Tan  Anus/Rectum Evaluation and Tone: No muscle tone  Feeding assistance level:      Intake/Output Summary (Last 24 hours) at 4/17/2025 1453  Last data filed at 4/17/2025 1453  Gross per 24 hour   Intake 6308.33 ml   Output 350 ml   Net 5958.33 ml      Louis-Gu FACES Pain Rating: No hurt  PAINAD Score:  [0]    Dietary Orders (From admission, onward)       Start     Ordered    04/17/25 0143  May Not Participate in Room Service  ( ROOM SERVICE MAY NOT PARTICIPATE)  Once        Question:  .  Answer:  Yes    04/17/25 0142 04/17/25 0116  NPO Diet; Effective now  Diet " effective now         04/17/25 0115                     Estimated Needs:   Total Energy Estimated Needs in 24 hours (kCal):  (1500)  Method for Estimating Needs: 25 kcal/kg  Total Protein Estimated Needs in 24 Hours (g):  (66-78)  Method for Estimating 24 Hour Protein Needs: 1.1-1.3 g/kg     Method for Estimating 24 Hour Fluid Needs: 1 ml/kcal       Nutrition Diagnosis   Malnutrition Diagnosis  Patient has Malnutrition Diagnosis: No    Nutrition Diagnosis  Patient has Nutrition Diagnosis: Yes  Diagnosis Status (1): New  Nutrition Diagnosis 1: Increased nutrient needs  Related to (1): PNA, sepsis  As Evidenced by (1): increased nutrient demand for critical illness  Additional Assessment Information (1): pt meets estimated nutrition and hydration needs       Nutrition Interventions/Recommendations   Nutrition Prescription:  EN    Nutrition Interventions:     Interventions: Enteral intake     Goal: Vital 1.5 @ 45 ml/hr= 1620 kcal, 73 g protein, 6.5 g fiber, 826 ml free water in 1080 ml volume of TF; 120 ml FWF every 4 hours= 1546 ml total free water / 24 hours    Coordination of Care: GREG Rasmussen  Nutrition Education:   N/A  Recommendations:  TF recs: Vital 1.5 @ 15 ml/hr x 24 hours. After 24 hours if pt tolerating and no major shifts in electrolytes, increase by 10 ml/hr every 8-12 hours until goal met at 45 ml/hr.   Water: 120 ml FWF every 4 hours  Weights: daily   RFP, Mg daily; replete lytes prn         Nutrition Monitoring and Evaluation   Food/Nutrient Related History Monitoring  Monitoring and Evaluation Plan: Enteral and parenteral nutrition intake determination  Enteral and Parenteral Nutrition Intake Determination: Enteral nutrition intake - Tolerate TF at goal rate    Anthropometric Measurements  Monitoring and Evaluation Plan: Body weight  Body Weight: Body weight - Maintain stable weight    Biochemical Data, Medical Tests and Procedures  Monitoring and Evaluation Plan: Electrolyte/renal panel,  Glucose/endocrine profile  Criteria: Monitor                 Time Spent (min): 60 minutes         [1]   Allergies  Allergen Reactions    Amoxicillin-Pot Clavulanate Rash   [2] azithromycin, 500 mg, intravenous, q24h  baclofen, 20 mg, oral, TID  bisacodyl, 10 mg, rectal, Daily  citalopram, 20 mg, oral, Daily  clonazePAM, 1 mg, oral, TID  glycopyrrolate, 2 mg, oral, TID  insulin lispro, 0-5 Units, subcutaneous, q4h  ipratropium-albuteroL, 3 mL, nebulization, TID  lacosamide, 300 mg, oral, BID  lactulose, 10 g, oral, Daily  lamoTRIgine, 200 mg, oral, TID  piperacillin-tazobactam, 4.5 g, intravenous, q6h  polyethylene glycol, 17 g, oral, Daily  potassium phosphate, 30 mmol, intravenous, Once  senna, 10 mL, oral, BID  zonisamide, 300 mg, oral, Nightly  [3]    [4] PRN medications: acetaminophen **OR** acetaminophen, dextrose, dextrose, glucagon, glucagon, ipratropium-albuteroL, ondansetron ODT **OR** ondansetron, oxygen

## 2025-04-17 NOTE — SIGNIFICANT EVENT
Called to bedside for pt satting 84% on 15L HFNC, pt placed on airvo 60L 90%, sat 90% , RN aware of change

## 2025-04-17 NOTE — H&P
Subjective   Subjective:   HPI:  Juan Carlos Ybarra is a 29 y.o. male with a PMH of spastic CP, seizure disorder who presented to [facility] with c/o fevers, cough, increased mucus production.     Talked to pt's mom at bedside given pt's baseline is nonverbal, wheelchair-bound, who reported that yesterday when she got back from lunch and checked on him, she found he was running a fever to 102F. She reports he was also having a lot of increased mucus production/coughing. Pt mother reports he got all his childhood vaccinations but has not gotten COVID/PNA/flu vaccinations. Several people in their household have had cold-like symptoms over the past weeks. He does not have history of lung disease. Did require ventilation at one point when he had COVID.    She reports he has not had BM since Saturday, usually he has one about every 2 days. He did have episode of emesis while in ED during my exam. Mom denies any episodes of emesis at home prior to presentation.    Pt was found to be hypoxic on arrival to 83%, placed initially on 6L which was increased to 10L.    Review Of Systems:  Unable to perform complete ROS w/ pt given baseline non-verbal. Below noted from hx gathered from mom.    Review of Systems   Constitutional:  Positive for fever.   HENT:  Positive for congestion.    Respiratory:  Positive for cough.    Gastrointestinal:  Positive for constipation.       ED COURSE:  Visit Vitals  /81   Pulse (!) 106   Temp 37.2 °C (99 °F) (Temporal)   Resp 20   Afebrile in ED  Labs:  - WBC 4.7 Hgb 16.8   - Na 136 K 5.3 Cl 99 Bicarb 25 BUN 22 Cr 0.87  - AST 47, bilirubin total 2.4  - Flu, COVID negative  - VBG: pH 7.36 PCO2 50, HCO3 28.3  - Lactate 4.6  Imaging:  - CXR w/ left airspace opacities compatible w/ PNA  Interventions:   - LR 1L  - NS 1L  - Azithromycin 500mg  - Zosyn 4.5g  - Vancomycin 1500mg  - Toradol 15mg    Medications: reviewed.    Code Status: DNR, intubation ok. Confirmed w/ mother on admission.      Past  "Medical History:  He has no past medical history on file.    Past Surgical History:  He has no past surgical history on file.    Social History:  He has no history on file for tobacco use, alcohol use, and drug use.    Family History:  Family History[1]  Allergies:  Amoxicillin-pot clavulanate    Home Medications:  Prescriptions Prior to Admission[2]       Objective   Objective:     /81   Pulse (!) 106   Temp 37.2 °C (99 °F) (Temporal)   Resp 20   Ht 1.524 m (5')   Wt 59 kg (130 lb)   SpO2 (!) 88%   BMI 25.39 kg/m²     Physical Exam  Constitutional:       General: He is not in acute distress.  HENT:      Mouth/Throat:      Mouth: Mucous membranes are moist.   Cardiovascular:      Rate and Rhythm: Tachycardia present.      Pulses: Normal pulses.      Heart sounds: Normal heart sounds.   Pulmonary:      Breath sounds: Rhonchi present.      Comments: On 10L.  Abdominal:      General: There is no distension.      Tenderness: There is no abdominal tenderness.      Comments: PEG tube noted, no surrounding erythema   Musculoskeletal:      Right lower leg: No edema.      Left lower leg: No edema.      Comments: Contracted, muscle atrophy noted   Skin:     General: Skin is warm and dry.   Neurological:      Mental Status: He is alert. Mental status is at baseline.      Comments: Nonverbal at baseline        Lab Work:     Lab Results   Component Value Date    WBC 4.7 04/16/2025    HGB 16.8 04/16/2025    HCT 49.9 04/16/2025    MCV 99 04/16/2025     04/16/2025     Lab Results   Component Value Date    GLUCOSE 122 (H) 04/16/2025    CALCIUM 9.3 04/16/2025     04/16/2025    K 5.3 04/16/2025    CO2 25 04/16/2025    CL 99 04/16/2025    BUN 22 04/16/2025    CREATININE 0.87 04/16/2025     No results found for: \"HGBA1C\", \"TSH\", \"VITD25\"  Cultures:   No results found for the last 90 days.    Images:     XR chest 1 view   Final Result   Airspace opacities asymmetric in the left lung compatible with   pneumonia; " short-term progress imaging is recommended after treatment   to assure resolution and exclude other underlying pathology.        MACRO:   None        Signed by: Samuel Joshi 4/16/2025 7:02 PM   Dictation workstation:   [id number]         Medications:     Scheduled Meds:  Scheduled Medications[3]Continuous Meds:  Continuous Medications[4]  PRN Meds:  PRN Medications[5]     Assessment & Plan:     Juan Carlos Ybarra is a 29 y.o. male with a PMH of spastic CP, seizure disorder admitted for management of suspected aspiration pneumonia.     ACUTE MEDICAL ISSUES:  #Aspiration PNA  #PNA due to gram negative bacteria  #AHRF  - Suspect 2/2 aspiration  - MRSA nares negative  - No recent hospitalizations/abx use  - On 10L in ED  PLAN:  - Ceftriaxone, Azithromycin  - Bronchopulmonary hygiene per RT  - Continuous pulse oximetry   - DuoNebs  - Legionella/strep urine antigens  - Continue home glycopyrrolate 1mg TID to help control secretions  - Tylenol PRN for fevers    #Emesis   #Nausea  - Qtc 449  PLAN  - Zofran PRN ordered    #Elevated bilirubin  - On chart review appears he has had some abnormal LFT in the past but last total bilirubin documented was WNL, unclear etiology from prior notes  - Had RUQ done in 2024 w/ cholelithiasis and sludge, normal appearance of liver, CBD  - Elevated total bili to 2.4 on presentation, AST slightly elevated to 47, otherwise LFT WNL  PLAN:  - Direct bilirubin ordered  - Can consider RUQ/further workup but will hold off for now  - Trend daily w/ CMP on AM labs    #C/f ileus/SBO:   - Constipation x 4 days w/ emesis on admission   PLAN:  - Abdominal x-ray ordered  - Miralax, kobi colace ordered    #Elevated lactate  - S/p 2L IVF  PLAN  - Will continue to trend    CHRONIC MEDICAL ISSUES:  #Seizure disorder - continue home clonazepam 0.5mg TID, lacosamide 300mg BID, lamotrigine 200mg TID, zonisamide 100mg daily;seizure precautions in place  #CF #Chronic dysphagia - S/p PEG tube     Fluid: Replete  PRN  Electrolytes: Replete PRN  Nutrition: NPO for now, can continue TF pending results of KUB  GI ppx: NA  DVT/PE ppx: None given low risk, SCD  Abx: Ceftriaxone/Azithromycin   IV Lines: PIV  O2: RA    Dispo: Admitted to stepdown for management of aspiration PNA, ELOS >2 days    Marilee Elise DO, PGY-1  Internal Medicine          [1] No family history on file.  [2] (Not in a hospital admission)  [3] azithromycin, 500 mg, intravenous, Once  sodium chloride, 1,000 mL, intravenous, Once  vancomycin, 25 mg/kg, intravenous, Once  [4]    [5]

## 2025-04-17 NOTE — CONSULTS
PALLIATIVE MEDICINE CONSULT   Provider Requesting Consultation: Paulo Moctezuma DO    INTRODUCTIONS   Patient's capacity: None and is unable to participate in the visit  Family present:  Parents  Service: Palliative care was introduced as a resource for patients with serious illness to help with symptoms, assist with goals of care conversations, navigate complex decision making, improve quality of life for patients, and provide support to patients and families.     SUBJECTIVE   History of the Present Illness  Juan Carlos Ybarra is a 29 y.o. 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 His mother states that the entire family has had some type of upper respiratory infection as well.  Per the record upon presentation to the hospital the patient was 83% on room air and required 10 L nasal cannula to maintain saturations over 90%.  Today the patient is decompensated and required Airvo and transferred to ICU threatening need for intubation.     Mother states the patient had not had a bowel movement since this past weekend.  On the initial x-ray here the patient was noted to have large volume of dense stool in the rectum and left hemicolon with gaseous distention that raises concern for possible bowel obstruction versus severe constipation.      Palliative care was consulted due to severe critical illness and current goals of care have not been discussed.    Past Medical History  See HPI    Past Surgical History   has no past surgical history on file.    Family Medical History  Family History[1]    Home Medications  Reviewed    Allergies  Allergies[2]    Social History  The patient is single.  His mother and father are his caregivers and are both very involved in his life . The pt has  2 siblings.  The patient has  no history of alcohol drug use or tobacco use.    [employer] and Spirituality:  [place] [employer]  Their michael is very important to them and they lean on their michael during difficult times  ADVANCE CARE PLANNING AND COUNSELING   Voluntary Advance Care Planning  conversation took place with:  Parents    Counseling and Support Provided  Counseling provided on clinical condition, including diagnoses severe pneumonia and bowel obstruction versus severe constipation.  Resuscitation options with respect to QOL, disease stage, and expected outcomes.  Family verbalized understanding of this information.    All questions were answered to their satisfaction.  Support and empathy was provided throughout the conversation.    Serious Illness Perception  Serious Illness: Severe pneumonia and severe constipation versus bowel obstruction    Impression of disease and prognosis: Severe but parents feel that the patient can recover how he is done in the past    Concerns: Parents are concerned about the patient possibly needed to be intubated again.  Patient has a history of being intubated in the past for 18 days and it was really difficult for the patient and for his family.  Patient's father expressed that his son is like this because of doctor's error and feels he knows what will be best for his son regarding when to intubate him or not.    Hopes: They hope that the patient can avoid intubation and recover back to his baseline.    Joys : Being with his family and traveling.  Family states they take him everywhere with them and they always have.    Minimal acceptable quality of Life: Family feels that he know what is going on with the patient and will determine in the future if it becomes necessary.    Maximum health burden for the possibility of more time: Everything at this point.      Resuscitation Assessment: Patient's mother feels she really does not want the patient intubated ever again at all because of what he went through in the  past with intubation.  Patient's father feels that he would only want the patient intubated if it became absolutely necessary.  Patient's mother though she feels differently than patient's father is supportive of patient's father's decision and therefore ultimately they are both in agreement that intubating if absolutely necessary, performing CPR, and any other health care that would provide opportunity for survival.  At this time any perceived possible negative outcomes of heroic measures in this patient's condition are less important as patient's father states that many times in the past they have been told the patient would not make it but he did.     Advanced Directives  Parents are his medical decision makers    Emergency Contact Information  Extended Emergency Contact Information  Primary Emergency Contact: Brianna Ybarra  Home Phone: [phone]  Relation: Parent      OBJECTIVE   Inpatient Medications Reviewed   Scheduled Medications[3]  Continuous medications  Continuous Medications[4]  PRN medications  PRN Medications[5]     Last Recorded Vitals Reviewed   Blood pressure 97/68, pulse (!) 117, temperature 36.3 °C (97.3 °F), temperature source Temporal, resp. rate (!) 31, height 1.524 m (5'), weight 60 kg (132 lb 4.4 oz), SpO2 93%.  7 Day Weight Change: Unable to Calculate   Body mass index is 25.83 kg/m².   Weight change:      Relevant Results Reviewed   Lab Results   Component Value Date    WBC 3.4 (L) 04/17/2025    HGB 14.2 04/17/2025    HCT 44.6 04/17/2025     (H) 04/17/2025     (L) 04/17/2025      Lab Results   Component Value Date    GLUCOSE 93 04/17/2025    CALCIUM 7.6 (L) 04/17/2025     04/17/2025    K 3.4 (L) 04/17/2025    CO2 22 04/17/2025     04/17/2025    BUN 13 04/17/2025    CREATININE 0.57 04/17/2025      Lab Results   Component Value Date    ALT 31 04/17/2025    AST 21 04/17/2025    ALKPHOS 63 04/17/2025    BILITOT 1.4 (H) 04/17/2025      Lab Results   Component Value  "Date    ALBUMIN 3.5 04/17/2025      Serum creatinine: 0.57 mg/dL 04/17/25 0605  Estimated creatinine clearance: 125 mL/min     Relevant Imaging Reviewed and Independent interpretation   Imaging  XR abdomen 1 view  Result Date: 4/17/2025  Large volume dense stool  throughout the colon,  gaseous distention of the bowel loops. Seems to be at least ileus, but could be acrual  bowel blockage vs stool.  Reviwed actual read    XR chest 1 view  Result Date: 4/16/2025  PNA bilat  Reviewed actual read     CXR 4/17  Bilateral PNA L > R  Reviewed actual read    CTAP 4/17  At least maria guadalupe to see extreme stool burden and gaseous distention. Awaiting actual read for the other specific findings.     Cardiology, Vascular, and Other Imaging  No other imaging results found for the past 7 days     Physical Exam   GENERAL: Lethargic, ill-appearing, mild respiratory distress  LUNGS: Tachypneic, mild accessory muscle use, maxed out on Airvo oxygen saturations high 80s to low 90s  CARDIO: Tacky  GI: Distended    ASSESSMENT    Impression  This is a critically ill 29 y.o. year old who is hospitalized for aspiration Pneumonia of left lower lobe due to infectious organism. This has been complicated acute hypoxic respiratory failure threatening the need to be intubated, unable to utilize BiPAP therapy due to apparent bowel blockage with vomiting, septic shock, ileus versus SBO.     Goals of Care: Survival based.  Clarify specifically what \"intubate if absolutely necessary \" meant with family and Dr. Fournier to mean oxygen saturations staying <87% and or significant respiratory distress as determined by the intensive care doctors.     Hopes: To avoid intubation and recovery back to baseline    Code Status: FULL CODE    Prognosis: guarded    PLAN   -Continue supportive and aggressive care with goals of care  -Revisit goals of care as needed  -Music therapy for additional support    Thank you for asking Palliative Care to assist with care of this " patient.    Update provided to: The Primary team and the bedside nurse    MEDICAL COMPLEXITY    Medical complexity was high level due to due to aspiration pneumonia, acute hypoxic respiratory failure, maxed out on Airvo threatening the need to be intubated with the inability to utilize BiPAP therapy due to apparent bowel blockage with noted vomiting, septic shock and ileus versus SBO that poses threat to life or bodily function. Dicussed case mgmt in detail with Dr. Moctezuma ( Medicine), Neelam Keenan CNP, MD, and MD Shantanu ( critical care team), review of prior external notes from palliative care and internal medicine, also reviewed current encounter notes from Emergency Medicine team, general medicine team, reviewed each individual lab, independent interpretation of CXR's  and Abd XR, CTAP, obtained information form independent historian.   The time spent discussing ACP  was 30 minutes.     CONTACT INFORMATION   Cyndee Kapoor, MSN, APRN, AGNP-C, ACHPN  Palliative Medicine  Epic Secure chat         [1] No family history on file.  [2]   Allergies  Allergen Reactions    Amoxicillin-Pot Clavulanate Rash   [3] azithromycin, 500 mg, intravenous, q24h  baclofen, 20 mg, oral, TID  cefTRIAXone, 1 g, intravenous, q24h  citalopram, 20 mg, oral, Daily  clonazePAM, 1 mg, oral, TID  glycopyrrolate, 2 mg, oral, TID  insulin lispro, 0-5 Units, subcutaneous, q4h  ipratropium-albuteroL, 3 mL, nebulization, TID  lacosamide, 300 mg, oral, BID  lactated Ringer's, 1,000 mL, intravenous, Once  lactulose, 10 g, oral, Daily  lamoTRIgine, 200 mg, oral, TID  polyethylene glycol, 17 g, oral, Daily  potassium phosphate, 30 mmol, intravenous, Once  senna, 10 mL, oral, BID  zonisamide, 300 mg, oral, Nightly    [4] dextrose 5 % and lactated Ringer's, 100 mL/hr    [5] PRN medications: acetaminophen **OR** acetaminophen, dextrose, dextrose, glucagon, glucagon, ipratropium-albuteroL, ondansetron ODT **OR** ondansetron, oxygen

## 2025-04-17 NOTE — CARE PLAN
Pt is a transfer to the ICU . Pt is maxed on Airvo w\ NRB  Pt sating 86-90%. Family does not want us to intubate at this time. Pt remains on Airvo 60L 95% w\ NRB. Pt actively vomiting . Will hold off on the bipap. If pt sats remains less than 85 % will try bipap.

## 2025-04-17 NOTE — CONSULTS
Reason For Consult  Concern for bowel obstruction on xr    History Of Present Illness  Juan Carlos Ybarra is a 29 y.o. male presenting with fever, constipation and diff breathing.    Juan Carlos Ybarra is a 29 y.o. male with a PMH of spastic CP, seizure disorder who presented to [facility] with c/o fevers, cough, increased mucus production.      Pt has not had known BM since Saturday 5 d ago. Abd xr was completed today which showed concern for bowel obstruction. Surgery was consulted for evaluation and recommendation.     Evaluated pt just after he returned from CT abd pelvis.     Per the father who is at the bedside, pt has not had a BM in 5 days.     Started with lethargy 4 days ago.   Fever began 3 days ago.   Dec PO intake for 2 days.   Vomiting yesterday.   Today with more coughing and mucous production.     He is incontinent of bowel and bladder and wears a depends.       Past Medical History  He has a past medical history of Cerebral palsy and Seizure (Multi).    Surgical History  He has no past surgical history on file.     Social History  He reports that he has never smoked. He has never used smokeless tobacco. He reports that he does not drink alcohol and does not use drugs.    Family History  Family History[1]     Allergies  Amoxicillin-pot clavulanate    Review of Systems  Review of Systems   Reason unable to perform ROS: pt unable to respond verbally, ROS obtained by history from father at bedside.   Constitutional:  Positive for activity change, appetite change, chills, fatigue and fever.   Respiratory:  Positive for cough and shortness of breath.    Gastrointestinal:  Positive for abdominal distention, constipation and vomiting.   Genitourinary:  Positive for difficulty urinating (today, normally he is incontinenct per father).   Musculoskeletal:  Negative for joint swelling.   Psychiatric/Behavioral:  Positive for decreased concentration.           Physical Exam  Physical Exam  Vitals reviewed.   Constitutional:        General: He is sleeping.      Appearance: He is ill-appearing. He is not diaphoretic.      Interventions: Nasal cannula in place.      Comments: Unresponsive to verbal or tactile stimuli   HENT:      Mouth/Throat:      Mouth: Mucous membranes are dry.   Cardiovascular:      Rate and Rhythm: Tachycardia present.   Pulmonary:      Breath sounds: Rhonchi present.      Comments: Dim gale throughout  Abdominal:      General: There is distension.      Comments: No bs noted, no reaction to abd palpation, no movement, no inc in HR   Skin:     General: Skin is warm.      Coloration: Skin is pale.   Psychiatric:         Speech: He is noncommunicative.      Comments: No response to verbal or tactile stimuli            Last Recorded Vitals  Blood pressure 97/63, pulse 106, temperature 36.3 °C (97.3 °F), temperature source Temporal, resp. rate (!) 27, height 1.524 m (5'), weight 60 kg (132 lb 4.4 oz), SpO2 91%.  At time of exam , resp 24, /74, pulse ox 88%    Relevant Results    CT abdomen pelvis w IV contrast  Result Date: 4/17/2025  STUDY: CT Abdomen and Pelvis with IV Contrast; 4/17/25 at 3:00 PM INDICATION: Small bowel obstruction. COMPARISON: CT AP 8/11/20. ACCESSION NUMBER(S): [id number] ORDERING CLINICIAN: FRANSICO SAMANIEGO TECHNIQUE: CT of the abdomen and pelvis was performed.  Contiguous axial images were obtained at 3 mm slice thickness through the abdomen and pelvis. Coronal and sagittal reconstructions at 3 mm slice thickness were performed.  Omnipaque 350 75 mL was administered intravenously.   FINDINGS: LOWER CHEST: No cardiomegaly.  No pericardial effusion.  Extensive consolidation throughout the lung bases left greater than right  ABDOMEN:  LIVER: No hepatomegaly.  Smooth surface contour.  There is fatty infiltration of the liver.  Small hypodensities in the liver likely representing small cysts.  BILE DUCTS: No intrahepatic or extrahepatic biliary ductal dilatation.  GALLBLADDER: The gallbladder is  present without gallstones. STOMACH: G-tube noted with balloon in the lumen of the stomach.  In addition, there is an NG tube with tip in the mid to distal stomach..  PANCREAS: No masses or ductal dilatation.  SPLEEN: No splenomegaly or focal splenic lesion.  ADRENAL GLANDS: No thickening or nodules.  KIDNEYS AND URETERS: Kidneys are normal in size and location.  No renal or ureteral calculi.  PELVIS:  BLADDER: No abnormalities identified.  REPRODUCTIVE ORGANS: Prostate gland is mildly enlarged measuring 5.2 cm.  BOWEL: Appendix is normal.  Large amount of formed stool throughout the colon including stool in the rectal vault measuring up to 8.5 cm in diameter with mild associated rectal wall thickening and minimal perirectal fat stranding.  There is liquid stool in the right hemicolon  VESSELS: No abnormalities identified.  Abdominal aorta is normal in caliber.  PERITONEUM/RETROPERITONEUM/LYMPH NODES: No free fluid.  No pneumoperitoneum. No lymphadenopathy.  ABDOMINAL WALL: No abnormalities identified. SOFT TISSUES: No abnormalities identified.  BONES: No acute fracture or aggressive osseous lesion.    1.Large amount of formed stool throughout the colon including stool in the rectal vault measuring up to 8.5 cm in diameter with mild associated rectal wall thickening and minimal perirectal fat stranding. Findings suggestive of constipation with possible accompanying stercoral colitis.  Overall appearances not significantly changed compared to 8/11/2020.  No dilated small bowel loops. 2.Extensive consolidation throughout the lung bases left greater than right, new compared to prior compatible with extensive multifocal pneumonia. 3.Hepatic steatosis. 4.Mildly enlarged prostate. Signed by Darin Hilliard MD    XR chest 1 view  Result Date: 4/17/2025  Interpreted By:  Kulwinder Estrada, STUDY: XR CHEST 1 VIEW   INDICATION: Signs/Symptoms:NG tube placment.   COMPARISON: April 16   ACCESSION NUMBER(S): [id number]   ORDERING  CLINICIAN: FRANSICO SAMANIEGO   FINDINGS: Nasogastric tube over the gastric antrum.   Perihilar and basilar edema with retrocardiac airspace disease and left effusion worsened from previous.         Nasogastric tube over the gastric antrum.   Perihilar and basilar edema with retrocardiac airspace disease and left effusion worsened from previous.   Signed by: Kulwinder Estrada 4/17/2025 2:58 PM Dictation workstation:   [id number]    ECG 12 lead  Result Date: 4/17/2025  Sinus tachycardia ST & T wave abnormality, consider anterior ischemia Abnormal ECG When compared with ECG of 22-SEP-2021 03:57, ST no longer elevated in Inferior leads ST no longer elevated in Anterior leads T wave inversion now evident in Inferior leads T wave inversion now evident in Anterior leads    XR abdomen 1 view  Result Date: 4/17/2025  Interpreted By:  Kristi Strickland, STUDY: XR ABDOMEN 1 VIEW; ;  4/17/2025 1:50 am   INDICATION: Signs/Symptoms:c/f sbo/ileus.     COMPARISON: Abdomen radiographs dated 08/14/2020.   ACCESSION NUMBER(S): [id number]   ORDERING CLINICIAN: MAGALY ODELL   FINDINGS: Portable supine and upright radiographs of the abdomen were performed.   There is large volume formed stool in the rectum and left hemicolon. There is diffuse gaseous distention of bowel loops in the mid abdomen with a distended colonic loop measuring up to 9.5 cm in maximum width. This could be related to fecal impaction. No evidence of pneumoperitoneum on provided images. Visualized lung bases demonstrate airspace opacity in the left lower lung zone within limits of evaluation. A gastrostomy/jejunostomy tube projects over the mid right hemiabdomen. No acute osseous findings.       Large volume dense stool in the rectum and left hemicolon. Diffuse gaseous distention of the bowel loops in the mid abdomen raises concern for a degree of bowel obstruction and could be related to fecal impaction. Recommend clinical correlation. Dedicated CT abdomen pelvis can be  performed for further evaluation as clinically warranted.     MACRO: None   Signed by: Kristi Strickland 4/17/2025 10:37 AM Dictation workstation:   [id number]    XR chest 1 view  Result Date: 4/16/2025  Interpreted By:  Samuel Joshi, STUDY: XR CHEST 1 VIEW;  4/16/2025 6:47 pm   INDICATION: Signs/Symptoms:sob.   COMPARISON: 9/21/2021   ACCESSION NUMBER(S): [id number]   ORDERING CLINICIAN: MERNA TUBBS   FINDINGS: There is magnification of the cardiac silhouette secondary AP technique. There are airspace opacities asymmetric in the left lung compatible with pneumonia. No significant pleural effusion. No pneumothorax.       Airspace opacities asymmetric in the left lung compatible with pneumonia; short-term progress imaging is recommended after treatment to assure resolution and exclude other underlying pathology.   MACRO: None   Signed by: Samuel Joshi 4/16/2025 7:02 PM Dictation workstation:   [id number]      Scheduled medications  Scheduled Medications[2]  Continuous medications  Continuous Medications[3]  PRN medications  PRN Medications[4]  Results for orders placed or performed during the hospital encounter of 04/16/25 (from the past 24 hours)   ECG 12 lead   Result Value Ref Range    Ventricular Rate 104 BPM    Atrial Rate 104 BPM    VA Interval 134 ms    QRS Duration 96 ms    QT Interval 342 ms    QTC Calculation(Bazett) 449 ms    P Axis 36 degrees    R Axis 55 degrees    T Axis -10 degrees    QRS Count 18 beats    Q Onset 208 ms    P Onset 141 ms    P Offset 184 ms    T Offset 379 ms    QTC Fredericia 410 ms   Sars-CoV-2 and Influenza A/B PCR   Result Value Ref Range    Flu A Result Not Detected Not Detected    Flu B Result Not Detected Not Detected    Coronavirus 2019, PCR Not Detected Not Detected   MRSA Surveillance for Vancomycin De-escalation, PCR    Specimen: Anterior Nares; Swab   Result Value Ref Range    MRSA PCR Not Detected Not Detected   RSV PCR   Result Value Ref Range    RSV PCR Not  Detected Not Detected   CBC and Auto Differential   Result Value Ref Range    WBC 4.7 4.4 - 11.3 x10*3/uL    nRBC 0.0 0.0 - 0.0 /100 WBCs    RBC 5.04 4.50 - 5.90 x10*6/uL    Hemoglobin 16.8 13.5 - 17.5 g/dL    Hematocrit 49.9 41.0 - 52.0 %    MCV 99 80 - 100 fL    MCH 33.3 26.0 - 34.0 pg    MCHC 33.7 32.0 - 36.0 g/dL    RDW 12.7 11.5 - 14.5 %    Platelets 186 150 - 450 x10*3/uL    Neutrophils % 71.1 40.0 - 80.0 %    Immature Granulocytes %, Automated 0.2 0.0 - 0.9 %    Lymphocytes % 22.6 13.0 - 44.0 %    Monocytes % 5.3 2.0 - 10.0 %    Eosinophils % 0.2 0.0 - 6.0 %    Basophils % 0.6 0.0 - 2.0 %    Neutrophils Absolute 3.37 1.20 - 7.70 x10*3/uL    Immature Granulocytes Absolute, Automated 0.01 0.00 - 0.70 x10*3/uL    Lymphocytes Absolute 1.07 (L) 1.20 - 4.80 x10*3/uL    Monocytes Absolute 0.25 0.10 - 1.00 x10*3/uL    Eosinophils Absolute 0.01 0.00 - 0.70 x10*3/uL    Basophils Absolute 0.03 0.00 - 0.10 x10*3/uL   Comprehensive Metabolic Panel   Result Value Ref Range    Glucose 122 (H) 74 - 99 mg/dL    Sodium 136 136 - 145 mmol/L    Potassium 5.3 3.5 - 5.3 mmol/L    Chloride 99 98 - 107 mmol/L    Bicarbonate 25 21 - 32 mmol/L    Anion Gap 17 10 - 20 mmol/L    Urea Nitrogen 22 6 - 23 mg/dL    Creatinine 0.87 0.50 - 1.30 mg/dL    eGFR >90 >60 mL/min/1.73m*2    Calcium 9.3 8.6 - 10.3 mg/dL    Albumin 4.6 3.4 - 5.0 g/dL    Alkaline Phosphatase 82 33 - 120 U/L    Total Protein 8.3 (H) 6.4 - 8.2 g/dL    AST 47 (H) 9 - 39 U/L    Bilirubin, Total 2.4 (H) 0.0 - 1.2 mg/dL    ALT 45 10 - 52 U/L   Lactate   Result Value Ref Range    Lactate 4.3 (HH) 0.4 - 2.0 mmol/L   Blood Culture    Specimen: Peripheral Venipuncture; Blood culture   Result Value Ref Range    Blood Culture Loaded on Instrument - Culture in progress    Blood Culture    Specimen: Peripheral Venipuncture; Blood culture   Result Value Ref Range    Blood Culture Loaded on Instrument - Culture in progress    Magnesium   Result Value Ref Range    Magnesium 2.30 1.60 -  2.40 mg/dL   B-Type Natriuretic Peptide   Result Value Ref Range    BNP 54 0 - 99 pg/mL   Blood Gas Venous Full Panel   Result Value Ref Range    POCT pH, Venous 7.36 7.33 - 7.43 pH    POCT pCO2, Venous 50 41 - 51 mm Hg    POCT pO2, Venous 31 (L) 35 - 45 mm Hg    POCT SO2, Venous 36 (L) 45 - 75 %    POCT Oxy Hemoglobin, Venous 35.6 (L) 45.0 - 75.0 %    POCT Hematocrit Calculated, Venous 51.0 41.0 - 52.0 %    POCT Sodium, Venous 132 (L) 136 - 145 mmol/L    POCT Potassium, Venous 3.9 3.5 - 5.3 mmol/L    POCT Chloride, Venous 101 98 - 107 mmol/L    POCT Ionized Calicum, Venous 1.22 1.10 - 1.33 mmol/L    POCT Glucose, Venous 136 (H) 74 - 99 mg/dL    POCT Lactate, Venous 4.6 (HH) 0.4 - 2.0 mmol/L    POCT Base Excess, Venous 1.6 -2.0 - 3.0 mmol/L    POCT HCO3 Calculated, Venous 28.2 (H) 22.0 - 26.0 mmol/L    POCT Hemoglobin, Venous 17.1 13.5 - 17.5 g/dL    POCT Anion Gap, Venous 7.0 (L) 10.0 - 25.0 mmol/L    Patient Temperature      FiO2 29 %   Troponin I, High Sensitivity, Initial   Result Value Ref Range    Troponin I, High Sensitivity 4 0 - 20 ng/L   Bilirubin, Direct   Result Value Ref Range    Bilirubin, Direct 0.3 0.0 - 0.3 mg/dL   Troponin, High Sensitivity, 1 Hour   Result Value Ref Range    Troponin I, High Sensitivity <3 0 - 20 ng/L   Lactate   Result Value Ref Range    Lactate 2.7 (H) 0.4 - 2.0 mmol/L   Blood Gas Arterial Full Panel   Result Value Ref Range    POCT pH, Arterial 7.37 (L) 7.38 - 7.42 pH    POCT pCO2, Arterial 39 38 - 42 mm Hg    POCT pO2, Arterial 70 (L) 85 - 95 mm Hg    POCT SO2, Arterial 96 94 - 100 %    POCT Oxy Hemoglobin, Arterial 92.3 (L) 94.0 - 98.0 %    POCT Hematocrit Calculated, Arterial 41.0 41.0 - 52.0 %    POCT Sodium, Arterial 133 (L) 136 - 145 mmol/L    POCT Potassium, Arterial 3.1 (L) 3.5 - 5.3 mmol/L    POCT Chloride, Arterial 107 98 - 107 mmol/L    POCT Ionized Calcium, Arterial 1.10 1.10 - 1.33 mmol/L    POCT Glucose, Arterial 121 (H) 74 - 99 mg/dL    POCT Lactate, Arterial 1.9  0.4 - 2.0 mmol/L    POCT Base Excess, Arterial -2.5 (L) -2.0 - 3.0 mmol/L    POCT HCO3 Calculated, Arterial 22.5 22.0 - 26.0 mmol/L    POCT Hemoglobin, Arterial 13.7 13.5 - 17.5 g/dL    POCT Anion Gap, Arterial 7 (L) 10 - 25 mmo/L    Patient Temperature      FiO2 100 %   CBC and Auto Differential   Result Value Ref Range    WBC 3.4 (L) 4.4 - 11.3 x10*3/uL    nRBC 0.0 0.0 - 0.0 /100 WBCs    RBC 4.29 (L) 4.50 - 5.90 x10*6/uL    Hemoglobin 14.2 13.5 - 17.5 g/dL    Hematocrit 44.6 41.0 - 52.0 %     (H) 80 - 100 fL    MCH 33.1 26.0 - 34.0 pg    MCHC 31.8 (L) 32.0 - 36.0 g/dL    RDW 12.8 11.5 - 14.5 %    Platelets 143 (L) 150 - 450 x10*3/uL    Immature Granulocytes %, Automated 0.9 0.0 - 0.9 %    Immature Granulocytes Absolute, Automated 0.03 0.00 - 0.70 x10*3/uL   Magnesium   Result Value Ref Range    Magnesium 1.60 1.60 - 2.40 mg/dL   Comprehensive Metabolic Panel   Result Value Ref Range    Glucose 93 74 - 99 mg/dL    Sodium 138 136 - 145 mmol/L    Potassium 3.4 (L) 3.5 - 5.3 mmol/L    Chloride 106 98 - 107 mmol/L    Bicarbonate 22 21 - 32 mmol/L    Anion Gap 13 10 - 20 mmol/L    Urea Nitrogen 13 6 - 23 mg/dL    Creatinine 0.57 0.50 - 1.30 mg/dL    eGFR >90 >60 mL/min/1.73m*2    Calcium 7.6 (L) 8.6 - 10.3 mg/dL    Albumin 3.5 3.4 - 5.0 g/dL    Alkaline Phosphatase 63 33 - 120 U/L    Total Protein 6.1 (L) 6.4 - 8.2 g/dL    AST 21 9 - 39 U/L    Bilirubin, Total 1.4 (H) 0.0 - 1.2 mg/dL    ALT 31 10 - 52 U/L   Phosphorus   Result Value Ref Range    Phosphorus 2.3 (L) 2.5 - 4.9 mg/dL   Manual Differential   Result Value Ref Range    Neutrophils %, Manual 23.0 40.0 - 80.0 %    Bands %, Manual 30.0 0.0 - 5.0 %    Lymphocytes %, Manual 42.0 13.0 - 44.0 %    Monocytes %, Manual 5.0 2.0 - 10.0 %    Eosinophils %, Manual 0.0 0.0 - 6.0 %    Basophils %, Manual 0.0 0.0 - 2.0 %    Seg Neutrophils Absolute, Manual 0.78 (L) 1.20 - 7.00 x10*3/uL    Bands Absolute, Manual 1.02 (H) 0.00 - 0.70 x10*3/uL    Lymphocytes Absolute,  Manual 1.43 1.20 - 4.80 x10*3/uL    Monocytes Absolute, Manual 0.17 0.10 - 1.00 x10*3/uL    Eosinophils Absolute, Manual 0.00 0.00 - 0.70 x10*3/uL    Basophils Absolute, Manual 0.00 0.00 - 0.10 x10*3/uL    Total Cells Counted 100     Neutrophils Absolute, Manual 1.80 1.20 - 7.70 x10*3/uL    RBC Morphology No significant RBC morphology present    POCT GLUCOSE   Result Value Ref Range    POCT Glucose 137 (H) 74 - 99 mg/dL   Blood Gas Arterial Full Panel   Result Value Ref Range    POCT pH, Arterial 7.39 7.38 - 7.42 pH    POCT pCO2, Arterial 38 38 - 42 mm Hg    POCT pO2, Arterial 58 (L) 85 - 95 mm Hg    POCT SO2, Arterial 93 (L) 94 - 100 %    POCT Oxy Hemoglobin, Arterial 90.8 (L) 94.0 - 98.0 %    POCT Hematocrit Calculated, Arterial 35.0 (L) 41.0 - 52.0 %    POCT Sodium, Arterial 134 (L) 136 - 145 mmol/L    POCT Potassium, Arterial 3.2 (L) 3.5 - 5.3 mmol/L    POCT Chloride, Arterial 106 98 - 107 mmol/L    POCT Ionized Calcium, Arterial 1.17 1.10 - 1.33 mmol/L    POCT Glucose, Arterial 116 (H) 74 - 99 mg/dL    POCT Lactate, Arterial 3.7 (H) 0.4 - 2.0 mmol/L    POCT Base Excess, Arterial -1.7 -2.0 - 3.0 mmol/L    POCT HCO3 Calculated, Arterial 23.0 22.0 - 26.0 mmol/L    POCT Hemoglobin, Arterial 11.6 (L) 13.5 - 17.5 g/dL    POCT Anion Gap, Arterial 8 (L) 10 - 25 mmo/L    Patient Temperature      FiO2 90 %    Apparatus HIGH FLOW CANNULA     Site of Arterial Puncture Radial Right     Genaro's Test Positive    Lactate   Result Value Ref Range    Lactate 3.5 (H) 0.4 - 2.0 mmol/L   CBC   Result Value Ref Range    WBC 4.1 (L) 4.4 - 11.3 x10*3/uL    nRBC 0.0 0.0 - 0.0 /100 WBCs    RBC 3.54 (L) 4.50 - 5.90 x10*6/uL    Hemoglobin 11.6 (L) 13.5 - 17.5 g/dL    Hematocrit 35.1 (L) 41.0 - 52.0 %    MCV 99 80 - 100 fL    MCH 32.8 26.0 - 34.0 pg    MCHC 33.0 32.0 - 36.0 g/dL    RDW 12.6 11.5 - 14.5 %    Platelets 138 (L) 150 - 450 x10*3/uL   Magnesium   Result Value Ref Range    Magnesium 1.54 (L) 1.60 - 2.40 mg/dL   Comprehensive  metabolic panel   Result Value Ref Range    Glucose 121 (H) 74 - 99 mg/dL    Sodium 136 136 - 145 mmol/L    Potassium 3.1 (L) 3.5 - 5.3 mmol/L    Chloride 105 98 - 107 mmol/L    Bicarbonate 22 21 - 32 mmol/L    Anion Gap 12 10 - 20 mmol/L    Urea Nitrogen 11 6 - 23 mg/dL    Creatinine 0.51 0.50 - 1.30 mg/dL    eGFR >90 >60 mL/min/1.73m*2    Calcium 7.2 (L) 8.6 - 10.3 mg/dL    Albumin 3.0 (L) 3.4 - 5.0 g/dL    Alkaline Phosphatase 51 33 - 120 U/L    Total Protein 5.3 (L) 6.4 - 8.2 g/dL    AST 17 9 - 39 U/L    Bilirubin, Total 0.9 0.0 - 1.2 mg/dL    ALT 25 10 - 52 U/L   Phosphorus   Result Value Ref Range    Phosphorus 1.9 (L) 2.5 - 4.9 mg/dL   POCT GLUCOSE   Result Value Ref Range    POCT Glucose 130 (H) 74 - 99 mg/dL        Assessment/Plan     Juan Carlos Ybarra is a 29 y.o. male with a PMH of spastic CP, seizure disorder who is admitted with pneumonia, and constipation with large stool volume throughout the colon.  - imaging reviewed. I personally reviewed the XR and CT   - labs reviewed. Worsening ABG and lactate today. Hypokalemia, low phosphorus and magnesium. Replacements were ordered by primary team. Cont to monitor labs.   - recommend dulcolax supp daily  - soap suds enemas q 8 hours for now.  - recommend maintain NG placement to LIWS until resolution of bowel function.   - recommend miralax BID through PEG tube and hold NG suction for 30 minutes after miralax.  - recommend hold on using PEG tube and hold tube feeds until return of bowel function and pt having BMs.       - remainder of care per primary team. Discussed with primary team    Plan of care discussed with Dr. Gee who is in agreement with plan of care.     Lizzie Anaya, AVTAR-CNP         [1] No family history on file.  [2] azithromycin, 500 mg, intravenous, q24h  baclofen, 20 mg, oral, TID  bisacodyl, 10 mg, rectal, Daily  citalopram, 20 mg, oral, Daily  clonazePAM, 1 mg, oral, TID  glycopyrrolate, 2 mg, oral, TID  insulin lispro, 0-5 Units,  subcutaneous, q4h  ipratropium-albuteroL, 3 mL, nebulization, TID  lacosamide, 300 mg, intravenous, q12h  [Held by provider] lacosamide, 300 mg, oral, BID  lactulose, 10 g, oral, Daily  lamoTRIgine, 200 mg, oral, TID  piperacillin-tazobactam, 4.5 g, intravenous, q6h  polyethylene glycol, 17 g, oral, Daily  potassium phosphate, 30 mmol, intravenous, Once  senna, 10 mL, oral, BID  zonisamide, 300 mg, oral, Nightly     [3]    [4] PRN medications: acetaminophen **OR** acetaminophen, dextrose, dextrose, glucagon, glucagon, ipratropium-albuteroL, LORazepam, ondansetron ODT **OR** ondansetron, oxygen

## 2025-04-17 NOTE — HOSPITAL COURSE
Juan Carlos Ybarra is a 29 y.o. male with a PMH of spastic CP, seizure disorder who presented to [facility] with c/o fevers, cough, increased mucus production.      Talked to pt's mom at bedside given pt's baseline is nonverbal, wheelchair-bound, who reported that yesterday when she got back from lunch and checked on him, she found he was running a fever to 102F. She reports he was also having a lot of increased mucus production/coughing. Pt mother reports he got all his childhood vaccinations but has not gotten COVID/PNA/flu vaccinations. Several people in their household have had cold-like symptoms over the past weeks. He does not have history of lung disease. Did require ventilation at one point when he had COVID.     She reports he has not had BM since Saturday, usually he has one about every 2 days. He did have episode of emesis while in ED during my exam. Mom denies any episodes of emesis at home prior to presentation.     Pt was found to be hypoxic on arrival to 83%, placed initially on 6L which was increased to 10L. He was started on ceftriaxone and azithromycin as CXR showed left lung pneumonia.     On 4/17, patient had significant drop in BP and had 5 L of boluses which did not improve his BP. He was requiring Airvo (maxed out) and was still desaturating below 88%. Palliative was consulted to discuss goals of care. CT abdomen was ordered but was not able to be completed as patient was not stable. Pneumonia workup was pending.    Transferred to ICU for septic shock, needing BP control.     ICU course  Patient did not need pressor support while in ICU. Initially planned on intubation/BiPAP but patient did well on Airvo plus he had profuse vomiting. There was concerns for SBO as he did not have a bowel movement. NG tube was placed to suction, surgery consulted. CT Abdomen pelvis was unremarkable for SBO but large stool burden was seen which likely precipitated nausea/vomiting. Aggressive bowel regimen was started  including soap suds enema. He had a large bowel movement the following day. Initially there was concerns for aspiration vs community acquired pneumonia so he was started on Zosyn and Azithromcyin. He was found to be positive for Streptococcus pneumoniae on work up. Continuing Zosyn. He was satting at 100% on Airvo, weaning down as tolerated. Currently on 40/70 (4/18). Currently in ICU for ICU nursing as well as aspiration risk.  4/19, increased to max Airvo settings due to mucous plugging.  Placed on VEST therapy to improve mucous plugging.  VBG after treatment indicated improvement with pH of 7.48 with pCO2 of 37.  Chest x-ray was repeated on 4/19 and showed improvement in right infrahilar region and continuing opacification of left lower lung and retrocardiac region.  Left perihilar and right mid and suprahilar infiltrates appeared increased

## 2025-04-17 NOTE — PROGRESS NOTES
04/17/25 1427   Discharge Planning   Living Arrangements Parent   Support Systems Parent   Assistance Needed Patient was transferred from [facility] to ICU. Family does not want patient to be intubate at this time. Patient remains on Airvo 60L. Palliative is following. At baseline patient is from home with his parents, has a history of cerebral palsy, on room air at home, is nonverbal and is wheelchair-bound.   Type of Residence Private residence   Who is requesting discharge planning? Provider   Home or Post Acute Services Other (Comment)  (TBD once patient is more medically stable)   Expected Discharge Disposition Home   Financial Resource Strain   How hard is it for you to pay for the very basics like food, housing, medical care, and heating? Pt Unable   Housing Stability   In the last 12 months, was there a time when you were not able to pay the mortgage or rent on time? Pt Unable   At any time in the past 12 months, were you homeless or living in a shelter (including now)? Pt Unable   Transportation Needs   In the past 12 months, has lack of transportation kept you from medical appointments or from getting medications? Pt Unable   In the past 12 months, has lack of transportation kept you from meetings, work, or from getting things needed for daily living? Pt Unable   Stroke Family Assessment   Stroke Family Assessment Needed No   Intensity of Service   Intensity of Service 0-30 min

## 2025-04-18 ENCOUNTER — APPOINTMENT (OUTPATIENT)
Dept: RADIOLOGY | Facility: HOSPITAL | Age: 30
End: 2025-04-18
Payer: COMMERCIAL

## 2025-04-18 LAB
ALBUMIN SERPL BCP-MCNC: 3.1 G/DL (ref 3.4–5)
ALP SERPL-CCNC: 60 U/L (ref 33–120)
ALT SERPL W P-5'-P-CCNC: 22 U/L (ref 10–52)
ANION GAP BLDV CALCULATED.4IONS-SCNC: 1 MMOL/L (ref 10–25)
ANION GAP SERPL CALC-SCNC: 9 MMOL/L (ref 10–20)
AST SERPL W P-5'-P-CCNC: 15 U/L (ref 9–39)
ATRIAL RATE: 104 BPM
BASE EXCESS BLDV CALC-SCNC: 5.1 MMOL/L (ref -2–3)
BASOPHILS # BLD AUTO: 0.04 X10*3/UL (ref 0–0.1)
BASOPHILS NFR BLD AUTO: 0.5 %
BILIRUB SERPL-MCNC: 1.5 MG/DL (ref 0–1.2)
BODY TEMPERATURE: ABNORMAL
BUN SERPL-MCNC: 8 MG/DL (ref 6–23)
CA-I BLDV-SCNC: 1.19 MMOL/L (ref 1.1–1.33)
CALCIUM SERPL-MCNC: 7.8 MG/DL (ref 8.6–10.3)
CHLORIDE BLDV-SCNC: 105 MMOL/L (ref 98–107)
CHLORIDE SERPL-SCNC: 102 MMOL/L (ref 98–107)
CO2 SERPL-SCNC: 27 MMOL/L (ref 21–32)
CREAT SERPL-MCNC: 0.49 MG/DL (ref 0.5–1.3)
EGFRCR SERPLBLD CKD-EPI 2021: >90 ML/MIN/1.73M*2
EOSINOPHIL # BLD AUTO: 0.01 X10*3/UL (ref 0–0.7)
EOSINOPHIL NFR BLD AUTO: 0.1 %
ERYTHROCYTE [DISTWIDTH] IN BLOOD BY AUTOMATED COUNT: 12.4 % (ref 11.5–14.5)
GLUCOSE BLD MANUAL STRIP-MCNC: 106 MG/DL (ref 74–99)
GLUCOSE BLD MANUAL STRIP-MCNC: 112 MG/DL (ref 74–99)
GLUCOSE BLD MANUAL STRIP-MCNC: 116 MG/DL (ref 74–99)
GLUCOSE BLD MANUAL STRIP-MCNC: 96 MG/DL (ref 74–99)
GLUCOSE BLDV-MCNC: 102 MG/DL (ref 74–99)
GLUCOSE SERPL-MCNC: 101 MG/DL (ref 74–99)
HCO3 BLDV-SCNC: 26.9 MMOL/L (ref 22–26)
HCT VFR BLD AUTO: 40.2 % (ref 41–52)
HCT VFR BLD EST: 38 % (ref 41–52)
HGB BLD-MCNC: 13.3 G/DL (ref 13.5–17.5)
HGB BLDV-MCNC: 12.6 G/DL (ref 13.5–17.5)
HOLD SPECIMEN: NORMAL
IMM GRANULOCYTES # BLD AUTO: 0.03 X10*3/UL (ref 0–0.7)
IMM GRANULOCYTES NFR BLD AUTO: 0.4 % (ref 0–0.9)
INHALED O2 CONCENTRATION: 90 %
LACTATE BLDV-SCNC: 1.3 MMOL/L (ref 0.4–2)
LACTATE SERPL-SCNC: 1.4 MMOL/L (ref 0.4–2)
LEGIONELLA AG UR QL: NEGATIVE
LYMPHOCYTES # BLD AUTO: 0.79 X10*3/UL (ref 1.2–4.8)
LYMPHOCYTES NFR BLD AUTO: 10.8 %
MAGNESIUM SERPL-MCNC: 1.77 MG/DL (ref 1.6–2.4)
MCH RBC QN AUTO: 32.8 PG (ref 26–34)
MCHC RBC AUTO-ENTMCNC: 33.1 G/DL (ref 32–36)
MCV RBC AUTO: 99 FL (ref 80–100)
MONOCYTES # BLD AUTO: 0.4 X10*3/UL (ref 0.1–1)
MONOCYTES NFR BLD AUTO: 5.5 %
NEUTROPHILS # BLD AUTO: 6.05 X10*3/UL (ref 1.2–7.7)
NEUTROPHILS NFR BLD AUTO: 82.7 %
NRBC BLD-RTO: 0 /100 WBCS (ref 0–0)
OXYHGB MFR BLDV: 97.5 % (ref 45–75)
P AXIS: 36 DEGREES
P OFFSET: 184 MS
P ONSET: 141 MS
PCO2 BLDV: 30 MM HG (ref 41–51)
PH BLDV: 7.56 PH (ref 7.33–7.43)
PHOSPHATE SERPL-MCNC: 1.3 MG/DL (ref 2.5–4.9)
PLATELET # BLD AUTO: 152 X10*3/UL (ref 150–450)
PO2 BLDV: 140 MM HG (ref 35–45)
POTASSIUM BLDV-SCNC: 3.5 MMOL/L (ref 3.5–5.3)
POTASSIUM SERPL-SCNC: 3 MMOL/L (ref 3.5–5.3)
PR INTERVAL: 134 MS
PROT SERPL-MCNC: 5.7 G/DL (ref 6.4–8.2)
Q ONSET: 208 MS
QRS COUNT: 18 BEATS
QRS DURATION: 96 MS
QT INTERVAL: 342 MS
QTC CALCULATION(BAZETT): 449 MS
QTC FREDERICIA: 410 MS
R AXIS: 55 DEGREES
RBC # BLD AUTO: 4.06 X10*6/UL (ref 4.5–5.9)
S PNEUM AG UR QL: POSITIVE
SAO2 % BLDV: 100 % (ref 45–75)
SODIUM BLDV-SCNC: 129 MMOL/L (ref 136–145)
SODIUM SERPL-SCNC: 135 MMOL/L (ref 136–145)
T AXIS: -10 DEGREES
T OFFSET: 379 MS
VENTRICULAR RATE: 104 BPM
WBC # BLD AUTO: 7.3 X10*3/UL (ref 4.4–11.3)

## 2025-04-18 PROCEDURE — 2500000005 HC RX 250 GENERAL PHARMACY W/O HCPCS

## 2025-04-18 PROCEDURE — 94640 AIRWAY INHALATION TREATMENT: CPT

## 2025-04-18 PROCEDURE — 99231 SBSQ HOSP IP/OBS SF/LOW 25: CPT | Performed by: SURGERY

## 2025-04-18 PROCEDURE — 2500000001 HC RX 250 WO HCPCS SELF ADMINISTERED DRUGS (ALT 637 FOR MEDICARE OP)

## 2025-04-18 PROCEDURE — 2500000005 HC RX 250 GENERAL PHARMACY W/O HCPCS: Performed by: INTERNAL MEDICINE

## 2025-04-18 PROCEDURE — 74018 RADEX ABDOMEN 1 VIEW: CPT

## 2025-04-18 PROCEDURE — 99233 SBSQ HOSP IP/OBS HIGH 50: CPT | Performed by: NURSE PRACTITIONER

## 2025-04-18 PROCEDURE — 2500000002 HC RX 250 W HCPCS SELF ADMINISTERED DRUGS (ALT 637 FOR MEDICARE OP, ALT 636 FOR OP/ED)

## 2025-04-18 PROCEDURE — 36415 COLL VENOUS BLD VENIPUNCTURE: CPT

## 2025-04-18 PROCEDURE — 83735 ASSAY OF MAGNESIUM: CPT

## 2025-04-18 PROCEDURE — 84100 ASSAY OF PHOSPHORUS: CPT

## 2025-04-18 PROCEDURE — 85025 COMPLETE CBC W/AUTO DIFF WBC: CPT

## 2025-04-18 PROCEDURE — 99291 CRITICAL CARE FIRST HOUR: CPT

## 2025-04-18 PROCEDURE — 74018 RADEX ABDOMEN 1 VIEW: CPT | Performed by: STUDENT IN AN ORGANIZED HEALTH CARE EDUCATION/TRAINING PROGRAM

## 2025-04-18 PROCEDURE — 2020000001 HC ICU ROOM DAILY

## 2025-04-18 PROCEDURE — 82947 ASSAY GLUCOSE BLOOD QUANT: CPT

## 2025-04-18 PROCEDURE — 80053 COMPREHEN METABOLIC PANEL: CPT

## 2025-04-18 PROCEDURE — C9254 INJECTION, LACOSAMIDE: HCPCS | Mod: JZ

## 2025-04-18 PROCEDURE — 2500000004 HC RX 250 GENERAL PHARMACY W/ HCPCS (ALT 636 FOR OP/ED): Mod: JZ

## 2025-04-18 PROCEDURE — 83605 ASSAY OF LACTIC ACID: CPT

## 2025-04-18 PROCEDURE — 94760 N-INVAS EAR/PLS OXIMETRY 1: CPT

## 2025-04-18 PROCEDURE — 84132 ASSAY OF SERUM POTASSIUM: CPT

## 2025-04-18 RX ORDER — POTASSIUM CHLORIDE 14.9 MG/ML
20 INJECTION INTRAVENOUS
Status: DISPENSED | OUTPATIENT
Start: 2025-04-18 | End: 2025-04-18

## 2025-04-18 RX ORDER — OXYCODONE HYDROCHLORIDE 5 MG/1
10 TABLET ORAL EVERY 6 HOURS PRN
Refills: 0 | Status: DISCONTINUED | OUTPATIENT
Start: 2025-04-18 | End: 2025-04-25 | Stop reason: HOSPADM

## 2025-04-18 RX ORDER — INSULIN LISPRO 100 [IU]/ML
0-5 INJECTION, SOLUTION INTRAVENOUS; SUBCUTANEOUS EVERY 4 HOURS
Status: DISCONTINUED | OUTPATIENT
Start: 2025-04-18 | End: 2025-04-19

## 2025-04-18 RX ORDER — OXYCODONE HYDROCHLORIDE 5 MG/1
5 TABLET ORAL EVERY 6 HOURS PRN
Refills: 0 | Status: DISCONTINUED | OUTPATIENT
Start: 2025-04-18 | End: 2025-04-25 | Stop reason: HOSPADM

## 2025-04-18 RX ORDER — MAGNESIUM SULFATE HEPTAHYDRATE 40 MG/ML
2 INJECTION, SOLUTION INTRAVENOUS ONCE
Status: COMPLETED | OUTPATIENT
Start: 2025-04-18 | End: 2025-04-18

## 2025-04-18 RX ADMIN — PIPERACILLIN SODIUM AND TAZOBACTAM SODIUM 4.5 G: 4; .5 INJECTION, SOLUTION INTRAVENOUS at 08:10

## 2025-04-18 RX ADMIN — CLONAZEPAM 1 MG: 0.5 TABLET ORAL at 08:28

## 2025-04-18 RX ADMIN — BACLOFEN 20 MG: 10 TABLET ORAL at 08:27

## 2025-04-18 RX ADMIN — PIPERACILLIN SODIUM AND TAZOBACTAM SODIUM 4.5 G: 4; .5 INJECTION, SOLUTION INTRAVENOUS at 15:41

## 2025-04-18 RX ADMIN — ZONISAMIDE 300 MG: 100 CAPSULE ORAL at 20:29

## 2025-04-18 RX ADMIN — CLONAZEPAM 1 MG: 0.5 TABLET ORAL at 15:34

## 2025-04-18 RX ADMIN — IPRATROPIUM BROMIDE AND ALBUTEROL SULFATE 3 ML: 2.5; .5 SOLUTION RESPIRATORY (INHALATION) at 15:28

## 2025-04-18 RX ADMIN — POTASSIUM PHOSPHATE, MONOBASIC AND POTASSIUM PHOSPHATE, DIBASIC 21 MMOL: 224; 236 INJECTION, SOLUTION, CONCENTRATE INTRAVENOUS at 08:08

## 2025-04-18 RX ADMIN — LACOSAMIDE 300 MG: 10 INJECTION INTRAVENOUS at 17:34

## 2025-04-18 RX ADMIN — LAMOTRIGINE 200 MG: 100 TABLET ORAL at 15:35

## 2025-04-18 RX ADMIN — POTASSIUM CHLORIDE 20 MEQ: 200 INJECTION, SOLUTION INTRAVENOUS at 15:35

## 2025-04-18 RX ADMIN — AZITHROMYCIN MONOHYDRATE 500 MG: 500 INJECTION, POWDER, LYOPHILIZED, FOR SOLUTION INTRAVENOUS at 21:20

## 2025-04-18 RX ADMIN — PIPERACILLIN SODIUM AND TAZOBACTAM SODIUM 4.5 G: 4; .5 INJECTION, SOLUTION INTRAVENOUS at 02:28

## 2025-04-18 RX ADMIN — CITALOPRAM HYDROBROMIDE 20 MG: 20 TABLET ORAL at 08:28

## 2025-04-18 RX ADMIN — BACLOFEN 20 MG: 10 TABLET ORAL at 20:27

## 2025-04-18 RX ADMIN — Medication 60 L/MIN: at 19:37

## 2025-04-18 RX ADMIN — ENOXAPARIN SODIUM 40 MG: 40 INJECTION SUBCUTANEOUS at 15:34

## 2025-04-18 RX ADMIN — POTASSIUM CHLORIDE 20 MEQ: 200 INJECTION, SOLUTION INTRAVENOUS at 13:13

## 2025-04-18 RX ADMIN — MAGNESIUM SULFATE HEPTAHYDRATE 2 G: 40 INJECTION, SOLUTION INTRAVENOUS at 17:40

## 2025-04-18 RX ADMIN — LACTULOSE 10 G: 20 SOLUTION ORAL at 08:28

## 2025-04-18 RX ADMIN — IPRATROPIUM BROMIDE AND ALBUTEROL SULFATE 3 ML: 2.5; .5 SOLUTION RESPIRATORY (INHALATION) at 07:30

## 2025-04-18 RX ADMIN — Medication 40 L/MIN: at 15:28

## 2025-04-18 RX ADMIN — Medication 40 L/MIN: at 08:14

## 2025-04-18 RX ADMIN — LAMOTRIGINE 200 MG: 100 TABLET ORAL at 08:27

## 2025-04-18 RX ADMIN — BACLOFEN 20 MG: 10 TABLET ORAL at 15:34

## 2025-04-18 RX ADMIN — Medication 60 L/MIN: at 23:00

## 2025-04-18 RX ADMIN — LACOSAMIDE 300 MG: 10 INJECTION INTRAVENOUS at 05:58

## 2025-04-18 RX ADMIN — LAMOTRIGINE 200 MG: 100 TABLET ORAL at 20:29

## 2025-04-18 RX ADMIN — GLYCOPYRROLATE 2 MG: 1 TABLET ORAL at 20:29

## 2025-04-18 RX ADMIN — CLONAZEPAM 1 MG: 0.5 TABLET ORAL at 20:28

## 2025-04-18 RX ADMIN — Medication 50 L/MIN: at 07:30

## 2025-04-18 RX ADMIN — ACETAMINOPHEN 650 MG: 650 SOLUTION ORAL at 16:42

## 2025-04-18 RX ADMIN — PIPERACILLIN SODIUM AND TAZOBACTAM SODIUM 4.5 G: 4; .5 INJECTION, SOLUTION INTRAVENOUS at 20:10

## 2025-04-18 RX ADMIN — IPRATROPIUM BROMIDE AND ALBUTEROL SULFATE 3 ML: 2.5; .5 SOLUTION RESPIRATORY (INHALATION) at 19:37

## 2025-04-18 RX ADMIN — GLYCOPYRROLATE 2 MG: 1 TABLET ORAL at 15:38

## 2025-04-18 RX ADMIN — GLYCOPYRROLATE 2 MG: 1 TABLET ORAL at 08:28

## 2025-04-18 RX ADMIN — POTASSIUM CHLORIDE 20 MEQ: 200 INJECTION, SOLUTION INTRAVENOUS at 17:34

## 2025-04-18 ASSESSMENT — PAIN SCALES - PAIN ASSESSMENT IN ADVANCED DEMENTIA (PAINAD)
BODYLANGUAGE: RELAXED
CONSOLABILITY: NO NEED TO CONSOLE
TOTALSCORE: 0
BREATHING: NORMAL
FACIALEXPRESSION: SMILING OR INEXPRESSIVE

## 2025-04-18 ASSESSMENT — ACTIVITIES OF DAILY LIVING (ADL): LACK_OF_TRANSPORTATION: PATIENT UNABLE TO ANSWER

## 2025-04-18 ASSESSMENT — PAIN SCALES - GENERAL: PAINLEVEL_OUTOF10: 0 - NO PAIN

## 2025-04-18 NOTE — CARE PLAN
The patient's goals for the shift include      The clinical goals for the shift include pt will remain hemodynamically stable and will not interfere with medical equipment    Over the shift, the patient did not make progress toward the following goals. Barriers to progression include cognitive impairment. Recommendations to address these barriers include explanation of care.

## 2025-04-18 NOTE — PROGRESS NOTES
Patient: Juan Carlos Ybarra   Age: 29 y.o.   Gender: male  Room/bed: 08/08-A     Attending: Alexy Richter MD  Code Status:  Full Code    Subjective    He had a fever of 100.4, rectal tylenol given. Per nursing he has been having small episodes of emesis that he is swallowing and non-compliant with suctioning. He is doing well this morning. He was satting at 100% on Airvo 50/80. Weaning down as tolerated, currently 40/70. He had a large bowel movement this morning. Per nursing, it was watery but profuse. KUB taken showed resolution of stool burden. NG tube taken out and TF restarted via PEG tube. Medications to be given via PEG tube as well. Will continue to monitor today.    ROS: 12 points review of system is negative except as stated as above.     Objective    Physical Exam     Temp:  [36.7 °C (98.1 °F)-38 °C (100.4 °F)] 37.1 °C (98.8 °F)  Heart Rate:  [] 95  Resp:  [18-41] 32  BP: ()/(51-95) 117/77  FiO2 (%):  [70 %-95 %] 70 %      Intake/Output Summary (Last 24 hours) at 4/18/2025 1425  Last data filed at 4/18/2025 1330  Gross per 24 hour   Intake 2332.33 ml   Output 3446 ml   Net -1113.67 ml       Vitals:    04/18/25 1028   Weight: 60.4 kg (133 lb 2.5 oz)       FiO2 (%):  [70 %-95 %] 70 %     Labs:   CBC:  Recent Labs     04/18/25  0556 04/17/25  1314 04/17/25  0605   WBC 7.3 4.1* 3.4*   HGB 13.3* 11.6* 14.2   HCT 40.2* 35.1* 44.6    138* 143*   MCV 99 99 104*     CMP:  Recent Labs     04/18/25  0556 04/17/25  1314 04/17/25  0605   * 136 138   K 3.0* 3.1* 3.4*    105 106   CO2 27 22 22   ANIONGAP 9* 12 13   BUN 8 11 13   CREATININE 0.49* 0.51 0.57   EGFR >90 >90 >90   GLUCOSE 101* 121* 93     Recent Labs     04/18/25  0556 04/17/25  1314 04/17/25  0605   ALBUMIN 3.1* 3.0* 3.5   ALKPHOS 60 51 63   ALT 22 25 31   AST 15 17 21   BILITOT 1.5* 0.9 1.4*     Calcium/Phos:   Lab Results   Component Value Date    CALCIUM 7.8 (L) 04/18/2025    PHOS 1.3 (L) 04/18/2025      COAG:   Recent Labs      09/19/21  1856   INR 1.1     CARDIAC:   Recent Labs     04/16/25  2111 04/16/25  1858   TROPHS <3 4   BNP  --  54     Micro/ID:     Lab Results   Component Value Date    BLOODCULT No growth at 1 day 04/16/2025    BLOODCULT No growth at 1 day 04/16/2025       Images:  ECG 12 lead  Sinus tachycardia  ST & T wave abnormality, consider anterior ischemia  Abnormal ECG  When compared with ECG of 22-SEP-2021 03:57,  ST no longer elevated in Inferior leads  ST no longer elevated in Anterior leads  T wave inversion now evident in Inferior leads  T wave inversion now evident in Anterior leads  See ED provider note for full interpretation and clinical correlation  Confirmed by Yazmin Akins ([id number]) on 4/18/2025 1:59:27 PM  XR abdomen 1 view  Narrative: Interpreted By:  Kristi Strickland,   STUDY:  XR ABDOMEN 1 VIEW; ;  4/18/2025 8:24 am      INDICATION:  Signs/Symptoms:concern for obstruction.          COMPARISON:  04/17/2025      ACCESSION NUMBER(S):  [id number]      ORDERING CLINICIAN:  LISA BURCIAGA      FINDINGS:  There is interval decrease in the rectal and distal colonic stool  burden compared to immediate prior radiograph dated 04/17/2025. There  is interval improvement in gaseous distention of the bowel loops with  moderate residual gaseous distention of the descending colon in the  mid abdomen measuring up to 10 cm in width. There is hazy airspace  opacities in bilateral lung bases that are partially included in the  field of view. No evidence of pneumoperitoneum within limits of  supine portable technique. No acute osseous findings.      Impression: 1. Interval decrease in stool burden within the rectum and descending  colon compared to prior radiograph. Mild interval improvement in  gaseous distention of the bowel loops with moderate residual as  described above.  2. Redemonstration of hazy bibasilar airspace opacities, which can  represent atelectasis versus pneumonia in appropriate clinical  setting.               MACRO:  None      Signed by: Kristi Strickland 4/18/2025 9:17 AM  Dictation workstation:   [id number]       Medications:  Scheduled medications  Scheduled Medications[1]  Continuous medications  Continuous Medications[2]  PRN medications  PRN Medications[3]     Assessment and Plan:  Juan Carlos Ybarra is a 29 y.o. male with a PMH of spastic CP, developmental delay, seizure disorder admitted for management of suspected aspiration pneumonia now placed on Airvo w/ worsening Oxygen saturation transferred to ICU for possible Intubation. Also suspecting possible SBO, NG tube placed, surgery consulted, CT abdomen shows significant stool burden.     Updates 4/18  - Weaning down on Airvo. Currently satting at 100% on 40/70  - NG tube removed  - TF started via PEG  - Medications to be given via PEG  - Remove gaitan  - Strep pneumo positive, continue Zosyn  - Continue ICU monitoring    NEUROLOGY  #Spastic Cerebral Palsy  - Nonverbal at baseline  - Baclofen 20 mg     #Seizure Disorder  - Clonazepam 0.5 mg TID  - Lacosamide 300 mg BID  - Lamotrigine 200 mg TID  - Zonisamide 100 mg daily  - Seizure precautions     CARDIOVASCULAR  No active issues     PULMONARY  #Acute Hypoxic Respiratory Failure  #Pneumonia  - On Airvo 40/70, Wean as tolerated  - Strep pneumo positive  - On Zosyn and Azithromycin  - On DuoNeb PRN    RENAL/GENITOURINARY  #Urinary retention  - Bladder scan  - Will place gaitan if retaining     GASTROENTEROLOGY  #Constipation  #Stool burden  #C/f SBO  - NG tube placed, NPO for now, will change oral medications to IV if available  - Surgery consulted  - Significant stool burden seen in CT, no SBO, likely nausea/vomiting precipitated due to the stool burden  - Has PEG tube. Will use to give meds when bowels cleared  - Remove gaitan  - Dulcolax suppository  - Soap suds enema  - Will increase bowel regimen     HEMATOLOGY/ONCOLOGY  No active issues     RHEUMATOLOGY  No active issues     INFECTIOUS DISEASE  #Pneumonia  -  Strep pneumo positive  - On Zosyn and Azithromycin  - Blood cultures pending  - UA negative for UTI  - Lactate 4.5 -> 3.5, continue to trend     Fluids: PRN  Electrolytes: PRN  Nutrition: NPO  Antimicrobials: Zosyn, Azithromycin (4/17 - )  DVT ppx: Lovenox  GI ppx: None  Bowel care: Enema, Lactulose, Miralax, Senna  Catheter: None  Lines: PIV  Supplemental Oxygen: Airvo Max  Drips: None  Emergency Contact: Brianna Ybarra (Parent) [phone]  Code Status: Full Code     Dispo: 30 y/o male w/ spastic cerebral palsy admitted to ICU for acute hypoxic respiratory failure 2/2 aspiration pneumonia. ELOS >48 hrs     Zeferino Fournier MD  Internal Medicine, PGY-II         [1] azithromycin, 500 mg, intravenous, q24h  baclofen, 20 mg, oral, TID  bisacodyl, 10 mg, rectal, Daily  citalopram, 20 mg, oral, Daily  clonazePAM, 1 mg, oral, TID  enoxaparin, 40 mg, subcutaneous, q24h  glycopyrrolate, 2 mg, oral, TID  insulin lispro, 0-5 Units, subcutaneous, q4h  ipratropium-albuteroL, 3 mL, nebulization, TID  lacosamide, 300 mg, intravenous, q12h  [Held by provider] lacosamide, 300 mg, oral, BID  lactulose, 10 g, oral, Daily  lamoTRIgine, 200 mg, oral, TID  magnesium sulfate, 2 g, intravenous, Once  piperacillin-tazobactam, 4.5 g, intravenous, q6h  polyethylene glycol, 17 g, oral, Daily  potassium chloride, 20 mEq, intravenous, q2h  senna, 10 mL, oral, BID  zonisamide, 300 mg, oral, Nightly  [2]    [3] PRN medications: acetaminophen **OR** acetaminophen, acetaminophen, dextrose, dextrose, glucagon, glucagon, HYDROmorphone, ipratropium-albuteroL, LORazepam, ondansetron ODT **OR** ondansetron, oxyCODONE, oxyCODONE, oxygen

## 2025-04-18 NOTE — CONSULTS
Patient: Juan Carlos Ybarra   Age: 29 y.o.   Gender: male  Room/bed: ICU-09     Attending: Alexy Richter MD  Code Status:  Full Code    Subjective    28 y/o male w/ PMH of Spastic CP, Intellectual disability, Dysphagia s/p PEG tube, H/o intubation and Seizure disorder admitted for management of pneumonia. Patient is non-verbal at baseline. Per chart review and family, he was exposed to family members who had flu like symptoms over the past week. Yester day he was found to have fever of 102F as well as productive cough and congestion. He was hypoxic on arrival to ED satting at 83% on RA. He was placed initially on 6L O2 which was increased to 10L. His O2 requirements worsened this morning and he had to be placed Airvo which was at max setting. There was also concerns of SBO. Initial plan was to place him on BiPAP but he had a few episodes of vomiting. He was brought to the ICU on Airvo. Family did not want him to be intubated yet stating that they don't want him to be intubated unless absolutely necessary. He was satting at 89-90% on Airvo. Will continue Airvo for now. NG tube was placed and put to suction. CT scan was done which showed significant stool burden. General Surgery consulted and recommended to keep him on NG tube and PEG tube as well as NPO until resolution of bowel function. He was given Dulcolax suppository without relief. Will try soap suds enema. Jackson catheter placed due to urinary retention.    ROS: 12 points review of system is negative except as stated as above.     Objective    Physical Exam     Temp:  [35.8 °C (96.4 °F)-37.2 °C (99 °F)] 36.3 °C (97.3 °F)  Heart Rate:  [] 107  Resp:  [17-31] 27  BP: ()/(48-81) 75/49  FiO2 (%):  [70 %-90 %] 90 %      Intake/Output Summary (Last 24 hours) at 4/17/2025 1246  Last data filed at 4/17/2025 1122  Gross per 24 hour   Intake 5150 ml   Output 350 ml   Net 4800 ml       Vitals:    04/17/25 0151   Weight: 60 kg (132 lb 4.4 oz)       FiO2 (%):  [70  %-90 %] 90 %     Labs:   CBC:  Recent Labs     04/17/25  0605 04/16/25 1858 09/24/21  0529   WBC 3.4* 4.7 6.5   HGB 14.2 16.8 11.3*   HCT 44.6 49.9 34.3*   * 186 416   * 99 99     CMP:  Recent Labs     04/17/25  0605 04/16/25 1858 09/24/21  0529    136 140   K 3.4* 5.3 3.7    99 103   CO2 22 25 25   ANIONGAP 13 17 16   BUN 13 22 10   CREATININE 0.57 0.87 0.32*   EGFR >90 >90  --    GLUCOSE 93 122* 92     Recent Labs     04/17/25  0605 04/16/25 1858 09/24/21  0529 09/21/21  0321 09/20/21  0231   ALBUMIN 3.5 4.6 4.2   < > 3.9   ALKPHOS 63 82  --   --  190*   ALT 31 45  --   --  77*   AST 21 47*  --   --  41*   BILITOT 1.4* 2.4*  --   --  0.5    < > = values in this interval not displayed.     Calcium/Phos:   Lab Results   Component Value Date    CALCIUM 7.6 (L) 04/17/2025    PHOS 2.3 (L) 04/17/2025      COAG:   Recent Labs     09/19/21  1856   INR 1.1     CARDIAC:   Recent Labs     04/16/25 2111 04/16/25 1858   TROPHS <3 4   BNP  --  54     Lab Results   Component Value Date    BLOODCULT Loaded on Instrument - Culture in progress 04/16/2025    BLOODCULT Loaded on Instrument - Culture in progress 04/16/2025       Images:  ECG 12 lead  Sinus tachycardia  ST & T wave abnormality, consider anterior ischemia  Abnormal ECG  When compared with ECG of 22-SEP-2021 03:57,  ST no longer elevated in Inferior leads  ST no longer elevated in Anterior leads  T wave inversion now evident in Inferior leads  T wave inversion now evident in Anterior leads  XR abdomen 1 view  Narrative: Interpreted By:  Kristi Strickland,   STUDY:  XR ABDOMEN 1 VIEW; ;  4/17/2025 1:50 am      INDICATION:  Signs/Symptoms:c/f sbo/ileus.          COMPARISON:  Abdomen radiographs dated 08/14/2020.      ACCESSION NUMBER(S):  [id number]      ORDERING CLINICIAN:  MAGALY ODELL      FINDINGS:  Portable supine and upright radiographs of the abdomen were performed.      There is large volume formed stool in the rectum and left  hemicolon.  There is diffuse gaseous distention of bowel loops in the mid abdomen  with a distended colonic loop measuring up to 9.5 cm in maximum  width. This could be related to fecal impaction. No evidence of  pneumoperitoneum on provided images. Visualized lung bases  demonstrate airspace opacity in the left lower lung zone within  limits of evaluation. A gastrostomy/jejunostomy tube projects over  the mid right hemiabdomen. No acute osseous findings.      Impression: Large volume dense stool in the rectum and left hemicolon. Diffuse  gaseous distention of the bowel loops in the mid abdomen raises  concern for a degree of bowel obstruction and could be related to  fecal impaction. Recommend clinical correlation. Dedicated CT abdomen  pelvis can be performed for further evaluation as clinically  warranted.          MACRO:  None      Signed by: Kristi Strickland 4/17/2025 10:37 AM  Dictation workstation:   [id number]     Medications:  Scheduled medications  [Scheduled Medications]    [Scheduled Medications]  azithromycin, 500 mg, intravenous, q24h  baclofen, 20 mg, oral, TID  cefTRIAXone, 1 g, intravenous, q24h  citalopram, 20 mg, oral, Daily  clonazePAM, 1 mg, oral, TID  glycopyrrolate, 2 mg, oral, TID  insulin lispro, 0-5 Units, subcutaneous, q4h  ipratropium-albuteroL, 3 mL, nebulization, TID  lacosamide, 300 mg, oral, BID  lactated Ringer's, 1,000 mL, intravenous, Once  lactulose, 10 g, oral, Daily  lamoTRIgine, 200 mg, oral, TID  polyethylene glycol, 17 g, oral, Daily  potassium phosphate, 30 mmol, intravenous, Once  senna, 10 mL, oral, BID  zonisamide, 300 mg, oral, Nightly    Continuous medications  [Continuous Medications]    [Continuous Medications]  dextrose 5 % and lactated Ringer's, 100 mL/hr    PRN medications  [PRN Medications]     [PRN Medications]  PRN medications: acetaminophen **OR** acetaminophen, dextrose, dextrose, glucagon, glucagon, ipratropium-albuteroL, ondansetron ODT **OR** ondansetron,  "oxygen      Assessment and Plan:  Juan Carlos Ybarra is a 29 y.o. male with a PMH of spastic CP, developmental delay, seizure disorder admitted for management of suspected aspiration pneumonia now placed on Airvo w/ worsening Oxygen saturation transferred to ICU for possible Intubation. Also suspecting possible SBO, NG tube placed, surgery consulted, CT abdomen shows significant stool burden.    NEUROLOGY  #Spastic Cerebral Palsy  - Nonverbal at baseline  - Baclofen 20 mg    #Seizure Disorder  - Clonazepam 0.5 mg TID  - Lacosamide 300 mg BID  - Lamotrigine 200 mg TID  - Zonisamide 100 mg daily  - Seizure precautions    CARDIOVASCULAR  No active issues    PULMONARY  #Acute Hypoxic Respiratory Failure  #Aspiration Pneumonia  - On Max Airvo  - On Zosyn and Azithromycin  - On DuoNeb PRN  - Legionella, strep pneumo pending  - Discussed with parents - they want to hold off on intubation until \"absolutely necessary\". Currently he is satting at 90% on Max Airvo. Plan is to intubate if he has increased respiratory distress/persistently satting <88%  - Palliative aided in conversation and define parameters    RENAL/GENITOURINARY  #Urinary retention  - Bladder scan  - Will place gaitan if retaining    GASTROENTEROLOGY  #Constipation  #Stool burden  #C/f SBO  - NG tube placed, NPO for now, will change oral medications to IV if available  - Surgery consulted  - Significant stool burden seen in CT, no SBO, likely nausea/vomiting precipitated due to the stool burden  - Has PEG tube. Will use to give meds when bowels cleared  - Dulcolax suppository  - Soap suds enema  - Will increase bowel regimen    HEMATOLOGY/ONCOLOGY  No active issues    RHEUMATOLOGY  No active issues    INFECTIOUS DISEASE  #Aspiration pneumonia  - On Zosyn and Azithromycin  - Blood cultures pending  - UA pending  - Lactate 4.5 -> 3.5, continue to trend  - On Zosyn    Fluids: PRN  Electrolytes: PRN  Nutrition: NPO  Antimicrobials: Zosyn, Azithromycin (4/17 - )  DVT " ppx: Lovenox  GI ppx: None  Bowel care: Enema, Lactulose, Miralax, Senna  Catheter: None  Lines: PIV  Supplemental Oxygen: Airvo Max  Drips: None  Emergency Contact: Brianna Ybarra (Parent) [phone]  Code Status: Full Code    Dispo: 28 y/o male w/ spastic cerebral palsy admitted to ICU for acute hypoxic respiratory failure 2/2 aspiration pneumonia. ELOS >48 hrs    Zeferino Fournier MD  Internal Medicine, PGY-II

## 2025-04-18 NOTE — PROGRESS NOTES
Juan Carlos Ybarra is a 29 y.o. male on day 1 of admission presenting with Pneumonia of left lower lobe due to infectious organism.    Subjective   Lying in bed in the intensive care unit appears comfortable       Objective vital signs stable and afebrile, apparently had a large bowel movement last night.    Physical Exam  Abdomen is much softer  Last Recorded Vitals  Blood pressure (!) 119/95, pulse 96, temperature 37.6 °C (99.7 °F), temperature source Temporal, resp. rate 26, height 1.524 m (5'), weight 60 kg (132 lb 4.4 oz), SpO2 98%.  Intake/Output last 3 Shifts:  I/O last 3 completed shifts:  In: 7382.3 (123 mL/kg) [I.V.:114 (1.9 mL/kg); IV Piggyback:7268.3]  Out: 2981 (49.7 mL/kg) [Urine:2300 (1.1 mL/kg/hr); Emesis/NG output:431; Stool:250]  Weight: 60 kg     Relevant Results            This patient currently has cardiac telemetry ordered; if you would like to modify or discontinue the telemetry order, click here to go to the orders activity to modify/discontinue the order.    This patient has a urinary catheter   Reason for the urinary catheter remaining today? neurogenic bladder               Assessment & Plan  Pneumonia of left lower lobe due to infectious organism    Obstipation has improved, will sign off please call for any other general surgery issues.  Most likely can remove the NG tube today      I spent 10 minutes in the professional and overall care of this patient.      Gali Gee MD

## 2025-04-18 NOTE — PROGRESS NOTES
Patient's mother states she has everything she needs for patient's tube feeds at home.      04/18/25 1004   Discharge Planning   Living Arrangements Parent;Other (Comment)  (Private duty home care aide (paid for by the family))   Support Systems Parent;Home care staff   Assistance Needed Patient was brought to the ICU on Airvo. Family did not want him to be intubated yet stating that they don't want him to be intubated unless absolutely necessary. Palliative is following. At baseline patient is from home with his parents, has a history of cerebral palsy, on room air at home, is nonverbal and is wheelchair-bound. Per mother patient has the following DME at home: a yissel lift, wheel chair, wedge to elevate patient in bed. Patient has a private duty aide that lives with the family and assists with care of patient. Mother is requesting suction machine and canister at time of DC from hospital if possible.   Type of Residence Private residence   Number of Stairs to Enter Residence 0   Number of Stairs Within Residence 0   Who is requesting discharge planning? Provider   Home or Post Acute Services Other (Comment)  (TBD once patient is more medically stable)   Expected Discharge Disposition Home   Does the patient need discharge transport arranged? Yes   RoundTrip coordination needed? Yes   Has discharge transport been arranged? No   Financial Resource Strain   How hard is it for you to pay for the very basics like food, housing, medical care, and heating? Pt Unable   Housing Stability   In the last 12 months, was there a time when you were not able to pay the mortgage or rent on time? Pt Unable   At any time in the past 12 months, were you homeless or living in a shelter (including now)? Pt Unable   Transportation Needs   In the past 12 months, has lack of transportation kept you from medical appointments or from getting medications? Pt Unable   In the past 12 months, has lack of transportation kept you from meetings,  work, or from getting things needed for daily living? Pt Unable

## 2025-04-18 NOTE — PROGRESS NOTES
PALLIATIVE DAILY PROGRESS NOTE                                                                                                                                             SUBJECTIVE                                                                                                                                                                                                                                                    Parents report that pt has made improvements with breathing and BP since yesterday and they are feeling much better about that. Parents state that pt's brothers came to visit yesterday due to how poorly the pt was doing and soon after that, he started to show improvement. They report having peace of mind with the good care he receives here.     OBJECTIVE                                                                                                                                                                                                                                                      Physical Exam  GENERAL: awake, ill appearing, in no distress on airvo    Last Recorded Vitals Reviewed   Visit Vitals  /79   Pulse 99   Temp 37.9 °C (100.2 °F) (Temporal)   Resp 20   Ht 1.524 m (5')   Wt 60.4 kg (133 lb 2.5 oz)   SpO2 93%   BMI 26.01 kg/m²   Smoking Status Never   BSA 1.6 m²        Medications Reviewed   Scheduled medications  Scheduled Medications[1]  Continuous medications  Continuous Medications[2]  PRN medications  PRN Medications[3]    Labs Reviewed   Results for orders placed or performed during the hospital encounter of 04/16/25 (from the past 24 hours)   Lactate   Result Value Ref Range    Lactate 2.7 (H) 0.4 - 2.0 mmol/L   Blood Gas Lactic Acid, Venous   Result Value Ref Range    POCT Lactate, Venous 2.8 (H) 0.4 - 2.0 mmol/L   Blood Gas Venous Full Panel   Result Value Ref Range    POCT pH, Venous 7.41 7.33 - 7.43 pH    POCT pCO2, Venous 40 (L) 41 - 51 mm Hg    POCT pO2, Venous  61 (H) 35 - 45 mm Hg    POCT SO2, Venous 95 (H) 45 - 75 %    POCT Oxy Hemoglobin, Venous 92.2 (H) 45.0 - 75.0 %    POCT Hematocrit Calculated, Venous 38.0 (L) 41.0 - 52.0 %    POCT Sodium, Venous 130 (L) 136 - 145 mmol/L    POCT Potassium, Venous 3.4 (L) 3.5 - 5.3 mmol/L    POCT Chloride, Venous 104 98 - 107 mmol/L    POCT Ionized Calicum, Venous 1.21 1.10 - 1.33 mmol/L    POCT Glucose, Venous 120 (H) 74 - 99 mg/dL    POCT Lactate, Venous 2.8 (H) 0.4 - 2.0 mmol/L    POCT Base Excess, Venous 0.7 -2.0 - 3.0 mmol/L    POCT HCO3 Calculated, Venous 25.4 22.0 - 26.0 mmol/L    POCT Hemoglobin, Venous 12.7 (L) 13.5 - 17.5 g/dL    POCT Anion Gap, Venous 4.0 (L) 10.0 - 25.0 mmol/L    Patient Temperature      FiO2 44 %   Urinalysis with Reflex Culture and Microscopic   Result Value Ref Range    Color, Urine Yellow Light-Yellow, Yellow, Dark-Yellow    Appearance, Urine Clear Clear    Specific Gravity, Urine >1.050 (N) 1.005 - 1.035    pH, Urine 7.0 5.0, 5.5, 6.0, 6.5, 7.0, 7.5, 8.0    Protein, Urine 20 (TRACE) NEGATIVE, 10 (TRACE), 20 (TRACE) mg/dL    Glucose, Urine Normal Normal mg/dL    Blood, Urine NEGATIVE NEGATIVE mg/dL    Ketones, Urine NEGATIVE NEGATIVE mg/dL    Bilirubin, Urine NEGATIVE NEGATIVE mg/dL    Urobilinogen, Urine Normal Normal mg/dL    Nitrite, Urine NEGATIVE NEGATIVE    Leukocyte Esterase, Urine NEGATIVE NEGATIVE   Extra Urine Gray Tube   Result Value Ref Range    Extra Tube Hold for add-ons.    Legionella Antigen, Urine    Specimen: Clean Catch/Voided; Urine   Result Value Ref Range    L. pneumophila Urine Ag Negative Negative   Streptococcus pneumoniae Antigen, Urine    Specimen: Clean Catch/Voided; Urine   Result Value Ref Range    Streptococcus pneumoniae Ag, Urine Positive (A) Negative   Urinalysis Microscopic   Result Value Ref Range    WBC, Urine 1-5 1-5, NONE /HPF    RBC, Urine 6-10 (A) NONE, 1-2, 3-5 /HPF   POCT GLUCOSE   Result Value Ref Range    POCT Glucose 101 (H) 74 - 99 mg/dL   POCT GLUCOSE    Result Value Ref Range    POCT Glucose 79 74 - 99 mg/dL   Lactate   Result Value Ref Range    Lactate 2.2 (H) 0.4 - 2.0 mmol/L   POCT GLUCOSE   Result Value Ref Range    POCT Glucose 96 74 - 99 mg/dL   POCT GLUCOSE   Result Value Ref Range    POCT Glucose 106 (H) 74 - 99 mg/dL   CBC and Auto Differential   Result Value Ref Range    WBC 7.3 4.4 - 11.3 x10*3/uL    nRBC 0.0 0.0 - 0.0 /100 WBCs    RBC 4.06 (L) 4.50 - 5.90 x10*6/uL    Hemoglobin 13.3 (L) 13.5 - 17.5 g/dL    Hematocrit 40.2 (L) 41.0 - 52.0 %    MCV 99 80 - 100 fL    MCH 32.8 26.0 - 34.0 pg    MCHC 33.1 32.0 - 36.0 g/dL    RDW 12.4 11.5 - 14.5 %    Platelets 152 150 - 450 x10*3/uL    Neutrophils % 82.7 40.0 - 80.0 %    Immature Granulocytes %, Automated 0.4 0.0 - 0.9 %    Lymphocytes % 10.8 13.0 - 44.0 %    Monocytes % 5.5 2.0 - 10.0 %    Eosinophils % 0.1 0.0 - 6.0 %    Basophils % 0.5 0.0 - 2.0 %    Neutrophils Absolute 6.05 1.20 - 7.70 x10*3/uL    Immature Granulocytes Absolute, Automated 0.03 0.00 - 0.70 x10*3/uL    Lymphocytes Absolute 0.79 (L) 1.20 - 4.80 x10*3/uL    Monocytes Absolute 0.40 0.10 - 1.00 x10*3/uL    Eosinophils Absolute 0.01 0.00 - 0.70 x10*3/uL    Basophils Absolute 0.04 0.00 - 0.10 x10*3/uL   Magnesium   Result Value Ref Range    Magnesium 1.77 1.60 - 2.40 mg/dL   Comprehensive Metabolic Panel   Result Value Ref Range    Glucose 101 (H) 74 - 99 mg/dL    Sodium 135 (L) 136 - 145 mmol/L    Potassium 3.0 (L) 3.5 - 5.3 mmol/L    Chloride 102 98 - 107 mmol/L    Bicarbonate 27 21 - 32 mmol/L    Anion Gap 9 (L) 10 - 20 mmol/L    Urea Nitrogen 8 6 - 23 mg/dL    Creatinine 0.49 (L) 0.50 - 1.30 mg/dL    eGFR >90 >60 mL/min/1.73m*2    Calcium 7.8 (L) 8.6 - 10.3 mg/dL    Albumin 3.1 (L) 3.4 - 5.0 g/dL    Alkaline Phosphatase 60 33 - 120 U/L    Total Protein 5.7 (L) 6.4 - 8.2 g/dL    AST 15 9 - 39 U/L    Bilirubin, Total 1.5 (H) 0.0 - 1.2 mg/dL    ALT 22 10 - 52 U/L   Phosphorus   Result Value Ref Range    Phosphorus 1.3 (L) 2.5 - 4.9 mg/dL    Lactate   Result Value Ref Range    Lactate 1.4 0.4 - 2.0 mmol/L   Blood Gas Venous Full Panel   Result Value Ref Range    POCT pH, Venous 7.56 (H) 7.33 - 7.43 pH    POCT pCO2, Venous 30 (L) 41 - 51 mm Hg    POCT pO2, Venous 140 (H) 35 - 45 mm Hg    POCT SO2, Venous 100 (H) 45 - 75 %    POCT Oxy Hemoglobin, Venous 97.5 (H) 45.0 - 75.0 %    POCT Hematocrit Calculated, Venous 38.0 (L) 41.0 - 52.0 %    POCT Sodium, Venous 129 (L) 136 - 145 mmol/L    POCT Potassium, Venous 3.5 3.5 - 5.3 mmol/L    POCT Chloride, Venous 105 98 - 107 mmol/L    POCT Ionized Calicum, Venous 1.19 1.10 - 1.33 mmol/L    POCT Glucose, Venous 102 (H) 74 - 99 mg/dL    POCT Lactate, Venous 1.3 0.4 - 2.0 mmol/L    POCT Base Excess, Venous 5.1 (H) -2.0 - 3.0 mmol/L    POCT HCO3 Calculated, Venous 26.9 (H) 22.0 - 26.0 mmol/L    POCT Hemoglobin, Venous 12.6 (L) 13.5 - 17.5 g/dL    POCT Anion Gap, Venous 1.0 (L) 10.0 - 25.0 mmol/L    Patient Temperature      FiO2 90 %        Imaging Interpretation   4/19 Abd XR   -Improved stool and gas burden   -PNA bilat lung bases     Cardiology, Vascular, and Other Imaging Reviewed   ECG 12 lead  Result Date: 4/18/2025  Sinus tachycardia ST & T wave abnormality, consider anterior ischemia Abnormal ECG When compared with ECG of 22-SEP-2021 03:57, ST no longer elevated in Inferior leads ST no longer elevated in Anterior leads T wave inversion now evident in Inferior leads T wave inversion now evident in Anterior leads See ED provider note for full interpretation and clinical correlation Confirmed by Yazmin Akins ([id number]) on 4/18/2025 1:59:27 PM     }    ASSESSMENT AND PLAN   Impression  This is a critically ill 29 y.o. year old who is hospitalized for aspiration Pneumonia of left lower lobe due to infectious organism. This has been complicated acute hypoxic respiratory failure, septic shock with severe constipation and gaseous distention. Improving.      Goals of Care: Survival based. They are hoping to avoid  intubation unless 02 saturations consistently <87%/respiratory distress as determined by the intensive care doctors.   They are also hoping to recover back to baseline. The pt is  making progress towards these goals and thus far has been able to avoid intubation.      Code Status: FULL CODE    PLAN    -Continue supportive and aggressive care  -Revisit goals of care as needed  -Music therapy for additional support for pt  -Provided support to the family during this difficult time and will do so intermittently   -Reviewed Palliative coverage resumes on Monday 4/21    Update provided to: The Primary team and the bedside nurse    MEDICAL COMPLEXITY    Medical complexity was high level due to due to aspiration pneumonia, acute hypoxic respiratory failure, on airvo, septic shock and severe constipation s/p bowel blockage ileus that poses threat to life or bodily function. Dicussed case mgmt in detail with MD Shantanu ( critical care team), review of surgery note and critical care note, reviewed each individual lab, independent interpretation of XR of the abdomen, obtaining subjective from independent historian ( parents), pt on multiple parenteral controlled substances.    CONTACT INFORMATION   Cyndee Kapoor CNP  Palliative Medicine  Epic Secure chat         [1] azithromycin, 500 mg, intravenous, q24h  baclofen, 20 mg, oral, TID  bisacodyl, 10 mg, rectal, Daily  citalopram, 20 mg, oral, Daily  clonazePAM, 1 mg, oral, TID  enoxaparin, 40 mg, subcutaneous, q24h  glycopyrrolate, 2 mg, oral, TID  insulin lispro, 0-5 Units, subcutaneous, q4h  ipratropium-albuteroL, 3 mL, nebulization, TID  lacosamide, 300 mg, intravenous, q12h  [Held by provider] lacosamide, 300 mg, oral, BID  lactulose, 10 g, oral, Daily  lamoTRIgine, 200 mg, oral, TID  magnesium sulfate, 2 g, intravenous, Once  piperacillin-tazobactam, 4.5 g, intravenous, q6h  polyethylene glycol, 17 g, oral, Daily  potassium chloride, 20 mEq, intravenous, q2h  senna, 10  mL, oral, BID  zonisamide, 300 mg, oral, Nightly    [2]    [3] PRN medications: acetaminophen **OR** acetaminophen, acetaminophen, dextrose, dextrose, glucagon, glucagon, HYDROmorphone, ipratropium-albuteroL, LORazepam, ondansetron ODT **OR** ondansetron, oxyCODONE, oxyCODONE, oxygen

## 2025-04-18 NOTE — CARE PLAN
The clinical goals for the shift include pt will remain hemodynamically stable and will not interfere with medical equipment      Problem: Pain - Adult  Goal: Verbalizes/displays adequate comfort level or baseline comfort level  Outcome: Progressing  Problem: Safety - Adult  Goal: Free from fall injury  Outcome: Progressing  Problem: Discharge Planning  Goal: Discharge to home or other facility with appropriate resources  Outcome: Progressing  Problem: Chronic Conditions and Co-morbidities  Goal: Patient's chronic conditions and co-morbidity symptoms are monitored and maintained or improved  Outcome: Progressing  Problem: Nutrition  Goal: Nutrient intake appropriate for maintaining nutritional needs  Outcome: Progressing  Problem: Respiratory  Goal: Clear secretions with interventions this shift  Outcome: Progressing  Goal: Minimize anxiety/maximize coping throughout shift  Outcome: Progressing  Goal: Minimal/no exertional discomfort or dyspnea this shift  Outcome: Progressing  Goal: No signs of respiratory distress (eg. Use of accessory muscles. Peds grunting)  Outcome: Progressing  Goal: Patent airway maintained this shift  Outcome: Progressing  Goal: Tolerate mechanical ventilation evidenced by VS/agitation level this shift  Outcome: Progressing  Goal: Tolerate pulmonary toileting this shift  Outcome: Progressing  Goal: Verbalize decreased shortness of breath this shift  Outcome: Progressing  Goal: Wean oxygen to maintain O2 saturation per order/standard this shift  Outcome: Progressing  Goal: Increase self care and/or family involvement in next 24 hours  Outcome: Progressing  Problem: Skin  Goal: Decreased wound size/increased tissue granulation at next dressing change  Outcome: Progressing  Flowsheets (Taken 4/18/2025 0034)  Decreased wound size/increased tissue granulation at next dressing change: Promote sleep for wound healing  Goal: Participates in plan/prevention/treatment measures  Outcome:  Progressing  Goal: Prevent/manage excess moisture  Outcome: Progressing  Goal: Prevent/minimize sheer/friction injuries  Outcome: Progressing  Goal: Promote/optimize nutrition  Outcome: Progressing  Goal: Promote skin healing  Outcome: Progressing  Problem: Safety - Medical Restraint  Goal: Remains free of injury from restraints (Restraint for Interference with Medical Device)  Outcome: Progressing  Goal: Free from restraint(s) (Restraint for Interference with Medical Device)  Outcome: Progressing

## 2025-04-19 ENCOUNTER — APPOINTMENT (OUTPATIENT)
Dept: RADIOLOGY | Facility: HOSPITAL | Age: 30
End: 2025-04-19
Payer: COMMERCIAL

## 2025-04-19 LAB
ALBUMIN SERPL BCP-MCNC: 3 G/DL (ref 3.4–5)
ALP SERPL-CCNC: 63 U/L (ref 33–120)
ALT SERPL W P-5'-P-CCNC: 16 U/L (ref 10–52)
ANION GAP BLDV CALCULATED.4IONS-SCNC: -1 MMOL/L (ref 10–25)
ANION GAP SERPL CALC-SCNC: 9 MMOL/L (ref 10–20)
AST SERPL W P-5'-P-CCNC: 14 U/L (ref 9–39)
BASE EXCESS BLDV CALC-SCNC: 4 MMOL/L (ref -2–3)
BASOPHILS # BLD AUTO: 0.02 X10*3/UL (ref 0–0.1)
BASOPHILS NFR BLD AUTO: 0.2 %
BILIRUB SERPL-MCNC: 1.2 MG/DL (ref 0–1.2)
BODY TEMPERATURE: ABNORMAL
BUN SERPL-MCNC: 8 MG/DL (ref 6–23)
CA-I BLDV-SCNC: 1.23 MMOL/L (ref 1.1–1.33)
CALCIUM SERPL-MCNC: 7.7 MG/DL (ref 8.6–10.3)
CHLORIDE BLDV-SCNC: 106 MMOL/L (ref 98–107)
CHLORIDE SERPL-SCNC: 102 MMOL/L (ref 98–107)
CO2 SERPL-SCNC: 25 MMOL/L (ref 21–32)
CREAT SERPL-MCNC: 0.4 MG/DL (ref 0.5–1.3)
EGFRCR SERPLBLD CKD-EPI 2021: >90 ML/MIN/1.73M*2
EOSINOPHIL # BLD AUTO: 0 X10*3/UL (ref 0–0.7)
EOSINOPHIL NFR BLD AUTO: 0 %
ERYTHROCYTE [DISTWIDTH] IN BLOOD BY AUTOMATED COUNT: 12.5 % (ref 11.5–14.5)
GLUCOSE BLD MANUAL STRIP-MCNC: 113 MG/DL (ref 74–99)
GLUCOSE BLD MANUAL STRIP-MCNC: 134 MG/DL (ref 74–99)
GLUCOSE BLDV-MCNC: 151 MG/DL (ref 74–99)
GLUCOSE SERPL-MCNC: 136 MG/DL (ref 74–99)
HCO3 BLDV-SCNC: 27.6 MMOL/L (ref 22–26)
HCT VFR BLD AUTO: 37.9 % (ref 41–52)
HCT VFR BLD EST: 36 % (ref 41–52)
HGB BLD-MCNC: 12.4 G/DL (ref 13.5–17.5)
HGB BLDV-MCNC: 11.9 G/DL (ref 13.5–17.5)
IMM GRANULOCYTES # BLD AUTO: 0.06 X10*3/UL (ref 0–0.7)
IMM GRANULOCYTES NFR BLD AUTO: 0.7 % (ref 0–0.9)
INHALED O2 CONCENTRATION: 80 %
LACTATE BLDV-SCNC: 1.1 MMOL/L (ref 0.4–2)
LYMPHOCYTES # BLD AUTO: 0.99 X10*3/UL (ref 1.2–4.8)
LYMPHOCYTES NFR BLD AUTO: 11.5 %
MAGNESIUM SERPL-MCNC: 1.94 MG/DL (ref 1.6–2.4)
MCH RBC QN AUTO: 33.3 PG (ref 26–34)
MCHC RBC AUTO-ENTMCNC: 32.7 G/DL (ref 32–36)
MCV RBC AUTO: 102 FL (ref 80–100)
MONOCYTES # BLD AUTO: 0.72 X10*3/UL (ref 0.1–1)
MONOCYTES NFR BLD AUTO: 8.4 %
NEUTROPHILS # BLD AUTO: 6.82 X10*3/UL (ref 1.2–7.7)
NEUTROPHILS NFR BLD AUTO: 79.2 %
NRBC BLD-RTO: 0 /100 WBCS (ref 0–0)
OXYHGB MFR BLDV: 96.8 % (ref 45–75)
PCO2 BLDV: 37 MM HG (ref 41–51)
PH BLDV: 7.48 PH (ref 7.33–7.43)
PHOSPHATE SERPL-MCNC: 1.8 MG/DL (ref 2.5–4.9)
PLATELET # BLD AUTO: 171 X10*3/UL (ref 150–450)
PO2 BLDV: 137 MM HG (ref 35–45)
POTASSIUM BLDV-SCNC: 4 MMOL/L (ref 3.5–5.3)
POTASSIUM SERPL-SCNC: 3.4 MMOL/L (ref 3.5–5.3)
PROT SERPL-MCNC: 5.8 G/DL (ref 6.4–8.2)
RBC # BLD AUTO: 3.72 X10*6/UL (ref 4.5–5.9)
SAO2 % BLDV: 100 % (ref 45–75)
SODIUM BLDV-SCNC: 129 MMOL/L (ref 136–145)
SODIUM SERPL-SCNC: 133 MMOL/L (ref 136–145)
WBC # BLD AUTO: 8.6 X10*3/UL (ref 4.4–11.3)

## 2025-04-19 PROCEDURE — C9254 INJECTION, LACOSAMIDE: HCPCS

## 2025-04-19 PROCEDURE — 84132 ASSAY OF SERUM POTASSIUM: CPT

## 2025-04-19 PROCEDURE — 2500000004 HC RX 250 GENERAL PHARMACY W/ HCPCS (ALT 636 FOR OP/ED): Mod: JZ

## 2025-04-19 PROCEDURE — 94760 N-INVAS EAR/PLS OXIMETRY 1: CPT

## 2025-04-19 PROCEDURE — 2020000001 HC ICU ROOM DAILY

## 2025-04-19 PROCEDURE — 71045 X-RAY EXAM CHEST 1 VIEW: CPT | Performed by: RADIOLOGY

## 2025-04-19 PROCEDURE — 94640 AIRWAY INHALATION TREATMENT: CPT

## 2025-04-19 PROCEDURE — 2500000004 HC RX 250 GENERAL PHARMACY W/ HCPCS (ALT 636 FOR OP/ED)

## 2025-04-19 PROCEDURE — 84100 ASSAY OF PHOSPHORUS: CPT

## 2025-04-19 PROCEDURE — 83735 ASSAY OF MAGNESIUM: CPT

## 2025-04-19 PROCEDURE — 71045 X-RAY EXAM CHEST 1 VIEW: CPT

## 2025-04-19 PROCEDURE — 36415 COLL VENOUS BLD VENIPUNCTURE: CPT

## 2025-04-19 PROCEDURE — 2500000005 HC RX 250 GENERAL PHARMACY W/O HCPCS: Performed by: INTERNAL MEDICINE

## 2025-04-19 PROCEDURE — 94668 MNPJ CHEST WALL SBSQ: CPT

## 2025-04-19 PROCEDURE — 2500000001 HC RX 250 WO HCPCS SELF ADMINISTERED DRUGS (ALT 637 FOR MEDICARE OP)

## 2025-04-19 PROCEDURE — 2500000002 HC RX 250 W HCPCS SELF ADMINISTERED DRUGS (ALT 637 FOR MEDICARE OP, ALT 636 FOR OP/ED)

## 2025-04-19 PROCEDURE — 99291 CRITICAL CARE FIRST HOUR: CPT

## 2025-04-19 PROCEDURE — 94669 MECHANICAL CHEST WALL OSCILL: CPT

## 2025-04-19 PROCEDURE — 82947 ASSAY GLUCOSE BLOOD QUANT: CPT

## 2025-04-19 PROCEDURE — 2500000005 HC RX 250 GENERAL PHARMACY W/O HCPCS

## 2025-04-19 PROCEDURE — 82247 BILIRUBIN TOTAL: CPT

## 2025-04-19 PROCEDURE — 94667 MNPJ CHEST WALL 1ST: CPT

## 2025-04-19 PROCEDURE — 85025 COMPLETE CBC W/AUTO DIFF WBC: CPT

## 2025-04-19 RX ORDER — POTASSIUM CHLORIDE 14.9 MG/ML
20 INJECTION INTRAVENOUS ONCE
Status: COMPLETED | OUTPATIENT
Start: 2025-04-19 | End: 2025-04-19

## 2025-04-19 RX ORDER — GUAIFENESIN 600 MG/1
600 TABLET, EXTENDED RELEASE ORAL 2 TIMES DAILY PRN
Status: DISCONTINUED | OUTPATIENT
Start: 2025-04-19 | End: 2025-04-19

## 2025-04-19 RX ORDER — GUAIFENESIN 600 MG/1
600 TABLET, EXTENDED RELEASE ORAL 2 TIMES DAILY PRN
Status: DISCONTINUED | OUTPATIENT
Start: 2025-04-19 | End: 2025-04-25 | Stop reason: HOSPADM

## 2025-04-19 RX ORDER — INSULIN LISPRO 100 [IU]/ML
0-5 INJECTION, SOLUTION INTRAVENOUS; SUBCUTANEOUS EVERY 6 HOURS SCHEDULED
Status: DISCONTINUED | OUTPATIENT
Start: 2025-04-19 | End: 2025-04-25 | Stop reason: HOSPADM

## 2025-04-19 RX ORDER — CEFTRIAXONE 1 G/50ML
1 INJECTION, SOLUTION INTRAVENOUS EVERY 24 HOURS
Status: DISCONTINUED | OUTPATIENT
Start: 2025-04-19 | End: 2025-04-22

## 2025-04-19 RX ADMIN — IPRATROPIUM BROMIDE AND ALBUTEROL SULFATE 3 ML: 2.5; .5 SOLUTION RESPIRATORY (INHALATION) at 14:33

## 2025-04-19 RX ADMIN — GLYCOPYRROLATE 2 MG: 1 TABLET ORAL at 20:52

## 2025-04-19 RX ADMIN — AZITHROMYCIN MONOHYDRATE 500 MG: 500 INJECTION, POWDER, LYOPHILIZED, FOR SOLUTION INTRAVENOUS at 19:43

## 2025-04-19 RX ADMIN — BACLOFEN 20 MG: 10 TABLET ORAL at 17:37

## 2025-04-19 RX ADMIN — POLYETHYLENE GLYCOL 3350 17 G: 17 POWDER, FOR SOLUTION ORAL at 08:10

## 2025-04-19 RX ADMIN — LAMOTRIGINE 200 MG: 100 TABLET ORAL at 08:12

## 2025-04-19 RX ADMIN — GLYCOPYRROLATE 2 MG: 1 TABLET ORAL at 17:38

## 2025-04-19 RX ADMIN — LACTULOSE 10 G: 20 SOLUTION ORAL at 08:12

## 2025-04-19 RX ADMIN — CLONAZEPAM 1 MG: 0.5 TABLET ORAL at 20:52

## 2025-04-19 RX ADMIN — PIPERACILLIN SODIUM AND TAZOBACTAM SODIUM 4.5 G: 4; .5 INJECTION, SOLUTION INTRAVENOUS at 01:56

## 2025-04-19 RX ADMIN — PIPERACILLIN SODIUM AND TAZOBACTAM SODIUM 4.5 G: 4; .5 INJECTION, SOLUTION INTRAVENOUS at 10:23

## 2025-04-19 RX ADMIN — ENOXAPARIN SODIUM 40 MG: 40 INJECTION SUBCUTANEOUS at 17:38

## 2025-04-19 RX ADMIN — CEFTRIAXONE 1 G: 1 INJECTION, SOLUTION INTRAVENOUS at 18:53

## 2025-04-19 RX ADMIN — SENNOSIDES 10 ML: 8.8 LIQUID ORAL at 20:52

## 2025-04-19 RX ADMIN — IPRATROPIUM BROMIDE AND ALBUTEROL SULFATE 3 ML: 2.5; .5 SOLUTION RESPIRATORY (INHALATION) at 19:49

## 2025-04-19 RX ADMIN — Medication 40 L/MIN: at 07:58

## 2025-04-19 RX ADMIN — LACOSAMIDE 300 MG: 10 INJECTION INTRAVENOUS at 05:04

## 2025-04-19 RX ADMIN — CLONAZEPAM 1 MG: 0.5 TABLET ORAL at 17:37

## 2025-04-19 RX ADMIN — IPRATROPIUM BROMIDE AND ALBUTEROL SULFATE 3 ML: 2.5; .5 SOLUTION RESPIRATORY (INHALATION) at 07:58

## 2025-04-19 RX ADMIN — BACLOFEN 20 MG: 10 TABLET ORAL at 20:52

## 2025-04-19 RX ADMIN — Medication 50 L/MIN: at 23:30

## 2025-04-19 RX ADMIN — Medication 60 L/MIN: at 09:15

## 2025-04-19 RX ADMIN — Medication 50 L/MIN: at 14:33

## 2025-04-19 RX ADMIN — LAMOTRIGINE 200 MG: 100 TABLET ORAL at 20:52

## 2025-04-19 RX ADMIN — SENNOSIDES 10 ML: 8.8 LIQUID ORAL at 08:10

## 2025-04-19 RX ADMIN — LAMOTRIGINE 200 MG: 100 TABLET ORAL at 17:37

## 2025-04-19 RX ADMIN — POTASSIUM CHLORIDE 20 MEQ: 14.9 INJECTION, SOLUTION INTRAVENOUS at 07:49

## 2025-04-19 RX ADMIN — BACLOFEN 20 MG: 10 TABLET ORAL at 08:10

## 2025-04-19 RX ADMIN — CITALOPRAM HYDROBROMIDE 20 MG: 20 TABLET ORAL at 08:12

## 2025-04-19 RX ADMIN — ZONISAMIDE 300 MG: 100 CAPSULE ORAL at 20:52

## 2025-04-19 RX ADMIN — CLONAZEPAM 1 MG: 0.5 TABLET ORAL at 08:10

## 2025-04-19 RX ADMIN — LACOSAMIDE 300 MG: 10 INJECTION INTRAVENOUS at 17:40

## 2025-04-19 RX ADMIN — POTASSIUM PHOSPHATE, MONOBASIC AND POTASSIUM PHOSPHATE, DIBASIC 21 MMOL: 224; 236 INJECTION, SOLUTION, CONCENTRATE INTRAVENOUS at 07:49

## 2025-04-19 RX ADMIN — GLYCOPYRROLATE 2 MG: 1 TABLET ORAL at 08:12

## 2025-04-19 ASSESSMENT — PAIN SCALES - PAIN ASSESSMENT IN ADVANCED DEMENTIA (PAINAD)
FACIALEXPRESSION: SMILING OR INEXPRESSIVE
BREATHING: NORMAL
BODYLANGUAGE: RELAXED
CONSOLABILITY: NO NEED TO CONSOLE
TOTALSCORE: 0

## 2025-04-19 ASSESSMENT — PAIN SCALES - WONG BAKER: WONGBAKER_NUMERICALRESPONSE: NO HURT

## 2025-04-19 ASSESSMENT — PAIN SCALES - GENERAL
PAINLEVEL_OUTOF10: 0 - NO PAIN
PAINLEVEL_OUTOF10: 0 - NO PAIN

## 2025-04-19 NOTE — CARE PLAN
The patient's goals for the shift include      The clinical goals for the shift include Patient will remain hemodynamically stable throughout shift.    Over the shift, the patient did not make progress toward the following goals. Barriers to progression include weak cough. Recommendations to address these barriers include increase respiratory treatment measures.

## 2025-04-19 NOTE — PROGRESS NOTES
Critical Care Medicine - Daily Progress Note   Hospital Day: 4       Name:Juan Carlos Ybarra, AGE: 29 y.o., GENDER: male, MRN: [id number], ROOM: 08/08-A   CODE STATUS: Full Code  Attending Physician: Alexy Richter MD  Resident: Bryson Pulliam DO        Chief Complaint     Chief Complaint   Patient presents with    Shortness of Breath        Subjective    Juan Carlos Ybarra is a 29 y.o. year old male patient admitted on 4/16/2025 , on Hospital Day: 4 with following indications for ICU care: Acute hypoxic respiratory failure requiring Airvo , concern for future intubation    Interval History: Overnight patient was having frequent bowel movements overnight, as such soapsuds enema was discontinued.  Sliding scale insulin was changed to every 6 hours and patient received 21 mmol of Kphos and 20meq of KCl for potassium 3.4 and phosphorus 1.8.  The following morning patient continued to demonstrate increased secretions and tachypnea likely secondary to mucous plugging.  Patient's Airvo was increased to max settings once more and patient was placed on VEST therapy.  Repeat VBG and chest x-ray were ordered.        Meds    Scheduled medications  Scheduled Medications[1]  Continuous medications  Continuous Medications[2]  PRN medications  PRN Medications[3]     Objective    Physical Exam  Constitutional:       General: He is not in acute distress.     Comments: Nonverbal at baseline   HENT:      Head: Normocephalic.   Cardiovascular:      Rate and Rhythm: Normal rate and regular rhythm.      Pulses: Normal pulses.      Heart sounds: Normal heart sounds.   Pulmonary:      Effort: Pulmonary effort is normal.      Breath sounds: Rhonchi present.      Comments: Decreased breath sounds  Abdominal:      General: Abdomen is flat. There is no distension.      Tenderness: There is no abdominal tenderness.      Comments: PEG tube in place   Neurological:      Mental Status: He is alert. Mental status is at baseline.        Visit Vitals  BP  "101/61   Pulse 85   Temp 36.4 °C (97.5 °F)   Resp (!) 30   Ht 1.524 m (5')   Wt 60.5 kg (133 lb 6.1 oz)   SpO2 98%   BMI 26.05 kg/m²   Smoking Status Never   BSA 1.6 m²        Intake/Output Summary (Last 24 hours) at 4/19/2025 1124  Last data filed at 4/19/2025 1054  Gross per 24 hour   Intake 2555.67 ml   Output 2415 ml   Net 140.67 ml       Vent settings:   FiO2 (%):  [70 %-90 %] 90 %     Labs:   Results from last 72 hours   Lab Units 04/19/25  0439 04/18/25  0556 04/17/25  1314   SODIUM mmol/L 133* 135* 136   POTASSIUM mmol/L 3.4* 3.0* 3.1*   CHLORIDE mmol/L 102 102 105   CO2 mmol/L 25 27 22   BUN mg/dL 8 8 11   CREATININE mg/dL 0.40* 0.49* 0.51   GLUCOSE mg/dL 136* 101* 121*   CALCIUM mg/dL 7.7* 7.8* 7.2*   ANION GAP mmol/L 9* 9* 12   EGFR mL/min/1.73m*2 >90 >90 >90   PHOSPHORUS mg/dL 1.8* 1.3* 1.9*      Results from last 72 hours   Lab Units 04/19/25  0439 04/18/25  0556 04/17/25  1314 04/17/25  0605 04/16/25  1858   WBC AUTO x10*3/uL 8.6 7.3 4.1* 3.4* 4.7   HEMOGLOBIN g/dL 12.4* 13.3* 11.6* 14.2 16.8   HEMATOCRIT % 37.9* 40.2* 35.1* 44.6 49.9   PLATELETS AUTO x10*3/uL 171 152 138* 143* 186   NEUTROS PCT AUTO % 79.2 82.7  --   --  71.1   LYMPHO PCT MAN %  --   --   --  42.0  --    LYMPHS PCT AUTO % 11.5 10.8  --   --  22.6   MONO PCT MAN %  --   --   --  5.0  --    MONOS PCT AUTO % 8.4 5.5  --   --  5.3   EOSINO PCT MAN %  --   --   --  0.0  --    EOS PCT AUTO % 0.0 0.1  --   --  0.2      Lab Results   Component Value Date    CALCIUM 7.7 (L) 04/19/2025    PHOS 1.8 (L) 04/19/2025      No results found for: \"CRP\"    [unfilled]     Micro/ID:   No results found for the last 90 days.                   No lab exists for component: \"AGALPCRNB\"   .ID  Lab Results   Component Value Date    BLOODCULT No growth at 2 days 04/16/2025    BLOODCULT No growth at 2 days 04/16/2025       Images    Imaging  XR abdomen 1 view  Result Date: 4/18/2025  1. Interval decrease in stool burden within the rectum and descending colon " compared to prior radiograph. Mild interval improvement in gaseous distention of the bowel loops with moderate residual as described above. 2. Redemonstration of hazy bibasilar airspace opacities, which can represent atelectasis versus pneumonia in appropriate clinical setting.       MACRO: None   Signed by: Kristi Strickland 4/18/2025 9:17 AM Dictation workstation:   [id number]    CT abdomen pelvis w IV contrast  Result Date: 4/17/2025  1.Large amount of formed stool throughout the colon including stool in the rectal vault measuring up to 8.5 cm in diameter with mild associated rectal wall thickening and minimal perirectal fat stranding. Findings suggestive of constipation with possible accompanying stercoral colitis.  Overall appearances not significantly changed compared to 8/11/2020.  No dilated small bowel loops. 2.Extensive consolidation throughout the lung bases left greater than right, new compared to prior compatible with extensive multifocal pneumonia. 3.Hepatic steatosis. 4.Mildly enlarged prostate. Signed by Darin Hilliard MD    XR chest 1 view  Result Date: 4/17/2025    Nasogastric tube over the gastric antrum.   Perihilar and basilar edema with retrocardiac airspace disease and left effusion worsened from previous.   Signed by: Kulwinder Estrada 4/17/2025 2:58 PM Dictation workstation:   [id number]    XR abdomen 1 view  Result Date: 4/17/2025  Large volume dense stool in the rectum and left hemicolon. Diffuse gaseous distention of the bowel loops in the mid abdomen raises concern for a degree of bowel obstruction and could be related to fecal impaction. Recommend clinical correlation. Dedicated CT abdomen pelvis can be performed for further evaluation as clinically warranted.     MACRO: None   Signed by: Kristi Strickland 4/17/2025 10:37 AM Dictation workstation:   [id number]    XR chest 1 view  Result Date: 4/16/2025  Airspace opacities asymmetric in the left lung compatible with pneumonia; short-term  progress imaging is recommended after treatment to assure resolution and exclude other underlying pathology.   MACRO: None   Signed by: Samuel Joshi 4/16/2025 7:02 PM Dictation workstation:   [id number]      Cardiology, Vascular, and Other Imaging  ECG 12 lead  Result Date: 4/18/2025  Sinus tachycardia ST & T wave abnormality, consider anterior ischemia Abnormal ECG When compared with ECG of 22-SEP-2021 03:57, ST no longer elevated in Inferior leads ST no longer elevated in Anterior leads T wave inversion now evident in Inferior leads T wave inversion now evident in Anterior leads See ED provider note for full interpretation and clinical correlation Confirmed by Yazmin Akins ([id number]) on 4/18/2025 1:59:27 PM        Assessment and Plan    Juan Carlos Ybarra is a 29 y.o. year old male patient admitted on 4/16/2025 , on Hospital Day: 4 with a PMH of spastic CP, developmental delay, seizure disorder admitted for management of suspected aspiration pneumonia now placed on Airvo w/ worsening Oxygen saturation transferred to ICU for possible Intubation.     Updates 04/19/25 :   -Strep. Pneumo was positive, de-escalated patient to ceftriaxone and azithromycin.  -Placed on vest therapy for mucous plugging.  Repeat VBG after treatment demonstrated pH 7.48 with pCO2 of 37  -Repeat CXR 4/19:Left perihilar and right mid and suprahilar infiltrates appear  increased. Infiltrate at the right infrahilar region appears slightly improved. Again there is opacification at the left lower lung and retrocardiac region probably from consolidated infiltrate and effusion.  -Mucinex 600 mg twice daily as needed   -Jackson catheter in place, will attempt a void trial today    NEUROLOGY  #Spastic Cerebral Palsy  - Nonverbal at baseline  - Baclofen 20 mg     #Seizure Disorder  - Clonazepam 0.5 mg TID  - Lacosamide 300 mg BID  - Lamotrigine 200 mg TID  - Zonisamide 100 mg daily  - Seizure precautions     CARDIOVASCULAR  No active issues      PULMONARY  #Acute Hypoxic Respiratory Failure  # Respiratory alkalosis improving  # Strep pneumo pneumonia  - Increased to Airvo 60L/90% wean as tolerated  - Underwent Vest therapy, repeat VBG afterwards demonstrated pH 7.48, pCO2 37  - Repeat CXR: Left perihilar and right mid and suprahilar infiltrates appear increased. Infiltrate at the right infrahilar region appears slightly improved. Again there is opacification at the left lower lung and retrocardiac region probably from consolidated infiltrate and effusion.  - Strep pneumo positive  - Zosyn and azithromycin 4/17-4/19. deescalated to Rocephin (4/19-  - On DuoNeb PRN     RENAL/GENITOURINARY  # Metabolic alkalosis  Will obtain repeat VBG, likely contraction alkalosis ISO decreased fluid volume    #Urinary retention  - Will attempt void trial today     GASTROENTEROLOGY  #Constipation  #Stool burden(improved)  #C/f SBO  - NG tube removed on 4/18 per surgery recommendation  - Stool burden has significantly improved on repeat KUB  -Patient has been experiencing frequent bowel movements   - Has PEG tube. Will use to give meds when bowels cleared  - Dulcolax suppository       HEMATOLOGY/ONCOLOGY  No active issues     RHEUMATOLOGY  No active issues     INFECTIOUS DISEASE  #Pneumonia  - Strep pneumo positive  -Legionella negative  - Blood cultures no growth 2 days  - UA negative for UTI  Plan:  - Zosyn and azithromycin 4/17-4/19. deescalated to Rocephin (4/19-    FLUID/ ELECTROLYTE/ NUTRITION:    Fluids: PRN  Electrolytes: PRN  Nutrition: Tube feeds per nutrition  Antimicrobials: Zosyn(4/17-4/19) Azithromycin (4/17 -), ceftriaxone 4/19-  DVT ppx: Lovenox  GI ppx: None  Bowel care:  Lactulose, Miralax, Senna  Catheter: None  Lines: PIV  Supplemental Oxygen: Airvo Max  Drips: None  Emergency Contact: Extended Emergency Contact Information  Primary Emergency Contact: Brianna Ybarra  Home Phone: [phone]  Relation: Parent   Code: Full Code      Disposition: 30 y/o male  w/ spastic cerebral palsy admitted to ICU for acute hypoxic respiratory failure 2/2 aspiration pneumonia.  Will remain in ICU at this time due to him continuing to require advanced Airvo settings and concern for possible future intubation.    Bryson Pulliam,   Internal Medicine, PGY- 1  04/19/25 at 11:24 AM                  [1] azithromycin, 500 mg, intravenous, q24h  baclofen, 20 mg, oral, TID  bisacodyl, 10 mg, rectal, Daily  cefTRIAXone, 1 g, intravenous, q24h  citalopram, 20 mg, oral, Daily  clonazePAM, 1 mg, oral, TID  enoxaparin, 40 mg, subcutaneous, q24h  glycopyrrolate, 2 mg, oral, TID  insulin lispro, 0-5 Units, subcutaneous, q6h JANNIE  ipratropium-albuteroL, 3 mL, nebulization, TID  lacosamide, 300 mg, intravenous, q12h  [Held by provider] lacosamide, 300 mg, oral, BID  lactulose, 10 g, oral, Daily  lamoTRIgine, 200 mg, oral, TID  polyethylene glycol, 17 g, oral, Daily  potassium phosphate, 21 mmol, intravenous, Once  senna, 10 mL, oral, BID  zonisamide, 300 mg, oral, Nightly  [2]    [3] PRN medications: acetaminophen **OR** acetaminophen, acetaminophen, dextrose, dextrose, glucagon, glucagon, guaiFENesin, HYDROmorphone, ipratropium-albuteroL, LORazepam, ondansetron ODT **OR** ondansetron, oxyCODONE, oxyCODONE, oxygen

## 2025-04-20 LAB
ALBUMIN SERPL BCP-MCNC: 2.8 G/DL (ref 3.4–5)
ALP SERPL-CCNC: 61 U/L (ref 33–120)
ALT SERPL W P-5'-P-CCNC: 13 U/L (ref 10–52)
ANION GAP BLDV CALCULATED.4IONS-SCNC: 2 MMOL/L (ref 10–25)
ANION GAP SERPL CALC-SCNC: 10 MMOL/L (ref 10–20)
AST SERPL W P-5'-P-CCNC: 10 U/L (ref 9–39)
BACTERIA BLD CULT: NORMAL
BACTERIA BLD CULT: NORMAL
BASE EXCESS BLDV CALC-SCNC: 3.3 MMOL/L (ref -2–3)
BASOPHILS # BLD AUTO: 0.02 X10*3/UL (ref 0–0.1)
BASOPHILS NFR BLD AUTO: 0.3 %
BILIRUB SERPL-MCNC: 0.6 MG/DL (ref 0–1.2)
BODY TEMPERATURE: ABNORMAL
BUN SERPL-MCNC: 10 MG/DL (ref 6–23)
CA-I BLDV-SCNC: 1.22 MMOL/L (ref 1.1–1.33)
CALCIUM SERPL-MCNC: 8.1 MG/DL (ref 8.6–10.3)
CHLORIDE BLDV-SCNC: 103 MMOL/L (ref 98–107)
CHLORIDE SERPL-SCNC: 104 MMOL/L (ref 98–107)
CO2 SERPL-SCNC: 25 MMOL/L (ref 21–32)
CREAT SERPL-MCNC: 0.47 MG/DL (ref 0.5–1.3)
EGFRCR SERPLBLD CKD-EPI 2021: >90 ML/MIN/1.73M*2
EOSINOPHIL # BLD AUTO: 0.01 X10*3/UL (ref 0–0.7)
EOSINOPHIL NFR BLD AUTO: 0.2 %
ERYTHROCYTE [DISTWIDTH] IN BLOOD BY AUTOMATED COUNT: 12.7 % (ref 11.5–14.5)
GLUCOSE BLD MANUAL STRIP-MCNC: 120 MG/DL (ref 74–99)
GLUCOSE BLD MANUAL STRIP-MCNC: 141 MG/DL (ref 74–99)
GLUCOSE BLD MANUAL STRIP-MCNC: 143 MG/DL (ref 74–99)
GLUCOSE BLD MANUAL STRIP-MCNC: 173 MG/DL (ref 74–99)
GLUCOSE BLDV-MCNC: 146 MG/DL (ref 74–99)
GLUCOSE SERPL-MCNC: 124 MG/DL (ref 74–99)
HCO3 BLDV-SCNC: 27.8 MMOL/L (ref 22–26)
HCT VFR BLD AUTO: 35.3 % (ref 41–52)
HCT VFR BLD EST: 44 % (ref 41–52)
HGB BLD-MCNC: 11.8 G/DL (ref 13.5–17.5)
HGB BLDV-MCNC: 14.7 G/DL (ref 13.5–17.5)
IMM GRANULOCYTES # BLD AUTO: 0.11 X10*3/UL (ref 0–0.7)
IMM GRANULOCYTES NFR BLD AUTO: 1.7 % (ref 0–0.9)
INHALED O2 CONCENTRATION: 90 %
LACTATE BLDV-SCNC: 0.9 MMOL/L (ref 0.4–2)
LYMPHOCYTES # BLD AUTO: 1.27 X10*3/UL (ref 1.2–4.8)
LYMPHOCYTES NFR BLD AUTO: 19.4 %
MAGNESIUM SERPL-MCNC: 1.97 MG/DL (ref 1.6–2.4)
MCH RBC QN AUTO: 33 PG (ref 26–34)
MCHC RBC AUTO-ENTMCNC: 33.4 G/DL (ref 32–36)
MCV RBC AUTO: 99 FL (ref 80–100)
MONOCYTES # BLD AUTO: 0.85 X10*3/UL (ref 0.1–1)
MONOCYTES NFR BLD AUTO: 13 %
NEUTROPHILS # BLD AUTO: 4.3 X10*3/UL (ref 1.2–7.7)
NEUTROPHILS NFR BLD AUTO: 65.4 %
NRBC BLD-RTO: 0 /100 WBCS (ref 0–0)
OXYHGB MFR BLDV: 97.3 % (ref 45–75)
PCO2 BLDV: 41 MM HG (ref 41–51)
PH BLDV: 7.44 PH (ref 7.33–7.43)
PHOSPHATE SERPL-MCNC: 2.2 MG/DL (ref 2.5–4.9)
PLATELET # BLD AUTO: 176 X10*3/UL (ref 150–450)
PO2 BLDV: 91 MM HG (ref 35–45)
POTASSIUM BLDV-SCNC: 3.7 MMOL/L (ref 3.5–5.3)
POTASSIUM SERPL-SCNC: 3.6 MMOL/L (ref 3.5–5.3)
PROT SERPL-MCNC: 5.8 G/DL (ref 6.4–8.2)
RBC # BLD AUTO: 3.58 X10*6/UL (ref 4.5–5.9)
SAO2 % BLDV: 100 % (ref 45–75)
SODIUM BLDV-SCNC: 129 MMOL/L (ref 136–145)
SODIUM SERPL-SCNC: 135 MMOL/L (ref 136–145)
WBC # BLD AUTO: 6.6 X10*3/UL (ref 4.4–11.3)

## 2025-04-20 PROCEDURE — 36415 COLL VENOUS BLD VENIPUNCTURE: CPT

## 2025-04-20 PROCEDURE — 94640 AIRWAY INHALATION TREATMENT: CPT

## 2025-04-20 PROCEDURE — 84100 ASSAY OF PHOSPHORUS: CPT

## 2025-04-20 PROCEDURE — 2500000002 HC RX 250 W HCPCS SELF ADMINISTERED DRUGS (ALT 637 FOR MEDICARE OP, ALT 636 FOR OP/ED)

## 2025-04-20 PROCEDURE — 83735 ASSAY OF MAGNESIUM: CPT

## 2025-04-20 PROCEDURE — 80053 COMPREHEN METABOLIC PANEL: CPT

## 2025-04-20 PROCEDURE — 2500000004 HC RX 250 GENERAL PHARMACY W/ HCPCS (ALT 636 FOR OP/ED): Mod: JZ

## 2025-04-20 PROCEDURE — 82947 ASSAY GLUCOSE BLOOD QUANT: CPT

## 2025-04-20 PROCEDURE — 94760 N-INVAS EAR/PLS OXIMETRY 1: CPT

## 2025-04-20 PROCEDURE — 2500000005 HC RX 250 GENERAL PHARMACY W/O HCPCS

## 2025-04-20 PROCEDURE — 2500000005 HC RX 250 GENERAL PHARMACY W/O HCPCS: Performed by: INTERNAL MEDICINE

## 2025-04-20 PROCEDURE — 94668 MNPJ CHEST WALL SBSQ: CPT

## 2025-04-20 PROCEDURE — 94669 MECHANICAL CHEST WALL OSCILL: CPT

## 2025-04-20 PROCEDURE — 99291 CRITICAL CARE FIRST HOUR: CPT

## 2025-04-20 PROCEDURE — C9254 INJECTION, LACOSAMIDE: HCPCS | Mod: JW

## 2025-04-20 PROCEDURE — 84132 ASSAY OF SERUM POTASSIUM: CPT

## 2025-04-20 PROCEDURE — 85025 COMPLETE CBC W/AUTO DIFF WBC: CPT

## 2025-04-20 PROCEDURE — 2500000004 HC RX 250 GENERAL PHARMACY W/ HCPCS (ALT 636 FOR OP/ED)

## 2025-04-20 PROCEDURE — 2020000001 HC ICU ROOM DAILY

## 2025-04-20 PROCEDURE — 2500000001 HC RX 250 WO HCPCS SELF ADMINISTERED DRUGS (ALT 637 FOR MEDICARE OP)

## 2025-04-20 RX ADMIN — IPRATROPIUM BROMIDE AND ALBUTEROL SULFATE 3 ML: 2.5; .5 SOLUTION RESPIRATORY (INHALATION) at 07:48

## 2025-04-20 RX ADMIN — GLYCOPYRROLATE 2 MG: 1 TABLET ORAL at 15:04

## 2025-04-20 RX ADMIN — POLYETHYLENE GLYCOL 3350 17 G: 17 POWDER, FOR SOLUTION ORAL at 08:53

## 2025-04-20 RX ADMIN — IPRATROPIUM BROMIDE AND ALBUTEROL SULFATE 3 ML: 2.5; .5 SOLUTION RESPIRATORY (INHALATION) at 14:56

## 2025-04-20 RX ADMIN — LACOSAMIDE 300 MG: 10 INJECTION INTRAVENOUS at 16:37

## 2025-04-20 RX ADMIN — LACOSAMIDE 300 MG: 10 INJECTION INTRAVENOUS at 04:51

## 2025-04-20 RX ADMIN — LACTULOSE 10 G: 20 SOLUTION ORAL at 08:53

## 2025-04-20 RX ADMIN — BACLOFEN 20 MG: 10 TABLET ORAL at 21:20

## 2025-04-20 RX ADMIN — ONDANSETRON 4 MG: 2 INJECTION, SOLUTION INTRAMUSCULAR; INTRAVENOUS at 23:21

## 2025-04-20 RX ADMIN — GLYCOPYRROLATE 2 MG: 1 TABLET ORAL at 21:23

## 2025-04-20 RX ADMIN — CEFTRIAXONE 1 G: 1 INJECTION, SOLUTION INTRAVENOUS at 15:03

## 2025-04-20 RX ADMIN — POTASSIUM PHOSPHATE, MONOBASIC AND POTASSIUM PHOSPHATE, DIBASIC 21 MMOL: 224; 236 INJECTION, SOLUTION, CONCENTRATE INTRAVENOUS at 07:22

## 2025-04-20 RX ADMIN — SENNOSIDES 10 ML: 8.8 LIQUID ORAL at 08:53

## 2025-04-20 RX ADMIN — SENNOSIDES 10 ML: 8.8 LIQUID ORAL at 21:20

## 2025-04-20 RX ADMIN — Medication 60 L/MIN: at 07:48

## 2025-04-20 RX ADMIN — LAMOTRIGINE 200 MG: 100 TABLET ORAL at 15:04

## 2025-04-20 RX ADMIN — CLONAZEPAM 1 MG: 0.5 TABLET ORAL at 08:53

## 2025-04-20 RX ADMIN — GLYCOPYRROLATE 2 MG: 1 TABLET ORAL at 08:54

## 2025-04-20 RX ADMIN — CITALOPRAM HYDROBROMIDE 20 MG: 20 TABLET ORAL at 08:54

## 2025-04-20 RX ADMIN — AZITHROMYCIN MONOHYDRATE 500 MG: 500 INJECTION, POWDER, LYOPHILIZED, FOR SOLUTION INTRAVENOUS at 21:23

## 2025-04-20 RX ADMIN — CLONAZEPAM 1 MG: 0.5 TABLET ORAL at 21:20

## 2025-04-20 RX ADMIN — BACLOFEN 20 MG: 10 TABLET ORAL at 15:04

## 2025-04-20 RX ADMIN — Medication 60 L/MIN: at 23:22

## 2025-04-20 RX ADMIN — BACLOFEN 20 MG: 10 TABLET ORAL at 08:54

## 2025-04-20 RX ADMIN — CLONAZEPAM 1 MG: 0.5 TABLET ORAL at 15:03

## 2025-04-20 RX ADMIN — Medication 60 L/MIN: at 20:38

## 2025-04-20 RX ADMIN — IPRATROPIUM BROMIDE AND ALBUTEROL SULFATE 3 ML: 2.5; .5 SOLUTION RESPIRATORY (INHALATION) at 20:38

## 2025-04-20 RX ADMIN — LAMOTRIGINE 200 MG: 100 TABLET ORAL at 21:20

## 2025-04-20 RX ADMIN — Medication 40 L/MIN: at 14:56

## 2025-04-20 RX ADMIN — ZONISAMIDE 300 MG: 100 CAPSULE ORAL at 21:23

## 2025-04-20 RX ADMIN — ENOXAPARIN SODIUM 40 MG: 40 INJECTION SUBCUTANEOUS at 16:37

## 2025-04-20 RX ADMIN — LAMOTRIGINE 200 MG: 100 TABLET ORAL at 08:53

## 2025-04-20 ASSESSMENT — PAIN SCALES - PAIN ASSESSMENT IN ADVANCED DEMENTIA (PAINAD)
BREATHING: NORMAL
BODYLANGUAGE: RELAXED
BREATHING: NORMAL
FACIALEXPRESSION: SMILING OR INEXPRESSIVE
TOTALSCORE: 0
CONSOLABILITY: NO NEED TO CONSOLE
BODYLANGUAGE: RELAXED
TOTALSCORE: 0
FACIALEXPRESSION: SMILING OR INEXPRESSIVE
CONSOLABILITY: NO NEED TO CONSOLE

## 2025-04-20 ASSESSMENT — PAIN SCALES - GENERAL
PAINLEVEL_OUTOF10: 0 - NO PAIN

## 2025-04-20 ASSESSMENT — PAIN SCALES - WONG BAKER
WONGBAKER_NUMERICALRESPONSE: NO HURT
WONGBAKER_NUMERICALRESPONSE: NO HURT

## 2025-04-20 NOTE — PROGRESS NOTES
Patient: Juan Carlos Ybarra   Age: 29 y.o.   Gender: male  Room/bed: 08/08-A     Attending: Alexy Richter MD  Code Status:  Full Code    Subjective    Patient desatted last night, now requiring Airvo at max setting. Likely due to aspiration. He has been having adequate BM. Otherwise, no new changes.    ROS: 12 points review of system is negative except as stated as above.     Objective    Physical Exam   Constitutional:       General: He is not in acute distress.     Comments: Nonverbal at baseline   HENT:      Head: Normocephalic.   Cardiovascular:      Rate and Rhythm: Normal rate and regular rhythm.      Pulses: Normal pulses.      Heart sounds: Normal heart sounds.   Pulmonary:      Effort: Pulmonary effort is normal.      Breath sounds: Rhonchi present.      Comments: Decreased breath sounds  Abdominal:      General: Abdomen is flat. There is no distension.      Tenderness: There is no abdominal tenderness.      Comments: PEG tube in place   Neurological:      Mental Status: He is alert. Mental status is at baseline.     Temp:  [37 °C (98.6 °F)-37.1 °C (98.8 °F)] 37.1 °C (98.8 °F)  Heart Rate:  [] 88  Resp:  [17-41] 29  BP: ()/(50-79) 106/77  FiO2 (%):  [70 %-95 %] 70 %      Intake/Output Summary (Last 24 hours) at 4/20/2025 1629  Last data filed at 4/20/2025 1528  Gross per 24 hour   Intake 720 ml   Output 2000 ml   Net -1280 ml       Vitals:    04/20/25 0759   Weight: 62.4 kg (137 lb 9.1 oz)       FiO2 (%):  [70 %-95 %] 70 %     Labs:   CBC:  Recent Labs     04/20/25 0428 04/19/25  0439 04/18/25  0556   WBC 6.6 8.6 7.3   HGB 11.8* 12.4* 13.3*   HCT 35.3* 37.9* 40.2*    171 152   MCV 99 102* 99     CMP:  Recent Labs     04/20/25 0428 04/19/25  0439 04/18/25  0556   * 133* 135*   K 3.6 3.4* 3.0*    102 102   CO2 25 25 27   ANIONGAP 10 9* 9*   BUN 10 8 8   CREATININE 0.47* 0.40* 0.49*   EGFR >90 >90 >90   GLUCOSE 124* 136* 101*     Recent Labs     04/20/25  0428 04/19/25  0434  04/18/25  0556   ALBUMIN 2.8* 3.0* 3.1*   ALKPHOS 61 63 60   ALT 13 16 22   AST 10 14 15   BILITOT 0.6 1.2 1.5*     Calcium/Phos:   Lab Results   Component Value Date    CALCIUM 8.1 (L) 04/20/2025    PHOS 2.2 (L) 04/20/2025      COAG:   Recent Labs     09/19/21  1856   INR 1.1     CARDIAC:   Recent Labs     04/16/25  2111 04/16/25  1858   TROPHS <3 4   BNP  --  54     Micro/ID:     Lab Results   Component Value Date    BLOODCULT No growth at 3 days 04/16/2025    BLOODCULT No growth at 3 days 04/16/2025       Images:  XR chest 1 view  Narrative: Interpreted By:  Joseph Alves,   STUDY:  XR CHEST 1 VIEW;  4/19/2025 11:09 am      INDICATION:  Signs/Symptoms:Increased oxygen requirements and secretions.      COMPARISON:  04/17/2025      ACCESSION NUMBER(S):  [id number]      ORDERING CLINICIAN:  FRANSICO SAMANIEGO      FINDINGS:  Left perihilar and right mid and suprahilar infiltrates appear  increased. This may be due to congestion and/or pneumonia. Infiltrate  at the right infrahilar region appears slightly improved. Again there  is opacification at the left lower lung and retrocardiac region  probably from consolidated infiltrate and effusion. No acute osseous  changes.      Impression: 1.  Increased perihilar congestion and/or pneumonia.      Signed by: Joseph Alves 4/19/2025 1:20 PM  Dictation workstation:   [id number]       Medications:  Scheduled medications  Scheduled Medications[1]  Continuous medications  Continuous Medications[2]  PRN medications  PRN Medications[3]     Assessment and Plan:  Juan Carlos Ybarra is a 29 y.o. male with a PMH of spastic CP, developmental delay, seizure disorder admitted for management of suspected aspiration pneumonia now placed on Airvo w/ worsening Oxygen saturation transferred to ICU for possible Intubation. Also suspecting possible SBO, NG tube placed, surgery consulted, CT abdomen shows significant stool burden.     Updates 4/20  - On Airvo max setting. Wean down as tolerated  - Jackson  removal  - TF and medications via PEG  - Continue ICU monitoring    NEUROLOGY  #Spastic Cerebral Palsy  - Nonverbal at baseline  - Baclofen 20 mg     #Seizure Disorder  - Clonazepam 0.5 mg TID  - Lacosamide 300 mg BID  - Lamotrigine 200 mg TID  - Zonisamide 100 mg daily  - Seizure precautions     CARDIOVASCULAR  No active issues     PULMONARY  #Acute Hypoxic Respiratory Failure  #Pneumonia  - On Airvo 60/90, Wean as tolerated. Worsening likely due to Aspiration.  - Aspiration precautions  - Strep pneumo positive  - On Zosyn and Azithromycin  - On DuoNeb PRN    RENAL/GENITOURINARY  #Urinary retention  - Bladder scan  - Jackson removal today     GASTROENTEROLOGY  #Constipation  #Stool burden(improved)  #C/f SBO  - NG tube removed on 4/18 per surgery recommendation  - Stool burden has significantly improved on repeat KUB  - Patient has been experiencing frequent bowel movements   - Has PEG tube. Will use to give meds and TF  - Dulcolax suppository     HEMATOLOGY/ONCOLOGY  No active issues     RHEUMATOLOGY  No active issues     INFECTIOUS DISEASE  #Pneumonia  - Strep pneumo positive  - On Zosyn and Azithromycin  - Blood cultures pending  - UA negative for UTI  - Lactate 4.5 -> 3.5, continue to trend     Fluids: PRN  Electrolytes: PRN  Nutrition: NPO  Antimicrobials: Zosyn, Azithromycin (4/17 - )  DVT ppx: Lovenox  GI ppx: None  Bowel care: Lactulose, Miralax, Senna  Catheter: None  Lines: PIV  Supplemental Oxygen: Airvo Max  Drips: None  Emergency Contact: Brianna Ybarra (Parent) [phone]  Code Status: Full Code     Dispo: 28 y/o male w/ spastic cerebral palsy admitted to ICU for acute hypoxic respiratory failure 2/2 pneumonia. Will require continued ICU monitoring in setting of aspiration risk and unstable respiratory status.     Zeferino Fournier MD  Internal Medicine, PGY-II         [1] azithromycin, 500 mg, intravenous, q24h  baclofen, 20 mg, oral, TID  bisacodyl, 10 mg, rectal, Daily  cefTRIAXone, 1 g,  intravenous, q24h  citalopram, 20 mg, oral, Daily  clonazePAM, 1 mg, oral, TID  enoxaparin, 40 mg, subcutaneous, q24h  glycopyrrolate, 2 mg, oral, TID  insulin lispro, 0-5 Units, subcutaneous, q6h JANNIE  ipratropium-albuteroL, 3 mL, nebulization, TID  lacosamide, 300 mg, intravenous, q12h  [Held by provider] lacosamide, 300 mg, oral, BID  lactulose, 10 g, oral, Daily  lamoTRIgine, 200 mg, oral, TID  polyethylene glycol, 17 g, oral, Daily  senna, 10 mL, oral, BID  zonisamide, 300 mg, oral, Nightly     [2]    [3] PRN medications: acetaminophen **OR** acetaminophen, acetaminophen, dextrose, dextrose, glucagon, glucagon, guaiFENesin, HYDROmorphone, ipratropium-albuteroL, LORazepam, ondansetron ODT **OR** ondansetron, oxyCODONE, oxyCODONE, oxygen

## 2025-04-20 NOTE — CARE PLAN
The patient's goals for the shift include      The clinical goals for the shift include Patient will have decreased supplemental oxygen needs this shift    Over the shift, the patient did not make progress toward the following goals. Barriers to progression include   Problem: Safety - Adult  Goal: Free from fall injury  Outcome: Progressing   . Recommendations to address these barriers include .

## 2025-04-20 NOTE — CARE PLAN
The patient's goals for the shift include      The clinical goals for the shift include remainn safe and comfortable    Over the shift, the patient did not make progress toward the following goals. Barriers to progression include cognitive impairment. Recommendations to address these barriers include supportive care.

## 2025-04-21 VITALS
TEMPERATURE: 98.8 F | WEIGHT: 137.57 LBS | DIASTOLIC BLOOD PRESSURE: 62 MMHG | HEART RATE: 83 BPM | SYSTOLIC BLOOD PRESSURE: 108 MMHG | OXYGEN SATURATION: 98 % | HEIGHT: 60 IN | BODY MASS INDEX: 27.01 KG/M2 | RESPIRATION RATE: 24 BRPM

## 2025-04-21 LAB
ALBUMIN SERPL BCP-MCNC: 3 G/DL (ref 3.4–5)
ALP SERPL-CCNC: 65 U/L (ref 33–120)
ALT SERPL W P-5'-P-CCNC: 12 U/L (ref 10–52)
ANION GAP SERPL CALC-SCNC: 11 MMOL/L (ref 10–20)
AST SERPL W P-5'-P-CCNC: 13 U/L (ref 9–39)
BACTERIA BLD CULT: NORMAL
BACTERIA BLD CULT: NORMAL
BASOPHILS # BLD AUTO: 0.03 X10*3/UL (ref 0–0.1)
BASOPHILS NFR BLD AUTO: 0.4 %
BILIRUB SERPL-MCNC: 0.5 MG/DL (ref 0–1.2)
BUN SERPL-MCNC: 8 MG/DL (ref 6–23)
CALCIUM SERPL-MCNC: 8.3 MG/DL (ref 8.6–10.3)
CHLORIDE SERPL-SCNC: 104 MMOL/L (ref 98–107)
CO2 SERPL-SCNC: 24 MMOL/L (ref 21–32)
CREAT SERPL-MCNC: 0.38 MG/DL (ref 0.5–1.3)
EGFRCR SERPLBLD CKD-EPI 2021: >90 ML/MIN/1.73M*2
EOSINOPHIL # BLD AUTO: 0.05 X10*3/UL (ref 0–0.7)
EOSINOPHIL NFR BLD AUTO: 0.7 %
ERYTHROCYTE [DISTWIDTH] IN BLOOD BY AUTOMATED COUNT: 12.7 % (ref 11.5–14.5)
GLUCOSE BLD MANUAL STRIP-MCNC: 149 MG/DL (ref 74–99)
GLUCOSE BLD MANUAL STRIP-MCNC: 156 MG/DL (ref 74–99)
GLUCOSE SERPL-MCNC: 111 MG/DL (ref 74–99)
HCT VFR BLD AUTO: 37.8 % (ref 41–52)
HGB BLD-MCNC: 12 G/DL (ref 13.5–17.5)
IMM GRANULOCYTES # BLD AUTO: 0.18 X10*3/UL (ref 0–0.7)
IMM GRANULOCYTES NFR BLD AUTO: 2.6 % (ref 0–0.9)
LYMPHOCYTES # BLD AUTO: 1.31 X10*3/UL (ref 1.2–4.8)
LYMPHOCYTES NFR BLD AUTO: 18.8 %
MAGNESIUM SERPL-MCNC: 1.94 MG/DL (ref 1.6–2.4)
MCH RBC QN AUTO: 33 PG (ref 26–34)
MCHC RBC AUTO-ENTMCNC: 31.7 G/DL (ref 32–36)
MCV RBC AUTO: 104 FL (ref 80–100)
MONOCYTES # BLD AUTO: 0.73 X10*3/UL (ref 0.1–1)
MONOCYTES NFR BLD AUTO: 10.5 %
NEUTROPHILS # BLD AUTO: 4.68 X10*3/UL (ref 1.2–7.7)
NEUTROPHILS NFR BLD AUTO: 67 %
NRBC BLD-RTO: 0 /100 WBCS (ref 0–0)
PHOSPHATE SERPL-MCNC: 2.8 MG/DL (ref 2.5–4.9)
PLATELET # BLD AUTO: 201 X10*3/UL (ref 150–450)
POTASSIUM SERPL-SCNC: 4.3 MMOL/L (ref 3.5–5.3)
PROT SERPL-MCNC: 6.3 G/DL (ref 6.4–8.2)
RBC # BLD AUTO: 3.64 X10*6/UL (ref 4.5–5.9)
SODIUM SERPL-SCNC: 135 MMOL/L (ref 136–145)
WBC # BLD AUTO: 7 X10*3/UL (ref 4.4–11.3)

## 2025-04-21 PROCEDURE — 2500000004 HC RX 250 GENERAL PHARMACY W/ HCPCS (ALT 636 FOR OP/ED): Mod: JW

## 2025-04-21 PROCEDURE — 36415 COLL VENOUS BLD VENIPUNCTURE: CPT

## 2025-04-21 PROCEDURE — 82947 ASSAY GLUCOSE BLOOD QUANT: CPT

## 2025-04-21 PROCEDURE — 80053 COMPREHEN METABOLIC PANEL: CPT

## 2025-04-21 PROCEDURE — 2500000001 HC RX 250 WO HCPCS SELF ADMINISTERED DRUGS (ALT 637 FOR MEDICARE OP)

## 2025-04-21 PROCEDURE — 2500000002 HC RX 250 W HCPCS SELF ADMINISTERED DRUGS (ALT 637 FOR MEDICARE OP, ALT 636 FOR OP/ED)

## 2025-04-21 PROCEDURE — 2020000001 HC ICU ROOM DAILY

## 2025-04-21 PROCEDURE — 94640 AIRWAY INHALATION TREATMENT: CPT

## 2025-04-21 PROCEDURE — 2500000005 HC RX 250 GENERAL PHARMACY W/O HCPCS: Performed by: INTERNAL MEDICINE

## 2025-04-21 PROCEDURE — C9254 INJECTION, LACOSAMIDE: HCPCS | Mod: JW

## 2025-04-21 PROCEDURE — 83735 ASSAY OF MAGNESIUM: CPT

## 2025-04-21 PROCEDURE — 85025 COMPLETE CBC W/AUTO DIFF WBC: CPT

## 2025-04-21 PROCEDURE — 2500000004 HC RX 250 GENERAL PHARMACY W/ HCPCS (ALT 636 FOR OP/ED)

## 2025-04-21 PROCEDURE — 99291 CRITICAL CARE FIRST HOUR: CPT | Performed by: INTERNAL MEDICINE

## 2025-04-21 PROCEDURE — 2500000004 HC RX 250 GENERAL PHARMACY W/ HCPCS (ALT 636 FOR OP/ED): Mod: JZ

## 2025-04-21 PROCEDURE — 94760 N-INVAS EAR/PLS OXIMETRY 1: CPT

## 2025-04-21 PROCEDURE — 84100 ASSAY OF PHOSPHORUS: CPT

## 2025-04-21 RX ORDER — METOCLOPRAMIDE HYDROCHLORIDE 5 MG/ML
5 INJECTION INTRAMUSCULAR; INTRAVENOUS 3 TIMES DAILY
Status: DISCONTINUED | OUTPATIENT
Start: 2025-04-21 | End: 2025-04-25 | Stop reason: HOSPADM

## 2025-04-21 RX ORDER — AMOXICILLIN 250 MG
1 CAPSULE ORAL 2 TIMES DAILY
Status: DISCONTINUED | OUTPATIENT
Start: 2025-04-21 | End: 2025-04-23

## 2025-04-21 RX ADMIN — CLONAZEPAM 1 MG: 0.5 TABLET ORAL at 09:19

## 2025-04-21 RX ADMIN — CLONAZEPAM 1 MG: 0.5 TABLET ORAL at 20:47

## 2025-04-21 RX ADMIN — Medication 50 L/MIN: at 14:12

## 2025-04-21 RX ADMIN — LACTULOSE 10 G: 20 SOLUTION ORAL at 09:19

## 2025-04-21 RX ADMIN — BISACODYL 10 MG: 10 SUPPOSITORY RECTAL at 09:19

## 2025-04-21 RX ADMIN — CITALOPRAM HYDROBROMIDE 20 MG: 20 TABLET ORAL at 09:19

## 2025-04-21 RX ADMIN — LACOSAMIDE 300 MG: 10 INJECTION INTRAVENOUS at 17:00

## 2025-04-21 RX ADMIN — CLONAZEPAM 1 MG: 0.5 TABLET ORAL at 15:03

## 2025-04-21 RX ADMIN — POLYETHYLENE GLYCOL 3350 17 G: 17 POWDER, FOR SOLUTION ORAL at 09:19

## 2025-04-21 RX ADMIN — LAMOTRIGINE 200 MG: 100 TABLET ORAL at 09:19

## 2025-04-21 RX ADMIN — INSULIN LISPRO 1 UNITS: 100 INJECTION, SOLUTION INTRAVENOUS; SUBCUTANEOUS at 18:09

## 2025-04-21 RX ADMIN — BACLOFEN 20 MG: 10 TABLET ORAL at 20:47

## 2025-04-21 RX ADMIN — ACETAMINOPHEN 650 MG: 650 SOLUTION ORAL at 17:20

## 2025-04-21 RX ADMIN — LAMOTRIGINE 200 MG: 100 TABLET ORAL at 15:03

## 2025-04-21 RX ADMIN — METOCLOPRAMIDE 5 MG: 5 INJECTION, SOLUTION INTRAMUSCULAR; INTRAVENOUS at 15:03

## 2025-04-21 RX ADMIN — GLYCOPYRROLATE 2 MG: 1 TABLET ORAL at 15:03

## 2025-04-21 RX ADMIN — METOCLOPRAMIDE 5 MG: 5 INJECTION, SOLUTION INTRAMUSCULAR; INTRAVENOUS at 11:09

## 2025-04-21 RX ADMIN — IPRATROPIUM BROMIDE AND ALBUTEROL SULFATE 3 ML: 2.5; .5 SOLUTION RESPIRATORY (INHALATION) at 07:45

## 2025-04-21 RX ADMIN — LAMOTRIGINE 200 MG: 100 TABLET ORAL at 20:47

## 2025-04-21 RX ADMIN — IPRATROPIUM BROMIDE AND ALBUTEROL SULFATE 3 ML: 2.5; .5 SOLUTION RESPIRATORY (INHALATION) at 14:12

## 2025-04-21 RX ADMIN — CEFTRIAXONE 1 G: 1 INJECTION, SOLUTION INTRAVENOUS at 14:03

## 2025-04-21 RX ADMIN — LACOSAMIDE 300 MG: 10 INJECTION INTRAVENOUS at 05:58

## 2025-04-21 RX ADMIN — BACLOFEN 20 MG: 10 TABLET ORAL at 09:19

## 2025-04-21 RX ADMIN — ENOXAPARIN SODIUM 40 MG: 40 INJECTION SUBCUTANEOUS at 16:25

## 2025-04-21 RX ADMIN — Medication 50 L/MIN: at 23:28

## 2025-04-21 RX ADMIN — ZONISAMIDE 300 MG: 100 CAPSULE ORAL at 20:47

## 2025-04-21 RX ADMIN — METOCLOPRAMIDE 5 MG: 5 INJECTION, SOLUTION INTRAMUSCULAR; INTRAVENOUS at 20:46

## 2025-04-21 RX ADMIN — GLYCOPYRROLATE 2 MG: 1 TABLET ORAL at 20:47

## 2025-04-21 RX ADMIN — GLYCOPYRROLATE 2 MG: 1 TABLET ORAL at 09:33

## 2025-04-21 RX ADMIN — Medication 50 L/MIN: at 19:21

## 2025-04-21 RX ADMIN — Medication 60 L/MIN: at 04:00

## 2025-04-21 RX ADMIN — BACLOFEN 20 MG: 10 TABLET ORAL at 15:04

## 2025-04-21 RX ADMIN — Medication 50 L/MIN: at 07:45

## 2025-04-21 RX ADMIN — IPRATROPIUM BROMIDE AND ALBUTEROL SULFATE 3 ML: 2.5; .5 SOLUTION RESPIRATORY (INHALATION) at 19:20

## 2025-04-21 RX ADMIN — SENNOSIDES 10 ML: 8.8 LIQUID ORAL at 09:18

## 2025-04-21 ASSESSMENT — PAIN SCALES - WONG BAKER: WONGBAKER_NUMERICALRESPONSE: NO HURT

## 2025-04-21 ASSESSMENT — PAIN - FUNCTIONAL ASSESSMENT
PAIN_FUNCTIONAL_ASSESSMENT: CPOT (CRITICAL CARE PAIN OBSERVATION TOOL)
PAIN_FUNCTIONAL_ASSESSMENT: CPOT (CRITICAL CARE PAIN OBSERVATION TOOL)

## 2025-04-21 ASSESSMENT — ACTIVITIES OF DAILY LIVING (ADL): LACK_OF_TRANSPORTATION: PATIENT UNABLE TO ANSWER

## 2025-04-21 NOTE — PROGRESS NOTES
04/21/25 1001   Discharge Planning   Living Arrangements Parent;Other (Comment)  (Private duty home care aide (paid for by the family))   Support Systems Parent;Home care staff   Assistance Needed At baseline patient is from home with his parents, has a history of cerebral palsy, on room air at home, is nonverbal and is wheelchair-bound. Per mother patient has the following DME at home: a yissel lift, wheel chair, wedge to elevate patient in bed. Patient has a private duty aide that lives with the family and assists with care of patient. Mother is requesting suction machine and canister at time of DC from hospital if possible.   Type of Residence Private residence   Number of Stairs to Enter Residence 0   Number of Stairs Within Residence 0   Who is requesting discharge planning? Provider   Home or Post Acute Services None   Expected Discharge Disposition Home   Does the patient need discharge transport arranged? Yes   RoundTrip coordination needed? Yes   Has discharge transport been arranged? No   Financial Resource Strain   How hard is it for you to pay for the very basics like food, housing, medical care, and heating? Pt Unable   Housing Stability   In the last 12 months, was there a time when you were not able to pay the mortgage or rent on time? Pt Unable   At any time in the past 12 months, were you homeless or living in a shelter (including now)? Pt Unable   Transportation Needs   In the past 12 months, has lack of transportation kept you from medical appointments or from getting medications? Pt Unable   In the past 12 months, has lack of transportation kept you from meetings, work, or from getting things needed for daily living? Pt Unable

## 2025-04-21 NOTE — PROGRESS NOTES
"Music Therapy Note    Juan Carlos Ybarra was referred by ERICK Quarles.    Therapy Session  Referral Type: New referral this admission  Visit Type: New visit  Session Start Time: 1520  Session End Time: 1555  Intervention Delivery: In-person  Conflict of Service: None  Number of family members present: 2  Family Present for Session: Parent/Guardian, Other (Comment)  Family Participation: Interactive     Pre-assessment  Unable to Assess Reason: Cognitive limitation  Mood/Affect: Tired/lethargic         Treatment/Interventions  Areas of Focus: Coping, Emotional support  Music Therapy Interventions: Live music listening  Interruption: No  Patient Fell Asleep at End of Session: No    Post-assessment  Unable to Assess Reason: Cognitive limitation  Mood/Affect: Tired/lethargic  Continue Visiting: Yes  Total Session Time (min): 35 minutes    Narrative  Assessment Detail: Upon MT's arrival, pt was being attended to by other staff. MT met pt's mother and family outside of the room. Pt's mom shared that pt enjoys almost any kind of music, specifically naming classical, rock, and country. When they were able to join pt in the room, MT set up the space as pt's mom and family got settled. Pt's mom requested classical music on this occasion. She also showed MT a \"toy\" that pt enjoys, where he pushes a button and it lights up, vibrates, and plays music, stating that it is his favorite.  Plan: MT will engage pt in preferred music to promote a positive environment for coping with hospitalization and treatment.  Intervention: MT presented a variety of classical piano pieces during this time, including \"The Eugene,\" \"Mango Chanel Turca,\" \"Raindrop Prelude,\" Brahms \"Intermezzo,\" and Debussy's \"Reverie.\" Pt remained facing away from MT for the majority of time, although he did shift/stretch in bed several times, move his arms and legs, and open his eyes.  Evaluation: Pt's family all expressed enjoyment and gratitude for the visit, " stating that MT could come back any time. Pt demonstrated enjoyment through increased engagement (opening eyes) and movement.  Follow-up: MT will follow up as able and appropriate.    Education Documentation  No documentation found.

## 2025-04-21 NOTE — CARE PLAN
The patient's goals for the shift include      The clinical goals for the shift include pt will remain seizure free this shift    Over the shift, the patient did not make progress toward the following goals. Barriers to progression include . Recommendations to address these barriers include .

## 2025-04-21 NOTE — PROGRESS NOTES
Subjective   Subjective:   Overnight Events:   Patient was seen and examined at bedside this morning. Patient appears comfortable while remaining on Airvo. Had a large liquid bowel movement this morning and another episode of emesis. Otherwise, remains hemodynamically stable, at baseline neurologically-wise.     Review Of Systems:  11-point ROS was performed and is negative except as noted below and in the HPI.     Review of Systems     Objective   Objective:     BP 99/69   Pulse 94   Temp 36 °C (96.8 °F) (Temporal)   Resp (!) 41   Ht 1.524 m (5')   Wt 57.3 kg (126 lb 5.2 oz)   SpO2 95%   BMI 24.67 kg/m²     Physical Exam  Constitutional:       General: He is in acute distress.      Comments: +Nonverbal at baseline   HENT:      Head: Normocephalic and atraumatic.      Nose: Nose normal.      Mouth/Throat:      Mouth: Mucous membranes are moist.   Cardiovascular:      Rate and Rhythm: Normal rate and regular rhythm.      Heart sounds: Normal heart sounds.   Pulmonary:      Effort: Respiratory distress present.      Breath sounds: Rhonchi and rales present.      Comments: +Tachypnea  Abdominal:      General: Bowel sounds are normal. There is no distension.      Palpations: Abdomen is soft.      Tenderness: There is no abdominal tenderness.      Comments: +PEG tube in place   Musculoskeletal:         General: No swelling.      Right lower leg: No edema.      Left lower leg: No edema.      Comments: +Contracted upper extremities   Skin:     General: Skin is warm and dry.      Coloration: Skin is not jaundiced.   Neurological:      Mental Status: Mental status is at baseline.     Lab Work:     Lab Results   Component Value Date    WBC 7.0 04/21/2025    HGB 12.0 (L) 04/21/2025    HCT 37.8 (L) 04/21/2025     (H) 04/21/2025     04/21/2025     Lab Results   Component Value Date    GLUCOSE 111 (H) 04/21/2025    CALCIUM 8.3 (L) 04/21/2025     (L) 04/21/2025    K 4.3 04/21/2025    CO2 24 04/21/2025     " 04/21/2025    BUN 8 04/21/2025    CREATININE 0.38 (L) 04/21/2025     No results found for: \"HGBA1C\", \"TSH\", \"VITD25\"  Cultures:   No results found for the last 90 days.    Images:     XR chest 1 view   Final Result   Airspace opacities asymmetric in the left lung compatible with   pneumonia; short-term progress imaging is recommended after treatment   to assure resolution and exclude other underlying pathology.        MACRO:   None        Signed by: Samuel Joshi 4/16/2025 7:02 PM   Dictation workstation:   [id number]         Medications:     Scheduled:  Scheduled Medications[1]Continuous:  Continuous Medications[2]  PRN:  PRN Medications[3]     Assessment & Plan:     Juan Carlos Ybarra is a 29 y.o. male with a PMH of PMH of spastic CP, developmental delay, seizure disorder admitted for management of suspected aspiration pneumonia now placed on Airvo w/ worsening Oxygen saturation transferred to ICU for possible Intubation. Also suspecting possible SBO, NG tube placed, surgery consulted, CT abdomen shows significant stool burden.     NEURO/PSYCH:  #Seizure Disorder:  - Continue home Clonazepam 0.5 mg TID, Lacosamide 300 mg BID, Lamotrigine 200 mg TID, Zonisamide 100 mg daily  - Continue Seizure precautions    CARDIOVASCULAR:  - No acute medical issues    PULMONARY:  #AHRF 2/2 aspiration pneumonia:   #Strep pneumonia:  - Remains on Airvo support, wean down as tolerated  - Positive strep pneumo urine ags   - Stopped chest vest physiotherapy  - Continue Rocephin (4/19-); finished a course of Azithromycin  - Continue DuoNeb PRN   - Aspiration precautions     RENAL/UROLOGY:  #Urinary retention:  - Bladder scan  - Jackson removal today    GASTROINTESTINAL:  #Persisting Nausea/Vomiting:  - Added Reglan 5 mg TID for vomiting  - Will continue close monitor  - QTC normal on last EKG    #Constipation - resolved   - Changed BM regiment to Namita-colace BID  - Stopped scheduled Miralax, Lactulose, and Colace    HEME/ONC:  - No " active medical issues    ENDOCRINE:  - No active medical issues    INFECTIOUS:  #Aspiration PNA:   - See Pulmonology section     MSK/SKIN:  #Spastic Cerebral Palsy:  - Nonverbal at baseline  - Continue home Baclofen 20 mg for spasms    Fluid: Replete PRN  Electrolytes: Replete PRN  Nutrition: NPO, TF are running  GI ppx: None  DVT/PE ppx: Lovenox  Abx: Rocephin (4/19-)  Drips: None  IV Lines: PIV  O2: Airvo    Dispo: transferred to ICU for possible Intubation. Also suspecting possible SBO, NG tube placed, surgery consulted, CT abdomen shows significant stool burden. Palliative following for prolonged hospitalization. Estimated length of stay >2 days.    Roger Parmar, PGY-3  Internal Medicine    Disclaimer: Documentation completed with the information available at the time of input. The times in the chart may not be reflective of actual patient care times, interventions, or procedures. Documentation occurs after the physical care of the   patient.         [1] baclofen, 20 mg, oral, TID  cefTRIAXone, 1 g, intravenous, q24h  citalopram, 20 mg, oral, Daily  clonazePAM, 1 mg, oral, TID  enoxaparin, 40 mg, subcutaneous, q24h  glycopyrrolate, 2 mg, oral, TID  insulin lispro, 0-5 Units, subcutaneous, q6h JANNIE  ipratropium-albuteroL, 3 mL, nebulization, TID  lacosamide, 300 mg, intravenous, q12h  lamoTRIgine, 200 mg, oral, TID  metoclopramide, 5 mg, intravenous, TID  senna, 10 mL, oral, BID  sennosides-docusate sodium, 1 tablet, oral, BID  zonisamide, 300 mg, oral, Nightly  [2]    [3] PRN medications: acetaminophen **OR** acetaminophen, acetaminophen, dextrose, dextrose, glucagon, glucagon, guaiFENesin, HYDROmorphone, ipratropium-albuteroL, LORazepam, ondansetron ODT **OR** ondansetron, oxyCODONE, oxyCODONE, oxygen

## 2025-04-21 NOTE — PROGRESS NOTES
Palliative Care Social Work Note     Met with pt and mother.  Pt is on airvo, unable to participate in visit.  Mother feels pt is doing better and has 'turned the corner'.  Plan is to dc home once medically stable.  Family has an accessible RV and has multiple travel plans this summer.  No questions or issues at present.

## 2025-04-22 LAB
ALBUMIN SERPL BCP-MCNC: 2.8 G/DL (ref 3.4–5)
ALP SERPL-CCNC: 74 U/L (ref 33–120)
ALT SERPL W P-5'-P-CCNC: 12 U/L (ref 10–52)
ANION GAP SERPL CALC-SCNC: 12 MMOL/L (ref 10–20)
AST SERPL W P-5'-P-CCNC: 11 U/L (ref 9–39)
BASOPHILS # BLD AUTO: 0.03 X10*3/UL (ref 0–0.1)
BASOPHILS NFR BLD AUTO: 0.3 %
BILIRUB SERPL-MCNC: 0.5 MG/DL (ref 0–1.2)
BUN SERPL-MCNC: 8 MG/DL (ref 6–23)
CALCIUM SERPL-MCNC: 8.1 MG/DL (ref 8.6–10.3)
CHLORIDE SERPL-SCNC: 101 MMOL/L (ref 98–107)
CO2 SERPL-SCNC: 25 MMOL/L (ref 21–32)
CREAT SERPL-MCNC: 0.41 MG/DL (ref 0.5–1.3)
EGFRCR SERPLBLD CKD-EPI 2021: >90 ML/MIN/1.73M*2
EOSINOPHIL # BLD AUTO: 0.14 X10*3/UL (ref 0–0.7)
EOSINOPHIL NFR BLD AUTO: 1.3 %
ERYTHROCYTE [DISTWIDTH] IN BLOOD BY AUTOMATED COUNT: 12.5 % (ref 11.5–14.5)
GLUCOSE BLD MANUAL STRIP-MCNC: 124 MG/DL (ref 74–99)
GLUCOSE BLD MANUAL STRIP-MCNC: 140 MG/DL (ref 74–99)
GLUCOSE BLD MANUAL STRIP-MCNC: 142 MG/DL (ref 74–99)
GLUCOSE BLD MANUAL STRIP-MCNC: 153 MG/DL (ref 74–99)
GLUCOSE SERPL-MCNC: 123 MG/DL (ref 74–99)
HCT VFR BLD AUTO: 34.6 % (ref 41–52)
HGB BLD-MCNC: 11.5 G/DL (ref 13.5–17.5)
IMM GRANULOCYTES # BLD AUTO: 0.18 X10*3/UL (ref 0–0.7)
IMM GRANULOCYTES NFR BLD AUTO: 1.7 % (ref 0–0.9)
LYMPHOCYTES # BLD AUTO: 1.41 X10*3/UL (ref 1.2–4.8)
LYMPHOCYTES NFR BLD AUTO: 13.5 %
MAGNESIUM SERPL-MCNC: 1.94 MG/DL (ref 1.6–2.4)
MCH RBC QN AUTO: 32.9 PG (ref 26–34)
MCHC RBC AUTO-ENTMCNC: 33.2 G/DL (ref 32–36)
MCV RBC AUTO: 99 FL (ref 80–100)
MONOCYTES # BLD AUTO: 0.48 X10*3/UL (ref 0.1–1)
MONOCYTES NFR BLD AUTO: 4.6 %
NEUTROPHILS # BLD AUTO: 8.24 X10*3/UL (ref 1.2–7.7)
NEUTROPHILS NFR BLD AUTO: 78.6 %
NRBC BLD-RTO: 0 /100 WBCS (ref 0–0)
PHOSPHATE SERPL-MCNC: 2.6 MG/DL (ref 2.5–4.9)
PLATELET # BLD AUTO: 243 X10*3/UL (ref 150–450)
POTASSIUM SERPL-SCNC: 3.8 MMOL/L (ref 3.5–5.3)
PROT SERPL-MCNC: 6.2 G/DL (ref 6.4–8.2)
RBC # BLD AUTO: 3.5 X10*6/UL (ref 4.5–5.9)
SODIUM SERPL-SCNC: 134 MMOL/L (ref 136–145)
WBC # BLD AUTO: 10.5 X10*3/UL (ref 4.4–11.3)

## 2025-04-22 PROCEDURE — 2500000005 HC RX 250 GENERAL PHARMACY W/O HCPCS

## 2025-04-22 PROCEDURE — 2500000004 HC RX 250 GENERAL PHARMACY W/ HCPCS (ALT 636 FOR OP/ED): Mod: JW

## 2025-04-22 PROCEDURE — 82947 ASSAY GLUCOSE BLOOD QUANT: CPT

## 2025-04-22 PROCEDURE — 84100 ASSAY OF PHOSPHORUS: CPT

## 2025-04-22 PROCEDURE — 2500000002 HC RX 250 W HCPCS SELF ADMINISTERED DRUGS (ALT 637 FOR MEDICARE OP, ALT 636 FOR OP/ED)

## 2025-04-22 PROCEDURE — 2060000001 HC INTERMEDIATE ICU ROOM DAILY

## 2025-04-22 PROCEDURE — 94760 N-INVAS EAR/PLS OXIMETRY 1: CPT

## 2025-04-22 PROCEDURE — 94640 AIRWAY INHALATION TREATMENT: CPT

## 2025-04-22 PROCEDURE — 2500000001 HC RX 250 WO HCPCS SELF ADMINISTERED DRUGS (ALT 637 FOR MEDICARE OP)

## 2025-04-22 PROCEDURE — 87077 CULTURE AEROBIC IDENTIFY: CPT | Mod: GEALAB

## 2025-04-22 PROCEDURE — 2500000004 HC RX 250 GENERAL PHARMACY W/ HCPCS (ALT 636 FOR OP/ED): Mod: JZ

## 2025-04-22 PROCEDURE — 80053 COMPREHEN METABOLIC PANEL: CPT

## 2025-04-22 PROCEDURE — 85025 COMPLETE CBC W/AUTO DIFF WBC: CPT

## 2025-04-22 PROCEDURE — 36415 COLL VENOUS BLD VENIPUNCTURE: CPT

## 2025-04-22 PROCEDURE — 2500000005 HC RX 250 GENERAL PHARMACY W/O HCPCS: Performed by: INTERNAL MEDICINE

## 2025-04-22 PROCEDURE — C9254 INJECTION, LACOSAMIDE: HCPCS | Mod: JW

## 2025-04-22 PROCEDURE — 99291 CRITICAL CARE FIRST HOUR: CPT | Performed by: INTERNAL MEDICINE

## 2025-04-22 PROCEDURE — 83735 ASSAY OF MAGNESIUM: CPT

## 2025-04-22 RX ORDER — LACOSAMIDE 50 MG/1
300 TABLET ORAL 2 TIMES DAILY
Status: DISCONTINUED | OUTPATIENT
Start: 2025-04-22 | End: 2025-04-25 | Stop reason: HOSPADM

## 2025-04-22 RX ORDER — CEFTRIAXONE 1 G/50ML
1 INJECTION, SOLUTION INTRAVENOUS EVERY 24 HOURS
Status: DISCONTINUED | OUTPATIENT
Start: 2025-04-22 | End: 2025-04-25

## 2025-04-22 RX ADMIN — METOCLOPRAMIDE 5 MG: 5 INJECTION, SOLUTION INTRAMUSCULAR; INTRAVENOUS at 20:15

## 2025-04-22 RX ADMIN — METOCLOPRAMIDE 5 MG: 5 INJECTION, SOLUTION INTRAMUSCULAR; INTRAVENOUS at 08:13

## 2025-04-22 RX ADMIN — LAMOTRIGINE 200 MG: 100 TABLET ORAL at 08:12

## 2025-04-22 RX ADMIN — LAMOTRIGINE 200 MG: 100 TABLET ORAL at 15:14

## 2025-04-22 RX ADMIN — ZONISAMIDE 300 MG: 100 CAPSULE ORAL at 20:16

## 2025-04-22 RX ADMIN — CLONAZEPAM 1 MG: 0.5 TABLET ORAL at 15:14

## 2025-04-22 RX ADMIN — INSULIN LISPRO 1 UNITS: 100 INJECTION, SOLUTION INTRAVENOUS; SUBCUTANEOUS at 11:42

## 2025-04-22 RX ADMIN — LACOSAMIDE 300 MG: 10 INJECTION INTRAVENOUS at 04:53

## 2025-04-22 RX ADMIN — GLYCOPYRROLATE 2 MG: 1 TABLET ORAL at 15:15

## 2025-04-22 RX ADMIN — SENNOSIDES AND DOCUSATE SODIUM 1 TABLET: 50; 8.6 TABLET ORAL at 08:13

## 2025-04-22 RX ADMIN — Medication 50 L/MIN: at 03:28

## 2025-04-22 RX ADMIN — SENNOSIDES AND DOCUSATE SODIUM 1 TABLET: 50; 8.6 TABLET ORAL at 20:17

## 2025-04-22 RX ADMIN — GLYCOPYRROLATE 2 MG: 1 TABLET ORAL at 20:16

## 2025-04-22 RX ADMIN — CLONAZEPAM 1 MG: 0.5 TABLET ORAL at 08:12

## 2025-04-22 RX ADMIN — CITALOPRAM HYDROBROMIDE 20 MG: 20 TABLET ORAL at 08:12

## 2025-04-22 RX ADMIN — Medication 50 L/MIN: at 08:30

## 2025-04-22 RX ADMIN — Medication 10 L/MIN: at 19:27

## 2025-04-22 RX ADMIN — CLONAZEPAM 1 MG: 0.5 TABLET ORAL at 20:15

## 2025-04-22 RX ADMIN — BACLOFEN 20 MG: 10 TABLET ORAL at 20:16

## 2025-04-22 RX ADMIN — BACLOFEN 20 MG: 10 TABLET ORAL at 08:13

## 2025-04-22 RX ADMIN — BACLOFEN 20 MG: 10 TABLET ORAL at 15:14

## 2025-04-22 RX ADMIN — Medication 40 L/MIN: at 11:27

## 2025-04-22 RX ADMIN — IPRATROPIUM BROMIDE AND ALBUTEROL SULFATE 3 ML: 2.5; .5 SOLUTION RESPIRATORY (INHALATION) at 19:26

## 2025-04-22 RX ADMIN — CEFTRIAXONE 1 G: 1 INJECTION, SOLUTION INTRAVENOUS at 13:38

## 2025-04-22 RX ADMIN — LAMOTRIGINE 200 MG: 100 TABLET ORAL at 20:16

## 2025-04-22 RX ADMIN — GLYCOPYRROLATE 2 MG: 1 TABLET ORAL at 08:13

## 2025-04-22 RX ADMIN — SENNOSIDES 10 ML: 8.8 LIQUID ORAL at 20:15

## 2025-04-22 RX ADMIN — IPRATROPIUM BROMIDE AND ALBUTEROL SULFATE 3 ML: 2.5; .5 SOLUTION RESPIRATORY (INHALATION) at 08:30

## 2025-04-22 RX ADMIN — LACOSAMIDE 300 MG: 50 TABLET, FILM COATED ORAL at 18:33

## 2025-04-22 RX ADMIN — ENOXAPARIN SODIUM 40 MG: 40 INJECTION SUBCUTANEOUS at 16:07

## 2025-04-22 RX ADMIN — METOCLOPRAMIDE 5 MG: 5 INJECTION, SOLUTION INTRAMUSCULAR; INTRAVENOUS at 15:15

## 2025-04-22 RX ADMIN — IPRATROPIUM BROMIDE AND ALBUTEROL SULFATE 3 ML: 2.5; .5 SOLUTION RESPIRATORY (INHALATION) at 13:55

## 2025-04-22 RX ADMIN — Medication 12 L/MIN: at 13:55

## 2025-04-22 RX ADMIN — SENNOSIDES 10 ML: 8.8 LIQUID ORAL at 08:13

## 2025-04-22 ASSESSMENT — COGNITIVE AND FUNCTIONAL STATUS - GENERAL
TOILETING: TOTAL
STANDING UP FROM CHAIR USING ARMS: TOTAL
EATING MEALS: TOTAL
DRESSING REGULAR LOWER BODY CLOTHING: TOTAL
HELP NEEDED FOR BATHING: TOTAL
MOVING FROM LYING ON BACK TO SITTING ON SIDE OF FLAT BED WITH BEDRAILS: TOTAL
CLIMB 3 TO 5 STEPS WITH RAILING: TOTAL
TURNING FROM BACK TO SIDE WHILE IN FLAT BAD: TOTAL
PERSONAL GROOMING: TOTAL
MOBILITY SCORE: 6
MOVING TO AND FROM BED TO CHAIR: TOTAL
DAILY ACTIVITIY SCORE: 6
WALKING IN HOSPITAL ROOM: TOTAL
DRESSING REGULAR UPPER BODY CLOTHING: TOTAL

## 2025-04-22 ASSESSMENT — PAIN SCALES - GENERAL: PAINLEVEL_OUTOF10: 0 - NO PAIN

## 2025-04-22 ASSESSMENT — PAIN - FUNCTIONAL ASSESSMENT: PAIN_FUNCTIONAL_ASSESSMENT: CPOT (CRITICAL CARE PAIN OBSERVATION TOOL)

## 2025-04-22 NOTE — PROGRESS NOTES
Nutrition Progress Note  Nutrition Follow Up Note  Patient has Malnutrition Diagnosis: No  Nutrition Assessment     Pt on airvo for aspiration PNA, improving per MD notes, plan to transfer to step down unit from ICU. Palliative care following for prolonged hospital stay. Pt on Reglan 3 x/day for vomiting, no recent vomiting noted.     Nutrition History:  Food and Nutrient History:     (25) Pt seen for follow up visit, tolerating TF at goal of 45 ml/hr via PEG tube. No residuals noted.     (25) Pt non-verbal, met with pts parents to obtain history. Pt is NPO, receives EN via PEG tube to meet nutrition and hydration needs. Pt with no BM x 4 days, mom reports small BM this AM.     RX Allergies[1]   GI Symptoms: None     Oral Problems: Swallowing difficulty     Previously prescribed diets: Enteral nutrition order  Previous Diet / Nutrition Education / Counselin cartons of Compleat TF twice daily via PEG; additional 24 oz of free water throughout the day in PEG. 8 oz of this water is mixed in with pts 2 cartons of Compleat TF daily. Pt normally recieves 2 cartons of Compleat TF at 1:30 pm and 2 cans at 6 or 7 pm. Pt receives one of the feeds with gravity bags, the other with bolus via syringe. TF provides 1400 kcal, 64 g fat, 16 g fiber, 152 g CHO, 72 g protein, 776 ml free water  in 1000 ml volume of TF; 1496 ml total free water/24 hours.    Anthropometrics:  Height: 152.4 cm (5')   Weight: 58.8 kg (129 lb 10.1 oz)   BMI (Calculated): 25.32  IBW/kg (Dietitian Calculated): 48 kg  Percent of IBW: 125 %       Weight History:     Weight         2025  0759 2025  0600 2025  0800 2025  0620 2025  0800    Weight: 62.4 kg (137 lb 9.1 oz) 57.3 kg (126 lb 5.2 oz) 57.3 kg (126 lb 5.2 oz) 58.8 kg (129 lb 10.1 oz) 58.8 kg (129 lb 10.1 oz)            Nutrition Focused Physical Exam Findings:       Edema  Edema: +2 mild  Edema Location: LLE  Physical Findings  Skin: Negative (skin  "intact)    Nutrition Significant Labs:    Results from last 7 days   Lab Units 04/22/25  0521 04/21/25  0508 04/20/25  0428   GLUCOSE mg/dL 123* 111* 124*   SODIUM mmol/L 134* 135* 135*   POTASSIUM mmol/L 3.8 4.3 3.6   CHLORIDE mmol/L 101 104 104   CO2 mmol/L 25 24 25   BUN mg/dL 8 8 10   CREATININE mg/dL 0.41* 0.38* 0.47*   EGFR mL/min/1.73m*2 >90 >90 >90   CALCIUM mg/dL 8.1* 8.3* 8.1*   PHOSPHORUS mg/dL 2.6 2.8 2.2*   MAGNESIUM mg/dL 1.94 1.94 1.97     No results found for: \"HGBA1C\"  Results from last 7 days   Lab Units 04/22/25  1121 04/22/25  0612 04/21/25  2358 04/21/25  1806 04/21/25  1147 04/20/25  2323 04/20/25  1800 04/20/25  1130   POCT GLUCOSE mg/dL 153* 142* 124* 156* 149* 173* 143* 141*     Lab Results   Component Value Date    ALBUMIN 2.8 (L) 04/22/2025      No results found for: \"CRP\"        Nutrition Specific Medications:   Scheduled medications:  Scheduled Medications[2]  Continuous medications:  Continuous Medications[3]  PRN medications:  PRN Medications[4]     Nursing Data Per flowsheet:   Stool Appearance: Liquid (04/22/25 0815)  Gastrointestinal  Gastrointestinal (WDL): Exceptions to WDL  Abdomen Inspection: Gastrostomy tube  Abdominal Tenderness: No guarding  Bowel Sounds: All quadrants  Bowel Sounds (All Quadrants): Active  Passing Flatus: No  Last BM Date: 04/22/25  Bowel Incontinence: Yes  Stool Appearance: Liquid  Stool Color: Brown  Anus/Rectum Evaluation and Tone: No muscle tone  Feeding assistance level:      Intake/Output Summary (Last 24 hours) at 4/22/2025 1318  Last data filed at 4/22/2025 0620  Gross per 24 hour   Intake 1773 ml   Output 1045 ml   Net 728 ml      0-10 (Numeric) Pain Score: 0 - No pain  Critical-Care Pain Observation Score:  [0]    Dietary Orders (From admission, onward)       Start     Ordered    04/18/25 0959  Enteral feeding with NPO PEG (percutaneous endoscopic gastric); 45; 120; Water; Tap water; Every 4 hours  Diet effective now        Comments: Vital 1.5 @ 15 " ml/hr x 24 hours. After 24 hours if pt tolerating and no major shifts in electrolytes, increase by 10 ml/hr every 8-12 hours until goal met at 45 ml/hr.   Question Answer Comment   Tube feeding formula: Vital 1.5    Feeding route: PEG (percutaneous endoscopic gastric)    Tube feeding continuous rate (mL/hr): 45    Tube feeding flush (mL): 120    Flush type: Water    Water type: Tap water    Flush frequency: Every 4 hours        04/18/25 0958    04/17/25 0143  May Not Participate in Room Service  ( ROOM SERVICE MAY NOT PARTICIPATE)  Once        Question:  .  Answer:  Yes    04/17/25 0142                     Estimated Needs:   Total Energy Estimated Needs in 24 hours (kCal):  (1500)  Method for Estimating Needs: 25 kcal/kg  Total Protein Estimated Needs in 24 Hours (g):  (66-78)  Method for Estimating 24 Hour Protein Needs: 1.1-1.3 g/kg     Method for Estimating 24 Hour Fluid Needs: 1 ml/kcal       Nutrition Diagnosis   Malnutrition Diagnosis  Patient has Malnutrition Diagnosis: No    Nutrition Diagnosis  Patient has Nutrition Diagnosis: Yes  Diagnosis Status (1): Active  Nutrition Diagnosis 1: Increased nutrient needs  Related to (1): PNA, sepsis  As Evidenced by (1): increased nutrient demand for critical illness  Additional Assessment Information (1): pt meets estimated nutrition and hydration needs via PEG tube feeds       Nutrition Interventions/Recommendations   Nutrition Prescription:  EN    Nutrition Interventions:   Interventions: Enteral intake  Goal: Vital 1.5 @ 45 ml/hr= 1620 kcal, 73 g protein, 6.5 g fiber, 826 ml free water in 1080 ml volume of TF; 120 ml FWF every 4 hours= 1546 ml total free water / 24 hours      Coordination of Care: GREG Gunter  Nutrition Education:   N/A    Recommendations:  Continue with current TF and water flush order- Vital 1.5 @ 45 ml/hr with 120 ml FWF every 4 hours. Pt tolerating lower fiber formula since prone to constipation  Weights: 2-3 x/week  RFP, Mg daily; replete lytes  prn         Nutrition Monitoring and Evaluation   Food/Nutrient Related History Monitoring  Monitoring and Evaluation Plan: Enteral and parenteral nutrition intake determination  Enteral and Parenteral Nutrition Intake Determination: Enteral nutrition intake - Tolerate TF at goal rate    Anthropometric Measurements  Monitoring and Evaluation Plan: Body weight  Body Weight: Body weight - Maintain stable weight    Biochemical Data, Medical Tests and Procedures  Monitoring and Evaluation Plan: Electrolyte/renal panel, Glucose/endocrine profile  Criteria: Monitor       Time Spent (min): 30 minutes         [1]   Allergies  Allergen Reactions    Amoxicillin-Pot Clavulanate Rash     Tolerated Zosyn 4/16 and ceftriaxone 4/17   [2] baclofen, 20 mg, oral, TID  cefTRIAXone, 1 g, intravenous, q24h  citalopram, 20 mg, oral, Daily  clonazePAM, 1 mg, oral, TID  enoxaparin, 40 mg, subcutaneous, q24h  glycopyrrolate, 2 mg, oral, TID  insulin lispro, 0-5 Units, subcutaneous, q6h JANNIE  ipratropium-albuteroL, 3 mL, nebulization, TID  lacosamide, 300 mg, g-tube, BID  lamoTRIgine, 200 mg, oral, TID  metoclopramide, 5 mg, intravenous, TID  senna, 10 mL, oral, BID  sennosides-docusate sodium, 1 tablet, oral, BID  zonisamide, 300 mg, oral, Nightly  [3]    [4] PRN medications: acetaminophen **OR** acetaminophen, acetaminophen, dextrose, dextrose, glucagon, glucagon, guaiFENesin, HYDROmorphone, ipratropium-albuteroL, LORazepam, ondansetron ODT **OR** ondansetron, oxyCODONE, oxyCODONE, oxygen

## 2025-04-22 NOTE — PROGRESS NOTES
Subjective   Subjective:   Overnight Events:   Patient was seen and examined at bedside this morning. Patient appears comfortable while remaining on Airvo, improved to 40/60. Otherwise, remains hemodynamically stable, at baseline neurologically-wise.     Review Of Systems:  11-point ROS was performed and is negative except as noted below and in the HPI.     Review of Systems     Objective   Objective:     BP (!) 101/48   Pulse 90   Temp 37.3 °C (99.1 °F) (Temporal)   Resp (!) 37   Ht 1.524 m (5')   Wt 58.8 kg (129 lb 10.1 oz)   SpO2 94%   BMI 25.32 kg/m²     Physical Exam  Constitutional:       General: He is not in acute distress.     Comments: +Nonverbal at baseline   HENT:      Head: Normocephalic and atraumatic.      Nose: Nose normal.      Mouth/Throat:      Mouth: Mucous membranes are moist.   Cardiovascular:      Rate and Rhythm: Normal rate and regular rhythm.      Heart sounds: Normal heart sounds.   Pulmonary:      Breath sounds: Rhonchi (improved) and rales (improved) present.   Abdominal:      General: Bowel sounds are normal. There is no distension.      Palpations: Abdomen is soft.      Tenderness: There is no abdominal tenderness.      Comments: +PEG tube in place   Musculoskeletal:         General: No swelling.      Right lower leg: No edema.      Left lower leg: No edema.      Comments: +Contracted upper extremities   Skin:     General: Skin is warm and dry.      Coloration: Skin is not jaundiced.   Neurological:      Mental Status: Mental status is at baseline.       Lab Work:     Lab Results   Component Value Date    WBC 10.5 04/22/2025    HGB 11.5 (L) 04/22/2025    HCT 34.6 (L) 04/22/2025    MCV 99 04/22/2025     04/22/2025     Lab Results   Component Value Date    GLUCOSE 123 (H) 04/22/2025    CALCIUM 8.1 (L) 04/22/2025     (L) 04/22/2025    K 3.8 04/22/2025    CO2 25 04/22/2025     04/22/2025    BUN 8 04/22/2025    CREATININE 0.41 (L) 04/22/2025     No results found  "for: \"HGBA1C\", \"TSH\", \"VITD25\"  Cultures:   No results found for the last 90 days.    Images:     XR chest 1 view   Final Result   Airspace opacities asymmetric in the left lung compatible with   pneumonia; short-term progress imaging is recommended after treatment   to assure resolution and exclude other underlying pathology.        MACRO:   None        Signed by: Samuel Joshi 4/16/2025 7:02 PM   Dictation workstation:   [id number]         Medications:     Scheduled:  Scheduled Medications[1]Continuous:  Continuous Medications[2]  PRN:  PRN Medications[3]     Assessment & Plan:     Juan Carlos Ybarra is a 29 y.o. male with a PMH of PMH of spastic CP, developmental delay, seizure disorder admitted for management of suspected aspiration pneumonia now placed on Airvo w/ worsening Oxygen saturation transferred to ICU for possible Intubation. Also suspecting possible SBO, NG tube placed, surgery consulted, CT abdomen shows significant stool burden.     NEURO/PSYCH:  #Seizure Disorder:  - Continue home Clonazepam 0.5 mg TID, Lacosamide 300 mg BID, Lamotrigine 200 mg TID, Zonisamide 100 mg daily  - Continue Seizure precautions    CARDIOVASCULAR:  - No acute medical issues    PULMONARY:  #AHRF 2/2 aspiration pneumonia - improved:  #Strep pneumonia:  - Remains on Airvo support, wean down as tolerated  - Positive strep pneumo urine ags   - Continue Rocephin for 10 days total (4/16-4/25); finished a course of Azithromycin  - Continue DuoNeb PRN   - Aspiration precautions   - No more chest vest physiotherapy to avoid emesis  - On Airvo 40/60 from 50/90, if tolerates new demand then switch to NC at noon    RENAL/UROLOGY:  #Urinary retention:  - Bladder scans ordered  - Jackson removal tomorrow    GASTROINTESTINAL:  #Persisting Nausea/Vomiting:  - Added Reglan 5 mg TID for vomiting  - Will continue close monitor  - QTC normal on last EKG  - No vomiting reported    #Constipation - resolved:   - Continue BM regiment to Namita-colace " BID    HEME/ONC:  - No active medical issues    ENDOCRINE:  - No active medical issues    INFECTIOUS:  #Aspiration PNA:   - See Pulmonology section     MSK/SKIN:  #Spastic Cerebral Palsy:  - Nonverbal at baseline  - Continue home Baclofen 20 mg for spasms    Fluid: Replete PRN  Electrolytes: Replete PRN  Nutrition: NPO, TF are running  GI ppx: None  DVT/PE ppx: Lovenox  Abx: Rocephin (4/19-4/25); finished Azithromycin  Drips: None  IV Lines: PIV  O2: Airvo    Dispo: on airvo for ahrf 2/2 aspiration pna, improving.  Stable for transfer to step down, started on 4/22.  Palliative following for prolonged hospitalization.  Estimated length of stay >2 days.    Roger Parmar, PGY-3  Internal Medicine    Disclaimer: Documentation completed with the information available at the time of input. The times in the chart may not be reflective of actual patient care times, interventions, or procedures. Documentation occurs after the physical care of the   patient.       [1] baclofen, 20 mg, oral, TID  cefTRIAXone, 1 g, intravenous, q24h  citalopram, 20 mg, oral, Daily  clonazePAM, 1 mg, oral, TID  enoxaparin, 40 mg, subcutaneous, q24h  glycopyrrolate, 2 mg, oral, TID  insulin lispro, 0-5 Units, subcutaneous, q6h JANNIE  ipratropium-albuteroL, 3 mL, nebulization, TID  lacosamide, 300 mg, intravenous, q12h  lamoTRIgine, 200 mg, oral, TID  metoclopramide, 5 mg, intravenous, TID  senna, 10 mL, oral, BID  sennosides-docusate sodium, 1 tablet, oral, BID  zonisamide, 300 mg, oral, Nightly     [2]    [3] PRN medications: acetaminophen **OR** acetaminophen, acetaminophen, dextrose, dextrose, glucagon, glucagon, guaiFENesin, HYDROmorphone, ipratropium-albuteroL, LORazepam, ondansetron ODT **OR** ondansetron, oxyCODONE, oxyCODONE, oxygen

## 2025-04-22 NOTE — CARE PLAN
The patient's goals for the shift include      The clinical goals for the shift include pt will remain hemodynamically stable and o2 sat will remain above 92%

## 2025-04-22 NOTE — CARE PLAN
The clinical goals for the shift include pt will remain hemodynamically stable and oxygen sats will remain above 90%    Problem: Pain - Adult  Goal: Verbalizes/displays adequate comfort level or baseline comfort level  Outcome: Progressing  Problem: Safety - Adult  Goal: Free from fall injury  Outcome: Progressing  Problem: Discharge Planning  Goal: Discharge to home or other facility with appropriate resources  Outcome: Progressing  Problem: Chronic Conditions and Co-morbidities  Goal: Patient's chronic conditions and co-morbidity symptoms are monitored and maintained or improved  Outcome: Progressing  Problem: Nutrition  Goal: Nutrient intake appropriate for maintaining nutritional needs  Outcome: Progressing  Problem: Respiratory  Goal: Clear secretions with interventions this shift  Outcome: Progressing  Goal: Minimize anxiety/maximize coping throughout shift  Outcome: Progressing  Goal: Minimal/no exertional discomfort or dyspnea this shift  Outcome: Progressing  Goal: No signs of respiratory distress (eg. Use of accessory muscles. Peds grunting)  Outcome: Progressing  Goal: Patent airway maintained this shift  Outcome: Progressing  Goal: Tolerate mechanical ventilation evidenced by VS/agitation level this shift  Outcome: Progressing  Goal: Tolerate pulmonary toileting this shift  Outcome: Progressing  Goal: Verbalize decreased shortness of breath this shift  Outcome: Progressing  Goal: Wean oxygen to maintain O2 saturation per order/standard this shift  Outcome: Progressing  Goal: Increase self care and/or family involvement in next 24 hours  Outcome: Progressing  Problem: Skin  Goal: Decreased wound size/increased tissue granulation at next dressing change  Outcome: Progressing  Flowsheets (Taken 4/22/2025 0326)  Decreased wound size/increased tissue granulation at next dressing change: Promote sleep for wound healing  Goal: Participates in plan/prevention/treatment measures  Outcome: Progressing  Goal:  Prevent/manage excess moisture  Outcome: Progressing  Goal: Prevent/minimize sheer/friction injuries  Outcome: Progressing  Goal: Promote/optimize nutrition  Outcome: Progressing  Goal: Promote skin healing  Outcome: Progressing  Problem: Safety - Medical Restraint  Goal: Remains free of injury from restraints (Restraint for Interference with Medical Device)  Outcome: Progressing  Flowsheets (Taken 4/22/2025 0326)  Remains free of injury from restraints (restraint for interference with medical device): Every 2 hours: Monitor safety, psychosocial status, comfort, nutrition and hydration  Goal: Free from restraint(s) (Restraint for Interference with Medical Device)  Outcome: Progressing  Problem: Fall/Injury  Goal: Not fall by end of shift  Outcome: Progressing  Goal: Be free from injury by end of the shift  Outcome: Progressing  Goal: Verbalize understanding of personal risk factors for fall in the hospital  Outcome: Progressing  Goal: Verbalize understanding of risk factor reduction measures to prevent injury from fall in the home  Outcome: Progressing  Goal: Use assistive devices by end of the shift  Outcome: Progressing  Goal: Pace activities to prevent fatigue by end of the shift  Outcome: Progressing

## 2025-04-22 NOTE — PROGRESS NOTES
PALLIATIVE DAILY PROGRESS NOTE                                                                                                                                             SUBJECTIVE                                                                                                                                                                                                                                                    Grandmother at bedside. She states that the pt is doing better compared to prior days. She states he is coughing up mucous more and has been responsive.     OBJECTIVE                                                                                                                                                                                                                                                      Physical Exam  GENERAL: awake, ill appearing, in no distress on airvo    Last Recorded Vitals Reviewed   Visit Vitals  BP 91/63   Pulse 104   Temp 36.9 °C (98.4 °F)   Resp (!) 30   Ht 1.524 m (5')   Wt 58.8 kg (129 lb 10.1 oz)   SpO2 91%   BMI 25.32 kg/m²   Smoking Status Never   BSA 1.58 m²        Medications Reviewed   Scheduled medications  Scheduled Medications[1]  Continuous medications  Continuous Medications[2]  PRN medications  PRN Medications[3]    Labs Reviewed   Results for orders placed or performed during the hospital encounter of 04/16/25 (from the past 24 hours)   POCT GLUCOSE   Result Value Ref Range    POCT Glucose 156 (H) 74 - 99 mg/dL   POCT GLUCOSE   Result Value Ref Range    POCT Glucose 124 (H) 74 - 99 mg/dL   CBC and Auto Differential   Result Value Ref Range    WBC 10.5 4.4 - 11.3 x10*3/uL    nRBC 0.0 0.0 - 0.0 /100 WBCs    RBC 3.50 (L) 4.50 - 5.90 x10*6/uL    Hemoglobin 11.5 (L) 13.5 - 17.5 g/dL    Hematocrit 34.6 (L) 41.0 - 52.0 %    MCV 99 80 - 100 fL    MCH 32.9 26.0 - 34.0 pg    MCHC 33.2 32.0 - 36.0 g/dL    RDW 12.5 11.5 - 14.5 %    Platelets 243 150 - 450 x10*3/uL     Neutrophils % 78.6 40.0 - 80.0 %    Immature Granulocytes %, Automated 1.7 (H) 0.0 - 0.9 %    Lymphocytes % 13.5 13.0 - 44.0 %    Monocytes % 4.6 2.0 - 10.0 %    Eosinophils % 1.3 0.0 - 6.0 %    Basophils % 0.3 0.0 - 2.0 %    Neutrophils Absolute 8.24 (H) 1.20 - 7.70 x10*3/uL    Immature Granulocytes Absolute, Automated 0.18 0.00 - 0.70 x10*3/uL    Lymphocytes Absolute 1.41 1.20 - 4.80 x10*3/uL    Monocytes Absolute 0.48 0.10 - 1.00 x10*3/uL    Eosinophils Absolute 0.14 0.00 - 0.70 x10*3/uL    Basophils Absolute 0.03 0.00 - 0.10 x10*3/uL   Magnesium   Result Value Ref Range    Magnesium 1.94 1.60 - 2.40 mg/dL   Comprehensive Metabolic Panel   Result Value Ref Range    Glucose 123 (H) 74 - 99 mg/dL    Sodium 134 (L) 136 - 145 mmol/L    Potassium 3.8 3.5 - 5.3 mmol/L    Chloride 101 98 - 107 mmol/L    Bicarbonate 25 21 - 32 mmol/L    Anion Gap 12 10 - 20 mmol/L    Urea Nitrogen 8 6 - 23 mg/dL    Creatinine 0.41 (L) 0.50 - 1.30 mg/dL    eGFR >90 >60 mL/min/1.73m*2    Calcium 8.1 (L) 8.6 - 10.3 mg/dL    Albumin 2.8 (L) 3.4 - 5.0 g/dL    Alkaline Phosphatase 74 33 - 120 U/L    Total Protein 6.2 (L) 6.4 - 8.2 g/dL    AST 11 9 - 39 U/L    Bilirubin, Total 0.5 0.0 - 1.2 mg/dL    ALT 12 10 - 52 U/L   Phosphorus   Result Value Ref Range    Phosphorus 2.6 2.5 - 4.9 mg/dL   POCT GLUCOSE   Result Value Ref Range    POCT Glucose 142 (H) 74 - 99 mg/dL   POCT GLUCOSE   Result Value Ref Range    POCT Glucose 153 (H) 74 - 99 mg/dL        Imaging Interpretation   4/19 Abd XR   -Improved stool and gas burden   -PNA bilat lung bases     Cardiology, Vascular, and Other Imaging Reviewed   No other imaging results found for the past 2 days     }    ASSESSMENT AND PLAN   Impression  This is a critically ill 29 y.o. year old who is hospitalized for aspiration Pneumonia of left lower lobe due to infectious organism. This has been complicated acute hypoxic respiratory failure, septic shock with severe constipation and gaseous distention.  Improving. Pt expectorating mucous but having some difficulty clearing the airway at times and oral cavity.      Goals of Care: Survival based. They are hoping to avoid intubation unless 02 saturations consistently <87%/respiratory distress as determined by the intensive care doctors.   They are also hoping to recover back to baseline. The pt is  making progress towards these goals and has been able to avoid intubation.      Code Status: FULL CODE    PLAN    -Author did suction airway that became momentarily compromised  with phlegm and did desat to the low 80's  ---->Pt did recover within minutes of intervention  -Continue supportive and aggressive care  -Revisit goals of care as needed  -Music therapy for additional support for pt as pt is reported to really enjoy this  -Provided support to the patients grandmother  and will do so intermittently     Update provided to: The Primary team and the bedside nurse    MEDICAL COMPLEXITY    Medical complexity was high level due to due to aspiration pneumonia, acute hypoxic respiratory failure, on high flow NC, persistent N and v that provides for high risk of aspiration,  review of  critical care notes, review of each individual lab, obtaining subjective from independent historian ( grandmother), pt on multiple parenteral controlled substances.    CONTACT INFORMATION   Cyndee Kapoor, CNP  Palliative Medicine  [employer] Secure chat         [1] baclofen, 20 mg, oral, TID  cefTRIAXone, 1 g, intravenous, q24h  citalopram, 20 mg, oral, Daily  clonazePAM, 1 mg, oral, TID  enoxaparin, 40 mg, subcutaneous, q24h  glycopyrrolate, 2 mg, oral, TID  insulin lispro, 0-5 Units, subcutaneous, q6h JANNIE  ipratropium-albuteroL, 3 mL, nebulization, TID  lacosamide, 300 mg, g-tube, BID  lamoTRIgine, 200 mg, oral, TID  metoclopramide, 5 mg, intravenous, TID  senna, 10 mL, oral, BID  sennosides-docusate sodium, 1 tablet, oral, BID  zonisamide, 300 mg, oral, Nightly     [2]    [3] PRN medications:  acetaminophen **OR** acetaminophen, acetaminophen, dextrose, dextrose, glucagon, glucagon, guaiFENesin, HYDROmorphone, ipratropium-albuteroL, LORazepam, ondansetron ODT **OR** ondansetron, oxyCODONE, oxyCODONE, oxygen

## 2025-04-23 LAB
ALBUMIN SERPL BCP-MCNC: 3 G/DL (ref 3.4–5)
ALP SERPL-CCNC: 86 U/L (ref 33–120)
ALT SERPL W P-5'-P-CCNC: 13 U/L (ref 10–52)
ANION GAP SERPL CALC-SCNC: 12 MMOL/L (ref 10–20)
AST SERPL W P-5'-P-CCNC: 11 U/L (ref 9–39)
BASOPHILS # BLD AUTO: 0.05 X10*3/UL (ref 0–0.1)
BASOPHILS NFR BLD AUTO: 0.4 %
BILIRUB SERPL-MCNC: 0.4 MG/DL (ref 0–1.2)
BUN SERPL-MCNC: 10 MG/DL (ref 6–23)
CALCIUM SERPL-MCNC: 8.5 MG/DL (ref 8.6–10.3)
CHLORIDE SERPL-SCNC: 100 MMOL/L (ref 98–107)
CO2 SERPL-SCNC: 26 MMOL/L (ref 21–32)
CREAT SERPL-MCNC: 0.34 MG/DL (ref 0.5–1.3)
EGFRCR SERPLBLD CKD-EPI 2021: >90 ML/MIN/1.73M*2
EOSINOPHIL # BLD AUTO: 0.26 X10*3/UL (ref 0–0.7)
EOSINOPHIL NFR BLD AUTO: 2.2 %
ERYTHROCYTE [DISTWIDTH] IN BLOOD BY AUTOMATED COUNT: 12.5 % (ref 11.5–14.5)
GLUCOSE BLD MANUAL STRIP-MCNC: 140 MG/DL (ref 74–99)
GLUCOSE BLD MANUAL STRIP-MCNC: 141 MG/DL (ref 74–99)
GLUCOSE BLD MANUAL STRIP-MCNC: 152 MG/DL (ref 74–99)
GLUCOSE SERPL-MCNC: 113 MG/DL (ref 74–99)
HCT VFR BLD AUTO: 35.2 % (ref 41–52)
HGB BLD-MCNC: 11.5 G/DL (ref 13.5–17.5)
IMM GRANULOCYTES # BLD AUTO: 0.27 X10*3/UL (ref 0–0.7)
IMM GRANULOCYTES NFR BLD AUTO: 2.3 % (ref 0–0.9)
LYMPHOCYTES # BLD AUTO: 1.87 X10*3/UL (ref 1.2–4.8)
LYMPHOCYTES NFR BLD AUTO: 15.9 %
MAGNESIUM SERPL-MCNC: 2.13 MG/DL (ref 1.6–2.4)
MCH RBC QN AUTO: 32.4 PG (ref 26–34)
MCHC RBC AUTO-ENTMCNC: 32.7 G/DL (ref 32–36)
MCV RBC AUTO: 99 FL (ref 80–100)
MONOCYTES # BLD AUTO: 0.77 X10*3/UL (ref 0.1–1)
MONOCYTES NFR BLD AUTO: 6.5 %
NEUTROPHILS # BLD AUTO: 8.57 X10*3/UL (ref 1.2–7.7)
NEUTROPHILS NFR BLD AUTO: 72.7 %
NRBC BLD-RTO: 0 /100 WBCS (ref 0–0)
PHOSPHATE SERPL-MCNC: 3.3 MG/DL (ref 2.5–4.9)
PLATELET # BLD AUTO: 313 X10*3/UL (ref 150–450)
POTASSIUM SERPL-SCNC: 4 MMOL/L (ref 3.5–5.3)
PROT SERPL-MCNC: 6.6 G/DL (ref 6.4–8.2)
RBC # BLD AUTO: 3.55 X10*6/UL (ref 4.5–5.9)
SODIUM SERPL-SCNC: 134 MMOL/L (ref 136–145)
WBC # BLD AUTO: 11.8 X10*3/UL (ref 4.4–11.3)

## 2025-04-23 PROCEDURE — 2500000005 HC RX 250 GENERAL PHARMACY W/O HCPCS: Performed by: INTERNAL MEDICINE

## 2025-04-23 PROCEDURE — 83735 ASSAY OF MAGNESIUM: CPT

## 2025-04-23 PROCEDURE — 94640 AIRWAY INHALATION TREATMENT: CPT

## 2025-04-23 PROCEDURE — 2500000001 HC RX 250 WO HCPCS SELF ADMINISTERED DRUGS (ALT 637 FOR MEDICARE OP)

## 2025-04-23 PROCEDURE — 2500000002 HC RX 250 W HCPCS SELF ADMINISTERED DRUGS (ALT 637 FOR MEDICARE OP, ALT 636 FOR OP/ED)

## 2025-04-23 PROCEDURE — 2500000004 HC RX 250 GENERAL PHARMACY W/ HCPCS (ALT 636 FOR OP/ED): Mod: JZ

## 2025-04-23 PROCEDURE — 82947 ASSAY GLUCOSE BLOOD QUANT: CPT

## 2025-04-23 PROCEDURE — 94760 N-INVAS EAR/PLS OXIMETRY 1: CPT

## 2025-04-23 PROCEDURE — 36415 COLL VENOUS BLD VENIPUNCTURE: CPT

## 2025-04-23 PROCEDURE — 80053 COMPREHEN METABOLIC PANEL: CPT

## 2025-04-23 PROCEDURE — 85025 COMPLETE CBC W/AUTO DIFF WBC: CPT

## 2025-04-23 PROCEDURE — 84100 ASSAY OF PHOSPHORUS: CPT

## 2025-04-23 PROCEDURE — 1200000002 HC GENERAL ROOM WITH TELEMETRY DAILY

## 2025-04-23 PROCEDURE — 99233 SBSQ HOSP IP/OBS HIGH 50: CPT | Performed by: INTERNAL MEDICINE

## 2025-04-23 RX ORDER — DOCUSATE SODIUM 100 MG/1
100 CAPSULE, LIQUID FILLED ORAL 2 TIMES DAILY
Status: DISCONTINUED | OUTPATIENT
Start: 2025-04-23 | End: 2025-04-23

## 2025-04-23 RX ORDER — DOCUSATE SODIUM 50 MG/5ML
100 LIQUID ORAL DAILY
Status: DISCONTINUED | OUTPATIENT
Start: 2025-04-23 | End: 2025-04-25 | Stop reason: HOSPADM

## 2025-04-23 RX ADMIN — CLONAZEPAM 1 MG: 0.5 TABLET ORAL at 15:11

## 2025-04-23 RX ADMIN — LAMOTRIGINE 200 MG: 100 TABLET ORAL at 08:44

## 2025-04-23 RX ADMIN — Medication 4 L/MIN: at 20:26

## 2025-04-23 RX ADMIN — LAMOTRIGINE 200 MG: 100 TABLET ORAL at 15:11

## 2025-04-23 RX ADMIN — SENNOSIDES AND DOCUSATE SODIUM 1 TABLET: 50; 8.6 TABLET ORAL at 08:44

## 2025-04-23 RX ADMIN — ENOXAPARIN SODIUM 40 MG: 40 INJECTION SUBCUTANEOUS at 15:31

## 2025-04-23 RX ADMIN — DOCUSATE SODIUM 100 MG: 50 LIQUID ORAL at 14:24

## 2025-04-23 RX ADMIN — METOCLOPRAMIDE 5 MG: 5 INJECTION, SOLUTION INTRAMUSCULAR; INTRAVENOUS at 21:29

## 2025-04-23 RX ADMIN — LACOSAMIDE 300 MG: 50 TABLET, FILM COATED ORAL at 08:44

## 2025-04-23 RX ADMIN — LAMOTRIGINE 200 MG: 100 TABLET ORAL at 21:28

## 2025-04-23 RX ADMIN — METOCLOPRAMIDE 5 MG: 5 INJECTION, SOLUTION INTRAMUSCULAR; INTRAVENOUS at 08:44

## 2025-04-23 RX ADMIN — GLYCOPYRROLATE 2 MG: 1 TABLET ORAL at 15:19

## 2025-04-23 RX ADMIN — BACLOFEN 20 MG: 10 TABLET ORAL at 21:28

## 2025-04-23 RX ADMIN — LACOSAMIDE 300 MG: 50 TABLET, FILM COATED ORAL at 21:28

## 2025-04-23 RX ADMIN — BACLOFEN 20 MG: 10 TABLET ORAL at 15:11

## 2025-04-23 RX ADMIN — ZONISAMIDE 300 MG: 100 CAPSULE ORAL at 21:28

## 2025-04-23 RX ADMIN — BACLOFEN 20 MG: 10 TABLET ORAL at 08:44

## 2025-04-23 RX ADMIN — GLYCOPYRROLATE 2 MG: 1 TABLET ORAL at 08:55

## 2025-04-23 RX ADMIN — IPRATROPIUM BROMIDE AND ALBUTEROL SULFATE 3 ML: 2.5; .5 SOLUTION RESPIRATORY (INHALATION) at 20:21

## 2025-04-23 RX ADMIN — CLONAZEPAM 1 MG: 0.5 TABLET ORAL at 21:28

## 2025-04-23 RX ADMIN — IPRATROPIUM BROMIDE AND ALBUTEROL SULFATE 3 ML: 2.5; .5 SOLUTION RESPIRATORY (INHALATION) at 08:24

## 2025-04-23 RX ADMIN — CEFTRIAXONE 1 G: 1 INJECTION, SOLUTION INTRAVENOUS at 14:24

## 2025-04-23 RX ADMIN — IPRATROPIUM BROMIDE AND ALBUTEROL SULFATE 3 ML: 2.5; .5 SOLUTION RESPIRATORY (INHALATION) at 14:23

## 2025-04-23 RX ADMIN — CLONAZEPAM 1 MG: 0.5 TABLET ORAL at 08:44

## 2025-04-23 RX ADMIN — CITALOPRAM HYDROBROMIDE 20 MG: 20 TABLET ORAL at 08:44

## 2025-04-23 RX ADMIN — GLYCOPYRROLATE 2 MG: 1 TABLET ORAL at 21:28

## 2025-04-23 RX ADMIN — METOCLOPRAMIDE 5 MG: 5 INJECTION, SOLUTION INTRAMUSCULAR; INTRAVENOUS at 15:11

## 2025-04-23 RX ADMIN — SENNOSIDES 10 ML: 8.8 LIQUID ORAL at 08:44

## 2025-04-23 ASSESSMENT — COGNITIVE AND FUNCTIONAL STATUS - GENERAL
MOVING TO AND FROM BED TO CHAIR: TOTAL
WALKING IN HOSPITAL ROOM: TOTAL
MOBILITY SCORE: 6
TURNING FROM BACK TO SIDE WHILE IN FLAT BAD: TOTAL
CLIMB 3 TO 5 STEPS WITH RAILING: TOTAL
STANDING UP FROM CHAIR USING ARMS: TOTAL
MOVING FROM LYING ON BACK TO SITTING ON SIDE OF FLAT BED WITH BEDRAILS: TOTAL

## 2025-04-23 ASSESSMENT — PAIN SCALES - GENERAL
PAINLEVEL_OUTOF10: 0 - NO PAIN
PAINLEVEL_OUTOF10: 0 - NO PAIN

## 2025-04-23 ASSESSMENT — PAIN - FUNCTIONAL ASSESSMENT: PAIN_FUNCTIONAL_ASSESSMENT: CPOT (CRITICAL CARE PAIN OBSERVATION TOOL)

## 2025-04-23 ASSESSMENT — ACTIVITIES OF DAILY LIVING (ADL): LACK_OF_TRANSPORTATION: PATIENT UNABLE TO ANSWER

## 2025-04-23 NOTE — SIGNIFICANT EVENT
Subjective      No changes from ICU progress note today.    Objective    Vitals  Visit Vitals  /65   Pulse (!) 115   Temp 37 °C (98.6 °F)   Resp (!) 34   Ht 1.524 m (5')   Wt 59.4 kg (130 lb 15.3 oz)   SpO2 92%   BMI 25.58 kg/m²   Smoking Status Never   BSA 1.59 m²       Physical Exam    Physical Exam  Constitutional:       General: He is not in acute distress.  Cardiovascular:      Rate and Rhythm: Tachycardia present.   Pulmonary:      Effort: Pulmonary effort is normal.      Breath sounds: Rhonchi present.      Comments: Tachypnea  Abdominal:      General: Abdomen is flat. There is no distension.      Palpations: Abdomen is soft.      Tenderness: There is no abdominal tenderness.      Comments: PEG tube in place   Genitourinary:     Comments: Jackson in place  Skin:     General: Skin is warm and dry.   Neurological:      Mental Status: He is alert. Mental status is at baseline.           IOs    Intake/Output Summary (Last 24 hours) at 4/23/2025 1517  Last data filed at 4/23/2025 1501  Gross per 24 hour   Intake 1760 ml   Output 2420 ml   Net -660 ml       Labs:   Results from last 72 hours   Lab Units 04/23/25  0524 04/22/25  0521 04/21/25  0508   SODIUM mmol/L 134* 134* 135*   POTASSIUM mmol/L 4.0 3.8 4.3   CHLORIDE mmol/L 100 101 104   CO2 mmol/L 26 25 24   BUN mg/dL 10 8 8   CREATININE mg/dL 0.34* 0.41* 0.38*   GLUCOSE mg/dL 113* 123* 111*   CALCIUM mg/dL 8.5* 8.1* 8.3*   ANION GAP mmol/L 12 12 11   EGFR mL/min/1.73m*2 >90 >90 >90   PHOSPHORUS mg/dL 3.3 2.6 2.8      Results from last 72 hours   Lab Units 04/23/25  0524 04/22/25  0521 04/21/25  0508   WBC AUTO x10*3/uL 11.8* 10.5 7.0   HEMOGLOBIN g/dL 11.5* 11.5* 12.0*   HEMATOCRIT % 35.2* 34.6* 37.8*   PLATELETS AUTO x10*3/uL 313 243 201   NEUTROS PCT AUTO % 72.7 78.6 67.0   LYMPHS PCT AUTO % 15.9 13.5 18.8   MONOS PCT AUTO % 6.5 4.6 10.5   EOS PCT AUTO % 2.2 1.3 0.7      Lab Results   Component Value Date    CALCIUM 8.5 (L) 04/23/2025    PHOS 3.3  "04/23/2025      No results found for: \"CRP\"   [unfilled]     Micro/ID:   No results found for the last 90 days.                   No lab exists for component: \"AGALPCRNB\"   Lab Results   Component Value Date    BLOODCULT No growth at 4 days -  FINAL REPORT 04/16/2025    BLOODCULT No growth at 4 days -  FINAL REPORT 04/16/2025     Recent Labs     04/16/25  1846   FLUAFLUVID Not Detected   FLUBFLUVID Not Detected   [id number] Not Detected        Images  XR chest 1 view  Result Date: 4/19/2025  1.  Increased perihilar congestion and/or pneumonia.   Signed by: Joseph Alves 4/19/2025 1:20 PM Dictation workstation:   [id number]    XR abdomen 1 view  Result Date: 4/18/2025  1. Interval decrease in stool burden within the rectum and descending colon compared to prior radiograph. Mild interval improvement in gaseous distention of the bowel loops with moderate residual as described above. 2. Redemonstration of hazy bibasilar airspace opacities, which can represent atelectasis versus pneumonia in appropriate clinical setting.       MACRO: None   Signed by: Kristi Strickland 4/18/2025 9:17 AM Dictation workstation:   [id number]    CT abdomen pelvis w IV contrast  Result Date: 4/17/2025  1.Large amount of formed stool throughout the colon including stool in the rectal vault measuring up to 8.5 cm in diameter with mild associated rectal wall thickening and minimal perirectal fat stranding. Findings suggestive of constipation with possible accompanying stercoral colitis.  Overall appearances not significantly changed compared to 8/11/2020.  No dilated small bowel loops. 2.Extensive consolidation throughout the lung bases left greater than right, new compared to prior compatible with extensive multifocal pneumonia. 3.Hepatic steatosis. 4.Mildly enlarged prostate. Signed by Darin Hilliard MD    XR chest 1 view  Result Date: 4/17/2025    Nasogastric tube over the gastric antrum.   Perihilar and basilar edema with retrocardiac " airspace disease and left effusion worsened from previous.   Signed by: Kulwinder Estrada 4/17/2025 2:58 PM Dictation workstation:   [id number]    XR abdomen 1 view  Result Date: 4/17/2025  Large volume dense stool in the rectum and left hemicolon. Diffuse gaseous distention of the bowel loops in the mid abdomen raises concern for a degree of bowel obstruction and could be related to fecal impaction. Recommend clinical correlation. Dedicated CT abdomen pelvis can be performed for further evaluation as clinically warranted.     MACRO: None   Signed by: Kristi Strickland 4/17/2025 10:37 AM Dictation workstation:   [id number]    XR chest 1 view  Result Date: 4/16/2025  Airspace opacities asymmetric in the left lung compatible with pneumonia; short-term progress imaging is recommended after treatment to assure resolution and exclude other underlying pathology.   MACRO: None   Signed by: Samuel Joshi 4/16/2025 7:02 PM Dictation workstation:   [id number]      Meds  Scheduled medications  Scheduled Medications[1]  Continuous medications  Continuous Medications[2]  PRN medications  PRN Medications[3]     Assessment and Plan    Assessment/Plan     Juan Carlos Ybarra is a 29 y.o. male with a PMH of PMH of spastic CP, developmental delay, seizure disorder admitted for management of suspected aspiration pneumonia placed on Airvo w/ worsening Oxygen saturation transferred to ICU for possible Intubation. Also suspecting possible SBO, NG tube placed, surgery consulted, CT abdomen shows significant stool burden. SBO ruled out. Treating for aspiration PNA. Aggressive bowel regimen resolved constipation. Avoided intubation in ICU. Transferred patient to floors on 4/23.     ACUTE ISSUES:     #AHRF 2/2 aspiration pneumonia - improved:  #Strep pneumonia:  - Not on O2 at home  - Remains on Airvo support, wean down as tolerated   - Positive strep pneumo urine ags   - Continue Rocephin for 10 days total (4/16-4/25); finished a course of  Azithromycin   - Continue DuoNeb PRN   - Aspiration precautions   - No more chest vest physiotherapy to avoid emesis  - On NC 5L     #Urinary retention:  - Bladder scans ordered  - Jackson removal today 4/23     #Persisting Nausea/Vomiting:  - QTC normal on last EKG  - Continue Reglan 5 mg TID for vomiting  - Monitor     #Constipation - resolved:  - Continue BM regimen: colace daily    CHRONIC ISSUES:    #Seizure Disorder:  - Continue home Clonazepam 0.5 mg TID, Lacosamide 300 mg BID, Lamotrigine 200 mg TID, Zonisamide 100 mg daily  - Continue Seizure precautions    #Spastic Cerebral Palsy:  - Nonverbal at baseline  - Continue home Baclofen 20 mg for spasms     Fluid: Replete PRN  Electrolytes: Replete PRN  Nutrition: NPO, TF are running  GI ppx: None  DVT/PE ppx: Lovenox  Abx: Rocephin (4/19-4/25); finished Azithromycin  Drips: None  IV Lines: PIV  O2: 5L NC     Dispo: Transferred to step down on 4/23. Palliative following for prolonged hospitalization.  Estimated length of stay >2 days.    Julia Haddad MD  Transitional Year  PGY-1         [1] baclofen, 20 mg, oral, TID  cefTRIAXone, 1 g, intravenous, q24h  citalopram, 20 mg, oral, Daily  clonazePAM, 1 mg, oral, TID  docusate sodium, 100 mg, oral, Daily  enoxaparin, 40 mg, subcutaneous, q24h  glycopyrrolate, 2 mg, oral, TID  insulin lispro, 0-5 Units, subcutaneous, q6h JANNIE  ipratropium-albuteroL, 3 mL, nebulization, TID  lacosamide, 300 mg, g-tube, BID  lamoTRIgine, 200 mg, oral, TID  metoclopramide, 5 mg, intravenous, TID  zonisamide, 300 mg, oral, Nightly  [2]    [3] PRN medications: acetaminophen **OR** acetaminophen, acetaminophen, dextrose, dextrose, glucagon, glucagon, guaiFENesin, HYDROmorphone, ipratropium-albuteroL, LORazepam, ondansetron ODT **OR** ondansetron, oxyCODONE, oxyCODONE, oxygen

## 2025-04-23 NOTE — PROGRESS NOTES
04/23/25 1415   Discharge Planning   Living Arrangements Parent;Other (Comment)  (Private duty home care aide (paid for by the family))   Support Systems Parent;Home care staff   Assistance Needed At baseline patient is from home with his parents, has a history of cerebral palsy, on room air at home, is nonverbal and is wheelchair-bound. Per mother patient has the following DME at home: a yissel lift, wheel chair, wedge to elevate patient in bed. Patient has a private duty aide that lives with the family and assists with care of patient. Mother is requesting suction machine and canister at time of DC from hospital if possible. RT was made aware of mother's requestion for suction machine and need for home going suction machine as well as home going O2 will be addressed closer to time of DC.   Type of Residence Private residence   Number of Stairs to Enter Residence 0   Number of Stairs Within Residence 0   Who is requesting discharge planning? Provider   Home or Post Acute Services None   Expected Discharge Disposition Home   Does the patient need discharge transport arranged? Yes   RoundTrip coordination needed? Yes   Has discharge transport been arranged? No   Financial Resource Strain   How hard is it for you to pay for the very basics like food, housing, medical care, and heating? Pt Unable   Housing Stability   In the last 12 months, was there a time when you were not able to pay the mortgage or rent on time? Pt Unable   At any time in the past 12 months, were you homeless or living in a shelter (including now)? Pt Unable   Transportation Needs   In the past 12 months, has lack of transportation kept you from medical appointments or from getting medications? Pt Unable   In the past 12 months, has lack of transportation kept you from meetings, work, or from getting things needed for daily living? Pt Unable

## 2025-04-23 NOTE — CARE PLAN
The patient's goals for the shift include      The clinical goals for the shift include pt will remain hemodynamically stable and o2 sat will remain above 92%    Over the shift, the patient did not make progress toward the following goals. Barriers to progression include none. Recommendations to address these barriers include none.

## 2025-04-23 NOTE — PROGRESS NOTES
"Subjective   Subjective:   Overnight Events:   Patient was seen and examined at bedside this morning. Patient appears comfortable while remaining on NC 5L. Otherwise, remains hemodynamically stable, at baseline neurologically-wise. No reported emesis or diarrhea.    Review Of Systems:  11-point ROS was performed and is negative except as noted below and in the HPI.     Review of Systems     Objective   Objective:     /69   Pulse 85   Temp 37 °C (98.6 °F)   Resp 25   Ht 1.524 m (5')   Wt 59.4 kg (130 lb 15.3 oz)   SpO2 91%   BMI 25.58 kg/m²     Physical Exam  Constitutional:       General: He is not in acute distress.     Comments: +Nonverbal at baseline   HENT:      Head: Normocephalic and atraumatic.      Nose: Nose normal.      Mouth/Throat:      Mouth: Mucous membranes are moist.   Cardiovascular:      Rate and Rhythm: Normal rate and regular rhythm.      Heart sounds: Normal heart sounds.   Pulmonary:      Breath sounds: Rhonchi (improved) present.   Abdominal:      General: Bowel sounds are normal. There is no distension.      Palpations: Abdomen is soft.      Tenderness: There is no abdominal tenderness.      Comments: +PEG tube in place   Musculoskeletal:         General: No swelling.      Right lower leg: No edema.      Left lower leg: No edema.      Comments: +Contracted upper extremities   Skin:     General: Skin is warm and dry.      Coloration: Skin is not jaundiced.   Neurological:      Mental Status: Mental status is at baseline.       Lab Work:     Lab Results   Component Value Date    WBC 11.8 (H) 04/23/2025    HGB 11.5 (L) 04/23/2025    HCT 35.2 (L) 04/23/2025    MCV 99 04/23/2025     04/23/2025     Lab Results   Component Value Date    GLUCOSE 113 (H) 04/23/2025    CALCIUM 8.5 (L) 04/23/2025     (L) 04/23/2025    K 4.0 04/23/2025    CO2 26 04/23/2025     04/23/2025    BUN 10 04/23/2025    CREATININE 0.34 (L) 04/23/2025     No results found for: \"HGBA1C\", \"TSH\", " "\"VITD25\"  Cultures:   No results found for the last 90 days.    Images:     XR chest 1 view   Final Result   Airspace opacities asymmetric in the left lung compatible with   pneumonia; short-term progress imaging is recommended after treatment   to assure resolution and exclude other underlying pathology.        MACRO:   None        Signed by: Samuel Joshi 4/16/2025 7:02 PM   Dictation workstation:   [id number]         Medications:     Scheduled:  Scheduled Medications[1]Continuous:  Continuous Medications[2]  PRN:  PRN Medications[3]     Assessment & Plan:     Juan Carlos Ybarra is a 29 y.o. male with a PMH of PMH of spastic CP, developmental delay, seizure disorder admitted for management of suspected aspiration pneumonia now placed on Airvo w/ worsening Oxygen saturation transferred to ICU for possible Intubation. Also suspecting possible SBO, NG tube placed, surgery consulted, CT abdomen shows significant stool burden.     NEURO/PSYCH:  #Seizure Disorder:  - Continue home Clonazepam 0.5 mg TID, Lacosamide 300 mg BID, Lamotrigine 200 mg TID, Zonisamide 100 mg daily  - Continue Seizure precautions    CARDIOVASCULAR:  - No acute medical issues    PULMONARY:  #AHRF 2/2 aspiration pneumonia - improved:  #Strep pneumonia:  - Remains on Airvo support, wean down as tolerated   - Positive strep pneumo urine ags   - Continue Rocephin for 10 days total (4/16-4/25); finished a course of Azithromycin   - Continue DuoNeb PRN   - Aspiration precautions   - No more chest vest physiotherapy to avoid emesis  - On NC 5L    RENAL/UROLOGY:  #Urinary retention:  - Bladder scans ordered  - Jackson removal tomorrow    GASTROINTESTINAL:  #Persisting Nausea/Vomiting:  - Added Reglan 5 mg TID for vomiting  - Will continue close monitor  - QTC normal on last EKG  - No vomiting reported    #Constipation - resolved:  - Continue BM regiment to liquid colace     HEME/ONC:  - No active medical issues    ENDOCRINE:  - No active medical " issues    INFECTIOUS:  #Aspiration PNA:   - See Pulmonology section    MSK/SKIN:  #Spastic Cerebral Palsy:  - Nonverbal at baseline  - Continue home Baclofen 20 mg for spasms    Fluid: Replete PRN  Electrolytes: Replete PRN  Nutrition: NPO, TF are running  GI ppx: None  DVT/PE ppx: Lovenox  Abx: Rocephin (4/19-4/25); finished Azithromycin  Drips: None  IV Lines: PIV  O2: 5L NC    Dispo: on airvo for ahrf 2/2 aspiration pna, improving.  Stable for transfer to step down, started on 4/22.  Palliative following for prolonged hospitalization.  Estimated length of stay >2 days.    Roger Parmar, PGY-3  Internal Medicine    Disclaimer: Documentation completed with the information available at the time of input. The times in the chart may not be reflective of actual patient care times, interventions, or procedures. Documentation occurs after the physical care of the   patient.         [1] baclofen, 20 mg, oral, TID  cefTRIAXone, 1 g, intravenous, q24h  citalopram, 20 mg, oral, Daily  clonazePAM, 1 mg, oral, TID  enoxaparin, 40 mg, subcutaneous, q24h  glycopyrrolate, 2 mg, oral, TID  insulin lispro, 0-5 Units, subcutaneous, q6h JANNIE  ipratropium-albuteroL, 3 mL, nebulization, TID  lacosamide, 300 mg, g-tube, BID  lamoTRIgine, 200 mg, oral, TID  metoclopramide, 5 mg, intravenous, TID  senna, 10 mL, oral, BID  sennosides-docusate sodium, 1 tablet, oral, BID  zonisamide, 300 mg, oral, Nightly     [2]    [3] PRN medications: acetaminophen **OR** acetaminophen, acetaminophen, dextrose, dextrose, glucagon, glucagon, guaiFENesin, HYDROmorphone, ipratropium-albuteroL, LORazepam, ondansetron ODT **OR** ondansetron, oxyCODONE, oxyCODONE, oxygen

## 2025-04-23 NOTE — PROGRESS NOTES
"Music Therapy Note    Juan Carlos Ybarra was referred by ERICK Quarles.    Therapy Session  Referral Type: New referral this admission  Visit Type: New visit  Session Start Time: 1115  Session End Time: 1120  Intervention Delivery: In-person  Conflict of Service: Asleep  Number of family members present: 1  Family Present for Session: Parent/Guardian (father)  Family Participation: Interactive     Pre-assessment  Mood/Affect: Tired/lethargic         Treatment/Interventions  Music Therapy Interventions: Assessment  Interruption: No    Post-assessment  Unable to Assess Reason: Did not provide expressive therapy intervention  Continue Visiting: Yes  Total Session Time (min): 5 minutes    Narrative  Assessment Detail: Upon MT's arrival, pt was sleeping/resting somewhat restlessly in bed. His father was present at bedside. MT introduced herself and updated pt's father on pt's previous session. Pt's father provided brief update, stating that pt was feeling \"much better\" and was improving. He also shared about some of pt and pt's family musical preferences, reiterating that pt listens to a wide variety of music and enjoys many different kinds. He also confirmed that pt enjoys classical music and that they often play it when things are \"not going well.\" Pt's father requested that MT return later, stating that pt's mother would be present in the afternoon, and expressing that pt would enjoy more music when awake. No other needs expressed at this time.  Follow-up: MT will follow up as able and appropriate.    Education Documentation  No documentation found.          "

## 2025-04-23 NOTE — CARE PLAN
The clinical goals for the shift include pt will remain hemodynamically stable and o2 sat will remain above 92%    Problem: Pain - Adult  Goal: Verbalizes/displays adequate comfort level or baseline comfort level  Outcome: Progressing  Problem: Safety - Adult  Goal: Free from fall injury  Outcome: Progressing  Problem: Discharge Planning  Goal: Discharge to home or other facility with appropriate resources  Outcome: Progressing  Problem: Chronic Conditions and Co-morbidities  Goal: Patient's chronic conditions and co-morbidity symptoms are monitored and maintained or improved  Outcome: Progressing  Problem: Nutrition  Goal: Nutrient intake appropriate for maintaining nutritional needs  Outcome: Progressing  Problem: Respiratory  Goal: Clear secretions with interventions this shift  Outcome: Progressing  Goal: Minimize anxiety/maximize coping throughout shift  Outcome: Progressing  Goal: Minimal/no exertional discomfort or dyspnea this shift  Outcome: Progressing  Goal: No signs of respiratory distress (eg. Use of accessory muscles. Peds grunting)  Outcome: Progressing  Goal: Patent airway maintained this shift  Outcome: Progressing  Goal: Tolerate mechanical ventilation evidenced by VS/agitation level this shift  Outcome: Progressing  Goal: Tolerate pulmonary toileting this shift  Outcome: Progressing  Goal: Verbalize decreased shortness of breath this shift  Outcome: Progressing  Goal: Wean oxygen to maintain O2 saturation per order/standard this shift  Outcome: Progressing  Goal: Increase self care and/or family involvement in next 24 hours  Outcome: Progressing  Problem: Skin  Goal: Decreased wound size/increased tissue granulation at next dressing change  Outcome: Progressing  Flowsheets (Taken 4/22/2025 2150)  Decreased wound size/increased tissue granulation at next dressing change: Promote sleep for wound healing  Goal: Participates in plan/prevention/treatment measures  Outcome: Progressing  Goal:  Prevent/manage excess moisture  Outcome: Progressing  Goal: Prevent/minimize sheer/friction injuries  Outcome: Progressing  Goal: Promote/optimize nutrition  Outcome: Progressing  Goal: Promote skin healing  Outcome: Progressing  Problem: Safety - Medical Restraint  Goal: Remains free of injury from restraints (Restraint for Interference with Medical Device)  Outcome: Progressing  Flowsheets (Taken 4/22/2025 0835 by Alyson Mcintyre RN)  Remains free of injury from restraints (restraint for interference with medical device): Every 2 hours: Monitor safety, psychosocial status, comfort, nutrition and hydration  Goal: Free from restraint(s) (Restraint for Interference with Medical Device)  Outcome: Progressing  Problem: Fall/Injury  Goal: Not fall by end of shift  Outcome: Progressing  Goal: Be free from injury by end of the shift  Outcome: Progressing  Goal: Verbalize understanding of personal risk factors for fall in the hospital  Outcome: Progressing  Goal: Verbalize understanding of risk factor reduction measures to prevent injury from fall in the home  Outcome: Progressing  Goal: Use assistive devices by end of the shift  Outcome: Progressing  Goal: Pace activities to prevent fatigue by end of the shift  Outcome: Progressing

## 2025-04-24 LAB
ALBUMIN SERPL BCP-MCNC: 3.1 G/DL (ref 3.4–5)
ALP SERPL-CCNC: 90 U/L (ref 33–120)
ALT SERPL W P-5'-P-CCNC: 18 U/L (ref 10–52)
ANION GAP SERPL CALC-SCNC: 12 MMOL/L (ref 10–20)
AST SERPL W P-5'-P-CCNC: 17 U/L (ref 9–39)
BASOPHILS # BLD AUTO: 0.05 X10*3/UL (ref 0–0.1)
BASOPHILS NFR BLD AUTO: 0.4 %
BILIRUB SERPL-MCNC: 0.4 MG/DL (ref 0–1.2)
BUN SERPL-MCNC: 13 MG/DL (ref 6–23)
CALCIUM SERPL-MCNC: 8.6 MG/DL (ref 8.6–10.3)
CHLORIDE SERPL-SCNC: 102 MMOL/L (ref 98–107)
CO2 SERPL-SCNC: 26 MMOL/L (ref 21–32)
CREAT SERPL-MCNC: 0.41 MG/DL (ref 0.5–1.3)
EGFRCR SERPLBLD CKD-EPI 2021: >90 ML/MIN/1.73M*2
EOSINOPHIL # BLD AUTO: 0.48 X10*3/UL (ref 0–0.7)
EOSINOPHIL NFR BLD AUTO: 4.2 %
ERYTHROCYTE [DISTWIDTH] IN BLOOD BY AUTOMATED COUNT: 12.4 % (ref 11.5–14.5)
GLUCOSE BLD MANUAL STRIP-MCNC: 118 MG/DL (ref 74–99)
GLUCOSE BLD MANUAL STRIP-MCNC: 134 MG/DL (ref 74–99)
GLUCOSE BLD MANUAL STRIP-MCNC: 148 MG/DL (ref 74–99)
GLUCOSE SERPL-MCNC: 125 MG/DL (ref 74–99)
HCT VFR BLD AUTO: 35.8 % (ref 41–52)
HGB BLD-MCNC: 11.9 G/DL (ref 13.5–17.5)
IMM GRANULOCYTES # BLD AUTO: 0.31 X10*3/UL (ref 0–0.7)
IMM GRANULOCYTES NFR BLD AUTO: 2.7 % (ref 0–0.9)
LYMPHOCYTES # BLD AUTO: 1.7 X10*3/UL (ref 1.2–4.8)
LYMPHOCYTES NFR BLD AUTO: 14.8 %
MAGNESIUM SERPL-MCNC: 2.17 MG/DL (ref 1.6–2.4)
MCH RBC QN AUTO: 32.9 PG (ref 26–34)
MCHC RBC AUTO-ENTMCNC: 33.2 G/DL (ref 32–36)
MCV RBC AUTO: 99 FL (ref 80–100)
MONOCYTES # BLD AUTO: 0.63 X10*3/UL (ref 0.1–1)
MONOCYTES NFR BLD AUTO: 5.5 %
NEUTROPHILS # BLD AUTO: 8.31 X10*3/UL (ref 1.2–7.7)
NEUTROPHILS NFR BLD AUTO: 72.4 %
NRBC BLD-RTO: 0 /100 WBCS (ref 0–0)
PHOSPHATE SERPL-MCNC: 4.1 MG/DL (ref 2.5–4.9)
PLATELET # BLD AUTO: 376 X10*3/UL (ref 150–450)
POTASSIUM SERPL-SCNC: 3.9 MMOL/L (ref 3.5–5.3)
PROT SERPL-MCNC: 6.6 G/DL (ref 6.4–8.2)
RBC # BLD AUTO: 3.62 X10*6/UL (ref 4.5–5.9)
SODIUM SERPL-SCNC: 136 MMOL/L (ref 136–145)
WBC # BLD AUTO: 11.5 X10*3/UL (ref 4.4–11.3)

## 2025-04-24 PROCEDURE — 1200000002 HC GENERAL ROOM WITH TELEMETRY DAILY

## 2025-04-24 PROCEDURE — 2500000004 HC RX 250 GENERAL PHARMACY W/ HCPCS (ALT 636 FOR OP/ED): Mod: JZ

## 2025-04-24 PROCEDURE — 85025 COMPLETE CBC W/AUTO DIFF WBC: CPT

## 2025-04-24 PROCEDURE — 99232 SBSQ HOSP IP/OBS MODERATE 35: CPT

## 2025-04-24 PROCEDURE — 2500000002 HC RX 250 W HCPCS SELF ADMINISTERED DRUGS (ALT 637 FOR MEDICARE OP, ALT 636 FOR OP/ED)

## 2025-04-24 PROCEDURE — 83735 ASSAY OF MAGNESIUM: CPT

## 2025-04-24 PROCEDURE — 2500000005 HC RX 250 GENERAL PHARMACY W/O HCPCS: Performed by: INTERNAL MEDICINE

## 2025-04-24 PROCEDURE — 84100 ASSAY OF PHOSPHORUS: CPT

## 2025-04-24 PROCEDURE — 2500000001 HC RX 250 WO HCPCS SELF ADMINISTERED DRUGS (ALT 637 FOR MEDICARE OP)

## 2025-04-24 PROCEDURE — 36415 COLL VENOUS BLD VENIPUNCTURE: CPT

## 2025-04-24 PROCEDURE — 94760 N-INVAS EAR/PLS OXIMETRY 1: CPT

## 2025-04-24 PROCEDURE — 94640 AIRWAY INHALATION TREATMENT: CPT

## 2025-04-24 PROCEDURE — 99232 SBSQ HOSP IP/OBS MODERATE 35: CPT | Performed by: INTERNAL MEDICINE

## 2025-04-24 PROCEDURE — 82947 ASSAY GLUCOSE BLOOD QUANT: CPT

## 2025-04-24 PROCEDURE — 80053 COMPREHEN METABOLIC PANEL: CPT

## 2025-04-24 RX ADMIN — LAMOTRIGINE 200 MG: 100 TABLET ORAL at 10:38

## 2025-04-24 RX ADMIN — ENOXAPARIN SODIUM 40 MG: 40 INJECTION SUBCUTANEOUS at 10:39

## 2025-04-24 RX ADMIN — CLONAZEPAM 1 MG: 0.5 TABLET ORAL at 10:38

## 2025-04-24 RX ADMIN — Medication 4 L/MIN: at 15:29

## 2025-04-24 RX ADMIN — CLONAZEPAM 1 MG: 0.5 TABLET ORAL at 20:54

## 2025-04-24 RX ADMIN — LAMOTRIGINE 200 MG: 100 TABLET ORAL at 15:56

## 2025-04-24 RX ADMIN — LACOSAMIDE 300 MG: 50 TABLET, FILM COATED ORAL at 10:38

## 2025-04-24 RX ADMIN — METOCLOPRAMIDE 5 MG: 5 INJECTION, SOLUTION INTRAMUSCULAR; INTRAVENOUS at 10:39

## 2025-04-24 RX ADMIN — IPRATROPIUM BROMIDE AND ALBUTEROL SULFATE 3 ML: 2.5; .5 SOLUTION RESPIRATORY (INHALATION) at 15:29

## 2025-04-24 RX ADMIN — IPRATROPIUM BROMIDE AND ALBUTEROL SULFATE 3 ML: 2.5; .5 SOLUTION RESPIRATORY (INHALATION) at 20:14

## 2025-04-24 RX ADMIN — GLYCOPYRROLATE 2 MG: 1 TABLET ORAL at 21:25

## 2025-04-24 RX ADMIN — CITALOPRAM HYDROBROMIDE 20 MG: 20 TABLET ORAL at 10:38

## 2025-04-24 RX ADMIN — ZONISAMIDE 300 MG: 100 CAPSULE ORAL at 20:54

## 2025-04-24 RX ADMIN — LAMOTRIGINE 200 MG: 100 TABLET ORAL at 20:54

## 2025-04-24 RX ADMIN — DOCUSATE SODIUM 100 MG: 50 LIQUID ORAL at 10:39

## 2025-04-24 RX ADMIN — METOCLOPRAMIDE 5 MG: 5 INJECTION, SOLUTION INTRAMUSCULAR; INTRAVENOUS at 15:56

## 2025-04-24 RX ADMIN — LACOSAMIDE 300 MG: 50 TABLET, FILM COATED ORAL at 20:54

## 2025-04-24 RX ADMIN — GLYCOPYRROLATE 2 MG: 1 TABLET ORAL at 11:40

## 2025-04-24 RX ADMIN — IPRATROPIUM BROMIDE AND ALBUTEROL SULFATE 3 ML: 2.5; .5 SOLUTION RESPIRATORY (INHALATION) at 08:00

## 2025-04-24 RX ADMIN — CEFTRIAXONE 1 G: 1 INJECTION, SOLUTION INTRAVENOUS at 14:24

## 2025-04-24 RX ADMIN — BACLOFEN 20 MG: 10 TABLET ORAL at 15:56

## 2025-04-24 RX ADMIN — GLYCOPYRROLATE 2 MG: 1 TABLET ORAL at 15:57

## 2025-04-24 RX ADMIN — CLONAZEPAM 1 MG: 0.5 TABLET ORAL at 15:56

## 2025-04-24 RX ADMIN — BACLOFEN 20 MG: 10 TABLET ORAL at 10:39

## 2025-04-24 RX ADMIN — METOCLOPRAMIDE 5 MG: 5 INJECTION, SOLUTION INTRAMUSCULAR; INTRAVENOUS at 20:54

## 2025-04-24 RX ADMIN — Medication 4 L/MIN: at 08:00

## 2025-04-24 RX ADMIN — BACLOFEN 20 MG: 10 TABLET ORAL at 20:54

## 2025-04-24 RX ADMIN — Medication 4 L/MIN: at 20:14

## 2025-04-24 ASSESSMENT — COGNITIVE AND FUNCTIONAL STATUS - GENERAL
DRESSING REGULAR LOWER BODY CLOTHING: TOTAL
WALKING IN HOSPITAL ROOM: TOTAL
TOILETING: TOTAL
DRESSING REGULAR UPPER BODY CLOTHING: TOTAL
TOILETING: TOTAL
DRESSING REGULAR LOWER BODY CLOTHING: TOTAL
EATING MEALS: TOTAL
MOBILITY SCORE: 6
MOBILITY SCORE: 6
TURNING FROM BACK TO SIDE WHILE IN FLAT BAD: TOTAL
DAILY ACTIVITIY SCORE: 6
MOVING TO AND FROM BED TO CHAIR: TOTAL
PERSONAL GROOMING: TOTAL
MOVING FROM LYING ON BACK TO SITTING ON SIDE OF FLAT BED WITH BEDRAILS: TOTAL
DRESSING REGULAR UPPER BODY CLOTHING: TOTAL
MOVING FROM LYING ON BACK TO SITTING ON SIDE OF FLAT BED WITH BEDRAILS: TOTAL
STANDING UP FROM CHAIR USING ARMS: TOTAL
DAILY ACTIVITIY SCORE: 6
HELP NEEDED FOR BATHING: TOTAL
CLIMB 3 TO 5 STEPS WITH RAILING: TOTAL
EATING MEALS: TOTAL
MOVING TO AND FROM BED TO CHAIR: TOTAL
PERSONAL GROOMING: TOTAL
TURNING FROM BACK TO SIDE WHILE IN FLAT BAD: TOTAL
CLIMB 3 TO 5 STEPS WITH RAILING: TOTAL
STANDING UP FROM CHAIR USING ARMS: TOTAL
WALKING IN HOSPITAL ROOM: TOTAL
HELP NEEDED FOR BATHING: TOTAL

## 2025-04-24 ASSESSMENT — PAIN SCALES - PAIN ASSESSMENT IN ADVANCED DEMENTIA (PAINAD)
BODYLANGUAGE: RELAXED
TOTALSCORE: 0
BREATHING: NORMAL
FACIALEXPRESSION: SMILING OR INEXPRESSIVE
CONSOLABILITY: NO NEED TO CONSOLE

## 2025-04-24 ASSESSMENT — PAIN SCALES - WONG BAKER: WONGBAKER_NUMERICALRESPONSE: NO HURT

## 2025-04-24 ASSESSMENT — PAIN - FUNCTIONAL ASSESSMENT: PAIN_FUNCTIONAL_ASSESSMENT: CPOT (CRITICAL CARE PAIN OBSERVATION TOOL)

## 2025-04-24 ASSESSMENT — PAIN SCALES - GENERAL: PAINLEVEL_OUTOF10: 0 - NO PAIN

## 2025-04-24 NOTE — PROGRESS NOTES
"Music Therapy Note    Juan Carlos Ybarra was referred by ERICK Quarles.    Therapy Session  Referral Type: New referral this admission  Visit Type: Follow-up visit  Session Start Time: 1410  Session End Time: 1503  Intervention Delivery: In-person  Conflict of Service: None  Number of family members present: 2  Family Present for Session: Parent/Guardian, Other (Comment)  Family Participation: Interactive  Number of staff members present: 1     Pre-assessment  Unable to Assess Reason: Cognitive limitation  Mood/Affect: Appropriate         Treatment/Interventions  Areas of Focus: Coping, Procedural support  Music Therapy Interventions: Live music listening, Improvisation  Interruption: No  Patient Fell Asleep at End of Session: No    Post-assessment  Unable to Assess Reason: Cognitive limitation  Mood/Affect: Appropriate, Tired/lethargic  Continue Visiting: Yes  Total Session Time (min): 53 minutes    Narrative  Assessment Detail: Upon MT's arrival, pt was awake and more alert than previously. Pt's mother and another family member were present. Pt's mom provided brief update and shared that pt would likely be switching rooms soon, to move off of ICU. Pt also began a breathing treatment shortly after MT's arrival. Pt swatting at mask and appearing to be uncomfortable, and his mother held his hand so that he would be more amenable to wearing the mask.  Plan: MT will engage pt in preferred music to assist pt in coping with treatment and increase treatment compliance/comfort with treatment.  Intervention: Pt's mother had previously suggested music by , so MT presented \"Don't Stop Me Now\" on the guitar. MT then presented a variety of other classic rock songs, some following suggestions from family. Selections included \"Living On A Prayer,\" \"Don't Stop Beleiving,\" \"Hey There Winter,\" \"Make It With You,\" and \"I Wanna Hold Your Hand.\" Of note, pt did appear to become somewhat agitated during \"Hey There Winter,\" " swatting at the mask and shifting restlessly in bed, so MT transitioned to improvised chords/vocal improvisation on nonsense syllables, following general inspiration from the original song. He appeared to relax throughout, and he tolerated the mask/breathing treatment for the remained of the treatment. Session concluded at that time; no other needs expressed.  Evaluation: Pt demonstrated increased engagement, as evidenced by keeping his eyes open more frequently and for longer periods, increased awareness and interactions with his mother, and increased treatment compliance. Pt does swat at his ear/face at baseline, per mom. However, he did appear to calm and be less distracted by wearing the mask throughout the session. Pt's family encouraged by his increased alertness.  Follow-up: MT will follow up as able and appropriate.    Education Documentation  No documentation found.

## 2025-04-24 NOTE — NURSING NOTE
Assumed care of patient. Patient is on 4L HF with family at the bedside. Call light in reach. Patient is non-verbal. Patient is sinus tach on monitor @ 109bpm.

## 2025-04-24 NOTE — CARE PLAN
The patient's goals for the shift include      The clinical goals for the shift include patient will remain safe during shift    Problem: Pain - Adult  Goal: Verbalizes/displays adequate comfort level or baseline comfort level  Outcome: Progressing     Problem: Safety - Adult  Goal: Free from fall injury  Outcome: Progressing     Problem: Discharge Planning  Goal: Discharge to home or other facility with appropriate resources  Outcome: Progressing     Problem: Chronic Conditions and Co-morbidities  Goal: Patient's chronic conditions and co-morbidity symptoms are monitored and maintained or improved  Outcome: Progressing     Problem: Nutrition  Goal: Nutrient intake appropriate for maintaining nutritional needs  Outcome: Progressing

## 2025-04-24 NOTE — PROGRESS NOTES
"Subjective   Subjective:   History Of Present Illness:  Juan Carlos Ybarra is a 29 y.o. male was seen and evaluated at bedside today. Overnight, no reported acute events. Patient is in no acute distress. Pt nonverbal at baseline, per family at bedside they think he is more awake today than he was yesterday and they think he looks a bit better.      12-point ROS performed, negative aside from noted above     Objective   Objective:     BP 92/64 (BP Location: Right arm)   Pulse 96   Temp 36.5 °C (97.7 °F) (Temporal)   Resp 16   Ht 1.524 m (5')   Wt 59.4 kg (130 lb 15.3 oz)   SpO2 95%   BMI 25.58 kg/m²     Physical Exam  Constitutional:       General: He is not in acute distress.  HENT:      Mouth/Throat:      Mouth: Mucous membranes are moist.   Eyes:      Conjunctiva/sclera: Conjunctivae normal.   Cardiovascular:      Rate and Rhythm: Normal rate and regular rhythm.   Pulmonary:      Effort: Pulmonary effort is normal. No respiratory distress.      Comments: Coarse BS bilaterally. On 4L O2  Abdominal:      Tenderness: There is no abdominal tenderness.   Musculoskeletal:      Right lower leg: No edema.      Left lower leg: No edema.   Skin:     General: Skin is warm and dry.   Neurological:      Mental Status: Mental status is at baseline.         Lab Work:     Lab Results   Component Value Date    WBC 11.5 (H) 04/24/2025    HGB 11.9 (L) 04/24/2025    HCT 35.8 (L) 04/24/2025    MCV 99 04/24/2025     04/24/2025     Lab Results   Component Value Date    GLUCOSE 125 (H) 04/24/2025    CALCIUM 8.6 04/24/2025     04/24/2025    K 3.9 04/24/2025    CO2 26 04/24/2025     04/24/2025    BUN 13 04/24/2025    CREATININE 0.41 (L) 04/24/2025     No results found for: \"HGBA1C\", \"TSH\", \"VITD25\"  Cultures:   No results found for the last 90 days.    Images:     XR chest 1 view   Final Result   Airspace opacities asymmetric in the left lung compatible with   pneumonia; short-term progress imaging is recommended after " treatment   to assure resolution and exclude other underlying pathology.        MACRO:   None        Signed by: Samuel Joshi 4/16/2025 7:02 PM   Dictation workstation:   [id number]         Medications:     Scheduled:  Scheduled Medications[1]Continuous:  Continuous Medications[2]  PRN:  PRN Medications[3]     Assessment & Plan:     Juan Carlos Ybarra is a 29 y.o. male with pmhx spastic CP, developmental delay, seizure disorder admitted for management of aspiration pneumonia. On HFNC.    #Aspiration PNA  #AHRF 2/2 PNA  - On 4 L HFNC  - Completed 4 days azithro  - On day 9 abx treatment, on ceftriaxone (planning for 10 todal, last day tomorrow)  - Continue to wean O2 as tolerated, may need to go home w/ home O2  - Continue nebs    O2: 4L    Marilee Elise, DO  Internal Medicine PGY-1         [1] baclofen, 20 mg, oral, TID  cefTRIAXone, 1 g, intravenous, q24h  citalopram, 20 mg, oral, Daily  clonazePAM, 1 mg, oral, TID  docusate sodium, 100 mg, oral, Daily  enoxaparin, 40 mg, subcutaneous, q24h  glycopyrrolate, 2 mg, oral, TID  insulin lispro, 0-5 Units, subcutaneous, q6h JANNIE  ipratropium-albuteroL, 3 mL, nebulization, TID  lacosamide, 300 mg, g-tube, BID  lamoTRIgine, 200 mg, oral, TID  metoclopramide, 5 mg, intravenous, TID  zonisamide, 300 mg, oral, Nightly  [2]    [3] PRN medications: acetaminophen **OR** acetaminophen, acetaminophen, dextrose, dextrose, glucagon, glucagon, guaiFENesin, HYDROmorphone, ipratropium-albuteroL, LORazepam, ondansetron ODT **OR** ondansetron, oxyCODONE, oxyCODONE, oxygen

## 2025-04-24 NOTE — CARE PLAN
The patient's goals for the shift include      The clinical goals for the shift include patient will remain safe during shift      Problem: Pain - Adult  Goal: Verbalizes/displays adequate comfort level or baseline comfort level  Outcome: Progressing     Problem: Safety - Adult  Goal: Free from fall injury  Outcome: Progressing     Problem: Discharge Planning  Goal: Discharge to home or other facility with appropriate resources  Outcome: Progressing     Problem: Chronic Conditions and Co-morbidities  Goal: Patient's chronic conditions and co-morbidity symptoms are monitored and maintained or improved  Outcome: Progressing     Problem: Nutrition  Goal: Nutrient intake appropriate for maintaining nutritional needs  Outcome: Progressing     Problem: Respiratory  Goal: Clear secretions with interventions this shift  Outcome: Progressing

## 2025-04-24 NOTE — PROGRESS NOTES
"  Subjective    Overnight Events: None    Per father, patient's activity levels are returning to baseline and he appears better. He had a BM this morning.    Objective    Vitals  Visit Vitals  /68   Pulse 95   Temp 36.6 °C (97.9 °F)   Resp 24   Ht 1.524 m (5')   Wt 59.4 kg (130 lb 15.3 oz)   SpO2 95%   BMI 25.58 kg/m²   Smoking Status Never   BSA 1.59 m²       Physical Exam    Physical Exam  Constitutional:       General: He is not in acute distress.  HENT:      Head: Atraumatic.   Cardiovascular:      Rate and Rhythm: Normal rate and regular rhythm.   Pulmonary:      Effort: Pulmonary effort is normal.      Breath sounds: Wheezing and rhonchi present.   Abdominal:      General: Abdomen is flat.      Palpations: Abdomen is soft.      Comments: PEG tube in place with minimal leaking   Skin:     General: Skin is warm and dry.   Neurological:      Mental Status: He is alert. Mental status is at baseline.           IOs    Intake/Output Summary (Last 24 hours) at 4/24/2025 0736  Last data filed at 4/24/2025 0427  Gross per 24 hour   Intake 410 ml   Output 2150 ml   Net -1740 ml       Labs:   Results from last 72 hours   Lab Units 04/23/25  0524 04/22/25  0521   SODIUM mmol/L 134* 134*   POTASSIUM mmol/L 4.0 3.8   CHLORIDE mmol/L 100 101   CO2 mmol/L 26 25   BUN mg/dL 10 8   CREATININE mg/dL 0.34* 0.41*   GLUCOSE mg/dL 113* 123*   CALCIUM mg/dL 8.5* 8.1*   ANION GAP mmol/L 12 12   EGFR mL/min/1.73m*2 >90 >90   PHOSPHORUS mg/dL 3.3 2.6      Results from last 72 hours   Lab Units 04/24/25  0623 04/23/25  0524 04/22/25  0521   WBC AUTO x10*3/uL 11.5* 11.8* 10.5   HEMOGLOBIN g/dL 11.9* 11.5* 11.5*   HEMATOCRIT % 35.8* 35.2* 34.6*   PLATELETS AUTO x10*3/uL 376 313 243   NEUTROS PCT AUTO % 72.4 72.7 78.6   LYMPHS PCT AUTO % 14.8 15.9 13.5   MONOS PCT AUTO % 5.5 6.5 4.6   EOS PCT AUTO % 4.2 2.2 1.3      Lab Results   Component Value Date    CALCIUM 8.5 (L) 04/23/2025    PHOS 3.3 04/23/2025      No results found for: \"CRP\" " "  [unfilled]     Micro/ID:   No results found for the last 90 days.                   No lab exists for component: \"AGALPCRNB\"   Lab Results   Component Value Date    BLOODCULT No growth at 4 days -  FINAL REPORT 04/16/2025    BLOODCULT No growth at 4 days -  FINAL REPORT 04/16/2025     Recent Labs     04/16/25  1846   FLUAFLUVID Not Detected   FLUBFLUVID Not Detected   [id number] Not Detected        Images  XR chest 1 view  Result Date: 4/19/2025  1.  Increased perihilar congestion and/or pneumonia.   Signed by: Joesph Alves 4/19/2025 1:20 PM Dictation workstation:   [id number]    XR abdomen 1 view  Result Date: 4/18/2025  1. Interval decrease in stool burden within the rectum and descending colon compared to prior radiograph. Mild interval improvement in gaseous distention of the bowel loops with moderate residual as described above. 2. Redemonstration of hazy bibasilar airspace opacities, which can represent atelectasis versus pneumonia in appropriate clinical setting.       MACRO: None   Signed by: Kristi Strickland 4/18/2025 9:17 AM Dictation workstation:   [id number]    CT abdomen pelvis w IV contrast  Result Date: 4/17/2025  1.Large amount of formed stool throughout the colon including stool in the rectal vault measuring up to 8.5 cm in diameter with mild associated rectal wall thickening and minimal perirectal fat stranding. Findings suggestive of constipation with possible accompanying stercoral colitis.  Overall appearances not significantly changed compared to 8/11/2020.  No dilated small bowel loops. 2.Extensive consolidation throughout the lung bases left greater than right, new compared to prior compatible with extensive multifocal pneumonia. 3.Hepatic steatosis. 4.Mildly enlarged prostate. Signed by Darin Hilliard MD    XR chest 1 view  Result Date: 4/17/2025    Nasogastric tube over the gastric antrum.   Perihilar and basilar edema with retrocardiac airspace disease and left effusion worsened " from previous.   Signed by: Kulwinder Estrada 4/17/2025 2:58 PM Dictation workstation:   [id number]    XR abdomen 1 view  Result Date: 4/17/2025  Large volume dense stool in the rectum and left hemicolon. Diffuse gaseous distention of the bowel loops in the mid abdomen raises concern for a degree of bowel obstruction and could be related to fecal impaction. Recommend clinical correlation. Dedicated CT abdomen pelvis can be performed for further evaluation as clinically warranted.     MACRO: None   Signed by: Kristi Strickland 4/17/2025 10:37 AM Dictation workstation:   [id number]    XR chest 1 view  Result Date: 4/16/2025  Airspace opacities asymmetric in the left lung compatible with pneumonia; short-term progress imaging is recommended after treatment to assure resolution and exclude other underlying pathology.   MACRO: None   Signed by: Samuel Joshi 4/16/2025 7:02 PM Dictation workstation:   [id number]      Meds  Scheduled medications  Scheduled Medications[1]  Continuous medications  Continuous Medications[2]  PRN medications  PRN Medications[3]     Assessment and Plan    Assessment/Plan     Juan Carlos Ybarra is a 29 y.o. male with a PMH of PMH of spastic CP, developmental delay, seizure disorder admitted for management of suspected aspiration pneumonia placed on Airvo w/ worsening Oxygen saturation transferred to ICU for possible Intubation. Also suspecting possible SBO, NG tube placed, surgery consulted, CT abdomen shows significant stool burden. SBO ruled out. Treating for aspiration PNA. Aggressive bowel regimen resolved constipation. Avoided intubation in ICU. Transferred patient to floors on 4/23.      ACUTE ISSUES:     #AHRF 2/2 aspiration pneumonia - improved:  #Strep pneumonia:  - Not on O2 at home  - Remains on O2 support, wean down as tolerated   - Positive strep pneumo urine ags   - Continue Rocephin for 10 days total (4/16-4/25); finished a course of Azithromycin   - Continue DuoNeb PRN   - Aspiration  precautions   - No more chest vest physiotherapy to avoid emesis  - Look into ordering nebulizer machine for discharge     #Urinary retention:  - Bladder scans ordered  - Jackson removal 4/23     #Persisting Nausea/Vomiting:  - QTC normal on last EKG  - Continue Reglan 5 mg TID for vomiting  - Monitor     #Constipation - resolved:  - Continue BM regimen: colace daily     CHRONIC ISSUES:     #Seizure Disorder:  - Continue home Clonazepam 0.5 mg TID, Lacosamide 300 mg BID, Lamotrigine 200 mg TID, Zonisamide 100 mg daily  - Continue Seizure precautions     #Spastic Cerebral Palsy:  - Nonverbal at baseline  - Continue home Baclofen 20 mg for spasms     Fluid: Replete PRN  Electrolytes: Replete PRN  Nutrition: NPO, TF are running  GI ppx: None  DVT/PE ppx: Lovenox  Abx: Rocephin (4/19-4/25); finished Azithromycin  Drips: None  IV Lines: PIV  O2: 5L NC     Dispo: Transferred to step down on 4/23. Palliative following for prolonged hospitalization.  Estimated length of stay >2 days.     Julia Haddad MD  Transitional Year  PGY-1         [1] baclofen, 20 mg, oral, TID  cefTRIAXone, 1 g, intravenous, q24h  citalopram, 20 mg, oral, Daily  clonazePAM, 1 mg, oral, TID  docusate sodium, 100 mg, oral, Daily  enoxaparin, 40 mg, subcutaneous, q24h  glycopyrrolate, 2 mg, oral, TID  insulin lispro, 0-5 Units, subcutaneous, q6h JANNIE  ipratropium-albuteroL, 3 mL, nebulization, TID  lacosamide, 300 mg, g-tube, BID  lamoTRIgine, 200 mg, oral, TID  metoclopramide, 5 mg, intravenous, TID  zonisamide, 300 mg, oral, Nightly  [2]    [3] PRN medications: acetaminophen **OR** acetaminophen, acetaminophen, dextrose, dextrose, glucagon, glucagon, guaiFENesin, HYDROmorphone, ipratropium-albuteroL, LORazepam, ondansetron ODT **OR** ondansetron, oxyCODONE, oxyCODONE, oxygen

## 2025-04-25 ENCOUNTER — PATIENT MESSAGE (OUTPATIENT)
Dept: PRIMARY CARE CLINIC | Age: 30
End: 2025-04-25

## 2025-04-25 VITALS
DIASTOLIC BLOOD PRESSURE: 71 MMHG | WEIGHT: 130.95 LBS | SYSTOLIC BLOOD PRESSURE: 113 MMHG | TEMPERATURE: 98.8 F | OXYGEN SATURATION: 90 % | HEART RATE: 91 BPM | HEIGHT: 60 IN | BODY MASS INDEX: 25.71 KG/M2 | RESPIRATION RATE: 17 BRPM

## 2025-04-25 LAB
ALBUMIN SERPL BCP-MCNC: 3.1 G/DL (ref 3.4–5)
ALP SERPL-CCNC: 93 U/L (ref 33–120)
ALT SERPL W P-5'-P-CCNC: 24 U/L (ref 10–52)
ANION GAP SERPL CALC-SCNC: 11 MMOL/L (ref 10–20)
AST SERPL W P-5'-P-CCNC: 18 U/L (ref 9–39)
BACTERIA SPEC RESP CULT: ABNORMAL
BASOPHILS # BLD AUTO: 0.06 X10*3/UL (ref 0–0.1)
BASOPHILS NFR BLD AUTO: 0.8 %
BILIRUB SERPL-MCNC: 0.3 MG/DL (ref 0–1.2)
BUN SERPL-MCNC: 15 MG/DL (ref 6–23)
CALCIUM SERPL-MCNC: 8.7 MG/DL (ref 8.6–10.3)
CHLORIDE SERPL-SCNC: 102 MMOL/L (ref 98–107)
CO2 SERPL-SCNC: 26 MMOL/L (ref 21–32)
CREAT SERPL-MCNC: 0.45 MG/DL (ref 0.5–1.3)
EGFRCR SERPLBLD CKD-EPI 2021: >90 ML/MIN/1.73M*2
EOSINOPHIL # BLD AUTO: 0.3 X10*3/UL (ref 0–0.7)
EOSINOPHIL NFR BLD AUTO: 4 %
ERYTHROCYTE [DISTWIDTH] IN BLOOD BY AUTOMATED COUNT: 12.5 % (ref 11.5–14.5)
GLUCOSE BLD MANUAL STRIP-MCNC: 115 MG/DL (ref 74–99)
GLUCOSE BLD MANUAL STRIP-MCNC: 137 MG/DL (ref 74–99)
GLUCOSE BLD MANUAL STRIP-MCNC: 138 MG/DL (ref 74–99)
GLUCOSE SERPL-MCNC: 100 MG/DL (ref 74–99)
GRAM STN SPEC: ABNORMAL
GRAM STN SPEC: ABNORMAL
HCT VFR BLD AUTO: 36.8 % (ref 41–52)
HGB BLD-MCNC: 11.8 G/DL (ref 13.5–17.5)
IMM GRANULOCYTES # BLD AUTO: 0.18 X10*3/UL (ref 0–0.7)
IMM GRANULOCYTES NFR BLD AUTO: 2.4 % (ref 0–0.9)
LYMPHOCYTES # BLD AUTO: 1.83 X10*3/UL (ref 1.2–4.8)
LYMPHOCYTES NFR BLD AUTO: 24.3 %
MAGNESIUM SERPL-MCNC: 2.24 MG/DL (ref 1.6–2.4)
MCH RBC QN AUTO: 32.1 PG (ref 26–34)
MCHC RBC AUTO-ENTMCNC: 32.1 G/DL (ref 32–36)
MCV RBC AUTO: 100 FL (ref 80–100)
MONOCYTES # BLD AUTO: 0.63 X10*3/UL (ref 0.1–1)
MONOCYTES NFR BLD AUTO: 8.4 %
NEUTROPHILS # BLD AUTO: 4.53 X10*3/UL (ref 1.2–7.7)
NEUTROPHILS NFR BLD AUTO: 60.1 %
NRBC BLD-RTO: 0 /100 WBCS (ref 0–0)
PHOSPHATE SERPL-MCNC: 3.5 MG/DL (ref 2.5–4.9)
PLATELET # BLD AUTO: 439 X10*3/UL (ref 150–450)
POTASSIUM SERPL-SCNC: 4.2 MMOL/L (ref 3.5–5.3)
PROT SERPL-MCNC: 6.7 G/DL (ref 6.4–8.2)
RBC # BLD AUTO: 3.68 X10*6/UL (ref 4.5–5.9)
SODIUM SERPL-SCNC: 135 MMOL/L (ref 136–145)
WBC # BLD AUTO: 7.5 X10*3/UL (ref 4.4–11.3)

## 2025-04-25 PROCEDURE — 99239 HOSP IP/OBS DSCHRG MGMT >30: CPT

## 2025-04-25 PROCEDURE — 94760 N-INVAS EAR/PLS OXIMETRY 1: CPT

## 2025-04-25 PROCEDURE — 2500000001 HC RX 250 WO HCPCS SELF ADMINISTERED DRUGS (ALT 637 FOR MEDICARE OP)

## 2025-04-25 PROCEDURE — 83735 ASSAY OF MAGNESIUM: CPT

## 2025-04-25 PROCEDURE — 2500000004 HC RX 250 GENERAL PHARMACY W/ HCPCS (ALT 636 FOR OP/ED): Mod: JZ

## 2025-04-25 PROCEDURE — 94640 AIRWAY INHALATION TREATMENT: CPT

## 2025-04-25 PROCEDURE — 36415 COLL VENOUS BLD VENIPUNCTURE: CPT

## 2025-04-25 PROCEDURE — 84100 ASSAY OF PHOSPHORUS: CPT

## 2025-04-25 PROCEDURE — 2500000005 HC RX 250 GENERAL PHARMACY W/O HCPCS: Performed by: INTERNAL MEDICINE

## 2025-04-25 PROCEDURE — 85025 COMPLETE CBC W/AUTO DIFF WBC: CPT

## 2025-04-25 PROCEDURE — 99232 SBSQ HOSP IP/OBS MODERATE 35: CPT | Performed by: INTERNAL MEDICINE

## 2025-04-25 PROCEDURE — 2500000002 HC RX 250 W HCPCS SELF ADMINISTERED DRUGS (ALT 637 FOR MEDICARE OP, ALT 636 FOR OP/ED)

## 2025-04-25 PROCEDURE — 80053 COMPREHEN METABOLIC PANEL: CPT

## 2025-04-25 PROCEDURE — 82947 ASSAY GLUCOSE BLOOD QUANT: CPT

## 2025-04-25 RX ORDER — CEFTRIAXONE 1 G/50ML
1 INJECTION, SOLUTION INTRAVENOUS EVERY 24 HOURS
Status: COMPLETED | OUTPATIENT
Start: 2025-04-25 | End: 2025-04-25

## 2025-04-25 RX ADMIN — CEFTRIAXONE 1 G: 1 INJECTION, SOLUTION INTRAVENOUS at 12:55

## 2025-04-25 RX ADMIN — CITALOPRAM HYDROBROMIDE 20 MG: 20 TABLET ORAL at 09:16

## 2025-04-25 RX ADMIN — Medication 4 L/MIN: at 07:57

## 2025-04-25 RX ADMIN — LACOSAMIDE 300 MG: 50 TABLET, FILM COATED ORAL at 09:15

## 2025-04-25 RX ADMIN — BACLOFEN 20 MG: 10 TABLET ORAL at 09:16

## 2025-04-25 RX ADMIN — CLONAZEPAM 1 MG: 0.5 TABLET ORAL at 09:16

## 2025-04-25 RX ADMIN — METOCLOPRAMIDE 5 MG: 5 INJECTION, SOLUTION INTRAMUSCULAR; INTRAVENOUS at 09:16

## 2025-04-25 RX ADMIN — LAMOTRIGINE 200 MG: 100 TABLET ORAL at 09:16

## 2025-04-25 RX ADMIN — DOCUSATE SODIUM 100 MG: 50 LIQUID ORAL at 09:16

## 2025-04-25 RX ADMIN — IPRATROPIUM BROMIDE AND ALBUTEROL SULFATE 3 ML: 2.5; .5 SOLUTION RESPIRATORY (INHALATION) at 07:55

## 2025-04-25 RX ADMIN — GLYCOPYRROLATE 2 MG: 1 TABLET ORAL at 09:17

## 2025-04-25 RX ADMIN — ENOXAPARIN SODIUM 40 MG: 40 INJECTION SUBCUTANEOUS at 09:15

## 2025-04-25 ASSESSMENT — COGNITIVE AND FUNCTIONAL STATUS - GENERAL
MOVING FROM LYING ON BACK TO SITTING ON SIDE OF FLAT BED WITH BEDRAILS: TOTAL
DAILY ACTIVITIY SCORE: 6
PERSONAL GROOMING: TOTAL
MOVING TO AND FROM BED TO CHAIR: TOTAL
EATING MEALS: TOTAL
TURNING FROM BACK TO SIDE WHILE IN FLAT BAD: TOTAL
HELP NEEDED FOR BATHING: TOTAL
DRESSING REGULAR UPPER BODY CLOTHING: TOTAL
TOILETING: TOTAL
STANDING UP FROM CHAIR USING ARMS: TOTAL
WALKING IN HOSPITAL ROOM: TOTAL
DRESSING REGULAR LOWER BODY CLOTHING: TOTAL
MOBILITY SCORE: 6
CLIMB 3 TO 5 STEPS WITH RAILING: TOTAL

## 2025-04-25 ASSESSMENT — PAIN SCALES - WONG BAKER: WONGBAKER_NUMERICALRESPONSE: NO HURT

## 2025-04-25 NOTE — DISCHARGE INSTRUCTIONS
Please, take your home medications as instructed after being discharged from the hospital.    OTHER INSTRUCTIONS:  Work with your home health care company to get a suction device to help manage secretions. A paper prescription was given to you.    UPCOMING APPOINTMENTS:  Please, call and schedule follow-up with your family medicine provider for an appointment within 7 to 14 days after leaving the hospital.      If you experience any worsening symptoms or have any concerns, please contact your primary care provider to schedule an appointment. If you cannot get in touch with your primary care physician, please return to the nearest emergency room or urgent care clinic for an evaluation and treatment.     Thank you for choosing [facility] and allowing us to partake in your medical care!     - Your [facility] inpatient primary care team.

## 2025-04-25 NOTE — CARE PLAN
The patient's goals for the shift include  to get some rest    The clinical goals for the shift include patient will remain safe this shift        Problem: Pain - Adult  Goal: Verbalizes/displays adequate comfort level or baseline comfort level  Outcome: Progressing     Problem: Safety - Adult  Goal: Free from fall injury  Outcome: Progressing     Problem: Nutrition  Goal: Nutrient intake appropriate for maintaining nutritional needs  Outcome: Progressing     Problem: Respiratory  Goal: No signs of respiratory distress (eg. Use of accessory muscles. Peds grunting)  Outcome: Progressing     Problem: Respiratory  Goal: Wean oxygen to maintain O2 saturation per order/standard this shift  Outcome: Progressing

## 2025-04-25 NOTE — DISCHARGE SUMMARY
Discharge Diagnosis  Aspiration pneumonia / streptococcus pneumonia  Sepsis from pneumonia  Severe constipation  Acute hypoxic respiratory failure       Issues Requiring Follow-Up  Aspiration pneumonia / streptococcus pneumonia  Sepsis from pneumonia  Severe constipation  Acute hypoxic respiratory failure    Discharge Meds     Medication List      START taking these medications     miscellaneous medical supply misc; 1 each every 4 hours if needed   (congestion). 1 suction machine     CHANGE how you take these medications     zonisamide 100 mg capsule; Commonly known as: Zonegran; Take 3 capsules   (300 mg) by mouth once daily.; What changed: when to take this     CONTINUE taking these medications     baclofen 20 mg tablet; Commonly known as: Lioresal   citalopram 10 mg/5 mL solution; Commonly known as: CeleXA; Take 10 mL   (20 mg) by mouth once daily.   clonazePAM 0.5 mg tablet; Commonly known as: KlonoPIN; Take 2 tablets (1   mg) by mouth 3 times a day.   glycopyrrolate 1 mg tablet; Commonly known as: Robinul; Take 2 tablets   (2 mg) by mouth 3 times a day.   lacosamide 150 mg tablet tablet; Commonly known as: Vimpat; TAKE 2   TABLETS (300 MG) BY MOUTH 2 TIMES A DAY.   lactulose 20 gram/30 mL oral solution   lamoTRIgine 200 mg tablet; Commonly known as: LaMICtal; Take 1 tablet   (200 mg) by mouth 3 times a day.   polyethylene glycol 17 gram packet; Commonly known as: Glycolax, Miralax       Test Results Pending At Discharge  Pending Labs       No current pending labs.            Hospital Course  Juan Carlos Ybarra is a 29 y.o. male with a PMH of spastic CP, seizure disorder, who presented to [facility] with c/o fevers, cough, and increased mucus production. He was admitted to ICU for septic shock and AHRF 2/2 aspiration pneumonia in the setting of persistent vomiting requiring Airvo support and IV antibiotics.  His hypotension was attributed to sepsis 2/2 pneumonia and was addressed with aggressive fluid resuscitation.  Initially, patient's symptom of vomiting was suspected to be caused by SBO.  Subsequently NG tube was placed and general surgery consulted, and CT AP was ordered which was unremarkable for SBO but concerning for a high stool burden, which was addressed with aggressive bowel regiment.      Placed on VEST therapy to improve mucous plugging but was discontinued as it was causing recurring vomiting with worsening infiltrates on repeat CXR. His respiratory status improved after resolution of nausea/vomiting with scheduled Reglan and he was successfully weaned off of Airvo 50/90 down to 10L NC and then down to 4L NC.     He was hemodynamically stable for transfer to the general medicine floor on 4/23. Medicine team notified. Continue addressing urinary retention and antibiotics for aspiration pneumonia.     On medicine floor patient completed a 10 days course of antibiotics for his pneumonia. His bowel movements remained regular. He returned to baseline activity levels and was weaned to room air by 4/25. Discharged home on 4/25 with instructions to follow up with his PCP and a prescription for a suction device to manage future increased secretions as needed.    Pertinent Physical Exam At Time of Discharge  Physical Exam  Constitutional:       General: He is not in acute distress.     Appearance: He is not ill-appearing.   HENT:      Head: Atraumatic.   Cardiovascular:      Rate and Rhythm: Normal rate and regular rhythm.   Pulmonary:      Effort: Pulmonary effort is normal.      Breath sounds: Rhonchi present.   Abdominal:      General: Abdomen is flat.      Palpations: Abdomen is soft.      Tenderness: There is no abdominal tenderness.      Comments: PEG tube in place, leaking some brown fluid around insertion site. At baseline with leakage. No signs of infection.   Skin:     General: Skin is warm and dry.   Neurological:      Mental Status: He is alert. Mental status is at baseline.         Outpatient Follow-Up  Future  Appointments   Date Time Provider Department Center   8/8/2025  2:20 PM Misha Dumont MD [id number] [facility]       Julia Haddad MD  Transitional Year  PGY-1

## 2025-04-25 NOTE — PROGRESS NOTES
"Subjective   Subjective:   History Of Present Illness:  Juan Carlos Ybarra is a 29 y.o. male was seen and evaluated at bedside today. Overnight, no reported acute events. Patient is in no acute distress. Per family, seems like he is doing better/more awake today. Was taken off O2 this AM.      12-point ROS performed, negative aside from noted above     Objective   Objective:     /58 (BP Location: Right leg, Patient Position: Lying)   Pulse 81   Temp 36.7 °C (98.1 °F) (Temporal)   Resp 20   Ht 1.524 m (5')   Wt 59.4 kg (130 lb 15.3 oz)   SpO2 96%   BMI 25.58 kg/m²     Physical Exam  Constitutional:       General: He is not in acute distress.  HENT:      Mouth/Throat:      Mouth: Mucous membranes are moist.   Eyes:      Conjunctiva/sclera: Conjunctivae normal.   Cardiovascular:      Rate and Rhythm: Normal rate and regular rhythm.   Pulmonary:      Effort: Pulmonary effort is normal. No respiratory distress.      Comments: Mild exp wheezing, on RA  Abdominal:      Tenderness: There is no abdominal tenderness.   Musculoskeletal:      Right lower leg: No edema.      Left lower leg: No edema.   Skin:     General: Skin is warm and dry.   Neurological:      Mental Status: Mental status is at baseline.         Lab Work:     Lab Results   Component Value Date    WBC 7.5 04/25/2025    HGB 11.8 (L) 04/25/2025    HCT 36.8 (L) 04/25/2025     04/25/2025     04/25/2025     Lab Results   Component Value Date    GLUCOSE 100 (H) 04/25/2025    CALCIUM 8.7 04/25/2025     (L) 04/25/2025    K 4.2 04/25/2025    CO2 26 04/25/2025     04/25/2025    BUN 15 04/25/2025    CREATININE 0.45 (L) 04/25/2025     No results found for: \"HGBA1C\", \"TSH\", \"VITD25\"  Cultures:   Susceptibility data from last 90 days.  Collected Specimen Info Organism   04/22/25 Fluid from SPUTUM Corynebacterium striatum group     Images:     XR chest 1 view   Final Result   Airspace opacities asymmetric in the left lung compatible with "   pneumonia; short-term progress imaging is recommended after treatment   to assure resolution and exclude other underlying pathology.        MACRO:   None        Signed by: Samuel Joshi 4/16/2025 7:02 PM   Dictation workstation:   [id number]         Medications:     Scheduled:  Scheduled Medications[1]Continuous:  Continuous Medications[2]  PRN:  PRN Medications[3]     Assessment & Plan:     Juan Carlos Ybarra is a 29 y.o. male with pmhx spastic CP, developmental delay, seizure disorder admitted for management of aspiration pneumonia. On HFNC.    #Aspiration PNA  #AHRF 2/2 PNA  - Completed 4 days azithro  - Completed 10 days of abx treatment today  - Pt on RA currently, seems to be much improved  - Pulm to sign off at this time    O2: RA    Marilee Elise, DO  Internal Medicine PGY-1         [1] baclofen, 20 mg, oral, TID  cefTRIAXone, 1 g, intravenous, q24h  citalopram, 20 mg, oral, Daily  clonazePAM, 1 mg, oral, TID  docusate sodium, 100 mg, oral, Daily  enoxaparin, 40 mg, subcutaneous, q24h  glycopyrrolate, 2 mg, oral, TID  insulin lispro, 0-5 Units, subcutaneous, q6h JANNIE  ipratropium-albuteroL, 3 mL, nebulization, TID  lacosamide, 300 mg, g-tube, BID  lamoTRIgine, 200 mg, oral, TID  metoclopramide, 5 mg, intravenous, TID  zonisamide, 300 mg, oral, Nightly     [2]    [3] PRN medications: acetaminophen **OR** acetaminophen, acetaminophen, dextrose, dextrose, glucagon, glucagon, guaiFENesin, HYDROmorphone, ipratropium-albuteroL, LORazepam, ondansetron ODT **OR** ondansetron, oxyCODONE, oxyCODONE, oxygen

## 2025-04-25 NOTE — CARE PLAN
The patient's goals for the shift include  pt will be discharged home  Problem: Pain - Adult  Goal: Verbalizes/displays adequate comfort level or baseline comfort level  4/25/2025 1318 by Deepa Whitaker RN  Outcome: Progressing  4/25/2025 1317 by Deepa Whitaker RN  Outcome: Progressing     Problem: Safety - Adult  Goal: Free from fall injury  4/25/2025 1318 by Deepa Whitaker RN  Outcome: Progressing  4/25/2025 1317 by Deepa Whitaker RN  Outcome: Progressing     Problem: Discharge Planning  Goal: Discharge to home or other facility with appropriate resources  4/25/2025 1318 by Deepa Whitaker RN  Outcome: Progressing  4/25/2025 1317 by Deepa Whitaker RN  Outcome: Progressing     Problem: Chronic Conditions and Co-morbidities  Goal: Patient's chronic conditions and co-morbidity symptoms are monitored and maintained or improved  4/25/2025 1318 by Deepa Whitaker RN  Outcome: Progressing  4/25/2025 1317 by Deepa Whitaker RN  Outcome: Progressing     Problem: Nutrition  Goal: Nutrient intake appropriate for maintaining nutritional needs  4/25/2025 1318 by Deepa Whitaker RN  Outcome: Progressing  4/25/2025 1317 by Deepa Whitaker RN  Outcome: Progressing     Problem: Respiratory  Goal: Clear secretions with interventions this shift  4/25/2025 1318 by Deepa Whitaker RN  Outcome: Progressing  4/25/2025 1317 by Deepa Whitaker RN  Outcome: Progressing  Goal: Minimize anxiety/maximize coping throughout shift  4/25/2025 1318 by Deepa Whitaker RN  Outcome: Progressing  4/25/2025 1317 by Deepa Whitaker RN  Outcome: Progressing  Goal: Minimal/no exertional discomfort or dyspnea this shift  4/25/2025 1318 by Deepa Whitaker RN  Outcome: Progressing  4/25/2025 1317 by Deepa Whitaker RN  Outcome: Progressing  Goal: No signs of respiratory distress (eg. Use of accessory muscles. Peds grunting)  4/25/2025 1318 by Deepa Whitaker RN  Outcome: Progressing  4/25/2025 1317 by Deepa Whitaker  RN  Outcome: Progressing  Goal: Patent airway maintained this shift  4/25/2025 1318 by Deepa Whitaker RN  Outcome: Progressing  4/25/2025 1317 by Deepa Whitaker RN  Outcome: Progressing  Goal: Tolerate mechanical ventilation evidenced by VS/agitation level this shift  4/25/2025 1318 by Deepa Whitaker RN  Outcome: Progressing  4/25/2025 1317 by Deepa Whitaker RN  Outcome: Progressing  Goal: Tolerate pulmonary toileting this shift  4/25/2025 1318 by Deepa Whitaker RN  Outcome: Progressing  4/25/2025 1317 by Deepa Whitaker RN  Outcome: Progressing  Goal: Verbalize decreased shortness of breath this shift  4/25/2025 1318 by Deepa Whitaker RN  Outcome: Progressing  4/25/2025 1317 by Deepa Whitaker RN  Outcome: Progressing  Goal: Wean oxygen to maintain O2 saturation per order/standard this shift  4/25/2025 1318 by Deepa Whitaker RN  Outcome: Progressing  4/25/2025 1317 by Deepa Whitaker RN  Outcome: Progressing  Goal: Increase self care and/or family involvement in next 24 hours  4/25/2025 1318 by Deepa Whitaker RN  Outcome: Progressing  4/25/2025 1317 by Deepa Whitaker RN  Outcome: Progressing     Problem: Skin  Goal: Decreased wound size/increased tissue granulation at next dressing change  4/25/2025 1318 by Deepa Whitaker RN  Outcome: Progressing  Flowsheets (Taken 4/24/2025 2234 by Jinny Fish RN)  Decreased wound size/increased tissue granulation at next dressing change: Protective dressings over bony prominences  4/25/2025 1317 by Deepa Whitaker RN  Outcome: Progressing  Goal: Participates in plan/prevention/treatment measures  4/25/2025 1318 by Deepa Whitaker RN  Outcome: Progressing  Flowsheets (Taken 4/24/2025 1307 by Chester Golden RN)  Participates in plan/prevention/treatment measures: Discuss with provider PT/OT consult  4/25/2025 1317 by Deepa Whitaker RN  Outcome: Progressing  Goal: Prevent/manage excess moisture  4/25/2025 1318 by Deepa Whitaker  RN  Outcome: Progressing  Flowsheets (Taken 4/24/2025 1307 by Chester Golden RN)  Prevent/manage excess moisture: Moisturize dry skin  4/25/2025 1317 by Deepa Whitaker RN  Outcome: Progressing  Goal: Prevent/minimize sheer/friction injuries  4/25/2025 1318 by Deepa Whitaker RN  Outcome: Progressing  Flowsheets (Taken 4/24/2025 1307 by Chester Golden, RN)  Prevent/minimize sheer/friction injuries: HOB 30 degrees or less  4/25/2025 1317 by Deepa Whitaker RN  Outcome: Progressing  Goal: Promote/optimize nutrition  4/25/2025 1318 by Deepa Whitaker RN  Outcome: Progressing  Flowsheets (Taken 4/24/2025 1307 by Chester Golden, GREG)  Promote/optimize nutrition: Monitor/record intake including meals  4/25/2025 1317 by Deepa Whitaker RN  Outcome: Progressing  Goal: Promote skin healing  4/25/2025 1318 by Deepa Whitaker RN  Outcome: Progressing  Flowsheets (Taken 4/24/2025 1307 by Chester Golden RN)  Promote skin healing: Turn/reposition every 2 hours/use positioning/transfer devices  4/25/2025 1317 by Deepa Whitaker RN  Outcome: Progressing     Problem: Fall/Injury  Goal: Not fall by end of shift  4/25/2025 1318 by Deepa Whitaker RN  Outcome: Progressing  4/25/2025 1317 by Deepa Whitaker RN  Outcome: Progressing  Goal: Be free from injury by end of the shift  4/25/2025 1318 by Deepa Whitaker RN  Outcome: Progressing  4/25/2025 1317 by Deepa Whitaker RN  Outcome: Progressing  Goal: Verbalize understanding of personal risk factors for fall in the hospital  4/25/2025 1318 by Deepa Whitaker RN  Outcome: Progressing  4/25/2025 1317 by Deepa Whitaker RN  Outcome: Progressing  Goal: Verbalize understanding of risk factor reduction measures to prevent injury from fall in the home  4/25/2025 1318 by Deepa Whitaker RN  Outcome: Progressing  4/25/2025 1317 by Deepa Whitaker RN  Outcome: Progressing  Goal: Use assistive devices by end of the shift  4/25/2025 1318 by Deepa  GREG Whitaker  Outcome: Progressing  4/25/2025 1317 by Deepa Whitaker RN  Outcome: Progressing  Goal: Pace activities to prevent fatigue by end of the shift  4/25/2025 1318 by Deepa Whitaker RN  Outcome: Progressing  4/25/2025 1317 by Deepa Whitaker RN  Outcome: Progressing       The clinical goals for the shift include patient will remain hemodynamically stable    Over the shift, the patient did not make progress toward the following goals. Barriers to progression include none. Recommendations to address these barriers include none.

## 2025-04-25 NOTE — PROGRESS NOTES
Palliative Care Social Work Note     Met with pt, mother, and friend.  Pt now on room air.  Pt non verbal.  Family hoping for dc today, mother will transport pt home.  No further palliative care needs, will sign off.  Team updated.

## 2025-04-25 NOTE — NURSING NOTE
Discharge instructions reviewed with patients mother. No questions at this time. No new medications given. Patient mothers verbalized understanding. Handout on PNA given. Telemetry removed and left at nurses station, masimo removed and left at bedside. IV removed. Discharged home in stable condition.

## 2025-04-25 NOTE — CARE PLAN
The patient's goals for the shift include  to go home    The clinical goals for the shift include patient will remain hemodynamically stable    Over the shift, the patient did not make progress toward the following goals. Barriers to progression include none. Recommendations to address these barriers include none.

## 2025-04-28 RX ORDER — BACLOFEN 20 MG/1
20 TABLET ORAL 3 TIMES DAILY
Qty: 90 TABLET | Refills: 0 | Status: SHIPPED | OUTPATIENT
Start: 2025-04-28

## 2025-04-28 NOTE — SIGNIFICANT EVENT
Follow Up Phone Call    Outgoing phone call    Spoke to: Juan Carlos Ybarra Relationship:familymother   Phone number: [phone]      Outcome: contacted patient/ family   Chief Complaint   Patient presents with    Shortness of Breath          Diagnosis:Not applicable    States he is feeling better. No further questions or concerns.

## 2025-04-28 NOTE — TELEPHONE ENCOUNTER
Name of Medication(s) Requested:  Requested Prescriptions     Pending Prescriptions Disp Refills    baclofen (LIORESAL) 20 MG tablet 90 tablet 0     Sig: Take 1 tablet by mouth 3 times daily 1 tablet po tid       Medication is on current medication list Yes    Dosage and directions were verified? Yes    Quantity verified: 90 day supply     Pharmacy Verified?  Yes    Last Appointment:  1/30/2025    Future appts:  Future Appointments   Date Time Provider Department Center   6/5/2025  2:30 PM Amanda Beth MD [facility] DE[facility]        (If no appt send self scheduling link. .REFILLAPPT)  Scheduling request sent?     [] Yes  [] No    Does patient need updated?  [] Yes  [] No

## 2025-04-30 PROBLEM — J98.8 CONGESTION OF UPPER AIRWAY: Status: ACTIVE | Noted: 2025-04-30

## 2025-05-21 DIAGNOSIS — F41.9 ANXIETY: ICD-10-CM

## 2025-05-22 RX ORDER — CLONAZEPAM 0.5 MG/1
1 TABLET ORAL 3 TIMES DAILY
Qty: 180 TABLET | Refills: 2 | Status: SHIPPED | OUTPATIENT
Start: 2025-05-22 | End: 2025-08-20

## 2025-05-27 RX ORDER — BACLOFEN 20 MG/1
20 TABLET ORAL 3 TIMES DAILY
Qty: 90 TABLET | Refills: 0 | OUTPATIENT
Start: 2025-05-27

## 2025-05-27 RX ORDER — BACLOFEN 20 MG/1
20 TABLET ORAL 3 TIMES DAILY
Qty: 90 TABLET | Refills: 1 | OUTPATIENT
Start: 2025-05-27

## 2025-05-27 NOTE — TELEPHONE ENCOUNTER
Name of Medication(s) Requested:  Requested Prescriptions     Pending Prescriptions Disp Refills    baclofen (LIORESAL) 20 MG tablet 90 tablet 0     Sig: Take 1 tablet by mouth 3 times daily 1 tablet po tid       Medication is on current medication list Yes    Dosage and directions were verified? Yes    Quantity verified: 90 day supply     Pharmacy Verified?  Yes    Last Appointment:  1/30/2025    Future appts:  Future Appointments   Date Time Provider Department Center   6/5/2025  2:30 PM Amanda Beth MD [facility] DEP        (If no appt send self scheduling link. .REFILLAPPT)  Scheduling request sent?     [] Yes  [] No    Does patient need updated?  [] Yes  [] No

## 2025-06-02 RX ORDER — BACLOFEN 20 MG/1
20 TABLET ORAL 3 TIMES DAILY
Qty: 270 TABLET | Refills: 0 | Status: SHIPPED | OUTPATIENT
Start: 2025-06-02

## 2025-06-05 ENCOUNTER — OFFICE VISIT (OUTPATIENT)
Dept: PRIMARY CARE CLINIC | Age: 30
End: 2025-06-05
Payer: COMMERCIAL

## 2025-06-05 VITALS
SYSTOLIC BLOOD PRESSURE: 108 MMHG | HEART RATE: 80 BPM | BODY MASS INDEX: 26.95 KG/M2 | DIASTOLIC BLOOD PRESSURE: 74 MMHG | HEIGHT: 60 IN | TEMPERATURE: 97.2 F | OXYGEN SATURATION: 93 %

## 2025-06-05 DIAGNOSIS — R79.89 ABNORMAL LIVER FUNCTION TESTS: ICD-10-CM

## 2025-06-05 DIAGNOSIS — K21.9 GASTROESOPHAGEAL REFLUX DISEASE WITHOUT ESOPHAGITIS: ICD-10-CM

## 2025-06-05 DIAGNOSIS — G80.0 SPASTIC QUADRIPLEGIC CEREBRAL PALSY (HCC): ICD-10-CM

## 2025-06-05 DIAGNOSIS — R56.9 SEIZURES (HCC): ICD-10-CM

## 2025-06-05 DIAGNOSIS — J69.0 ASPIRATION PNEUMONIA OF BOTH LUNGS, UNSPECIFIED ASPIRATION PNEUMONIA TYPE, UNSPECIFIED PART OF LUNG (HCC): Primary | ICD-10-CM

## 2025-06-05 DIAGNOSIS — Z87.09 H/O ACUTE RESPIRATORY FAILURE: ICD-10-CM

## 2025-06-05 DIAGNOSIS — F41.9 ANXIETY: ICD-10-CM

## 2025-06-05 DIAGNOSIS — F79 MENTALLY DISABLED: ICD-10-CM

## 2025-06-05 PROCEDURE — 99214 OFFICE O/P EST MOD 30 MIN: CPT | Performed by: INTERNAL MEDICINE

## 2025-06-05 RX ORDER — FAMOTIDINE 20 MG/1
20 TABLET, FILM COATED ORAL 2 TIMES DAILY
Qty: 60 TABLET | Refills: 3 | Status: SHIPPED | OUTPATIENT
Start: 2025-06-05

## 2025-06-05 RX ORDER — GLYCOPYRROLATE 1 MG/1
3 TABLET ORAL 3 TIMES DAILY
Qty: 270 TABLET | Refills: 3 | Status: SHIPPED | OUTPATIENT
Start: 2025-06-05

## 2025-06-05 RX ORDER — CITALOPRAM HYDROBROMIDE 10 MG/5ML
20 SOLUTION ORAL DAILY
Qty: 300 ML | Refills: 2 | Status: SHIPPED | OUTPATIENT
Start: 2025-06-05

## 2025-06-05 NOTE — PROGRESS NOTES
Chief Complaint   Patient presents with    Follow-up     Patient is a routine visit with labs from April on file to review. Patient was hospitalized then for Pneumonia.        HPI:  Patient is here for follow-up of hospitalization for aspiration pneumonia and acute respiratory failure.  All his records from the hospital was reviewed including his blood work and his imaging studies.  Also his blood gases were showing respiratory failure with hypoxemia but they decided not to intubate him at this point and patient had pull-through just on 4 L nasal cannula..  He was placed on ceftriaxone and Zithromax and he did very well.  His blood work was followed closely and so was his chest x-ray which was still showing some pneumonic infiltrate on discharge.  Patient was counseled that the x-ray would lag behind few days and we will repeat the chest x-ray just to ensure stability.    Today he is accompanied by his mother and his caregiver.  He appears to be in stable usual health.  There is no cough or shortness of breath but he has been having a lot of drooling lately and they think that this might be the reason for his aspiration pneumonia.  They were also advised not to feed him with tube feeding unless he is in a sitting position.  .  We will increase his Robinul to 3 mg 3 times a day  Will also start him on Pepcid 20 mg daily.  Patient was advised to discuss with neurology in the next visit    Past Medical History, Surgical History, and Family History has been reviewed and updated.    Review of Systems:  Constitutional:  No fever, no fatigue, no chills, no headaches, no weight change  Dermatology:  No rash, no mole, no dry or sensitive skin  ENT:  No cough, no sore throat, no sinus pain, no runny nose, no ear pain  Cardiology:  No chest pain, no palpitations, no leg edema, no shortness of breath, no PND  Gastroenterology:  No dysphagia, no abdominal pain, no nausea, no vomiting, no constipation, no diarrhea, no

## 2025-06-09 DIAGNOSIS — G40.909 SEIZURE DISORDER (MULTI): ICD-10-CM

## 2025-06-10 RX ORDER — LACOSAMIDE 150 MG/1
300 TABLET ORAL 2 TIMES DAILY
Qty: 120 TABLET | Refills: 5 | Status: SHIPPED | OUTPATIENT
Start: 2025-06-10

## 2025-06-26 RX ORDER — LACTULOSE 10 G/15ML
10 SOLUTION ORAL DAILY
Qty: 946 ML | Refills: 3 | Status: SHIPPED | OUTPATIENT
Start: 2025-06-26

## 2025-07-21 DIAGNOSIS — G40.909 SEIZURE DISORDER (MULTI): ICD-10-CM

## 2025-07-21 RX ORDER — ZONISAMIDE 100 MG/1
300 CAPSULE ORAL DAILY
Qty: 90 CAPSULE | Refills: 5 | Status: SHIPPED | OUTPATIENT
Start: 2025-07-21 | End: 2026-01-17

## 2025-07-22 DIAGNOSIS — F41.9 ANXIETY: ICD-10-CM

## 2025-07-22 RX ORDER — CITALOPRAM HYDROBROMIDE 10 MG/5ML
20 SOLUTION ORAL DAILY
Qty: 300 ML | Refills: 5 | Status: SHIPPED | OUTPATIENT
Start: 2025-07-22 | End: 2026-01-18

## 2025-08-08 ENCOUNTER — APPOINTMENT (OUTPATIENT)
Dept: PEDIATRIC NEUROLOGY | Facility: CLINIC | Age: 30
End: 2025-08-08
Payer: COMMERCIAL

## 2025-08-08 VITALS — BODY MASS INDEX: 24.76 KG/M2 | WEIGHT: 126.76 LBS

## 2025-08-08 DIAGNOSIS — R56.9 SEIZURE (MULTI): ICD-10-CM

## 2025-08-08 DIAGNOSIS — G80.9 CEREBRAL PALSY, UNSPECIFIED TYPE (MULTI): Primary | ICD-10-CM

## 2025-08-08 PROCEDURE — 99213 OFFICE O/P EST LOW 20 MIN: CPT | Performed by: PSYCHIATRY & NEUROLOGY

## 2025-08-14 RX ORDER — LACTULOSE 10 G/15ML
30 SOLUTION ORAL DAILY
Qty: 4050 ML | Refills: 1 | Status: SHIPPED | OUTPATIENT
Start: 2025-08-14

## 2025-08-28 DIAGNOSIS — G40.909 SEIZURE DISORDER (MULTI): ICD-10-CM

## 2025-08-28 DIAGNOSIS — F41.9 ANXIETY: ICD-10-CM

## 2025-08-28 RX ORDER — CLONAZEPAM 0.5 MG/1
1 TABLET ORAL 3 TIMES DAILY
Qty: 180 TABLET | Refills: 2 | Status: SHIPPED | OUTPATIENT
Start: 2025-08-28 | End: 2025-11-26

## 2025-08-28 RX ORDER — BACLOFEN 20 MG/1
20 TABLET ORAL 3 TIMES DAILY
Qty: 270 TABLET | Refills: 0 | Status: SHIPPED | OUTPATIENT
Start: 2025-08-28

## 2025-08-28 RX ORDER — LAMOTRIGINE 200 MG/1
200 TABLET ORAL 3 TIMES DAILY
Qty: 90 TABLET | Refills: 5 | Status: SHIPPED | OUTPATIENT
Start: 2025-08-28

## 2025-08-29 ENCOUNTER — APPOINTMENT (OUTPATIENT)
Dept: NEUROLOGY | Facility: HOSPITAL | Age: 30
End: 2025-08-29
Payer: COMMERCIAL

## 2026-08-14 ENCOUNTER — APPOINTMENT (OUTPATIENT)
Dept: PEDIATRIC NEUROLOGY | Facility: CLINIC | Age: 31
End: 2026-08-14
Payer: COMMERCIAL

## (undated) DEVICE — HAND SET

## (undated) DEVICE — DOUBLE BASIN SET: Brand: MEDLINE INDUSTRIES, INC.

## (undated) DEVICE — TUBING, SUCTION, 3/16" X 12', STRAIGHT: Brand: MEDLINE

## (undated) DEVICE — GOWN,SIRUS,FABRNF,XL,20/CS: Brand: MEDLINE

## (undated) DEVICE — GLOVE SURG SZ 65 THK91MIL LTX FREE SYN POLYISOPRENE

## (undated) DEVICE — COUNTER NDL 30 COUNT DBL MAG

## (undated) DEVICE — PAD,NON-ADHERENT,3X8,STERILE,LF,1/PK: Brand: MEDLINE

## (undated) DEVICE — SET PROPHY

## (undated) DEVICE — GLOVE ORANGE PI 7 1/2   MSG9075

## (undated) DEVICE — TUBING SUCT 12FR MAL ALUM SHFT FN CAP VENT UNIV CONN W/ OBT

## (undated) DEVICE — GOWN,SIRUS,FABRNF,L,20/CS: Brand: MEDLINE

## (undated) DEVICE — NDLCTR: FOAM/MAG 40CT 64/CS: Brand: MEDICAL ACTION INDUSTRIES

## (undated) DEVICE — COVER,LIGHT HANDLE,FLX,2/PK: Brand: MEDLINE INDUSTRIES, INC.

## (undated) DEVICE — TUBING, SUCTION, 1/4" X 10', STRAIGHT: Brand: MEDLINE

## (undated) DEVICE — SYRINGE BLB 50CC IRRIG PLIABLE FNGR FLNG GRAD FLSK DISP

## (undated) DEVICE — GARMENT,MEDLINE,DVT,INT,CALF,MED, GEN2: Brand: MEDLINE

## (undated) DEVICE — SURGICAL PROCEDURE PACK EENT CUST

## (undated) DEVICE — CHLORAPREP 26ML ORANGE

## (undated) DEVICE — CONTROL SYRINGE LUER-LOCK TIP: Brand: MONOJECT

## (undated) DEVICE — GAUZE,SPONGE,4"X4",16PLY,XRAY,STRL,LF: Brand: MEDLINE

## (undated) DEVICE — STANDARD HYPODERMIC NEEDLE,POLYPROPYLENE HUB: Brand: MONOJECT

## (undated) DEVICE — JELLY PETROLEUM 5GR FOIL PK

## (undated) DEVICE — DENTAL: Brand: MEDLINE INDUSTRIES, INC.

## (undated) DEVICE — MARKER,SKIN,WI/RULER AND LABELS: Brand: MEDLINE

## (undated) DEVICE — SPONGE LAP W3/8XL1.5IN COT CYL CNTR STRUNG N RADPQ STRL

## (undated) DEVICE — SYRINGE,EAR/ULCER, 3 OZ, STERILE: Brand: MEDLINE

## (undated) DEVICE — PATIENT RETURN ELECTRODE, SINGLE-USE, CONTACT QUALITY MONITORING, ADULT, WITH 9FT CORD, FOR PATIENTS WEIGING OVER 33LBS. (15KG): Brand: MEGADYNE

## (undated) DEVICE — ELECTRODE PT RET AD L9FT HI MOIST COND ADH HYDRGEL CORDED

## (undated) DEVICE — INTENDED FOR TISSUE SEPARATION, AND OTHER PROCEDURES THAT REQUIRE A SHARP SURGICAL BLADE TO PUNCTURE OR CUT.: Brand: BARD-PARKER ® STAINLESS STEEL BLADES

## (undated) DEVICE — YANKAUER,OPEN TIP,W/O VENT,STERILE: Brand: MEDLINE INDUSTRIES, INC.

## (undated) DEVICE — GAUZE,SPONGE,4"X4",16PLY,STRL,LF,10/TRAY: Brand: MEDLINE

## (undated) DEVICE — PENCIL ES L3M BTTN SWCH HOLSTER W/ BLDE ELECTRD EDGE

## (undated) DEVICE — PACKING,VAGINAL,XR,ST,4"X36",100EA: Brand: MEDLINE

## (undated) DEVICE — BASIC SINGLE BASIN 1-LF: Brand: MEDLINE INDUSTRIES, INC.

## (undated) DEVICE — GOWN,SIRUS,NONRNF,SETINSLV,XL,20/CS: Brand: MEDLINE

## (undated) DEVICE — CATHETER ETER URETH 30FR BLLN 5CC LTX 2 W F BARDX LUB

## (undated) DEVICE — TOWEL,OR,DSP,ST,BLUE,STD,6/PK,12PK/CS: Brand: MEDLINE